# Patient Record
Sex: FEMALE | Race: WHITE | HISPANIC OR LATINO | Employment: OTHER | URBAN - METROPOLITAN AREA
[De-identification: names, ages, dates, MRNs, and addresses within clinical notes are randomized per-mention and may not be internally consistent; named-entity substitution may affect disease eponyms.]

---

## 2017-01-09 ENCOUNTER — OFFICE VISIT (OUTPATIENT)
Dept: SPORTS MEDICINE | Facility: CLINIC | Age: 82
End: 2017-01-09
Payer: MEDICARE

## 2017-01-09 VITALS — WEIGHT: 180 LBS | BODY MASS INDEX: 36.29 KG/M2 | HEIGHT: 59 IN | TEMPERATURE: 98 F

## 2017-01-09 DIAGNOSIS — M17.0 PRIMARY OSTEOARTHRITIS OF BOTH KNEES: Primary | ICD-10-CM

## 2017-01-09 PROCEDURE — 20611 DRAIN/INJ JOINT/BURSA W/US: CPT | Mod: 50,PBBFAC,PO | Performed by: FAMILY MEDICINE

## 2017-01-09 PROCEDURE — 99999 PR PBB SHADOW E&M-EST. PATIENT-LVL III: CPT | Mod: PBBFAC,,, | Performed by: FAMILY MEDICINE

## 2017-01-09 PROCEDURE — 99499 UNLISTED E&M SERVICE: CPT | Mod: S$PBB,,, | Performed by: FAMILY MEDICINE

## 2017-01-09 PROCEDURE — 99213 OFFICE O/P EST LOW 20 MIN: CPT | Mod: PBBFAC,PO | Performed by: FAMILY MEDICINE

## 2017-01-09 RX ORDER — TRIAMCINOLONE ACETONIDE 40 MG/ML
40 INJECTION, SUSPENSION INTRA-ARTICULAR; INTRAMUSCULAR
Status: DISCONTINUED | OUTPATIENT
Start: 2017-01-09 | End: 2017-01-09 | Stop reason: HOSPADM

## 2017-01-09 RX ADMIN — TRIAMCINOLONE ACETONIDE 40 MG: 40 INJECTION, SUSPENSION INTRA-ARTICULAR; INTRAMUSCULAR at 05:01

## 2017-01-09 NOTE — PROCEDURES
Large Joint Aspiration/Injection  Date/Time: 1/9/2017 5:56 PM  Performed by: MADALYN THAO  Authorized by: MADALYN THAO     Consent Done?:  Yes (Verbal)  Indications:  Pain  Procedure site marked: Yes    Timeout: Prior to procedure the correct patient, procedure, and site was verified      Location:  Knee  Site:  R knee and L knee  Prep: Patient was prepped and draped in usual sterile fashion    Ultrasonic Guidance for needle placement: Yes  Images are saved and documented.  Needle size:  18 G  Approach:  Lateral  Medications:  40 mg triamcinolone acetonide 40 mg/mL; 40 mg triamcinolone acetonide 40 mg/mL  Patient tolerance:  Patient tolerated the procedure well with no immediate complications    Additional Comments: Description of ultrasound utilization for needle guidance:   Ultrasound guidance used for needle localization.  Images saved and stored for documentation.  The knee joint was visualized.  Dynamic visualization of the 20g x 1.5  needle was continuous throughout the procedure.

## 2017-01-09 NOTE — MR AVS SNAPSHOT
[facility]  [address]  [facility] [address]  Phone: [phone]                  Marycruz Perez   2017 5:00 PM   Appointment    Description:  Female : 1/3/1930   Provider:  Bang Rob MD   Department:  [facility]                To Do List           Future Appointments        Provider Department Dept Phone    2017 5:00 PM Bang Rob MD [facility] [phone]    2017 5:00 PM Bang Rob MD [facility] [phone]      Goals (5 Years of Data)     None      [facility]     [facility] On Call Nurse Care Line -  Assistance  Registered nurses in the [facility] On Call Center provide clinical advisement, health education, appointment booking, and other advisory services.  Call for this free service at [phone].             Medications           Message regarding Medications     Verify the changes and/or additions to your medication regime listed below are the same as discussed with your clinician today.  If any of these changes or additions are incorrect, please notify your healthcare provider.             Verify that the below list of medications is an accurate representation of the medications you are currently taking.  If none reported, the list may be blank. If incorrect, please contact your healthcare provider. Carry this list with you in case of emergency.           Current Medications     amlodipine (NORVASC) 5 MG tablet     clopidogrel (PLAVIX) 75 mg tablet TK 1 T PO QD    potassium chloride (MICRO-K) 10 MEQ CpSR TK 1 C PO D    tramadol (ULTRAM) 50 mg tablet TK 1 T PO TID PRN P    valsartan (DIOVAN) 160 MG tablet TK 1 T PO D    VOLTAREN 1 % Gel EUGENE 4 GRAMS TID PRN P           Clinical Reference Information           Allergies as of 2017     Codeine    Penicillins      Immunizations Administered on Date of Encounter - 2017     None      [facility] Sign-Up     Activating  your [facility] account is as easy as 1-2-3!     1) Visit [email or web address], select Sign Up Now, enter this activation code and your date of birth, then select Next.  [email or web address]  Expires: 1/20/2017  3:36 PM      2) Create a username and password to use when you visit [facility] in the future and select a security question in case you lose your password and select Next.    3) Enter your e-mail address and click Sign Up!    Additional Information  If you have questions, please e-mail [email or web address] or call [phone] to talk to our [facility] staff. Remember, [facility] is NOT to be used for urgent needs. For medical emergencies, dial 911.

## 2017-01-16 ENCOUNTER — OFFICE VISIT (OUTPATIENT)
Dept: SPORTS MEDICINE | Facility: CLINIC | Age: 82
End: 2017-01-16
Payer: MEDICARE

## 2017-01-16 VITALS — TEMPERATURE: 98 F | HEIGHT: 58 IN | WEIGHT: 180 LBS | BODY MASS INDEX: 37.79 KG/M2

## 2017-01-16 DIAGNOSIS — M17.0 PRIMARY OSTEOARTHRITIS OF BOTH KNEES: Primary | ICD-10-CM

## 2017-01-16 PROCEDURE — 99999 PR PBB SHADOW E&M-EST. PATIENT-LVL III: CPT | Mod: PBBFAC,,, | Performed by: FAMILY MEDICINE

## 2017-01-16 PROCEDURE — 20610 DRAIN/INJ JOINT/BURSA W/O US: CPT | Mod: 50,PBBFAC,PO | Performed by: FAMILY MEDICINE

## 2017-01-16 PROCEDURE — 99499 UNLISTED E&M SERVICE: CPT | Mod: S$PBB,,, | Performed by: FAMILY MEDICINE

## 2017-01-16 PROCEDURE — 99213 OFFICE O/P EST LOW 20 MIN: CPT | Mod: PBBFAC,PO | Performed by: FAMILY MEDICINE

## 2017-01-16 RX ORDER — HYALURONATE SODIUM 20 MG/2 ML
20 SYRINGE (ML) INTRAARTICULAR
Status: DISCONTINUED | OUTPATIENT
Start: 2017-01-16 | End: 2017-01-16 | Stop reason: HOSPADM

## 2017-01-16 RX ADMIN — Medication 20 MG: at 05:01

## 2017-01-16 NOTE — MR AVS SNAPSHOT
[facility]  [address]  [facility] [address]  Phone: [phone]                  Marycruz Perez   2017 5:00 PM   Appointment    Description:  Female : 1/3/1930   Provider:  Bang Rob MD   Department:  [facility]                To Do List           Future Appointments        Provider Department Dept Phone    2017 5:00 PM Bang Rob MD [facility] [phone]      Goals (5 Years of Data)     None      [facility]     [facility] On Call Nurse Care Line -  Assistance  Registered nurses in the [facility] On Call Center provide clinical advisement, health education, appointment booking, and other advisory services.  Call for this free service at [phone].             Medications           Message regarding Medications     Verify the changes and/or additions to your medication regime listed below are the same as discussed with your clinician today.  If any of these changes or additions are incorrect, please notify your healthcare provider.             Verify that the below list of medications is an accurate representation of the medications you are currently taking.  If none reported, the list may be blank. If incorrect, please contact your healthcare provider. Carry this list with you in case of emergency.           Current Medications     amlodipine (NORVASC) 5 MG tablet     clopidogrel (PLAVIX) 75 mg tablet TK 1 T PO QD    potassium chloride (MICRO-K) 10 MEQ CpSR TK 1 C PO D    tramadol (ULTRAM) 50 mg tablet TK 1 T PO TID PRN P    valsartan (DIOVAN) 160 MG tablet TK 1 T PO D    VOLTAREN 1 % Gel EUGENE 4 GRAMS TID PRN P           Clinical Reference Information           Allergies as of 2017     Codeine    Penicillins      Immunizations Administered on Date of Encounter - 2017     None      [facility] Sign-Up     Activating your [facility] account is as easy as 1-2-3!     1) Visit [email or web address], select Sign  Up Now, enter this activation code and your date of birth, then select Next.  [email or web address]  Expires: 1/20/2017  3:36 PM      2) Create a username and password to use when you visit [facility] in the future and select a security question in case you lose your password and select Next.    3) Enter your e-mail address and click Sign Up!    Additional Information  If you have questions, please e-mail [email or web address] or call [phone] to talk to our [facility] staff. Remember, [facility] is NOT to be used for urgent needs. For medical emergencies, dial 911.

## 2017-01-16 NOTE — PROCEDURES
Large Joint Aspiration/Injection  Date/Time: 1/16/2017 5:41 PM  Performed by: MADALYN THAO  Authorized by: MADALYN THAO     Consent Done?:  Yes (Verbal)  Indications:  Pain  Procedure site marked: Yes    Timeout: Prior to procedure the correct patient, procedure, and site was verified      Location:  Knee  Site:  R knee and L knee  Prep: Patient was prepped and draped in usual sterile fashion    Ultrasonic Guidance for needle placement: No  Needle size:  20 G  Approach:  Anterior  Medications:  20 mg EUFLEXXA 10 mg/mL; 20 mg EUFLEXXA 10 mg/mL  Aspirate amount (ml):  20  Aspirate:  Clear  Patient tolerance:  Patient tolerated the procedure well with no immediate complications    Additional Comments: Description of ultrasound utilization for needle guidance:   Ultrasound guidance used for needle localization.  Images saved and stored for documentation.  The knee joint was visualized.  Dynamic visualization of the 20g x 1.5  needle was continuous throughout the procedure.

## 2017-03-05 ENCOUNTER — HOSPITAL ENCOUNTER (EMERGENCY)
Facility: HOSPITAL | Age: 82
Discharge: HOME | End: 2017-03-05
Attending: EMERGENCY MEDICINE
Payer: MEDICARE

## 2017-03-05 VITALS
RESPIRATION RATE: 20 BRPM | DIASTOLIC BLOOD PRESSURE: 69 MMHG | WEIGHT: 180 LBS | OXYGEN SATURATION: 99 % | TEMPERATURE: 98 F | BODY MASS INDEX: 36.29 KG/M2 | SYSTOLIC BLOOD PRESSURE: 154 MMHG | HEIGHT: 59 IN | HEART RATE: 90 BPM

## 2017-03-05 DIAGNOSIS — R00.2 PALPITATIONS: ICD-10-CM

## 2017-03-05 LAB
ALBUMIN SERPL BCP-MCNC: 3.2 G/DL
ALP SERPL-CCNC: 102 U/L
ALT SERPL W/O P-5'-P-CCNC: 9 U/L
ANION GAP SERPL CALC-SCNC: 11 MMOL/L
ANISOCYTOSIS BLD QL SMEAR: SLIGHT
AST SERPL-CCNC: 15 U/L
BASOPHILS # BLD AUTO: NORMAL K/UL
BASOPHILS NFR BLD: 0 %
BILIRUB SERPL-MCNC: 0.3 MG/DL
BUN SERPL-MCNC: 19 MG/DL
CALCIUM SERPL-MCNC: 8.9 MG/DL
CHLORIDE SERPL-SCNC: 108 MMOL/L
CO2 SERPL-SCNC: 21 MMOL/L
CREAT SERPL-MCNC: 0.9 MG/DL
DIFFERENTIAL METHOD: NORMAL
EOSINOPHIL # BLD AUTO: NORMAL K/UL
EOSINOPHIL NFR BLD: 0 %
ERYTHROCYTE [DISTWIDTH] IN BLOOD BY AUTOMATED COUNT: 13.9 %
EST. GFR  (AFRICAN AMERICAN): >60 ML/MIN/1.73 M^2
EST. GFR  (NON AFRICAN AMERICAN): 58 ML/MIN/1.73 M^2
GLUCOSE SERPL-MCNC: 113 MG/DL
HCT VFR BLD AUTO: 37.1 %
HGB BLD-MCNC: 12.2 G/DL
INR PPP: 1
LYMPHOCYTES # BLD AUTO: NORMAL K/UL
LYMPHOCYTES NFR BLD: 39 %
MAGNESIUM SERPL-MCNC: 1.8 MG/DL
MCH RBC QN AUTO: 29.3 PG
MCHC RBC AUTO-ENTMCNC: 32.9 %
MCV RBC AUTO: 89 FL
MONOCYTES # BLD AUTO: NORMAL K/UL
MONOCYTES NFR BLD: 6 %
NEUTROPHILS NFR BLD: 54 %
NEUTS BAND NFR BLD MANUAL: 1 %
PLATELET # BLD AUTO: 188 K/UL
PLATELET BLD QL SMEAR: NORMAL
PMV BLD AUTO: 11 FL
POTASSIUM SERPL-SCNC: 3.8 MMOL/L
PROT SERPL-MCNC: 6.8 G/DL
PROTHROMBIN TIME: 10.5 SEC
RBC # BLD AUTO: 4.16 M/UL
SODIUM SERPL-SCNC: 140 MMOL/L
TROPONIN I SERPL DL<=0.01 NG/ML-MCNC: 0.01 NG/ML
TSH SERPL DL<=0.005 MIU/L-ACNC: 2.33 UIU/ML
WBC # BLD AUTO: 7.22 K/UL

## 2017-03-05 PROCEDURE — 84443 ASSAY THYROID STIM HORMONE: CPT

## 2017-03-05 PROCEDURE — 85610 PROTHROMBIN TIME: CPT

## 2017-03-05 PROCEDURE — 84484 ASSAY OF TROPONIN QUANT: CPT

## 2017-03-05 PROCEDURE — 99284 EMERGENCY DEPT VISIT MOD MDM: CPT

## 2017-03-05 PROCEDURE — 83735 ASSAY OF MAGNESIUM: CPT

## 2017-03-05 PROCEDURE — 85007 BL SMEAR W/DIFF WBC COUNT: CPT

## 2017-03-05 PROCEDURE — 80053 COMPREHEN METABOLIC PANEL: CPT

## 2017-03-05 PROCEDURE — 85027 COMPLETE CBC AUTOMATED: CPT

## 2017-03-05 PROCEDURE — 93005 ELECTROCARDIOGRAM TRACING: CPT

## 2017-03-05 NOTE — ED AVS SNAPSHOT
[facility] [address]  [place] [address]               Marycruz Perez   3/5/2017 12:31 AM   ED    Description:  Female : 1/3/1930   Department:  [facility]           Your Care was Coordinated By:     Provider Role From To    Osei Madrid MD Attending Provider 17 0052 --      Reason for Visit     Palpitations           Diagnoses this Visit        Comments    Palpitations           ED Disposition     None           To Do List           Follow-up Information     Follow up with Zelalem Puente MD.    Specialty:  Cardiology    Why:  For re-evaluation of your symptoms    Contact information:    [address]  [id number]  [place] [address]  [phone]        [facility]     [facility] Nurse Care [facility] -  Assistance  Registered nurses in the [facility] Center provide clinical advisement, health education, appointment booking, and other advisory services.  Call for this free service at [phone].             Medications           Message regarding Medications     Verify the changes and/or additions to your medication regime listed below are the same as discussed with your clinician today.  If any of these changes or additions are incorrect, please notify your healthcare provider.             Verify that the below list of medications is an accurate representation of the medications you are currently taking.  If none reported, the list may be blank. If incorrect, please contact your healthcare provider. Carry this list with you in case of emergency.           Current Medications     amlodipine (NORVASC) 5 MG tablet     clopidogrel (PLAVIX) 75 mg tablet TK 1 T PO QD    potassium chloride (MICRO-K) 10 MEQ CpSR TK 1 C PO D    tramadol (ULTRAM) 50 mg tablet TK 1 T PO TID PRN P    valsartan (DIOVAN) 160 MG tablet TK 1 T PO D    VOLTAREN 1 % Gel EUGENE 4 GRAMS TID PRN P           Clinical Reference Information           Your  "Vitals Were     BP Pulse Temp Resp Height Weight    154/69 90 98.4 °F (36.9 °C) (Oral) 20 4' 11" (1.499 m) 81.6 kg (180 lb)    SpO2 BMI             99% 36.36 kg/m2         Allergies as of 3/5/2017        Reactions    Codeine     Penicillins       Immunizations Administered on Date of Encounter - 3/5/2017     None      ED Micro, Lab, POCT     Start Ordered       Status Ordering Provider    03/05/17 0054 03/05/17 0053  CBC auto differential  STAT      Final result     03/05/17 0054 03/05/17 0053  Comprehensive metabolic panel  STAT      Final result     03/05/17 0054 03/05/17 0053  Magnesium  STAT      Final result     03/05/17 0054 03/05/17 0053  Protime-INR  STAT      Final result     03/05/17 0054 03/05/17 0053  Troponin I  STAT      Final result     03/05/17 0054 03/05/17 0053  TSH  STAT      Final result       ED Imaging Orders     Start Ordered       Status Ordering Provider    03/05/17 0110 03/05/17 0110  X-Ray Chest 1 View  1 time imaging      Final result         Discharge Instructions         Monitor your symptoms closely.  Follow up with your cardiologist.      Understanding Heart Palpitations    Heart palpitations are a symptom. Its the feeling you have when your heartbeat seems to be racing, pounding, skipping, or fluttering. Heart palpitations are most often felt in the chest. Sometimes, they may also be felt in the neck.  What causes heart palpitations?  In most cases, heart palpitations are caused by:  · Stress or anxiety  · Exercise  · Pregnancy  · Some medicines  · Caffeine  · Nicotine  · Alcohol  · Illegal drugs, such as cocaine  · Health problems, such as anemia or overactive thyroid  In some cases, heart palpitations may be caused by a problem with the heart. Abnormal heart rhythms (arrhythmias) are the main concern. They may need to be managed by you and your healthcare provider or treated right away.  How are heart palpitations treated?  Treatments for heart palpitations depend on the cause. " Options may include:  · Managing the things that trigger your heart palpitations. This could mean:  ¨ Learning ways to reduce stress and anxiety  ¨ Avoiding caffeine, nicotine, alcohol, or illegal drugs  ¨ Stopping the use of certain medicines, under your doctors guidance  · Medicines, procedures, or surgery to treat an arrhythmia or other health problem that is causing your symptoms  What are the complications of heart palpitations?  Complications of heart palpitations are rare unless they are caused by a problem such as an arrhythmia. In such cases, complications can include:  · Fainting  · Heart failure. This problem occurs when the heart is so weak it no longer pumps blood well.  · Blood clots and stroke  · Sudden cardiac arrest. This problem occurs when the heart suddenly stops beating.  When should I call my healthcare provider?  Call your healthcare provider right away if you have any of these:  · Fever of 100.4°F (38°C) or higher, or as directed  · Symptoms that dont get better with treatment, or symptoms that get worse  · New symptoms, such as chest pain, shortness of breath, dizziness, or fainting   Date Last Reviewed: 5/1/2016  © 9597-6698 [employer]. [address] [place] [address]. All rights reserved. This information is not intended as a substitute for professional medical care. Always follow your healthcare professional's instructions.          [facility] Sign-Up     Activating your [facility] account is as easy as 1-2-3!     1) Visit [email or web address], select Sign Up Now, enter this activation code and your date of birth, then select Next.  [email or web address]  Expires: 4/19/2017  4:19 AM      2) Create a username and password to use when you visit [facility] in the future and select a security question in case you lose your password and select Next.    3) Enter your e-mail address and click Sign Up!    Additional Information  If you have questions, please e-mail  [email or web address] or call [phone] to talk to our [facility] staff. Remember, [facility] is NOT to be used for urgent needs. For medical emergencies, dial 911.          [facility] complies with applicable Federal civil rights laws and does not discriminate on the basis of race, color, national origin, age, disability, or sex.        Language Assistance Services     ATTENTION: Language assistance services are available, free of charge. Please call [phone].      ATENCIÓN: Si habla español, tiene a rome disposición servicios gratuitos de asistencia lingüística. Llame al [phone].     CHÚ Ý: N?u b?n nói Ti?ng Vi?t, có các d?ch v? h? tr? ngôn ng? mi?n phí dành cho b?n. G?i s? [phone].

## 2017-03-05 NOTE — ED PROVIDER NOTES
Encounter Date: 3/5/2017       History     Chief Complaint   Patient presents with    Palpitations     palpitations for one hour.  No chest pain.     Review of patient's allergies indicates:   Allergen Reactions    Codeine     Penicillins      HPI Comments: 87-year-old female presents with palpitations that have been occurring intermittently throughout the day.  She states that she did not sleep well last night and has not been feeling well all day.  She reports fatigue, but denies any chest pain nausea, vomiting or fever.  The palpitations were persistent for approximately 1 hour prior to arrival but she states that they have spontaneously improved.  No exacerbating relieving factors.    The history is provided by the patient.     Past Medical History:   Diagnosis Date    Hypertension      Past Surgical History:   Procedure Laterality Date    CARDIAC PACEMAKER PLACEMENT      CAROTID STENT       History reviewed. No pertinent family history.  Social History   Substance Use Topics    Smoking status: Never Smoker    Smokeless tobacco: Never Used    Alcohol use None     Review of Systems   Constitutional: Positive for activity change, appetite change and fatigue. Negative for fever.   HENT: Negative for sore throat.    Respiratory: Negative for shortness of breath.    Cardiovascular: Positive for palpitations. Negative for chest pain.   Gastrointestinal: Negative for nausea.   Genitourinary: Negative for dysuria.   Musculoskeletal: Negative for back pain.   Skin: Negative for rash.   Neurological: Negative for weakness.   Hematological: Does not bruise/bleed easily.   All other systems reviewed and are negative.      Physical Exam   Initial Vitals   BP Pulse Resp Temp SpO2   03/05/17 0028 03/05/17 0028 03/05/17 0028 03/05/17 0028 03/05/17 0028   192/76 62 16 98.4 °F (36.9 °C) 97 %     Physical Exam    Vitals reviewed.  Constitutional: Vital signs are normal. She appears well-developed and well-nourished.    HENT:   Head: Normocephalic and atraumatic.   Mouth/Throat: Oropharynx is clear and moist.   Eyes: Conjunctivae and EOM are normal. Pupils are equal, round, and reactive to light.   Neck: Trachea normal and normal range of motion. Neck supple.   Cardiovascular: Normal rate, regular rhythm and normal pulses.   Murmur heard.  Pulmonary/Chest: Breath sounds normal. No respiratory distress.   Abdominal: Soft. Normal appearance and bowel sounds are normal. There is no tenderness.   Musculoskeletal: Normal range of motion.   Neurological: She is alert and oriented to person, place, and time.   Skin: Skin is warm and dry.         ED Course   Procedures  Labs Reviewed   CBC W/ AUTO DIFFERENTIAL   COMPREHENSIVE METABOLIC PANEL   MAGNESIUM   PROTIME-INR   TROPONIN I   TSH     EKG Readings: (Independently Interpreted)   Initial Reading: No STEMI. Rhythm: Normal Sinus Rhythm. Heart Rate: 61.   Paced rhythm        X-Rays:   Independently Interpreted Readings:   Chest X-Ray: Cardiomegaly present. There is a pacemaker present in the right upper chest.     Medical Decision Making:   History:   Old Medical Records: I decided to obtain old medical records.  Old Records Summarized: records from clinic visits and records from previous admission(s).  Initial Assessment:   Emergent evaluation of palpitations  Differential Diagnosis:   Lethal dysrhythmia, ACS, I abnormality, pacemaker malfunction  Independently Interpreted Test(s):   I have ordered and independently interpreted X-rays - see prior notes.  I have ordered and independently interpreted EKG Reading(s) - see prior notes  Clinical Tests:   Lab Tests: Reviewed and Ordered  Radiological Study: Reviewed and Ordered  Medical Tests: Reviewed and Ordered  ED Management:  Initial emergency department plan includes cardiac monitoring, lab work.  EKG does not indicate a clear dysrhythmia.  We'll monitor closely and repeat EKG.  We'll check labs.  I discussed with [employer] technician  who states that the patient has a single lead pacemaker with no atrial wire, she states there will be limited data obtained from a interrogation of the device.  She states that unless the patient was in ventricular tachycardia greater than 180 bpm, interrogation would not reveal significant information.  Other:   I have discussed this case with another health care provider.              Attending Attestation:             Attending ED Notes:   Patient had no additional episodes of palpitations during her emergency department stay.  She maintained normal sinus rhythm on cardiac monitoring.  Her lab work was unremarkable.  This time I feel she is safe for discharge home.  She understands to follow-up with her cardiologist on Monday.  She was chance a monitor symptoms and return if there are any additional symptoms.          ED Course     Clinical Impression:   The encounter diagnosis was Palpitations.    Disposition:   Disposition: Discharged  Condition: Stable       Osei Madrid MD  03/05/17 2471

## 2017-03-05 NOTE — DISCHARGE INSTRUCTIONS
Monitor your symptoms closely.  Follow up with your cardiologist.      Understanding Heart Palpitations    Heart palpitations are a symptom. Its the feeling you have when your heartbeat seems to be racing, pounding, skipping, or fluttering. Heart palpitations are most often felt in the chest. Sometimes, they may also be felt in the neck.  What causes heart palpitations?  In most cases, heart palpitations are caused by:  · Stress or anxiety  · Exercise  · Pregnancy  · Some medicines  · Caffeine  · Nicotine  · Alcohol  · Illegal drugs, such as cocaine  · Health problems, such as anemia or overactive thyroid  In some cases, heart palpitations may be caused by a problem with the heart. Abnormal heart rhythms (arrhythmias) are the main concern. They may need to be managed by you and your healthcare provider or treated right away.  How are heart palpitations treated?  Treatments for heart palpitations depend on the cause. Options may include:  · Managing the things that trigger your heart palpitations. This could mean:  ¨ Learning ways to reduce stress and anxiety  ¨ Avoiding caffeine, nicotine, alcohol, or illegal drugs  ¨ Stopping the use of certain medicines, under your doctors guidance  · Medicines, procedures, or surgery to treat an arrhythmia or other health problem that is causing your symptoms  What are the complications of heart palpitations?  Complications of heart palpitations are rare unless they are caused by a problem such as an arrhythmia. In such cases, complications can include:  · Fainting  · Heart failure. This problem occurs when the heart is so weak it no longer pumps blood well.  · Blood clots and stroke  · Sudden cardiac arrest. This problem occurs when the heart suddenly stops beating.  When should I call my healthcare provider?  Call your healthcare provider right away if you have any of these:  · Fever of 100.4°F (38°C) or higher, or as directed  · Symptoms that dont get better with  treatment, or symptoms that get worse  · New symptoms, such as chest pain, shortness of breath, dizziness, or fainting   Date Last Reviewed: 5/1/2016  © 0184-4390 [employer]. [address] [place] [address], [place] [address]. All rights reserved. This information is not intended as a substitute for professional medical care. Always follow your healthcare professional's instructions.

## 2017-03-06 DIAGNOSIS — R00.2 PALPITATIONS: Primary | ICD-10-CM

## 2018-11-29 ENCOUNTER — TELEPHONE (OUTPATIENT)
Dept: SPORTS MEDICINE | Facility: CLINIC | Age: 83
End: 2018-11-29

## 2018-11-29 NOTE — TELEPHONE ENCOUNTER
Scheduled patient 18.12.05. @ 2:30pm    Curtis Odell   Sports Medicine Assistant   [facility]     ----- Message from Curtis Odell MA sent at 11/29/2018  2:49 PM CST -----  Contact: Self      ----- Message -----  From: Bernarda King  Sent: 11/29/2018  12:54 PM  To: [facility] Staff    Patient injured RT knee and is requesting to be seen tomorrow or Monday after 3:00. Nothing showing later than 2:30 on .  [phone]

## 2018-12-05 ENCOUNTER — OFFICE VISIT (OUTPATIENT)
Dept: SPORTS MEDICINE | Facility: CLINIC | Age: 83
End: 2018-12-05
Payer: MEDICARE

## 2018-12-05 ENCOUNTER — HOSPITAL ENCOUNTER (OUTPATIENT)
Dept: RADIOLOGY | Facility: HOSPITAL | Age: 83
Discharge: HOME | End: 2018-12-05
Attending: FAMILY MEDICINE
Payer: MEDICARE

## 2018-12-05 VITALS — HEIGHT: 59 IN | BODY MASS INDEX: 35.28 KG/M2 | TEMPERATURE: 98 F | WEIGHT: 175 LBS

## 2018-12-05 DIAGNOSIS — G89.29 CHRONIC PAIN OF BOTH KNEES: ICD-10-CM

## 2018-12-05 DIAGNOSIS — R53.81 PHYSICAL DECONDITIONING: ICD-10-CM

## 2018-12-05 DIAGNOSIS — M25.561 CHRONIC PAIN OF BOTH KNEES: ICD-10-CM

## 2018-12-05 DIAGNOSIS — M17.0 PRIMARY OSTEOARTHRITIS OF KNEES, BILATERAL: Primary | ICD-10-CM

## 2018-12-05 DIAGNOSIS — M25.562 CHRONIC PAIN OF BOTH KNEES: ICD-10-CM

## 2018-12-05 PROCEDURE — 20611 DRAIN/INJ JOINT/BURSA W/US: CPT | Mod: 50,PBBFAC,PO | Performed by: FAMILY MEDICINE

## 2018-12-05 PROCEDURE — 99999 PR PBB SHADOW E&M-EST. PATIENT-LVL III: CPT | Mod: PBBFAC,,, | Performed by: FAMILY MEDICINE

## 2018-12-05 PROCEDURE — 99213 OFFICE O/P EST LOW 20 MIN: CPT | Mod: PBBFAC,25,PO | Performed by: FAMILY MEDICINE

## 2018-12-05 PROCEDURE — 73564 X-RAY EXAM KNEE 4 OR MORE: CPT | Mod: 26,50,, | Performed by: RADIOLOGY

## 2018-12-05 PROCEDURE — 99214 OFFICE O/P EST MOD 30 MIN: CPT | Mod: 25,S$PBB,, | Performed by: FAMILY MEDICINE

## 2018-12-05 PROCEDURE — 73564 X-RAY EXAM KNEE 4 OR MORE: CPT | Mod: TC,50,FY,PO

## 2018-12-05 RX ORDER — TRIAMCINOLONE ACETONIDE 40 MG/ML
40 INJECTION, SUSPENSION INTRA-ARTICULAR; INTRAMUSCULAR
Status: DISCONTINUED | OUTPATIENT
Start: 2018-12-05 | End: 2018-12-05 | Stop reason: HOSPADM

## 2018-12-05 RX ADMIN — TRIAMCINOLONE ACETONIDE 40 MG: 40 INJECTION, SUSPENSION INTRA-ARTICULAR; INTRAMUSCULAR at 11:12

## 2018-12-05 NOTE — PROGRESS NOTES
Marycruz Perez, a 88 y.o. female, presents today for evaluation of her right and left knee.      History of Present Illness (HPI)  Location: anterior knee, R>L  Onset: insidious, chronic  Palliative:    Relative rest   Oral analgesics   L/R, CSI, iaKnee 16.12.06   L/R, CSI, iaKnee 17.01.09    L/R, VSI - Euflexxa, iaKnee, 17.01.16        Provocative:    ADLS   Prolonged ambulation     Prior: none  Progression: worsening discomfort  Quality:    sharp pain  Radiation: none  Severity: per nursing documentation  Timing: intermittent w/ use  Trauma: Patient had recent trauma on 11/29/2018 where she twisted her knee in the shower    Review of Systems (ROS)  A 10+ review of systems was performed with pertinent positives and negatives noted above in the history of present illness. Other systems were negative unless otherwise specified.    Physical Examination (PE)  General:  The patient is alert and oriented x 3. Mood is pleasant. Observation of ears, eyes and nose reveal no gross abnormalities. HEENT: NCAT, sclera anicteric.   Lungs: Respirations are equal and unlabored.  Gait is coordinated. Patient can toe walk and heel walk without difficulty.    Right and left KNEE EXAMINATION    Observation/Inspection  Gait:   Wheelchair, able to stand/not ambulate  Alignment:  Neutral   Scars:   None   Muscle atrophy: Mild  Effusion:  None   Warmth:  None   Discoloration:   none     Tenderness / Crepitus (T / C):         T / C      T / C  Patella   - / -   Lateral joint line   - / -     Peripatellar medial  -  Medial joint line    + / -  Peripatellar lateral -  Medial plica   - / -  Patellar tendon -   Popliteal fossa   - / -  Quad tendon   -   Gastrocnemius   -  Prepatellar Bursa - / -   Quadricep   -  Tibial tubercle  -  Thigh/hamstring  -  Pes anserine/HS -  Fibula    -  ITB   - / -  Tibia     -  Tib/fib joint  - / -  LCL    -    MFC   + / -   MCL: Proximal  -    LFC   - / -   Distal    -          ROM: (* =  pain)  PASSIVE   ACTIVE    Left :   5 / 0 / 145*   5 / 0 / 145*     Right :    5 / 0 / 145*   5 / 0 / 145*    Patellofemoral examination:  See above noted areas of tenderness.   Patella position    Subluxation / dislocation: Centered        Sup. / Inf;   Normal   Crepitus (PF):    Absent   Patellar Mobility:       Medial-lateral:   Normal    Superior-inferior:  Normal    Inferior tilt   Normal    Patellar tendon:  Normal   Lateral tilt:    Normal   J-sign:     None   Patellofemoral grind:   No pain     Meniscal Signs:     Pain on terminal extension:  +  Pain on terminal flexion:  +  Louises maneuver:  +*  Squat     NT    Ligament Examination:  ACL / Lachman:  WNL  PCL-Post.  drawer: normal 0 to 2mm  MCL- Valgus:  normal 0 to 2mm  LCL- Varus:    normal 0 to 2mm  Pivot shift:  guarding   Dial Test:   difference c/w other side   At 30° flexion: normal (< 5°)    At 90° flexion: normal (< 5°)   Reverse Pivot Shift:   normal (Equal)     Strength: (* = with pain) Painful Side  Quadriceps   4/5  Hamstrin/5    Extremity Neuro-vascular Examination:   Sensation:  Grossly intact to light touch all dermatomal regions.   Motor Function:  Fully intact motor function at hip, knee, foot and ankle    DTRs;  quadriceps and  achilles 2+.  No clonus and downgoing Babinski.    Vascular status:  DP and PT pulses 2+, brisk capillary refill, symmetric.     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 surg candidate given pacemaker and cardiac hx     The patient chooses #2 and #3 csi iaknee bilat    Pain management: handout given  Bracing:   Physical therapy: fPT, @ [facility], begin as above   Activity (e.g. sports, work) restrictions: as tolerated   school/vocation:     Follow up in 12 w  A/e fPT, a/e csi iaknee bilat  Should symptoms worsen or fail to resolve, consider:  Revisiting the above options

## 2018-12-05 NOTE — PROCEDURES
"Large Joint Aspiration/Injection: R knee, L knee  Date/Time: 12/5/2018 11:32 AM  Performed by: Bang Rob MD  Authorized by: Bang Rob MD     Consent Done?:  Yes (Verbal)  Indications:  Pain  Procedure site marked: Yes    Timeout: Prior to procedure the correct patient, procedure, and site was verified      Location:  Knee  Site:  R knee and L knee  Prep: Patient was prepped and draped in usual sterile fashion    Ultrasonic Guidance for needle placement: Yes  Images are saved and documented.  Needle size:  20 G  Approach:  Lateral  Medications:  40 mg triamcinolone acetonide 40 mg/mL; 40 mg triamcinolone acetonide 40 mg/mL  Patient tolerance:  Patient tolerated the procedure well with no immediate complications    Additional Comments: Description of ultrasound utilization for needle guidance:   Ultrasound guidance used for needle localization. Images saved and stored for documentation. The knee joint was visualized. Dynamic visualization of the 20g x 1.5" needle was continuous throughout the procedure.      "

## 2019-03-22 ENCOUNTER — HOSPITAL ENCOUNTER (EMERGENCY)
Facility: HOSPITAL | Age: 84
Discharge: HOME | End: 2019-03-22
Attending: EMERGENCY MEDICINE
Payer: MEDICARE

## 2019-03-22 VITALS
WEIGHT: 178 LBS | BODY MASS INDEX: 35.88 KG/M2 | HEIGHT: 59 IN | HEART RATE: 91 BPM | RESPIRATION RATE: 19 BRPM | TEMPERATURE: 98 F | DIASTOLIC BLOOD PRESSURE: 67 MMHG | SYSTOLIC BLOOD PRESSURE: 165 MMHG | OXYGEN SATURATION: 96 %
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
## 2019-03-22 DIAGNOSIS — S01.81XA LACERATION OF FOREHEAD, INITIAL ENCOUNTER: ICD-10-CM

## 2019-03-22 DIAGNOSIS — M25.569 KNEE PAIN: ICD-10-CM

## 2019-03-22 DIAGNOSIS — M25.521 RIGHT ELBOW PAIN: ICD-10-CM

## 2019-03-22 DIAGNOSIS — S00.83XA CONTUSION OF FOREHEAD, INITIAL ENCOUNTER: Primary | ICD-10-CM

## 2019-03-22 PROCEDURE — 25000003 PHARM REV CODE 250: Performed by: PHYSICIAN ASSISTANT

## 2019-03-22 PROCEDURE — 90471 IMMUNIZATION ADMIN: CPT | Performed by: PHYSICIAN ASSISTANT

## 2019-03-22 PROCEDURE — 99284 EMERGENCY DEPT VISIT MOD MDM: CPT | Mod: 25

## 2019-03-22 PROCEDURE — 90715 TDAP VACCINE 7 YRS/> IM: CPT | Performed by: PHYSICIAN ASSISTANT

## 2019-03-22 PROCEDURE — 63600175 PHARM REV CODE 636 W HCPCS: Performed by: PHYSICIAN ASSISTANT

## 2019-03-22 RX ORDER — HYDROCODONE BITARTRATE AND ACETAMINOPHEN 5; 325 MG/1; MG/1
1 TABLET ORAL
Status: COMPLETED | OUTPATIENT
Start: 2019-03-22 | End: 2019-03-22

## 2019-03-22 RX ORDER — NAPROXEN 500 MG/1
500 TABLET ORAL 2 TIMES DAILY WITH MEALS
Qty: 14 TABLET | Refills: 0 | Status: SHIPPED | OUTPATIENT
Start: 2019-03-22 | End: 2019-10-31

## 2019-03-22 RX ORDER — LIDOCAINE HYDROCHLORIDE 10 MG/ML
10 INJECTION INFILTRATION; PERINEURAL
Status: DISCONTINUED | OUTPATIENT
Start: 2019-03-22 | End: 2019-03-22 | Stop reason: HOSPADM

## 2019-03-22 RX ADMIN — CLOSTRIDIUM TETANI TOXOID ANTIGEN (FORMALDEHYDE INACTIVATED), CORYNEBACTERIUM DIPHTHERIAE TOXOID ANTIGEN (FORMALDEHYDE INACTIVATED), BORDETELLA PERTUSSIS TOXOID ANTIGEN (GLUTARALDEHYDE INACTIVATED), BORDETELLA PERTUSSIS FILAMENTOUS HEMAGGLUTININ ANTIGEN (FORMALDEHYDE INACTIVATED), BORDETELLA PERTUSSIS PERTACTIN ANTIGEN, AND BORDETELLA PERTUSSIS FIMBRIAE 2/3 ANTIGEN 0.5 ML: 5; 2; 2.5; 5; 3; 5 INJECTION, SUSPENSION INTRAMUSCULAR at 12:03

## 2019-03-22 RX ADMIN — HYDROCODONE BITARTRATE AND ACETAMINOPHEN 1 TABLET: 5; 325 TABLET ORAL at 10:03

## 2019-03-22 NOTE — ED NOTES
Patient vaccination site checked, patient had no s/s of reaction, discharge instructions administered, family at bedside. Patient verbalized understanding

## 2019-03-22 NOTE — ED NOTES
Assist pt. With bedpan to void. Repositioned in bed for comfort. Family at bedside. Surgery resident at bedside.

## 2019-03-22 NOTE — ED NOTES
Report received care assumed, patient AAOx4 daughter at bedside, laceration tray setup at bedside. Patient updated on care.

## 2019-03-22 NOTE — ED PROVIDER NOTES
Encounter Date: 3/22/2019       History     Chief Complaint   Patient presents with    Fall     Reports fell getting out of shower hitting head. Reports became dizzy prior to fall. Pt has large hematoma to L forehead and reports 2 small lacerations to hematoma. Pt also on Plavix.      Marycruz Perez, a 89 y.o. female that presents to the ED for evaluation after a fall while getting out of the shower PTA.  Patient states that she did feel dizzy upon exiting the shower and her shower shoe got caught on the drain, causing her to fall forward, hitting her head on the wall and landing on her right side and both knees.  Patient states that at her baseline she has knee pain and ambulates with a walker or uses a wheelchair.  She states that she had to crawl to the phone to call EMS, but was not on the ground for an extended amount of time.  No loc.  Patient is currently taking plavix.  Has pain to bilateral knees as well as right elbow.  No previous h/o of fracture or surgery to these sites.      ,       The history is provided by the patient.     Review of patient's allergies indicates:   Allergen Reactions    Codeine     Penicillins     Penicillin Rash     Past Medical History:   Diagnosis Date    Hypertension      Past Surgical History:   Procedure Laterality Date    CARDIAC PACEMAKER PLACEMENT      CAROTID STENT       History reviewed. No pertinent family history.  Social History     Tobacco Use    Smoking status: Never Smoker    Smokeless tobacco: Never Used   Substance Use Topics    Alcohol use: No     Frequency: Never    Drug use: No     Review of Systems   Constitutional: Negative for fever.   HENT:        Bruising to left forehead    Gastrointestinal: Negative for nausea and vomiting.   Musculoskeletal: Negative for neck pain and neck stiffness.   Skin: Positive for color change and wound.   Allergic/Immunologic: Negative for immunocompromised state.   Hematological: Negative for adenopathy.    Psychiatric/Behavioral: Negative for agitation.   All other systems reviewed and are negative.      Physical Exam     Initial Vitals [03/22/19 0938]   BP Pulse Resp Temp SpO2   139/61 77 16 97.5 °F (36.4 °C) (!) 94 %      MAP       --         Physical Exam    Nursing note and vitals reviewed.  Constitutional: She appears well-developed and well-nourished. She is not diaphoretic. No distress.   HENT:   Head: Normocephalic. Head is with contusion and with laceration. Head is without raccoon's eyes, without Strickland's sign, without right periorbital erythema and without left periorbital erythema.       Right Ear: Hearing, tympanic membrane, external ear and ear canal normal.   Left Ear: Hearing, tympanic membrane, external ear and ear canal normal.   Nose: Nose normal.   Mouth/Throat: Oropharynx is clear and moist.   Contusion to left forehead with overlying superficial laceration measuring approximately 1cm.     Eyes: Conjunctivae and EOM are normal.   Neck: Normal range of motion. No spinous process tenderness and no muscular tenderness present. Normal range of motion present. No neck rigidity.   Cardiovascular: Normal rate and regular rhythm.   Pulmonary/Chest: Breath sounds normal. No respiratory distress. She has no wheezes. She has no rhonchi. She has no rales.   Abdominal: Soft. Bowel sounds are normal. She exhibits no distension. There is no tenderness. There is no rebound and no guarding.   Musculoskeletal:        Right elbow: She exhibits decreased range of motion and swelling. She exhibits no effusion, no deformity and no laceration. Tenderness found. Olecranon process tenderness noted. No radial head, no medial epicondyle and no lateral epicondyle tenderness noted.        Right hip: Normal.        Left hip: Normal.        Right knee: She exhibits normal range of motion, no swelling, no effusion, no ecchymosis, no deformity, no laceration, no erythema, normal alignment, no LCL laxity, normal patellar  mobility, no bony tenderness, normal meniscus and no MCL laxity. Tenderness found. Medial joint line tenderness noted. No lateral joint line, no MCL, no LCL and no patellar tendon tenderness noted.        Left knee: She exhibits normal range of motion, no swelling, no effusion, no ecchymosis, no deformity, no laceration, no erythema, normal alignment, no LCL laxity, normal patellar mobility, no bony tenderness, normal meniscus and no MCL laxity. Tenderness found. Medial joint line tenderness noted. No lateral joint line, no MCL, no LCL and no patellar tendon tenderness noted.        Right ankle: Normal.        Left ankle: Normal.        Legs:  All extremities are nvi.  Small abrasion noted to right elbow.  Normal AROM of right elbow and bilateral knees.  No ligamentous laxity noted to bilateral knees.    Neurological: She is alert and oriented to person, place, and time. No cranial nerve deficit. GCS eye subscore is 4. GCS verbal subscore is 5. GCS motor subscore is 6.   Skin: Skin is warm and dry. Capillary refill takes less than 2 seconds. No rash noted. No erythema.   Psychiatric: She has a normal mood and affect. Thought content normal.         ED Course   Lac Repair  Date/Time: 3/22/2019 2:43 PM  Performed by: Valerie Valdez PA-C  Authorized by: Mark Anthony Philip MD   Body area: head/neck  Location details: scalp  Laceration length: 2 cm  Foreign bodies: no foreign bodies  Tendon involvement: none  Nerve involvement: none  Vascular damage: no  Patient sedated: no  Irrigation solution: saline  Irrigation method: syringe  Amount of cleaning: standard  Debridement: none  Degree of undermining: none  Skin closure: Steri-Strips  Number of sutures: 2  Technique: simple  Approximation: loose  Approximation difficulty: simple  Patient tolerance: Patient tolerated the procedure well with no immediate complications        Labs Reviewed - No data to display       Imaging Results          CT Cervical Spine Without Contrast  (Final result)  Result time 03/22/19 12:02:48    Final result by Bang Petit MD (03/22/19 12:02:48)                 Impression:      Left frontal extracranial soft tissue swelling/hematoma.  No evidence of underlying calvarial fracture or acute intracranial hemorrhage.    Chronic ischemic change and generalized cerebral volume loss.    Moderate cervical spondylosis without evidence of acute fracture or traumatic malalignment.      Electronically signed by: Bang Petit MD  Date:    03/22/2019  Time:    12:02             Narrative:    EXAMINATION:  CT HEAD WITHOUT CONTRAST; CT CERVICAL SPINE WITHOUT CONTRAST    CLINICAL HISTORY:  head trauma;; fall;    TECHNIQUE:  Low dose axial CT images obtained throughout the head and cervical spine without intravenous contrast.  Axial, sagittal and coronal reconstructions were performed.    COMPARISON:  None.    FINDINGS:  Head:    Prominence of the ventricles and sulci compatible with mild generalized cerebral volume loss.  No hydrocephalus.    No extra-axial blood or fluid collections.    Mild to moderate chronic microvascular ischemic change.  Remote right caudate head lacunar type infarct.  No parenchymal mass, hemorrhage, edema or recent or remote major vascular distribution infarct.    Left frontal extracranial soft tissue swelling/hematoma.  No calvarial fracture.  Mastoid air cells and paranasal sinuses are essentially clear.    Spine:    The vertebral bodies are normal in height and morphology without evidence of fracture.    Reversal normal cervical lordosis with apex at C5.  No significant spondylolisthesis.    Moderate degenerative change with loss of disc height at C4-5, C5-6 and C6-7.  No bony spinal canal stenosis.  Mild scattered neural foraminal encroachment from uncovertebral and facet hypertrophy.    Limited evaluation of the intraspinal contents demonstrates no evidence of hematoma.    The paraspinal soft tissue structures exhibit no acute  abnormalities.  Scattered vascular calcification.                               CT Head Without Contrast (Final result)  Result time 03/22/19 12:02:48    Final result by Bang Petit MD (03/22/19 12:02:48)                 Impression:      Left frontal extracranial soft tissue swelling/hematoma.  No evidence of underlying calvarial fracture or acute intracranial hemorrhage.    Chronic ischemic change and generalized cerebral volume loss.    Moderate cervical spondylosis without evidence of acute fracture or traumatic malalignment.      Electronically signed by: Bang Petit MD  Date:    03/22/2019  Time:    12:02             Narrative:    EXAMINATION:  CT HEAD WITHOUT CONTRAST; CT CERVICAL SPINE WITHOUT CONTRAST    CLINICAL HISTORY:  head trauma;; fall;    TECHNIQUE:  Low dose axial CT images obtained throughout the head and cervical spine without intravenous contrast.  Axial, sagittal and coronal reconstructions were performed.    COMPARISON:  None.    FINDINGS:  Head:    Prominence of the ventricles and sulci compatible with mild generalized cerebral volume loss.  No hydrocephalus.    No extra-axial blood or fluid collections.    Mild to moderate chronic microvascular ischemic change.  Remote right caudate head lacunar type infarct.  No parenchymal mass, hemorrhage, edema or recent or remote major vascular distribution infarct.    Left frontal extracranial soft tissue swelling/hematoma.  No calvarial fracture.  Mastoid air cells and paranasal sinuses are essentially clear.    Spine:    The vertebral bodies are normal in height and morphology without evidence of fracture.    Reversal normal cervical lordosis with apex at C5.  No significant spondylolisthesis.    Moderate degenerative change with loss of disc height at C4-5, C5-6 and C6-7.  No bony spinal canal stenosis.  Mild scattered neural foraminal encroachment from uncovertebral and facet hypertrophy.    Limited evaluation of the intraspinal contents  demonstrates no evidence of hematoma.    The paraspinal soft tissue structures exhibit no acute abnormalities.  Scattered vascular calcification.                               X-Ray Knee 3 View Bilateral (Final result)  Result time 03/22/19 11:06:22    Final result by Ezekiel Soto MD (03/22/19 11:06:22)                 Impression:      No fracture or dislocation.  No suprapatellar joint effusion.  Moderate to severe bilateral DJD is present.      Electronically signed by: Ezekiel Soto MD  Date:    03/22/2019  Time:    11:06             Narrative:    EXAMINATION:  XR KNEE 3 VIEW BILATERAL    CLINICAL HISTORY:  Pain in unspecified knee    TECHNIQUE:  AP, lateral, and Merchant views of both knees were performed.    COMPARISON:  Bilateral knee radiographs 12/05/2018.    FINDINGS:  No fracture or dislocation.  No suprapatellar joint effusion.  Moderate to severe bilateral DJD is present.                               X-Ray Elbow Complete Right (Final result)  Result time 03/22/19 11:07:19    Final result by Ezekiel Soto MD (03/22/19 11:07:19)                 Impression:      No fracture, dislocation, or joint effusion.      Electronically signed by: Ezekiel Soto MD  Date:    03/22/2019  Time:    11:07             Narrative:    EXAMINATION:  XR ELBOW COMPLETE 3 VIEW RIGHT    CLINICAL HISTORY:  . Pain in right elbow    TECHNIQUE:  AP, lateral, and oblique views of the right elbow were performed.    COMPARISON:  None    FINDINGS:  No fracture, dislocation, or joint effusion.                                 Medical Decision Making:   Initial Assessment:   Scalp hematoma, right elbow pain and bilateral knee pain after fall  Differential Diagnosis:   Fracture, dislocation, ICH, laceration simple vs complex   Clinical Tests:   Radiological Study: Ordered and Reviewed  ED Management:  Patient presents to ED for evaluation after fall in shower.  Laceration overlying contusion to left scalp.  Norco given and tetanus updated.   Patient is neurologically intact.  CT of head and neck show no acute abnormality.  X-rays shows no evidence of fracture.  Patient at this time is stable for discharge with return precautions.  Family at bedside is in agreement with plan.                        Clinical Impression:       ICD-10-CM ICD-9-CM   1. Contusion of forehead, initial encounter S00.83XA 920   2. Right elbow pain M25.521 719.42   3. Knee pain M25.569 719.46   4. Laceration of forehead, initial encounter S01.81XA 873.42   5. Fall, initial encounter W19.XXXA E888.9                                Valerie Valdez PA-C  03/22/19 1442

## 2019-03-22 NOTE — ED NOTES
pt presents to the ED w/ c/ of fall pta. Pt reports that she was getting out of shower when she slipped. Pt denies dizziness at this time. Pt reports tenderness to bilateral knees on palpation. Denies pain to needs on ROM. Pt reports pain to right shoulder and right elbow on movement. Pt does have full ROM on right upper extremity. Pt reports that she is on plavix for pacemaker. Pt denies abd pain or nausea or dizziness at this time. Pt has hematoma to the left side of the forehead. Pt arrives with bleeding controlled and hematoma wrapped. Pt is orientedx4, awake, alert. Pt is connected to cardiac monitor, BP cuff, and pulse ox.

## 2019-10-02 ENCOUNTER — HOSPITAL ENCOUNTER (OUTPATIENT)
Dept: RADIOLOGY | Facility: HOSPITAL | Age: 84
Discharge: HOME | End: 2019-10-02
Attending: FAMILY MEDICINE
Payer: MEDICARE

## 2019-10-02 DIAGNOSIS — M25.569 PAIN IN JOINT, LOWER LEG: ICD-10-CM

## 2019-10-02 DIAGNOSIS — M25.569 PAIN IN JOINT, LOWER LEG: Primary | ICD-10-CM

## 2019-10-02 PROCEDURE — 73560 X-RAY EXAM OF KNEE 1 OR 2: CPT | Mod: TC,FY,LT

## 2019-10-02 PROCEDURE — 73560 XR KNEE 1 OR 2 VIEW LEFT: ICD-10-PCS | Mod: 26,LT,, | Performed by: RADIOLOGY

## 2019-10-02 PROCEDURE — 73560 X-RAY EXAM OF KNEE 1 OR 2: CPT | Mod: 26,LT,, | Performed by: RADIOLOGY

## 2019-10-06 ENCOUNTER — HOSPITAL ENCOUNTER (EMERGENCY)
Facility: HOSPITAL | Age: 84
Discharge: HOME | End: 2019-10-06
Attending: EMERGENCY MEDICINE
Payer: MEDICARE

## 2019-10-06 VITALS
HEART RATE: 92 BPM | SYSTOLIC BLOOD PRESSURE: 149 MMHG | RESPIRATION RATE: 18 BRPM | BODY MASS INDEX: 35.28 KG/M2 | HEIGHT: 59 IN | TEMPERATURE: 98 F | OXYGEN SATURATION: 95 % | DIASTOLIC BLOOD PRESSURE: 82 MMHG | WEIGHT: 175 LBS

## 2019-10-06 DIAGNOSIS — G93.40 ACUTE ENCEPHALOPATHY: ICD-10-CM

## 2019-10-06 DIAGNOSIS — R41.0 INTERMITTENT CONFUSION: Primary | ICD-10-CM

## 2019-10-06 LAB
ALBUMIN SERPL BCP-MCNC: 3.3 G/DL (ref 3.5–5.2)
ALP SERPL-CCNC: 103 U/L (ref 55–135)
ALT SERPL W/O P-5'-P-CCNC: 9 U/L (ref 10–44)
AMMONIA PLAS-SCNC: 71 UMOL/L (ref 10–50)
AMPHET+METHAMPHET UR QL: NEGATIVE
ANION GAP SERPL CALC-SCNC: 8 MMOL/L (ref 8–16)
AST SERPL-CCNC: 16 U/L (ref 10–40)
BARBITURATES UR QL SCN>200 NG/ML: NEGATIVE
BENZODIAZ UR QL SCN>200 NG/ML: NEGATIVE
BILIRUB SERPL-MCNC: 0.4 MG/DL (ref 0.1–1)
BILIRUB UR QL STRIP: NEGATIVE
BUN SERPL-MCNC: 18 MG/DL (ref 8–23)
BZE UR QL SCN: NEGATIVE
CALCIUM SERPL-MCNC: 9.3 MG/DL (ref 8.7–10.5)
CANNABINOIDS UR QL SCN: NEGATIVE
CHLORIDE SERPL-SCNC: 107 MMOL/L (ref 95–110)
CLARITY UR: CLEAR
CO2 SERPL-SCNC: 26 MMOL/L (ref 23–29)
COLOR UR: YELLOW
CREAT SERPL-MCNC: 1 MG/DL (ref 0.5–1.4)
CREAT UR-MCNC: 91.2 MG/DL (ref 15–325)
ERYTHROCYTE [DISTWIDTH] IN BLOOD BY AUTOMATED COUNT: 14.3 % (ref 11.5–14.5)
EST. GFR  (AFRICAN AMERICAN): 58 ML/MIN/1.73 M^2
EST. GFR  (NON AFRICAN AMERICAN): 50 ML/MIN/1.73 M^2
GLUCOSE SERPL-MCNC: 104 MG/DL (ref 70–110)
GLUCOSE UR QL STRIP: NEGATIVE
HCT VFR BLD AUTO: 42.3 % (ref 37–48.5)
HGB BLD-MCNC: 13.4 G/DL (ref 12–16)
HGB UR QL STRIP: NEGATIVE
KETONES UR QL STRIP: NEGATIVE
LACTATE SERPL-SCNC: 1.4 MMOL/L (ref 0.5–2.2)
LEUKOCYTE ESTERASE UR QL STRIP: NEGATIVE
LIPASE SERPL-CCNC: 16 U/L (ref 4–60)
MCH RBC QN AUTO: 29.3 PG (ref 27–31)
MCHC RBC AUTO-ENTMCNC: 31.7 G/DL (ref 32–36)
MCV RBC AUTO: 92 FL (ref 82–98)
METHADONE UR QL SCN>300 NG/ML: NEGATIVE
NITRITE UR QL STRIP: NEGATIVE
OPIATES UR QL SCN: NEGATIVE
PCP UR QL SCN>25 NG/ML: NEGATIVE
PH UR STRIP: 6 [PH] (ref 5–8)
PLATELET # BLD AUTO: 176 K/UL (ref 150–350)
PMV BLD AUTO: 11.1 FL (ref 9.2–12.9)
POTASSIUM SERPL-SCNC: 4 MMOL/L (ref 3.5–5.1)
PROT SERPL-MCNC: 6.9 G/DL (ref 6–8.4)
PROT UR QL STRIP: NEGATIVE
RBC # BLD AUTO: 4.58 M/UL (ref 4–5.4)
SODIUM SERPL-SCNC: 141 MMOL/L (ref 136–145)
SP GR UR STRIP: 1.02 (ref 1–1.03)
TOXICOLOGY INFORMATION: NORMAL
URN SPEC COLLECT METH UR: NORMAL
UROBILINOGEN UR STRIP-ACNC: NEGATIVE EU/DL
WBC # BLD AUTO: 6.2 K/UL (ref 3.9–12.7)

## 2019-10-06 PROCEDURE — 80053 COMPREHEN METABOLIC PANEL: CPT

## 2019-10-06 PROCEDURE — 83690 ASSAY OF LIPASE: CPT

## 2019-10-06 PROCEDURE — 82140 ASSAY OF AMMONIA: CPT

## 2019-10-06 PROCEDURE — 96360 HYDRATION IV INFUSION INIT: CPT | Mod: 25

## 2019-10-06 PROCEDURE — 85027 COMPLETE CBC AUTOMATED: CPT

## 2019-10-06 PROCEDURE — 93005 ELECTROCARDIOGRAM TRACING: CPT

## 2019-10-06 PROCEDURE — 81003 URINALYSIS AUTO W/O SCOPE: CPT | Mod: 59

## 2019-10-06 PROCEDURE — 80307 DRUG TEST PRSMV CHEM ANLYZR: CPT

## 2019-10-06 PROCEDURE — 99285 EMERGENCY DEPT VISIT HI MDM: CPT | Mod: 25

## 2019-10-06 PROCEDURE — 63600175 PHARM REV CODE 636 W HCPCS: Performed by: EMERGENCY MEDICINE

## 2019-10-06 PROCEDURE — P9612 CATHETERIZE FOR URINE SPEC: HCPCS

## 2019-10-06 PROCEDURE — 83605 ASSAY OF LACTIC ACID: CPT

## 2019-10-06 RX ORDER — ONDANSETRON 4 MG/1
4 TABLET, FILM COATED ORAL EVERY 8 HOURS PRN
COMMUNITY
End: 2020-01-09

## 2019-10-06 RX ADMIN — SODIUM CHLORIDE, SODIUM LACTATE, POTASSIUM CHLORIDE, AND CALCIUM CHLORIDE 500 ML: .6; .31; .03; .02 INJECTION, SOLUTION INTRAVENOUS at 05:10

## 2019-10-06 NOTE — ED NOTES
Pt provided with warm blanket for comfort- updated on NPO status until all tests have resulted.   Pt informed of need for urine specimen and importance of urine testing for diagnosing- pt verbalized understanding.   Side rails up x 2- call light within reach, family at bedside.  No needs or complaints at this time

## 2019-10-06 NOTE — ED PROVIDER NOTES
"Encounter Date: 10/6/2019    SCRIBE #1 NOTE: I, Segundo Guzman, am scribing for, and in the presence of,  . I have scribed the entire note.       History     Chief Complaint   Patient presents with    Altered Mental Status     pt. reports confusion for 2 days after starting zofran. zofran was prescribed after she was experiencing dizziness that caused her to fall when she stood up from a bending position. approx. 4 weeks ago.      Marycruz Perez is a 89 y.o. female who  has a past medical history of Hypertension.    The patient presents to the ED due to AMS.   Patient's family reports the patient has been experiencing dizziness since September 30th, which has been making her off-balance. She sustained a fall 4 days ago, and was seen by her PCP and was prescribed Zofran. The patient's family reports since starting the Zofran, they have noticed intermittent confusion. The patient's daughter states the patient told her she felt "confused" and had a hard time talking and getting her thoughts together. She denies any other recent illness, fever, chills, sore throat, SOB, CP, nausea, vomiting, abdominal pain, dysuria, headache, focal weakness/numbness, slurred speech, facial droop, or any other complaints.  Patient has been able to ambulate with a walker, which is her baseline functional status.       The history is provided by a relative and the patient.     Review of patient's allergies indicates:   Allergen Reactions    Codeine     Penicillins     Penicillin Rash     Past Medical History:   Diagnosis Date    Hypertension      Past Surgical History:   Procedure Laterality Date    CARDIAC PACEMAKER PLACEMENT      CAROTID STENT       No family history on file.  Social History     Tobacco Use    Smoking status: Never Smoker    Smokeless tobacco: Never Used   Substance Use Topics    Alcohol use: No     Frequency: Never    Drug use: No     Review of Systems   Constitutional: Negative for chills and fever. "   HENT: Negative for congestion, rhinorrhea and sore throat.    Eyes: Negative for redness and visual disturbance.   Respiratory: Negative for cough, shortness of breath and wheezing.    Cardiovascular: Negative for chest pain and palpitations.   Gastrointestinal: Negative for abdominal pain, diarrhea, nausea and vomiting.   Genitourinary: Negative for dysuria and hematuria.   Musculoskeletal: Negative for back pain, myalgias and neck pain.   Skin: Negative for rash.   Neurological: Negative for dizziness, weakness and light-headedness.   Psychiatric/Behavioral: Positive for confusion. Negative for hallucinations, sleep disturbance and suicidal ideas. The patient is not nervous/anxious.        Physical Exam     Initial Vitals [10/06/19 1559]   BP Pulse Resp Temp SpO2   (!) 140/65 80 20 97.9 °F (36.6 °C) (!) 92 %      MAP       --         Physical Exam    Nursing note and vitals reviewed.  Constitutional: She appears well-developed and well-nourished. She is not diaphoretic. No distress.   Awake, alert, NAD.  Oriented x3.   HENT:   Head: Normocephalic and atraumatic.   Right Ear: Tympanic membrane normal.   Left Ear: Tympanic membrane normal.   Mouth/Throat: Oropharynx is clear and moist.   Eyes: EOM are normal. Pupils are equal, round, and reactive to light.   Neck: No tracheal deviation present.   Cardiovascular: Normal rate, regular rhythm, normal heart sounds and intact distal pulses.   Pulmonary/Chest: Breath sounds normal. No stridor. No respiratory distress. She has no wheezes.   Abdominal: Soft. Bowel sounds are normal. She exhibits no distension and no mass. There is no tenderness. There is no rigidity, no rebound, no guarding, no CVA tenderness, no tenderness at McBurney's point and negative Mueller's sign.   Obese abdomen, non-tender.  Post op scar to abdomen, well healed.   Musculoskeletal: Normal range of motion. She exhibits no edema.   Neurological: She is alert and oriented to person, place, and time.  She has normal strength. No cranial nerve deficit or sensory deficit. She exhibits normal muscle tone. GCS eye subscore is 4. GCS verbal subscore is 5. GCS motor subscore is 6.   Skin: Skin is warm and dry. Capillary refill takes less than 2 seconds. No pallor.   post op scar to abdomen well healed.   Psychiatric: She has a normal mood and affect. Her behavior is normal. Thought content normal.         ED Course   Procedures  Labs Reviewed   CBC WITHOUT DIFFERENTIAL - Abnormal; Notable for the following components:       Result Value    Mean Corpuscular Hemoglobin Conc 31.7 (*)     All other components within normal limits   COMPREHENSIVE METABOLIC PANEL - Abnormal; Notable for the following components:    Albumin 3.3 (*)     ALT 9 (*)     eGFR if  58 (*)     eGFR if non  50 (*)     All other components within normal limits   AMMONIA - Abnormal; Notable for the following components:    Ammonia 71 (*)     All other components within normal limits   DRUG SCREEN PANEL, URINE EMERGENCY    Narrative:     Preferred Collection Type->Urine, Clean Catch   LIPASE   LACTIC ACID, PLASMA   URINALYSIS, REFLEX TO URINE CULTURE    Narrative:     Preferred Collection Type->Urine, Clean Catch     EKG Readings: (Independently Interpreted)   Initial Reading: No STEMI. Previous EKG: Compared with most recent EKG   Paced rhythm, rate 92, no ischemia.  Stable from prior 03/2017     ECG Results          EKG 12-lead (In process)  Result time 10/07/19 07:36:21    In process by Interface, Lab In [facility] (10/07/19 07:36:21)                 Narrative:    Test Reason : G93.40,    Vent. Rate : 092 BPM     Atrial Rate : 092 BPM     P-R Int : 000 ms          QRS Dur : 154 ms      QT Int : 434 ms       P-R-T Axes : 069 220 087 degrees     QTc Int : 536 ms    Sinus rhythm with A-V dissociation and Wide QRS rhythm  Right bundle branch block  LVH with repolarization abnormality  Cannot rule out Anteroseptal infarct ,age  undetermined  Abnormal ECG  When compared with ECG of 05-MAR-2017 00:47,  Previous ECG has undetermined rhythm, needs review    Referred By: [email or web address]   SELF           Confirmed By:                             Imaging Results          CT Head Without Contrast (Final result)  Result time 10/06/19 17:42:00    Final result by Rozina Tejada MD (10/06/19 17:42:00)                 Impression:      No acute intracranial abnormality detected. Remote right caudate head lacunar infarct.    Stable cerebral atrophy and chronic small vessel ischemic changes.      Electronically signed by: Rozina Tejada  Date:    10/06/2019  Time:    17:42             Narrative:    EXAMINATION:  CT OF THE HEAD WITHOUT    CLINICAL HISTORY:  Confusion/delirium, altered LOC, unexplained;    TECHNIQUE:  5 mm unenhanced axial images were obtained from the skull base to the vertex.    COMPARISON:  03/22/2019    FINDINGS:  There is stable cerebral atrophy and chronic small vessel ischemic change.  There is a nonacute infarct of the right caudate head.  There is no acute intracranial hemorrhage, territorial infarct or mass effect, or midline shift. The visualized paranasal sinuses and mastoid air cells are clear.  Hyperostosis frontalis is present.                                 Medical Decision Making:   Differential Diagnosis:   Differential Diagnosis includes, but is not limited to:  CVA/TIA, seizure, status epilepticus, post-ictal state, meningitis/encephalitis, sepsis, MI/ACS, arrhythmia, syncope, intracranial mass/hemorrhage, head trauma, anaphylaxis, substance abuse, alcohol intoxication/withdrawal, medication reaction, intentional medication overdose, neuroleptic malignant syndrome, serotonin syndrome, CO poisoning, hypoxia/hypercapnea, hepatic encephalopathy, metabolic disturbance, thyroid disease, hypoglycemia.    Clinical Tests:   Lab Tests: Ordered and Reviewed  Radiological Study: Ordered and Reviewed  Medical Tests: Reviewed and  Ordered                   ED Course as of Oct 07 1922   Sun Oct 06, 2019   3599 89-year-old femaleWith history of chronic arthritis presents to ED accompanied by children due to concern for confusion.  Children state they noted she intermittently has appeared more confused and has had trouble talking since being evaluated by her PCP and starting Zofran for dizziness.  Family states she was talking nonsensically earlier today.  She has been ambulatory with her walker, which is her functional baseline, and is currently not confused.  She is oriented to person place and time on arrival.  Vitals unremarkable, exam benign, no focal neuro deficits, symmetric strength all 4 extremities, follows commands well.  Will obtain CT head, basic labs, UA, and continue to monitor.    [SS]   1842 CT head without acute process.  Labs grossly unremarkable.  UA without infection.  On reassessment, patient remains well-appearing and vitals have been stable throughout ED course.  Unknown etiology of patient's intermittent confusion at this time, however, no emergent process evident on today/s visit. Etiology possibly age-related or from recent fall and post-concussive syndrome. Patient does not appear confused currently, and is interacting normally with family and oriented x3.   Informed patient and family of findings and reassured. Recommend close f/u with PCP for further evaluation and management, or return to ED for worsening symptoms, focal weakness/stroke symptoms, or any other concerns.   [SS]      ED Course User Index  [SS] Samy Melchor MD     Clinical Impression:       ICD-10-CM ICD-9-CM   1. Intermittent confusion R41.0 298.9   2. Acute encephalopathy G93.40 348.30           Disposition:   Disposition: Discharged  Condition: Stable        I, Dr. Samy Melchor, personally performed the services described in this documentation. All medical record entries made by the scribe were at my direction and in my presence.  I have reviewed  the chart and agree that the record reflects my personal performance and is accurate and complete.     Samy Melchor MD.               Samy Melchor MD  10/07/19 1925

## 2019-10-07 NOTE — ED NOTES
Pt unable to provide urine sample at this time- explained in and out catheter. Pt verbalized understanding

## 2019-10-16 ENCOUNTER — OFFICE VISIT (OUTPATIENT)
Dept: SPORTS MEDICINE | Facility: CLINIC | Age: 84
End: 2019-10-16
Payer: MEDICARE

## 2019-10-16 ENCOUNTER — HOSPITAL ENCOUNTER (OUTPATIENT)
Dept: RADIOLOGY | Facility: HOSPITAL | Age: 84
Discharge: HOME | End: 2019-10-16
Attending: PHYSICIAN ASSISTANT
Payer: MEDICARE

## 2019-10-16 VITALS
HEART RATE: 79 BPM | WEIGHT: 175 LBS | DIASTOLIC BLOOD PRESSURE: 77 MMHG | SYSTOLIC BLOOD PRESSURE: 149 MMHG | HEIGHT: 59 IN | BODY MASS INDEX: 35.28 KG/M2

## 2019-10-16 DIAGNOSIS — M25.562 CHRONIC PAIN OF BOTH KNEES: ICD-10-CM

## 2019-10-16 DIAGNOSIS — R53.81 PHYSICAL DECONDITIONING: ICD-10-CM

## 2019-10-16 DIAGNOSIS — G89.29 CHRONIC PAIN OF BOTH KNEES: ICD-10-CM

## 2019-10-16 DIAGNOSIS — M17.0 PRIMARY OSTEOARTHRITIS OF KNEES, BILATERAL: Primary | ICD-10-CM

## 2019-10-16 DIAGNOSIS — M25.561 CHRONIC PAIN OF BOTH KNEES: ICD-10-CM

## 2019-10-16 PROCEDURE — 20610 DRAIN/INJ JOINT/BURSA W/O US: CPT | Mod: 50,PBBFAC | Performed by: PHYSICIAN ASSISTANT

## 2019-10-16 PROCEDURE — 99214 OFFICE O/P EST MOD 30 MIN: CPT | Mod: PBBFAC,25 | Performed by: PHYSICIAN ASSISTANT

## 2019-10-16 PROCEDURE — 99999 PR PBB SHADOW E&M-EST. PATIENT-LVL IV: CPT | Mod: PBBFAC,,, | Performed by: PHYSICIAN ASSISTANT

## 2019-10-16 PROCEDURE — 99213 PR OFFICE/OUTPT VISIT, EST, LEVL III, 20-29 MIN: ICD-10-PCS | Mod: 25,S$PBB,, | Performed by: PHYSICIAN ASSISTANT

## 2019-10-16 PROCEDURE — 99213 OFFICE O/P EST LOW 20 MIN: CPT | Mod: 25,S$PBB,, | Performed by: PHYSICIAN ASSISTANT

## 2019-10-16 PROCEDURE — 73562 X-RAY EXAM OF KNEE 3: CPT | Mod: 26,50,, | Performed by: RADIOLOGY

## 2019-10-16 PROCEDURE — 20610 DRAIN/INJ JOINT/BURSA W/O US: CPT | Mod: 50,S$PBB,, | Performed by: PHYSICIAN ASSISTANT

## 2019-10-16 PROCEDURE — 20610 PR DRAIN/INJECT LARGE JOINT/BURSA: ICD-10-PCS | Mod: 50,S$PBB,, | Performed by: PHYSICIAN ASSISTANT

## 2019-10-16 PROCEDURE — 73562 X-RAY EXAM OF KNEE 3: CPT | Mod: 50,TC

## 2019-10-16 PROCEDURE — 99999 PR PBB SHADOW E&M-EST. PATIENT-LVL IV: ICD-10-PCS | Mod: PBBFAC,,, | Performed by: PHYSICIAN ASSISTANT

## 2019-10-16 PROCEDURE — 73562 XR KNEE 3 VIEW BILATERAL: ICD-10-PCS | Mod: 26,50,, | Performed by: RADIOLOGY

## 2019-10-16 RX ORDER — LIDOCAINE HYDROCHLORIDE 10 MG/ML
2 INJECTION INFILTRATION; PERINEURAL
Status: COMPLETED | OUTPATIENT
Start: 2019-10-16 | End: 2019-10-16

## 2019-10-16 RX ORDER — TRIAMCINOLONE ACETONIDE 40 MG/ML
40 INJECTION, SUSPENSION INTRA-ARTICULAR; INTRAMUSCULAR
Status: COMPLETED | OUTPATIENT
Start: 2019-10-16 | End: 2019-10-16

## 2019-10-16 RX ORDER — BUPIVACAINE HYDROCHLORIDE 2.5 MG/ML
2 INJECTION, SOLUTION INFILTRATION; PERINEURAL
Status: COMPLETED | OUTPATIENT
Start: 2019-10-16 | End: 2019-10-16

## 2019-10-16 RX ADMIN — LIDOCAINE HYDROCHLORIDE 2 ML: 10 INJECTION, SOLUTION INFILTRATION; PERINEURAL at 11:10

## 2019-10-16 RX ADMIN — TRIAMCINOLONE ACETONIDE 40 MG: 40 INJECTION, SUSPENSION INTRA-ARTICULAR; INTRAMUSCULAR at 11:10

## 2019-10-16 RX ADMIN — BUPIVACAINE HYDROCHLORIDE 5 MG: 2.5 INJECTION, SOLUTION INFILTRATION; PERINEURAL at 11:10

## 2019-10-16 NOTE — PROGRESS NOTES
Marycruz Perez, a 89 y.o. female, presents today for evaluation of her right and left knees. Hx chronic bilateral knee pain, worse after recent fall on 9/21/19. States that she slipped and fell in shower, landed in seated position. Noticed pain and swelling in knees worse the day after fall. Pain has continued since, swelling has improved. She is interested in steroid injections today. She has not been icing or taking any medication for knee pain.    History of Present Illness (HPI)  Location: anterior knee, R>L  Onset: insidious, chronic  Palliative:    Relative rest   Oral analgesics   L/R, CSI, iaKnee 16.12.06   L/R, CSI, iaKnee 17.01.09    L/R, VSI - Euflexxa, iaKnee, 17.01.16   L/R, CSI, iaKnee 18.12.05        Provocative:    ADLS   Prolonged ambulation     Prior: none  Progression: worsening discomfort  Quality:    sharp pain  Radiation: none  Severity: per nursing documentation  Timing: intermittent w/ use  Trauma: Patient had recent trauma on 9/21/19 day when she fell in shower, landed in seated position but pain in knees after standing up from fall    Review of Systems (ROS)  A 10+ review of systems was performed with pertinent positives and negatives noted above in the history of present illness. Other systems were negative unless otherwise specified.    Physical Examination (PE)  General:  The patient is alert and oriented x 3. Mood is pleasant. Observation of ears, eyes and nose reveal no gross abnormalities. HEENT: NCAT, sclera anicteric.   Lungs: Respirations are equal and unlabored.  Gait is coordinated. Patient can toe walk and heel walk without difficulty.    Right and left KNEE EXAMINATION    Observation/Inspection  Gait:   Wheelchair, able to stand/not ambulate  Alignment:  Neutral   Scars:   None   Muscle atrophy: Mild  Effusion:  None   Warmth:  None   Discoloration:   none     Tenderness / Crepitus (T / C):         T / C      T / C  Patella   - / -   Lateral joint line   +/ -     Peripatellar  medial  -  Medial joint line    + / -  Peripatellar lateral -  Medial plica   - / -  Patellar tendon -   Popliteal fossa   - / -  Quad tendon   -   Gastrocnemius   -  Prepatellar Bursa - / -   Quadricep   -  Tibial tubercle  -  Thigh/hamstring  -  Pes anserine/HS -  Fibula    -  ITB   - / -  Tibia     -  Tib/fib joint  - / -  LCL    -    MFC   + / -   MCL: Proximal  -    LFC   - / -   Distal    -          ROM: (* = pain)  PASSIVE   ACTIVE    Left :    0 *    0 *     Right :     0 *    0 *    Patellofemoral examination:  See above noted areas of tenderness.   Patella position    Subluxation / dislocation: Centered        Sup. / Inf;   Normal   Crepitus (PF):    Absent   Patellar Mobility:       Medial-lateral:   Normal    Superior-inferior:  Normal    Inferior tilt   Normal    Patellar tendon:  Normal   Lateral tilt:    Normal   J-sign:     None   Patellofemoral grind:   No pain     Meniscal Signs:     Pain on terminal extension:  +  Pain on terminal flexion:  +  Louises maneuver:  +*  Squat     NT    Ligament Examination:  ACL / Lachman:  WNL  PCL-Post.  drawer: normal 0 to 2mm  MCL- Valgus:  normal 0 to 2mm  LCL- Varus:    normal 0 to 2mm  Pivot shift:  guarding   Dial Test:   difference c/w other side   At 30° flexion: normal (< 5°)    At 90° flexion: normal (< 5°)   Reverse Pivot Shift:   normal (Equal)     Strength: (* = with pain) Painful Side  Quadriceps   4/5  Hamstrin/5    Extremity Neuro-vascular Examination:   Sensation:  Grossly intact to light touch all dermatomal regions.   Motor Function:  Fully intact motor function at hip, knee, foot and ankle    DTRs;  quadriceps and  achilles 2+.  No clonus and downgoing Babinski.    Vascular status:  DP and PT pulses 2+, brisk capillary refill, symmetric.     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 surg candidate given pacemaker and cardiac hx     The patient chooses #2 and #3 csi iaknee bilat    Injection Procedure  A time out was performed, including verification of patient ID, procedure, site and side, availability of information and equipment, review of safety issues, and agreement with consent, the procedure site was marked.    After time out was performed, the patient was prepped aseptically with povidone-iodine swabsticks. A diagnostic and therapeutic injection of 1:4cc Kenalog/Lidocaine/Marcaine was given under sterile technique using a 22g x 1.5 needle from the anteriolateral aspect of the bilateral Knee Joint in the sitting position.      Marycruz Perez had no adverse reactions to the medication. Pain decreased. She was instructed to apply ice to the joint for 20 minutes and avoid strenuous activities for 24-36 hours following the injection. She was warned of possible blood sugar and/or blood pressure changes during that time. Following that time, she can resume regular activities.    She was reminded to call the clinic immediately for any adverse side effects as explained in clinic today.      Pain management: handout given  Bracing:   Physical therapy: fPT, @ [facility], begin as above   Activity (e.g. sports, work) restrictions: as tolerated   school/vocation:     Follow up in 12 w  A/e fPT, a/e csi iaknee bilat  Should symptoms worsen or fail to resolve,  consider:  Revisiting the above options

## 2019-10-31 ENCOUNTER — OFFICE VISIT (OUTPATIENT)
Dept: CARDIOLOGY | Facility: CLINIC | Age: 84
End: 2019-10-31
Payer: MEDICARE

## 2019-10-31 VITALS
DIASTOLIC BLOOD PRESSURE: 87 MMHG | WEIGHT: 181.13 LBS | SYSTOLIC BLOOD PRESSURE: 129 MMHG | OXYGEN SATURATION: 97 % | HEART RATE: 81 BPM | HEIGHT: 59 IN | BODY MASS INDEX: 36.52 KG/M2

## 2019-10-31 DIAGNOSIS — Z98.61 S/P PTCA (PERCUTANEOUS TRANSLUMINAL CORONARY ANGIOPLASTY): ICD-10-CM

## 2019-10-31 DIAGNOSIS — Z98.61 POST PTCA: ICD-10-CM

## 2019-10-31 DIAGNOSIS — Z95.0 CARDIAC PACEMAKER: Primary | ICD-10-CM

## 2019-10-31 DIAGNOSIS — I10 ESSENTIAL HYPERTENSION: ICD-10-CM

## 2019-10-31 PROCEDURE — 93010 EKG 12-LEAD: ICD-10-PCS | Mod: S$PBB,,, | Performed by: INTERNAL MEDICINE

## 2019-10-31 PROCEDURE — 99213 OFFICE O/P EST LOW 20 MIN: CPT | Mod: PBBFAC,PO | Performed by: INTERNAL MEDICINE

## 2019-10-31 PROCEDURE — 99999 PR PBB SHADOW E&M-EST. PATIENT-LVL III: ICD-10-PCS | Mod: PBBFAC,,, | Performed by: INTERNAL MEDICINE

## 2019-10-31 PROCEDURE — 99205 PR OFFICE/OUTPT VISIT, NEW, LEVL V, 60-74 MIN: ICD-10-PCS | Mod: S$PBB,25,, | Performed by: INTERNAL MEDICINE

## 2019-10-31 PROCEDURE — 93010 ELECTROCARDIOGRAM REPORT: CPT | Mod: S$PBB,,, | Performed by: INTERNAL MEDICINE

## 2019-10-31 PROCEDURE — 93005 ELECTROCARDIOGRAM TRACING: CPT | Mod: PBBFAC,PO | Performed by: INTERNAL MEDICINE

## 2019-10-31 PROCEDURE — 99205 OFFICE O/P NEW HI 60 MIN: CPT | Mod: S$PBB,25,, | Performed by: INTERNAL MEDICINE

## 2019-10-31 PROCEDURE — 99999 PR PBB SHADOW E&M-EST. PATIENT-LVL III: CPT | Mod: PBBFAC,,, | Performed by: INTERNAL MEDICINE

## 2019-10-31 RX ORDER — OXYBUTYNIN CHLORIDE 15 MG/1
TABLET, EXTENDED RELEASE ORAL
Refills: 11 | COMMUNITY
Start: 2019-10-03 | End: 2019-10-31

## 2019-10-31 RX ORDER — FUROSEMIDE 20 MG/1
TABLET ORAL
Refills: 0 | COMMUNITY
Start: 2019-10-02 | End: 2020-01-09 | Stop reason: SDUPTHER

## 2019-10-31 RX ORDER — DIAZEPAM 5 MG/1
TABLET ORAL
Refills: 0 | Status: ON HOLD | COMMUNITY
Start: 2019-10-21 | End: 2021-02-19 | Stop reason: HOSPADM

## 2019-10-31 RX ORDER — METOPROLOL SUCCINATE 25 MG/1
TABLET, EXTENDED RELEASE ORAL
Refills: 3 | COMMUNITY
Start: 2019-10-03 | End: 2021-12-15 | Stop reason: SDUPTHER

## 2019-10-31 NOTE — PROGRESS NOTES
Subjective:      Patient ID: Marycruz Perez is a 89 y.o. female.    Chief Complaint: Pacemaker Check (Establishing care with a new cardiologist)    HPI:  Pt used to see Dr Puente who placed a pacemaker in pt in 2002.    [facility] [place] put in a new pacemaker.  The device is a Biotronik.    Pt states she has a hx of a stent (either a coronary or a carotid stent) but no hx of heart attack.    Pt can walk with the walker in the house.  Pt is limited by severe arthritis in both knees.  Pt takes naproxen    Pt feels short of breath when walking which is a chronic symptom.    Pt c/o wheezing.    Legs swell chronically.    Last stress test was many years ago.    Last echocardiogram was a couple of years ago at [facility].    Pt c/o palpitations with heart beating fast.    Review of Systems   Cardiovascular: Positive for dyspnea on exertion, leg swelling and palpitations. Negative for chest pain, claudication, irregular heartbeat, near-syncope, orthopnea and syncope.      Pt fell in the shower earlier this year in March and September.     Pt states that she got dizzy and fell after bending over to  a washcloth.  No definite loss of consciousness.    Past Medical History:   Diagnosis Date    Hypertension     Syncope and collapse         Past Surgical History:   Procedure Laterality Date    CARDIAC CATHETERIZATION      CARDIAC PACEMAKER PLACEMENT      CHOLECYSTECTOMY      CORONARY ANGIOPLASTY      HYSTERECTOMY         Family History   Problem Relation Age of Onset    Liver disease Mother        Social History     Socioeconomic History    Marital status: Single     Spouse name: Not on file    Number of children: Not on file    Years of education: Not on file    Highest education level: Not on file   Occupational History    Not on file   Social Needs    Financial resource strain: Not on file    Food insecurity:     Worry: Not on file     Inability: Not on file    Transportation needs:     Medical: Not on file      Non-medical: Not on file   Tobacco Use    Smoking status: Never Smoker    Smokeless tobacco: Never Used   Substance and Sexual Activity    Alcohol use: No     Frequency: Never    Drug use: No    Sexual activity: Not on file   Lifestyle    Physical activity:     Days per week: Not on file     Minutes per session: Not on file    Stress: Not on file   Relationships    Social connections:     Talks on phone: Not on file     Gets together: Not on file     Attends Gnosticist service: Not on file     Active member of club or organization: Not on file     Attends meetings of clubs or organizations: Not on file     Relationship status: Not on file   Other Topics Concern    Not on file   Social History Narrative    Not on file       Current Outpatient Medications on File Prior to Visit   Medication Sig Dispense Refill    amlodipine (NORVASC) 5 MG tablet 5 mg once daily.       clopidogrel (PLAVIX) 75 mg tablet TK 1 T PO QD  2    diazePAM (VALIUM) 5 MG tablet TK 1 T PO D PRN  0    furosemide (LASIX) 20 MG tablet TK 1 T PO D  0    metoprolol succinate (TOPROL-XL) 25 MG 24 hr tablet TK 1 T PO D  3    naproxen (NAPROSYN) 500 MG tablet Take 1 tablet (500 mg total) by mouth 2 (two) times daily with meals. 14 tablet 0    oxybutynin (DITROPAN XL) 15 MG TR24 TK 1 T PO D  11    potassium chloride (MICRO-K) 10 MEQ CpSR TK 1 C PO D  1    VOLTAREN 1 % Gel EUGENE 4 GRAMS TID PRN P  1    ondansetron (ZOFRAN) 4 MG tablet Take 4 mg by mouth every 8 (eight) hours as needed for Nausea.      tramadol (ULTRAM) 50 mg tablet TK 1 T PO TID PRN P  0    valsartan (DIOVAN) 160 MG tablet TK 1 T PO D  1     No current facility-administered medications on file prior to visit.        Review of patient's allergies indicates:   Allergen Reactions    Codeine     Penicillins     Penicillin Rash     Objective:     Vitals:    10/31/19 1547   BP: 129/87   BP Location: Left arm   Patient Position: Sitting   BP Method: Large (Automatic)   Pulse:  "81   SpO2: 97%   Weight: 82.2 kg (181 lb 1.7 oz)   Height: 4' 11" (1.499 m)        Physical Exam   Constitutional: She is oriented to person, place, and time. She appears well-developed and well-nourished.   Eyes: No scleral icterus.   Neck: No JVD present. Carotid bruit is not present.   Cardiovascular: Normal rate and regular rhythm. Exam reveals no gallop.   Murmur (II/VI systolic) heard.  Pulmonary/Chest: Breath sounds normal.   Musculoskeletal: She exhibits edema (one plus edema bilaterally).   Neurological: She is alert and oriented to person, place, and time.   Skin: Skin is warm and dry.   Psychiatric: She has a normal mood and affect. Her behavior is normal. Judgment and thought content normal.   Vitals reviewed.     CBC and CMP in ER earlier this month were OK.  CT head showed an old stroke.    ECG shows sinus rhythm and complete heart block with ventricular pacing at 78 bpm, appropriate magnet response    [facility] records from care everywhere indicate pt has a carotid stent    Old CXR shows a single lead pacemaker  Assessment:     1. Cardiac pacemaker    2. S/P PTCA (percutaneous transluminal coronary angioplasty)    3. Essential hypertension      Plan:   Marycruz was seen today for pacemaker check.    Diagnoses and all orders for this visit:    Cardiac pacemaker    S/P PTCA (percutaneous transluminal coronary angioplasty)    Essential hypertension    Other orders  -     IN OFFICE EKG 12-LEAD (to Muse)     Edema could be exacerbated by amlodipine and naproxen    Falls could be due to an unsteady gait or could be due to orthostatic hypotension    Therefore will discontinue the amlodipine and the naproxen and monitor the blood pressure off them.  Pt instructed to take Tylenol prn knee pain.      Will also discontinue the ditropan since it can cause dizziness    Pt and daughter informed that the diazepam could cause loss of balance.  Pt's son is dying and daughter is concerned that his death could cause severe " emotional upset.    Need records of last stress test and last pacemaker interrogation and last echocardiogram from Dr Ham    Need cor angio and PTCA results from [facility] or [facility] [place]    RTC 2 weeks with Biotronik.   If blood pressure goes up will consider doubling the dose of valsartan    F/u with Dr Loo, PCP    No follow-ups on file.

## 2019-11-12 ENCOUNTER — OFFICE VISIT (OUTPATIENT)
Dept: CARDIOLOGY | Facility: CLINIC | Age: 84
End: 2019-11-12
Payer: MEDICARE

## 2019-11-12 VITALS
HEART RATE: 72 BPM | WEIGHT: 184.5 LBS | HEIGHT: 59 IN | BODY MASS INDEX: 37.2 KG/M2 | DIASTOLIC BLOOD PRESSURE: 70 MMHG | SYSTOLIC BLOOD PRESSURE: 137 MMHG

## 2019-11-12 DIAGNOSIS — I10 ESSENTIAL HYPERTENSION: Primary | ICD-10-CM

## 2019-11-12 DIAGNOSIS — Z95.0 CARDIAC PACEMAKER: ICD-10-CM

## 2019-11-12 DIAGNOSIS — I34.0 MILD MITRAL REGURGITATION: ICD-10-CM

## 2019-11-12 DIAGNOSIS — R60.0 LOCALIZED EDEMA: ICD-10-CM

## 2019-11-12 DIAGNOSIS — Z98.61 S/P PTCA (PERCUTANEOUS TRANSLUMINAL CORONARY ANGIOPLASTY): ICD-10-CM

## 2019-11-12 DIAGNOSIS — I35.0 AORTIC VALVE STENOSIS, ETIOLOGY OF CARDIAC VALVE DISEASE UNSPECIFIED: ICD-10-CM

## 2019-11-12 DIAGNOSIS — R06.09 DYSPNEA ON EXERTION: ICD-10-CM

## 2019-11-12 PROCEDURE — 93288 INTERROG EVL PM/LDLS PM IP: CPT | Mod: 26,S$PBB,, | Performed by: INTERNAL MEDICINE

## 2019-11-12 PROCEDURE — 99999 PR PBB SHADOW E&M-EST. PATIENT-LVL III: ICD-10-PCS | Mod: PBBFAC,,, | Performed by: INTERNAL MEDICINE

## 2019-11-12 PROCEDURE — 99213 OFFICE O/P EST LOW 20 MIN: CPT | Mod: PBBFAC,PO,25 | Performed by: INTERNAL MEDICINE

## 2019-11-12 PROCEDURE — 99214 OFFICE O/P EST MOD 30 MIN: CPT | Mod: S$PBB,,, | Performed by: INTERNAL MEDICINE

## 2019-11-12 PROCEDURE — 93288 PR INTERROG EVAL, IN PERSON,PACEMAKER: ICD-10-PCS | Mod: 26,S$PBB,, | Performed by: INTERNAL MEDICINE

## 2019-11-12 PROCEDURE — 99214 PR OFFICE/OUTPT VISIT, EST, LEVL IV, 30-39 MIN: ICD-10-PCS | Mod: S$PBB,,, | Performed by: INTERNAL MEDICINE

## 2019-11-12 PROCEDURE — 93288 INTERROG EVL PM/LDLS PM IP: CPT | Mod: PBBFAC,PO | Performed by: INTERNAL MEDICINE

## 2019-11-12 PROCEDURE — 99999 PR PBB SHADOW E&M-EST. PATIENT-LVL III: CPT | Mod: PBBFAC,,, | Performed by: INTERNAL MEDICINE

## 2019-11-12 RX ORDER — MECLIZINE HYDROCHLORIDE 25 MG/1
TABLET ORAL
Refills: 1 | COMMUNITY
Start: 2019-11-07 | End: 2020-01-09

## 2019-11-12 NOTE — PROGRESS NOTES
Subjective:      Patient ID: Marycruz Perez is a 89 y.o. female.    Chief Complaint: Follow-up    HPI:  Feels well  No recurrent weak or dizzy spells.  Feet, however, are still swollen even after stopping the amlodipine and naproxen.  Pt states that she also stopped the valsartan 2 weeks ago due to recall.    Pt is no longer having palpitations.    PCP is Dr Loo      Review of Systems   Cardiovascular: Positive for dyspnea on exertion (chronic, stable) and leg swelling (chronic, stable). Negative for chest pain, claudication, irregular heartbeat, near-syncope, orthopnea, palpitations and syncope.        Past Medical History:   Diagnosis Date    Hypertension     Syncope and collapse         Past Surgical History:   Procedure Laterality Date    CARDIAC CATHETERIZATION      CARDIAC PACEMAKER PLACEMENT      CHOLECYSTECTOMY      CORONARY ANGIOPLASTY      HYSTERECTOMY         Family History   Problem Relation Age of Onset    Liver disease Mother        Social History     Socioeconomic History    Marital status: Single     Spouse name: Not on file    Number of children: Not on file    Years of education: Not on file    Highest education level: Not on file   Occupational History    Not on file   Social Needs    Financial resource strain: Not on file    Food insecurity:     Worry: Not on file     Inability: Not on file    Transportation needs:     Medical: Not on file     Non-medical: Not on file   Tobacco Use    Smoking status: Never Smoker    Smokeless tobacco: Never Used   Substance and Sexual Activity    Alcohol use: No     Frequency: Never    Drug use: No    Sexual activity: Not on file   Lifestyle    Physical activity:     Days per week: Not on file     Minutes per session: Not on file    Stress: Not on file   Relationships    Social connections:     Talks on phone: Not on file     Gets together: Not on file     Attends Mu-ism service: Not on file     Active member of club or  "organization: Not on file     Attends meetings of clubs or organizations: Not on file     Relationship status: Not on file   Other Topics Concern    Not on file   Social History Narrative    Not on file       Current Outpatient Medications on File Prior to Visit   Medication Sig Dispense Refill    clopidogrel (PLAVIX) 75 mg tablet TK 1 T PO QD  2    furosemide (LASIX) 20 MG tablet TK 1 T PO D  0    meclizine (ANTIVERT) 25 mg tablet   1    metoprolol succinate (TOPROL-XL) 25 MG 24 hr tablet TK 1 T PO D  3    ondansetron (ZOFRAN) 4 MG tablet Take 4 mg by mouth every 8 (eight) hours as needed for Nausea.      potassium chloride (MICRO-K) 10 MEQ CpSR TK 1 C PO D  1    tramadol (ULTRAM) 50 mg tablet TK 1 T PO TID PRN P  0    VOLTAREN 1 % Gel EUGENE 4 GRAMS TID PRN P  1    diazePAM (VALIUM) 5 MG tablet TK 1 T PO D PRN  0    valsartan (DIOVAN) 160 MG tablet TK 1 T PO D  1     No current facility-administered medications on file prior to visit.        Review of patient's allergies indicates:   Allergen Reactions    Codeine     Penicillins     Penicillin Rash     Objective:     Vitals:    11/12/19 1147 11/12/19 1201   BP: (!) 145/78 137/70   BP Location: Right arm Right arm   Patient Position: Sitting Sitting   BP Method: Large (Automatic)    Pulse: 72    Weight: 83.7 kg (184 lb 8.4 oz)    Height: 4' 11" (1.499 m)         Physical Exam   Constitutional: She is oriented to person, place, and time. She appears well-developed and well-nourished.   Eyes: No scleral icterus.   Neck: No JVD present. Carotid bruit is not present.   Cardiovascular: Normal rate and regular rhythm. Exam reveals no gallop.   Murmur (II/VI systolic) heard.  Pulmonary/Chest: Breath sounds normal.   Musculoskeletal: She exhibits edema (trace to one plus edema LLE>RLE).   Neurological: She is alert and oriented to person, place, and time.   Skin: Skin is warm and dry.   Psychiatric: She has a normal mood and affect. Her behavior is normal. " Judgment and thought content normal.   Vitals reviewed.     Wt up 3 lbs    Pacemaker interrogated in office:  Battery at 70% of life.  100% RV paced.  No dysrhythmia.  Normal device function    Dr Ham's records reviewed:  Echocardiogram 4/18 showed LVEF 55%, abn LV relaxation, mild AS and mild MR.  PAP 35 mm Hg.    Note Cardiolite stress test in 2013 was normal    Note CBC and CMP last month OK  Assessment:     1. Essential hypertension    2. Cardiac pacemaker    3. S/P PTCA (percutaneous transluminal coronary angioplasty)    4. Aortic valve stenosis, etiology of cardiac valve disease unspecified    5. Mild mitral regurgitation    6. Localized edema    7. Dyspnea on exertion      Plan:   Marycruz was seen today for follow-up.    Diagnoses and all orders for this visit:    Essential hypertension  -     TSH; Future  -     Lipid panel; Future    Cardiac pacemaker    S/P PTCA (percutaneous transluminal coronary angioplasty)  -     TSH; Future  -     Lipid panel; Future    Aortic valve stenosis, etiology of cardiac valve disease unspecified  -     Echo Color Flow Doppler? Yes; Future    Mild mitral regurgitation  -     Echo Color Flow Doppler? Yes; Future    Localized edema  -     US Lower Extremity Veins Bilateral; Future  -     Echo Color Flow Doppler? Yes; Future  -     TSH; Future  -     Brain natriuretic peptide; Future    Dyspnea on exertion  -     X-Ray Chest PA And Lateral; Future  -     Brain natriuretic peptide; Future     The two falls earlier this year were likely due to orthostatic hypotension.    Pt's blood pressure is now normal off amlodipine and off valsartan.    Edema is due to venous insufficiency of lower extremities.    Pacemaker is functioning normally    Same meds    RTC 6 months with [facility]    Due to asymmetric pedal edema will get venous ultrasound of lower extremities to R/O DVT    Due to hx of aortic stenosis and mild pulmonary hypertension will repeat the echocardiogram with doppler    Will  also get CXR due to shortness of breath    Salt and fluid restriction discussed    Tylenol for arthritis pain in place of naproxen    Check lipids and TFT's    Follow up in about 6 months (around 5/12/2020), or with [facility].

## 2019-11-25 ENCOUNTER — HOSPITAL ENCOUNTER (OUTPATIENT)
Dept: RADIOLOGY | Facility: HOSPITAL | Age: 84
Discharge: HOME | End: 2019-11-25
Attending: INTERNAL MEDICINE
Payer: MEDICARE

## 2019-11-25 ENCOUNTER — HOSPITAL ENCOUNTER (OUTPATIENT)
Dept: CARDIOLOGY | Facility: HOSPITAL | Age: 84
Discharge: HOME | End: 2019-11-25
Attending: INTERNAL MEDICINE
Payer: MEDICARE

## 2019-11-25 DIAGNOSIS — I35.0 AORTIC VALVE STENOSIS, ETIOLOGY OF CARDIAC VALVE DISEASE UNSPECIFIED: ICD-10-CM

## 2019-11-25 DIAGNOSIS — R60.0 LOCALIZED EDEMA: ICD-10-CM

## 2019-11-25 DIAGNOSIS — I34.0 MILD MITRAL REGURGITATION: ICD-10-CM

## 2019-11-25 DIAGNOSIS — R06.09 DYSPNEA ON EXERTION: ICD-10-CM

## 2019-11-25 LAB
AORTIC ROOT ANNULUS: 2.83 CM
ASCENDING AORTA: 2.61 CM
AV INDEX (PROSTH): 0.3
AV MEAN GRADIENT: 13 MMHG
AV PEAK GRADIENT: 20 MMHG
AV VALVE AREA: 1.03 CM2
AV VELOCITY RATIO: 0.35
CV ECHO LV RWT: 0.66 CM
DOP CALC AO PEAK VEL: 2.21 M/S
DOP CALC AO VTI: 44.72 CM
DOP CALC LVOT AREA: 3.5 CM2
DOP CALC LVOT DIAMETER: 2.11 CM
DOP CALC LVOT PEAK VEL: 0.77 M/S
DOP CALC LVOT STROKE VOLUME: 46.2 CM3
DOP CALCLVOT PEAK VEL VTI: 13.22 CM
E WAVE DECELERATION TIME: 140.58 MSEC
E/A RATIO: 3.63
ECHO LV POSTERIOR WALL: 1.2 CM (ref 0.6–1.1)
FRACTIONAL SHORTENING: 40 % (ref 28–44)
INTERVENTRICULAR SEPTUM: 1.4 CM (ref 0.6–1.1)
IVRT: 0.09 MSEC
LA MAJOR: 5.54 CM
LA MINOR: 5.41 CM
LA WIDTH: 3.76 CM
LEFT ATRIUM SIZE: 4.9 CM
LEFT ATRIUM VOLUME: 85.73 CM3
LEFT INTERNAL DIMENSION IN SYSTOLE: 2.16 CM (ref 2.1–4)
LEFT VENTRICLE DIASTOLIC VOLUME: 55.22 ML
LEFT VENTRICLE SYSTOLIC VOLUME: 15.45 ML
LEFT VENTRICULAR INTERNAL DIMENSION IN DIASTOLE: 3.62 CM (ref 3.5–6)
LEFT VENTRICULAR MASS: 161.35 G
MV PEAK A VEL: 0.48 M/S
MV PEAK E VEL: 1.74 M/S
PISA TR MAX VEL: 3.29 M/S
RA MAJOR: 4.63 CM
RA PRESSURE: 8 MMHG
RA WIDTH: 2.73 CM
RIGHT VENTRICULAR END-DIASTOLIC DIMENSION: 3.61 CM
STJ: 2.64 CM
TR MAX PG: 43 MMHG
TV REST PULMONARY ARTERY PRESSURE: 51 MMHG

## 2019-11-25 PROCEDURE — 93970 US LOWER EXTREMITY VEINS BILATERAL: ICD-10-PCS | Mod: 26,,, | Performed by: RADIOLOGY

## 2019-11-25 PROCEDURE — 93306 TTE W/DOPPLER COMPLETE: CPT | Mod: 26,,, | Performed by: INTERNAL MEDICINE

## 2019-11-25 PROCEDURE — 93970 EXTREMITY STUDY: CPT | Mod: 26,,, | Performed by: RADIOLOGY

## 2019-11-25 PROCEDURE — 93306 TTE W/DOPPLER COMPLETE: CPT

## 2019-11-25 PROCEDURE — 93970 EXTREMITY STUDY: CPT | Mod: TC

## 2019-11-25 PROCEDURE — 71046 X-RAY EXAM CHEST 2 VIEWS: CPT | Mod: TC,FY

## 2019-11-25 PROCEDURE — 71046 XR CHEST PA AND LATERAL: ICD-10-PCS | Mod: 26,,, | Performed by: RADIOLOGY

## 2019-11-25 PROCEDURE — 93306 ECHO (CUPID ONLY): ICD-10-PCS | Mod: 26,,, | Performed by: INTERNAL MEDICINE

## 2019-11-25 PROCEDURE — 71046 X-RAY EXAM CHEST 2 VIEWS: CPT | Mod: 26,,, | Performed by: RADIOLOGY

## 2019-11-26 ENCOUNTER — TELEPHONE (OUTPATIENT)
Dept: CARDIOLOGY | Facility: CLINIC | Age: 84
End: 2019-11-26

## 2019-11-26 NOTE — TELEPHONE ENCOUNTER
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   Anticipate annual repeat echocardiogram

## 2019-12-30 ENCOUNTER — HOSPITAL ENCOUNTER (EMERGENCY)
Facility: HOSPITAL | Age: 84
Discharge: HOME | End: 2019-12-30
Attending: EMERGENCY MEDICINE
Payer: MEDICARE

## 2019-12-30 VITALS
WEIGHT: 178 LBS | DIASTOLIC BLOOD PRESSURE: 93 MMHG | SYSTOLIC BLOOD PRESSURE: 155 MMHG | TEMPERATURE: 98 F | RESPIRATION RATE: 17 BRPM | OXYGEN SATURATION: 96 % | HEART RATE: 89 BPM | BODY MASS INDEX: 35.95 KG/M2

## 2019-12-30 DIAGNOSIS — R60.0 BILATERAL LOWER EXTREMITY EDEMA: Primary | ICD-10-CM

## 2019-12-30 DIAGNOSIS — R06.02 SHORTNESS OF BREATH: ICD-10-CM

## 2019-12-30 LAB
ALBUMIN SERPL BCP-MCNC: 3.4 G/DL (ref 3.5–5.2)
ALP SERPL-CCNC: 104 U/L (ref 55–135)
ALT SERPL W/O P-5'-P-CCNC: 12 U/L (ref 10–44)
ANION GAP SERPL CALC-SCNC: 11 MMOL/L (ref 8–16)
AST SERPL-CCNC: 17 U/L (ref 10–40)
BASOPHILS # BLD AUTO: 0.02 K/UL (ref 0–0.2)
BASOPHILS NFR BLD: 0.3 % (ref 0–1.9)
BILIRUB SERPL-MCNC: 0.6 MG/DL (ref 0.1–1)
BILIRUB UR QL STRIP: NEGATIVE
BNP SERPL-MCNC: 408 PG/ML (ref 0–99)
BUN SERPL-MCNC: 18 MG/DL (ref 8–23)
CALCIUM SERPL-MCNC: 9.3 MG/DL (ref 8.7–10.5)
CHLORIDE SERPL-SCNC: 108 MMOL/L (ref 95–110)
CLARITY UR: CLEAR
CO2 SERPL-SCNC: 21 MMOL/L (ref 23–29)
COLOR UR: YELLOW
CREAT SERPL-MCNC: 0.8 MG/DL (ref 0.5–1.4)
DIFFERENTIAL METHOD: ABNORMAL
EOSINOPHIL # BLD AUTO: 0.1 K/UL (ref 0–0.5)
EOSINOPHIL NFR BLD: 1 % (ref 0–8)
ERYTHROCYTE [DISTWIDTH] IN BLOOD BY AUTOMATED COUNT: 15 % (ref 11.5–14.5)
EST. GFR  (AFRICAN AMERICAN): >60 ML/MIN/1.73 M^2
EST. GFR  (NON AFRICAN AMERICAN): >60 ML/MIN/1.73 M^2
GLUCOSE SERPL-MCNC: 94 MG/DL (ref 70–110)
GLUCOSE UR QL STRIP: NEGATIVE
HCT VFR BLD AUTO: 49.1 % (ref 37–48.5)
HGB BLD-MCNC: 16.7 G/DL (ref 12–16)
HGB UR QL STRIP: NEGATIVE
KETONES UR QL STRIP: NEGATIVE
LEUKOCYTE ESTERASE UR QL STRIP: ABNORMAL
LYMPHOCYTES # BLD AUTO: 2.3 K/UL (ref 1–4.8)
LYMPHOCYTES NFR BLD: 33.5 % (ref 18–48)
MCH RBC QN AUTO: 30.3 PG (ref 27–31)
MCHC RBC AUTO-ENTMCNC: 34 G/DL (ref 32–36)
MCV RBC AUTO: 89 FL (ref 82–98)
MICROSCOPIC COMMENT: NORMAL
MONOCYTES # BLD AUTO: 0.4 K/UL (ref 0.3–1)
MONOCYTES NFR BLD: 5.7 % (ref 4–15)
NEUTROPHILS # BLD AUTO: 4.2 K/UL (ref 1.8–7.7)
NEUTROPHILS NFR BLD: 59.5 % (ref 38–73)
NITRITE UR QL STRIP: NEGATIVE
PH UR STRIP: 6 [PH] (ref 5–8)
PLATELET # BLD AUTO: 137 K/UL (ref 150–350)
PMV BLD AUTO: 12 FL (ref 9.2–12.9)
POTASSIUM SERPL-SCNC: 4.2 MMOL/L (ref 3.5–5.1)
PROT SERPL-MCNC: 6.7 G/DL (ref 6–8.4)
PROT UR QL STRIP: NEGATIVE
RBC # BLD AUTO: 5.52 M/UL (ref 4–5.4)
RBC #/AREA URNS HPF: 1 /HPF (ref 0–4)
SODIUM SERPL-SCNC: 140 MMOL/L (ref 136–145)
SP GR UR STRIP: 1.02 (ref 1–1.03)
TROPONIN I SERPL DL<=0.01 NG/ML-MCNC: <0.006 NG/ML (ref 0–0.03)
URN SPEC COLLECT METH UR: ABNORMAL
UROBILINOGEN UR STRIP-ACNC: NEGATIVE EU/DL
WBC # BLD AUTO: 6.99 K/UL (ref 3.9–12.7)
WBC #/AREA URNS HPF: 3 /HPF (ref 0–5)

## 2019-12-30 PROCEDURE — 93010 EKG 12-LEAD: ICD-10-PCS | Mod: ,,, | Performed by: INTERNAL MEDICINE

## 2019-12-30 PROCEDURE — 63600175 PHARM REV CODE 636 W HCPCS: Performed by: EMERGENCY MEDICINE

## 2019-12-30 PROCEDURE — 80053 COMPREHEN METABOLIC PANEL: CPT

## 2019-12-30 PROCEDURE — 85025 COMPLETE CBC W/AUTO DIFF WBC: CPT

## 2019-12-30 PROCEDURE — 83880 ASSAY OF NATRIURETIC PEPTIDE: CPT

## 2019-12-30 PROCEDURE — 81000 URINALYSIS NONAUTO W/SCOPE: CPT

## 2019-12-30 PROCEDURE — 93010 ELECTROCARDIOGRAM REPORT: CPT | Mod: ,,, | Performed by: INTERNAL MEDICINE

## 2019-12-30 PROCEDURE — 93005 ELECTROCARDIOGRAM TRACING: CPT

## 2019-12-30 PROCEDURE — 84484 ASSAY OF TROPONIN QUANT: CPT

## 2019-12-30 PROCEDURE — 99285 EMERGENCY DEPT VISIT HI MDM: CPT | Mod: 25

## 2019-12-30 PROCEDURE — 96374 THER/PROPH/DIAG INJ IV PUSH: CPT

## 2019-12-30 RX ORDER — FUROSEMIDE 10 MG/ML
20 INJECTION INTRAMUSCULAR; INTRAVENOUS
Status: COMPLETED | OUTPATIENT
Start: 2019-12-30 | End: 2019-12-30

## 2019-12-30 RX ORDER — FUROSEMIDE 20 MG/1
20 TABLET ORAL DAILY
Qty: 7 TABLET | Refills: 0 | Status: SHIPPED | OUTPATIENT
Start: 2019-12-30 | End: 2020-01-06

## 2019-12-30 RX ADMIN — FUROSEMIDE 20 MG: 10 INJECTION, SOLUTION INTRAMUSCULAR; INTRAVENOUS at 08:12

## 2019-12-31 NOTE — DISCHARGE INSTRUCTIONS
Your blood pressure was elevated today, possibly from stopping one of your blood pressure medications recently.  Follow-up with your primary doctor as soon as possible for blood pressure re-check and to add blood pressure medication if needed.  Keep your legs elevated as much as possible when sitting/resting, and use compression stockings to help with swelling.

## 2019-12-31 NOTE — ED NOTES
APPEARANCE: Alert, oriented and in no acute distress.  CARDIAC: Normal rate and rhythm, no murmur heard.   RESPIRATORY:Normal rate and effort, breath sounds clear bilaterally throughout chest. Respirations are equal and unlabored no obvious signs of distress.  GASTRO: soft, bowel sounds normal, no tenderness, no abdominal distention  SKIN: Skin is warm and dry, normal skin turgor, mucous membranes moist.  NEURO: 5/5 strength major flexors/extensors bilaterally. Sensory intact to light touch bilaterally. Sturgeon coma scale: eyes open spontaneously-4, oriented & converses-5, obeys commands-6. No neurological abnormalities.   MENTAL STATUS: awake, alert and aware of environment.  EYE: PERRL, both eyes: pupils brisk and reactive to light. Normal size.  ENT: EARS: no obvious drainage. NOSE: no active bleeding.

## 2019-12-31 NOTE — ED TRIAGE NOTES
Pt presented to ED with c/o leg swelling x3 days. Pt states since Friday she has had leg swelling that increased swelling and pain as days went on, pt states legs have gotten so bad she is unable to really walk. Pt denies any sob, chest pain , dizziness or n/v. Pt has history of htn , syncope, and pacemaker.

## 2019-12-31 NOTE — ED PROVIDER NOTES
Encounter Date: 12/30/2019       History     Chief Complaint   Patient presents with    Leg Swelling     x 2 -3 days, pt denies any SOB, pt complains of increased leg pain with movement      Marycruz Perez is a 89 y.o. female who  has a past medical history of Hypertension and Syncope and collapse.    The patient presents to the ED due to bilateral leg swelling.   Patient reports symptoms started a few weeks ago.  She was evaluated by her cardiologist, and was started on Lasix.  She initially took it twice a day, and has since been taking it once per day, but she has not noticed any improvement.  She denies any significant pain, but states her legs feel weak and heavy.  She has history of CHF but denies any chest pain, shortness of breath, fever, skin changes, recent injury, trauma, or fall.         Review of patient's allergies indicates:   Allergen Reactions    Codeine     Penicillins     Penicillin Rash     Past Medical History:   Diagnosis Date    Hypertension     Syncope and collapse      Past Surgical History:   Procedure Laterality Date    CARDIAC CATHETERIZATION      CARDIAC PACEMAKER PLACEMENT      CHOLECYSTECTOMY      CORONARY ANGIOPLASTY      HYSTERECTOMY       Family History   Problem Relation Age of Onset    Liver disease Mother      Social History     Tobacco Use    Smoking status: Never Smoker    Smokeless tobacco: Never Used   Substance Use Topics    Alcohol use: No     Frequency: Never    Drug use: No     Review of Systems   Constitutional: Negative for chills and fever.   HENT: Negative for sore throat.    Respiratory: Negative for cough, chest tightness and shortness of breath.    Cardiovascular: Positive for leg swelling. Negative for chest pain and palpitations.   Gastrointestinal: Negative for abdominal pain, constipation, diarrhea, nausea and vomiting.   Genitourinary: Negative for dysuria, frequency and urgency.   Musculoskeletal: Negative for back pain.   Skin: Negative for  rash and wound.   Neurological: Negative for syncope and weakness.   Hematological: Does not bruise/bleed easily.   Psychiatric/Behavioral: Negative for agitation, behavioral problems and confusion.       Physical Exam     Initial Vitals [12/30/19 1600]   BP Pulse Resp Temp SpO2   (!) 209/92 91 18 97.5 °F (36.4 °C) 97 %      MAP       --         Physical Exam    Nursing note and vitals reviewed.  Constitutional: She appears well-developed and well-nourished. She is not diaphoretic. No distress.   Pleasant, well-appearing, no acute distress.   HENT:   Head: Normocephalic and atraumatic.   Mouth/Throat: Oropharynx is clear and moist.   Eyes: EOM are normal. Pupils are equal, round, and reactive to light.   Neck: No tracheal deviation present.   Cardiovascular: Normal rate, regular rhythm, normal heart sounds and intact distal pulses.   Pulmonary/Chest: Effort normal and breath sounds normal. No stridor. No respiratory distress. She has no decreased breath sounds. She has no wheezes. She has no rhonchi. She has no rales.   Abdominal: Soft. Bowel sounds are normal. She exhibits no distension and no mass. There is no tenderness.   Musculoskeletal: Normal range of motion. She exhibits edema (Bilateral lower extremity, pitting up to thighs).   Neurological: She is alert and oriented to person, place, and time. She has normal strength. No cranial nerve deficit or sensory deficit.   Skin: Skin is warm and dry. Capillary refill takes less than 2 seconds. No pallor.   Psychiatric: She has a normal mood and affect. Her behavior is normal. Thought content normal.         ED Course   Procedures  Labs Reviewed   CBC W/ AUTO DIFFERENTIAL - Abnormal; Notable for the following components:       Result Value    RBC 5.52 (*)     Hemoglobin 16.7 (*)     Hematocrit 49.1 (*)     RDW 15.0 (*)     Platelets 137 (*)     All other components within normal limits   COMPREHENSIVE METABOLIC PANEL - Abnormal; Notable for the following components:     CO2 21 (*)     Albumin 3.4 (*)     All other components within normal limits   B-TYPE NATRIURETIC PEPTIDE - Abnormal; Notable for the following components:     (*)     All other components within normal limits   URINALYSIS, REFLEX TO URINE CULTURE - Abnormal; Notable for the following components:    Leukocytes, UA Trace (*)     All other components within normal limits    Narrative:     Preferred Collection Type->Urine, Clean Catch   TROPONIN I   URINALYSIS MICROSCOPIC    Narrative:     Preferred Collection Type->Urine, Clean Catch     EKG Readings: (Independently Interpreted)   Paced rhythm, rate 94, no ST changes or ischemia.   Stable from prior 10/2019     ECG Results          EKG 12-lead (Final result)  Result time 12/31/19 12:23:11    Final result by Interface, Lab In [facility] (12/31/19 12:23:11)                 Narrative:    Test Reason : R06.02,    Vent. Rate : 094 BPM     Atrial Rate : 094 BPM     P-R Int : 000 ms          QRS Dur : 158 ms      QT Int : 418 ms       P-R-T Axes : 070 217 097 degrees     QTc Int : 522 ms    Ventricular-paced rhythm  Confirmed by Antonella GASTON, Addison PARK ([id number]) on 12/31/2019 12:23:00 PM    Referred By: [email or web address]   SELF           Confirmed By:Addison Berman MD                            Imaging Results          X-Ray Chest 1 View (Final result)  Result time 12/30/19 19:07:03    Final result by Rozina Tejada MD (12/30/19 19:07:03)                 Impression:      No acute intrathoracic abnormality detected.  No significant change.      Electronically signed by: Rozina Tejada  Date:    12/30/2019  Time:    19:07             Narrative:    EXAMINATION:  CHEST ONE VIEW    CLINICAL HISTORY:  Shortness of breath    TECHNIQUE:  One view of the chest.    COMPARISON:  11/25/2019    FINDINGS:  Examination is limited by body habitus.  There is a single lead right subclavian pacer.  The cardiac silhouette is within normal limits. Vascular calcifications seen in the aortic  knob.  A calcified granuloma is seen in the right mid upper lung field, which is unchanged.  There is no focal consolidation, pneumothorax, or pleural effusion. Calcification of the mitral valve annulus is present.                                 Medical Decision Making:   History:   Old Medical Records: I decided to obtain old medical records.  Old Records Summarized: other records.  Initial Assessment:   89-year-old female with history of hypertension, CHF presenting to ED due to bilateral lower extremity swelling over last several weeks, despite taking Lasix at home.  Hypertensive on arrival, otherwise vitals reassuring, patient well-appearing and in no acute distress.  Bilateral lower extremity pitting edema without respiratory distress or significant rales.  Will obtain labs, CXR, give IV Lasix, and reassess.  Differential Diagnosis:   Differential Diagnosis includes, but is not limited to:  PE, MI/ACS, pneumothorax, pericardial effusion/tamonade, pneumonia, lung abscess, pericarditis/myocarditis, pleural effusion, lung mass, CHF exacerbation, asthma exacerbation, COPD exacerbation, aspirated/ingested foreign body, airway obstruction, CO poisoning, anemia, metabolic derangement, allergy/atopy, influenza, viral URI, viral syndrome.    Clinical Tests:   Lab Tests: Ordered  Radiological Study: Ordered and Reviewed  Medical Tests: Ordered and Reviewed  ED Management:  EKG paced rhythm without ischemia or arrhythmia.  CXR grossly stable without acute process.  Labs pending at time of shift change.    Oncoming physician to assumed care, follow up on lab results, and disposition accordingly.  Anticipate patient will be stable for discharge if labs are unremarkable. Recommend leg elevation, compression stockings, increasing Lasix, and follow up with PCP for blood pressure control and further management.                   ED Course as of Jan 02 1616   Mon Dec 30, 2019   0941 This is a SORT/MSE of a 89 y.o. female  presenting to the ED with c/o bilateral leg pain and swelling for 2-3 weeks but worse the past 2 days. Patient reports history of CHF. She admits to SOB but denies chest pain and vomiting. Pertinent exam findings remarkable for nonpitting bilateral LE edema and elevated blood pressure. Care will be transferred to an alternate provider when patient is roomed for a full evaluation and final disposition. [facility] 5:41 PM    [HN]   2220 Received sign-out from Dr. Melchor, labs imaging reviewed, no acute process identified.  Patient complaining of worsening leg swelling, appears to be related to slightly worsening CHF, and some lymphedema from not moving and not wearing compression stockings.  Trace leukocyte esterase noted, but no signs UTI in no symptoms. Discussed with patient diagnosis of peripheral edema, discussed plan discharge home, sleep with legs elevated as well as compression stockings, will also restart her lisinopril and will have her follow up with primary care in the next few days.  Return precautions discussed.  Patient understood agreed to plan and will be discharged.    [SE]      ED Course User Index  [HN] Richard Cinda  [SE] Dwayne Padilla MD                Clinical Impression:       ICD-10-CM ICD-9-CM   1. Bilateral lower extremity edema R60.0 782.3   2. Shortness of breath R06.02 786.05                             Samy Melchor MD  12/30/19 2012       Samy Melchor MD  01/02/20 1616

## 2020-01-07 ENCOUNTER — TELEPHONE (OUTPATIENT)
Dept: CARDIOLOGY | Facility: CLINIC | Age: 85
End: 2020-01-07

## 2020-01-07 NOTE — TELEPHONE ENCOUNTER
Gave patient an appointment for Thursday January 9th @4pm. Told patient to go to ER if her leg gets worst. Patient verbalized understanding.      ----- Message from Yoli Childs sent at 1/7/2020 12:46 PM CST -----  Contact: Self  Pt is calling to be scheduled for an appt for leg inflammation.  Pt says she was seen in the ER recently & requests a returned call for scheduling.   was unable to find availability before 2/24/20.    She can be reached at [phone].    Thank you.

## 2020-01-09 ENCOUNTER — OFFICE VISIT (OUTPATIENT)
Dept: CARDIOLOGY | Facility: CLINIC | Age: 85
End: 2020-01-09
Payer: MEDICARE

## 2020-01-09 VITALS
HEIGHT: 59 IN | WEIGHT: 186.06 LBS | OXYGEN SATURATION: 96 % | BODY MASS INDEX: 37.51 KG/M2 | HEART RATE: 90 BPM | SYSTOLIC BLOOD PRESSURE: 134 MMHG | DIASTOLIC BLOOD PRESSURE: 80 MMHG

## 2020-01-09 DIAGNOSIS — Z95.0 CARDIAC PACEMAKER: ICD-10-CM

## 2020-01-09 DIAGNOSIS — I34.0 MILD MITRAL REGURGITATION: ICD-10-CM

## 2020-01-09 DIAGNOSIS — R60.0 LOCALIZED EDEMA: Primary | ICD-10-CM

## 2020-01-09 DIAGNOSIS — I05.0 RHEUMATIC MITRAL STENOSIS: ICD-10-CM

## 2020-01-09 DIAGNOSIS — I06.0 RHEUMATIC AORTIC STENOSIS: ICD-10-CM

## 2020-01-09 DIAGNOSIS — Z98.61 S/P PTCA (PERCUTANEOUS TRANSLUMINAL CORONARY ANGIOPLASTY): ICD-10-CM

## 2020-01-09 DIAGNOSIS — I10 ESSENTIAL HYPERTENSION: ICD-10-CM

## 2020-01-09 PROCEDURE — 99214 PR OFFICE/OUTPT VISIT, EST, LEVL IV, 30-39 MIN: ICD-10-PCS | Mod: S$PBB,,, | Performed by: INTERNAL MEDICINE

## 2020-01-09 PROCEDURE — 1159F PR MEDICATION LIST DOCUMENTED IN MEDICAL RECORD: ICD-10-PCS | Mod: ,,, | Performed by: INTERNAL MEDICINE

## 2020-01-09 PROCEDURE — 99213 OFFICE O/P EST LOW 20 MIN: CPT | Mod: PBBFAC,PO | Performed by: INTERNAL MEDICINE

## 2020-01-09 PROCEDURE — 1159F MED LIST DOCD IN RCRD: CPT | Mod: ,,, | Performed by: INTERNAL MEDICINE

## 2020-01-09 PROCEDURE — 99999 PR PBB SHADOW E&M-EST. PATIENT-LVL III: ICD-10-PCS | Mod: PBBFAC,,, | Performed by: INTERNAL MEDICINE

## 2020-01-09 PROCEDURE — 99214 OFFICE O/P EST MOD 30 MIN: CPT | Mod: S$PBB,,, | Performed by: INTERNAL MEDICINE

## 2020-01-09 PROCEDURE — 99999 PR PBB SHADOW E&M-EST. PATIENT-LVL III: CPT | Mod: PBBFAC,,, | Performed by: INTERNAL MEDICINE

## 2020-01-09 RX ORDER — FUROSEMIDE 20 MG/1
20 TABLET ORAL DAILY
Qty: 90 TABLET | Refills: 3 | Status: SHIPPED | OUTPATIENT
Start: 2020-01-09 | End: 2020-05-12 | Stop reason: SDUPTHER

## 2020-01-09 NOTE — PROGRESS NOTES
Subjective:      Patient ID: Marycruz Perez is a 89 y.o. female.    Chief Complaint: Hospital Follow Up (Leg swelling)    HPI:   Pt went to ER 12/30/19 with pain in both knees and in both legs.    Pt c/o swelling in both legs.    Pt was given furosemide 20 mg daily for 5 days and the swelling improved by 40% and the discomfort improved.    Pt had steroid shots in her knees in the past which helped the pain in her knees temporarily.    Pt has chronic shortness of breath with exertion.    Review of Systems   Cardiovascular: Positive for dyspnea on exertion and leg swelling. Negative for chest pain, claudication, irregular heartbeat, near-syncope, orthopnea, palpitations and syncope.        Past Medical History:   Diagnosis Date    Hypertension     Syncope and collapse         Past Surgical History:   Procedure Laterality Date    CARDIAC CATHETERIZATION      CARDIAC PACEMAKER PLACEMENT      CHOLECYSTECTOMY      CORONARY ANGIOPLASTY      HYSTERECTOMY         Family History   Problem Relation Age of Onset    Liver disease Mother        Social History     Socioeconomic History    Marital status: Single     Spouse name: Not on file    Number of children: Not on file    Years of education: Not on file    Highest education level: Not on file   Occupational History    Not on file   Social Needs    Financial resource strain: Not on file    Food insecurity:     Worry: Not on file     Inability: Not on file    Transportation needs:     Medical: Not on file     Non-medical: Not on file   Tobacco Use    Smoking status: Never Smoker    Smokeless tobacco: Never Used   Substance and Sexual Activity    Alcohol use: No     Frequency: Never    Drug use: No    Sexual activity: Not on file   Lifestyle    Physical activity:     Days per week: Not on file     Minutes per session: Not on file    Stress: Not on file   Relationships    Social connections:     Talks on phone: Not on file     Gets together: Not on file  "    Attends Rastafari service: Not on file     Active member of club or organization: Not on file     Attends meetings of clubs or organizations: Not on file     Relationship status: Not on file   Other Topics Concern    Not on file   Social History Narrative    Not on file       Current Outpatient Medications on File Prior to Visit   Medication Sig Dispense Refill    clopidogrel (PLAVIX) 75 mg tablet TK 1 T PO QD  2    diazePAM (VALIUM) 5 MG tablet TK 1 T PO D PRN  0    metoprolol succinate (TOPROL-XL) 25 MG 24 hr tablet TK 1 T PO D  3    potassium chloride (MICRO-K) 10 MEQ CpSR TK 1 C PO D  1    [DISCONTINUED] furosemide (LASIX) 20 MG tablet TK 1 T PO D  0    [DISCONTINUED] meclizine (ANTIVERT) 25 mg tablet   1    [DISCONTINUED] ondansetron (ZOFRAN) 4 MG tablet Take 4 mg by mouth every 8 (eight) hours as needed for Nausea.      [DISCONTINUED] tramadol (ULTRAM) 50 mg tablet TK 1 T PO TID PRN P  0    [DISCONTINUED] valsartan (DIOVAN) 160 MG tablet TK 1 T PO D  1    [DISCONTINUED] VOLTAREN 1 % Gel EUGENE 4 GRAMS TID PRN P  1     No current facility-administered medications on file prior to visit.        Review of patient's allergies indicates:   Allergen Reactions    Codeine     Penicillins     Penicillin Rash     Objective:     Vitals:    01/09/20 1410 01/09/20 1440   BP: (!) 149/86 134/80   BP Location: Left arm Left arm   Patient Position: Sitting Sitting   BP Method: Large (Automatic)    Pulse: 90    SpO2:  96%   Weight: 84.4 kg (186 lb 1.1 oz)    Height: 4' 11" (1.499 m)         Physical Exam   Constitutional: She is oriented to person, place, and time. She appears well-developed and well-nourished.   Eyes: No scleral icterus.   Neck: No JVD present. Carotid bruit is not present.   Cardiovascular: Normal rate and regular rhythm. Exam reveals no gallop.   Murmur (III/VI systolic) heard.  Pulmonary/Chest: Breath sounds normal.   Musculoskeletal: She exhibits edema (trace pitting pedal edema " bilaterally).   Neurological: She is alert and oriented to person, place, and time.   Skin: Skin is warm and dry.   Psychiatric: She has a normal mood and affect. Her behavior is normal. Judgment and thought content normal.   Vitals reviewed.     Records form ER 12/30/19:  CXR WNL    CMP WNL except alb 3.4  Hgb 16.7  K 4.2      Note recent echo 11/25/19 showed mild AS and mild MS and normal LVEF and moderate MR and pulmonary hypertension with an LVEF 51 mm Hg.      Assessment:     1. Localized edema    2. Rheumatic aortic stenosis    3. Cardiac pacemaker    4. Essential hypertension    5. Mild mitral regurgitation    6. S/P PTCA (percutaneous transluminal coronary angioplasty)    7. Rheumatic mitral stenosis      Plan:   Marycruz was seen today for hospital follow up.    Diagnoses and all orders for this visit:    Localized edema  -     CBC auto differential; Future  -     Comprehensive metabolic panel; Future  -     Magnesium; Future  -     TSH; Future    Rheumatic aortic stenosis  -     CBC auto differential; Future  -     Comprehensive metabolic panel; Future  -     Magnesium; Future  -     TSH; Future    Cardiac pacemaker    Essential hypertension  -     CBC auto differential; Future  -     Comprehensive metabolic panel; Future  -     Magnesium; Future  -     TSH; Future    Mild mitral regurgitation    S/P PTCA (percutaneous transluminal coronary angioplasty)    Rheumatic mitral stenosis  -     CBC auto differential; Future  -     Comprehensive metabolic panel; Future  -     Magnesium; Future  -     TSH; Future    Other orders  -     furosemide (LASIX) 20 MG tablet; Take 1 tablet (20 mg total) by mouth once daily.     Most of the edema is due to venous insufficiency.    Pt may have some component of diastolic CHF    Will add furosemide 20 mg daily    Continue KCl and metoprolol and clopidogrel    Salt and fluid restriction discussed    Consider f/u with orthopedic surgeon.    RTC 6-8 weeks     Repeat labs  on return    Follow up in about 6 weeks (around 2/20/2020).

## 2020-02-11 NOTE — PROGRESS NOTES
Marycruz Perez, a 89 y.o. female, presents today for evaluation of her Left and Right knee.      History of Present Illness (HPI)  Location: anterior/medial knee, R>L  Onset: Chronic since 2016  Palliative:    Relative rest   Oral analgesics   CSI, L/R. iaKnee, 12.06.2016   CSI, L/R. iaKnee, 01.09.2017    VSI (Euflexxa), L/R. iaKnee, 01.16.2017   CSI, L/R. iaKnee, 12.05.2018   CSI, L/R. 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 Newman - no treatment needed  Progression: worsening discomfort  Quality:    sharp pain  Radiation: none  Severity: per nursing documentation  Timing: intermittent w/ use  Trauma:   - 01.12.2020: fall in home bathroom    - 08.27.2019: fell prior to her flight to [employer].   - 11.29.2018: Patient had recent trauma on 11/29/2018 where she twisted her knee in the shower    Review of Systems (ROS)  A 10+ review of systems was performed with pertinent positives and negatives noted above in the history of present illness. Other systems were negative unless otherwise specified.    Physical Examination (PE)  General:  The patient is alert and oriented x 3. Mood is pleasant. Observation of ears, eyes and nose reveal no gross abnormalities. HEENT: NCAT, sclera anicteric.   Lungs: Respirations are equal and unlabored.  Gait is coordinated. Patient can toe walk and heel walk without difficulty.    Right and left KNEE EXAMINATION    Observation/Inspection  Gait:   Wheelchair, able to stand/not ambulate  Alignment:  Neutral   Scars:   None   Muscle atrophy: Mild  Effusion:  None   Warmth:  None   Discoloration:   none     Tenderness / Crepitus (T / C):         T / C      T / C  Patella   - / -   Lateral joint line   - / -     Peripatellar medial  -  Medial joint line    ++ / -  Peripatellar lateral -  Medial plica   - / -  Patellar tendon -    Popliteal fossa   - / -  Quad tendon   -   Gastrocnemius   -  Prepatellar Bursa - / -   Quadricep   -  Tibial tubercle  -  Thigh/hamstring  -  Pes anserine/HS -  Fibula    -  ITB   - / -  Tibia     -  Tib/fib joint  - / -  LCL    -    MFC   + / -   MCL: Proximal  -    LFC   - / -   Distal    -          ROM: (* = pain)  PASSIVE   ACTIVE    Left :   5 / 0 / 145*   5 / 0 / 145*     Right :    5 / 0 / 145*   5 / 0 / 145*    Patellofemoral examination:  See above noted areas of tenderness.   Patella position    Subluxation / dislocation: Centered        Sup. / Inf;   Normal   Crepitus (PF):    Absent   Patellar Mobility:       Medial-lateral:   Normal    Superior-inferior:  Normal    Inferior tilt   Normal    Patellar tendon:  Normal   Lateral tilt:    Normal   J-sign:     None   Patellofemoral grind:   No pain     Meniscal Signs:     Pain on terminal extension:  +  Pain on terminal flexion:  +  Louises maneuver:  +*  Squat     NT    Ligament Examination:  ACL / Lachman:  WNL  PCL-Post.  drawer: normal 0 to 2mm  MCL- Valgus:  normal 0 to 2mm  LCL- Varus:    normal 0 to 2mm  Pivot shift:  guarding   Dial Test:   difference c/w other side   At 30° flexion: normal (< 5°)    At 90° flexion: normal (< 5°)   Reverse Pivot Shift:   normal (Equal)     Strength: (* = with pain) Painful Side  Quadriceps   4/5  Hamstrin/5    Extremity Neuro-vascular Examination:   Sensation:  Grossly intact to light touch all dermatomal regions.   Motor Function:  Fully intact motor function at hip, knee, foot and ankle    DTRs;  quadriceps and  achilles 2+.  No clonus and downgoing Babinski.    Vascular status:  DP and PT pulses 2+, brisk capillary refill, symmetric.     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 surg candidate given pacemaker and cardiac hx   The patient chooses As above   x = prescribed  CSI = corticosteroid injection  VSI = viscosupplement injection  PRPI = platelet rich plasma injection  ia = intra articular  R = right  L = left  B = bilateral     Physical Therapy        Formal (fPT), @ [facility] facility nf   Formal (fPT), @ OSH facility        Homegoing (hgPT), per concurrent fPT recommendations    Homegoing (hgPT), per prior fPT recommendations    Homegoing (hgPT), handout provided        w/  (atPT)    [blank] = not prescribed  x = prescribed  b = prescribed, and begin as indicated  t = continue as indicated  r = prescribed, and restart as indicated  p = completed prior as indicated  hs = prescribed, and with high school   col = prescribed, and with college or university   nf = physical therapy was recommended, but patient is not interested in PT at this time    Activity (e.g. sports, work) restrictions    [blank] = as tolerated  pt = per physical therapist  at = per     Bracing    [blank] = not prescribed  r = recommended, but not fit with at todays visit  f = prescribed and fit with at todays visit  t = continue as indicated    Pain management    [blank] = No prescription necessary. A handout detailing dosing of appropriate over-the-counter musculoskeletal analgesics was made available to the patient.   m = meloxicam x 14 days  mp = 14 day course of meloxicam prescribed prior    Follow up 12   [blank] = as needed  [number] = in [number] weeks  CSI = for corticosteroid injection  VSI = for viscosupplement injection or injection series  PRP = for platelet rich plasma injection or injection  series  MRI = after MRI imaging    Should symptoms worsen or fail to resolve, consider    Revisiting the above options and / or        Vocation:

## 2020-02-12 ENCOUNTER — HOSPITAL ENCOUNTER (OUTPATIENT)
Dept: RADIOLOGY | Facility: HOSPITAL | Age: 85
Discharge: HOME | End: 2020-02-12
Attending: FAMILY MEDICINE
Payer: MEDICARE

## 2020-02-12 ENCOUNTER — OFFICE VISIT (OUTPATIENT)
Dept: SPORTS MEDICINE | Facility: CLINIC | Age: 85
End: 2020-02-12
Payer: MEDICARE

## 2020-02-12 VITALS — BODY MASS INDEX: 37.5 KG/M2 | WEIGHT: 186 LBS | TEMPERATURE: 97 F | HEIGHT: 59 IN

## 2020-02-12 DIAGNOSIS — M17.0 PRIMARY OSTEOARTHRITIS OF BOTH KNEES: Primary | ICD-10-CM

## 2020-02-12 DIAGNOSIS — M25.562 PAIN IN BOTH KNEES, UNSPECIFIED CHRONICITY: ICD-10-CM

## 2020-02-12 DIAGNOSIS — M25.561 PAIN IN BOTH KNEES, UNSPECIFIED CHRONICITY: ICD-10-CM

## 2020-02-12 PROCEDURE — 99213 OFFICE O/P EST LOW 20 MIN: CPT | Mod: PBBFAC,25 | Performed by: FAMILY MEDICINE

## 2020-02-12 PROCEDURE — 73564 X-RAY EXAM KNEE 4 OR MORE: CPT | Mod: TC,50

## 2020-02-12 PROCEDURE — 73564 XR KNEE ORTHO BILAT WITH FLEXION: ICD-10-PCS | Mod: 26,50,, | Performed by: RADIOLOGY

## 2020-02-12 PROCEDURE — 99214 OFFICE O/P EST MOD 30 MIN: CPT | Mod: 25,S$PBB,, | Performed by: FAMILY MEDICINE

## 2020-02-12 PROCEDURE — 20611 DRAIN/INJ JOINT/BURSA W/US: CPT | Mod: 50,PBBFAC | Performed by: FAMILY MEDICINE

## 2020-02-12 PROCEDURE — 99214 PR OFFICE/OUTPT VISIT, EST, LEVL IV, 30-39 MIN: ICD-10-PCS | Mod: 25,S$PBB,, | Performed by: FAMILY MEDICINE

## 2020-02-12 PROCEDURE — 73564 X-RAY EXAM KNEE 4 OR MORE: CPT | Mod: 26,50,, | Performed by: RADIOLOGY

## 2020-02-12 PROCEDURE — 99999 PR PBB SHADOW E&M-EST. PATIENT-LVL III: CPT | Mod: PBBFAC,,, | Performed by: FAMILY MEDICINE

## 2020-02-12 PROCEDURE — 99999 PR PBB SHADOW E&M-EST. PATIENT-LVL III: ICD-10-PCS | Mod: PBBFAC,,, | Performed by: FAMILY MEDICINE

## 2020-02-12 PROCEDURE — 20611 LARGE JOINT ASPIRATION/INJECTION: BILATERAL KNEE: ICD-10-PCS | Mod: 50,S$PBB,, | Performed by: FAMILY MEDICINE

## 2020-02-12 RX ORDER — TRIAMCINOLONE ACETONIDE 40 MG/ML
40 INJECTION, SUSPENSION INTRA-ARTICULAR; INTRAMUSCULAR
Status: DISCONTINUED | OUTPATIENT
Start: 2020-02-12 | End: 2020-02-12 | Stop reason: HOSPADM

## 2020-02-12 RX ORDER — AMLODIPINE BESYLATE 5 MG/1
5 TABLET ORAL DAILY
COMMUNITY
End: 2020-10-06 | Stop reason: SINTOL

## 2020-02-12 RX ORDER — TOLTERODINE 4 MG/1
1 CAPSULE, EXTENDED RELEASE ORAL DAILY
COMMUNITY
End: 2020-02-20

## 2020-02-12 RX ORDER — LOSARTAN POTASSIUM 100 MG/1
100 TABLET ORAL DAILY
COMMUNITY
End: 2022-02-01 | Stop reason: SDUPTHER

## 2020-02-12 RX ADMIN — TRIAMCINOLONE ACETONIDE 40 MG: 40 INJECTION, SUSPENSION INTRA-ARTICULAR; INTRAMUSCULAR at 11:02

## 2020-02-12 NOTE — PROCEDURES
"Large Joint Aspiration/Injection: bilateral knee  Performed by: Bang Rob MD  Authorized by: Bang Rob MD  Date/Time: 2/12/2020 11:30 AM    Consent Done?:  Yes (Verbal)  Indications:  pain  Timeout: Immediately prior to procedure a time out was called to verify the correct patient, procedure, equipment, support staff and site/side marked as required.  Prep:Patient was prepped and draped in the usual sterile fashion.  Procedure site marked: Yes     Anesthesia  Local anesthesia not used        Details:   Needle size: 20 G  Ultrasonic Guidance for needle placement: Yes  Images are saved and documented.   Approach: lateral  Location:  Knee  Site:  Bilateral knee    Medications (Right): 40 mg triamcinolone acetonide 40 mg/mL  Medications (Left): 40 mg triamcinolone acetonide 40 mg/mL  Patient tolerance:  patient tolerated the procedure well with no immediate complications    Description of ultrasound utilization for needle guidance:   Ultrasound guidance used for needle localization. Images saved and stored for documentation. The knee joint was visualized. Dynamic visualization of the 20g x 3.5" needle was continuous throughout the procedure.         "

## 2020-02-13 ENCOUNTER — CLINICAL SUPPORT (OUTPATIENT)
Dept: CARDIOLOGY | Facility: CLINIC | Age: 85
End: 2020-02-13
Payer: MEDICARE

## 2020-02-13 DIAGNOSIS — Z95.0 CARDIAC PACEMAKER: Primary | ICD-10-CM

## 2020-02-13 PROCEDURE — 99499 UNLISTED E&M SERVICE: CPT | Mod: S$PBB,,, | Performed by: INTERNAL MEDICINE

## 2020-02-13 PROCEDURE — 93294 PR INTERROG EVAL, REMOTE, UP TO 90 DAYS,PACEMAKER: ICD-10-PCS | Mod: ,,, | Performed by: INTERNAL MEDICINE

## 2020-02-13 PROCEDURE — 99499 NO LOS: ICD-10-PCS | Mod: S$PBB,,, | Performed by: INTERNAL MEDICINE

## 2020-02-13 PROCEDURE — 93294 REM INTERROG EVL PM/LDLS PM: CPT | Mod: ,,, | Performed by: INTERNAL MEDICINE

## 2020-02-13 NOTE — PROGRESS NOTES
Subjective:      Patient ID: Marycruz Perez is a 89 y.o. female.    Chief Complaint: No chief complaint on file.    HPI:    ROS Bi[employer] pacemaker remote interrogation report downloaded and prepared by medical assistant and interpreted by me and full report scanned into Epic media.  Battery OK at 70%.  Lead OK.  No events.  Normal device function    Past Medical History:   Diagnosis Date    Hypertension     Syncope and collapse         Past Surgical History:   Procedure Laterality Date    CARDIAC CATHETERIZATION      CARDIAC PACEMAKER PLACEMENT      CHOLECYSTECTOMY      CORONARY ANGIOPLASTY      HYSTERECTOMY         Family History   Problem Relation Age of Onset    Liver disease Mother        Social History     Socioeconomic History    Marital status: Single     Spouse name: Not on file    Number of children: Not on file    Years of education: Not on file    Highest education level: Not on file   Occupational History    Not on file   Social Needs    Financial resource strain: Not on file    Food insecurity:     Worry: Not on file     Inability: Not on file    Transportation needs:     Medical: Not on file     Non-medical: Not on file   Tobacco Use    Smoking status: Never Smoker    Smokeless tobacco: Never Used   Substance and Sexual Activity    Alcohol use: No     Frequency: Never    Drug use: No    Sexual activity: Not on file   Lifestyle    Physical activity:     Days per week: Not on file     Minutes per session: Not on file    Stress: Not on file   Relationships    Social connections:     Talks on phone: Not on file     Gets together: Not on file     Attends Cheondoism service: Not on file     Active member of club or organization: Not on file     Attends meetings of clubs or organizations: Not on file     Relationship status: Not on file   Other Topics Concern    Not on file   Social History Narrative    Not on file       Current Outpatient Medications on File Prior to Visit    Medication Sig Dispense Refill    amLODIPine (NORVASC) 5 MG tablet Take 5 mg by mouth once daily.      clopidogrel (PLAVIX) 75 mg tablet TK 1 T PO QD  2    diazePAM (VALIUM) 5 MG tablet TK 1 T PO D PRN  0    furosemide (LASIX) 20 MG tablet Take 1 tablet (20 mg total) by mouth once daily. (Patient not taking: Reported on 2/12/2020) 90 tablet 3    losartan (COZAAR) 100 MG tablet Take 100 mg by mouth once daily.      metoprolol succinate (TOPROL-XL) 25 MG 24 hr tablet TK 1 T PO D  3    potassium chloride (MICRO-K) 10 MEQ CpSR TK 1 C PO D  1    tolterodine (DETROL LA) 4 MG 24 hr capsule Take 1 mg by mouth once daily.       No current facility-administered medications on file prior to visit.        Review of patient's allergies indicates:   Allergen Reactions    Codeine     Penicillins     Penicillin Rash     Objective:   There were no vitals filed for this visit.     Physical Exam     Assessment:     1. Cardiac pacemaker      Plan:   Diagnoses and all orders for this visit:    Cardiac pacemaker         No follow-ups on file.

## 2020-02-20 ENCOUNTER — TELEPHONE (OUTPATIENT)
Dept: CARDIOLOGY | Facility: CLINIC | Age: 85
End: 2020-02-20

## 2020-02-20 ENCOUNTER — LAB VISIT (OUTPATIENT)
Dept: LAB | Facility: HOSPITAL | Age: 85
End: 2020-02-20
Attending: INTERNAL MEDICINE
Payer: MEDICARE

## 2020-02-20 ENCOUNTER — OFFICE VISIT (OUTPATIENT)
Dept: CARDIOLOGY | Facility: CLINIC | Age: 85
End: 2020-02-20
Payer: MEDICARE

## 2020-02-20 VITALS
BODY MASS INDEX: 37.33 KG/M2 | SYSTOLIC BLOOD PRESSURE: 110 MMHG | HEIGHT: 59 IN | DIASTOLIC BLOOD PRESSURE: 78 MMHG | HEART RATE: 83 BPM | WEIGHT: 185.19 LBS

## 2020-02-20 DIAGNOSIS — R60.0 LOCALIZED EDEMA: ICD-10-CM

## 2020-02-20 DIAGNOSIS — I10 ESSENTIAL HYPERTENSION: ICD-10-CM

## 2020-02-20 DIAGNOSIS — Z98.61 S/P PTCA (PERCUTANEOUS TRANSLUMINAL CORONARY ANGIOPLASTY): ICD-10-CM

## 2020-02-20 DIAGNOSIS — R60.0 LOCALIZED EDEMA: Primary | ICD-10-CM

## 2020-02-20 DIAGNOSIS — Z95.0 CARDIAC PACEMAKER: ICD-10-CM

## 2020-02-20 DIAGNOSIS — I34.0 MILD MITRAL REGURGITATION: ICD-10-CM

## 2020-02-20 DIAGNOSIS — I06.0 RHEUMATIC AORTIC STENOSIS: ICD-10-CM

## 2020-02-20 DIAGNOSIS — I05.0 RHEUMATIC MITRAL STENOSIS: ICD-10-CM

## 2020-02-20 LAB
ALBUMIN SERPL BCP-MCNC: 3.3 G/DL (ref 3.5–5.2)
ALP SERPL-CCNC: 116 U/L (ref 55–135)
ALT SERPL W/O P-5'-P-CCNC: 11 U/L (ref 10–44)
ANION GAP SERPL CALC-SCNC: 9 MMOL/L (ref 8–16)
AST SERPL-CCNC: 13 U/L (ref 10–40)
BASOPHILS # BLD AUTO: 0.04 K/UL (ref 0–0.2)
BASOPHILS NFR BLD: 0.4 % (ref 0–1.9)
BILIRUB SERPL-MCNC: 0.5 MG/DL (ref 0.1–1)
BUN SERPL-MCNC: 28 MG/DL (ref 8–23)
CALCIUM SERPL-MCNC: 9.2 MG/DL (ref 8.7–10.5)
CHLORIDE SERPL-SCNC: 110 MMOL/L (ref 95–110)
CO2 SERPL-SCNC: 22 MMOL/L (ref 23–29)
CREAT SERPL-MCNC: 0.9 MG/DL (ref 0.5–1.4)
DIFFERENTIAL METHOD: ABNORMAL
EOSINOPHIL # BLD AUTO: 0.1 K/UL (ref 0–0.5)
EOSINOPHIL NFR BLD: 0.8 % (ref 0–8)
ERYTHROCYTE [DISTWIDTH] IN BLOOD BY AUTOMATED COUNT: 13 % (ref 11.5–14.5)
EST. GFR  (AFRICAN AMERICAN): >60 ML/MIN/1.73 M^2
EST. GFR  (NON AFRICAN AMERICAN): 56 ML/MIN/1.73 M^2
GLUCOSE SERPL-MCNC: 100 MG/DL (ref 70–110)
HCT VFR BLD AUTO: 44 % (ref 37–48.5)
HGB BLD-MCNC: 14.1 G/DL (ref 12–16)
IMM GRANULOCYTES # BLD AUTO: 0.07 K/UL (ref 0–0.04)
IMM GRANULOCYTES NFR BLD AUTO: 0.6 % (ref 0–0.5)
LYMPHOCYTES # BLD AUTO: 2.7 K/UL (ref 1–4.8)
LYMPHOCYTES NFR BLD: 23.8 % (ref 18–48)
MAGNESIUM SERPL-MCNC: 2 MG/DL (ref 1.6–2.6)
MCH RBC QN AUTO: 29.3 PG (ref 27–31)
MCHC RBC AUTO-ENTMCNC: 32 G/DL (ref 32–36)
MCV RBC AUTO: 91 FL (ref 82–98)
MONOCYTES # BLD AUTO: 0.7 K/UL (ref 0.3–1)
MONOCYTES NFR BLD: 5.7 % (ref 4–15)
NEUTROPHILS # BLD AUTO: 7.8 K/UL (ref 1.8–7.7)
NEUTROPHILS NFR BLD: 68.7 % (ref 38–73)
NRBC BLD-RTO: 0 /100 WBC
PLATELET # BLD AUTO: 204 K/UL (ref 150–350)
PMV BLD AUTO: 11.2 FL (ref 9.2–12.9)
POTASSIUM SERPL-SCNC: 3.8 MMOL/L (ref 3.5–5.1)
PROT SERPL-MCNC: 7.1 G/DL (ref 6–8.4)
RBC # BLD AUTO: 4.82 M/UL (ref 4–5.4)
SODIUM SERPL-SCNC: 141 MMOL/L (ref 136–145)
T4 FREE SERPL-MCNC: 1.22 NG/DL (ref 0.71–1.51)
TSH SERPL DL<=0.005 MIU/L-ACNC: 0.37 UIU/ML (ref 0.4–4)
WBC # BLD AUTO: 11.31 K/UL (ref 3.9–12.7)

## 2020-02-20 PROCEDURE — 85025 COMPLETE CBC W/AUTO DIFF WBC: CPT

## 2020-02-20 PROCEDURE — 99214 PR OFFICE/OUTPT VISIT, EST, LEVL IV, 30-39 MIN: ICD-10-PCS | Mod: S$PBB,,, | Performed by: INTERNAL MEDICINE

## 2020-02-20 PROCEDURE — 99999 PR PBB SHADOW E&M-EST. PATIENT-LVL III: ICD-10-PCS | Mod: PBBFAC,,, | Performed by: INTERNAL MEDICINE

## 2020-02-20 PROCEDURE — 99999 PR PBB SHADOW E&M-EST. PATIENT-LVL III: CPT | Mod: PBBFAC,,, | Performed by: INTERNAL MEDICINE

## 2020-02-20 PROCEDURE — 80053 COMPREHEN METABOLIC PANEL: CPT

## 2020-02-20 PROCEDURE — 84439 ASSAY OF FREE THYROXINE: CPT

## 2020-02-20 PROCEDURE — 84443 ASSAY THYROID STIM HORMONE: CPT

## 2020-02-20 PROCEDURE — 83735 ASSAY OF MAGNESIUM: CPT

## 2020-02-20 PROCEDURE — 99213 OFFICE O/P EST LOW 20 MIN: CPT | Mod: PBBFAC,PO | Performed by: INTERNAL MEDICINE

## 2020-02-20 PROCEDURE — 99214 OFFICE O/P EST MOD 30 MIN: CPT | Mod: S$PBB,,, | Performed by: INTERNAL MEDICINE

## 2020-02-20 PROCEDURE — 36415 COLL VENOUS BLD VENIPUNCTURE: CPT

## 2020-02-20 RX ORDER — TOLTERODINE TARTRATE 1 MG/1
1 TABLET, EXTENDED RELEASE ORAL 2 TIMES DAILY
COMMUNITY
Start: 2020-01-29 | End: 2021-02-02

## 2020-02-20 NOTE — PROGRESS NOTES
Subjective:      Patient ID: Marycruz Perez is a 89 y.o. female.    Chief Complaint: Follow-up (6 wk f/u)    HPI:  Pt reports less edema of legs since taking furosemide.    Pt does not take the furosemide since it makes her urinate frequently    Pt walks in the house but is short of breath chronically after walking short distances.    Review of Systems   Cardiovascular: Positive for dyspnea on exertion and leg swelling. Negative for chest pain, claudication, irregular heartbeat, near-syncope, orthopnea, palpitations and syncope.        Past Medical History:   Diagnosis Date    Hypertension     Syncope and collapse         Past Surgical History:   Procedure Laterality Date    CARDIAC CATHETERIZATION      CARDIAC PACEMAKER PLACEMENT      CHOLECYSTECTOMY      CORONARY ANGIOPLASTY      HYSTERECTOMY         Family History   Problem Relation Age of Onset    Liver disease Mother        Social History     Socioeconomic History    Marital status: Single     Spouse name: Not on file    Number of children: Not on file    Years of education: Not on file    Highest education level: Not on file   Occupational History    Not on file   Social Needs    Financial resource strain: Not on file    Food insecurity:     Worry: Not on file     Inability: Not on file    Transportation needs:     Medical: Not on file     Non-medical: Not on file   Tobacco Use    Smoking status: Never Smoker    Smokeless tobacco: Never Used   Substance and Sexual Activity    Alcohol use: No     Frequency: Never    Drug use: No    Sexual activity: Not on file   Lifestyle    Physical activity:     Days per week: Not on file     Minutes per session: Not on file    Stress: Not on file   Relationships    Social connections:     Talks on phone: Not on file     Gets together: Not on file     Attends Taoism service: Not on file     Active member of club or organization: Not on file     Attends meetings of clubs or organizations: Not on  "file     Relationship status: Not on file   Other Topics Concern    Not on file   Social History Narrative    Not on file       Current Outpatient Medications on File Prior to Visit   Medication Sig Dispense Refill    amLODIPine (NORVASC) 5 MG tablet Take 5 mg by mouth once daily.      clopidogrel (PLAVIX) 75 mg tablet TK 1 T PO QD  2    losartan (COZAAR) 100 MG tablet Take 100 mg by mouth once daily.      metoprolol succinate (TOPROL-XL) 25 MG 24 hr tablet TK 1 T PO D  3    potassium chloride (MICRO-K) 10 MEQ CpSR TK 1 C PO D  1    [DISCONTINUED] tolterodine (DETROL LA) 4 MG 24 hr capsule Take 1 mg by mouth once daily.      diazePAM (VALIUM) 5 MG tablet TK 1 T PO D PRN  0    furosemide (LASIX) 20 MG tablet Take 1 tablet (20 mg total) by mouth once daily. (Patient not taking: Reported on 2/12/2020) 90 tablet 3    tolterodine (DETROL) 1 MG Tab        No current facility-administered medications on file prior to visit.        Review of patient's allergies indicates:   Allergen Reactions    Codeine     Penicillins     Penicillin Rash     Objective:     Vitals:    02/20/20 1313 02/20/20 1340   BP: (!) 144/86 110/78   BP Location: Right arm Left arm   Patient Position: Sitting Sitting   BP Method: Large (Automatic)    Pulse: 83    Weight: 84 kg (185 lb 3 oz)    Height: 4' 11" (1.499 m)         Physical Exam   Constitutional: She is oriented to person, place, and time. She appears well-developed and well-nourished.   Eyes: No scleral icterus.   Neck: No JVD present. Carotid bruit is not present.   Cardiovascular: Normal rate and regular rhythm. Exam reveals no gallop.   No murmur heard.  Pulmonary/Chest: Breath sounds normal.   Musculoskeletal: She exhibits edema (one plus edema of legs bilaterally).   Neurological: She is alert and oriented to person, place, and time.   Skin: Skin is warm and dry.   Psychiatric: She has a normal mood and affect. Her behavior is normal. Judgment and thought content normal. "   Vitals reviewed.   wt down one pound    Today's lab:Na 141, K 3.8, creat 0.9, LFT's OK except alb 3.3, WBC 11.3, Hgb 14.1  TSH pending    Assessment:     1. Localized edema    2. Rheumatic aortic stenosis    3. Rheumatic mitral stenosis    4. Mild mitral regurgitation    5. Essential hypertension    6. Cardiac pacemaker    7. S/P PTCA (percutaneous transluminal coronary angioplasty)      Plan:   Marycruz was seen today for follow-up.    Diagnoses and all orders for this visit:    Localized edema  -     CBC auto differential; Future  -     Comprehensive metabolic panel; Future    Rheumatic aortic stenosis    Rheumatic mitral stenosis  -     CBC auto differential; Future  -     Comprehensive metabolic panel; Future    Mild mitral regurgitation    Essential hypertension    Cardiac pacemaker    S/P PTCA (percutaneous transluminal coronary angioplasty)     Compliance with daily dose of furosemide discussed    Same meds plus resume furosemide    Salt restriction discussed    Fluid restriction discussed    Low carb, weight reducing diet discussed.    Obesity and deconditioning are also contributing to pt's shortness of breath.    Elevation of feet above heart discussed    RTC 3 months with lab    Follow up in about 3 months (around 5/20/2020).

## 2020-04-27 ENCOUNTER — TELEPHONE (OUTPATIENT)
Dept: CARDIOLOGY | Facility: CLINIC | Age: 85
End: 2020-04-27

## 2020-04-27 NOTE — TELEPHONE ENCOUNTER
Not receiving messages from patient's remote pacemaker monitor. Reached out to patient to have her reset the monitor.

## 2020-04-28 ENCOUNTER — TELEPHONE (OUTPATIENT)
Dept: CARDIOLOGY | Facility: CLINIC | Age: 85
End: 2020-04-28

## 2020-04-28 NOTE — TELEPHONE ENCOUNTER
No remote transmissions from patient's monitor. She was unable to reset monitor. Gave her the number for [employer] patient support [phone] to call for help with resetting monitor.

## 2020-04-30 ENCOUNTER — TELEPHONE (OUTPATIENT)
Dept: CARDIOLOGY | Facility: CLINIC | Age: 85
End: 2020-04-30

## 2020-05-12 ENCOUNTER — LAB VISIT (OUTPATIENT)
Dept: LAB | Facility: HOSPITAL | Age: 85
End: 2020-05-12
Attending: INTERNAL MEDICINE
Payer: MEDICARE

## 2020-05-12 ENCOUNTER — OFFICE VISIT (OUTPATIENT)
Dept: CARDIOLOGY | Facility: CLINIC | Age: 85
End: 2020-05-12
Payer: MEDICARE

## 2020-05-12 ENCOUNTER — TELEPHONE (OUTPATIENT)
Dept: CARDIOLOGY | Facility: CLINIC | Age: 85
End: 2020-05-12

## 2020-05-12 VITALS
OXYGEN SATURATION: 95 % | DIASTOLIC BLOOD PRESSURE: 79 MMHG | HEART RATE: 80 BPM | WEIGHT: 186.75 LBS | SYSTOLIC BLOOD PRESSURE: 133 MMHG | HEIGHT: 59 IN | BODY MASS INDEX: 37.65 KG/M2

## 2020-05-12 DIAGNOSIS — Z98.61 S/P PTCA (PERCUTANEOUS TRANSLUMINAL CORONARY ANGIOPLASTY): Primary | ICD-10-CM

## 2020-05-12 DIAGNOSIS — I44.2 COMPLETE HEART BLOCK: ICD-10-CM

## 2020-05-12 DIAGNOSIS — Z95.0 CARDIAC PACEMAKER: ICD-10-CM

## 2020-05-12 DIAGNOSIS — R60.0 LOCALIZED EDEMA: ICD-10-CM

## 2020-05-12 DIAGNOSIS — I05.0 RHEUMATIC MITRAL STENOSIS: ICD-10-CM

## 2020-05-12 DIAGNOSIS — I34.0 MILD MITRAL REGURGITATION: ICD-10-CM

## 2020-05-12 DIAGNOSIS — I10 ESSENTIAL HYPERTENSION: ICD-10-CM

## 2020-05-12 DIAGNOSIS — I06.0 RHEUMATIC AORTIC STENOSIS: ICD-10-CM

## 2020-05-12 LAB
ALBUMIN SERPL BCP-MCNC: 3.2 G/DL (ref 3.5–5.2)
ALP SERPL-CCNC: 100 U/L (ref 55–135)
ALT SERPL W/O P-5'-P-CCNC: 8 U/L (ref 10–44)
ANION GAP SERPL CALC-SCNC: 9 MMOL/L (ref 8–16)
AST SERPL-CCNC: 16 U/L (ref 10–40)
BASOPHILS # BLD AUTO: 0.12 K/UL (ref 0–0.2)
BASOPHILS NFR BLD: 1.3 % (ref 0–1.9)
BILIRUB SERPL-MCNC: 0.4 MG/DL (ref 0.1–1)
BUN SERPL-MCNC: 19 MG/DL (ref 8–23)
CALCIUM SERPL-MCNC: 9.6 MG/DL (ref 8.7–10.5)
CHLORIDE SERPL-SCNC: 109 MMOL/L (ref 95–110)
CO2 SERPL-SCNC: 22 MMOL/L (ref 23–29)
CREAT SERPL-MCNC: 1 MG/DL (ref 0.5–1.4)
DIFFERENTIAL METHOD: ABNORMAL
EOSINOPHIL # BLD AUTO: 0.1 K/UL (ref 0–0.5)
EOSINOPHIL NFR BLD: 1.1 % (ref 0–8)
ERYTHROCYTE [DISTWIDTH] IN BLOOD BY AUTOMATED COUNT: 14.1 % (ref 11.5–14.5)
EST. GFR  (AFRICAN AMERICAN): 57 ML/MIN/1.73 M^2
EST. GFR  (NON AFRICAN AMERICAN): 50 ML/MIN/1.73 M^2
GLUCOSE SERPL-MCNC: 114 MG/DL (ref 70–110)
HCT VFR BLD AUTO: 42.1 % (ref 37–48.5)
HGB BLD-MCNC: 13.4 G/DL (ref 12–16)
IMM GRANULOCYTES # BLD AUTO: 0.06 K/UL (ref 0–0.04)
IMM GRANULOCYTES NFR BLD AUTO: 0.6 % (ref 0–0.5)
LYMPHOCYTES # BLD AUTO: 2.5 K/UL (ref 1–4.8)
LYMPHOCYTES NFR BLD: 26.8 % (ref 18–48)
MCH RBC QN AUTO: 29.4 PG (ref 27–31)
MCHC RBC AUTO-ENTMCNC: 31.8 G/DL (ref 32–36)
MCV RBC AUTO: 92 FL (ref 82–98)
MONOCYTES # BLD AUTO: 0.6 K/UL (ref 0.3–1)
MONOCYTES NFR BLD: 6.8 % (ref 4–15)
NEUTROPHILS # BLD AUTO: 5.9 K/UL (ref 1.8–7.7)
NEUTROPHILS NFR BLD: 63.4 % (ref 38–73)
NRBC BLD-RTO: 0 /100 WBC
PLATELET # BLD AUTO: 179 K/UL (ref 150–350)
PMV BLD AUTO: 11.5 FL (ref 9.2–12.9)
POTASSIUM SERPL-SCNC: 4.2 MMOL/L (ref 3.5–5.1)
PROT SERPL-MCNC: 6.9 G/DL (ref 6–8.4)
RBC # BLD AUTO: 4.56 M/UL (ref 4–5.4)
SODIUM SERPL-SCNC: 140 MMOL/L (ref 136–145)
WBC # BLD AUTO: 9.35 K/UL (ref 3.9–12.7)

## 2020-05-12 PROCEDURE — 80053 COMPREHEN METABOLIC PANEL: CPT

## 2020-05-12 PROCEDURE — 93288 PR INTERROG EVAL, IN PERSON,PACEMAKER: ICD-10-PCS | Mod: 26,S$PBB,, | Performed by: INTERNAL MEDICINE

## 2020-05-12 PROCEDURE — 85025 COMPLETE CBC W/AUTO DIFF WBC: CPT

## 2020-05-12 PROCEDURE — 99999 PR PBB SHADOW E&M-EST. PATIENT-LVL III: ICD-10-PCS | Mod: PBBFAC,,, | Performed by: INTERNAL MEDICINE

## 2020-05-12 PROCEDURE — 99213 OFFICE O/P EST LOW 20 MIN: CPT | Mod: PBBFAC,PO | Performed by: INTERNAL MEDICINE

## 2020-05-12 PROCEDURE — 99999 PR PBB SHADOW E&M-EST. PATIENT-LVL III: CPT | Mod: PBBFAC,,, | Performed by: INTERNAL MEDICINE

## 2020-05-12 PROCEDURE — 36415 COLL VENOUS BLD VENIPUNCTURE: CPT

## 2020-05-12 PROCEDURE — 93288 INTERROG EVL PM/LDLS PM IP: CPT | Mod: 26,S$PBB,, | Performed by: INTERNAL MEDICINE

## 2020-05-12 PROCEDURE — 99214 PR OFFICE/OUTPT VISIT, EST, LEVL IV, 30-39 MIN: ICD-10-PCS | Mod: 25,S$PBB,, | Performed by: INTERNAL MEDICINE

## 2020-05-12 PROCEDURE — 93005 ELECTROCARDIOGRAM TRACING: CPT | Mod: PBBFAC,PO,59 | Performed by: INTERNAL MEDICINE

## 2020-05-12 PROCEDURE — 93010 ELECTROCARDIOGRAM REPORT: CPT | Mod: S$PBB,59,ICN, | Performed by: INTERNAL MEDICINE

## 2020-05-12 PROCEDURE — 93010 EKG 12-LEAD: ICD-10-PCS | Mod: S$PBB,59,ICN, | Performed by: INTERNAL MEDICINE

## 2020-05-12 PROCEDURE — 93288 INTERROG EVL PM/LDLS PM IP: CPT | Mod: PBBFAC,PO | Performed by: INTERNAL MEDICINE

## 2020-05-12 PROCEDURE — 99214 OFFICE O/P EST MOD 30 MIN: CPT | Mod: 25,S$PBB,, | Performed by: INTERNAL MEDICINE

## 2020-05-12 RX ORDER — MECLIZINE HYDROCHLORIDE 25 MG/1
25 TABLET ORAL 3 TIMES DAILY PRN
Status: ON HOLD | COMMUNITY
Start: 2020-03-19 | End: 2021-02-19 | Stop reason: HOSPADM

## 2020-05-12 RX ORDER — MONTELUKAST SODIUM 10 MG/1
TABLET ORAL
COMMUNITY
Start: 2020-05-11 | End: 2020-10-06

## 2020-05-12 RX ORDER — FUROSEMIDE 20 MG/1
20 TABLET ORAL DAILY
Qty: 90 TABLET | Refills: 3 | Status: SHIPPED | OUTPATIENT
Start: 2020-05-12 | End: 2020-11-05 | Stop reason: SDUPTHER

## 2020-05-12 NOTE — TELEPHONE ENCOUNTER
Left message on voice mail.  No cholesterol medication needed due to lipid profile being normal at last blood draw in November 2019.     ----- Message from Yogesh Newman MD sent at 5/12/2020  3:14 PM CDT -----    No, the lipid profile November 2019 was normal      ----- Message -----  From: Jenelle Yanez MA  Sent: 5/12/2020   3:08 PM CDT  To: Yogesh Newman MD    Patient's daughter would like to know if her mother needs to be on a cholesterol medication.  Please advise.

## 2020-05-12 NOTE — PROGRESS NOTES
Subjective:      Patient ID: Marycruz Perez is a 89 y.o. female.    Chief Complaint: Follow-up and Shortness of Breath    HPI:    Review of Systems   Cardiovascular: Positive for dyspnea on exertion, leg swelling and orthopnea (chronic 2 pillow orthopnea). Negative for chest pain, claudication, irregular heartbeat, near-syncope, palpitations and syncope.        Pt has mild, chronic shortness of breath of breath and chronic, mild edema.    Pt takes meclizine prn dizziness.    Pt reports that she is no longer taking the amlodipine and reports improved compliance with the furosemide although pt appears uncertain about her meds    Past Medical History:   Diagnosis Date    Hypertension     Syncope and collapse         Past Surgical History:   Procedure Laterality Date    CARDIAC CATHETERIZATION      CARDIAC PACEMAKER PLACEMENT      CHOLECYSTECTOMY      CORONARY ANGIOPLASTY      HYSTERECTOMY         Family History   Problem Relation Age of Onset    Liver disease Mother        Social History     Socioeconomic History    Marital status: Single     Spouse name: Not on file    Number of children: Not on file    Years of education: Not on file    Highest education level: Not on file   Occupational History    Not on file   Social Needs    Financial resource strain: Not on file    Food insecurity:     Worry: Not on file     Inability: Not on file    Transportation needs:     Medical: Not on file     Non-medical: Not on file   Tobacco Use    Smoking status: Never Smoker    Smokeless tobacco: Never Used   Substance and Sexual Activity    Alcohol use: No     Frequency: Never    Drug use: No    Sexual activity: Not on file   Lifestyle    Physical activity:     Days per week: Not on file     Minutes per session: Not on file    Stress: Not on file   Relationships    Social connections:     Talks on phone: Not on file     Gets together: Not on file     Attends Yazdanism service: Not on file     Active member  "of club or organization: Not on file     Attends meetings of clubs or organizations: Not on file     Relationship status: Not on file   Other Topics Concern    Not on file   Social History Narrative    Not on file       Current Outpatient Medications on File Prior to Visit   Medication Sig Dispense Refill    clopidogrel (PLAVIX) 75 mg tablet TK 1 T PO QD  2    furosemide (LASIX) 20 MG tablet Take 1 tablet (20 mg total) by mouth once daily. 90 tablet 3    losartan (COZAAR) 100 MG tablet Take 100 mg by mouth once daily.      meclizine (ANTIVERT) 25 mg tablet       metoprolol succinate (TOPROL-XL) 25 MG 24 hr tablet TK 1 T PO D  3    montelukast (SINGULAIR) 10 mg tablet       potassium chloride (MICRO-K) 10 MEQ CpSR TK 1 C PO D  1    tolterodine (DETROL) 1 MG Tab       amLODIPine (NORVASC) 5 MG tablet Take 5 mg by mouth once daily.      diazePAM (VALIUM) 5 MG tablet TK 1 T PO D PRN  0     No current facility-administered medications on file prior to visit.        Review of patient's allergies indicates:   Allergen Reactions    Codeine     Penicillins     Penicillin Rash     Objective:     Vitals:    05/12/20 1056   BP: 133/79   BP Location: Left arm   Patient Position: Sitting   BP Method: Large (Automatic)   Pulse: 80   SpO2: 95%   Weight: 84.7 kg (186 lb 11.7 oz)   Height: 4' 11" (1.499 m)        Physical Exam   Constitutional: She is oriented to person, place, and time. She appears well-developed and well-nourished.   Eyes: No scleral icterus.   Neck: No JVD present. Carotid bruit is not present.   Cardiovascular: Normal rate and regular rhythm. Exam reveals no gallop.   Murmur (III/VI systolic) heard.  Pulmonary/Chest: Breath sounds normal.   Musculoskeletal: She exhibits edema (one plus bilateral ankle edema).   Neurological: She is alert and oriented to person, place, and time.   Skin: Skin is warm and dry.   Psychiatric: She has a normal mood and affect. Her behavior is normal. Judgment and " thought content normal.   Vitals reviewed.       Single lead pacemaker interrogated in the office with [employer] rep:  PARTHA 7 years and 5 months  One nonsustatined VT in February 2020 lasting 4 seconds  Paces unipolar  Pt is pacemaker dependent    ECG today: NSR with complete heart block.  Ventricular pacing    Note last echocardiogram showed LVEF 55% and only mild aortic stenosis and mild mitral stenosis    Assessment:     1. S/P PTCA (percutaneous transluminal coronary angioplasty)    2. Rheumatic mitral stenosis    3. Rheumatic aortic stenosis    4. Mild mitral regurgitation    5. Essential hypertension    6. Cardiac pacemaker    7. Localized edema    8. Complete heart block      Plan:   Marycruz was seen today for follow-up and shortness of breath.    Diagnoses and all orders for this visit:    S/P PTCA (percutaneous transluminal coronary angioplasty)    Rheumatic mitral stenosis    Rheumatic aortic stenosis    Mild mitral regurgitation    Essential hypertension    Cardiac pacemaker  -     IN OFFICE EKG 12-LEAD (to [place])    Localized edema    Complete heart block  -     IN OFFICE EKG 12-LEAD (to [place])     Salt and fluid restriction discussed    Double the furosemide prn worse edema or shortness of breath.    Same meds    Get labs today that I ordered in February    Continue remote pacemaker monitoring    RTC 6 months     Discussed compliance with furosemide in detail (furosemide not found in her bag of meds)    Discussed that the potassium should only be taken when taking the furosemide    Discussed that the amlodipine was discontinued due to edema (it is still in her bag of meds)    Follow up in about 3 months (around 8/12/2020).

## 2020-05-18 ENCOUNTER — CLINICAL SUPPORT (OUTPATIENT)
Dept: CARDIOLOGY | Facility: CLINIC | Age: 85
End: 2020-05-18
Payer: MEDICARE

## 2020-05-18 DIAGNOSIS — Z95.0 CARDIAC PACEMAKER: Primary | ICD-10-CM

## 2020-05-18 PROCEDURE — 93294 PR INTERROG EVAL, REMOTE, UP TO 90 DAYS,PACEMAKER: ICD-10-PCS | Mod: ,,, | Performed by: INTERNAL MEDICINE

## 2020-05-18 PROCEDURE — 99499 UNLISTED E&M SERVICE: CPT | Mod: S$PBB,,, | Performed by: INTERNAL MEDICINE

## 2020-05-18 PROCEDURE — 93294 REM INTERROG EVL PM/LDLS PM: CPT | Mod: ,,, | Performed by: INTERNAL MEDICINE

## 2020-05-18 PROCEDURE — 99499 NO LOS: ICD-10-PCS | Mod: S$PBB,,, | Performed by: INTERNAL MEDICINE

## 2020-05-19 NOTE — PROGRESS NOTES
Subjective:      Patient ID: Marycruz Perez is a 89 y.o. female.    Chief Complaint: No chief complaint on file.    HPI:    [employer] remote interrogation of pacemaker report downloaded and prepared by medical assistant and interpreted by me and full report scanned into Epic media.  Battery OK.  Lead OK.  100% ventricular pacing.  Normal device function.    Past Medical History:   Diagnosis Date    Hypertension     Syncope and collapse         Past Surgical History:   Procedure Laterality Date    CARDIAC CATHETERIZATION      CARDIAC PACEMAKER PLACEMENT      CHOLECYSTECTOMY      CORONARY ANGIOPLASTY      HYSTERECTOMY         Family History   Problem Relation Age of Onset    Liver disease Mother        Social History     Socioeconomic History    Marital status: Single     Spouse name: Not on file    Number of children: Not on file    Years of education: Not on file    Highest education level: Not on file   Occupational History    Not on file   Social Needs    Financial resource strain: Not on file    Food insecurity:     Worry: Not on file     Inability: Not on file    Transportation needs:     Medical: Not on file     Non-medical: Not on file   Tobacco Use    Smoking status: Never Smoker    Smokeless tobacco: Never Used   Substance and Sexual Activity    Alcohol use: No     Frequency: Never    Drug use: No    Sexual activity: Not on file   Lifestyle    Physical activity:     Days per week: Not on file     Minutes per session: Not on file    Stress: Not on file   Relationships    Social connections:     Talks on phone: Not on file     Gets together: Not on file     Attends Scientology service: Not on file     Active member of club or organization: Not on file     Attends meetings of clubs or organizations: Not on file     Relationship status: Not on file   Other Topics Concern    Not on file   Social History Narrative    Not on file       Current Outpatient Medications on File Prior to  Visit   Medication Sig Dispense Refill    amLODIPine (NORVASC) 5 MG tablet Take 5 mg by mouth once daily.      clopidogrel (PLAVIX) 75 mg tablet TK 1 T PO QD  2    diazePAM (VALIUM) 5 MG tablet TK 1 T PO D PRN  0    furosemide (LASIX) 20 MG tablet Take 1 tablet (20 mg total) by mouth once daily. 90 tablet 3    losartan (COZAAR) 100 MG tablet Take 100 mg by mouth once daily.      meclizine (ANTIVERT) 25 mg tablet       metoprolol succinate (TOPROL-XL) 25 MG 24 hr tablet TK 1 T PO D  3    montelukast (SINGULAIR) 10 mg tablet       potassium chloride (MICRO-K) 10 MEQ CpSR TK 1 C PO D  1    tolterodine (DETROL) 1 MG Tab        No current facility-administered medications on file prior to visit.        Review of patient's allergies indicates:   Allergen Reactions    Codeine     Penicillins     Penicillin Rash     Objective:   There were no vitals filed for this visit.     Physical Exam     Assessment:     1. Cardiac pacemaker      Plan:   Diagnoses and all orders for this visit:    Cardiac pacemaker         No follow-ups on file.

## 2020-08-17 ENCOUNTER — CLINICAL SUPPORT (OUTPATIENT)
Dept: CARDIOLOGY | Facility: CLINIC | Age: 85
End: 2020-08-17
Payer: MEDICARE

## 2020-08-17 DIAGNOSIS — Z95.0 CARDIAC PACEMAKER: Primary | ICD-10-CM

## 2020-08-17 PROCEDURE — 93294 REM INTERROG EVL PM/LDLS PM: CPT | Mod: ,,, | Performed by: INTERNAL MEDICINE

## 2020-08-17 PROCEDURE — 93296 REM INTERROG EVL PM/IDS: CPT

## 2020-08-17 PROCEDURE — 93294 PR INTERROG EVAL, REMOTE, UP TO 90 DAYS,PACEMAKER: ICD-10-PCS | Mod: ,,, | Performed by: INTERNAL MEDICINE

## 2020-08-17 NOTE — PROGRESS NOTES
Subjective:      Patient ID: Marycruz Perez is a 89 y.o. female.    Chief Complaint: No chief complaint on file.    HPI:    [employer] remote pacemaker interrogation report downloaded and prepared by medical assistant and interpreted by me and full report scanned into Epic media.  Battery OK.  Lead OK.  Normal device function.    Past Medical History:   Diagnosis Date    Hypertension     Syncope and collapse         Past Surgical History:   Procedure Laterality Date    CARDIAC CATHETERIZATION      CARDIAC PACEMAKER PLACEMENT      CHOLECYSTECTOMY      CORONARY ANGIOPLASTY      HYSTERECTOMY         Family History   Problem Relation Age of Onset    Liver disease Mother        Social History     Socioeconomic History    Marital status: Single     Spouse name: Not on file    Number of children: Not on file    Years of education: Not on file    Highest education level: Not on file   Occupational History    Not on file   Social Needs    Financial resource strain: Not on file    Food insecurity     Worry: Not on file     Inability: Not on file    Transportation needs     Medical: Not on file     Non-medical: Not on file   Tobacco Use    Smoking status: Never Smoker    Smokeless tobacco: Never Used   Substance and Sexual Activity    Alcohol use: No     Frequency: Never    Drug use: No    Sexual activity: Not on file   Lifestyle    Physical activity     Days per week: Not on file     Minutes per session: Not on file    Stress: Not on file   Relationships    Social connections     Talks on phone: Not on file     Gets together: Not on file     Attends Scientologist service: Not on file     Active member of club or organization: Not on file     Attends meetings of clubs or organizations: Not on file     Relationship status: Not on file   Other Topics Concern    Not on file   Social History Narrative    Not on file       Current Outpatient Medications on File Prior to Visit   Medication Sig Dispense  Refill    amLODIPine (NORVASC) 5 MG tablet Take 5 mg by mouth once daily.      clopidogrel (PLAVIX) 75 mg tablet TK 1 T PO QD  2    diazePAM (VALIUM) 5 MG tablet TK 1 T PO D PRN  0    furosemide (LASIX) 20 MG tablet Take 1 tablet (20 mg total) by mouth once daily. 90 tablet 3    losartan (COZAAR) 100 MG tablet Take 100 mg by mouth once daily.      meclizine (ANTIVERT) 25 mg tablet       metoprolol succinate (TOPROL-XL) 25 MG 24 hr tablet TK 1 T PO D  3    montelukast (SINGULAIR) 10 mg tablet       potassium chloride (MICRO-K) 10 MEQ CpSR TK 1 C PO D  1    tolterodine (DETROL) 1 MG Tab        No current facility-administered medications on file prior to visit.        Review of patient's allergies indicates:   Allergen Reactions    Codeine     Penicillins     Penicillin Rash     Objective:   There were no vitals filed for this visit.     Physical Exam     Assessment:     1. Cardiac pacemaker      Plan:   Diagnoses and all orders for this visit:    Cardiac pacemaker         No follow-ups on file.

## 2020-08-27 ENCOUNTER — TELEPHONE (OUTPATIENT)
Dept: CARDIOLOGY | Facility: CLINIC | Age: 85
End: 2020-08-27

## 2020-08-27 NOTE — TELEPHONE ENCOUNTER
Patient's daughter called to reschedule mothers appointment due to the bad weather.  Rescheduled appointment and confirmed date, time and location with patient's daughter and reminded her to bring ALL of the patients medications to the next appointment. Reminder letter mailed to patient.

## 2020-09-29 ENCOUNTER — HOSPITAL ENCOUNTER (OUTPATIENT)
Dept: RADIOLOGY | Facility: HOSPITAL | Age: 85
Discharge: HOME | End: 2020-09-29
Attending: PSYCHIATRY & NEUROLOGY
Payer: MEDICARE

## 2020-09-29 DIAGNOSIS — R41.3 MEMORY LOSS: ICD-10-CM

## 2020-09-29 PROCEDURE — 70450 CT HEAD/BRAIN W/O DYE: CPT | Mod: 26,,, | Performed by: RADIOLOGY

## 2020-09-29 PROCEDURE — 70450 CT HEAD/BRAIN W/O DYE: CPT | Mod: TC

## 2020-09-29 PROCEDURE — 70450 CT HEAD WITHOUT CONTRAST: ICD-10-PCS | Mod: 26,,, | Performed by: RADIOLOGY

## 2020-10-06 ENCOUNTER — OFFICE VISIT (OUTPATIENT)
Dept: CARDIOLOGY | Facility: CLINIC | Age: 85
End: 2020-10-06
Payer: MEDICARE

## 2020-10-06 VITALS
DIASTOLIC BLOOD PRESSURE: 88 MMHG | WEIGHT: 184.94 LBS | BODY MASS INDEX: 37.28 KG/M2 | HEIGHT: 59 IN | SYSTOLIC BLOOD PRESSURE: 138 MMHG | HEART RATE: 85 BPM

## 2020-10-06 DIAGNOSIS — I05.0 RHEUMATIC MITRAL STENOSIS: ICD-10-CM

## 2020-10-06 DIAGNOSIS — Z95.0 CARDIAC PACEMAKER: ICD-10-CM

## 2020-10-06 DIAGNOSIS — R60.0 LOCALIZED EDEMA: Primary | ICD-10-CM

## 2020-10-06 DIAGNOSIS — I10 ESSENTIAL HYPERTENSION: ICD-10-CM

## 2020-10-06 DIAGNOSIS — Z98.61 S/P PTCA (PERCUTANEOUS TRANSLUMINAL CORONARY ANGIOPLASTY): ICD-10-CM

## 2020-10-06 DIAGNOSIS — Z95.0 PACEMAKER: ICD-10-CM

## 2020-10-06 DIAGNOSIS — I34.0 MILD MITRAL REGURGITATION: ICD-10-CM

## 2020-10-06 DIAGNOSIS — I06.0 RHEUMATIC AORTIC STENOSIS: ICD-10-CM

## 2020-10-06 PROCEDURE — 99214 OFFICE O/P EST MOD 30 MIN: CPT | Mod: 25,S$PBB,, | Performed by: INTERNAL MEDICINE

## 2020-10-06 PROCEDURE — 99999 PR PBB SHADOW E&M-EST. PATIENT-LVL III: CPT | Mod: PBBFAC,,, | Performed by: INTERNAL MEDICINE

## 2020-10-06 PROCEDURE — 93010 ELECTROCARDIOGRAM REPORT: CPT | Mod: S$PBB,,, | Performed by: INTERNAL MEDICINE

## 2020-10-06 PROCEDURE — 99214 PR OFFICE/OUTPT VISIT, EST, LEVL IV, 30-39 MIN: ICD-10-PCS | Mod: 25,S$PBB,, | Performed by: INTERNAL MEDICINE

## 2020-10-06 PROCEDURE — 93005 ELECTROCARDIOGRAM TRACING: CPT | Mod: PBBFAC,PO | Performed by: INTERNAL MEDICINE

## 2020-10-06 PROCEDURE — 93010 EKG 12-LEAD: ICD-10-PCS | Mod: S$PBB,,, | Performed by: INTERNAL MEDICINE

## 2020-10-06 PROCEDURE — 99999 PR PBB SHADOW E&M-EST. PATIENT-LVL III: ICD-10-PCS | Mod: PBBFAC,,, | Performed by: INTERNAL MEDICINE

## 2020-10-06 PROCEDURE — 99213 OFFICE O/P EST LOW 20 MIN: CPT | Mod: PBBFAC,PO,25 | Performed by: INTERNAL MEDICINE

## 2020-10-06 RX ORDER — CETIRIZINE HYDROCHLORIDE 10 MG/1
10 TABLET ORAL DAILY
COMMUNITY

## 2020-10-06 RX ORDER — CALCIUM CARBONATE 600 MG
600 TABLET ORAL DAILY
COMMUNITY

## 2020-10-06 NOTE — PROGRESS NOTES
Subjective:      Patient ID: Marycruz Perez is a 89 y.o. female.    Chief Complaint: Follow-up    HPI:  Feet swell and turn purple after sitting a while    Pt often feels dizzy upon arising in the morning    Pt has a mild non-productive cough.    Review of Systems   Cardiovascular: Positive for dyspnea on exertion and leg swelling. Negative for chest pain, claudication, irregular heartbeat, near-syncope, orthopnea, palpitations and syncope.        Past Medical History:   Diagnosis Date    Hypertension     Syncope and collapse         Past Surgical History:   Procedure Laterality Date    CARDIAC CATHETERIZATION      CARDIAC PACEMAKER PLACEMENT      CHOLECYSTECTOMY      CORONARY ANGIOPLASTY      HYSTERECTOMY         Family History   Problem Relation Age of Onset    Liver disease Mother        Social History     Socioeconomic History    Marital status: Single     Spouse name: Not on file    Number of children: Not on file    Years of education: Not on file    Highest education level: Not on file   Occupational History    Not on file   Social Needs    Financial resource strain: Not on file    Food insecurity     Worry: Not on file     Inability: Not on file    Transportation needs     Medical: Not on file     Non-medical: Not on file   Tobacco Use    Smoking status: Never Smoker    Smokeless tobacco: Never Used   Substance and Sexual Activity    Alcohol use: No     Frequency: Never    Drug use: No    Sexual activity: Not on file   Lifestyle    Physical activity     Days per week: Not on file     Minutes per session: Not on file    Stress: Not on file   Relationships    Social connections     Talks on phone: Not on file     Gets together: Not on file     Attends Jew service: Not on file     Active member of club or organization: Not on file     Attends meetings of clubs or organizations: Not on file     Relationship status: Not on file   Other Topics Concern    Not on file   Social  "History Narrative    Not on file       Current Outpatient Medications on File Prior to Visit   Medication Sig Dispense Refill    calcium carbonate (OS-BERE) 600 mg calcium (1,500 mg) Tab Take 600 mg by mouth once.      cetirizine (ZYRTEC) 10 MG tablet Take 10 mg by mouth once daily.      clopidogrel (PLAVIX) 75 mg tablet TK 1 T PO QD  2    diazePAM (VALIUM) 5 MG tablet TK 1 T PO D PRN  0    furosemide (LASIX) 20 MG tablet Take 1 tablet (20 mg total) by mouth once daily. 90 tablet 3    losartan (COZAAR) 100 MG tablet Take 100 mg by mouth once daily.      meclizine (ANTIVERT) 25 mg tablet 25 mg 3 (three) times daily as needed.       metoprolol succinate (TOPROL-XL) 25 MG 24 hr tablet TK 1 T PO D  3    potassium chloride (MICRO-K) 10 MEQ CpSR TK 1 C PO D  1    tolterodine (DETROL) 1 MG Tab 1 mg 2 (two) times daily.       ZINC ACETATE ORAL Take by mouth.      [DISCONTINUED] amLODIPine (NORVASC) 5 MG tablet Take 5 mg by mouth once daily.      [DISCONTINUED] montelukast (SINGULAIR) 10 mg tablet        No current facility-administered medications on file prior to visit.        Review of patient's allergies indicates:   Allergen Reactions    Codeine     Penicillins     Penicillin Rash     Objective:     Vitals:    10/06/20 1335 10/06/20 1418   BP: (!) 146/88 138/88   BP Location: Left arm Left arm   Patient Position: Sitting Sitting   BP Method: Large (Automatic)    Pulse: 85    Weight: 83.9 kg (184 lb 15.5 oz)    Height: 4' 11" (1.499 m)         Physical Exam   Constitutional: She is oriented to person, place, and time. She appears well-developed and well-nourished.   Eyes: No scleral icterus.   Neck: No JVD present. Carotid bruit is not present.   Cardiovascular: Normal rate and regular rhythm. Exam reveals no gallop.   No murmur heard.  Pulmonary/Chest: Breath sounds normal.   Musculoskeletal:         General: Edema present.   Neurological: She is alert and oriented to person, place, and time.   Skin: " Skin is warm and dry.   Psychiatric: She has a normal mood and affect. Her behavior is normal. Judgment and thought content normal.   Vitals reviewed.         ECG: NSR with heart block.  Ventricular pacing.    Lab Visit on 05/12/2020   Component Date Value Ref Range Status    WBC 05/12/2020 9.35  3.90 - 12.70 K/uL Final    RBC 05/12/2020 4.56  4.00 - 5.40 M/uL Final    Hemoglobin 05/12/2020 13.4  12.0 - 16.0 g/dL Final    Hematocrit 05/12/2020 42.1  37.0 - 48.5 % Final    Mean Corpuscular Volume 05/12/2020 92  82 - 98 fL Final    Mean Corpuscular Hemoglobin 05/12/2020 29.4  27.0 - 31.0 pg Final    Mean Corpuscular Hemoglobin Conc 05/12/2020 31.8* 32.0 - 36.0 g/dL Final    RDW 05/12/2020 14.1  11.5 - 14.5 % Final    Platelets 05/12/2020 179  150 - 350 K/uL Final    MPV 05/12/2020 11.5  9.2 - 12.9 fL Final    Immature Granulocytes 05/12/2020 0.6* 0.0 - 0.5 % Final    Gran # (ANC) 05/12/2020 5.9  1.8 - 7.7 K/uL Final    Immature Grans (Abs) 05/12/2020 0.06* 0.00 - 0.04 K/uL Final    Lymph # 05/12/2020 2.5  1.0 - 4.8 K/uL Final    Mono # 05/12/2020 0.6  0.3 - 1.0 K/uL Final    Eos # 05/12/2020 0.1  0.0 - 0.5 K/uL Final    Baso # 05/12/2020 0.12  0.00 - 0.20 K/uL Final    nRBC 05/12/2020 0  0 /100 WBC Final    Gran% 05/12/2020 63.4  38.0 - 73.0 % Final    Lymph% 05/12/2020 26.8  18.0 - 48.0 % Final    Mono% 05/12/2020 6.8  4.0 - 15.0 % Final    Eosinophil% 05/12/2020 1.1  0.0 - 8.0 % Final    Basophil% 05/12/2020 1.3  0.0 - 1.9 % Final    Differential Method 05/12/2020 Automated   Final    Sodium 05/12/2020 140  136 - 145 mmol/L Final    Potassium 05/12/2020 4.2  3.5 - 5.1 mmol/L Final    Chloride 05/12/2020 109  95 - 110 mmol/L Final    CO2 05/12/2020 22* 23 - 29 mmol/L Final    Glucose 05/12/2020 114* 70 - 110 mg/dL Final    BUN, Bld 05/12/2020 19  8 - 23 mg/dL Final    Creatinine 05/12/2020 1.0  0.5 - 1.4 mg/dL Final    Calcium 05/12/2020 9.6  8.7 - 10.5 mg/dL Final    Total Protein  05/12/2020 6.9  6.0 - 8.4 g/dL Final    Albumin 05/12/2020 3.2* 3.5 - 5.2 g/dL Final    Total Bilirubin 05/12/2020 0.4  0.1 - 1.0 mg/dL Final    Alkaline Phosphatase 05/12/2020 100  55 - 135 U/L Final    AST 05/12/2020 16  10 - 40 U/L Final    ALT 05/12/2020 8* 10 - 44 U/L Final    Anion Gap 05/12/2020 9  8 - 16 mmol/L Final    eGFR if African American 05/12/2020 57* >60 mL/min/1.73 m^2 Final    eGFR if non African American 05/12/2020 50* >60 mL/min/1.73 m^2 Final   (    Note last [employer] single chamber pacemaker with normal function last interrogation      CT brain shows old stroke and involution and microvascular ischemic changes    CXR 12/19 showed aortic atherosclerosis and normal heart size and clear lungs      Assessment:     1. Localized edema    2. Rheumatic mitral stenosis    3. Mild mitral regurgitation    4. Rheumatic aortic stenosis    5. Essential hypertension    6. S/P PTCA (percutaneous transluminal coronary angioplasty)    7. Cardiac pacemaker    8. Pacemaker      Plan:   Marycruz was seen today for follow-up.    Diagnoses and all orders for this visit:    Localized edema  -     IN OFFICE EKG 12-LEAD (to Muse)  -     CBC auto differential; Future  -     Comprehensive Metabolic Panel; Future  -     TSH; Future  -     Lipid Panel; Future    Rheumatic mitral stenosis  -     IN OFFICE EKG 12-LEAD (to Muse)    Mild mitral regurgitation  -     IN OFFICE EKG 12-LEAD (to Muse)    Rheumatic aortic stenosis  -     IN OFFICE EKG 12-LEAD (to Muse)    Essential hypertension  -     IN OFFICE EKG 12-LEAD (to Muse)  -     CBC auto differential; Future  -     Comprehensive Metabolic Panel; Future  -     TSH; Future  -     Lipid Panel; Future    S/P PTCA (percutaneous transluminal coronary angioplasty)  -     IN OFFICE EKG 12-LEAD (to Muse)  -     CBC auto differential; Future  -     Comprehensive Metabolic Panel; Future  -     TSH; Future  -     Lipid Panel; Future    Cardiac pacemaker  -     IN OFFICE EKG  12-LEAD (to Muse)    Pacemaker  -     IN OFFICE EKG 12-LEAD (to Muse)     Discussed that Detrol can cause dry mouth and dizziness and is optional since it doesn't really help with urinary incontinence    Rest of meds the same     The edema is mostly venous insufficiency edema rather than diastolic CHF    OK to double the furosemide prn worse edema    RTC 6 months with lab    Continue transtelephonic pacemaker checks    Follow up in about 6 months (around 4/6/2021).

## 2020-10-26 ENCOUNTER — HOSPITAL ENCOUNTER (OUTPATIENT)
Facility: HOSPITAL | Age: 85
Discharge: HOME | End: 2020-10-28
Attending: EMERGENCY MEDICINE | Admitting: INTERNAL MEDICINE
Payer: MEDICARE

## 2020-10-26 DIAGNOSIS — I50.33 ACUTE ON CHRONIC DIASTOLIC CONGESTIVE HEART FAILURE: ICD-10-CM

## 2020-10-26 DIAGNOSIS — M79.605 PAIN IN BOTH LOWER EXTREMITIES: ICD-10-CM

## 2020-10-26 DIAGNOSIS — M79.604 PAIN IN BOTH LOWER EXTREMITIES: ICD-10-CM

## 2020-10-26 DIAGNOSIS — R07.9 CHEST PAIN, UNSPECIFIED TYPE: ICD-10-CM

## 2020-10-26 DIAGNOSIS — Z95.0 CARDIAC PACEMAKER: ICD-10-CM

## 2020-10-26 DIAGNOSIS — I50.23 ACUTE ON CHRONIC SYSTOLIC CONGESTIVE HEART FAILURE: Primary | ICD-10-CM

## 2020-10-26 DIAGNOSIS — R06.02 SHORTNESS OF BREATH: ICD-10-CM

## 2020-10-26 DIAGNOSIS — I10 ESSENTIAL HYPERTENSION: ICD-10-CM

## 2020-10-26 PROCEDURE — 99291 CRITICAL CARE FIRST HOUR: CPT | Mod: 25

## 2020-10-26 PROCEDURE — 96374 THER/PROPH/DIAG INJ IV PUSH: CPT

## 2020-10-26 PROCEDURE — 93005 ELECTROCARDIOGRAM TRACING: CPT

## 2020-10-27 PROBLEM — R07.9 CHEST PAIN: Status: ACTIVE | Noted: 2020-10-27

## 2020-10-27 PROBLEM — I50.9 ACUTE EXACERBATION OF CHF (CONGESTIVE HEART FAILURE): Status: ACTIVE | Noted: 2020-10-27

## 2020-10-27 PROBLEM — M79.606 LEG PAIN: Status: ACTIVE | Noted: 2020-10-27

## 2020-10-27 PROBLEM — I25.10 CORONARY ARTERY DISEASE INVOLVING NATIVE CORONARY ARTERY: Status: ACTIVE | Noted: 2020-10-27

## 2020-10-27 LAB
ALBUMIN SERPL BCP-MCNC: 3.4 G/DL (ref 3.5–5.2)
ALBUMIN SERPL BCP-MCNC: 3.4 G/DL (ref 3.5–5.2)
ALP SERPL-CCNC: 103 U/L (ref 55–135)
ALP SERPL-CCNC: 108 U/L (ref 55–135)
ALT SERPL W/O P-5'-P-CCNC: 12 U/L (ref 10–44)
ALT SERPL W/O P-5'-P-CCNC: 12 U/L (ref 10–44)
ANION GAP SERPL CALC-SCNC: 8 MMOL/L (ref 8–16)
ANION GAP SERPL CALC-SCNC: 9 MMOL/L (ref 8–16)
AORTIC ROOT ANNULUS: 3.11 CM
AST SERPL-CCNC: 19 U/L (ref 10–40)
AST SERPL-CCNC: 20 U/L (ref 10–40)
AV INDEX (PROSTH): 0.47
AV MEAN GRADIENT: 10 MMHG
AV PEAK GRADIENT: 16 MMHG
AV VALVE AREA: 1.43 CM2
AV VELOCITY RATIO: 0.43
BASOPHILS # BLD AUTO: 0.05 K/UL (ref 0–0.2)
BASOPHILS # BLD AUTO: 0.05 K/UL (ref 0–0.2)
BASOPHILS NFR BLD: 0.6 % (ref 0–1.9)
BASOPHILS NFR BLD: 0.6 % (ref 0–1.9)
BILIRUB SERPL-MCNC: 0.4 MG/DL (ref 0.1–1)
BILIRUB SERPL-MCNC: 0.4 MG/DL (ref 0.1–1)
BNP SERPL-MCNC: 307 PG/ML (ref 0–99)
BSA FOR ECHO PROCEDURE: 1.86 M2
BUN SERPL-MCNC: 27 MG/DL (ref 8–23)
BUN SERPL-MCNC: 28 MG/DL (ref 8–23)
CALCIUM SERPL-MCNC: 8.9 MG/DL (ref 8.7–10.5)
CALCIUM SERPL-MCNC: 9 MG/DL (ref 8.7–10.5)
CHLORIDE SERPL-SCNC: 107 MMOL/L (ref 95–110)
CHLORIDE SERPL-SCNC: 107 MMOL/L (ref 95–110)
CO2 SERPL-SCNC: 28 MMOL/L (ref 23–29)
CO2 SERPL-SCNC: 28 MMOL/L (ref 23–29)
CREAT SERPL-MCNC: 1 MG/DL (ref 0.5–1.4)
CREAT SERPL-MCNC: 1.1 MG/DL (ref 0.5–1.4)
CV ECHO LV RWT: 1 CM
D DIMER PPP IA.FEU-MCNC: 2.5 MG/L FEU
DIFFERENTIAL METHOD: ABNORMAL
DIFFERENTIAL METHOD: ABNORMAL
DOP CALC AO PEAK VEL: 1.99 M/S
DOP CALC AO VTI: 37.68 CM
DOP CALC LVOT AREA: 3 CM2
DOP CALC LVOT DIAMETER: 1.97 CM
DOP CALC LVOT PEAK VEL: 0.86 M/S
DOP CALC LVOT STROKE VOLUME: 54.01 CM3
DOP CALCLVOT PEAK VEL VTI: 17.73 CM
E WAVE DECELERATION TIME: 310.34 MSEC
E/A RATIO: 0.75
ECHO LV POSTERIOR WALL: 1.43 CM (ref 0.6–1.1)
EOSINOPHIL # BLD AUTO: 0.1 K/UL (ref 0–0.5)
EOSINOPHIL # BLD AUTO: 0.1 K/UL (ref 0–0.5)
EOSINOPHIL NFR BLD: 1.2 % (ref 0–8)
EOSINOPHIL NFR BLD: 1.6 % (ref 0–8)
ERYTHROCYTE [DISTWIDTH] IN BLOOD BY AUTOMATED COUNT: 14.7 % (ref 11.5–14.5)
ERYTHROCYTE [DISTWIDTH] IN BLOOD BY AUTOMATED COUNT: 14.7 % (ref 11.5–14.5)
EST. GFR  (AFRICAN AMERICAN): 51 ML/MIN/1.73 M^2
EST. GFR  (AFRICAN AMERICAN): 57 ML/MIN/1.73 M^2
EST. GFR  (NON AFRICAN AMERICAN): 44 ML/MIN/1.73 M^2
EST. GFR  (NON AFRICAN AMERICAN): 50 ML/MIN/1.73 M^2
ESTIMATED AVG GLUCOSE: 117 MG/DL (ref 68–131)
FRACTIONAL SHORTENING: 31 % (ref 28–44)
GLUCOSE SERPL-MCNC: 117 MG/DL (ref 70–110)
GLUCOSE SERPL-MCNC: 145 MG/DL (ref 70–110)
HBA1C MFR BLD HPLC: 5.7 % (ref 4–5.6)
HCT VFR BLD AUTO: 37.6 % (ref 37–48.5)
HCT VFR BLD AUTO: 40.2 % (ref 37–48.5)
HGB BLD-MCNC: 12.5 G/DL (ref 12–16)
HGB BLD-MCNC: 13.2 G/DL (ref 12–16)
IMM GRANULOCYTES # BLD AUTO: 0.04 K/UL (ref 0–0.04)
IMM GRANULOCYTES # BLD AUTO: 0.04 K/UL (ref 0–0.04)
IMM GRANULOCYTES NFR BLD AUTO: 0.5 % (ref 0–0.5)
IMM GRANULOCYTES NFR BLD AUTO: 0.5 % (ref 0–0.5)
INR PPP: 1 (ref 0.8–1.2)
INTERVENTRICULAR SEPTUM: 1.5 CM (ref 0.6–1.1)
LA MAJOR: 6.18 CM
LA MINOR: 5.36 CM
LA WIDTH: 4.4 CM
LEFT ATRIUM SIZE: 4.6 CM
LEFT ATRIUM VOLUME INDEX: 55.5 ML/M2
LEFT ATRIUM VOLUME: 98.77 CM3
LEFT INTERNAL DIMENSION IN SYSTOLE: 1.99 CM (ref 2.1–4)
LEFT VENTRICLE DIASTOLIC VOLUME INDEX: 17.66 ML/M2
LEFT VENTRICLE DIASTOLIC VOLUME: 31.43 ML
LEFT VENTRICLE MASS INDEX: 80 G/M2
LEFT VENTRICLE SYSTOLIC VOLUME INDEX: 7.1 ML/M2
LEFT VENTRICLE SYSTOLIC VOLUME: 12.61 ML
LEFT VENTRICULAR INTERNAL DIMENSION IN DIASTOLE: 2.87 CM (ref 3.5–6)
LEFT VENTRICULAR MASS: 143.26 G
LYMPHOCYTES # BLD AUTO: 2.6 K/UL (ref 1–4.8)
LYMPHOCYTES # BLD AUTO: 2.8 K/UL (ref 1–4.8)
LYMPHOCYTES NFR BLD: 32.1 % (ref 18–48)
LYMPHOCYTES NFR BLD: 32.9 % (ref 18–48)
MAGNESIUM SERPL-MCNC: 1.8 MG/DL (ref 1.6–2.6)
MCH RBC QN AUTO: 29.7 PG (ref 27–31)
MCH RBC QN AUTO: 29.8 PG (ref 27–31)
MCHC RBC AUTO-ENTMCNC: 32.8 G/DL (ref 32–36)
MCHC RBC AUTO-ENTMCNC: 33.2 G/DL (ref 32–36)
MCV RBC AUTO: 90 FL (ref 82–98)
MCV RBC AUTO: 91 FL (ref 82–98)
MONOCYTES # BLD AUTO: 0.7 K/UL (ref 0.3–1)
MONOCYTES # BLD AUTO: 0.7 K/UL (ref 0.3–1)
MONOCYTES NFR BLD: 7.8 % (ref 4–15)
MONOCYTES NFR BLD: 8.6 % (ref 4–15)
MV PEAK A VEL: 1.42 M/S
MV PEAK E VEL: 1.07 M/S
MV STENOSIS PRESSURE HALF TIME: 90 MS
MV VALVE AREA P 1/2 METHOD: 2.44 CM2
NEUTROPHILS # BLD AUTO: 4.7 K/UL (ref 1.8–7.7)
NEUTROPHILS # BLD AUTO: 4.8 K/UL (ref 1.8–7.7)
NEUTROPHILS NFR BLD: 56.6 % (ref 38–73)
NEUTROPHILS NFR BLD: 57 % (ref 38–73)
NRBC BLD-RTO: 0 /100 WBC
NRBC BLD-RTO: 0 /100 WBC
PHOSPHATE SERPL-MCNC: 3.9 MG/DL (ref 2.7–4.5)
PISA TR MAX VEL: 2.81 M/S
PLATELET # BLD AUTO: 150 K/UL (ref 150–350)
PLATELET # BLD AUTO: 170 K/UL (ref 150–350)
PMV BLD AUTO: 11.9 FL (ref 9.2–12.9)
PMV BLD AUTO: 12 FL (ref 9.2–12.9)
POCT GLUCOSE: 103 MG/DL (ref 70–110)
POCT GLUCOSE: 112 MG/DL (ref 70–110)
POCT GLUCOSE: 121 MG/DL (ref 70–110)
POCT GLUCOSE: 125 MG/DL (ref 70–110)
POTASSIUM SERPL-SCNC: 3.5 MMOL/L (ref 3.5–5.1)
POTASSIUM SERPL-SCNC: 3.6 MMOL/L (ref 3.5–5.1)
PROT SERPL-MCNC: 6.6 G/DL (ref 6–8.4)
PROT SERPL-MCNC: 6.9 G/DL (ref 6–8.4)
PROTHROMBIN TIME: 10.8 SEC (ref 9–12.5)
PV PEAK VELOCITY: 0.8 CM/S
RA MAJOR: 4.83 CM
RA PRESSURE: 3 MMHG
RA WIDTH: 2.14 CM
RBC # BLD AUTO: 4.19 M/UL (ref 4–5.4)
RBC # BLD AUTO: 4.44 M/UL (ref 4–5.4)
RIGHT VENTRICULAR END-DIASTOLIC DIMENSION: 3.2 CM
SARS-COV-2 RDRP RESP QL NAA+PROBE: NEGATIVE
SODIUM SERPL-SCNC: 143 MMOL/L (ref 136–145)
SODIUM SERPL-SCNC: 144 MMOL/L (ref 136–145)
STJ: 2.01 CM
TR MAX PG: 32 MMHG
TRICUSPID ANNULAR PLANE SYSTOLIC EXCURSION: 1.67 CM
TROPONIN I SERPL DL<=0.01 NG/ML-MCNC: 0.02 NG/ML (ref 0–0.03)
TROPONIN I SERPL DL<=0.01 NG/ML-MCNC: 0.02 NG/ML (ref 0–0.03)
TROPONIN I SERPL DL<=0.01 NG/ML-MCNC: 0.03 NG/ML (ref 0–0.03)
TV REST PULMONARY ARTERY PRESSURE: 35 MMHG
WBC # BLD AUTO: 8.23 K/UL (ref 3.9–12.7)
WBC # BLD AUTO: 8.47 K/UL (ref 3.9–12.7)

## 2020-10-27 PROCEDURE — 84484 ASSAY OF TROPONIN QUANT: CPT | Mod: 91

## 2020-10-27 PROCEDURE — 96376 TX/PRO/DX INJ SAME DRUG ADON: CPT | Mod: 59

## 2020-10-27 PROCEDURE — 85379 FIBRIN DEGRADATION QUANT: CPT

## 2020-10-27 PROCEDURE — G0378 HOSPITAL OBSERVATION PER HR: HCPCS | Mod: CS

## 2020-10-27 PROCEDURE — 80053 COMPREHEN METABOLIC PANEL: CPT

## 2020-10-27 PROCEDURE — U0002 COVID-19 LAB TEST NON-CDC: HCPCS

## 2020-10-27 PROCEDURE — 25000003 PHARM REV CODE 250: Performed by: STUDENT IN AN ORGANIZED HEALTH CARE EDUCATION/TRAINING PROGRAM

## 2020-10-27 PROCEDURE — 83036 HEMOGLOBIN GLYCOSYLATED A1C: CPT

## 2020-10-27 PROCEDURE — 84484 ASSAY OF TROPONIN QUANT: CPT

## 2020-10-27 PROCEDURE — 63600175 PHARM REV CODE 636 W HCPCS: Performed by: STUDENT IN AN ORGANIZED HEALTH CARE EDUCATION/TRAINING PROGRAM

## 2020-10-27 PROCEDURE — 25000003 PHARM REV CODE 250: Performed by: EMERGENCY MEDICINE

## 2020-10-27 PROCEDURE — 85610 PROTHROMBIN TIME: CPT

## 2020-10-27 PROCEDURE — 85025 COMPLETE CBC W/AUTO DIFF WBC: CPT

## 2020-10-27 PROCEDURE — 83735 ASSAY OF MAGNESIUM: CPT

## 2020-10-27 PROCEDURE — 25500020 PHARM REV CODE 255: Performed by: EMERGENCY MEDICINE

## 2020-10-27 PROCEDURE — 94761 N-INVAS EAR/PLS OXIMETRY MLT: CPT

## 2020-10-27 PROCEDURE — 36415 COLL VENOUS BLD VENIPUNCTURE: CPT

## 2020-10-27 PROCEDURE — 96375 TX/PRO/DX INJ NEW DRUG ADDON: CPT

## 2020-10-27 PROCEDURE — 96372 THER/PROPH/DIAG INJ SC/IM: CPT | Mod: 59

## 2020-10-27 PROCEDURE — 83880 ASSAY OF NATRIURETIC PEPTIDE: CPT

## 2020-10-27 PROCEDURE — 84100 ASSAY OF PHOSPHORUS: CPT

## 2020-10-27 PROCEDURE — G0378 HOSPITAL OBSERVATION PER HR: HCPCS

## 2020-10-27 PROCEDURE — 99214 OFFICE O/P EST MOD 30 MIN: CPT | Mod: ,,, | Performed by: NURSE PRACTITIONER

## 2020-10-27 PROCEDURE — 99214 PR OFFICE/OUTPT VISIT, EST, LEVL IV, 30-39 MIN: ICD-10-PCS | Mod: ,,, | Performed by: NURSE PRACTITIONER

## 2020-10-27 PROCEDURE — 80053 COMPREHEN METABOLIC PANEL: CPT | Mod: 91

## 2020-10-27 RX ORDER — INSULIN ASPART 100 [IU]/ML
0-5 INJECTION, SOLUTION INTRAVENOUS; SUBCUTANEOUS
Status: DISCONTINUED | OUTPATIENT
Start: 2020-10-27 | End: 2020-10-28 | Stop reason: HOSPADM

## 2020-10-27 RX ORDER — METOPROLOL SUCCINATE 25 MG/1
25 TABLET, EXTENDED RELEASE ORAL DAILY
Status: DISCONTINUED | OUTPATIENT
Start: 2020-10-27 | End: 2020-10-28 | Stop reason: HOSPADM

## 2020-10-27 RX ORDER — FUROSEMIDE 10 MG/ML
80 INJECTION INTRAMUSCULAR; INTRAVENOUS
Status: COMPLETED | OUTPATIENT
Start: 2020-10-27 | End: 2020-10-27

## 2020-10-27 RX ORDER — FUROSEMIDE 10 MG/ML
40 INJECTION INTRAMUSCULAR; INTRAVENOUS
Status: DISCONTINUED | OUTPATIENT
Start: 2020-10-27 | End: 2020-10-28

## 2020-10-27 RX ORDER — METOLAZONE 2.5 MG/1
5 TABLET ORAL DAILY
Status: DISCONTINUED | OUTPATIENT
Start: 2020-10-27 | End: 2020-10-28

## 2020-10-27 RX ORDER — LABETALOL HYDROCHLORIDE 5 MG/ML
10 INJECTION, SOLUTION INTRAVENOUS ONCE
Status: COMPLETED | OUTPATIENT
Start: 2020-10-27 | End: 2020-10-27

## 2020-10-27 RX ORDER — GLUCAGON 1 MG
1 KIT INJECTION
Status: DISCONTINUED | OUTPATIENT
Start: 2020-10-27 | End: 2020-10-28 | Stop reason: HOSPADM

## 2020-10-27 RX ORDER — MECLIZINE HCL 12.5 MG 12.5 MG/1
25 TABLET ORAL 3 TIMES DAILY PRN
Status: DISCONTINUED | OUTPATIENT
Start: 2020-10-27 | End: 2020-10-28 | Stop reason: HOSPADM

## 2020-10-27 RX ORDER — HEPARIN SODIUM 5000 [USP'U]/ML
5000 INJECTION, SOLUTION INTRAVENOUS; SUBCUTANEOUS EVERY 12 HOURS
Status: DISCONTINUED | OUTPATIENT
Start: 2020-10-27 | End: 2020-10-28 | Stop reason: HOSPADM

## 2020-10-27 RX ORDER — LABETALOL HYDROCHLORIDE 5 MG/ML
10 INJECTION, SOLUTION INTRAVENOUS ONCE
Status: DISCONTINUED | OUTPATIENT
Start: 2020-10-27 | End: 2020-10-27

## 2020-10-27 RX ORDER — CLOPIDOGREL BISULFATE 75 MG/1
75 TABLET ORAL DAILY
Status: DISCONTINUED | OUTPATIENT
Start: 2020-10-27 | End: 2020-10-28 | Stop reason: HOSPADM

## 2020-10-27 RX ORDER — SODIUM CHLORIDE 0.9 % (FLUSH) 0.9 %
10 SYRINGE (ML) INJECTION
Status: DISCONTINUED | OUTPATIENT
Start: 2020-10-27 | End: 2020-10-28 | Stop reason: HOSPADM

## 2020-10-27 RX ORDER — LOSARTAN POTASSIUM 50 MG/1
100 TABLET ORAL DAILY
Status: DISCONTINUED | OUTPATIENT
Start: 2020-10-27 | End: 2020-10-28 | Stop reason: HOSPADM

## 2020-10-27 RX ORDER — IBUPROFEN 200 MG
16 TABLET ORAL
Status: DISCONTINUED | OUTPATIENT
Start: 2020-10-27 | End: 2020-10-28 | Stop reason: HOSPADM

## 2020-10-27 RX ORDER — OXYBUTYNIN CHLORIDE 5 MG/1
5 TABLET, EXTENDED RELEASE ORAL DAILY
Status: DISCONTINUED | OUTPATIENT
Start: 2020-10-27 | End: 2020-10-28 | Stop reason: HOSPADM

## 2020-10-27 RX ORDER — IBUPROFEN 200 MG
24 TABLET ORAL
Status: DISCONTINUED | OUTPATIENT
Start: 2020-10-27 | End: 2020-10-28 | Stop reason: HOSPADM

## 2020-10-27 RX ADMIN — OXYBUTYNIN CHLORIDE 5 MG: 5 TABLET, EXTENDED RELEASE ORAL at 09:10

## 2020-10-27 RX ADMIN — FUROSEMIDE 80 MG: 10 INJECTION, SOLUTION INTRAVENOUS at 04:10

## 2020-10-27 RX ADMIN — LOSARTAN POTASSIUM 100 MG: 50 TABLET, FILM COATED ORAL at 09:10

## 2020-10-27 RX ADMIN — HEPARIN SODIUM 5000 UNITS: 5000 INJECTION INTRAVENOUS; SUBCUTANEOUS at 09:10

## 2020-10-27 RX ADMIN — LABETALOL HYDROCHLORIDE 10 MG: 5 INJECTION, SOLUTION INTRAVENOUS at 02:10

## 2020-10-27 RX ADMIN — METOLAZONE 5 MG: 2.5 TABLET ORAL at 03:10

## 2020-10-27 RX ADMIN — IOHEXOL 100 ML: 350 INJECTION, SOLUTION INTRAVENOUS at 03:10

## 2020-10-27 RX ADMIN — FUROSEMIDE 40 MG: 10 INJECTION, SOLUTION INTRAVENOUS at 04:10

## 2020-10-27 RX ADMIN — METOPROLOL SUCCINATE 25 MG: 25 TABLET, FILM COATED, EXTENDED RELEASE ORAL at 09:10

## 2020-10-27 RX ADMIN — CLOPIDOGREL 75 MG: 75 TABLET, FILM COATED ORAL at 09:10

## 2020-10-27 RX ADMIN — FUROSEMIDE 40 MG: 10 INJECTION, SOLUTION INTRAVENOUS at 06:10

## 2020-10-27 RX ADMIN — NITROGLYCERIN 1 INCH: 20 OINTMENT TOPICAL at 12:10

## 2020-10-27 NOTE — ED PROVIDER NOTES
Encounter Date: 10/26/2020    SCRIBE #1 NOTE: I, Richard Loo, am scribing for, and in the presence of, Guy Lefort, MD.       History     Chief Complaint   Patient presents with    Chest Pain     Mid sternal chest pressure that started 2-3 hours ago. Patient complaining of SOB. Able to speak in full complete sentences.  Patient currently hypertensive. Daughter states she did not take her BP medication this morning. Last dose was 8 pm aristides Perez is a 89 y.o. female who  has a past medical history of Hypertension and Syncope and collapse.    The patient presents to the ED due to shortness of breath. Onset 2-3 hours ago. Patient reports associated midsternal chest pressure and heart palpitations. Family reports patient has a history of CHF. States her legs are more swollen than normal today despite taking Lasix. She last took blood pressure medication in the evening today at 8 pm but notes that she missed her morning dose today. She denies fever or new cough. Patient notes she recently had a steroid injection due to allergies. Patient uses Plavix.    The history is provided by the patient.     Review of patient's allergies indicates:   Allergen Reactions    Codeine     Penicillins     Penicillin Rash     Past Medical History:   Diagnosis Date    Acute exacerbation of CHF (congestive heart failure) 10/27/2020    Hypertension     Syncope and collapse      Past Surgical History:   Procedure Laterality Date    CARDIAC CATHETERIZATION      CARDIAC PACEMAKER PLACEMENT      CHOLECYSTECTOMY      CORONARY ANGIOPLASTY      HYSTERECTOMY       Family History   Problem Relation Age of Onset    Liver disease Mother      Social History     Tobacco Use    Smoking status: Never Smoker    Smokeless tobacco: Never Used   Substance Use Topics    Alcohol use: No     Frequency: Never    Drug use: No     Review of Systems   Constitutional: Negative for fever.   HENT: Negative for sore throat.    Eyes: Negative  for visual disturbance.   Respiratory: Positive for shortness of breath. Negative for cough.    Cardiovascular: Positive for chest pain, palpitations and leg swelling.   Gastrointestinal: Negative for nausea.   Genitourinary: Negative for dysuria.   Musculoskeletal: Negative for back pain.   Skin: Negative for rash.   Neurological: Negative for weakness.   All other systems reviewed and are negative.      Physical Exam     Initial Vitals [10/26/20 2343]   BP Pulse Resp Temp SpO2   (!) 224/105 85 18 98.4 °F (36.9 °C) 95 %      MAP       --         Physical Exam    Nursing note and vitals reviewed.  Constitutional: She appears well-developed and well-nourished.   HENT:   Head: Normocephalic and atraumatic.   Eyes: EOM are normal. Pupils are equal, round, and reactive to light.   Neck: Normal range of motion. Neck supple.   Cardiovascular: Normal rate, regular rhythm and intact distal pulses.   Pulmonary/Chest: Breath sounds normal. She is in respiratory distress (tachypnea).   Abdominal: Soft. She exhibits no distension. There is no abdominal tenderness.   Musculoskeletal: Normal range of motion. Tenderness and edema present.      Comments: Bilateral LE edema.    Left calf tenderness   Neurological: She is oriented to person, place, and time.   Skin: Skin is warm and dry.         ED Course   Critical Care    Date/Time: 10/27/2020 10:45 PM  Performed by: Guy J. Lefort, MD  Authorized by: Guy J. Lefort, MD   Total critical care time (exclusive of procedural time) : 30 minutes  Critical care time was exclusive of separately billable procedures and treating other patients.  Critical care was necessary to treat or prevent imminent or life-threatening deterioration of the following conditions: respiratory failure.  Critical care was time spent personally by me on the following activities: blood draw for specimens, development of treatment plan with patient or surrogate, evaluation of patient's response to treatment,  ordering and performing treatments and interventions, ordering and review of laboratory studies and ordering and review of radiographic studies.        Labs Reviewed   CBC W/ AUTO DIFFERENTIAL - Abnormal; Notable for the following components:       Result Value    RDW 14.7 (*)     All other components within normal limits   COMPREHENSIVE METABOLIC PANEL - Abnormal; Notable for the following components:    Glucose 145 (*)     BUN, Bld 28 (*)     Albumin 3.4 (*)     eGFR if  51 (*)     eGFR if non  44 (*)     All other components within normal limits   B-TYPE NATRIURETIC PEPTIDE - Abnormal; Notable for the following components:     (*)     All other components within normal limits   D DIMER, QUANTITATIVE - Abnormal; Notable for the following components:    D-Dimer 2.50 (*)     All other components within normal limits   TROPONIN I   PROTIME-INR   SARS-COV-2 RNA AMPLIFICATION, QUAL   CBC W/ AUTO DIFFERENTIAL   COMPREHENSIVE METABOLIC PANEL   MAGNESIUM   PHOSPHORUS   HEMOGLOBIN A1C   POCT GLUCOSE MONITORING CONTINUOUS     EKG Readings: (Independently Interpreted)   Rhythm: Paced Rhythm. Heart Rate: 91.       Imaging Results          CTA Chest Non-Coronary (PE Study) (Final result)  Result time 10/27/20 03:56:48    Final result by Kathi Stanton MD (10/27/20 03:56:48)                 Impression:      1. No evidence of pulmonary thromboembolism through the proximal segmental levels noting limited evaluation of distal segmental and subsegmental branch vessels at the lung bases due to respiratory motion artifact.  Correlation with D-dimer and lower extremity venous ultrasound as warranted.  2. Patchy ground-glass opacity and interlobular septal thickening involving the lung bases.  This could possibly relate to underlying respiratory motion artifact but can also be seen with pulmonary edema versus infectious or non infectious inflammatory process.  Clinical correlation advised.  3.  Coronary artery and aortic atherosclerosis.  Right-sided cardiac pacing device in place.      Electronically signed by: Kathi Stanton MD  Date:    10/27/2020  Time:    03:56             Narrative:    EXAMINATION:  CTA CHEST NON CORONARY    CLINICAL HISTORY:  PE suspected, intermediate prob, positive D-dimer;    TECHNIQUE:  Low dose axial images, sagittal and coronal reformations were obtained from the thoracic inlet to the lung bases following the IV administration of 100 mL of Omnipaque 350.  Contrast timing was optimized to evaluate the pulmonary arteries.  MIP images were performed.    COMPARISON:  None    FINDINGS:  There is a right-sided cardiac pacing device in place with transvenous leads extending into the heart.  The thoracic aorta normal in caliber, contour and course without evidence to suggest aneurysmal dilatation or dissection.  There is mild atherosclerotic plaquing of the thoracic aorta.  The heart is mildly prominent in size.  There is calcific atherosclerosis of the coronary vessels.  There is calcification of the mitral annulus.  No significant pericardial fluid is present.  There is reflux of contrast into the IVC and hepatic veins which can be seen with elevated right heart pressures.  There is no axillary adenopathy.  Normal sized mediastinal lymph nodes are present.  Hilar contours are within normal limits noting calcified right hilar lymph nodes.  The esophagus maintains normal caliber and course.    The trachea is midline and the proximal airways are patent.  There is a calcified right upper lobe pulmonary granuloma.  There is patchy ground-glass opacity and possible interlobular septal thickening most notably at the bases noting definitive evaluation is limited due to respiratory motion artifact.  There is no focal consolidation, pleural fluid or pneumothorax.    Allowing for artifact from dense contrast bolus in the SVC, the pulmonary arteries demonstrate no filling defects to suggest  pulmonary thromboembolism through the proximal segmental levels noting evaluation of distal segmental and subsegmental branches at the lung bases is limited by respiratory motion artifact.    Incidentally visualized structures of the upper abdomen demonstrate no acute abnormalities.  Visualized osseous structures demonstrate degenerative change without acute abnormality.                               X-Ray Chest AP Portable (Final result)  Result time 10/27/20 00:25:18    Final result by Preston Gonzales MD (10/27/20 00:25:18)                 Impression:      No definite evidence of acute cardiopulmonary disease.      Electronically signed by: Preston Gonzales  Date:    10/27/2020  Time:    00:25             Narrative:    EXAMINATION:  XR CHEST AP PORTABLE    CLINICAL HISTORY:  CHF;    TECHNIQUE:  Single frontal view of the chest was performed.    COMPARISON:  Chest radiograph 12/30/2019    FINDINGS:  EKG leads overlie the chest.  Similar position of single lead pacer, generator overlying the right upper hemithorax.  Cardiomediastinal silhouette unchanged.  Mitral annular calcifications.  Chronic appearing interstitial increased attenuation is noted.  Calcified granuloma and right upper mid lung field.  No new focal airspace interstitial opacities seen.  There is chronic elevation right hemidiaphragm.  No pneumothorax or pleural effusion.  No acute findings are suggested in the visualized upper abdomen.  Osseous and soft tissue structures without acute change.                                 Medical Decision Making:   Differential Diagnosis:   Differential Diagnosis includes, but is not limited to:  PE, MI/ACS, pneumothorax, pericardial effusion/tamonade, pneumonia, lung abscess, pericarditis/myocarditis, pleural effusion, lung mass, CHF exacerbation, asthma exacerbation, COPD exacerbation, aspirated/ingested foreign body, airway obstruction, CO poisoning, anemia, metabolic derangement, allergy/atopy, influenza,  viral URI, viral syndrome.    Independently Interpreted Test(s):   I have ordered and independently interpreted X-rays - see prior notes.  I have ordered and independently interpreted EKG Reading(s) - see prior notes  Clinical Tests:   Lab Tests: Reviewed  Radiological Study: Reviewed  Medical Tests: Reviewed                             Clinical Impression:     ICD-10-CM ICD-9-CM   1. Acute on chronic systolic congestive heart failure  I50.23 428.23     428.0   2. Shortness of breath  R06.02 786.05   3. Chest pain, unspecified type  R07.9 786.50   4. Pain in both lower extremities  M79.604 729.5    M79.605                       Disposition:   Disposition: Admitted  Condition: Fair     ED Disposition Condition    Observation               I, Dr. Guy Lefort, personally performed the services described in this documentation. All medical record entries made by the scribe were at my direction and in my presence. I have reviewed the chart and agree that the record reflects my personal performance and is accurate and complete. Guy Lefort, MD.  10:45 PM 10/27/2020                 Guy J. Lefort, MD  10/27/20 9009

## 2020-10-27 NOTE — NURSING
VN note: VN cued into patient's room for introduction. Daughter at bedside. Daughter reports patient speaks some English as well, but she is [place]. Refused  at this time. VN informed patient and daughter that VN would be working closely along side bedside nurse, PCT, and the rest of the care team and making rounds throughout the shift. Daughter verbalized understanding. Allowed time for questions. VN will continue to be available to patient and intervene prn.      Admission questions completed. Home medications review.

## 2020-10-27 NOTE — ASSESSMENT & PLAN NOTE
- /105 upon presentation  - on Losartan and Toprol XL as an outpatient with good control per daughter  - omission of medication yesterday likely leading to uncontrolled HTN  - on ARB currently with SBP down to 140s-150s; recommend resumption of BB therapy as well  - monitor BP and adjust meds prn

## 2020-10-27 NOTE — ASSESSMENT & PLAN NOTE
- EKG with V paced rhythm  - normal function pacemaker  - monitor on telemetry while admitted  - follow up as an outpatient for ongoing interrogations

## 2020-10-27 NOTE — SUBJECTIVE & OBJECTIVE
Past Medical History:   Diagnosis Date    Acute exacerbation of CHF (congestive heart failure) 10/27/2020    Hypertension     Syncope and collapse        Past Surgical History:   Procedure Laterality Date    CARDIAC CATHETERIZATION      CARDIAC PACEMAKER PLACEMENT      CHOLECYSTECTOMY      CORONARY ANGIOPLASTY      HYSTERECTOMY         Review of patient's allergies indicates:   Allergen Reactions    Codeine     Penicillins     Penicillin Rash       No current facility-administered medications on file prior to encounter.      Current Outpatient Medications on File Prior to Encounter   Medication Sig    calcium carbonate (OS-BERE) 600 mg calcium (1,500 mg) Tab Take 600 mg by mouth once daily.     cetirizine (ZYRTEC) 10 MG tablet Take 10 mg by mouth once daily.    clopidogrel (PLAVIX) 75 mg tablet TK 1 T PO QD    diazePAM (VALIUM) 5 MG tablet TK 1 T PO D PRN    furosemide (LASIX) 20 MG tablet Take 1 tablet (20 mg total) by mouth once daily.    losartan (COZAAR) 100 MG tablet Take 100 mg by mouth once daily.    meclizine (ANTIVERT) 25 mg tablet 25 mg 3 (three) times daily as needed.     metoprolol succinate (TOPROL-XL) 25 MG 24 hr tablet TK 1 T PO D    potassium chloride (MICRO-K) 10 MEQ CpSR TK 1 C PO D    tolterodine (DETROL) 1 MG Tab 1 mg 2 (two) times daily.     ZINC ACETATE ORAL Take by mouth once daily.      Family History     Problem Relation (Age of Onset)    Liver disease Mother        Tobacco Use    Smoking status: Never Smoker    Smokeless tobacco: Never Used   Substance and Sexual Activity    Alcohol use: No     Frequency: Never    Drug use: No    Sexual activity: Not on file     Review of Systems   Constitution: Negative for chills, decreased appetite, diaphoresis and fever.   Cardiovascular: Positive for chest pain, dyspnea on exertion and paroxysmal nocturnal dyspnea. Negative for claudication, cyanosis, irregular heartbeat, leg swelling, near-syncope, orthopnea, palpitations and  syncope.   Respiratory: Negative for cough, hemoptysis, shortness of breath and wheezing.    Gastrointestinal: Negative for bloating, abdominal pain, constipation, diarrhea, melena, nausea and vomiting.   Neurological: Negative for dizziness and weakness.     Objective:     Vital Signs (Most Recent):  Temp: 97.1 °F (36.2 °C) (10/27/20 1204)  Pulse: 87 (10/27/20 1204)  Resp: 18 (10/27/20 1204)  BP: 129/85 (10/27/20 1204)  SpO2: (!) 94 % (10/27/20 1206) Vital Signs (24h Range):  Temp:  [97.1 °F (36.2 °C)-98.4 °F (36.9 °C)] 97.1 °F (36.2 °C)  Pulse:  [73-92] 87  Resp:  [16-57] 18  SpO2:  [91 %-99 %] 94 %  BP: (129-224)/() 129/85        There is no height or weight on file to calculate BMI.    SpO2: (!) 94 %  O2 Device (Oxygen Therapy): room air      Intake/Output Summary (Last 24 hours) at 10/27/2020 1356  Last data filed at 10/27/2020 0908  Gross per 24 hour   Intake 180 ml   Output 1550 ml   Net -1370 ml       Lines/Drains/Airways     None                 Physical Exam   Constitutional: She is oriented to person, place, and time. She appears well-developed and well-nourished. No distress.   Cardiovascular: Normal rate and regular rhythm. Exam reveals no gallop.   No murmur heard.  Pulmonary/Chest: Effort normal and breath sounds normal. No respiratory distress. She has no wheezes.   Abdominal: Soft. Bowel sounds are normal. She exhibits no distension. There is no abdominal tenderness.   Neurological: She is alert and oriented to person, place, and time.   Skin: Skin is warm and dry.       Significant Labs:     Recent Labs   Lab 10/27/20  0501   WBC 8.47   RBC 4.19   HGB 12.5   HCT 37.6      MCV 90   MCH 29.8   MCHC 33.2     Recent Labs   Lab 10/27/20  0501      K 3.5      CO2 28   BUN 27*   CREATININE 1.0   MG 1.8     Recent Labs   Lab 10/27/20  1308   TROPONINI 0.017  0.017       Significant Imaging: Echocardiogram:   Transthoracic echo (TTE) complete ([facility] Only): repeat echo pending    Results for orders placed or performed during the hospital encounter of 11/25/19   Echo Color Flow Doppler? Yes   Result Value Ref Range    LA WIDTH 3.76 cm    LVIDd 3.62 3.5 - 6.0 cm    IVS 1.40 (A) 0.6 - 1.1 cm    Posterior Wall 1.20 0.6 - 1.1 cm    Ao root annulus 2.83 cm    LVIDs 2.16 2.1 - 4.0 cm    FS 40 28 - 44 %    LA volume 85.73 cm3    STJ 2.64 cm    Ascending aorta 2.61 cm    LV mass 161.35 g    LA size 4.90 cm    RVDD 3.61 cm    Left Ventricle Relative Wall Thickness 0.66 cm    AV mean gradient 13 mmHg    AV valve area 1.03 cm2    AV Velocity Ratio 0.35     AV index (prosthetic) 0.30     E/A ratio 3.63     E wave decelartion time 140.58 msec    IVRT 0.09 msec    LVOT diameter 2.11 cm    LVOT area 3.5 cm2    LVOT peak juan m 0.77 m/s    LVOT peak VTI 13.22 cm    Ao peak juan m 2.21 m/s    Ao VTI 44.72 cm    LVOT stroke volume 46.20 cm3    AV peak gradient 20 mmHg    MV Peak E Juan M 1.74 m/s    TR Max Juan M 3.29 m/s    MV Peak A Juan M 0.48 m/s    LV Systolic Volume 15.45 mL    LV Diastolic Volume 55.22 mL    RA Major Axis 4.63 cm    Left Atrium Minor Axis 5.41 cm    Left Atrium Major Axis 5.54 cm    Triscuspid Valve Regurgitation Peak Gradient 43 mmHg    RA Width 2.73 cm    Right Atrial Pressure (from IVC) 8 mmHg    TV rest pulmonary artery pressure 51 mmHg    Narrative    · Mild concentric left ventricular hypertrophy.  · Normal left ventricular systolic function. The estimated ejection   fraction is 55%  · Indeterminate left ventricular diastolic function.  · Septal wall has abnormal motion consistent with right ventricular   pacemaker.  · Mild right ventricular enlargement.  · Mild aortic valve stenosis.  · Aortic valve area is 1.03 cm2; peak velocity is 2.21 m/s; mean gradient   is 13 mmHg.  · There is mild leaflet calcification of the Mitral Valve.  · Moderate mitral regurgitation.  · Mild mitral stenosis.  · Moderate tricuspid regurgitation.  · Mild to moderate pulmonic regurgitation.  · Intermediate central  venous pressure (8 mm Hg).  · The estimated PA systolic pressure is 51 mm Hg  · Pulmonary hypertension present.  · The rhythm is sinus with complete heart block and ventricular pacing

## 2020-10-27 NOTE — PLAN OF CARE
Pt primary language [place] however speaks and understands English  Tn did offer  free of charge but pt declined. Pt informed Tn she lives with her daughter , Lily ([phone]) who is at bedside. Pt has a RW and w/c ; no hh. Pt voices no d/c needs at this time. Pt's Cardiologist is Dr. Newman.  Tn gave pt d/c brochure, card, and folder and encouraged to call for any needs/concerns.     10/27/20 1032   Discharge Assessment   Assessment Type Discharge Planning Assessment   Confirmed/corrected address and phone number on facesheet? Yes   Assessment information obtained from? Patient   Expected Length of Stay (days) 2   Communicated expected length of stay with patient/caregiver yes   Prior to hospitilization cognitive status: Alert/Oriented   Prior to hospitalization functional status: Independent;Assistive Equipment   Current cognitive status: Alert/Oriented   Current Functional Status: Needs Assistance   Lives With child(chris), adult   Able to Return to Prior Arrangements yes   Is patient able to care for self after discharge? Yes   Who are your caregiver(s) and their phone number(s)? Lily (daughter) [phone]   Patient's perception of discharge disposition home or selfcare   Readmission Within the Last 30 Days no previous admission in last 30 days   Patient currently being followed by outpatient case management? No   Patient currently receives any other outside agency services? No   Equipment Currently Used at Home walker, rolling;wheelchair   Do you have any problems affording any of your prescribed medications? No   Is the patient taking medications as prescribed? yes   Does the patient have transportation home? Yes   Transportation Anticipated family or friend will provide   Does the patient receive services at the [facility]? No   Discharge Plan A Home with family   Discharge Plan B Home Health   DME Needed Upon Discharge  none   Patient/Family in Agreement with Plan yes

## 2020-10-27 NOTE — H&P
[facility] Medicine H&P Note     Admitting Team: [facility] Hospitalist Team B  Attending Physician: Guy J. Lefort, MD  Resident: Jori  Intern: Kurtis     Date of Admit: 10/26/2020    Chief Complaint     Chest Pain (Mid sternal chest pressure that started 2-3 hours ago. Patient complaining of SOB. Able to speak in full complete sentences.  Patient currently hypertensive. Daughter states she did not take her BP medication this morning. Last dose was 8 pm tonight )   for 1 day    Subjective:      History of Present Illness:  Marycruz Perez is a 89 y.o.  female who  has a past medical history of Hypertension and Syncope and collapse.. The patient presented to [facility] on 10/26/2020 with a primary complaint of Chest Pain (Mid sternal chest pressure that started 2-3 hours ago. Patient complaining of SOB. Able to speak in full complete sentences.  Patient currently hypertensive. Daughter states she did not take her BP medication this morning. Last dose was 8 pm tonight )  Pt states 2-3 hours SOB before deciding to go to ED. Started when she was sitting down. Exacerbated by exertion. Reports chest pressure with heart palpitations. Increased urination at night. Denies CP. Denies SOB when sleeping. Reports ocassional weakness. Hx CHF. Legs more swollen than normal. Compliant with lasix. Pt missed morning dose of lasix today. Denies fever, cough,  headache, n/v, bladder changes, weight change, sputum.   Lasix 80mg, labetalol 10mg, nitroglycerin given in ED.    Past Medical History:  Past Medical History:   Diagnosis Date    Hypertension     Syncope and collapse        Past Surgical History:  Past Surgical History:   Procedure Laterality Date    CARDIAC CATHETERIZATION      CARDIAC PACEMAKER PLACEMENT      CHOLECYSTECTOMY      CORONARY ANGIOPLASTY      HYSTERECTOMY         Allergies:  Review of patient's allergies indicates:   Allergen Reactions    Codeine     Penicillins     Penicillin Rash        Home Medications:  Prior to Admission medications    Medication Sig Start Date End Date Taking? Authorizing Provider   calcium carbonate (OS-BERE) 600 mg calcium (1,500 mg) Tab Take 600 mg by mouth once.    Historical Provider   cetirizine (ZYRTEC) 10 MG tablet Take 10 mg by mouth once daily.    Historical Provider   clopidogrel (PLAVIX) 75 mg tablet TK 1 T PO QD 16   Historical Provider   diazePAM (VALIUM) 5 MG tablet TK 1 T PO D PRN 10/21/19   Historical Provider   furosemide (LASIX) 20 MG tablet Take 1 tablet (20 mg total) by mouth once daily. 20   Yogesh Newman MD   losartan (COZAAR) 100 MG tablet Take 100 mg by mouth once daily.    Historical Provider   meclizine (ANTIVERT) 25 mg tablet 25 mg 3 (three) times daily as needed.  3/19/20   Historical Provider   metoprolol succinate (TOPROL-XL) 25 MG 24 hr tablet TK 1 T PO D 10/3/19   Historical Provider   potassium chloride (MICRO-K) 10 MEQ CpSR TK 1 C PO D 10/31/16   Historical Provider   tolterodine (DETROL) 1 MG Tab 1 mg 2 (two) times daily.  20   Historical Provider   ZINC ACETATE ORAL Take by mouth.    Historical Provider       Family History:  Family History   Problem Relation Age of Onset    Liver disease Mother        Social History:  Social History     Tobacco Use    Smoking status: Never Smoker    Smokeless tobacco: Never Used   Substance Use Topics    Alcohol use: No     Frequency: Never    Drug use: No       Review of Systems:  Pertinent items are noted in HPI. All other systems are reviewed and are negative.    Health Maintaince :   Primary Care Physician: Richard Loo    Immunizations:   TDap UTD  Flu not UTD  Pna UTD    Cancer Screening:  PAP: N/A  MM  Colonoscopy: not UTD     Objective:   Last 24 Hour Vital Signs:  BP  Min: 147/72  Max: 224/105  Temp  Av.2 °F (36.8 °C)  Min: 98 °F (36.7 °C)  Max: 98.4 °F (36.9 °C)  Pulse  Av.3  Min: 85  Max: 92  Resp  Av  Min: 18  Max: 24  SpO2  Av.7 %  Min: 93  %  Max: 96 %  There is no height or weight on file to calculate BMI.  No intake/output data recorded.    Physical Examination:  Constitutional: She appears well-developed and well-nourished.   HEENT: Normocephalic and atraumatic. PERLLA. EOMI  Neck: Normal range of motion. Neck supple.   Cardiovascular: Normal rate, regular rhythm, normal heart sounds and intact distal pulses. No murmur, gallops, rubs  Pulmonary: crackles heard in bilateral  Lower lobes, no wheezes, rales, ronchi   Musculoskeletal: Normal range of motion.   Neurological: She is oriented to person, place, and time.   Skin: Skin is warm and dry.   Extremities: bilateral lower extremity pitting edema. baseline    Laboratory:  Most Recent Data:  CBC:   Lab Results   Component Value Date    WBC 8.23 10/27/2020    HGB 13.2 10/27/2020    HCT 40.2 10/27/2020     10/27/2020    MCV 91 10/27/2020    RDW 14.7 (H) 10/27/2020       BMP:   Lab Results   Component Value Date     10/27/2020    K 3.6 10/27/2020     10/27/2020    CO2 28 10/27/2020    BUN 28 (H) 10/27/2020    CREATININE 1.1 10/27/2020     (H) 10/27/2020    CALCIUM 9.0 10/27/2020    MG 2.0 02/20/2020     LFTs:   Lab Results   Component Value Date    PROT 6.9 10/27/2020    ALBUMIN 3.4 (L) 10/27/2020    BILITOT 0.4 10/27/2020    AST 19 10/27/2020    ALKPHOS 108 10/27/2020    ALT 12 10/27/2020     Coags:   Lab Results   Component Value Date    INR 1.0 10/27/2020     FLP:   Lab Results   Component Value Date    CHOL 188 11/25/2019    HDL 49 11/25/2019    LDLCALC 121.0 11/25/2019    TRIG 90 11/25/2019    CHOLHDL 26.1 11/25/2019     DM:   Lab Results   Component Value Date    LDLCALC 121.0 11/25/2019    CREATININE 1.1 10/27/2020     Thyroid:   Lab Results   Component Value Date    TSH 0.368 (L) 02/20/2020    FREET4 1.22 02/20/2020     Anemia: No results found for: IRON, TIBC, FERRITIN, [id number], FOLATE  Cardiac:   Lab Results   Component Value Date    TROPONINI 0.025 10/27/2020      (H) 10/27/2020     Urinalysis:   Lab Results   Component Value Date    COLORU Yellow 12/30/2019    SPECGRAV 1.020 12/30/2019    NITRITE Negative 12/30/2019    KETONESU Negative 12/30/2019    UROBILINOGEN Negative 12/30/2019    WBCUA 3 12/30/2019       Trended Lab Data:  Recent Labs   Lab 10/27/20  0037   WBC 8.23   HGB 13.2   HCT 40.2      MCV 91   RDW 14.7*      K 3.6      CO2 28   BUN 28*   CREATININE 1.1   *   PROT 6.9   ALBUMIN 3.4*   BILITOT 0.4   AST 19   ALKPHOS 108   ALT 12       Trended Cardiac Data:  Recent Labs   Lab 10/27/20  0037   TROPONINI 0.025   *       Microbiology Data:  None    Other Results:  EKG (my interpretation): ?    Radiology:  Imaging Results          CTA Chest Non-Coronary (PE Study) (In process)                X-Ray Chest AP Portable (Final result)  Result time 10/27/20 00:25:18    Final result by Preston Gonzales MD (10/27/20 00:25:18)                 Impression:      No definite evidence of acute cardiopulmonary disease.      Electronically signed by: Preston Gonzales  Date:    10/27/2020  Time:    00:25             Narrative:    EXAMINATION:  XR CHEST AP PORTABLE    CLINICAL HISTORY:  CHF;    TECHNIQUE:  Single frontal view of the chest was performed.    COMPARISON:  Chest radiograph 12/30/2019    FINDINGS:  EKG leads overlie the chest.  Similar position of single lead pacer, generator overlying the right upper hemithorax.  Cardiomediastinal silhouette unchanged.  Mitral annular calcifications.  Chronic appearing interstitial increased attenuation is noted.  Calcified granuloma and right upper mid lung field.  No new focal airspace interstitial opacities seen.  There is chronic elevation right hemidiaphragm.  No pneumothorax or pleural effusion.  No acute findings are suggested in the visualized upper abdomen.  Osseous and soft tissue structures without acute change.                                     Assessment:     Marycruz Perez is a 89  y.o. female with:  Patient Active Problem List    Diagnosis Date Noted    Rheumatic mitral stenosis 01/09/2020    Rheumatic aortic stenosis 11/12/2019    Mild mitral regurgitation 11/12/2019    Localized edema 11/12/2019    Cardiac pacemaker 10/31/2019    S/P PTCA (percutaneous transluminal coronary angioplasty) 10/31/2019    Essential hypertension 10/31/2019   Pt stable. Pending diuresis and resolution of SOB     Plan:     #CHF exacerbation  -CTA showing   1. No evidence of pulmonary thromboembolism through the proximal  segmental  levels noting limited evaluation of distal segmental and  subsegmental branch  vessels at the lung bases due to  respiratory motion artifact.  Correlation with  D-dimer and lower  extremity venous ultrasound as warranted.    2. Patchy ground-glass opacity and interlobular septal thickening  involving the lung  bases.  This could possibly relate to underlying  respiratory motion artifact but can  also be seen with pulmonary  edema versus infectious or non infectious  inflammatory process.   Clinical correlation advised.    3. Coronary artery and aortic atherosclerosis.  Right-sided cardiac  pacing device in  place.     -Note recent echo 11/25/19 showed mild AS and mild MS and  normal LVEF  and moderate MR and pulmonary hypertension with an  LVEF 51 mm Hg  -CXR showing   No definite evidence of acute cardiopulmonary disease  -had cardiac pacemaker.    -furosemide 80mg, labetalol 10mg given in ED  -continue diuresing      #HTN  -224/156 on admit  -continue home lasix, cozaar, toprol-xl    #hx angioplasty  -continue home plavix      Code Status:     Full    Doni Hull MD  hospitals Internal Medicine HO-I    hospitals Medicine Hospitalist Pager numbers:   hospitals Hospitalist Medicine Team A (Ale/Charles): [phone]  hospitals Hospitalist Medicine Team B (Lianne/Nadia):  [phone]

## 2020-10-27 NOTE — ASSESSMENT & PLAN NOTE
- chest pressure with SOB; likely related to uncontrolled BP  - EKG with Vpaced rhythm; troponin negative x 2 .025-.017  - no suspicion of ACS currently; continue with medical management with ASA, Plavix, BB, ARB and statin therapy   - echo pending

## 2020-10-27 NOTE — HOSPITAL COURSE
10/26/2020 Presented to the ER with complaints of chest pressure and SOB. Initial troponin .025. /105. . CTA with no evidence of PE with ground glass opacity suggestive of respiratory motion artifact vs pulmonary edema vs infectious vs non infectious process. Given IV Lasix 80mg in the ER. Admitted to [facility] Medicine for further evaluation   10/27/2020 Chest pressure and SOB improving; on IV Lasix 40mg BID with 1.3 liters out so far. Troponin down to .017HR and BP stable. No arrhythmias noted on telemetry. EKG with V paced rhythm. BLE venous ultrasound and echocardiogram pending. Cardiology consulted for evaluation of acute chest pain and SOB with abnormal CE  10/28/2020 Echocardiogram yesterday with normal LVEF. HR and BP stable. CBC WNL. BMP with creatinine 1.3 K+ 4.1. On IV Lasix and Metolazone with 4.0 liters out overnight- will de escalate to oral Lasix

## 2020-10-27 NOTE — ASSESSMENT & PLAN NOTE
- s/p LAD PCI 2001 by Dr. Puente with no repeat angiogram/intervention since  - continue DAPT, statin, BB, ARB  - echo pending   - no concern for ACS currently; symptoms appear to be related to mild volume overload and uncontrolled HTN

## 2020-10-27 NOTE — ED NOTES
Patient placed on continuous cardiac monitor, automatic blood pressure cuff and continuous pulse oximeter.

## 2020-10-27 NOTE — HPI
89yo female with history of CAD s/p LAD PCI 2001, SSS s/p PPM, HTN, MR, AS, obesity and arthritis who presented to the ER with complaints of chest pressure and SOB. Ms. Perez was accompanied by her daughter who assisted with history. She complains of chest tightness and SOB that started yesterday evening while at rest. She reports the chest pressure was not associated with any other symptoms. Her SOB was described as a rapid breathing that was not resolving therefore she presented to the ER. She denies any chest pressure or SOB in the preceeding days. She is not very active due to leg weakness/arthritis and the most active thing she does is walk from room to room in her house. She endorses a few episodes of PND as well as edema but denies any orthopnea. She was followed by Dr. Puente for years until his prison and is now followed by Dr. Newman. She reports feeling better since admission with her symptoms improving

## 2020-10-27 NOTE — CONSULTS
[facility]  Cardiology  Consult Note    Patient Name: Marycruz Perez  MRN: [id number]  Admission Date: 10/26/2020  Hospital Length of Stay: 0 days  Code Status: Full Code   Attending Provider: Marcellus Zuniga MD   Consulting Provider: HUMBLE Garcia, ANP  Primary Care Physician: Richard Loo MD  Principal Problem:Acute exacerbation of CHF (congestive heart failure)    Patient information was obtained from patient, relative(s), past medical records and ER records.     Inpatient consult to [facility]  Consult performed by: HUMBLE Stevenson, ANP  Consult ordered by: Ashu Hsieh MD  Reason for consult: chest pressure and JOHNSON     Inpatient consult to [facility]  Consult performed by: HUMBLE Stevenson, GEOFF  Consult ordered by: Kirt Morillo DO  Reason for consult: chest pressure and SOB         Subjective:     Chief Complaint:  Chest pressure and SOB      HPI:   89yo female with history of CAD s/p LAD PCI 2001, SSS s/p PPM, HTN, MR, AS, obesity and arthritis who presented to the ER with complaints of chest pressure and SOB. Ms. Perez was accompanied by her daughter who assisted with history. She complains of chest tightness and SOB that started yesterday evening while at rest. She reports the chest pressure was not associated with any other symptoms. Her SOB was described as a rapid breathing that was not resolving therefore she presented to the ER. She denies any chest pressure or SOB in the preceeding days. She is not very active due to leg weakness/arthritis and the most active thing she does is walk from room to room in her house. She endorses a few episodes of PND as well as edema but denies any orthopnea. She was followed by Dr. Puente for years until his jail and is now followed by Dr. Newman. She reports feeling better since admission with her symptoms improving     Hospital Course:    10/26/2020 Presented to the ER with complaints of chest  pressure and SOB. Initial troponin .025. /105. . CTA with no evidence of PE with ground glass opacity suggestive of respiratory motion artifact vs pulmonary edema vs infectious vs non infectious process. Given IV Lasix 80mg in the ER. Admitted to [facility] Medicine for further evaluation   10/27/2020 Chest pressure and SOB improving; on IV Lasix 40mg BID with 1.3 liters out so far. Troponin down to .017HR and BP stable. No arrhythmias noted on telemetry. EKG with V paced rhythm. BLE venous ultrasound and echocardiogram pending. Cardiology consulted for evaluation of acute chest pain and SOB with abnormal CE    Past Medical History:   Diagnosis Date    Acute exacerbation of CHF (congestive heart failure) 10/27/2020    Hypertension     Syncope and collapse        Past Surgical History:   Procedure Laterality Date    CARDIAC CATHETERIZATION      CARDIAC PACEMAKER PLACEMENT      CHOLECYSTECTOMY      CORONARY ANGIOPLASTY      HYSTERECTOMY         Review of patient's allergies indicates:   Allergen Reactions    Codeine     Penicillins     Penicillin Rash       No current facility-administered medications on file prior to encounter.      Current Outpatient Medications on File Prior to Encounter   Medication Sig    calcium carbonate (OS-BERE) 600 mg calcium (1,500 mg) Tab Take 600 mg by mouth once daily.     cetirizine (ZYRTEC) 10 MG tablet Take 10 mg by mouth once daily.    clopidogrel (PLAVIX) 75 mg tablet TK 1 T PO QD    diazePAM (VALIUM) 5 MG tablet TK 1 T PO D PRN    furosemide (LASIX) 20 MG tablet Take 1 tablet (20 mg total) by mouth once daily.    losartan (COZAAR) 100 MG tablet Take 100 mg by mouth once daily.    meclizine (ANTIVERT) 25 mg tablet 25 mg 3 (three) times daily as needed.     metoprolol succinate (TOPROL-XL) 25 MG 24 hr tablet TK 1 T PO D    potassium chloride (MICRO-K) 10 MEQ CpSR TK 1 C PO D    tolterodine (DETROL) 1 MG Tab 1 mg 2 (two) times daily.     ZINC  ACETATE ORAL Take by mouth once daily.      Family History     Problem Relation (Age of Onset)    Liver disease Mother        Tobacco Use    Smoking status: Never Smoker    Smokeless tobacco: Never Used   Substance and Sexual Activity    Alcohol use: No     Frequency: Never    Drug use: No    Sexual activity: Not on file     Review of Systems   Constitution: Negative for chills, decreased appetite, diaphoresis and fever.   Cardiovascular: Positive for chest pain, dyspnea on exertion and paroxysmal nocturnal dyspnea. Negative for claudication, cyanosis, irregular heartbeat, leg swelling, near-syncope, orthopnea, palpitations and syncope.   Respiratory: Negative for cough, hemoptysis, shortness of breath and wheezing.    Gastrointestinal: Negative for bloating, abdominal pain, constipation, diarrhea, melena, nausea and vomiting.   Neurological: Negative for dizziness and weakness.     Objective:     Vital Signs (Most Recent):  Temp: 97.1 °F (36.2 °C) (10/27/20 1204)  Pulse: 87 (10/27/20 1204)  Resp: 18 (10/27/20 1204)  BP: 129/85 (10/27/20 1204)  SpO2: (!) 94 % (10/27/20 1206) Vital Signs (24h Range):  Temp:  [97.1 °F (36.2 °C)-98.4 °F (36.9 °C)] 97.1 °F (36.2 °C)  Pulse:  [73-92] 87  Resp:  [16-57] 18  SpO2:  [91 %-99 %] 94 %  BP: (129-224)/() 129/85        There is no height or weight on file to calculate BMI.    SpO2: (!) 94 %  O2 Device (Oxygen Therapy): room air      Intake/Output Summary (Last 24 hours) at 10/27/2020 1356  Last data filed at 10/27/2020 0908  Gross per 24 hour   Intake 180 ml   Output 1550 ml   Net -1370 ml       Lines/Drains/Airways     None                 Physical Exam   Constitutional: She is oriented to person, place, and time. She appears well-developed and well-nourished. No distress.   Cardiovascular: Normal rate and regular rhythm. Exam reveals no gallop.   No murmur heard.  Pulmonary/Chest: Effort normal and breath sounds normal. No respiratory distress. She has no wheezes.    Abdominal: Soft. Bowel sounds are normal. She exhibits no distension. There is no abdominal tenderness.   Neurological: She is alert and oriented to person, place, and time.   Skin: Skin is warm and dry.       Significant Labs:     Recent Labs   Lab 10/27/20  0501   WBC 8.47   RBC 4.19   HGB 12.5   HCT 37.6      MCV 90   MCH 29.8   MCHC 33.2     Recent Labs   Lab 10/27/20  0501      K 3.5      CO2 28   BUN 27*   CREATININE 1.0   MG 1.8     Recent Labs   Lab 10/27/20  1308   TROPONINI 0.017  0.017       Significant Imaging: Echocardiogram:   Transthoracic echo (TTE) complete (Cupid Only): repeat echo pending   Results for orders placed or performed during the hospital encounter of 11/25/19   Echo Color Flow Doppler? Yes   Result Value Ref Range    LA WIDTH 3.76 cm    LVIDd 3.62 3.5 - 6.0 cm    IVS 1.40 (A) 0.6 - 1.1 cm    Posterior Wall 1.20 0.6 - 1.1 cm    Ao root annulus 2.83 cm    LVIDs 2.16 2.1 - 4.0 cm    FS 40 28 - 44 %    LA volume 85.73 cm3    STJ 2.64 cm    Ascending aorta 2.61 cm    LV mass 161.35 g    LA size 4.90 cm    RVDD 3.61 cm    Left Ventricle Relative Wall Thickness 0.66 cm    AV mean gradient 13 mmHg    AV valve area 1.03 cm2    AV Velocity Ratio 0.35     AV index (prosthetic) 0.30     E/A ratio 3.63     E wave decelartion time 140.58 msec    IVRT 0.09 msec    LVOT diameter 2.11 cm    LVOT area 3.5 cm2    LVOT peak juan m 0.77 m/s    LVOT peak VTI 13.22 cm    Ao peak juan m 2.21 m/s    Ao VTI 44.72 cm    LVOT stroke volume 46.20 cm3    AV peak gradient 20 mmHg    MV Peak E Juan M 1.74 m/s    TR Max Juan M 3.29 m/s    MV Peak A Juan M 0.48 m/s    LV Systolic Volume 15.45 mL    LV Diastolic Volume 55.22 mL    RA Major Axis 4.63 cm    Left Atrium Minor Axis 5.41 cm    Left Atrium Major Axis 5.54 cm    Triscuspid Valve Regurgitation Peak Gradient 43 mmHg    RA Width 2.73 cm    Right Atrial Pressure (from IVC) 8 mmHg    TV rest pulmonary artery pressure 51 mmHg    Narrative    · Mild concentric  left ventricular hypertrophy.  · Normal left ventricular systolic function. The estimated ejection   fraction is 55%  · Indeterminate left ventricular diastolic function.  · Septal wall has abnormal motion consistent with right ventricular   pacemaker.  · Mild right ventricular enlargement.  · Mild aortic valve stenosis.  · Aortic valve area is 1.03 cm2; peak velocity is 2.21 m/s; mean gradient   is 13 mmHg.  · There is mild leaflet calcification of the Mitral Valve.  · Moderate mitral regurgitation.  · Mild mitral stenosis.  · Moderate tricuspid regurgitation.  · Mild to moderate pulmonic regurgitation.  · Intermediate central venous pressure (8 mm Hg).  · The estimated PA systolic pressure is 51 mm Hg  · Pulmonary hypertension present.  · The rhythm is sinus with complete heart block and ventricular pacing        Assessment and Plan:     * Acute exacerbation of CHF (congestive heart failure)  - last echo in 2019 with normal LVEF, indeterminate diastolic dysfunction and moderate MR/TR  - ; CXR with no gross volume overload noted  - on IV Lasix BID with 1.3 liters out; does not appear significantly overload and would anticipate de escalation of IV Lasix later today vs tomorrow  - continue ARB; resume BB therapy; on Lasix 20mg po daily at home and will resume once IV Lasix de escalated     Coronary artery disease involving native coronary artery  - s/p LAD PCI 2001 by Dr. Puente with no repeat angiogram/intervention since  - continue DAPT, statin, BB, ARB  - echo pending   - no concern for ACS currently; symptoms appear to be related to mild volume overload and uncontrolled HTN     Chest pain  - chest pressure with SOB; likely related to uncontrolled BP  - EKG with Vpaced rhythm; troponin negative x 2 .025-.017  - no suspicion of ACS currently; continue with medical management with ASA, Plavix, BB, ARB and statin therapy   - echo pending      Essential hypertension  - /105 upon presentation  - on  Losartan and Toprol XL as an outpatient with good control per daughter  - omission of medication yesterday likely leading to uncontrolled HTN  - on ARB currently with SBP down to 140s-150s; recommend resumption of BB therapy as well  - monitor BP and adjust meds prn    Cardiac pacemaker  - EKG with V paced rhythm  - normal function pacemaker  - monitor on telemetry while admitted  - follow up as an outpatient for ongoing interrogations         VTE Risk Mitigation (From admission, onward)         Ordered     heparin (porcine) injection 5,000 Units  Every 12 hours      10/27/20 0426     IP VTE HIGH RISK PATIENT  Once      10/27/20 0426     Place sequential compression device  Until discontinued      10/27/20 0426                Thank you for your consult. I will follow-up with patient. Please contact us if you have any additional questions.    HUMBLE Garcia, ANP  Cardiology   [facility]

## 2020-10-27 NOTE — PLAN OF CARE
Patient arrived from ED this AM with complaints of SOB rt to CHF and CP.  Patient A&Ox4. Mainly Citizen of [place] speaking but does understand english.  Room air.  Paced on telemetry.  Cardiac diet. ACHS accuchecks.  Up with assistance to the bathroom. Purwick in place.  Skin intact.  IV lasix given.  U/S of BLE neg. ECHO pending.  Cardiology consulted.  All questions answered.w  Will continue to monitor.    Alaina Tsai RN      Problem: Fall Injury Risk  Goal: Absence of Fall and Fall-Related Injury  Outcome: Ongoing, Progressing  Intervention: Identify and Manage Contributors to Fall Injury Risk  Flowsheets (Taken 10/27/2020 1431)  Self-Care Promotion: independence encouraged  Medication Review/Management: medications reviewed     Problem: Adult Inpatient Plan of Care  Goal: Plan of Care Review  Outcome: Ongoing, Progressing  Flowsheets (Taken 10/27/2020 1431)  Plan of Care Reviewed With:   patient   daughter

## 2020-10-27 NOTE — ED NOTES
Pt presents to ED from home secondary to SOB that began 2-3hrs pta. States she became sob while sitting down but notes it is worse with exertion. Pt also reports some chest pressure. States she has a hx of chf and has noticed more swelling than usual in her legs despite taking her prescribed lasix.

## 2020-10-27 NOTE — ED NOTES
Pt reports symptoms have improved. Denies pain. Denies any needs at this time. Pt updated on poc and v/u.

## 2020-10-27 NOTE — ASSESSMENT & PLAN NOTE
- last echo in 2019 with normal LVEF, indeterminate diastolic dysfunction and moderate MR/TR  - ; CXR with no gross volume overload noted  - on IV Lasix BID with 1.3 liters out; does not appear significantly overload and would anticipate de escalation of IV Lasix later today vs tomorrow  - continue ARB; resume BB therapy; on Lasix 20mg po daily at home and will resume once IV Lasix de escalated

## 2020-10-28 VITALS
WEIGHT: 175.25 LBS | OXYGEN SATURATION: 93 % | BODY MASS INDEX: 35.33 KG/M2 | RESPIRATION RATE: 17 BRPM | HEART RATE: 76 BPM | TEMPERATURE: 97 F | HEIGHT: 59 IN | DIASTOLIC BLOOD PRESSURE: 65 MMHG | SYSTOLIC BLOOD PRESSURE: 146 MMHG

## 2020-10-28 LAB
ALBUMIN SERPL BCP-MCNC: 3.5 G/DL (ref 3.5–5.2)
ALP SERPL-CCNC: 89 U/L (ref 55–135)
ALT SERPL W/O P-5'-P-CCNC: 14 U/L (ref 10–44)
ANION GAP SERPL CALC-SCNC: 13 MMOL/L (ref 8–16)
AST SERPL-CCNC: 19 U/L (ref 10–40)
BASOPHILS # BLD AUTO: 0.05 K/UL (ref 0–0.2)
BASOPHILS NFR BLD: 0.5 % (ref 0–1.9)
BILIRUB SERPL-MCNC: 0.8 MG/DL (ref 0.1–1)
BUN SERPL-MCNC: 27 MG/DL (ref 8–23)
CALCIUM SERPL-MCNC: 9.3 MG/DL (ref 8.7–10.5)
CHLORIDE SERPL-SCNC: 94 MMOL/L (ref 95–110)
CO2 SERPL-SCNC: 31 MMOL/L (ref 23–29)
CREAT SERPL-MCNC: 1.3 MG/DL (ref 0.5–1.4)
DIFFERENTIAL METHOD: NORMAL
EOSINOPHIL # BLD AUTO: 0.2 K/UL (ref 0–0.5)
EOSINOPHIL NFR BLD: 2 % (ref 0–8)
ERYTHROCYTE [DISTWIDTH] IN BLOOD BY AUTOMATED COUNT: 14.2 % (ref 11.5–14.5)
EST. GFR  (AFRICAN AMERICAN): 42 ML/MIN/1.73 M^2
EST. GFR  (NON AFRICAN AMERICAN): 36 ML/MIN/1.73 M^2
GLUCOSE SERPL-MCNC: 125 MG/DL (ref 70–110)
HCT VFR BLD AUTO: 41.1 % (ref 37–48.5)
HGB BLD-MCNC: 13.7 G/DL (ref 12–16)
IMM GRANULOCYTES # BLD AUTO: 0.04 K/UL (ref 0–0.04)
IMM GRANULOCYTES NFR BLD AUTO: 0.4 % (ref 0–0.5)
LYMPHOCYTES # BLD AUTO: 3.2 K/UL (ref 1–4.8)
LYMPHOCYTES NFR BLD: 31.6 % (ref 18–48)
MCH RBC QN AUTO: 29.1 PG (ref 27–31)
MCHC RBC AUTO-ENTMCNC: 33.3 G/DL (ref 32–36)
MCV RBC AUTO: 87 FL (ref 82–98)
MONOCYTES # BLD AUTO: 0.7 K/UL (ref 0.3–1)
MONOCYTES NFR BLD: 7 % (ref 4–15)
NEUTROPHILS # BLD AUTO: 5.9 K/UL (ref 1.8–7.7)
NEUTROPHILS NFR BLD: 58.5 % (ref 38–73)
NRBC BLD-RTO: 0 /100 WBC
PLATELET # BLD AUTO: 180 K/UL (ref 150–350)
PMV BLD AUTO: 12.1 FL (ref 9.2–12.9)
POTASSIUM SERPL-SCNC: 3.1 MMOL/L (ref 3.5–5.1)
PROT SERPL-MCNC: 6.9 G/DL (ref 6–8.4)
RBC # BLD AUTO: 4.7 M/UL (ref 4–5.4)
SODIUM SERPL-SCNC: 138 MMOL/L (ref 136–145)
WBC # BLD AUTO: 10.04 K/UL (ref 3.9–12.7)

## 2020-10-28 PROCEDURE — 36415 COLL VENOUS BLD VENIPUNCTURE: CPT

## 2020-10-28 PROCEDURE — 85025 COMPLETE CBC W/AUTO DIFF WBC: CPT

## 2020-10-28 PROCEDURE — 25000003 PHARM REV CODE 250: Performed by: NURSE PRACTITIONER

## 2020-10-28 PROCEDURE — 96376 TX/PRO/DX INJ SAME DRUG ADON: CPT

## 2020-10-28 PROCEDURE — 99214 PR OFFICE/OUTPT VISIT, EST, LEVL IV, 30-39 MIN: ICD-10-PCS | Mod: ,,, | Performed by: NURSE PRACTITIONER

## 2020-10-28 PROCEDURE — 99214 OFFICE O/P EST MOD 30 MIN: CPT | Mod: ,,, | Performed by: NURSE PRACTITIONER

## 2020-10-28 PROCEDURE — 63600175 PHARM REV CODE 636 W HCPCS: Performed by: STUDENT IN AN ORGANIZED HEALTH CARE EDUCATION/TRAINING PROGRAM

## 2020-10-28 PROCEDURE — 80053 COMPREHEN METABOLIC PANEL: CPT

## 2020-10-28 PROCEDURE — G0378 HOSPITAL OBSERVATION PER HR: HCPCS

## 2020-10-28 PROCEDURE — 94761 N-INVAS EAR/PLS OXIMETRY MLT: CPT

## 2020-10-28 PROCEDURE — 25000003 PHARM REV CODE 250: Performed by: STUDENT IN AN ORGANIZED HEALTH CARE EDUCATION/TRAINING PROGRAM

## 2020-10-28 PROCEDURE — 96372 THER/PROPH/DIAG INJ SC/IM: CPT

## 2020-10-28 RX ORDER — POTASSIUM CHLORIDE 20 MEQ/1
40 TABLET, EXTENDED RELEASE ORAL
Status: COMPLETED | OUTPATIENT
Start: 2020-10-28 | End: 2020-10-28

## 2020-10-28 RX ORDER — FUROSEMIDE 20 MG/1
20 TABLET ORAL DAILY
Status: DISCONTINUED | OUTPATIENT
Start: 2020-10-28 | End: 2020-10-28 | Stop reason: HOSPADM

## 2020-10-28 RX ORDER — FUROSEMIDE 40 MG/1
40 TABLET ORAL DAILY
Status: DISCONTINUED | OUTPATIENT
Start: 2020-10-28 | End: 2020-10-28

## 2020-10-28 RX ADMIN — HEPARIN SODIUM 5000 UNITS: 5000 INJECTION INTRAVENOUS; SUBCUTANEOUS at 09:10

## 2020-10-28 RX ADMIN — FUROSEMIDE 20 MG: 40 TABLET ORAL at 09:10

## 2020-10-28 RX ADMIN — CLOPIDOGREL 75 MG: 75 TABLET, FILM COATED ORAL at 09:10

## 2020-10-28 RX ADMIN — POTASSIUM CHLORIDE 40 MEQ: 1500 TABLET, EXTENDED RELEASE ORAL at 07:10

## 2020-10-28 RX ADMIN — METOPROLOL SUCCINATE 25 MG: 25 TABLET, FILM COATED, EXTENDED RELEASE ORAL at 09:10

## 2020-10-28 RX ADMIN — LOSARTAN POTASSIUM 100 MG: 50 TABLET, FILM COATED ORAL at 09:10

## 2020-10-28 RX ADMIN — OXYBUTYNIN CHLORIDE 5 MG: 5 TABLET, EXTENDED RELEASE ORAL at 09:10

## 2020-10-28 RX ADMIN — FUROSEMIDE 40 MG: 10 INJECTION, SOLUTION INTRAVENOUS at 04:10

## 2020-10-28 RX ADMIN — POTASSIUM CHLORIDE 40 MEQ: 1500 TABLET, EXTENDED RELEASE ORAL at 09:10

## 2020-10-28 NOTE — SUBJECTIVE & OBJECTIVE
Review of Systems   Constitution: Negative for chills, decreased appetite, diaphoresis, fever, malaise/fatigue, weight gain and weight loss.   Cardiovascular: Negative for chest pain, claudication, cyanosis, dyspnea on exertion, irregular heartbeat, leg swelling, near-syncope, orthopnea, palpitations, paroxysmal nocturnal dyspnea and syncope.   Respiratory: Negative for cough, shortness of breath, snoring, sputum production and wheezing.    Endocrine: Negative for cold intolerance, heat intolerance, polydipsia, polyphagia and polyuria.   Skin: Negative for color change, dry skin, itching, nail changes and poor wound healing.   Musculoskeletal: Negative for back pain, gout, joint pain and joint swelling.   Gastrointestinal: Negative for bloating, abdominal pain, constipation, diarrhea, hematemesis, hematochezia, melena, nausea and vomiting.   Genitourinary: Negative for dysuria, hematuria and nocturia.   Neurological: Negative for dizziness, headaches, light-headedness, numbness, paresthesias and weakness.   Psychiatric/Behavioral: Negative for altered mental status, depression and memory loss.     Objective:     Vital Signs (Most Recent):  Temp: 96.6 °F (35.9 °C) (10/28/20 0802)  Pulse: 76 (10/28/20 0803)  Resp: 17 (10/28/20 0802)  BP: (!) 146/65 (10/28/20 0802)  SpO2: (!) 93 % (10/28/20 0832) Vital Signs (24h Range):  Temp:  [96.6 °F (35.9 °C)-97.8 °F (36.6 °C)] 96.6 °F (35.9 °C)  Pulse:  [75-87] 76  Resp:  [16-20] 17  SpO2:  [90 %-96 %] 93 %  BP: (119-175)/(65-92) 146/65     Weight: 79.5 kg (175 lb 4.3 oz)  Body mass index is 35.4 kg/m².     SpO2: (!) 93 %  O2 Device (Oxygen Therapy): room air      Intake/Output Summary (Last 24 hours) at 10/28/2020 0940  Last data filed at 10/28/2020 0000  Gross per 24 hour   Intake 280 ml   Output 2800 ml   Net -2520 ml       Lines/Drains/Airways     Drain            Female External Urinary Catheter 10/27/20 1 day          Peripheral Intravenous Line                  Peripheral IV - Single Lumen 10/28/20 24 G Medial;Left Breast less than 1 day                Physical Exam   Constitutional: She is oriented to person, place, and time. She appears well-developed and well-nourished. No distress.   Cardiovascular: Normal rate and regular rhythm. Exam reveals no gallop.   No murmur heard.  Pulmonary/Chest: Effort normal and breath sounds normal. No respiratory distress. She has no wheezes.   Abdominal: Soft. Bowel sounds are normal. She exhibits no distension. There is no abdominal tenderness.   Neurological: She is alert and oriented to person, place, and time.   Skin: Skin is warm and dry.       Significant Labs:     Recent Labs   Lab 10/28/20  0437   WBC 10.04   RBC 4.70   HGB 13.7   HCT 41.1      MCV 87   MCH 29.1   MCHC 33.3     Recent Labs   Lab 10/28/20  0437      K 3.1*   CL 94*   CO2 31*   BUN 27*   CREATININE 1.3       Significant Imaging: Echocardiogram:   Transthoracic echo (TTE) complete (Cupid Only):   Results for orders placed or performed during the hospital encounter of 10/26/20   Echo Color Flow Doppler? Yes   Result Value Ref Range    STJ 2.01 cm    AV mean gradient 10 mmHg    Ao peak juan m 1.99 m/s    Ao VTI 37.68 cm    IVS 1.50 (A) 0.6 - 1.1 cm    LA size 4.60 cm    Left Atrium Major Axis 6.18 cm    Left Atrium Minor Axis 5.36 cm    LVIDd 2.87 (A) 3.5 - 6.0 cm    LVIDs 1.99 (A) 2.1 - 4.0 cm    LVOT diameter 1.97 cm    LVOT peak VTI 17.73 cm    Posterior Wall 1.43 (A) 0.6 - 1.1 cm    MV Peak A Juan M 1.42 m/s    E wave decelartion time 310.34 msec    MV Peak E Juan M 1.07 m/s    RA Major Axis 4.83 cm    RA Width 2.14 cm    RVDD 3.20 cm    TAPSE 1.67 cm    TR Max Juan M 2.81 m/s    LA WIDTH 4.40 cm    Ao root annulus 3.11 cm    PV PEAK VELOCITY 0.80 cm/s    MV stenosis pressure 1/2 time 90.00 ms    LV Diastolic Volume 31.43 mL    LV Systolic Volume 12.61 mL    LVOT peak juan m 0.86 m/s    FS 31 %    LA volume 98.77 cm3    LV mass 143.26 g    Left Ventricle Relative  Wall Thickness 1.00 cm    AV valve area 1.43 cm2    AV Velocity Ratio 0.43     AV index (prosthetic) 0.47     MV valve area p 1/2 method 2.44 cm2    E/A ratio 0.75     LVOT area 3.0 cm2    LVOT stroke volume 54.01 cm3    AV peak gradient 16 mmHg    Triscuspid Valve Regurgitation Peak Gradient 32 mmHg    BSA 1.86 m2    LV Systolic Volume Index 7.1 mL/m2    LV Diastolic Volume Index 17.66 mL/m2    LA Volume Index 55.5 mL/m2    LV Mass Index 80 g/m2    Right Atrial Pressure (from IVC) 3 mmHg    TV rest pulmonary artery pressure 35 mmHg    Narrative    · The left ventricle is normal in size with normal systolic function. The   estimated ejection fraction is 55%.  · There is severe left ventricular concentric hypertrophy.  · Grade I diastolic dysfunction.  · Mild mitral regurgitation.  · Mild aortic valve stenosis.  · Aortic valve area is 1.43 cm2; peak velocity is 1.99 m/s; mean gradient   is 10 mmHg.  · Mild mitral stenosis.  · The estimated PA systolic pressure is 35 mmHg.  · Mild tricuspid regurgitation.

## 2020-10-28 NOTE — DISCHARGE SUMMARY
[facility] Medicine Discharge Summary    Primary Team: [facility] Hospitalist Team B  Attending Physician: Marcellus Zuniga MD  Resident: Jori/Ilia      Date of Admit: 10/26/2020  Date of Discharge: 10/28/2020    Discharge to: home  Condition: stable    Discharge Diagnoses     Patient Active Problem List   Diagnosis    Cardiac pacemaker    S/P PTCA (percutaneous transluminal coronary angioplasty)    Essential hypertension    Rheumatic aortic stenosis    Mild mitral regurgitation    Localized edema    Rheumatic mitral stenosis    Acute exacerbation of CHF (congestive heart failure)    Chest pain    Leg pain    Coronary artery disease involving native coronary artery       Consultants and Procedures     Consultants:  Cardiology     Procedures:       Imaging:  DVT U/S: No evidence of deep venous thrombosis in either lower extremity.  TTE: EF 55, severe LVH, mild MR, mild AS, mild TR    Brief History of Present Illness    89 y.o.  female presented with Chest Pain and SOB (Able to speak in full complete sentences) that started when she was sitting down and exacerbated by exertion, as well as increased leg swelling. Initial troponin .025. /105. . CTA with no evidence of PE     For the full HPI please refer to the History & Physical from this admission.    Hospital Course By Problem with Pertinent Findings     1. Acute on Chronic HF exacerbation  -TTE 11/25/19 showed mild AS and mild MS and  normal LVEF  and moderate MR and pulmonary hypertension with an LVEF 51 mm Hg  - s/p lasix  - Discharge with PO lasix 20     2. HTN  -continue home lasix, cozaar, toprol-xl     3. hx angioplasty  -continue home plavix    Discharge Medications      Marycruz Perez   Home Medication Instructions RUSLAN:[id number]    Printed on:10/28/20 1112   Medication Information                      calcium carbonate (OS-BERE) 600 mg calcium (1,500 mg) Tab  Take 600 mg by mouth once daily.              cetirizine (ZYRTEC) 10 MG  tablet  Take 10 mg by mouth once daily.             clopidogrel (PLAVIX) 75 mg tablet  TK 1 T PO QD             diazePAM (VALIUM) 5 MG tablet  TK 1 T PO D PRN             furosemide (LASIX) 20 MG tablet  Take 1 tablet (20 mg total) by mouth once daily.             losartan (COZAAR) 100 MG tablet  Take 100 mg by mouth once daily.             meclizine (ANTIVERT) 25 mg tablet  25 mg 3 (three) times daily as needed.              metoprolol succinate (TOPROL-XL) 25 MG 24 hr tablet  TK 1 T PO D             potassium chloride (MICRO-K) 10 MEQ CpSR  TK 1 C PO D             tolterodine (DETROL) 1 MG Tab  1 mg 2 (two) times daily.              ZINC ACETATE ORAL  Take by mouth once daily.                  Discharge Information:   Diet:  Cardiac    Physical Activity:  As tolerated              Instructions:  1. Take all medications as prescribed  2. Keep all follow-up appointments  3. Return to the hospital or call your primary care physicians if any worsening symptoms such as fever, chest pain, shortness of breath, return of symptoms, or any other concerns.    Follow-Up Appointments:  PCP, Cardiology     Ashu Hsieh MD  [facility] Internal Medicine, HO-2

## 2020-10-28 NOTE — PROGRESS NOTES
[facility]                    Pharmacy       Discharge Medication Education    Patient ACCEPTED medication education. Pharmacy has provided education on the name, indication, and possible side effects of the medication(s) prescribed, using teach-back method.     The following medications have also been discussed, during this admission.        Medication List        CONTINUE taking these medications      calcium carbonate 600 mg calcium (1,500 mg) Tab  Commonly known as: OS-BERE     cetirizine 10 MG tablet  Commonly known as: ZYRTEC     clopidogreL 75 mg tablet  Commonly known as: PLAVIX     diazePAM 5 MG tablet  Commonly known as: VALIUM     furosemide 20 MG tablet  Commonly known as: LASIX  Take 1 tablet (20 mg total) by mouth once daily.     losartan 100 MG tablet  Commonly known as: COZAAR     meclizine 25 mg tablet  Commonly known as: ANTIVERT     metoprolol succinate 25 MG 24 hr tablet  Commonly known as: TOPROL-XL     potassium chloride 10 MEQ Cpsr  Commonly known as: MICRO-K     tolterodine 1 MG Tab  Commonly known as: DETROL     ZINC ACETATE ORAL               Thank you  Remi Loo, PharmD  [phone]

## 2020-10-28 NOTE — ASSESSMENT & PLAN NOTE
- s/p LAD PCI 2001 by Dr. Puente with no repeat angiogram/intervention since  - continue DAPT, statin, BB, ARB  - echo with normal LVEF   - no concern for ACS currently; symptoms appear to be related to mild volume overload and uncontrolled HTN

## 2020-10-28 NOTE — PLAN OF CARE
Pt to be d/c home and voices no needs. Pt and daughter do not want hh services as pt has been independent and daughter Lily ([phone]) stated she helps pt as needed and will provide transportation home.   Pt has  d/c brochure, card, and folder and encouraged to call for any further needs.  TN notified floor nurse and VN of pt ready for d/c       10/28/20 1005   Final Note   Assessment Type Final Discharge Note   Anticipated Discharge Disposition Home   What phone number can be called within the next 1-3 days to see how you are doing after discharge? [phone]   Hospital Follow Up  Appt(s) scheduled? Yes   Discharge plans and expectations educations in teach back method with documentation complete? Yes   Right Care Referral Info   Post Acute Recommendation No Care

## 2020-10-28 NOTE — DISCHARGE INSTRUCTIONS
Heart Failure, Discharge Instructions for (Israeli) View Edit Remove   Heart Failure: Making Changes to Your Diet (Israeli) View Edit Remove   Heart Failure: Warning Signs of a Flare-Up (Israeli) View Edit Remove   Diet, Discharge Instructions for Eating a Low-Salt (Israeli) View Edit Remove

## 2020-10-28 NOTE — NURSING
Discharge instructions and education provided. Patient voices understanding. IV site and telemetry discontinued without adverse reaction. Patient left via wheelchair with in distress noted.

## 2020-10-28 NOTE — PLAN OF CARE
VN note: I cued into patient's room. Patient's son and daughter at bedside. I asked their preference for discharge information. They stated would prefer discharge information given in Yakut. Will send secure message to Mauritian speaking bedside nurse Lana. Will be available as needed.

## 2020-10-28 NOTE — PROGRESS NOTES
"[facility] Medicine Progress Note    Primary Team: \[facility]\"" Hospitalist Team b  Attending Physician: Marcellus Zuniga MD      Subjective:    Pt with no acute overnight events. States her breathing is better today. LE edema improving. Denies chest pain, SOB, abd pain, nausea, vomiting. Net negative over 3L.      Objective:     Last 24 Hour Vital Signs:  BP  Min: 119/70  Max: 175/92  Temp  Av.2 °F (36.2 °C)  Min: 96.6 °F (35.9 °C)  Max: 97.8 °F (36.6 °C)  Pulse  Av.5  Min: 75  Max: 87  Resp  Av.5  Min: 16  Max: 20  SpO2  Av.4 %  Min: 90 %  Max: 96 %  Height  Av' 11" (149.9 cm)  Min: 4' 11" (149.9 cm)  Max: 4' 11" (149.9 cm)  Weight  Av.5 kg (179 lb 10.1 oz)  Min: 79.5 kg (175 lb 4.3 oz)  Max: 83.5 kg (184 lb)  I/O last 3 completed shifts:  In: 460 [P.O.:460]  Out: 4350 [Urine:4350]    Physical Examination:  Constitutional: She appears well-developed and well-nourished.   HEENT: Normocephalic and atraumatic. PERLLA. EOMI  Neck: Normal range of motion. Neck supple.   Cardiovascular: Normal rate, regular rhythm, normal heart sounds and intact distal pulses. No murmur, gallops, rubs  Pulmonary: mild crackles heard in bilateral  Lower lobes, no wheezes, rales, ronchi   Musculoskeletal: Normal range of motion.   Neurological: She is oriented to person, place, and time.   Skin: Skin is warm and dry.   Extremities: bilateral lower extremity pitting edema. baseline    Laboratory:  Laboratory Data Reviewed: yes  Pertinent Findings:      Microbiology Data Reviewed: yes  Pertinent Findings:        Radiology Data Reviewed: yes  Pertinent Findings:      Current Medications:     Infusions:       Scheduled:   clopidogreL  75 mg Oral Daily    furosemide  20 mg Oral Daily    heparin (porcine)  5,000 Units Subcutaneous Q12H    losartan  100 mg Oral Daily    metoprolol succinate  25 mg Oral Daily    oxybutynin  5 mg Oral Daily    potassium chloride  40 mEq Oral Q2H        PRN:  dextrose 50%, dextrose " 50%, glucagon (human recombinant), glucose, glucose, insulin aspart U-100, meclizine, sodium chloride 0.9%      Assessment:     Marycruz Perez is a 89 y.o.female with  Patient Active Problem List    Diagnosis Date Noted    Acute exacerbation of CHF (congestive heart failure) 10/27/2020    Chest pain 10/27/2020    Leg pain 10/27/2020    Coronary artery disease involving native coronary artery 10/27/2020    Rheumatic mitral stenosis 01/09/2020    Rheumatic aortic stenosis 11/12/2019    Mild mitral regurgitation 11/12/2019    Localized edema 11/12/2019    Cardiac pacemaker 10/31/2019    S/P PTCA (percutaneous transluminal coronary angioplasty) 10/31/2019    Essential hypertension 10/31/2019        Plan:     1. Acute on Chronic HF exacerbation  -TTE 11/25/19 showed mild AS and mild MS and  normal LVEF  and moderate MR and pulmonary hypertension with an LVEF 51 mm Hg  - 33.5L out, switch from IV to PO lasix 20    2. HTN  -224/156 on admit  -continue home lasix, cozaar, toprol-xl     3. hx angioplasty  -continue home plavix    Dispo: Discharge today with lasix 20mg    Ashu Hsieh MD  [facility] Internal Medicine HO-2    [facility] Medicine Hospitalist Pager numbers:   [facility] Hospitalist Medicine Team A (Ale/Charles): [phone]  [facility] Hospitalist Medicine Team B (Lianne/Nadia):  [phone]

## 2020-10-28 NOTE — ASSESSMENT & PLAN NOTE
- /105 upon presentation  - SBP 110s-140s  - continue  Losartan and Toprol XL   - uncontrolled upon admission due to omission of medication and dietary noncompliance

## 2020-10-28 NOTE — PROGRESS NOTES
[facility]  Cardiology  Progress Note    Patient Name: Marycruz Perez  MRN: [id number]  Admission Date: 10/26/2020  Hospital Length of Stay: 0 days  Code Status: Full Code   Attending Physician: Marcellus Zuniga MD   Primary Care Physician: Richard Loo MD  Expected Discharge Date: 10/28/2020  Principal Problem:Acute exacerbation of CHF (congestive heart failure)    Subjective:     Hospital Course:     10/26/2020 Presented to the ER with complaints of chest pressure and SOB. Initial troponin .025. /105. . CTA with no evidence of PE with ground glass opacity suggestive of respiratory motion artifact vs pulmonary edema vs infectious vs non infectious process. Given IV Lasix 80mg in the ER. Admitted to [facility] for further evaluation   10/27/2020 Chest pressure and SOB improving; on IV Lasix 40mg BID with 1.3 liters out so far. Troponin down to .017HR and BP stable. No arrhythmias noted on telemetry. EKG with V paced rhythm. BLE venous ultrasound and echocardiogram pending. Cardiology consulted for evaluation of acute chest pain and SOB with abnormal CE  10/28/2020 Echocardiogram yesterday with normal LVEF. HR and BP stable. CBC WNL. BMP with creatinine 1.3 K+ 4.1. On IV Lasix and Metolazone with 4.0 liters out overnight- will de escalate to oral Lasix         Review of Systems   Constitution: Negative for chills, decreased appetite, diaphoresis, fever, malaise/fatigue, weight gain and weight loss.   Cardiovascular: Negative for chest pain, claudication, cyanosis, dyspnea on exertion, irregular heartbeat, leg swelling, near-syncope, orthopnea, palpitations, paroxysmal nocturnal dyspnea and syncope.   Respiratory: Negative for cough, shortness of breath, snoring, sputum production and wheezing.    Endocrine: Negative for cold intolerance, heat intolerance, polydipsia, polyphagia and polyuria.   Skin: Negative for color change, dry skin, itching, nail changes and poor wound  healing.   Musculoskeletal: Negative for back pain, gout, joint pain and joint swelling.   Gastrointestinal: Negative for bloating, abdominal pain, constipation, diarrhea, hematemesis, hematochezia, melena, nausea and vomiting.   Genitourinary: Negative for dysuria, hematuria and nocturia.   Neurological: Negative for dizziness, headaches, light-headedness, numbness, paresthesias and weakness.   Psychiatric/Behavioral: Negative for altered mental status, depression and memory loss.     Objective:     Vital Signs (Most Recent):  Temp: 96.6 °F (35.9 °C) (10/28/20 0802)  Pulse: 76 (10/28/20 0803)  Resp: 17 (10/28/20 0802)  BP: (!) 146/65 (10/28/20 0802)  SpO2: (!) 93 % (10/28/20 0832) Vital Signs (24h Range):  Temp:  [96.6 °F (35.9 °C)-97.8 °F (36.6 °C)] 96.6 °F (35.9 °C)  Pulse:  [75-87] 76  Resp:  [16-20] 17  SpO2:  [90 %-96 %] 93 %  BP: (119-175)/(65-92) 146/65     Weight: 79.5 kg (175 lb 4.3 oz)  Body mass index is 35.4 kg/m².     SpO2: (!) 93 %  O2 Device (Oxygen Therapy): room air      Intake/Output Summary (Last 24 hours) at 10/28/2020 0940  Last data filed at 10/28/2020 0000  Gross per 24 hour   Intake 280 ml   Output 2800 ml   Net -2520 ml       Lines/Drains/Airways     Drain            Female External Urinary Catheter 10/27/20 1 day          Peripheral Intravenous Line                 Peripheral IV - Single Lumen 10/28/20 24 G Medial;Left Breast less than 1 day                Physical Exam   Constitutional: She is oriented to person, place, and time. She appears well-developed and well-nourished. No distress.   Cardiovascular: Normal rate and regular rhythm. Exam reveals no gallop.   No murmur heard.  Pulmonary/Chest: Effort normal and breath sounds normal. No respiratory distress. She has no wheezes.   Abdominal: Soft. Bowel sounds are normal. She exhibits no distension. There is no abdominal tenderness.   Neurological: She is alert and oriented to person, place, and time.   Skin: Skin is warm and dry.        Significant Labs:     Recent Labs   Lab 10/28/20  0437   WBC 10.04   RBC 4.70   HGB 13.7   HCT 41.1      MCV 87   MCH 29.1   MCHC 33.3     Recent Labs   Lab 10/28/20  0437      K 3.1*   CL 94*   CO2 31*   BUN 27*   CREATININE 1.3       Significant Imaging: Echocardiogram:   Transthoracic echo (TTE) complete (Cupid Only):   Results for orders placed or performed during the hospital encounter of 10/26/20   Echo Color Flow Doppler? Yes   Result Value Ref Range    STJ 2.01 cm    AV mean gradient 10 mmHg    Ao peak juan m 1.99 m/s    Ao VTI 37.68 cm    IVS 1.50 (A) 0.6 - 1.1 cm    LA size 4.60 cm    Left Atrium Major Axis 6.18 cm    Left Atrium Minor Axis 5.36 cm    LVIDd 2.87 (A) 3.5 - 6.0 cm    LVIDs 1.99 (A) 2.1 - 4.0 cm    LVOT diameter 1.97 cm    LVOT peak VTI 17.73 cm    Posterior Wall 1.43 (A) 0.6 - 1.1 cm    MV Peak A Juan M 1.42 m/s    E wave decelartion time 310.34 msec    MV Peak E Juan M 1.07 m/s    RA Major Axis 4.83 cm    RA Width 2.14 cm    RVDD 3.20 cm    TAPSE 1.67 cm    TR Max Juan M 2.81 m/s    LA WIDTH 4.40 cm    Ao root annulus 3.11 cm    PV PEAK VELOCITY 0.80 cm/s    MV stenosis pressure 1/2 time 90.00 ms    LV Diastolic Volume 31.43 mL    LV Systolic Volume 12.61 mL    LVOT peak juan m 0.86 m/s    FS 31 %    LA volume 98.77 cm3    LV mass 143.26 g    Left Ventricle Relative Wall Thickness 1.00 cm    AV valve area 1.43 cm2    AV Velocity Ratio 0.43     AV index (prosthetic) 0.47     MV valve area p 1/2 method 2.44 cm2    E/A ratio 0.75     LVOT area 3.0 cm2    LVOT stroke volume 54.01 cm3    AV peak gradient 16 mmHg    Triscuspid Valve Regurgitation Peak Gradient 32 mmHg    BSA 1.86 m2    LV Systolic Volume Index 7.1 mL/m2    LV Diastolic Volume Index 17.66 mL/m2    LA Volume Index 55.5 mL/m2    LV Mass Index 80 g/m2    Right Atrial Pressure (from IVC) 3 mmHg    TV rest pulmonary artery pressure 35 mmHg    Narrative    · The left ventricle is normal in size with normal systolic function. The    estimated ejection fraction is 55%.  · There is severe left ventricular concentric hypertrophy.  · Grade I diastolic dysfunction.  · Mild mitral regurgitation.  · Mild aortic valve stenosis.  · Aortic valve area is 1.43 cm2; peak velocity is 1.99 m/s; mean gradient   is 10 mmHg.  · Mild mitral stenosis.  · The estimated PA systolic pressure is 35 mmHg.  · Mild tricuspid regurgitation.        Assessment and Plan:     Brief HPI: Seen this morning on AM rounds with Dr. Roberto. Denies any complaints currently and reports feeling better.Discussed cardiac POC as detailed below-verbalized understanding and agrees with POC     * Acute exacerbation of CHF (congestive heart failure)  - last echo in 2019 with normal LVEF, indeterminate diastolic dysfunction and moderate MR/TR; repeat echo yesterday with normal LVEF   - ; CXR with no gross volume overload noted  - on IV Lasix BID with 4.0 liters out overnight; started on Metolazone per primary team; IV Lasix de escalated to oral Lasix and will continue Lasix 20mg po daily upon discharge   - continue ARB, BB    Coronary artery disease involving native coronary artery  - s/p LAD PCI 2001 by Dr. Puente with no repeat angiogram/intervention since  - continue DAPT, statin, BB, ARB  - echo with normal LVEF   - no concern for ACS currently; symptoms appear to be related to mild volume overload and uncontrolled HTN     Chest pain  - chest pressure with SOB; likely related to uncontrolled BP  - EKG with Vpaced rhythm; troponin negative x 2 .025-.017  - no suspicion of ACS currently; continue with medical management with ASA, Plavix, BB, ARB and statin therapy   - echo pending      Essential hypertension  - /105 upon presentation  - SBP 110s-140s  - continue  Losartan and Toprol XL   - uncontrolled upon admission due to omission of medication and dietary noncompliance       Cardiac pacemaker  - EKG with V paced rhythm  - normal function pacemaker  - monitor on telemetry  while admitted  - follow up as an outpatient for ongoing interrogations         VTE Risk Mitigation (From admission, onward)         Ordered     heparin (porcine) injection 5,000 Units  Every 12 hours      10/27/20 0426     IP VTE HIGH RISK PATIENT  Once      10/27/20 0426     Place sequential compression device  Until discontinued      10/27/20 0426                HUMBLE Garcia, ANP  Cardiology  [facility]

## 2020-10-28 NOTE — PROGRESS NOTES
[facility]                    Pharmacy       Discharge Medication Education    Patient ACCEPTED medication education. Pharmacy has provided education on the name, indication, and possible side effects of the medication(s) prescribed, using teach-back method.     The following medications have also been discussed, during this admission.        Medication List        CONTINUE taking these medications      calcium carbonate 600 mg calcium (1,500 mg) Tab  Commonly known as: OS-BERE     cetirizine 10 MG tablet  Commonly known as: ZYRTEC     clopidogreL 75 mg tablet  Commonly known as: PLAVIX     diazePAM 5 MG tablet  Commonly known as: VALIUM     furosemide 20 MG tablet  Commonly known as: LASIX  Take 1 tablet (20 mg total) by mouth once daily.     losartan 100 MG tablet  Commonly known as: COZAAR     meclizine 25 mg tablet  Commonly known as: ANTIVERT     metoprolol succinate 25 MG 24 hr tablet  Commonly known as: TOPROL-XL     potassium chloride 10 MEQ Cpsr  Commonly known as: MICRO-K     tolterodine 1 MG Tab  Commonly known as: DETROL     ZINC ACETATE ORAL               Thank you  Remi Loo, PharmD  [phone]

## 2020-10-28 NOTE — ASSESSMENT & PLAN NOTE
- last echo in 2019 with normal LVEF, indeterminate diastolic dysfunction and moderate MR/TR; repeat echo yesterday with normal LVEF   - ; CXR with no gross volume overload noted  - on IV Lasix BID with 4.0 liters out overnight; started on Metolazone per primary team; IV Lasix de escalated to oral Lasix and will continue Lasix 20mg po daily upon discharge   - continue ARB, BB

## 2020-11-05 ENCOUNTER — OFFICE VISIT (OUTPATIENT)
Dept: FAMILY MEDICINE | Facility: HOSPITAL | Age: 85
End: 2020-11-05
Attending: FAMILY MEDICINE
Payer: MEDICARE

## 2020-11-05 VITALS
HEIGHT: 59 IN | SYSTOLIC BLOOD PRESSURE: 137 MMHG | BODY MASS INDEX: 35.47 KG/M2 | DIASTOLIC BLOOD PRESSURE: 83 MMHG | WEIGHT: 175.94 LBS | HEART RATE: 79 BPM

## 2020-11-05 DIAGNOSIS — M17.11 PRIMARY OSTEOARTHRITIS OF RIGHT KNEE: ICD-10-CM

## 2020-11-05 DIAGNOSIS — I50.33 ACUTE ON CHRONIC DIASTOLIC CONGESTIVE HEART FAILURE: Primary | ICD-10-CM

## 2020-11-05 PROCEDURE — 99214 OFFICE O/P EST MOD 30 MIN: CPT | Performed by: PHYSICIAN ASSISTANT

## 2020-11-05 RX ORDER — PREDNISONE 10 MG/1
TABLET ORAL
COMMUNITY
Start: 2020-10-23 | End: 2020-12-22

## 2020-11-05 RX ORDER — DICLOFENAC SODIUM 10 MG/G
GEL TOPICAL
COMMUNITY
Start: 2020-10-23

## 2020-11-05 RX ORDER — FUROSEMIDE 20 MG/1
20 TABLET ORAL DAILY
Qty: 90 TABLET | Refills: 3 | Status: SHIPPED | OUTPATIENT
Start: 2020-11-05 | End: 2020-11-12 | Stop reason: DRUGHIGH

## 2020-11-05 NOTE — PROGRESS NOTES
FAMILY MEDICINE  New Visit Progress Note   Recent Hospital Discharge     PRESENTING HISTORY     Chief Complaint/Reason for Admission:  Follow up Hospital Discharge   Chief Complaint   Patient presents with    Follow-up     Hosptial    Dizziness     PCP: Richard Loo MD    History of Present Illness:  Ms. Marycruz Perez is a 89 y.o. female who was recently admitted to the hospital.    Admit Date: 10/26/20  Discharge Date: 10/28/20  ___________________________________________________________________    HPI:  89yo female with history of CAD s/p LAD PCI 2001, SSS s/p PPM, HTN, obesity, and arthritis here today for hospital follow up. She presented with increasing SOB and chest tightness. Initial troponin .025. /105. . CTA with no evidence of PE with ground glass opacity suggestive of respiratory motion artifact vs pulmonary edema vs infectious vs non infectious process. Given IV Lasix 80mg in the ER. Troponin trended down. BP stabilized. EKG with paced rhythm, ECHO with normal LVEF. CP and SOB resolved. CP deemed due to HTN and volume overload, neg ACS.    Today she is accompanied by her daughter and has been doing well since her discharge. She reports taking her medications as prescribed, but her Lasix was not refilled and she is out. She does not weigh herself at home, but does try to monitor her salt intake. Of note, she does have a PCP, Dr. Richard Loo.    CHF/CAD (s/p LAD PCI):  ECHO with normal EF. Continue ARB, BB, statin, DAPT. Will refill lasix today. Pacemaker in place, functioning normally during admission. Follow up with Dr. Newman next week.    Essential hypertension\  Taking Losartan and Toprol XL. BP well controlled today.    Review of Systems  General ROS: negative for chills, fever or weight loss  Psychological ROS: negative for hallucination, depression or suicidal ideation  Ophthalmic ROS: negative for blurry vision, photophobia or eye pain  ENT ROS: negative for epistaxis, sore throat  or rhinorrhea  Respiratory ROS: no cough, shortness of breath, or wheezing  Cardiovascular ROS: +Leg swelling  Gastrointestinal ROS: no abdominal pain, change in bowel habits, or black/ bloody stools  Genito-Urinary ROS: no dysuria, trouble voiding, or hematuria  Musculoskeletal ROS: negative for gait disturbance or muscular weakness  Neurological ROS: no syncope or seizures; no ataxia  Dermatological ROS: negative for pruritis, rash and jaundice    PAST HISTORY:     Past Medical History:   Diagnosis Date    Acute exacerbation of CHF (congestive heart failure) 10/27/2020    Hypertension     Syncope and collapse        Past Surgical History:   Procedure Laterality Date    CARDIAC CATHETERIZATION      CARDIAC PACEMAKER PLACEMENT      CHOLECYSTECTOMY      CORONARY ANGIOPLASTY      HYSTERECTOMY         Family History   Problem Relation Age of Onset    Liver disease Mother        Social History     Socioeconomic History    Marital status: Single     Spouse name: Not on file    Number of children: Not on file    Years of education: Not on file    Highest education level: Not on file   Occupational History    Not on file   Social Needs    Financial resource strain: Not on file    Food insecurity     Worry: Not on file     Inability: Not on file    Transportation needs     Medical: Not on file     Non-medical: Not on file   Tobacco Use    Smoking status: Never Smoker    Smokeless tobacco: Never Used   Substance and Sexual Activity    Alcohol use: No     Frequency: Never    Drug use: No    Sexual activity: Not on file   Lifestyle    Physical activity     Days per week: Not on file     Minutes per session: Not on file    Stress: Not on file   Relationships    Social connections     Talks on phone: Not on file     Gets together: Not on file     Attends Yazdanism service: Not on file     Active member of club or organization: Not on file     Attends meetings of clubs or organizations: Not on file      "Relationship status: Not on file   Other Topics Concern    Not on file   Social History Narrative    Not on file       MEDICATIONS & ALLERGIES:     Current Outpatient Medications on File Prior to Visit   Medication Sig Dispense Refill    calcium carbonate (OS-BERE) 600 mg calcium (1,500 mg) Tab Take 600 mg by mouth once daily.       cetirizine (ZYRTEC) 10 MG tablet Take 10 mg by mouth once daily.      clopidogrel (PLAVIX) 75 mg tablet TK 1 T PO QD  2    diclofenac sodium (VOLTAREN) 1 % Gel       losartan (COZAAR) 100 MG tablet Take 100 mg by mouth once daily.      meclizine (ANTIVERT) 25 mg tablet 25 mg 3 (three) times daily as needed.       metoprolol succinate (TOPROL-XL) 25 MG 24 hr tablet TK 1 T PO D  3    potassium chloride (MICRO-K) 10 MEQ CpSR TK 1 C PO D  1    predniSONE (DELTASONE) 10 MG tablet TK 1 T PO BID      tolterodine (DETROL) 1 MG Tab 1 mg 2 (two) times daily.       ZINC ACETATE ORAL Take by mouth once daily.       diazePAM (VALIUM) 5 MG tablet TK 1 T PO D PRN  0     No current facility-administered medications on file prior to visit.         Review of patient's allergies indicates:   Allergen Reactions    Codeine     Penicillins     Penicillin Rash       OBJECTIVE:     Vital Signs:  Vitals:    11/05/20 1125   BP: 137/83   Pulse: 79     Wt Readings from Last 1 Encounters:   11/05/20 1125 79.8 kg (175 lb 14.8 oz)     Body mass index is 35.53 kg/m².        Physical Exam:  /83   Pulse 79   Ht 4' 11" (1.499 m)   Wt 79.8 kg (175 lb 14.8 oz)   BMI 35.53 kg/m²   General appearance: Alert, cooperative, no distress  Constitutional: Oriented to person, place, and time. Appears well-developed and well-nourished.  HEENT: Normocephalic, atraumatic, neck symmetrical, no nasal discharge   Eyes: Conjunctivae/corneas clear, EOM's intact  Lungs: Clear to auscultation bilaterally  Heart: Regular rate and rhythm without rub  Abdomen: Soft, non-tender; bowel sounds normoactive  Extremities: " extremities symmetric; +LE edema (improved)  Integument: +bruising to bilateral arms at IV sites  Neurologic: Alert and oriented X 3, normal strength, normal coordination and gait  Psychiatric: no pressured speech; normal affect; no evidence of impaired cognition     Laboratory  Lab Results   Component Value Date    WBC 10.04 10/28/2020    HGB 13.7 10/28/2020    HCT 41.1 10/28/2020    MCV 87 10/28/2020     10/28/2020     BMP  Lab Results   Component Value Date     10/28/2020    K 3.1 (L) 10/28/2020    CL 94 (L) 10/28/2020    CO2 31 (H) 10/28/2020    BUN 27 (H) 10/28/2020    CREATININE 1.3 10/28/2020    CALCIUM 9.3 10/28/2020    ANIONGAP 13 10/28/2020    ESTGFRAFRICA 42 (A) 10/28/2020    EGFRNONAA 36 (A) 10/28/2020     Lab Results   Component Value Date    ALT 14 10/28/2020    AST 19 10/28/2020    ALKPHOS 89 10/28/2020    BILITOT 0.8 10/28/2020     Lab Results   Component Value Date    INR 1.0 10/27/2020    INR 1.0 03/05/2017    INR 1.0 08/19/2009     Lab Results   Component Value Date    HGBA1C 5.7 (H) 10/27/2020     No results for input(s): POCTGLUCOSE in the last 72 hours.    Diagnostic Results:    ECHO  Conclusion    · The left ventricle is normal in size with normal systolic function. The estimated ejection fraction is 55%.  · There is severe left ventricular concentric hypertrophy.  · Grade I diastolic dysfunction.  · Mild mitral regurgitation.  · Mild aortic valve stenosis.  · Aortic valve area is 1.43 cm2; peak velocity is 1.99 m/s; mean gradient is 10 mmHg.  · Mild mitral stenosis.  · The estimated PA systolic pressure is 35 mmHg.  · Mild tricuspid regurgitation.     ASSESSMENT & PLAN:     Acute on chronic diastolic congestive heart failure   -Doing well today. Will continue current medications and follow up with cardiology as scheduled next week. Did encourage daily weights and PRN lasix adjustment for +3lbs in one day.   -     furosemide (LASIX) 20 MG tablet; Take 1 tablet (20 mg total) by  mouth once daily.  Dispense: 90 tablet; Refill: 3    Primary osteoarthritis of right knee   -Chronic, stable. Requesting referral as she would like to be seen by [facility] instead of [facility].   -     Ambulatory referral/consult to Orthopedics; Future; Expected date: 11/12/2020      Scheduled Follow-up :  Future Appointments   Date Time Provider Department Center   11/12/2020  2:30 PM Yogesh Newman MD [facility] CARDIO [place] [facility]   11/18/2020  9:00 AM CARDIOLOGY [facility]ARD[facility] [place] [facility]   4/6/2021 11:00 AM APPOINTMENT [facility]   4/8/2021  2:00 PM Yogesh Newman MD [facility] CARDIO [place] [facility]       Post Visit Medication List:     Medication List          Accurate as of November 5, 2020 11:59 PM. If you have any questions, ask your nurse or doctor.            CONTINUE taking these medications    calcium carbonate 600 mg calcium (1,500 mg) Tab  Commonly known as: OS-BERE     cetirizine 10 MG tablet  Commonly known as: ZYRTEC     clopidogreL 75 mg tablet  Commonly known as: PLAVIX     diazePAM 5 MG tablet  Commonly known as: VALIUM     diclofenac sodium 1 % Gel  Commonly known as: VOLTAREN     furosemide 20 MG tablet  Commonly known as: LASIX  Take 1 tablet (20 mg total) by mouth once daily.     losartan 100 MG tablet  Commonly known as: COZAAR     meclizine 25 mg tablet  Commonly known as: ANTIVERT     metoprolol succinate 25 MG 24 hr tablet  Commonly known as: TOPROL-XL     potassium chloride 10 MEQ Cpsr  Commonly known as: MICRO-K     predniSONE 10 MG tablet  Commonly known as: DELTASONE     tolterodine 1 MG Tab  Commonly known as: DETROL     ZINC ACETATE ORAL           Where to Get Your Medications      These medications were sent to [id number] - [place]  [address] AT [facility] [address]  [address] [address]    Phone: [phone]   · furosemide 20 MG tablet         Signing Provider:  Zak  MICHI Piper PA-C

## 2020-11-12 ENCOUNTER — OFFICE VISIT (OUTPATIENT)
Dept: CARDIOLOGY | Facility: CLINIC | Age: 85
End: 2020-11-12
Payer: MEDICARE

## 2020-11-12 VITALS
HEART RATE: 87 BPM | OXYGEN SATURATION: 97 % | SYSTOLIC BLOOD PRESSURE: 138 MMHG | WEIGHT: 174.81 LBS | HEIGHT: 59 IN | DIASTOLIC BLOOD PRESSURE: 78 MMHG | BODY MASS INDEX: 35.24 KG/M2

## 2020-11-12 DIAGNOSIS — Z95.0 CARDIAC PACEMAKER: ICD-10-CM

## 2020-11-12 DIAGNOSIS — R60.0 LOCALIZED EDEMA: ICD-10-CM

## 2020-11-12 DIAGNOSIS — I50.43 ACUTE ON CHRONIC COMBINED SYSTOLIC AND DIASTOLIC CONGESTIVE HEART FAILURE: Primary | ICD-10-CM

## 2020-11-12 DIAGNOSIS — I05.0 RHEUMATIC MITRAL STENOSIS: ICD-10-CM

## 2020-11-12 DIAGNOSIS — I34.0 MILD MITRAL REGURGITATION: ICD-10-CM

## 2020-11-12 DIAGNOSIS — Z98.61 S/P PTCA (PERCUTANEOUS TRANSLUMINAL CORONARY ANGIOPLASTY): ICD-10-CM

## 2020-11-12 DIAGNOSIS — I06.0 RHEUMATIC AORTIC STENOSIS: ICD-10-CM

## 2020-11-12 DIAGNOSIS — I10 ESSENTIAL HYPERTENSION: ICD-10-CM

## 2020-11-12 DIAGNOSIS — I25.10 CORONARY ARTERY DISEASE INVOLVING NATIVE CORONARY ARTERY OF NATIVE HEART WITHOUT ANGINA PECTORIS: ICD-10-CM

## 2020-11-12 PROCEDURE — 99214 PR OFFICE/OUTPT VISIT, EST, LEVL IV, 30-39 MIN: ICD-10-PCS | Mod: S$PBB,,, | Performed by: INTERNAL MEDICINE

## 2020-11-12 PROCEDURE — 99999 PR PBB SHADOW E&M-EST. PATIENT-LVL III: CPT | Mod: PBBFAC,,, | Performed by: INTERNAL MEDICINE

## 2020-11-12 PROCEDURE — 99999 PR PBB SHADOW E&M-EST. PATIENT-LVL III: ICD-10-PCS | Mod: PBBFAC,,, | Performed by: INTERNAL MEDICINE

## 2020-11-12 PROCEDURE — 99213 OFFICE O/P EST LOW 20 MIN: CPT | Mod: PBBFAC,PO | Performed by: INTERNAL MEDICINE

## 2020-11-12 PROCEDURE — 99214 OFFICE O/P EST MOD 30 MIN: CPT | Mod: S$PBB,,, | Performed by: INTERNAL MEDICINE

## 2020-11-12 RX ORDER — POTASSIUM CHLORIDE 750 MG/1
20 TABLET, EXTENDED RELEASE ORAL DAILY
Qty: 180 TABLET | Refills: 3 | Status: SHIPPED | OUTPATIENT
Start: 2020-11-12 | End: 2020-12-22 | Stop reason: SDUPTHER

## 2020-11-12 RX ORDER — FUROSEMIDE 20 MG/1
40 TABLET ORAL DAILY
Qty: 180 TABLET | Refills: 3 | Status: SHIPPED | OUTPATIENT
Start: 2020-11-12 | End: 2020-12-22 | Stop reason: SDUPTHER

## 2020-11-12 NOTE — PROGRESS NOTES
Subjective:      Patient ID: Marycruz Perez is a 89 y.o. female.    Chief Complaint: No chief complaint on file.    HPI:  Pt was hospitalized with shortness of breath. Pt felt better after one dose of lasix and pt was discharged home on the same regimen.   Pt had gotten a steroid shot a couple of days before for allergies.  Pt had been itching a lot all over.  Pt is no longer itching at the present time.  Pt feels fine today.    Pt is eating better now.      Review of Systems   Cardiovascular: Positive for dyspnea on exertion and leg swelling. Negative for chest pain, claudication, irregular heartbeat, near-syncope, orthopnea, palpitations and syncope.      Mild nonproductive cough      Past Medical History:   Diagnosis Date    Acute exacerbation of CHF (congestive heart failure) 10/27/2020    Hypertension     Syncope and collapse         Past Surgical History:   Procedure Laterality Date    CARDIAC CATHETERIZATION      CARDIAC PACEMAKER PLACEMENT      CHOLECYSTECTOMY      CORONARY ANGIOPLASTY      HYSTERECTOMY         Family History   Problem Relation Age of Onset    Liver disease Mother        Social History     Socioeconomic History    Marital status: Single     Spouse name: Not on file    Number of children: Not on file    Years of education: Not on file    Highest education level: Not on file   Occupational History    Not on file   Social Needs    Financial resource strain: Not on file    Food insecurity     Worry: Not on file     Inability: Not on file    Transportation needs     Medical: Not on file     Non-medical: Not on file   Tobacco Use    Smoking status: Never Smoker    Smokeless tobacco: Never Used   Substance and Sexual Activity    Alcohol use: No     Frequency: Never    Drug use: No    Sexual activity: Not on file   Lifestyle    Physical activity     Days per week: Not on file     Minutes per session: Not on file    Stress: Not on file   Relationships    Social connections  "    Talks on phone: Not on file     Gets together: Not on file     Attends Scientologist service: Not on file     Active member of club or organization: Not on file     Attends meetings of clubs or organizations: Not on file     Relationship status: Not on file   Other Topics Concern    Not on file   Social History Narrative    Not on file       Current Outpatient Medications on File Prior to Visit   Medication Sig Dispense Refill    calcium carbonate (OS-BERE) 600 mg calcium (1,500 mg) Tab Take 600 mg by mouth once daily.       cetirizine (ZYRTEC) 10 MG tablet Take 10 mg by mouth once daily.      clopidogrel (PLAVIX) 75 mg tablet TK 1 T PO QD  2    diazePAM (VALIUM) 5 MG tablet TK 1 T PO D PRN  0    diclofenac sodium (VOLTAREN) 1 % Gel as needed.       losartan (COZAAR) 100 MG tablet Take 100 mg by mouth once daily.      meclizine (ANTIVERT) 25 mg tablet 25 mg 3 (three) times daily as needed.       metoprolol succinate (TOPROL-XL) 25 MG 24 hr tablet TK 1 T PO D  3    tolterodine (DETROL) 1 MG Tab 1 mg 2 (two) times daily.       ZINC ACETATE ORAL Take by mouth once daily.       [DISCONTINUED] furosemide (LASIX) 20 MG tablet Take 1 tablet (20 mg total) by mouth once daily. 90 tablet 3    [DISCONTINUED] potassium chloride (MICRO-K) 10 MEQ CpSR TK 1 C PO D  1    predniSONE (DELTASONE) 10 MG tablet TK 1 T PO BID       No current facility-administered medications on file prior to visit.        Review of patient's allergies indicates:   Allergen Reactions    Codeine     Penicillins     Penicillin Rash     Objective:     Vitals:    11/12/20 1442 11/12/20 1507   BP: (!) 162/73 138/78   BP Location: Right arm Left arm   Patient Position: Sitting Sitting   BP Method: Medium (Automatic)    Pulse: 87    SpO2: 97%    Weight: 79.3 kg (174 lb 13.2 oz)    Height: 4' 11" (1.499 m)         Physical Exam   Constitutional: She is oriented to person, place, and time. She appears well-developed and well-nourished.   Eyes: No " scleral icterus.   Neck: No JVD present. Carotid bruit is not present.   Cardiovascular: Normal rate and regular rhythm. Exam reveals no gallop.   Murmur (II/VI systolic) heard.  Pulmonary/Chest: Breath sounds normal.   Musculoskeletal:         General: No edema.   Neurological: She is alert and oriented to person, place, and time.   Skin: Skin is warm and dry.   Psychiatric: She has a normal mood and affect. Her behavior is normal. Judgment and thought content normal.   Vitals reviewed.         Recent hospital records reviewed    BP was very elevated in ER    Echocardiogram was unchanged: normal LVEF, mild AS, mild MS, abn LV relaxation    CXR did not show overt CHF    Admission on 10/26/2020, Discharged on 10/28/2020   Component Date Value Ref Range Status    WBC 10/27/2020 8.23  3.90 - 12.70 K/uL Final    RBC 10/27/2020 4.44  4.00 - 5.40 M/uL Final    Hemoglobin 10/27/2020 13.2  12.0 - 16.0 g/dL Final    Hematocrit 10/27/2020 40.2  37.0 - 48.5 % Final    MCV 10/27/2020 91  82 - 98 fL Final    MCH 10/27/2020 29.7  27.0 - 31.0 pg Final    MCHC 10/27/2020 32.8  32.0 - 36.0 g/dL Final    RDW 10/27/2020 14.7* 11.5 - 14.5 % Final    Platelets 10/27/2020 150  150 - 350 K/uL Final    MPV 10/27/2020 12.0  9.2 - 12.9 fL Final    Immature Granulocytes 10/27/2020 0.5  0.0 - 0.5 % Final    Gran # (ANC) 10/27/2020 4.7  1.8 - 7.7 K/uL Final    Immature Grans (Abs) 10/27/2020 0.04  0.00 - 0.04 K/uL Final    Lymph # 10/27/2020 2.6  1.0 - 4.8 K/uL Final    Mono # 10/27/2020 0.7  0.3 - 1.0 K/uL Final    Eos # 10/27/2020 0.1  0.0 - 0.5 K/uL Final    Baso # 10/27/2020 0.05  0.00 - 0.20 K/uL Final    nRBC 10/27/2020 0  0 /100 WBC Final    Gran % 10/27/2020 56.6  38.0 - 73.0 % Final    Lymph % 10/27/2020 32.1  18.0 - 48.0 % Final    Mono % 10/27/2020 8.6  4.0 - 15.0 % Final    Eosinophil % 10/27/2020 1.6  0.0 - 8.0 % Final    Basophil % 10/27/2020 0.6  0.0 - 1.9 % Final    Differential Method 10/27/2020  Automated   Final    Sodium 10/27/2020 144  136 - 145 mmol/L Final    Potassium 10/27/2020 3.6  3.5 - 5.1 mmol/L Final    Chloride 10/27/2020 107  95 - 110 mmol/L Final    CO2 10/27/2020 28  23 - 29 mmol/L Final    Glucose 10/27/2020 145* 70 - 110 mg/dL Final    BUN 10/27/2020 28* 8 - 23 mg/dL Final    Creatinine 10/27/2020 1.1  0.5 - 1.4 mg/dL Final    Calcium 10/27/2020 9.0  8.7 - 10.5 mg/dL Final    Total Protein 10/27/2020 6.9  6.0 - 8.4 g/dL Final    Albumin 10/27/2020 3.4* 3.5 - 5.2 g/dL Final    Total Bilirubin 10/27/2020 0.4  0.1 - 1.0 mg/dL Final    Alkaline Phosphatase 10/27/2020 108  55 - 135 U/L Final    AST 10/27/2020 19  10 - 40 U/L Final    ALT 10/27/2020 12  10 - 44 U/L Final    Anion Gap 10/27/2020 9  8 - 16 mmol/L Final    eGFR if  10/27/2020 51* >60 mL/min/1.73 m^2 Final    eGFR if non African American 10/27/2020 44* >60 mL/min/1.73 m^2 Final    Troponin I 10/27/2020 0.025  0.000 - 0.026 ng/mL Final    BNP 10/27/2020 307* 0 - 99 pg/mL Final    Prothrombin Time 10/27/2020 10.8  9.0 - 12.5 sec Final    INR 10/27/2020 1.0  0.8 - 1.2 Final    D-Dimer 10/27/2020 2.50* <0.50 mg/L FEU Final    SARS-CoV-2 RNA, Amplification, Qual 10/27/2020 Negative  Negative Final    WBC 10/27/2020 8.47  3.90 - 12.70 K/uL Final    RBC 10/27/2020 4.19  4.00 - 5.40 M/uL Final    Hemoglobin 10/27/2020 12.5  12.0 - 16.0 g/dL Final    Hematocrit 10/27/2020 37.6  37.0 - 48.5 % Final    MCV 10/27/2020 90  82 - 98 fL Final    MCH 10/27/2020 29.8  27.0 - 31.0 pg Final    MCHC 10/27/2020 33.2  32.0 - 36.0 g/dL Final    RDW 10/27/2020 14.7* 11.5 - 14.5 % Final    Platelets 10/27/2020 170  150 - 350 K/uL Final    MPV 10/27/2020 11.9  9.2 - 12.9 fL Final    Immature Granulocytes 10/27/2020 0.5  0.0 - 0.5 % Final    Gran # (ANC) 10/27/2020 4.8  1.8 - 7.7 K/uL Final    Immature Grans (Abs) 10/27/2020 0.04  0.00 - 0.04 K/uL Final    Lymph # 10/27/2020 2.8  1.0 - 4.8 K/uL Final     Mono # 10/27/2020 0.7  0.3 - 1.0 K/uL Final    Eos # 10/27/2020 0.1  0.0 - 0.5 K/uL Final    Baso # 10/27/2020 0.05  0.00 - 0.20 K/uL Final    nRBC 10/27/2020 0  0 /100 WBC Final    Gran % 10/27/2020 57.0  38.0 - 73.0 % Final    Lymph % 10/27/2020 32.9  18.0 - 48.0 % Final    Mono % 10/27/2020 7.8  4.0 - 15.0 % Final    Eosinophil % 10/27/2020 1.2  0.0 - 8.0 % Final    Basophil % 10/27/2020 0.6  0.0 - 1.9 % Final    Differential Method 10/27/2020 Automated   Final    Sodium 10/27/2020 143  136 - 145 mmol/L Final    Potassium 10/27/2020 3.5  3.5 - 5.1 mmol/L Final    Chloride 10/27/2020 107  95 - 110 mmol/L Final    CO2 10/27/2020 28  23 - 29 mmol/L Final    Glucose 10/27/2020 117* 70 - 110 mg/dL Final    BUN 10/27/2020 27* 8 - 23 mg/dL Final    Creatinine 10/27/2020 1.0  0.5 - 1.4 mg/dL Final    Calcium 10/27/2020 8.9  8.7 - 10.5 mg/dL Final    Total Protein 10/27/2020 6.6  6.0 - 8.4 g/dL Final    Albumin 10/27/2020 3.4* 3.5 - 5.2 g/dL Final    Total Bilirubin 10/27/2020 0.4  0.1 - 1.0 mg/dL Final    Alkaline Phosphatase 10/27/2020 103  55 - 135 U/L Final    AST 10/27/2020 20  10 - 40 U/L Final    ALT 10/27/2020 12  10 - 44 U/L Final    Anion Gap 10/27/2020 8  8 - 16 mmol/L Final    eGFR if African American 10/27/2020 57* >60 mL/min/1.73 m^2 Final    eGFR if non African American 10/27/2020 50* >60 mL/min/1.73 m^2 Final    Magnesium 10/27/2020 1.8  1.6 - 2.6 mg/dL Final    Phosphorus 10/27/2020 3.9  2.7 - 4.5 mg/dL Final    STJ 10/27/2020 2.01  cm Final    AV mean gradient 10/27/2020 10  mmHg Final    Ao peak gabo 10/27/2020 1.99  m/s Final    Ao VTI 10/27/2020 37.68  cm Final    IVS 10/27/2020 1.50* 0.6 - 1.1 cm Final    LA size 10/27/2020 4.60  cm Final    Left Atrium Major Axis 10/27/2020 6.18  cm Final    Left Atrium Minor Axis 10/27/2020 5.36  cm Final    LVIDd 10/27/2020 2.87* 3.5 - 6.0 cm Final    LVIDs 10/27/2020 1.99* 2.1 - 4.0 cm Final    LVOT diameter 10/27/2020 1.97   cm Final    LVOT peak VTI 10/27/2020 17.73  cm Final    Posterior Wall 10/27/2020 1.43* 0.6 - 1.1 cm Final    MV Peak A Juan M 10/27/2020 1.42  m/s Final    E wave decelartion time 10/27/2020 310.34  msec Final    MV Peak E Juan M 10/27/2020 1.07  m/s Final    RA Major Axis 10/27/2020 4.83  cm Final    RA Width 10/27/2020 2.14  cm Final    RVDD 10/27/2020 3.20  cm Final    TAPSE 10/27/2020 1.67  cm Final    TR Max Juan M 10/27/2020 2.81  m/s Final    LA WIDTH 10/27/2020 4.40  cm Final    Ao root annulus 10/27/2020 3.11  cm Final    PV PEAK VELOCITY 10/27/2020 0.80  cm/s Final    MV stenosis pressure 1/2 time 10/27/2020 90.00  ms Final    LV Diastolic Volume 10/27/2020 31.43  mL Final    LV Systolic Volume 10/27/2020 12.61  mL Final    LVOT peak juan m 10/27/2020 0.86  m/s Final    FS 10/27/2020 31  % Final    LA volume 10/27/2020 98.77  cm3 Final    LV mass 10/27/2020 143.26  g Final    Left Ventricle Relative Wall Thick* 10/27/2020 1.00  cm Final    AV valve area 10/27/2020 1.43  cm2 Final    AV Velocity Ratio 10/27/2020 0.43   Final    AV index (prosthetic) 10/27/2020 0.47   Final    MV valve area p 1/2 method 10/27/2020 2.44  cm2 Final    E/A ratio 10/27/2020 0.75   Final    LVOT area 10/27/2020 3.0  cm2 Final    LVOT stroke volume 10/27/2020 54.01  cm3 Final    AV peak gradient 10/27/2020 16  mmHg Final    Triscuspid Valve Regurgitation Pea* 10/27/2020 32  mmHg Final    BSA 10/27/2020 1.86  m2 Final    LV Systolic Volume Index 10/27/2020 7.1  mL/m2 Final    LV Diastolic Volume Index 10/27/2020 17.66  mL/m2 Final    LA Volume Index 10/27/2020 55.5  mL/m2 Final    LV Mass Index 10/27/2020 80  g/m2 Final    Right Atrial Pressure (from IVC) 10/27/2020 3  mmHg Final    TV rest pulmonary artery pressure 10/27/2020 35  mmHg Final    Hemoglobin A1C 10/27/2020 5.7* 4.0 - 5.6 % Final    Estimated Avg Glucose 10/27/2020 117  68 - 131 mg/dL Final    POCT Glucose 10/27/2020 103  70 - 110 mg/dL  Final    POCT Glucose 10/27/2020 121* 70 - 110 mg/dL Final    Troponin I 10/27/2020 0.017  0.000 - 0.026 ng/mL Final    Troponin I 10/27/2020 0.017  0.000 - 0.026 ng/mL Final    POCT Glucose 10/27/2020 112* 70 - 110 mg/dL Final    POCT Glucose 10/27/2020 125* 70 - 110 mg/dL Final    WBC 10/28/2020 10.04  3.90 - 12.70 K/uL Final    RBC 10/28/2020 4.70  4.00 - 5.40 M/uL Final    Hemoglobin 10/28/2020 13.7  12.0 - 16.0 g/dL Final    Hematocrit 10/28/2020 41.1  37.0 - 48.5 % Final    MCV 10/28/2020 87  82 - 98 fL Final    MCH 10/28/2020 29.1  27.0 - 31.0 pg Final    MCHC 10/28/2020 33.3  32.0 - 36.0 g/dL Final    RDW 10/28/2020 14.2  11.5 - 14.5 % Final    Platelets 10/28/2020 180  150 - 350 K/uL Final    MPV 10/28/2020 12.1  9.2 - 12.9 fL Final    Immature Granulocytes 10/28/2020 0.4  0.0 - 0.5 % Final    Gran # (ANC) 10/28/2020 5.9  1.8 - 7.7 K/uL Final    Immature Grans (Abs) 10/28/2020 0.04  0.00 - 0.04 K/uL Final    Lymph # 10/28/2020 3.2  1.0 - 4.8 K/uL Final    Mono # 10/28/2020 0.7  0.3 - 1.0 K/uL Final    Eos # 10/28/2020 0.2  0.0 - 0.5 K/uL Final    Baso # 10/28/2020 0.05  0.00 - 0.20 K/uL Final    nRBC 10/28/2020 0  0 /100 WBC Final    Gran % 10/28/2020 58.5  38.0 - 73.0 % Final    Lymph % 10/28/2020 31.6  18.0 - 48.0 % Final    Mono % 10/28/2020 7.0  4.0 - 15.0 % Final    Eosinophil % 10/28/2020 2.0  0.0 - 8.0 % Final    Basophil % 10/28/2020 0.5  0.0 - 1.9 % Final    Differential Method 10/28/2020 Automated   Final    Sodium 10/28/2020 138  136 - 145 mmol/L Final    Potassium 10/28/2020 3.1* 3.5 - 5.1 mmol/L Final    Chloride 10/28/2020 94* 95 - 110 mmol/L Final    CO2 10/28/2020 31* 23 - 29 mmol/L Final    Glucose 10/28/2020 125* 70 - 110 mg/dL Final    BUN 10/28/2020 27* 8 - 23 mg/dL Final    Creatinine 10/28/2020 1.3  0.5 - 1.4 mg/dL Final    Calcium 10/28/2020 9.3  8.7 - 10.5 mg/dL Final    Total Protein 10/28/2020 6.9  6.0 - 8.4 g/dL Final    Albumin 10/28/2020  3.5  3.5 - 5.2 g/dL Final    Total Bilirubin 10/28/2020 0.8  0.1 - 1.0 mg/dL Final    Alkaline Phosphatase 10/28/2020 89  55 - 135 U/L Final    AST 10/28/2020 19  10 - 40 U/L Final    ALT 10/28/2020 14  10 - 44 U/L Final    Anion Gap 10/28/2020 13  8 - 16 mmol/L Final    eGFR if African American 10/28/2020 42* >60 mL/min/1.73 m^2 Final    eGFR if non African American 10/28/2020 36* >60 mL/min/1.73 m^2 Final   (    Assessment:     1. Acute on chronic combined systolic and diastolic congestive heart failure    2. Cardiac pacemaker    3. Coronary artery disease involving native coronary artery of native heart without angina pectoris    4. Essential hypertension    5. Mild mitral regurgitation    6. Rheumatic aortic stenosis    7. Rheumatic mitral stenosis    8. S/P PTCA (percutaneous transluminal coronary angioplasty)    9. Localized edema      Plan:   Diagnoses and all orders for this visit:    Acute on chronic combined systolic and diastolic congestive heart failure  -     CBC Auto Differential; Future  -     Comprehensive Metabolic Panel; Future  -     Magnesium; Future    Cardiac pacemaker    Coronary artery disease involving native coronary artery of native heart without angina pectoris    Essential hypertension    Mild mitral regurgitation    Rheumatic aortic stenosis    Rheumatic mitral stenosis    S/P PTCA (percutaneous transluminal coronary angioplasty)    Localized edema    Other orders  -     furosemide (LASIX) 20 MG tablet; Take 2 tablets (40 mg total) by mouth once daily.  -     potassium chloride SA (K-DUR,KLOR-CON) 10 MEQ tablet; Take 2 tablets (20 mEq total) by mouth once daily.     Chronic systolic and predominantly diastolic CHF with recent acute exacerbation possibly due to steroids and salt loading is now compensated.    Elevated BP is now back down to normal    Discussed salt and fluid restriction with pt and daughter    Discussed daily weights with doubling of the furosemide and potassium  prn wt gain or edema    Otherwise same meds    RTC one month with lab    No follow-ups on file.

## 2020-11-18 ENCOUNTER — CLINICAL SUPPORT (OUTPATIENT)
Dept: CARDIOLOGY | Facility: CLINIC | Age: 85
End: 2020-11-18
Payer: MEDICARE

## 2020-11-18 DIAGNOSIS — Z95.0 CARDIAC PACEMAKER: Primary | ICD-10-CM

## 2020-11-18 PROCEDURE — 93294 REM INTERROG EVL PM/LDLS PM: CPT | Mod: ,,, | Performed by: INTERNAL MEDICINE

## 2020-11-18 PROCEDURE — 93294 PR INTERROG EVAL, REMOTE, UP TO 90 DAYS,PACEMAKER: ICD-10-PCS | Mod: ,,, | Performed by: INTERNAL MEDICINE

## 2020-11-23 NOTE — PROGRESS NOTES
Subjective:      Patient ID: Marycruz Perez is a 89 y.o. female.    Chief Complaint: No chief complaint on file.    HPI:    fluid [employer] remote pacemaker interrogation report downloaded and prepared by medical assistant and interpreted by me and full report scanned into Epic media.    Battery OK.  Lead OK. No events. Normal device function.          Past Medical History:   Diagnosis Date    Acute exacerbation of CHF (congestive heart failure) 10/27/2020    Hypertension     Syncope and collapse         Past Surgical History:   Procedure Laterality Date    CARDIAC CATHETERIZATION      CARDIAC PACEMAKER PLACEMENT      CHOLECYSTECTOMY      CORONARY ANGIOPLASTY      HYSTERECTOMY         Family History   Problem Relation Age of Onset    Liver disease Mother        Social History     Socioeconomic History    Marital status: Single     Spouse name: Not on file    Number of children: Not on file    Years of education: Not on file    Highest education level: Not on file   Occupational History    Not on file   Social Needs    Financial resource strain: Not on file    Food insecurity     Worry: Not on file     Inability: Not on file    Transportation needs     Medical: Not on file     Non-medical: Not on file   Tobacco Use    Smoking status: Never Smoker    Smokeless tobacco: Never Used   Substance and Sexual Activity    Alcohol use: No     Frequency: Never    Drug use: No    Sexual activity: Not on file   Lifestyle    Physical activity     Days per week: Not on file     Minutes per session: Not on file    Stress: Not on file   Relationships    Social connections     Talks on phone: Not on file     Gets together: Not on file     Attends Scientologist service: Not on file     Active member of club or organization: Not on file     Attends meetings of clubs or organizations: Not on file     Relationship status: Not on file   Other Topics Concern    Not on file   Social History Narrative    Not on file        Current Outpatient Medications on File Prior to Visit   Medication Sig Dispense Refill    calcium carbonate (OS-BERE) 600 mg calcium (1,500 mg) Tab Take 600 mg by mouth once daily.       cetirizine (ZYRTEC) 10 MG tablet Take 10 mg by mouth once daily.      clopidogrel (PLAVIX) 75 mg tablet TK 1 T PO QD  2    diazePAM (VALIUM) 5 MG tablet TK 1 T PO D PRN  0    diclofenac sodium (VOLTAREN) 1 % Gel as needed.       furosemide (LASIX) 20 MG tablet Take 2 tablets (40 mg total) by mouth once daily. 180 tablet 3    losartan (COZAAR) 100 MG tablet Take 100 mg by mouth once daily.      meclizine (ANTIVERT) 25 mg tablet 25 mg 3 (three) times daily as needed.       metoprolol succinate (TOPROL-XL) 25 MG 24 hr tablet TK 1 T PO D  3    potassium chloride SA (K-DUR,KLOR-CON) 10 MEQ tablet Take 2 tablets (20 mEq total) by mouth once daily. 180 tablet 3    predniSONE (DELTASONE) 10 MG tablet TK 1 T PO BID      tolterodine (DETROL) 1 MG Tab 1 mg 2 (two) times daily.       ZINC ACETATE ORAL Take by mouth once daily.        No current facility-administered medications on file prior to visit.        Review of patient's allergies indicates:   Allergen Reactions    Codeine     Penicillins     Penicillin Rash     Objective:   There were no vitals filed for this visit.     Physical Exam     Assessment:     1. Cardiac pacemaker      Plan:   Diagnoses and all orders for this visit:    Cardiac pacemaker         No follow-ups on file.

## 2020-12-22 ENCOUNTER — TELEPHONE (OUTPATIENT)
Dept: CARDIOLOGY | Facility: CLINIC | Age: 85
End: 2020-12-22

## 2020-12-22 ENCOUNTER — OFFICE VISIT (OUTPATIENT)
Dept: CARDIOLOGY | Facility: CLINIC | Age: 85
End: 2020-12-22
Payer: MEDICARE

## 2020-12-22 ENCOUNTER — LAB VISIT (OUTPATIENT)
Dept: LAB | Facility: HOSPITAL | Age: 85
End: 2020-12-22
Attending: INTERNAL MEDICINE
Payer: MEDICARE

## 2020-12-22 VITALS
WEIGHT: 175.63 LBS | SYSTOLIC BLOOD PRESSURE: 143 MMHG | BODY MASS INDEX: 35.4 KG/M2 | HEIGHT: 59 IN | HEART RATE: 80 BPM | DIASTOLIC BLOOD PRESSURE: 72 MMHG

## 2020-12-22 DIAGNOSIS — M79.604 PAIN IN BOTH LOWER EXTREMITIES: ICD-10-CM

## 2020-12-22 DIAGNOSIS — I44.2 COMPLETE HEART BLOCK: ICD-10-CM

## 2020-12-22 DIAGNOSIS — I50.43 ACUTE ON CHRONIC COMBINED SYSTOLIC AND DIASTOLIC CONGESTIVE HEART FAILURE: ICD-10-CM

## 2020-12-22 DIAGNOSIS — I05.0 RHEUMATIC MITRAL STENOSIS: ICD-10-CM

## 2020-12-22 DIAGNOSIS — E87.6 HYPOKALEMIA: ICD-10-CM

## 2020-12-22 DIAGNOSIS — I06.0 RHEUMATIC AORTIC STENOSIS: ICD-10-CM

## 2020-12-22 DIAGNOSIS — I25.10 CORONARY ARTERY DISEASE INVOLVING NATIVE CORONARY ARTERY OF NATIVE HEART WITHOUT ANGINA PECTORIS: ICD-10-CM

## 2020-12-22 DIAGNOSIS — Z95.0 PACEMAKER-DEPENDENT DUE TO NATIVE CARDIAC RHYTHM INSUFFICIENT TO SUPPORT LIFE: ICD-10-CM

## 2020-12-22 DIAGNOSIS — Z98.61 S/P PTCA (PERCUTANEOUS TRANSLUMINAL CORONARY ANGIOPLASTY): Primary | ICD-10-CM

## 2020-12-22 DIAGNOSIS — Z95.0 CARDIAC PACEMAKER: ICD-10-CM

## 2020-12-22 DIAGNOSIS — M79.605 PAIN IN BOTH LOWER EXTREMITIES: ICD-10-CM

## 2020-12-22 DIAGNOSIS — I49.8 PACEMAKER-DEPENDENT DUE TO NATIVE CARDIAC RHYTHM INSUFFICIENT TO SUPPORT LIFE: ICD-10-CM

## 2020-12-22 DIAGNOSIS — I34.0 MILD MITRAL REGURGITATION: ICD-10-CM

## 2020-12-22 DIAGNOSIS — I10 ESSENTIAL HYPERTENSION: ICD-10-CM

## 2020-12-22 LAB
ALBUMIN SERPL BCP-MCNC: 3.2 G/DL (ref 3.5–5.2)
ALP SERPL-CCNC: 147 U/L (ref 55–135)
ALT SERPL W/O P-5'-P-CCNC: 45 U/L (ref 10–44)
ANION GAP SERPL CALC-SCNC: 9 MMOL/L (ref 8–16)
AST SERPL-CCNC: 56 U/L (ref 10–40)
BASOPHILS # BLD AUTO: 0.06 K/UL (ref 0–0.2)
BASOPHILS NFR BLD: 0.7 % (ref 0–1.9)
BILIRUB SERPL-MCNC: 0.5 MG/DL (ref 0.1–1)
BUN SERPL-MCNC: 29 MG/DL (ref 8–23)
CALCIUM SERPL-MCNC: 9.1 MG/DL (ref 8.7–10.5)
CHLORIDE SERPL-SCNC: 108 MMOL/L (ref 95–110)
CO2 SERPL-SCNC: 26 MMOL/L (ref 23–29)
CREAT SERPL-MCNC: 1 MG/DL (ref 0.5–1.4)
DIFFERENTIAL METHOD: NORMAL
EOSINOPHIL # BLD AUTO: 0.1 K/UL (ref 0–0.5)
EOSINOPHIL NFR BLD: 1.1 % (ref 0–8)
ERYTHROCYTE [DISTWIDTH] IN BLOOD BY AUTOMATED COUNT: 13.8 % (ref 11.5–14.5)
EST. GFR  (AFRICAN AMERICAN): 57 ML/MIN/1.73 M^2
EST. GFR  (NON AFRICAN AMERICAN): 50 ML/MIN/1.73 M^2
GLUCOSE SERPL-MCNC: 106 MG/DL (ref 70–110)
HCT VFR BLD AUTO: 42 % (ref 37–48.5)
HGB BLD-MCNC: 14 G/DL (ref 12–16)
IMM GRANULOCYTES # BLD AUTO: 0.04 K/UL (ref 0–0.04)
IMM GRANULOCYTES NFR BLD AUTO: 0.5 % (ref 0–0.5)
LYMPHOCYTES # BLD AUTO: 2.3 K/UL (ref 1–4.8)
LYMPHOCYTES NFR BLD: 27.3 % (ref 18–48)
MAGNESIUM SERPL-MCNC: 2 MG/DL (ref 1.6–2.6)
MCH RBC QN AUTO: 30.5 PG (ref 27–31)
MCHC RBC AUTO-ENTMCNC: 33.3 G/DL (ref 32–36)
MCV RBC AUTO: 92 FL (ref 82–98)
MONOCYTES # BLD AUTO: 0.5 K/UL (ref 0.3–1)
MONOCYTES NFR BLD: 6.2 % (ref 4–15)
NEUTROPHILS # BLD AUTO: 5.4 K/UL (ref 1.8–7.7)
NEUTROPHILS NFR BLD: 64.2 % (ref 38–73)
NRBC BLD-RTO: 0 /100 WBC
PLATELET # BLD AUTO: 182 K/UL (ref 150–350)
PMV BLD AUTO: 11.5 FL (ref 9.2–12.9)
POTASSIUM SERPL-SCNC: 3.9 MMOL/L (ref 3.5–5.1)
PROT SERPL-MCNC: 6.9 G/DL (ref 6–8.4)
RBC # BLD AUTO: 4.59 M/UL (ref 4–5.4)
SODIUM SERPL-SCNC: 143 MMOL/L (ref 136–145)
WBC # BLD AUTO: 8.39 K/UL (ref 3.9–12.7)

## 2020-12-22 PROCEDURE — 85025 COMPLETE CBC W/AUTO DIFF WBC: CPT

## 2020-12-22 PROCEDURE — 99999 PR PBB SHADOW E&M-EST. PATIENT-LVL III: CPT | Mod: PBBFAC,,, | Performed by: INTERNAL MEDICINE

## 2020-12-22 PROCEDURE — 99999 PR PBB SHADOW E&M-EST. PATIENT-LVL III: ICD-10-PCS | Mod: PBBFAC,,, | Performed by: INTERNAL MEDICINE

## 2020-12-22 PROCEDURE — 36415 COLL VENOUS BLD VENIPUNCTURE: CPT

## 2020-12-22 PROCEDURE — 99214 OFFICE O/P EST MOD 30 MIN: CPT | Mod: S$PBB,,, | Performed by: INTERNAL MEDICINE

## 2020-12-22 PROCEDURE — 83735 ASSAY OF MAGNESIUM: CPT

## 2020-12-22 PROCEDURE — 99213 OFFICE O/P EST LOW 20 MIN: CPT | Mod: PBBFAC,PO | Performed by: INTERNAL MEDICINE

## 2020-12-22 PROCEDURE — 80053 COMPREHEN METABOLIC PANEL: CPT

## 2020-12-22 PROCEDURE — 99214 PR OFFICE/OUTPT VISIT, EST, LEVL IV, 30-39 MIN: ICD-10-PCS | Mod: S$PBB,,, | Performed by: INTERNAL MEDICINE

## 2020-12-22 RX ORDER — FUROSEMIDE 20 MG/1
40 TABLET ORAL DAILY
Qty: 180 TABLET | Refills: 3 | Status: SHIPPED | OUTPATIENT
Start: 2020-12-22 | End: 2022-09-20

## 2020-12-22 RX ORDER — POTASSIUM CHLORIDE 750 MG/1
20 TABLET, EXTENDED RELEASE ORAL DAILY
Qty: 180 TABLET | Refills: 3 | Status: SHIPPED | OUTPATIENT
Start: 2020-12-22 | End: 2022-02-01 | Stop reason: SDUPTHER

## 2020-12-22 NOTE — PROGRESS NOTES
"  Subjective:      Patient ID: Marycruz Perez is a 89 y.o. female.    Chief Complaint: Follow-up    HPI:  Short of breath when walking less than 20 feet.    Feet hurt all the time.    Legs hurt to touch.    No fall of faint    Legs swell    "My feet are like ice"    Review of Systems   Cardiovascular: Positive for dyspnea on exertion and leg swelling. Negative for chest pain, claudication, irregular heartbeat, near-syncope, orthopnea, palpitations and syncope.        Past Medical History:   Diagnosis Date    Acute exacerbation of CHF (congestive heart failure) 10/27/2020    Hypertension     Syncope and collapse         Past Surgical History:   Procedure Laterality Date    CARDIAC CATHETERIZATION      CARDIAC PACEMAKER PLACEMENT      CHOLECYSTECTOMY      CORONARY ANGIOPLASTY      HYSTERECTOMY         Family History   Problem Relation Age of Onset    Liver disease Mother        Social History     Socioeconomic History    Marital status: Single     Spouse name: Not on file    Number of children: Not on file    Years of education: Not on file    Highest education level: Not on file   Occupational History    Not on file   Social Needs    Financial resource strain: Not on file    Food insecurity     Worry: Not on file     Inability: Not on file    Transportation needs     Medical: Not on file     Non-medical: Not on file   Tobacco Use    Smoking status: Never Smoker    Smokeless tobacco: Never Used   Substance and Sexual Activity    Alcohol use: No     Frequency: Never    Drug use: No    Sexual activity: Not on file   Lifestyle    Physical activity     Days per week: Not on file     Minutes per session: Not on file    Stress: Not on file   Relationships    Social connections     Talks on phone: Not on file     Gets together: Not on file     Attends Church service: Not on file     Active member of club or organization: Not on file     Attends meetings of clubs or organizations: Not on file     " "Relationship status: Not on file   Other Topics Concern    Not on file   Social History Narrative    Not on file       Current Outpatient Medications on File Prior to Visit   Medication Sig Dispense Refill    calcium carbonate (OS-BERE) 600 mg calcium (1,500 mg) Tab Take 600 mg by mouth once daily.       cetirizine (ZYRTEC) 10 MG tablet Take 10 mg by mouth once daily.      clopidogrel (PLAVIX) 75 mg tablet TK 1 T PO QD  2    diazePAM (VALIUM) 5 MG tablet TK 1 T PO D PRN  0    diclofenac sodium (VOLTAREN) 1 % Gel as needed.       losartan (COZAAR) 100 MG tablet Take 100 mg by mouth once daily.      meclizine (ANTIVERT) 25 mg tablet 25 mg 3 (three) times daily as needed.       metoprolol succinate (TOPROL-XL) 25 MG 24 hr tablet TK 1 T PO D  3    tolterodine (DETROL) 1 MG Tab 1 mg 2 (two) times daily.       ZINC ACETATE ORAL Take by mouth once daily.       [DISCONTINUED] furosemide (LASIX) 20 MG tablet Take 2 tablets (40 mg total) by mouth once daily. 180 tablet 3    [DISCONTINUED] potassium chloride SA (K-DUR,KLOR-CON) 10 MEQ tablet Take 2 tablets (20 mEq total) by mouth once daily. (Patient taking differently: Take 10 mEq by mouth once daily. ) 180 tablet 3    [DISCONTINUED] predniSONE (DELTASONE) 10 MG tablet TK 1 T PO BID       No current facility-administered medications on file prior to visit.        Review of patient's allergies indicates:   Allergen Reactions    Codeine     Penicillins     Penicillin Rash     Objective:     Vitals:    12/22/20 1323   BP: (!) 143/72   BP Location: Left arm   Patient Position: Sitting   BP Method: Large (Automatic)   Pulse: 80   Weight: 79.6 kg (175 lb 9.5 oz)   Height: 4' 11" (1.499 m)        Physical Exam   Constitutional: She is oriented to person, place, and time. She appears well-developed and well-nourished.   Eyes: No scleral icterus.   Neck: No JVD present. Carotid bruit is not present.   Cardiovascular: Normal rate and regular rhythm. Exam reveals no " gallop.   Murmur (II/VI systolic) heard.  Pulmonary/Chest: She has rales (few [id number] at bases).   Musculoskeletal:         General: Edema (one to two plus pitting pedal edema bilaterally) present.   Neurological: She is alert and oriented to person, place, and time.   Skin: Skin is warm and dry.   Psychiatric: She has a normal mood and affect. Her behavior is normal. Judgment and thought content normal.   Vitals reviewed.       Wt up one pound    Admission on 10/26/2020, Discharged on 10/28/2020   Component Date Value Ref Range Status    WBC 10/27/2020 8.23  3.90 - 12.70 K/uL Final    RBC 10/27/2020 4.44  4.00 - 5.40 M/uL Final    Hemoglobin 10/27/2020 13.2  12.0 - 16.0 g/dL Final    Hematocrit 10/27/2020 40.2  37.0 - 48.5 % Final    MCV 10/27/2020 91  82 - 98 fL Final    MCH 10/27/2020 29.7  27.0 - 31.0 pg Final    MCHC 10/27/2020 32.8  32.0 - 36.0 g/dL Final    RDW 10/27/2020 14.7* 11.5 - 14.5 % Final    Platelets 10/27/2020 150  150 - 350 K/uL Final    MPV 10/27/2020 12.0  9.2 - 12.9 fL Final    Immature Granulocytes 10/27/2020 0.5  0.0 - 0.5 % Final    Gran # (ANC) 10/27/2020 4.7  1.8 - 7.7 K/uL Final    Immature Grans (Abs) 10/27/2020 0.04  0.00 - 0.04 K/uL Final    Lymph # 10/27/2020 2.6  1.0 - 4.8 K/uL Final    Mono # 10/27/2020 0.7  0.3 - 1.0 K/uL Final    Eos # 10/27/2020 0.1  0.0 - 0.5 K/uL Final    Baso # 10/27/2020 0.05  0.00 - 0.20 K/uL Final    nRBC 10/27/2020 0  0 /100 WBC Final    Gran % 10/27/2020 56.6  38.0 - 73.0 % Final    Lymph % 10/27/2020 32.1  18.0 - 48.0 % Final    Mono % 10/27/2020 8.6  4.0 - 15.0 % Final    Eosinophil % 10/27/2020 1.6  0.0 - 8.0 % Final    Basophil % 10/27/2020 0.6  0.0 - 1.9 % Final    Differential Method 10/27/2020 Automated   Final    Sodium 10/27/2020 144  136 - 145 mmol/L Final    Potassium 10/27/2020 3.6  3.5 - 5.1 mmol/L Final    Chloride 10/27/2020 107  95 - 110 mmol/L Final    CO2 10/27/2020 28  23 - 29 mmol/L Final    Glucose  10/27/2020 145* 70 - 110 mg/dL Final    BUN 10/27/2020 28* 8 - 23 mg/dL Final    Creatinine 10/27/2020 1.1  0.5 - 1.4 mg/dL Final    Calcium 10/27/2020 9.0  8.7 - 10.5 mg/dL Final    Total Protein 10/27/2020 6.9  6.0 - 8.4 g/dL Final    Albumin 10/27/2020 3.4* 3.5 - 5.2 g/dL Final    Total Bilirubin 10/27/2020 0.4  0.1 - 1.0 mg/dL Final    Alkaline Phosphatase 10/27/2020 108  55 - 135 U/L Final    AST 10/27/2020 19  10 - 40 U/L Final    ALT 10/27/2020 12  10 - 44 U/L Final    Anion Gap 10/27/2020 9  8 - 16 mmol/L Final    eGFR if  10/27/2020 51* >60 mL/min/1.73 m^2 Final    eGFR if non African American 10/27/2020 44* >60 mL/min/1.73 m^2 Final    Troponin I 10/27/2020 0.025  0.000 - 0.026 ng/mL Final    BNP 10/27/2020 307* 0 - 99 pg/mL Final    Prothrombin Time 10/27/2020 10.8  9.0 - 12.5 sec Final    INR 10/27/2020 1.0  0.8 - 1.2 Final    D-Dimer 10/27/2020 2.50* <0.50 mg/L FEU Final    SARS-CoV-2 RNA, Amplification, Qual 10/27/2020 Negative  Negative Final    WBC 10/27/2020 8.47  3.90 - 12.70 K/uL Final    RBC 10/27/2020 4.19  4.00 - 5.40 M/uL Final    Hemoglobin 10/27/2020 12.5  12.0 - 16.0 g/dL Final    Hematocrit 10/27/2020 37.6  37.0 - 48.5 % Final    MCV 10/27/2020 90  82 - 98 fL Final    MCH 10/27/2020 29.8  27.0 - 31.0 pg Final    MCHC 10/27/2020 33.2  32.0 - 36.0 g/dL Final    RDW 10/27/2020 14.7* 11.5 - 14.5 % Final    Platelets 10/27/2020 170  150 - 350 K/uL Final    MPV 10/27/2020 11.9  9.2 - 12.9 fL Final    Immature Granulocytes 10/27/2020 0.5  0.0 - 0.5 % Final    Gran # (ANC) 10/27/2020 4.8  1.8 - 7.7 K/uL Final    Immature Grans (Abs) 10/27/2020 0.04  0.00 - 0.04 K/uL Final    Lymph # 10/27/2020 2.8  1.0 - 4.8 K/uL Final    Mono # 10/27/2020 0.7  0.3 - 1.0 K/uL Final    Eos # 10/27/2020 0.1  0.0 - 0.5 K/uL Final    Baso # 10/27/2020 0.05  0.00 - 0.20 K/uL Final    nRBC 10/27/2020 0  0 /100 WBC Final    Gran % 10/27/2020 57.0  38.0 - 73.0 % Final     Lymph % 10/27/2020 32.9  18.0 - 48.0 % Final    Mono % 10/27/2020 7.8  4.0 - 15.0 % Final    Eosinophil % 10/27/2020 1.2  0.0 - 8.0 % Final    Basophil % 10/27/2020 0.6  0.0 - 1.9 % Final    Differential Method 10/27/2020 Automated   Final    Sodium 10/27/2020 143  136 - 145 mmol/L Final    Potassium 10/27/2020 3.5  3.5 - 5.1 mmol/L Final    Chloride 10/27/2020 107  95 - 110 mmol/L Final    CO2 10/27/2020 28  23 - 29 mmol/L Final    Glucose 10/27/2020 117* 70 - 110 mg/dL Final    BUN 10/27/2020 27* 8 - 23 mg/dL Final    Creatinine 10/27/2020 1.0  0.5 - 1.4 mg/dL Final    Calcium 10/27/2020 8.9  8.7 - 10.5 mg/dL Final    Total Protein 10/27/2020 6.6  6.0 - 8.4 g/dL Final    Albumin 10/27/2020 3.4* 3.5 - 5.2 g/dL Final    Total Bilirubin 10/27/2020 0.4  0.1 - 1.0 mg/dL Final    Alkaline Phosphatase 10/27/2020 103  55 - 135 U/L Final    AST 10/27/2020 20  10 - 40 U/L Final    ALT 10/27/2020 12  10 - 44 U/L Final    Anion Gap 10/27/2020 8  8 - 16 mmol/L Final    eGFR if African American 10/27/2020 57* >60 mL/min/1.73 m^2 Final    eGFR if non African American 10/27/2020 50* >60 mL/min/1.73 m^2 Final    Magnesium 10/27/2020 1.8  1.6 - 2.6 mg/dL Final    Phosphorus 10/27/2020 3.9  2.7 - 4.5 mg/dL Final    STJ 10/27/2020 2.01  cm Final    AV mean gradient 10/27/2020 10  mmHg Final    Ao peak juan m 10/27/2020 1.99  m/s Final    Ao VTI 10/27/2020 37.68  cm Final    IVS 10/27/2020 1.50* 0.6 - 1.1 cm Final    LA size 10/27/2020 4.60  cm Final    Left Atrium Major Axis 10/27/2020 6.18  cm Final    Left Atrium Minor Axis 10/27/2020 5.36  cm Final    LVIDd 10/27/2020 2.87* 3.5 - 6.0 cm Final    LVIDs 10/27/2020 1.99* 2.1 - 4.0 cm Final    LVOT diameter 10/27/2020 1.97  cm Final    LVOT peak VTI 10/27/2020 17.73  cm Final    Posterior Wall 10/27/2020 1.43* 0.6 - 1.1 cm Final    MV Peak A Juan M 10/27/2020 1.42  m/s Final    E wave decelartion time 10/27/2020 310.34  msec Final    MV Peak E Juan M  10/27/2020 1.07  m/s Final    RA Major Axis 10/27/2020 4.83  cm Final    RA Width 10/27/2020 2.14  cm Final    RVDD 10/27/2020 3.20  cm Final    TAPSE 10/27/2020 1.67  cm Final    TR Max Juan M 10/27/2020 2.81  m/s Final    LA WIDTH 10/27/2020 4.40  cm Final    Ao root annulus 10/27/2020 3.11  cm Final    PV PEAK VELOCITY 10/27/2020 0.80  cm/s Final    MV stenosis pressure 1/2 time 10/27/2020 90.00  ms Final    LV Diastolic Volume 10/27/2020 31.43  mL Final    LV Systolic Volume 10/27/2020 12.61  mL Final    LVOT peak juan m 10/27/2020 0.86  m/s Final    FS 10/27/2020 31  % Final    LA volume 10/27/2020 98.77  cm3 Final    LV mass 10/27/2020 143.26  g Final    Left Ventricle Relative Wall Thick* 10/27/2020 1.00  cm Final    AV valve area 10/27/2020 1.43  cm2 Final    AV Velocity Ratio 10/27/2020 0.43   Final    AV index (prosthetic) 10/27/2020 0.47   Final    MV valve area p 1/2 method 10/27/2020 2.44  cm2 Final    E/A ratio 10/27/2020 0.75   Final    LVOT area 10/27/2020 3.0  cm2 Final    LVOT stroke volume 10/27/2020 54.01  cm3 Final    AV peak gradient 10/27/2020 16  mmHg Final    Triscuspid Valve Regurgitation Pea* 10/27/2020 32  mmHg Final    BSA 10/27/2020 1.86  m2 Final    LV Systolic Volume Index 10/27/2020 7.1  mL/m2 Final    LV Diastolic Volume Index 10/27/2020 17.66  mL/m2 Final    LA Volume Index 10/27/2020 55.5  mL/m2 Final    LV Mass Index 10/27/2020 80  g/m2 Final    Right Atrial Pressure (from IVC) 10/27/2020 3  mmHg Final    TV rest pulmonary artery pressure 10/27/2020 35  mmHg Final    Hemoglobin A1C 10/27/2020 5.7* 4.0 - 5.6 % Final    Estimated Avg Glucose 10/27/2020 117  68 - 131 mg/dL Final    POCT Glucose 10/27/2020 103  70 - 110 mg/dL Final    POCT Glucose 10/27/2020 121* 70 - 110 mg/dL Final    Troponin I 10/27/2020 0.017  0.000 - 0.026 ng/mL Final    Troponin I 10/27/2020 0.017  0.000 - 0.026 ng/mL Final    POCT Glucose 10/27/2020 112* 70 - 110 mg/dL Final     POCT Glucose 10/27/2020 125* 70 - 110 mg/dL Final    WBC 10/28/2020 10.04  3.90 - 12.70 K/uL Final    RBC 10/28/2020 4.70  4.00 - 5.40 M/uL Final    Hemoglobin 10/28/2020 13.7  12.0 - 16.0 g/dL Final    Hematocrit 10/28/2020 41.1  37.0 - 48.5 % Final    MCV 10/28/2020 87  82 - 98 fL Final    MCH 10/28/2020 29.1  27.0 - 31.0 pg Final    MCHC 10/28/2020 33.3  32.0 - 36.0 g/dL Final    RDW 10/28/2020 14.2  11.5 - 14.5 % Final    Platelets 10/28/2020 180  150 - 350 K/uL Final    MPV 10/28/2020 12.1  9.2 - 12.9 fL Final    Immature Granulocytes 10/28/2020 0.4  0.0 - 0.5 % Final    Gran # (ANC) 10/28/2020 5.9  1.8 - 7.7 K/uL Final    Immature Grans (Abs) 10/28/2020 0.04  0.00 - 0.04 K/uL Final    Lymph # 10/28/2020 3.2  1.0 - 4.8 K/uL Final    Mono # 10/28/2020 0.7  0.3 - 1.0 K/uL Final    Eos # 10/28/2020 0.2  0.0 - 0.5 K/uL Final    Baso # 10/28/2020 0.05  0.00 - 0.20 K/uL Final    nRBC 10/28/2020 0  0 /100 WBC Final    Gran % 10/28/2020 58.5  38.0 - 73.0 % Final    Lymph % 10/28/2020 31.6  18.0 - 48.0 % Final    Mono % 10/28/2020 7.0  4.0 - 15.0 % Final    Eosinophil % 10/28/2020 2.0  0.0 - 8.0 % Final    Basophil % 10/28/2020 0.5  0.0 - 1.9 % Final    Differential Method 10/28/2020 Automated   Final    Sodium 10/28/2020 138  136 - 145 mmol/L Final    Potassium 10/28/2020 3.1* 3.5 - 5.1 mmol/L Final    Chloride 10/28/2020 94* 95 - 110 mmol/L Final    CO2 10/28/2020 31* 23 - 29 mmol/L Final    Glucose 10/28/2020 125* 70 - 110 mg/dL Final    BUN 10/28/2020 27* 8 - 23 mg/dL Final    Creatinine 10/28/2020 1.3  0.5 - 1.4 mg/dL Final    Calcium 10/28/2020 9.3  8.7 - 10.5 mg/dL Final    Total Protein 10/28/2020 6.9  6.0 - 8.4 g/dL Final    Albumin 10/28/2020 3.5  3.5 - 5.2 g/dL Final    Total Bilirubin 10/28/2020 0.8  0.1 - 1.0 mg/dL Final    Alkaline Phosphatase 10/28/2020 89  55 - 135 U/L Final    AST 10/28/2020 19  10 - 40 U/L Final    ALT 10/28/2020 14  10 - 44 U/L Final    Anion  Gap 10/28/2020 13  8 - 16 mmol/L Final    eGFR if African American 10/28/2020 42* >60 mL/min/1.73 m^2 Final    eGFR if non African American 10/28/2020 36* >60 mL/min/1.73 m^2 Final   (    Assessment:     1. S/P PTCA (percutaneous transluminal coronary angioplasty)    2. Acute on chronic combined systolic and diastolic congestive heart failure    3. Rheumatic mitral stenosis    4. Rheumatic aortic stenosis    5. Mild mitral regurgitation    6. Essential hypertension    7. Coronary artery disease involving native coronary artery of native heart without angina pectoris    8. Cardiac pacemaker    9. Pain in both lower extremities    10. Complete heart block    11. Pacemaker-dependent due to native cardiac rhythm insufficient to support life    12. Hypokalemia      Plan:   Marycruz was seen today for follow-up.    Diagnoses and all orders for this visit:    S/P PTCA (percutaneous transluminal coronary angioplasty)    Acute on chronic combined systolic and diastolic congestive heart failure  -     Ambulatory referral/consult to Electrophysiology; Future    Rheumatic mitral stenosis    Rheumatic aortic stenosis    Mild mitral regurgitation    Essential hypertension    Coronary artery disease involving native coronary artery of native heart without angina pectoris    Cardiac pacemaker  -     Ambulatory referral/consult to Electrophysiology; Future    Pain in both lower extremities    Complete heart block    Pacemaker-dependent due to native cardiac rhythm insufficient to support life  -     Ambulatory referral/consult to Electrophysiology; Future    Hypokalemia    Other orders  -     potassium chloride SA (K-DUR,KLOR-CON) 10 MEQ tablet; Take 2 tablets (20 mEq total) by mouth once daily.  -     furosemide (LASIX) 20 MG tablet; Take 2 tablets (40 mg total) by mouth once daily.     Will check lab today.    Due to edema and JOHNSON will double the dose of furosemide 20 mg to 2 tablets daily (and double the dose of KCl to 10 meq 2  tablets daily)    Will consult arrhythmia service to see if pt is a candidate for upgrading the pacemaker to a dual chamber device.  (recent ECG's look like ventricular pacing with underlying sinus rhythm)    RTC in one month    Follow up in about 4 weeks (around 1/19/2021).

## 2020-12-22 NOTE — TELEPHONE ENCOUNTER
Lab OK--message left on voicemail that labs look good.  (Abn LFT's may represent chronic passive congestion of the liver.)

## 2021-01-06 ENCOUNTER — TELEPHONE (OUTPATIENT)
Dept: ELECTROPHYSIOLOGY | Facility: CLINIC | Age: 86
End: 2021-01-06

## 2021-01-08 ENCOUNTER — OFFICE VISIT (OUTPATIENT)
Dept: CARDIOLOGY | Facility: CLINIC | Age: 86
End: 2021-01-08
Payer: MEDICARE

## 2021-01-08 VITALS
DIASTOLIC BLOOD PRESSURE: 67 MMHG | WEIGHT: 176.81 LBS | SYSTOLIC BLOOD PRESSURE: 95 MMHG | HEIGHT: 59 IN | TEMPERATURE: 60 F | BODY MASS INDEX: 35.64 KG/M2

## 2021-01-08 DIAGNOSIS — I44.2 COMPLETE HEART BLOCK: Primary | ICD-10-CM

## 2021-01-08 DIAGNOSIS — I50.43 ACUTE ON CHRONIC COMBINED SYSTOLIC AND DIASTOLIC CONGESTIVE HEART FAILURE: ICD-10-CM

## 2021-01-08 DIAGNOSIS — Z95.0 PACEMAKER-DEPENDENT DUE TO NATIVE CARDIAC RHYTHM INSUFFICIENT TO SUPPORT LIFE: ICD-10-CM

## 2021-01-08 DIAGNOSIS — I49.8 OTHER SPECIFIED CARDIAC ARRHYTHMIAS: ICD-10-CM

## 2021-01-08 DIAGNOSIS — I49.8 PACEMAKER-DEPENDENT DUE TO NATIVE CARDIAC RHYTHM INSUFFICIENT TO SUPPORT LIFE: ICD-10-CM

## 2021-01-08 DIAGNOSIS — Z95.0 CARDIAC PACEMAKER: ICD-10-CM

## 2021-01-08 PROCEDURE — 93010 RHYTHM STRIP: ICD-10-PCS | Mod: S$PBB,,, | Performed by: INTERNAL MEDICINE

## 2021-01-08 PROCEDURE — 93005 ELECTROCARDIOGRAM TRACING: CPT | Mod: PBBFAC,PO | Performed by: INTERNAL MEDICINE

## 2021-01-08 PROCEDURE — 93010 ELECTROCARDIOGRAM REPORT: CPT | Mod: S$PBB,,, | Performed by: INTERNAL MEDICINE

## 2021-01-08 PROCEDURE — 99999 PR PBB SHADOW E&M-EST. PATIENT-LVL III: ICD-10-PCS | Mod: PBBFAC,,, | Performed by: INTERNAL MEDICINE

## 2021-01-08 PROCEDURE — 99205 PR OFFICE/OUTPT VISIT, NEW, LEVL V, 60-74 MIN: ICD-10-PCS | Mod: S$PBB,,, | Performed by: INTERNAL MEDICINE

## 2021-01-08 PROCEDURE — 99205 OFFICE O/P NEW HI 60 MIN: CPT | Mod: S$PBB,,, | Performed by: INTERNAL MEDICINE

## 2021-01-08 PROCEDURE — 99999 PR PBB SHADOW E&M-EST. PATIENT-LVL III: CPT | Mod: PBBFAC,,, | Performed by: INTERNAL MEDICINE

## 2021-01-08 PROCEDURE — 99213 OFFICE O/P EST LOW 20 MIN: CPT | Mod: PBBFAC,PO | Performed by: INTERNAL MEDICINE

## 2021-01-11 DIAGNOSIS — I49.8 OTHER SPECIFIED CARDIAC ARRHYTHMIAS: Primary | ICD-10-CM

## 2021-01-21 ENCOUNTER — OFFICE VISIT (OUTPATIENT)
Dept: UROLOGY | Facility: CLINIC | Age: 86
End: 2021-01-21
Payer: MEDICARE

## 2021-01-21 VITALS — DIASTOLIC BLOOD PRESSURE: 91 MMHG | HEART RATE: 85 BPM | SYSTOLIC BLOOD PRESSURE: 149 MMHG

## 2021-01-21 DIAGNOSIS — R35.0 URINE FREQUENCY: ICD-10-CM

## 2021-01-21 DIAGNOSIS — R32 URINARY INCONTINENCE IN FEMALE: Primary | ICD-10-CM

## 2021-01-21 DIAGNOSIS — R39.15 URINARY URGENCY: ICD-10-CM

## 2021-01-21 LAB
BILIRUB SERPL-MCNC: ABNORMAL MG/DL
BLOOD URINE, POC: ABNORMAL
CLARITY, POC UA: ABNORMAL
COLOR, POC UA: ABNORMAL
GLUCOSE UR QL STRIP: ABNORMAL
KETONES UR QL STRIP: ABNORMAL
LEUKOCYTE ESTERASE URINE, POC: ABNORMAL
NITRITE, POC UA: ABNORMAL
PH, POC UA: 5
POC RESIDUAL URINE VOLUME: 0 ML (ref 0–100)
PROTEIN, POC: ABNORMAL
SPECIFIC GRAVITY, POC UA: 1.02
UROBILINOGEN, POC UA: ABNORMAL

## 2021-01-21 PROCEDURE — 51798 US URINE CAPACITY MEASURE: CPT | Mod: PBBFAC,PO | Performed by: STUDENT IN AN ORGANIZED HEALTH CARE EDUCATION/TRAINING PROGRAM

## 2021-01-21 PROCEDURE — 99204 PR OFFICE/OUTPT VISIT, NEW, LEVL IV, 45-59 MIN: ICD-10-PCS | Mod: S$PBB,,, | Performed by: STUDENT IN AN ORGANIZED HEALTH CARE EDUCATION/TRAINING PROGRAM

## 2021-01-21 PROCEDURE — 81002 URINALYSIS NONAUTO W/O SCOPE: CPT | Mod: PBBFAC,PO | Performed by: STUDENT IN AN ORGANIZED HEALTH CARE EDUCATION/TRAINING PROGRAM

## 2021-01-21 PROCEDURE — 99999 PR PBB SHADOW E&M-EST. PATIENT-LVL III: ICD-10-PCS | Mod: PBBFAC,,, | Performed by: STUDENT IN AN ORGANIZED HEALTH CARE EDUCATION/TRAINING PROGRAM

## 2021-01-21 PROCEDURE — 99204 OFFICE O/P NEW MOD 45 MIN: CPT | Mod: S$PBB,,, | Performed by: STUDENT IN AN ORGANIZED HEALTH CARE EDUCATION/TRAINING PROGRAM

## 2021-01-21 PROCEDURE — 99213 OFFICE O/P EST LOW 20 MIN: CPT | Mod: PBBFAC,PO | Performed by: STUDENT IN AN ORGANIZED HEALTH CARE EDUCATION/TRAINING PROGRAM

## 2021-01-21 PROCEDURE — 99999 PR PBB SHADOW E&M-EST. PATIENT-LVL III: CPT | Mod: PBBFAC,,, | Performed by: STUDENT IN AN ORGANIZED HEALTH CARE EDUCATION/TRAINING PROGRAM

## 2021-01-21 RX ORDER — TOBRAMYCIN 3 MG/ML
SOLUTION/ DROPS OPHTHALMIC
COMMUNITY
Start: 2021-01-12

## 2021-02-01 ENCOUNTER — TELEPHONE (OUTPATIENT)
Dept: ELECTROPHYSIOLOGY | Facility: CLINIC | Age: 86
End: 2021-02-01

## 2021-02-01 DIAGNOSIS — I49.9 CARDIAC ARRHYTHMIA, UNSPECIFIED CARDIAC ARRHYTHMIA TYPE: ICD-10-CM

## 2021-02-01 DIAGNOSIS — I49.8 OTHER SPECIFIED CARDIAC ARRHYTHMIAS: ICD-10-CM

## 2021-02-01 DIAGNOSIS — I44.2 COMPLETE HEART BLOCK: Primary | ICD-10-CM

## 2021-02-02 ENCOUNTER — OFFICE VISIT (OUTPATIENT)
Dept: CARDIOLOGY | Facility: CLINIC | Age: 86
End: 2021-02-02
Payer: MEDICARE

## 2021-02-02 VITALS
WEIGHT: 176.06 LBS | SYSTOLIC BLOOD PRESSURE: 139 MMHG | DIASTOLIC BLOOD PRESSURE: 81 MMHG | BODY MASS INDEX: 35.49 KG/M2 | HEIGHT: 59 IN | TEMPERATURE: 78 F

## 2021-02-02 DIAGNOSIS — Z95.0 PACEMAKER-DEPENDENT DUE TO NATIVE CARDIAC RHYTHM INSUFFICIENT TO SUPPORT LIFE: ICD-10-CM

## 2021-02-02 DIAGNOSIS — I25.10 CORONARY ARTERY DISEASE INVOLVING NATIVE CORONARY ARTERY OF NATIVE HEART WITHOUT ANGINA PECTORIS: ICD-10-CM

## 2021-02-02 DIAGNOSIS — I44.2 COMPLETE HEART BLOCK: ICD-10-CM

## 2021-02-02 DIAGNOSIS — I06.0 RHEUMATIC AORTIC STENOSIS: ICD-10-CM

## 2021-02-02 DIAGNOSIS — I05.0 RHEUMATIC MITRAL STENOSIS: ICD-10-CM

## 2021-02-02 DIAGNOSIS — I10 ESSENTIAL HYPERTENSION: ICD-10-CM

## 2021-02-02 DIAGNOSIS — I50.42 CHRONIC COMBINED SYSTOLIC AND DIASTOLIC CONGESTIVE HEART FAILURE: Primary | ICD-10-CM

## 2021-02-02 DIAGNOSIS — I34.0 MILD MITRAL REGURGITATION: ICD-10-CM

## 2021-02-02 DIAGNOSIS — I49.8 PACEMAKER-DEPENDENT DUE TO NATIVE CARDIAC RHYTHM INSUFFICIENT TO SUPPORT LIFE: ICD-10-CM

## 2021-02-02 DIAGNOSIS — Z98.61 S/P PTCA (PERCUTANEOUS TRANSLUMINAL CORONARY ANGIOPLASTY): ICD-10-CM

## 2021-02-02 DIAGNOSIS — R60.0 LOCALIZED EDEMA: ICD-10-CM

## 2021-02-02 DIAGNOSIS — Z95.0 CARDIAC PACEMAKER: ICD-10-CM

## 2021-02-02 PROCEDURE — 1126F AMNT PAIN NOTED NONE PRSNT: CPT | Mod: S$GLB,,, | Performed by: INTERNAL MEDICINE

## 2021-02-02 PROCEDURE — 99999 PR PBB SHADOW E&M-EST. PATIENT-LVL III: ICD-10-PCS | Mod: PBBFAC,,, | Performed by: INTERNAL MEDICINE

## 2021-02-02 PROCEDURE — 3288F FALL RISK ASSESSMENT DOCD: CPT | Mod: CPTII,S$GLB,, | Performed by: INTERNAL MEDICINE

## 2021-02-02 PROCEDURE — 1159F MED LIST DOCD IN RCRD: CPT | Mod: S$GLB,,, | Performed by: INTERNAL MEDICINE

## 2021-02-02 PROCEDURE — 99999 PR PBB SHADOW E&M-EST. PATIENT-LVL III: CPT | Mod: PBBFAC,,, | Performed by: INTERNAL MEDICINE

## 2021-02-02 PROCEDURE — 1101F PT FALLS ASSESS-DOCD LE1/YR: CPT | Mod: CPTII,S$GLB,, | Performed by: INTERNAL MEDICINE

## 2021-02-02 PROCEDURE — 1159F PR MEDICATION LIST DOCUMENTED IN MEDICAL RECORD: ICD-10-PCS | Mod: S$GLB,,, | Performed by: INTERNAL MEDICINE

## 2021-02-02 PROCEDURE — 1101F PR PT FALLS ASSESS DOC 0-1 FALLS W/OUT INJ PAST YR: ICD-10-PCS | Mod: CPTII,S$GLB,, | Performed by: INTERNAL MEDICINE

## 2021-02-02 PROCEDURE — 3288F PR FALLS RISK ASSESSMENT DOCUMENTED: ICD-10-PCS | Mod: CPTII,S$GLB,, | Performed by: INTERNAL MEDICINE

## 2021-02-02 PROCEDURE — 99214 OFFICE O/P EST MOD 30 MIN: CPT | Mod: S$GLB,,, | Performed by: INTERNAL MEDICINE

## 2021-02-02 PROCEDURE — 99214 PR OFFICE/OUTPT VISIT, EST, LEVL IV, 30-39 MIN: ICD-10-PCS | Mod: S$GLB,,, | Performed by: INTERNAL MEDICINE

## 2021-02-02 PROCEDURE — 1126F PR PAIN SEVERITY QUANTIFIED, NO PAIN PRESENT: ICD-10-PCS | Mod: S$GLB,,, | Performed by: INTERNAL MEDICINE

## 2021-02-02 RX ORDER — NITROGLYCERIN 0.4 MG/1
0.4 TABLET SUBLINGUAL EVERY 5 MIN PRN
Qty: 30 TABLET | Refills: 11 | Status: ON HOLD | OUTPATIENT
Start: 2021-02-02 | End: 2023-12-02 | Stop reason: HOSPADM

## 2021-02-08 ENCOUNTER — TELEPHONE (OUTPATIENT)
Dept: CARDIOLOGY | Facility: CLINIC | Age: 86
End: 2021-02-08

## 2021-02-18 ENCOUNTER — HOSPITAL ENCOUNTER (OUTPATIENT)
Facility: HOSPITAL | Age: 86
Discharge: HOME | End: 2021-02-19
Attending: INTERNAL MEDICINE | Admitting: INTERNAL MEDICINE
Payer: MEDICARE

## 2021-02-18 DIAGNOSIS — I10 ESSENTIAL HYPERTENSION: ICD-10-CM

## 2021-02-18 DIAGNOSIS — Z95.0 CARDIAC PACEMAKER: ICD-10-CM

## 2021-02-18 DIAGNOSIS — I25.10 CORONARY ARTERY DISEASE INVOLVING NATIVE CORONARY ARTERY OF NATIVE HEART WITHOUT ANGINA PECTORIS: ICD-10-CM

## 2021-02-18 DIAGNOSIS — I50.32 CHRONIC HEART FAILURE WITH PRESERVED EJECTION FRACTION (HFPEF): ICD-10-CM

## 2021-02-18 DIAGNOSIS — R55 NEAR SYNCOPE: Primary | ICD-10-CM

## 2021-02-18 DIAGNOSIS — R55 SYNCOPE: ICD-10-CM

## 2021-02-18 LAB
ALBUMIN SERPL BCP-MCNC: 3.2 G/DL (ref 3.5–5.2)
ALP SERPL-CCNC: 99 U/L (ref 55–135)
ALT SERPL W/O P-5'-P-CCNC: 10 U/L (ref 10–44)
ANION GAP SERPL CALC-SCNC: 8 MMOL/L (ref 8–16)
AST SERPL-CCNC: 20 U/L (ref 10–40)
BASOPHILS # BLD AUTO: 0.1 K/UL (ref 0–0.2)
BASOPHILS NFR BLD: 1.3 % (ref 0–1.9)
BILIRUB SERPL-MCNC: 0.4 MG/DL (ref 0.1–1)
BILIRUB UR QL STRIP: NEGATIVE
BUN SERPL-MCNC: 21 MG/DL (ref 10–30)
CALCIUM SERPL-MCNC: 9 MG/DL (ref 8.7–10.5)
CHLORIDE SERPL-SCNC: 107 MMOL/L (ref 95–110)
CLARITY UR: ABNORMAL
CO2 SERPL-SCNC: 26 MMOL/L (ref 23–29)
COLOR UR: YELLOW
CREAT SERPL-MCNC: 1.1 MG/DL (ref 0.5–1.4)
CTP QC/QA: YES
DIFFERENTIAL METHOD: ABNORMAL
EOSINOPHIL # BLD AUTO: 0.1 K/UL (ref 0–0.5)
EOSINOPHIL NFR BLD: 1.3 % (ref 0–8)
ERYTHROCYTE [DISTWIDTH] IN BLOOD BY AUTOMATED COUNT: 13.5 % (ref 11.5–14.5)
EST. GFR  (AFRICAN AMERICAN): 51 ML/MIN/1.73 M^2
EST. GFR  (NON AFRICAN AMERICAN): 44 ML/MIN/1.73 M^2
GLUCOSE SERPL-MCNC: 131 MG/DL (ref 70–110)
GLUCOSE UR QL STRIP: NEGATIVE
HCT VFR BLD AUTO: 41.9 % (ref 37–48.5)
HGB BLD-MCNC: 13.6 G/DL (ref 12–16)
HGB UR QL STRIP: NEGATIVE
IMM GRANULOCYTES # BLD AUTO: 0.05 K/UL (ref 0–0.04)
IMM GRANULOCYTES NFR BLD AUTO: 0.6 % (ref 0–0.5)
KETONES UR QL STRIP: NEGATIVE
LACTATE SERPL-SCNC: 1.3 MMOL/L (ref 0.5–2.2)
LEUKOCYTE ESTERASE UR QL STRIP: ABNORMAL
LYMPHOCYTES # BLD AUTO: 1.7 K/UL (ref 1–4.8)
LYMPHOCYTES NFR BLD: 21.4 % (ref 18–48)
MCH RBC QN AUTO: 29.8 PG (ref 27–31)
MCHC RBC AUTO-ENTMCNC: 32.5 G/DL (ref 32–36)
MCV RBC AUTO: 92 FL (ref 82–98)
MICROSCOPIC COMMENT: ABNORMAL
MONOCYTES # BLD AUTO: 0.5 K/UL (ref 0.3–1)
MONOCYTES NFR BLD: 6.3 % (ref 4–15)
NEUTROPHILS # BLD AUTO: 5.5 K/UL (ref 1.8–7.7)
NEUTROPHILS NFR BLD: 69.1 % (ref 38–73)
NITRITE UR QL STRIP: NEGATIVE
NRBC BLD-RTO: 0 /100 WBC
PH UR STRIP: 5 [PH] (ref 5–8)
PLATELET # BLD AUTO: 192 K/UL (ref 150–350)
PMV BLD AUTO: 11.5 FL (ref 9.2–12.9)
POTASSIUM SERPL-SCNC: 3.7 MMOL/L (ref 3.5–5.1)
PROT SERPL-MCNC: 6.7 G/DL (ref 6–8.4)
PROT UR QL STRIP: NEGATIVE
RBC # BLD AUTO: 4.56 M/UL (ref 4–5.4)
RBC #/AREA URNS HPF: 3 /HPF (ref 0–4)
SARS-COV-2 RDRP RESP QL NAA+PROBE: NEGATIVE
SODIUM SERPL-SCNC: 141 MMOL/L (ref 136–145)
SP GR UR STRIP: 1.02 (ref 1–1.03)
TROPONIN I SERPL DL<=0.01 NG/ML-MCNC: <0.006 NG/ML (ref 0–0.03)
TROPONIN I SERPL DL<=0.01 NG/ML-MCNC: <0.006 NG/ML (ref 0–0.03)
URN SPEC COLLECT METH UR: ABNORMAL
UROBILINOGEN UR STRIP-ACNC: NEGATIVE EU/DL
WBC # BLD AUTO: 7.95 K/UL (ref 3.9–12.7)
WBC #/AREA URNS HPF: 10 /HPF (ref 0–5)

## 2021-02-18 PROCEDURE — 93010 EKG 12-LEAD: ICD-10-PCS | Mod: ,,, | Performed by: INTERNAL MEDICINE

## 2021-02-18 PROCEDURE — G0378 HOSPITAL OBSERVATION PER HR: HCPCS

## 2021-02-18 PROCEDURE — 93005 ELECTROCARDIOGRAM TRACING: CPT

## 2021-02-18 PROCEDURE — 99285 EMERGENCY DEPT VISIT HI MDM: CPT | Mod: 25

## 2021-02-18 PROCEDURE — 25000003 PHARM REV CODE 250: Performed by: STUDENT IN AN ORGANIZED HEALTH CARE EDUCATION/TRAINING PROGRAM

## 2021-02-18 PROCEDURE — 84484 ASSAY OF TROPONIN QUANT: CPT

## 2021-02-18 PROCEDURE — 83605 ASSAY OF LACTIC ACID: CPT

## 2021-02-18 PROCEDURE — U0002 COVID-19 LAB TEST NON-CDC: HCPCS | Performed by: PHYSICIAN ASSISTANT

## 2021-02-18 PROCEDURE — 25000003 PHARM REV CODE 250: Performed by: PHYSICIAN ASSISTANT

## 2021-02-18 PROCEDURE — 81000 URINALYSIS NONAUTO W/SCOPE: CPT

## 2021-02-18 PROCEDURE — 93010 ELECTROCARDIOGRAM REPORT: CPT | Mod: ,,, | Performed by: INTERNAL MEDICINE

## 2021-02-18 PROCEDURE — 80053 COMPREHEN METABOLIC PANEL: CPT

## 2021-02-18 PROCEDURE — 85025 COMPLETE CBC W/AUTO DIFF WBC: CPT

## 2021-02-18 PROCEDURE — 84484 ASSAY OF TROPONIN QUANT: CPT | Mod: 91

## 2021-02-18 RX ORDER — MECLIZINE HYDROCHLORIDE 25 MG/1
25 TABLET ORAL 3 TIMES DAILY PRN
Status: DISCONTINUED | OUTPATIENT
Start: 2021-02-18 | End: 2021-02-19 | Stop reason: HOSPADM

## 2021-02-18 RX ORDER — POTASSIUM CHLORIDE 750 MG/1
10 TABLET, EXTENDED RELEASE ORAL DAILY
Status: DISCONTINUED | OUTPATIENT
Start: 2021-02-19 | End: 2021-02-19 | Stop reason: HOSPADM

## 2021-02-18 RX ORDER — SODIUM CHLORIDE 0.9 % (FLUSH) 0.9 %
10 SYRINGE (ML) INJECTION
Status: DISCONTINUED | OUTPATIENT
Start: 2021-02-18 | End: 2021-02-19 | Stop reason: HOSPADM

## 2021-02-18 RX ORDER — LOSARTAN POTASSIUM 50 MG/1
100 TABLET ORAL DAILY
Status: DISCONTINUED | OUTPATIENT
Start: 2021-02-18 | End: 2021-02-19 | Stop reason: HOSPADM

## 2021-02-18 RX ORDER — TALC
6 POWDER (GRAM) TOPICAL NIGHTLY PRN
Status: DISCONTINUED | OUTPATIENT
Start: 2021-02-18 | End: 2021-02-19 | Stop reason: HOSPADM

## 2021-02-18 RX ORDER — METOPROLOL SUCCINATE 25 MG/1
25 TABLET, EXTENDED RELEASE ORAL DAILY
Status: DISCONTINUED | OUTPATIENT
Start: 2021-02-18 | End: 2021-02-19 | Stop reason: HOSPADM

## 2021-02-18 RX ORDER — CALCIUM CARBONATE 200(500)MG
500 TABLET,CHEWABLE ORAL DAILY
Status: DISCONTINUED | OUTPATIENT
Start: 2021-02-19 | End: 2021-02-19 | Stop reason: HOSPADM

## 2021-02-18 RX ORDER — CLOPIDOGREL BISULFATE 75 MG/1
75 TABLET ORAL DAILY
Status: DISCONTINUED | OUTPATIENT
Start: 2021-02-18 | End: 2021-02-19 | Stop reason: HOSPADM

## 2021-02-18 RX ORDER — ENOXAPARIN SODIUM 100 MG/ML
40 INJECTION SUBCUTANEOUS EVERY 24 HOURS
Status: DISCONTINUED | OUTPATIENT
Start: 2021-02-19 | End: 2021-02-19 | Stop reason: HOSPADM

## 2021-02-18 RX ORDER — FUROSEMIDE 20 MG/1
20 TABLET ORAL DAILY
Status: DISCONTINUED | OUTPATIENT
Start: 2021-02-18 | End: 2021-02-19 | Stop reason: HOSPADM

## 2021-02-18 RX ADMIN — FUROSEMIDE 20 MG: 20 TABLET ORAL at 07:02

## 2021-02-18 RX ADMIN — Medication 6 MG: at 11:02

## 2021-02-18 RX ADMIN — CLOPIDOGREL 75 MG: 75 TABLET, FILM COATED ORAL at 07:02

## 2021-02-18 RX ADMIN — METOPROLOL SUCCINATE 25 MG: 25 TABLET, FILM COATED, EXTENDED RELEASE ORAL at 07:02

## 2021-02-18 RX ADMIN — LOSARTAN POTASSIUM 100 MG: 50 TABLET, FILM COATED ORAL at 07:02

## 2021-02-19 ENCOUNTER — ANESTHESIA EVENT (OUTPATIENT)
Dept: MEDSURG UNIT | Facility: HOSPITAL | Age: 86
End: 2021-02-19
Payer: MEDICARE

## 2021-02-19 ENCOUNTER — TELEPHONE (OUTPATIENT)
Dept: ELECTROPHYSIOLOGY | Facility: CLINIC | Age: 86
End: 2021-02-19

## 2021-02-19 VITALS
RESPIRATION RATE: 18 BRPM | WEIGHT: 170.44 LBS | TEMPERATURE: 98 F | OXYGEN SATURATION: 96 % | HEART RATE: 76 BPM | HEIGHT: 59 IN | BODY MASS INDEX: 34.36 KG/M2 | SYSTOLIC BLOOD PRESSURE: 153 MMHG | DIASTOLIC BLOOD PRESSURE: 68 MMHG

## 2021-02-19 PROBLEM — R55 NEAR SYNCOPE: Status: RESOLVED | Noted: 2021-02-18 | Resolved: 2021-02-19

## 2021-02-19 LAB
ALBUMIN SERPL BCP-MCNC: 2.9 G/DL (ref 3.5–5.2)
ALP SERPL-CCNC: 88 U/L (ref 55–135)
ALT SERPL W/O P-5'-P-CCNC: 8 U/L (ref 10–44)
ANION GAP SERPL CALC-SCNC: 7 MMOL/L (ref 8–16)
AST SERPL-CCNC: 16 U/L (ref 10–40)
BILIRUB SERPL-MCNC: 0.5 MG/DL (ref 0.1–1)
BNP SERPL-MCNC: 232 PG/ML (ref 0–99)
BUN SERPL-MCNC: 16 MG/DL (ref 10–30)
CALCIUM SERPL-MCNC: 8.6 MG/DL (ref 8.7–10.5)
CHLORIDE SERPL-SCNC: 107 MMOL/L (ref 95–110)
CO2 SERPL-SCNC: 27 MMOL/L (ref 23–29)
CREAT SERPL-MCNC: 0.9 MG/DL (ref 0.5–1.4)
ERYTHROCYTE [DISTWIDTH] IN BLOOD BY AUTOMATED COUNT: 13.3 % (ref 11.5–14.5)
EST. GFR  (AFRICAN AMERICAN): >60 ML/MIN/1.73 M^2
EST. GFR  (NON AFRICAN AMERICAN): 56 ML/MIN/1.73 M^2
GLUCOSE SERPL-MCNC: 100 MG/DL (ref 70–110)
HCT VFR BLD AUTO: 39.1 % (ref 37–48.5)
HGB BLD-MCNC: 12.9 G/DL (ref 12–16)
MCH RBC QN AUTO: 30.1 PG (ref 27–31)
MCHC RBC AUTO-ENTMCNC: 33 G/DL (ref 32–36)
MCV RBC AUTO: 91 FL (ref 82–98)
PLATELET # BLD AUTO: 167 K/UL (ref 150–350)
PMV BLD AUTO: 11.7 FL (ref 9.2–12.9)
POTASSIUM SERPL-SCNC: 3.8 MMOL/L (ref 3.5–5.1)
PROT SERPL-MCNC: 6 G/DL (ref 6–8.4)
RBC # BLD AUTO: 4.29 M/UL (ref 4–5.4)
SODIUM SERPL-SCNC: 141 MMOL/L (ref 136–145)
WBC # BLD AUTO: 7.32 K/UL (ref 3.9–12.7)

## 2021-02-19 PROCEDURE — 25000003 PHARM REV CODE 250: Performed by: STUDENT IN AN ORGANIZED HEALTH CARE EDUCATION/TRAINING PROGRAM

## 2021-02-19 PROCEDURE — 94761 N-INVAS EAR/PLS OXIMETRY MLT: CPT

## 2021-02-19 PROCEDURE — 80053 COMPREHEN METABOLIC PANEL: CPT

## 2021-02-19 PROCEDURE — 36415 COLL VENOUS BLD VENIPUNCTURE: CPT

## 2021-02-19 PROCEDURE — 83880 ASSAY OF NATRIURETIC PEPTIDE: CPT

## 2021-02-19 PROCEDURE — 85027 COMPLETE CBC AUTOMATED: CPT

## 2021-02-19 PROCEDURE — G0378 HOSPITAL OBSERVATION PER HR: HCPCS

## 2021-02-19 RX ADMIN — POTASSIUM CHLORIDE 10 MEQ: 750 TABLET, EXTENDED RELEASE ORAL at 09:02

## 2021-02-19 RX ADMIN — METOPROLOL SUCCINATE 25 MG: 25 TABLET, FILM COATED, EXTENDED RELEASE ORAL at 09:02

## 2021-02-19 RX ADMIN — CLOPIDOGREL 75 MG: 75 TABLET, FILM COATED ORAL at 09:02

## 2021-02-19 RX ADMIN — LOSARTAN POTASSIUM 100 MG: 50 TABLET, FILM COATED ORAL at 09:02

## 2021-02-19 RX ADMIN — CALCIUM CARBONATE (ANTACID) CHEW TAB 500 MG 500 MG: 500 CHEW TAB at 09:02

## 2021-02-19 RX ADMIN — FUROSEMIDE 20 MG: 20 TABLET ORAL at 09:02

## 2021-02-22 ENCOUNTER — HOSPITAL ENCOUNTER (OUTPATIENT)
Facility: HOSPITAL | Age: 86
Discharge: HOME | End: 2021-02-22
Attending: INTERNAL MEDICINE | Admitting: INTERNAL MEDICINE
Payer: MEDICARE

## 2021-02-22 ENCOUNTER — ANESTHESIA (OUTPATIENT)
Dept: MEDSURG UNIT | Facility: HOSPITAL | Age: 86
End: 2021-02-22
Payer: MEDICARE

## 2021-02-22 VITALS
SYSTOLIC BLOOD PRESSURE: 138 MMHG | OXYGEN SATURATION: 96 % | HEART RATE: 73 BPM | DIASTOLIC BLOOD PRESSURE: 63 MMHG | BODY MASS INDEX: 37.36 KG/M2 | RESPIRATION RATE: 20 BRPM | TEMPERATURE: 98 F | HEIGHT: 58 IN | WEIGHT: 178 LBS

## 2021-02-22 DIAGNOSIS — I44.2 COMPLETE HEART BLOCK: ICD-10-CM

## 2021-02-22 DIAGNOSIS — I49.9 ARRHYTHMIA: ICD-10-CM

## 2021-02-22 DIAGNOSIS — I49.8 OTHER SPECIFIED CARDIAC ARRHYTHMIAS: ICD-10-CM

## 2021-02-22 DIAGNOSIS — Z95.9 CARDIAC DEVICE IN SITU: ICD-10-CM

## 2021-02-22 LAB — SARS-COV-2 RDRP RESP QL NAA+PROBE: NEGATIVE

## 2021-02-22 PROCEDURE — 37000009 HC ANESTHESIA EA ADD 15 MINS: Performed by: INTERNAL MEDICINE

## 2021-02-22 PROCEDURE — C1785 PMKR, DUAL, RATE-RESP: HCPCS | Performed by: INTERNAL MEDICINE

## 2021-02-22 PROCEDURE — 33214 UPGRADE OF PACEMAKER SYSTEM: CPT | Performed by: INTERNAL MEDICINE

## 2021-02-22 PROCEDURE — 27201423 OPTIME MED/SURG SUP & DEVICES STERILE SUPPLY: Performed by: INTERNAL MEDICINE

## 2021-02-22 PROCEDURE — 93010 ELECTROCARDIOGRAM REPORT: CPT | Mod: ,,, | Performed by: INTERNAL MEDICINE

## 2021-02-22 PROCEDURE — 63600175 PHARM REV CODE 636 W HCPCS: Performed by: NURSE ANESTHETIST, CERTIFIED REGISTERED

## 2021-02-22 PROCEDURE — C1894 INTRO/SHEATH, NON-LASER: HCPCS | Performed by: INTERNAL MEDICINE

## 2021-02-22 PROCEDURE — D9220A PRA ANESTHESIA: Mod: CRNA,,, | Performed by: NURSE ANESTHETIST, CERTIFIED REGISTERED

## 2021-02-22 PROCEDURE — 25500020 PHARM REV CODE 255: Performed by: NURSE ANESTHETIST, CERTIFIED REGISTERED

## 2021-02-22 PROCEDURE — A4216 STERILE WATER/SALINE, 10 ML: HCPCS | Performed by: INTERNAL MEDICINE

## 2021-02-22 PROCEDURE — 93010 ELECTROCARDIOGRAM REPORT: CPT | Mod: 76,,, | Performed by: INTERNAL MEDICINE

## 2021-02-22 PROCEDURE — 25000003 PHARM REV CODE 250: Performed by: NURSE ANESTHETIST, CERTIFIED REGISTERED

## 2021-02-22 PROCEDURE — 37000008 HC ANESTHESIA 1ST 15 MINUTES: Performed by: INTERNAL MEDICINE

## 2021-02-22 PROCEDURE — D9220A PRA ANESTHESIA: Mod: ANES,,, | Performed by: ANESTHESIOLOGY

## 2021-02-22 PROCEDURE — 93005 ELECTROCARDIOGRAM TRACING: CPT | Mod: 59

## 2021-02-22 PROCEDURE — 33214 UPGRADE OF PACEMAKER SYSTEM: CPT | Mod: ,,, | Performed by: INTERNAL MEDICINE

## 2021-02-22 PROCEDURE — D9220A PRA ANESTHESIA: ICD-10-PCS | Mod: CRNA,,, | Performed by: NURSE ANESTHETIST, CERTIFIED REGISTERED

## 2021-02-22 PROCEDURE — D9220A PRA ANESTHESIA: ICD-10-PCS | Mod: ANES,,, | Performed by: ANESTHESIOLOGY

## 2021-02-22 PROCEDURE — 63600175 PHARM REV CODE 636 W HCPCS: Performed by: INTERNAL MEDICINE

## 2021-02-22 PROCEDURE — 33214 PR UPGRADE OF PACEMAKER SYSTEM: ICD-10-PCS | Mod: ,,, | Performed by: INTERNAL MEDICINE

## 2021-02-22 PROCEDURE — C1898 LEAD, PMKR, OTHER THAN TRANS: HCPCS | Performed by: INTERNAL MEDICINE

## 2021-02-22 PROCEDURE — 25000003 PHARM REV CODE 250: Performed by: INTERNAL MEDICINE

## 2021-02-22 PROCEDURE — U0002 COVID-19 LAB TEST NON-CDC: HCPCS

## 2021-02-22 PROCEDURE — 63600175 PHARM REV CODE 636 W HCPCS: Performed by: NURSE PRACTITIONER

## 2021-02-22 PROCEDURE — 25000003 PHARM REV CODE 250: Performed by: NURSE PRACTITIONER

## 2021-02-22 PROCEDURE — 93010 EKG 12-LEAD: ICD-10-PCS | Mod: ,,, | Performed by: INTERNAL MEDICINE

## 2021-02-22 DEVICE — IMPLANTABLE DEVICE
Type: IMPLANTABLE DEVICE | Site: HEART | Status: FUNCTIONAL
Brand: SOLIA

## 2021-02-22 DEVICE — IMPLANTABLE DEVICE
Type: IMPLANTABLE DEVICE | Site: CHEST | Status: FUNCTIONAL
Brand: EDORA 8 DR-T

## 2021-02-22 RX ORDER — MIDAZOLAM HYDROCHLORIDE 1 MG/ML
INJECTION, SOLUTION INTRAMUSCULAR; INTRAVENOUS
Status: DISCONTINUED | OUTPATIENT
Start: 2021-02-22 | End: 2021-02-22

## 2021-02-22 RX ORDER — DIPHENHYDRAMINE HYDROCHLORIDE 50 MG/ML
25 INJECTION INTRAMUSCULAR; INTRAVENOUS EVERY 6 HOURS PRN
Status: DISCONTINUED | OUTPATIENT
Start: 2021-02-22 | End: 2021-02-22 | Stop reason: HOSPADM

## 2021-02-22 RX ORDER — FENTANYL CITRATE 50 UG/ML
INJECTION, SOLUTION INTRAMUSCULAR; INTRAVENOUS
Status: DISCONTINUED | OUTPATIENT
Start: 2021-02-22 | End: 2021-02-22

## 2021-02-22 RX ORDER — DOXYCYCLINE HYCLATE 100 MG
100 TABLET ORAL 2 TIMES DAILY
Qty: 10 TABLET | Refills: 0 | Status: SHIPPED | OUTPATIENT
Start: 2021-02-22 | End: 2021-02-27

## 2021-02-22 RX ORDER — ACETAMINOPHEN 325 MG/1
650 TABLET ORAL EVERY 4 HOURS PRN
Status: DISCONTINUED | OUTPATIENT
Start: 2021-02-22 | End: 2021-02-22 | Stop reason: HOSPADM

## 2021-02-22 RX ORDER — SODIUM CHLORIDE 9 MG/ML
INJECTION, SOLUTION INTRAMUSCULAR; INTRAVENOUS; SUBCUTANEOUS
Status: DISCONTINUED | OUTPATIENT
Start: 2021-02-22 | End: 2021-02-22 | Stop reason: HOSPADM

## 2021-02-22 RX ORDER — IODIXANOL 320 MG/ML
INJECTION, SOLUTION INTRAVASCULAR
Status: DISCONTINUED | OUTPATIENT
Start: 2021-02-22 | End: 2021-02-22

## 2021-02-22 RX ORDER — ONDANSETRON 2 MG/ML
4 INJECTION INTRAMUSCULAR; INTRAVENOUS ONCE AS NEEDED
Status: DISCONTINUED | OUTPATIENT
Start: 2021-02-22 | End: 2021-02-22 | Stop reason: HOSPADM

## 2021-02-22 RX ORDER — BUPIVACAINE HYDROCHLORIDE 2.5 MG/ML
INJECTION, SOLUTION EPIDURAL; INFILTRATION; INTRACAUDAL
Status: DISCONTINUED | OUTPATIENT
Start: 2021-02-22 | End: 2021-02-22 | Stop reason: HOSPADM

## 2021-02-22 RX ORDER — VANCOMYCIN HCL IN 5 % DEXTROSE 1G/250ML
1000 PLASTIC BAG, INJECTION (ML) INTRAVENOUS
Status: DISPENSED | OUTPATIENT
Start: 2021-02-22

## 2021-02-22 RX ORDER — PROPOFOL 10 MG/ML
VIAL (ML) INTRAVENOUS CONTINUOUS PRN
Status: DISCONTINUED | OUTPATIENT
Start: 2021-02-22 | End: 2021-02-22

## 2021-02-22 RX ORDER — HYDROMORPHONE HYDROCHLORIDE 1 MG/ML
0.2 INJECTION, SOLUTION INTRAMUSCULAR; INTRAVENOUS; SUBCUTANEOUS EVERY 5 MIN PRN
Status: DISCONTINUED | OUTPATIENT
Start: 2021-02-22 | End: 2021-02-22 | Stop reason: HOSPADM

## 2021-02-22 RX ORDER — VANCOMYCIN HYDROCHLORIDE 1 G/20ML
INJECTION, POWDER, LYOPHILIZED, FOR SOLUTION INTRAVENOUS
Status: DISCONTINUED | OUTPATIENT
Start: 2021-02-22 | End: 2021-02-22 | Stop reason: HOSPADM

## 2021-02-22 RX ORDER — LIDOCAINE HCL/PF 100 MG/5ML
SYRINGE (ML) INTRAVENOUS
Status: DISCONTINUED | OUTPATIENT
Start: 2021-02-22 | End: 2021-02-22

## 2021-02-22 RX ORDER — LIDOCAINE HYDROCHLORIDE 20 MG/ML
INJECTION, SOLUTION INFILTRATION; PERINEURAL
Status: DISCONTINUED | OUTPATIENT
Start: 2021-02-22 | End: 2021-02-22 | Stop reason: HOSPADM

## 2021-02-22 RX ORDER — FENTANYL CITRATE 50 UG/ML
25 INJECTION, SOLUTION INTRAMUSCULAR; INTRAVENOUS EVERY 5 MIN PRN
Status: DISCONTINUED | OUTPATIENT
Start: 2021-02-22 | End: 2021-02-22 | Stop reason: HOSPADM

## 2021-02-22 RX ADMIN — SODIUM CHLORIDE: 0.9 INJECTION, SOLUTION INTRAVENOUS at 09:02

## 2021-02-22 RX ADMIN — VANCOMYCIN HYDROCHLORIDE 1000 MG: 1 INJECTION, POWDER, LYOPHILIZED, FOR SOLUTION INTRAVENOUS at 09:02

## 2021-02-22 RX ADMIN — IODIXANOL 10 ML: 320 INJECTION, SOLUTION INTRAVASCULAR at 09:02

## 2021-02-22 RX ADMIN — FENTANYL CITRATE 25 MCG: 50 INJECTION INTRAMUSCULAR; INTRAVENOUS at 10:02

## 2021-02-22 RX ADMIN — IODIXANOL 10 ML: 320 INJECTION, SOLUTION INTRAVASCULAR at 10:02

## 2021-02-22 RX ADMIN — MIDAZOLAM 1 MG: 1 INJECTION INTRAMUSCULAR; INTRAVENOUS at 09:02

## 2021-02-22 RX ADMIN — LIDOCAINE HYDROCHLORIDE 60 MG: 20 INJECTION, SOLUTION INTRAVENOUS at 09:02

## 2021-02-22 RX ADMIN — FENTANYL CITRATE 50 MCG: 50 INJECTION INTRAMUSCULAR; INTRAVENOUS at 11:02

## 2021-02-22 RX ADMIN — PROPOFOL 50 MCG/KG/MIN: 10 INJECTION, EMULSION INTRAVENOUS at 09:02

## 2021-02-25 ENCOUNTER — TELEPHONE (OUTPATIENT)
Dept: ELECTROPHYSIOLOGY | Facility: CLINIC | Age: 86
End: 2021-02-25

## 2021-03-01 ENCOUNTER — CLINICAL SUPPORT (OUTPATIENT)
Dept: CARDIOLOGY | Facility: HOSPITAL | Age: 86
End: 2021-03-01
Attending: INTERNAL MEDICINE
Payer: MEDICARE

## 2021-03-01 DIAGNOSIS — I44.2 COMPLETE HEART BLOCK: ICD-10-CM

## 2021-03-01 DIAGNOSIS — I49.8 OTHER SPECIFIED CARDIAC ARRHYTHMIAS: ICD-10-CM

## 2021-03-01 PROCEDURE — 93280 PM DEVICE PROGR EVAL DUAL: CPT

## 2021-03-01 PROCEDURE — 93280 PM DEVICE PROGR EVAL DUAL: CPT | Mod: 26,,, | Performed by: INTERNAL MEDICINE

## 2021-03-01 PROCEDURE — 93280 CARDIAC DEVICE CHECK - IN CLINIC & HOSPITAL: ICD-10-PCS | Mod: 26,,, | Performed by: INTERNAL MEDICINE

## 2021-03-06 ENCOUNTER — OFFICE VISIT (OUTPATIENT)
Dept: URGENT CARE | Facility: CLINIC | Age: 86
End: 2021-03-06
Payer: MEDICARE

## 2021-03-06 ENCOUNTER — NURSE TRIAGE (OUTPATIENT)
Dept: ADMINISTRATIVE | Facility: CLINIC | Age: 86
End: 2021-03-06

## 2021-03-06 DIAGNOSIS — J34.89 RHINORRHEA: ICD-10-CM

## 2021-03-06 DIAGNOSIS — R05.9 COUGH: Primary | ICD-10-CM

## 2021-03-06 LAB
CTP QC/QA: YES
SARS-COV-2 RDRP RESP QL NAA+PROBE: NEGATIVE

## 2021-03-06 PROCEDURE — 99213 OFFICE O/P EST LOW 20 MIN: CPT | Mod: S$GLB,,, | Performed by: NURSE PRACTITIONER

## 2021-03-06 PROCEDURE — 99213 PR OFFICE/OUTPT VISIT, EST, LEVL III, 20-29 MIN: ICD-10-PCS | Mod: S$GLB,,, | Performed by: NURSE PRACTITIONER

## 2021-03-06 PROCEDURE — U0002 COVID-19 LAB TEST NON-CDC: HCPCS | Mod: QW,S$GLB,, | Performed by: NURSE PRACTITIONER

## 2021-03-06 PROCEDURE — U0002: ICD-10-PCS | Mod: QW,S$GLB,, | Performed by: NURSE PRACTITIONER

## 2021-04-02 ENCOUNTER — IMMUNIZATION (OUTPATIENT)
Dept: PRIMARY CARE CLINIC | Facility: CLINIC | Age: 86
End: 2021-04-02
Payer: MEDICARE

## 2021-04-02 DIAGNOSIS — Z23 NEED FOR VACCINATION: Primary | ICD-10-CM

## 2021-04-02 PROCEDURE — 0001A PR IMMUNIZ ADMIN, SARS-COV-2 COVID-19 VACC, 30MCG/0.3ML, 1ST DOSE: ICD-10-PCS | Mod: CV19,S$GLB,, | Performed by: INTERNAL MEDICINE

## 2021-04-02 PROCEDURE — 91300 PR SARS-COV- 2 COVID-19 VACCINE, NO PRSV, 30MCG/0.3ML, IM: CPT | Mod: S$GLB,,, | Performed by: INTERNAL MEDICINE

## 2021-04-02 PROCEDURE — 91300 PR SARS-COV- 2 COVID-19 VACCINE, NO PRSV, 30MCG/0.3ML, IM: ICD-10-PCS | Mod: S$GLB,,, | Performed by: INTERNAL MEDICINE

## 2021-04-02 PROCEDURE — 0001A PR IMMUNIZ ADMIN, SARS-COV-2 COVID-19 VACC, 30MCG/0.3ML, 1ST DOSE: CPT | Mod: CV19,S$GLB,, | Performed by: INTERNAL MEDICINE

## 2021-04-02 RX ADMIN — Medication 0.3 ML: at 11:04

## 2021-04-06 ENCOUNTER — LAB VISIT (OUTPATIENT)
Dept: LAB | Facility: HOSPITAL | Age: 86
End: 2021-04-06
Attending: INTERNAL MEDICINE
Payer: MEDICARE

## 2021-04-06 DIAGNOSIS — I10 ESSENTIAL HYPERTENSION: ICD-10-CM

## 2021-04-06 DIAGNOSIS — Z98.61 S/P PTCA (PERCUTANEOUS TRANSLUMINAL CORONARY ANGIOPLASTY): ICD-10-CM

## 2021-04-06 DIAGNOSIS — R60.0 LOCALIZED EDEMA: ICD-10-CM

## 2021-04-06 LAB
ALBUMIN SERPL BCP-MCNC: 2.9 G/DL (ref 3.5–5.2)
ALP SERPL-CCNC: 98 U/L (ref 55–135)
ALT SERPL W/O P-5'-P-CCNC: 12 U/L (ref 10–44)
ANION GAP SERPL CALC-SCNC: 12 MMOL/L (ref 8–16)
AST SERPL-CCNC: 21 U/L (ref 10–40)
BASOPHILS # BLD AUTO: 0.08 K/UL (ref 0–0.2)
BASOPHILS NFR BLD: 1.3 % (ref 0–1.9)
BILIRUB SERPL-MCNC: 0.3 MG/DL (ref 0.1–1)
BUN SERPL-MCNC: 18 MG/DL (ref 10–30)
CALCIUM SERPL-MCNC: 9.5 MG/DL (ref 8.7–10.5)
CHLORIDE SERPL-SCNC: 105 MMOL/L (ref 95–110)
CHOLEST SERPL-MCNC: 191 MG/DL (ref 120–199)
CHOLEST/HDLC SERPL: 4.4 {RATIO} (ref 2–5)
CO2 SERPL-SCNC: 25 MMOL/L (ref 23–29)
CREAT SERPL-MCNC: 1.1 MG/DL (ref 0.5–1.4)
DIFFERENTIAL METHOD: NORMAL
EOSINOPHIL # BLD AUTO: 0.2 K/UL (ref 0–0.5)
EOSINOPHIL NFR BLD: 2.5 % (ref 0–8)
ERYTHROCYTE [DISTWIDTH] IN BLOOD BY AUTOMATED COUNT: 13.4 % (ref 11.5–14.5)
EST. GFR  (AFRICAN AMERICAN): 51 ML/MIN/1.73 M^2
EST. GFR  (NON AFRICAN AMERICAN): 44 ML/MIN/1.73 M^2
GLUCOSE SERPL-MCNC: 130 MG/DL (ref 70–110)
HCT VFR BLD AUTO: 38.2 % (ref 37–48.5)
HDLC SERPL-MCNC: 43 MG/DL (ref 40–75)
HDLC SERPL: 22.5 % (ref 20–50)
HGB BLD-MCNC: 12.5 G/DL (ref 12–16)
IMM GRANULOCYTES # BLD AUTO: 0.03 K/UL (ref 0–0.04)
IMM GRANULOCYTES NFR BLD AUTO: 0.5 % (ref 0–0.5)
LDLC SERPL CALC-MCNC: 116.4 MG/DL (ref 63–159)
LYMPHOCYTES # BLD AUTO: 1.8 K/UL (ref 1–4.8)
LYMPHOCYTES NFR BLD: 29.4 % (ref 18–48)
MCH RBC QN AUTO: 29.8 PG (ref 27–31)
MCHC RBC AUTO-ENTMCNC: 32.7 G/DL (ref 32–36)
MCV RBC AUTO: 91 FL (ref 82–98)
MONOCYTES # BLD AUTO: 0.4 K/UL (ref 0.3–1)
MONOCYTES NFR BLD: 6.6 % (ref 4–15)
NEUTROPHILS # BLD AUTO: 3.6 K/UL (ref 1.8–7.7)
NEUTROPHILS NFR BLD: 59.7 % (ref 38–73)
NONHDLC SERPL-MCNC: 148 MG/DL
NRBC BLD-RTO: 0 /100 WBC
PLATELET # BLD AUTO: 240 K/UL (ref 150–450)
PMV BLD AUTO: 10.7 FL (ref 9.2–12.9)
POTASSIUM SERPL-SCNC: 3.9 MMOL/L (ref 3.5–5.1)
PROT SERPL-MCNC: 6.6 G/DL (ref 6–8.4)
RBC # BLD AUTO: 4.2 M/UL (ref 4–5.4)
SODIUM SERPL-SCNC: 142 MMOL/L (ref 136–145)
TRIGL SERPL-MCNC: 158 MG/DL (ref 30–150)
TSH SERPL DL<=0.005 MIU/L-ACNC: 2.67 UIU/ML (ref 0.4–4)
WBC # BLD AUTO: 6.03 K/UL (ref 3.9–12.7)

## 2021-04-06 PROCEDURE — 36415 COLL VENOUS BLD VENIPUNCTURE: CPT | Performed by: INTERNAL MEDICINE

## 2021-04-06 PROCEDURE — 80053 COMPREHEN METABOLIC PANEL: CPT | Performed by: INTERNAL MEDICINE

## 2021-04-06 PROCEDURE — 80061 LIPID PANEL: CPT | Performed by: INTERNAL MEDICINE

## 2021-04-06 PROCEDURE — 85025 COMPLETE CBC W/AUTO DIFF WBC: CPT | Performed by: INTERNAL MEDICINE

## 2021-04-06 PROCEDURE — 84443 ASSAY THYROID STIM HORMONE: CPT | Performed by: INTERNAL MEDICINE

## 2021-04-13 ENCOUNTER — OFFICE VISIT (OUTPATIENT)
Dept: CARDIOLOGY | Facility: CLINIC | Age: 86
End: 2021-04-13
Payer: MEDICARE

## 2021-04-13 VITALS
OXYGEN SATURATION: 95 % | DIASTOLIC BLOOD PRESSURE: 85 MMHG | HEART RATE: 85 BPM | HEIGHT: 58 IN | BODY MASS INDEX: 37.14 KG/M2 | SYSTOLIC BLOOD PRESSURE: 134 MMHG | WEIGHT: 176.94 LBS

## 2021-04-13 DIAGNOSIS — R73.01 FASTING HYPERGLYCEMIA: ICD-10-CM

## 2021-04-13 DIAGNOSIS — I49.8 PACEMAKER-DEPENDENT DUE TO NATIVE CARDIAC RHYTHM INSUFFICIENT TO SUPPORT LIFE: ICD-10-CM

## 2021-04-13 DIAGNOSIS — R55 VASOVAGAL SYNCOPE: Primary | ICD-10-CM

## 2021-04-13 DIAGNOSIS — I25.10 CORONARY ARTERY DISEASE INVOLVING NATIVE CORONARY ARTERY OF NATIVE HEART WITHOUT ANGINA PECTORIS: ICD-10-CM

## 2021-04-13 DIAGNOSIS — I05.0 RHEUMATIC MITRAL STENOSIS: ICD-10-CM

## 2021-04-13 DIAGNOSIS — Z95.0 PACEMAKER-DEPENDENT DUE TO NATIVE CARDIAC RHYTHM INSUFFICIENT TO SUPPORT LIFE: ICD-10-CM

## 2021-04-13 DIAGNOSIS — I34.0 MILD MITRAL REGURGITATION: ICD-10-CM

## 2021-04-13 DIAGNOSIS — I44.2 COMPLETE HEART BLOCK: ICD-10-CM

## 2021-04-13 DIAGNOSIS — I50.32 CHRONIC HEART FAILURE WITH PRESERVED EJECTION FRACTION (HFPEF): ICD-10-CM

## 2021-04-13 DIAGNOSIS — R60.0 LOCALIZED EDEMA: ICD-10-CM

## 2021-04-13 DIAGNOSIS — I73.9 PAD (PERIPHERAL ARTERY DISEASE): ICD-10-CM

## 2021-04-13 DIAGNOSIS — Z98.61 S/P PTCA (PERCUTANEOUS TRANSLUMINAL CORONARY ANGIOPLASTY): ICD-10-CM

## 2021-04-13 DIAGNOSIS — I10 ESSENTIAL HYPERTENSION: ICD-10-CM

## 2021-04-13 DIAGNOSIS — R30.0 DYSURIA: ICD-10-CM

## 2021-04-13 DIAGNOSIS — I06.0 RHEUMATIC AORTIC STENOSIS: ICD-10-CM

## 2021-04-13 DIAGNOSIS — Z95.0 CARDIAC PACEMAKER: ICD-10-CM

## 2021-04-13 PROCEDURE — 3288F FALL RISK ASSESSMENT DOCD: CPT | Mod: CPTII,S$GLB,, | Performed by: INTERNAL MEDICINE

## 2021-04-13 PROCEDURE — 99214 PR OFFICE/OUTPT VISIT, EST, LEVL IV, 30-39 MIN: ICD-10-PCS | Mod: S$GLB,,, | Performed by: INTERNAL MEDICINE

## 2021-04-13 PROCEDURE — 99999 PR PBB SHADOW E&M-EST. PATIENT-LVL III: CPT | Mod: PBBFAC,,, | Performed by: INTERNAL MEDICINE

## 2021-04-13 PROCEDURE — 1126F PR PAIN SEVERITY QUANTIFIED, NO PAIN PRESENT: ICD-10-PCS | Mod: S$GLB,,, | Performed by: INTERNAL MEDICINE

## 2021-04-13 PROCEDURE — 1159F MED LIST DOCD IN RCRD: CPT | Mod: S$GLB,,, | Performed by: INTERNAL MEDICINE

## 2021-04-13 PROCEDURE — 99999 PR PBB SHADOW E&M-EST. PATIENT-LVL III: ICD-10-PCS | Mod: PBBFAC,,, | Performed by: INTERNAL MEDICINE

## 2021-04-13 PROCEDURE — 1159F PR MEDICATION LIST DOCUMENTED IN MEDICAL RECORD: ICD-10-PCS | Mod: S$GLB,,, | Performed by: INTERNAL MEDICINE

## 2021-04-13 PROCEDURE — 1101F PR PT FALLS ASSESS DOC 0-1 FALLS W/OUT INJ PAST YR: ICD-10-PCS | Mod: CPTII,S$GLB,, | Performed by: INTERNAL MEDICINE

## 2021-04-13 PROCEDURE — 1101F PT FALLS ASSESS-DOCD LE1/YR: CPT | Mod: CPTII,S$GLB,, | Performed by: INTERNAL MEDICINE

## 2021-04-13 PROCEDURE — 3288F PR FALLS RISK ASSESSMENT DOCUMENTED: ICD-10-PCS | Mod: CPTII,S$GLB,, | Performed by: INTERNAL MEDICINE

## 2021-04-13 PROCEDURE — 1126F AMNT PAIN NOTED NONE PRSNT: CPT | Mod: S$GLB,,, | Performed by: INTERNAL MEDICINE

## 2021-04-13 PROCEDURE — 99214 OFFICE O/P EST MOD 30 MIN: CPT | Mod: S$GLB,,, | Performed by: INTERNAL MEDICINE

## 2021-04-13 RX ORDER — ALBUTEROL SULFATE 90 UG/1
AEROSOL, METERED RESPIRATORY (INHALATION)
COMMUNITY
Start: 2021-03-08 | End: 2022-02-01 | Stop reason: SDUPTHER

## 2021-04-23 ENCOUNTER — IMMUNIZATION (OUTPATIENT)
Dept: PRIMARY CARE CLINIC | Facility: CLINIC | Age: 86
End: 2021-04-23

## 2021-04-23 DIAGNOSIS — Z23 NEED FOR VACCINATION: Primary | ICD-10-CM

## 2021-04-23 PROCEDURE — 0002A PR IMMUNIZ ADMIN, SARS-COV-2 COVID-19 VACC, 30MCG/0.3ML, 2ND DOSE: ICD-10-PCS | Mod: CV19,S$GLB,, | Performed by: INTERNAL MEDICINE

## 2021-04-23 PROCEDURE — 91300 PR SARS-COV- 2 COVID-19 VACCINE, NO PRSV, 30MCG/0.3ML, IM: CPT | Mod: S$GLB,,, | Performed by: INTERNAL MEDICINE

## 2021-04-23 PROCEDURE — 91300 PR SARS-COV- 2 COVID-19 VACCINE, NO PRSV, 30MCG/0.3ML, IM: ICD-10-PCS | Mod: S$GLB,,, | Performed by: INTERNAL MEDICINE

## 2021-04-23 PROCEDURE — 0002A PR IMMUNIZ ADMIN, SARS-COV-2 COVID-19 VACC, 30MCG/0.3ML, 2ND DOSE: CPT | Mod: CV19,S$GLB,, | Performed by: INTERNAL MEDICINE

## 2021-04-23 RX ADMIN — Medication 0.3 ML: at 09:04

## 2021-05-22 ENCOUNTER — CLINICAL SUPPORT (OUTPATIENT)
Dept: CARDIOLOGY | Facility: HOSPITAL | Age: 86
End: 2021-05-22
Payer: MEDICARE

## 2021-05-22 DIAGNOSIS — Z95.0 PRESENCE OF CARDIAC PACEMAKER: ICD-10-CM

## 2021-05-22 DIAGNOSIS — I44.2 ATRIOVENTRICULAR BLOCK, COMPLETE: ICD-10-CM

## 2021-05-22 PROCEDURE — 93294 REM INTERROG EVL PM/LDLS PM: CPT | Mod: ,,, | Performed by: INTERNAL MEDICINE

## 2021-05-22 PROCEDURE — 93296 REM INTERROG EVL PM/IDS: CPT | Performed by: INTERNAL MEDICINE

## 2021-05-22 PROCEDURE — 93294 CARDIAC DEVICE CHECK - REMOTE: ICD-10-PCS | Mod: ,,, | Performed by: INTERNAL MEDICINE

## 2021-07-19 ENCOUNTER — TELEPHONE (OUTPATIENT)
Dept: CARDIOLOGY | Facility: CLINIC | Age: 86
End: 2021-07-19

## 2021-07-20 ENCOUNTER — OFFICE VISIT (OUTPATIENT)
Dept: CARDIOLOGY | Facility: CLINIC | Age: 86
End: 2021-07-20
Payer: MEDICARE

## 2021-07-20 VITALS
WEIGHT: 173.31 LBS | DIASTOLIC BLOOD PRESSURE: 82 MMHG | SYSTOLIC BLOOD PRESSURE: 128 MMHG | OXYGEN SATURATION: 94 % | HEIGHT: 58 IN | BODY MASS INDEX: 36.38 KG/M2 | HEART RATE: 84 BPM

## 2021-07-20 DIAGNOSIS — I49.8 PACEMAKER-DEPENDENT DUE TO NATIVE CARDIAC RHYTHM INSUFFICIENT TO SUPPORT LIFE: ICD-10-CM

## 2021-07-20 DIAGNOSIS — I73.9 PAD (PERIPHERAL ARTERY DISEASE): ICD-10-CM

## 2021-07-20 DIAGNOSIS — I44.2 COMPLETE HEART BLOCK: ICD-10-CM

## 2021-07-20 DIAGNOSIS — I50.32 CHRONIC HEART FAILURE WITH PRESERVED EJECTION FRACTION (HFPEF): ICD-10-CM

## 2021-07-20 DIAGNOSIS — R60.0 LOCALIZED EDEMA: ICD-10-CM

## 2021-07-20 DIAGNOSIS — Z95.0 PACEMAKER-DEPENDENT DUE TO NATIVE CARDIAC RHYTHM INSUFFICIENT TO SUPPORT LIFE: ICD-10-CM

## 2021-07-20 DIAGNOSIS — I05.0 RHEUMATIC MITRAL STENOSIS: ICD-10-CM

## 2021-07-20 DIAGNOSIS — Z98.61 S/P PTCA (PERCUTANEOUS TRANSLUMINAL CORONARY ANGIOPLASTY): ICD-10-CM

## 2021-07-20 DIAGNOSIS — R55 VASOVAGAL SYNCOPE: ICD-10-CM

## 2021-07-20 DIAGNOSIS — I34.0 MILD MITRAL REGURGITATION: ICD-10-CM

## 2021-07-20 DIAGNOSIS — I06.0 RHEUMATIC AORTIC STENOSIS: ICD-10-CM

## 2021-07-20 DIAGNOSIS — Z95.0 CARDIAC PACEMAKER: Primary | ICD-10-CM

## 2021-07-20 DIAGNOSIS — I25.10 CORONARY ARTERY DISEASE INVOLVING NATIVE CORONARY ARTERY OF NATIVE HEART WITHOUT ANGINA PECTORIS: ICD-10-CM

## 2021-07-20 DIAGNOSIS — I10 ESSENTIAL HYPERTENSION: ICD-10-CM

## 2021-07-20 PROCEDURE — 1101F PT FALLS ASSESS-DOCD LE1/YR: CPT | Mod: CPTII,S$GLB,, | Performed by: INTERNAL MEDICINE

## 2021-07-20 PROCEDURE — 99999 PR PBB SHADOW E&M-EST. PATIENT-LVL III: CPT | Mod: PBBFAC,,, | Performed by: INTERNAL MEDICINE

## 2021-07-20 PROCEDURE — 3288F FALL RISK ASSESSMENT DOCD: CPT | Mod: CPTII,S$GLB,, | Performed by: INTERNAL MEDICINE

## 2021-07-20 PROCEDURE — 3288F PR FALLS RISK ASSESSMENT DOCUMENTED: ICD-10-PCS | Mod: CPTII,S$GLB,, | Performed by: INTERNAL MEDICINE

## 2021-07-20 PROCEDURE — 1159F MED LIST DOCD IN RCRD: CPT | Mod: CPTII,S$GLB,, | Performed by: INTERNAL MEDICINE

## 2021-07-20 PROCEDURE — 99214 PR OFFICE/OUTPT VISIT, EST, LEVL IV, 30-39 MIN: ICD-10-PCS | Mod: 25,S$GLB,, | Performed by: INTERNAL MEDICINE

## 2021-07-20 PROCEDURE — 93000 EKG 12-LEAD: ICD-10-PCS | Mod: S$GLB,,, | Performed by: INTERNAL MEDICINE

## 2021-07-20 PROCEDURE — 93000 ELECTROCARDIOGRAM COMPLETE: CPT | Mod: S$GLB,,, | Performed by: INTERNAL MEDICINE

## 2021-07-20 PROCEDURE — 1126F PR PAIN SEVERITY QUANTIFIED, NO PAIN PRESENT: ICD-10-PCS | Mod: CPTII,S$GLB,, | Performed by: INTERNAL MEDICINE

## 2021-07-20 PROCEDURE — 1101F PR PT FALLS ASSESS DOC 0-1 FALLS W/OUT INJ PAST YR: ICD-10-PCS | Mod: CPTII,S$GLB,, | Performed by: INTERNAL MEDICINE

## 2021-07-20 PROCEDURE — 1126F AMNT PAIN NOTED NONE PRSNT: CPT | Mod: CPTII,S$GLB,, | Performed by: INTERNAL MEDICINE

## 2021-07-20 PROCEDURE — 1159F PR MEDICATION LIST DOCUMENTED IN MEDICAL RECORD: ICD-10-PCS | Mod: CPTII,S$GLB,, | Performed by: INTERNAL MEDICINE

## 2021-07-20 PROCEDURE — 99999 PR PBB SHADOW E&M-EST. PATIENT-LVL III: ICD-10-PCS | Mod: PBBFAC,,, | Performed by: INTERNAL MEDICINE

## 2021-07-20 PROCEDURE — 99214 OFFICE O/P EST MOD 30 MIN: CPT | Mod: 25,S$GLB,, | Performed by: INTERNAL MEDICINE

## 2021-08-04 ENCOUNTER — CLINICAL SUPPORT (OUTPATIENT)
Dept: CARDIOLOGY | Facility: HOSPITAL | Age: 86
End: 2021-08-04
Attending: INTERNAL MEDICINE
Payer: MEDICARE

## 2021-08-04 ENCOUNTER — OFFICE VISIT (OUTPATIENT)
Dept: ELECTROPHYSIOLOGY | Facility: CLINIC | Age: 86
End: 2021-08-04
Payer: MEDICARE

## 2021-08-04 VITALS
SYSTOLIC BLOOD PRESSURE: 151 MMHG | DIASTOLIC BLOOD PRESSURE: 71 MMHG | WEIGHT: 174.19 LBS | BODY MASS INDEX: 36.56 KG/M2 | HEIGHT: 58 IN | HEART RATE: 89 BPM

## 2021-08-04 DIAGNOSIS — I49.8 PACEMAKER-DEPENDENT DUE TO NATIVE CARDIAC RHYTHM INSUFFICIENT TO SUPPORT LIFE: ICD-10-CM

## 2021-08-04 DIAGNOSIS — I44.2 COMPLETE HEART BLOCK: ICD-10-CM

## 2021-08-04 DIAGNOSIS — I49.8 OTHER SPECIFIED CARDIAC ARRHYTHMIAS: ICD-10-CM

## 2021-08-04 DIAGNOSIS — Z95.0 CARDIAC PACEMAKER: Primary | ICD-10-CM

## 2021-08-04 DIAGNOSIS — Z95.0 PACEMAKER-DEPENDENT DUE TO NATIVE CARDIAC RHYTHM INSUFFICIENT TO SUPPORT LIFE: ICD-10-CM

## 2021-08-04 DIAGNOSIS — I10 ESSENTIAL HYPERTENSION: ICD-10-CM

## 2021-08-04 DIAGNOSIS — R55 VASOVAGAL SYNCOPE: ICD-10-CM

## 2021-08-04 PROCEDURE — 99499 UNLISTED E&M SERVICE: CPT | Mod: S$GLB,,, | Performed by: NURSE PRACTITIONER

## 2021-08-04 PROCEDURE — 99999 PR PBB SHADOW E&M-EST. PATIENT-LVL III: ICD-10-PCS | Mod: PBBFAC,,, | Performed by: NURSE PRACTITIONER

## 2021-08-04 PROCEDURE — 1126F PR PAIN SEVERITY QUANTIFIED, NO PAIN PRESENT: ICD-10-PCS | Mod: CPTII,S$GLB,, | Performed by: NURSE PRACTITIONER

## 2021-08-04 PROCEDURE — 1160F RVW MEDS BY RX/DR IN RCRD: CPT | Mod: CPTII,S$GLB,, | Performed by: NURSE PRACTITIONER

## 2021-08-04 PROCEDURE — 3288F FALL RISK ASSESSMENT DOCD: CPT | Mod: CPTII,S$GLB,, | Performed by: NURSE PRACTITIONER

## 2021-08-04 PROCEDURE — 93280 CARDIAC DEVICE CHECK - IN CLINIC & HOSPITAL: ICD-10-PCS | Mod: 26,,, | Performed by: INTERNAL MEDICINE

## 2021-08-04 PROCEDURE — 93280 PM DEVICE PROGR EVAL DUAL: CPT

## 2021-08-04 PROCEDURE — 99999 PR PBB SHADOW E&M-EST. PATIENT-LVL III: CPT | Mod: PBBFAC,,, | Performed by: NURSE PRACTITIONER

## 2021-08-04 PROCEDURE — 3288F PR FALLS RISK ASSESSMENT DOCUMENTED: ICD-10-PCS | Mod: CPTII,S$GLB,, | Performed by: NURSE PRACTITIONER

## 2021-08-04 PROCEDURE — 99499 RISK ADDL DX/OHS AUDIT: ICD-10-PCS | Mod: S$GLB,,, | Performed by: NURSE PRACTITIONER

## 2021-08-04 PROCEDURE — 1159F PR MEDICATION LIST DOCUMENTED IN MEDICAL RECORD: ICD-10-PCS | Mod: CPTII,S$GLB,, | Performed by: NURSE PRACTITIONER

## 2021-08-04 PROCEDURE — 99214 OFFICE O/P EST MOD 30 MIN: CPT | Mod: S$GLB,,, | Performed by: NURSE PRACTITIONER

## 2021-08-04 PROCEDURE — 93280 PM DEVICE PROGR EVAL DUAL: CPT | Mod: 26,,, | Performed by: INTERNAL MEDICINE

## 2021-08-04 PROCEDURE — 1159F MED LIST DOCD IN RCRD: CPT | Mod: CPTII,S$GLB,, | Performed by: NURSE PRACTITIONER

## 2021-08-04 PROCEDURE — 1126F AMNT PAIN NOTED NONE PRSNT: CPT | Mod: CPTII,S$GLB,, | Performed by: NURSE PRACTITIONER

## 2021-08-04 PROCEDURE — 1101F PR PT FALLS ASSESS DOC 0-1 FALLS W/OUT INJ PAST YR: ICD-10-PCS | Mod: CPTII,S$GLB,, | Performed by: NURSE PRACTITIONER

## 2021-08-04 PROCEDURE — 1160F PR REVIEW ALL MEDS BY PRESCRIBER/CLIN PHARMACIST DOCUMENTED: ICD-10-PCS | Mod: CPTII,S$GLB,, | Performed by: NURSE PRACTITIONER

## 2021-08-04 PROCEDURE — 99214 PR OFFICE/OUTPT VISIT, EST, LEVL IV, 30-39 MIN: ICD-10-PCS | Mod: S$GLB,,, | Performed by: NURSE PRACTITIONER

## 2021-08-04 PROCEDURE — 1101F PT FALLS ASSESS-DOCD LE1/YR: CPT | Mod: CPTII,S$GLB,, | Performed by: NURSE PRACTITIONER

## 2021-08-04 RX ORDER — CLOTRIMAZOLE AND BETAMETHASONE DIPROPIONATE 10; .64 MG/G; MG/G
CREAM TOPICAL 2 TIMES DAILY
Status: ON HOLD | COMMUNITY
Start: 2021-07-15 | End: 2023-12-02 | Stop reason: HOSPADM

## 2021-08-04 RX ORDER — MUPIROCIN 20 MG/G
OINTMENT TOPICAL 3 TIMES DAILY
COMMUNITY
Start: 2021-07-15 | End: 2022-03-17

## 2021-08-20 ENCOUNTER — CLINICAL SUPPORT (OUTPATIENT)
Dept: CARDIOLOGY | Facility: HOSPITAL | Age: 86
End: 2021-08-20
Payer: MEDICARE

## 2021-08-20 DIAGNOSIS — I44.2 ATRIOVENTRICULAR BLOCK, COMPLETE: ICD-10-CM

## 2021-08-20 DIAGNOSIS — Z95.0 PRESENCE OF CARDIAC PACEMAKER: ICD-10-CM

## 2021-08-20 PROCEDURE — 93294 REM INTERROG EVL PM/LDLS PM: CPT | Mod: ,,, | Performed by: INTERNAL MEDICINE

## 2021-08-20 PROCEDURE — 93296 REM INTERROG EVL PM/IDS: CPT | Performed by: INTERNAL MEDICINE

## 2021-08-20 PROCEDURE — 93294 CARDIAC DEVICE CHECK - REMOTE: ICD-10-PCS | Mod: ,,, | Performed by: INTERNAL MEDICINE

## 2021-08-24 ENCOUNTER — HOSPITAL ENCOUNTER (EMERGENCY)
Facility: HOSPITAL | Age: 86
Discharge: HOME | End: 2021-08-24
Attending: EMERGENCY MEDICINE
Payer: MEDICARE

## 2021-08-24 VITALS
WEIGHT: 160 LBS | HEART RATE: 117 BPM | OXYGEN SATURATION: 97 % | BODY MASS INDEX: 33.58 KG/M2 | HEIGHT: 58 IN | SYSTOLIC BLOOD PRESSURE: 196 MMHG | DIASTOLIC BLOOD PRESSURE: 116 MMHG | TEMPERATURE: 98 F | RESPIRATION RATE: 17 BRPM

## 2021-08-24 DIAGNOSIS — M79.89 LEG SWELLING: ICD-10-CM

## 2021-08-24 DIAGNOSIS — U07.1 COVID-19 VIRUS INFECTION: Primary | ICD-10-CM

## 2021-08-24 LAB
ALBUMIN SERPL BCP-MCNC: 3.3 G/DL (ref 3.5–5.2)
ALP SERPL-CCNC: 112 U/L (ref 55–135)
ALT SERPL W/O P-5'-P-CCNC: 15 U/L (ref 10–44)
ANION GAP SERPL CALC-SCNC: 12 MMOL/L (ref 8–16)
AST SERPL-CCNC: 25 U/L (ref 10–40)
BASOPHILS # BLD AUTO: 0.04 K/UL (ref 0–0.2)
BASOPHILS NFR BLD: 0.5 % (ref 0–1.9)
BILIRUB SERPL-MCNC: 0.3 MG/DL (ref 0.1–1)
BNP SERPL-MCNC: 128 PG/ML (ref 0–99)
BUN SERPL-MCNC: 22 MG/DL (ref 10–30)
CALCIUM SERPL-MCNC: 9 MG/DL (ref 8.7–10.5)
CHLORIDE SERPL-SCNC: 108 MMOL/L (ref 95–110)
CK SERPL-CCNC: 98 U/L (ref 20–180)
CO2 SERPL-SCNC: 18 MMOL/L (ref 23–29)
CREAT SERPL-MCNC: 0.9 MG/DL (ref 0.5–1.4)
CRP SERPL-MCNC: 3 MG/L (ref 0–8.2)
CTP QC/QA: YES
DIFFERENTIAL METHOD: ABNORMAL
EOSINOPHIL # BLD AUTO: 0 K/UL (ref 0–0.5)
EOSINOPHIL NFR BLD: 0.3 % (ref 0–8)
ERYTHROCYTE [DISTWIDTH] IN BLOOD BY AUTOMATED COUNT: 13.9 % (ref 11.5–14.5)
EST. GFR  (AFRICAN AMERICAN): >60 ML/MIN/1.73 M^2
EST. GFR  (NON AFRICAN AMERICAN): 56 ML/MIN/1.73 M^2
FERRITIN SERPL-MCNC: 229 NG/ML (ref 20–300)
GLUCOSE SERPL-MCNC: 114 MG/DL (ref 70–110)
HCT VFR BLD AUTO: 40.1 % (ref 37–48.5)
HGB BLD-MCNC: 13.6 G/DL (ref 12–16)
IMM GRANULOCYTES # BLD AUTO: 0.05 K/UL (ref 0–0.04)
IMM GRANULOCYTES NFR BLD AUTO: 0.6 % (ref 0–0.5)
LACTATE SERPL-SCNC: 1.7 MMOL/L (ref 0.5–2.2)
LDH SERPL L TO P-CCNC: 299 U/L (ref 110–260)
LYMPHOCYTES # BLD AUTO: 1.3 K/UL (ref 1–4.8)
LYMPHOCYTES NFR BLD: 17 % (ref 18–48)
MAGNESIUM SERPL-MCNC: 1.9 MG/DL (ref 1.6–2.6)
MCH RBC QN AUTO: 30 PG (ref 27–31)
MCHC RBC AUTO-ENTMCNC: 33.9 G/DL (ref 32–36)
MCV RBC AUTO: 88 FL (ref 82–98)
MONOCYTES # BLD AUTO: 0.4 K/UL (ref 0.3–1)
MONOCYTES NFR BLD: 4.5 % (ref 4–15)
NEUTROPHILS # BLD AUTO: 6 K/UL (ref 1.8–7.7)
NEUTROPHILS NFR BLD: 77.1 % (ref 38–73)
NRBC BLD-RTO: 0 /100 WBC
PHOSPHATE SERPL-MCNC: 2.6 MG/DL (ref 2.7–4.5)
PLATELET # BLD AUTO: 195 K/UL (ref 150–450)
PMV BLD AUTO: 11.3 FL (ref 9.2–12.9)
POTASSIUM SERPL-SCNC: 4.2 MMOL/L (ref 3.5–5.1)
PROT SERPL-MCNC: 7.1 G/DL (ref 6–8.4)
RBC # BLD AUTO: 4.54 M/UL (ref 4–5.4)
SARS-COV-2 RDRP RESP QL NAA+PROBE: POSITIVE
SODIUM SERPL-SCNC: 138 MMOL/L (ref 136–145)
TROPONIN I SERPL DL<=0.01 NG/ML-MCNC: 0.03 NG/ML (ref 0–0.03)
TROPONIN I SERPL DL<=0.01 NG/ML-MCNC: 0.03 NG/ML (ref 0–0.03)
WBC # BLD AUTO: 7.83 K/UL (ref 3.9–12.7)

## 2021-08-24 PROCEDURE — 63600175 PHARM REV CODE 636 W HCPCS: Performed by: EMERGENCY MEDICINE

## 2021-08-24 PROCEDURE — 93010 EKG 12-LEAD: ICD-10-PCS | Mod: ,,, | Performed by: INTERNAL MEDICINE

## 2021-08-24 PROCEDURE — 83615 LACTATE (LD) (LDH) ENZYME: CPT | Performed by: NURSE PRACTITIONER

## 2021-08-24 PROCEDURE — 93010 ELECTROCARDIOGRAM REPORT: CPT | Mod: ,,, | Performed by: INTERNAL MEDICINE

## 2021-08-24 PROCEDURE — 84484 ASSAY OF TROPONIN QUANT: CPT | Performed by: EMERGENCY MEDICINE

## 2021-08-24 PROCEDURE — 99285 EMERGENCY DEPT VISIT HI MDM: CPT | Mod: 25

## 2021-08-24 PROCEDURE — 96374 THER/PROPH/DIAG INJ IV PUSH: CPT

## 2021-08-24 PROCEDURE — 80053 COMPREHEN METABOLIC PANEL: CPT | Performed by: EMERGENCY MEDICINE

## 2021-08-24 PROCEDURE — 85025 COMPLETE CBC W/AUTO DIFF WBC: CPT | Performed by: EMERGENCY MEDICINE

## 2021-08-24 PROCEDURE — 82550 ASSAY OF CK (CPK): CPT | Performed by: NURSE PRACTITIONER

## 2021-08-24 PROCEDURE — 83605 ASSAY OF LACTIC ACID: CPT | Performed by: NURSE PRACTITIONER

## 2021-08-24 PROCEDURE — 84100 ASSAY OF PHOSPHORUS: CPT | Performed by: NURSE PRACTITIONER

## 2021-08-24 PROCEDURE — 82728 ASSAY OF FERRITIN: CPT | Performed by: NURSE PRACTITIONER

## 2021-08-24 PROCEDURE — 93005 ELECTROCARDIOGRAM TRACING: CPT

## 2021-08-24 PROCEDURE — 84484 ASSAY OF TROPONIN QUANT: CPT | Mod: 91 | Performed by: NURSE PRACTITIONER

## 2021-08-24 PROCEDURE — 86140 C-REACTIVE PROTEIN: CPT | Performed by: NURSE PRACTITIONER

## 2021-08-24 PROCEDURE — U0002 COVID-19 LAB TEST NON-CDC: HCPCS | Performed by: EMERGENCY MEDICINE

## 2021-08-24 PROCEDURE — 83880 ASSAY OF NATRIURETIC PEPTIDE: CPT | Performed by: EMERGENCY MEDICINE

## 2021-08-24 PROCEDURE — 83735 ASSAY OF MAGNESIUM: CPT | Performed by: NURSE PRACTITIONER

## 2021-08-24 RX ORDER — ALBUTEROL SULFATE 90 UG/1
2 AEROSOL, METERED RESPIRATORY (INHALATION)
Status: DISCONTINUED | OUTPATIENT
Start: 2021-08-24 | End: 2021-08-24 | Stop reason: HOSPADM

## 2021-08-24 RX ORDER — HYDRALAZINE HYDROCHLORIDE 20 MG/ML
10 INJECTION INTRAMUSCULAR; INTRAVENOUS
Status: COMPLETED | OUTPATIENT
Start: 2021-08-24 | End: 2021-08-24

## 2021-08-24 RX ADMIN — HYDRALAZINE HYDROCHLORIDE 10 MG: 20 INJECTION, SOLUTION INTRAMUSCULAR; INTRAVENOUS at 06:08

## 2021-08-30 ENCOUNTER — NURSE TRIAGE (OUTPATIENT)
Dept: ADMINISTRATIVE | Facility: CLINIC | Age: 86
End: 2021-08-30

## 2021-10-07 ENCOUNTER — TELEPHONE (OUTPATIENT)
Dept: CARDIOLOGY | Facility: HOSPITAL | Age: 86
End: 2021-10-07

## 2021-10-08 ENCOUNTER — DOCUMENTATION ONLY (OUTPATIENT)
Dept: CARDIOLOGY | Facility: HOSPITAL | Age: 86
End: 2021-10-08

## 2021-11-18 ENCOUNTER — CLINICAL SUPPORT (OUTPATIENT)
Dept: CARDIOLOGY | Facility: HOSPITAL | Age: 86
End: 2021-11-18
Payer: MEDICARE

## 2021-11-18 DIAGNOSIS — Z95.0 PRESENCE OF CARDIAC PACEMAKER: ICD-10-CM

## 2021-11-18 PROCEDURE — 93296 REM INTERROG EVL PM/IDS: CPT | Performed by: INTERNAL MEDICINE

## 2021-11-18 PROCEDURE — 93294 CARDIAC DEVICE CHECK - REMOTE: ICD-10-PCS | Mod: ,,, | Performed by: INTERNAL MEDICINE

## 2021-11-18 PROCEDURE — 93294 REM INTERROG EVL PM/LDLS PM: CPT | Mod: ,,, | Performed by: INTERNAL MEDICINE

## 2021-12-15 ENCOUNTER — TELEPHONE (OUTPATIENT)
Dept: CARDIOLOGY | Facility: CLINIC | Age: 86
End: 2021-12-15
Payer: MEDICARE

## 2021-12-15 RX ORDER — METOPROLOL SUCCINATE 25 MG/1
TABLET, EXTENDED RELEASE ORAL
Qty: 90 TABLET | Refills: 3 | Status: ON HOLD | OUTPATIENT
Start: 2021-12-15 | End: 2023-12-02 | Stop reason: HOSPADM

## 2022-01-28 ENCOUNTER — LAB VISIT (OUTPATIENT)
Dept: LAB | Facility: HOSPITAL | Age: 87
End: 2022-01-28
Attending: INTERNAL MEDICINE
Payer: MEDICARE

## 2022-01-28 DIAGNOSIS — R73.01 FASTING HYPERGLYCEMIA: ICD-10-CM

## 2022-01-28 DIAGNOSIS — R55 VASOVAGAL SYNCOPE: ICD-10-CM

## 2022-01-28 DIAGNOSIS — I10 ESSENTIAL HYPERTENSION: ICD-10-CM

## 2022-01-28 DIAGNOSIS — I50.32 CHRONIC HEART FAILURE WITH PRESERVED EJECTION FRACTION (HFPEF): ICD-10-CM

## 2022-01-28 DIAGNOSIS — I25.10 CORONARY ARTERY DISEASE INVOLVING NATIVE CORONARY ARTERY OF NATIVE HEART WITHOUT ANGINA PECTORIS: ICD-10-CM

## 2022-01-28 LAB
ALBUMIN SERPL BCP-MCNC: 3.2 G/DL (ref 3.5–5.2)
ALP SERPL-CCNC: 109 U/L (ref 55–135)
ALT SERPL W/O P-5'-P-CCNC: 12 U/L (ref 10–44)
ANION GAP SERPL CALC-SCNC: 10 MMOL/L (ref 8–16)
AST SERPL-CCNC: 20 U/L (ref 10–40)
BASOPHILS # BLD AUTO: 0.07 K/UL (ref 0–0.2)
BASOPHILS NFR BLD: 1.1 % (ref 0–1.9)
BILIRUB SERPL-MCNC: 0.4 MG/DL (ref 0.1–1)
BNP SERPL-MCNC: 89 PG/ML (ref 0–99)
BUN SERPL-MCNC: 20 MG/DL (ref 10–30)
CALCIUM SERPL-MCNC: 9.4 MG/DL (ref 8.7–10.5)
CHLORIDE SERPL-SCNC: 110 MMOL/L (ref 95–110)
CHOLEST SERPL-MCNC: 189 MG/DL (ref 120–199)
CHOLEST/HDLC SERPL: 4.6 {RATIO} (ref 2–5)
CO2 SERPL-SCNC: 21 MMOL/L (ref 23–29)
CREAT SERPL-MCNC: 0.9 MG/DL (ref 0.5–1.4)
DIFFERENTIAL METHOD: NORMAL
EOSINOPHIL # BLD AUTO: 0.2 K/UL (ref 0–0.5)
EOSINOPHIL NFR BLD: 3.3 % (ref 0–8)
ERYTHROCYTE [DISTWIDTH] IN BLOOD BY AUTOMATED COUNT: 13.1 % (ref 11.5–14.5)
EST. GFR  (AFRICAN AMERICAN): >60 ML/MIN/1.73 M^2
EST. GFR  (NON AFRICAN AMERICAN): 56 ML/MIN/1.73 M^2
ESTIMATED AVG GLUCOSE: 120 MG/DL (ref 68–131)
GLUCOSE SERPL-MCNC: 98 MG/DL (ref 70–110)
HBA1C MFR BLD: 5.8 % (ref 4–5.6)
HCT VFR BLD AUTO: 38.5 % (ref 37–48.5)
HDLC SERPL-MCNC: 41 MG/DL (ref 40–75)
HDLC SERPL: 21.7 % (ref 20–50)
HGB BLD-MCNC: 12.8 G/DL (ref 12–16)
IMM GRANULOCYTES # BLD AUTO: 0.01 K/UL (ref 0–0.04)
IMM GRANULOCYTES NFR BLD AUTO: 0.2 % (ref 0–0.5)
LDLC SERPL CALC-MCNC: 118 MG/DL (ref 63–159)
LYMPHOCYTES # BLD AUTO: 2.5 K/UL (ref 1–4.8)
LYMPHOCYTES NFR BLD: 39.8 % (ref 18–48)
MCH RBC QN AUTO: 29.8 PG (ref 27–31)
MCHC RBC AUTO-ENTMCNC: 33.2 G/DL (ref 32–36)
MCV RBC AUTO: 90 FL (ref 82–98)
MONOCYTES # BLD AUTO: 0.5 K/UL (ref 0.3–1)
MONOCYTES NFR BLD: 7.6 % (ref 4–15)
NEUTROPHILS # BLD AUTO: 3.1 K/UL (ref 1.8–7.7)
NEUTROPHILS NFR BLD: 48 % (ref 38–73)
NONHDLC SERPL-MCNC: 148 MG/DL
NRBC BLD-RTO: 0 /100 WBC
PLATELET # BLD AUTO: 186 K/UL (ref 150–450)
PMV BLD AUTO: 11 FL (ref 9.2–12.9)
POTASSIUM SERPL-SCNC: 3.7 MMOL/L (ref 3.5–5.1)
PROT SERPL-MCNC: 6.9 G/DL (ref 6–8.4)
RBC # BLD AUTO: 4.29 M/UL (ref 4–5.4)
SODIUM SERPL-SCNC: 141 MMOL/L (ref 136–145)
TRIGL SERPL-MCNC: 150 MG/DL (ref 30–150)
TSH SERPL DL<=0.005 MIU/L-ACNC: 2.65 UIU/ML (ref 0.4–4)
WBC # BLD AUTO: 6.35 K/UL (ref 3.9–12.7)

## 2022-01-28 PROCEDURE — 83036 HEMOGLOBIN GLYCOSYLATED A1C: CPT | Performed by: INTERNAL MEDICINE

## 2022-01-28 PROCEDURE — 80061 LIPID PANEL: CPT | Performed by: INTERNAL MEDICINE

## 2022-01-28 PROCEDURE — 83880 ASSAY OF NATRIURETIC PEPTIDE: CPT | Performed by: INTERNAL MEDICINE

## 2022-01-28 PROCEDURE — 36415 COLL VENOUS BLD VENIPUNCTURE: CPT | Performed by: INTERNAL MEDICINE

## 2022-01-28 PROCEDURE — 84443 ASSAY THYROID STIM HORMONE: CPT | Performed by: INTERNAL MEDICINE

## 2022-01-28 PROCEDURE — 85025 COMPLETE CBC W/AUTO DIFF WBC: CPT | Performed by: INTERNAL MEDICINE

## 2022-01-28 PROCEDURE — 80053 COMPREHEN METABOLIC PANEL: CPT | Performed by: INTERNAL MEDICINE

## 2022-02-01 ENCOUNTER — OFFICE VISIT (OUTPATIENT)
Dept: CARDIOLOGY | Facility: CLINIC | Age: 87
End: 2022-02-01
Payer: MEDICARE

## 2022-02-01 VITALS
BODY MASS INDEX: 37.26 KG/M2 | HEIGHT: 58 IN | OXYGEN SATURATION: 92 % | HEART RATE: 78 BPM | SYSTOLIC BLOOD PRESSURE: 150 MMHG | WEIGHT: 177.5 LBS | DIASTOLIC BLOOD PRESSURE: 80 MMHG

## 2022-02-01 DIAGNOSIS — I34.0 MILD MITRAL REGURGITATION: ICD-10-CM

## 2022-02-01 DIAGNOSIS — I49.8 PACEMAKER-DEPENDENT DUE TO NATIVE CARDIAC RHYTHM INSUFFICIENT TO SUPPORT LIFE: ICD-10-CM

## 2022-02-01 DIAGNOSIS — R73.01 FASTING HYPERGLYCEMIA: ICD-10-CM

## 2022-02-01 DIAGNOSIS — I10 ESSENTIAL HYPERTENSION: ICD-10-CM

## 2022-02-01 DIAGNOSIS — R60.0 LOCALIZED EDEMA: ICD-10-CM

## 2022-02-01 DIAGNOSIS — I73.9 PAD (PERIPHERAL ARTERY DISEASE): ICD-10-CM

## 2022-02-01 DIAGNOSIS — I06.0 RHEUMATIC AORTIC STENOSIS: ICD-10-CM

## 2022-02-01 DIAGNOSIS — I05.0 RHEUMATIC MITRAL STENOSIS: ICD-10-CM

## 2022-02-01 DIAGNOSIS — I44.2 COMPLETE HEART BLOCK: ICD-10-CM

## 2022-02-01 DIAGNOSIS — R55 VASOVAGAL SYNCOPE: ICD-10-CM

## 2022-02-01 DIAGNOSIS — Z98.61 S/P PTCA (PERCUTANEOUS TRANSLUMINAL CORONARY ANGIOPLASTY): ICD-10-CM

## 2022-02-01 DIAGNOSIS — I50.32 CHRONIC HEART FAILURE WITH PRESERVED EJECTION FRACTION (HFPEF): ICD-10-CM

## 2022-02-01 DIAGNOSIS — Z95.0 CARDIAC PACEMAKER: Primary | ICD-10-CM

## 2022-02-01 DIAGNOSIS — L60.0 ONYCHOMYCOSIS WITH INGROWN TOENAIL: ICD-10-CM

## 2022-02-01 DIAGNOSIS — Z95.0 PACEMAKER-DEPENDENT DUE TO NATIVE CARDIAC RHYTHM INSUFFICIENT TO SUPPORT LIFE: ICD-10-CM

## 2022-02-01 DIAGNOSIS — B35.1 ONYCHOMYCOSIS WITH INGROWN TOENAIL: ICD-10-CM

## 2022-02-01 DIAGNOSIS — I25.10 CORONARY ARTERY DISEASE INVOLVING NATIVE CORONARY ARTERY OF NATIVE HEART WITHOUT ANGINA PECTORIS: ICD-10-CM

## 2022-02-01 PROCEDURE — 3288F PR FALLS RISK ASSESSMENT DOCUMENTED: ICD-10-PCS | Mod: CPTII,S$GLB,, | Performed by: INTERNAL MEDICINE

## 2022-02-01 PROCEDURE — 1126F PR PAIN SEVERITY QUANTIFIED, NO PAIN PRESENT: ICD-10-PCS | Mod: CPTII,S$GLB,, | Performed by: INTERNAL MEDICINE

## 2022-02-01 PROCEDURE — 99214 PR OFFICE/OUTPT VISIT, EST, LEVL IV, 30-39 MIN: ICD-10-PCS | Mod: S$GLB,,, | Performed by: INTERNAL MEDICINE

## 2022-02-01 PROCEDURE — 1101F PR PT FALLS ASSESS DOC 0-1 FALLS W/OUT INJ PAST YR: ICD-10-PCS | Mod: CPTII,S$GLB,, | Performed by: INTERNAL MEDICINE

## 2022-02-01 PROCEDURE — 99999 PR PBB SHADOW E&M-EST. PATIENT-LVL IV: CPT | Mod: PBBFAC,,, | Performed by: INTERNAL MEDICINE

## 2022-02-01 PROCEDURE — 1126F AMNT PAIN NOTED NONE PRSNT: CPT | Mod: CPTII,S$GLB,, | Performed by: INTERNAL MEDICINE

## 2022-02-01 PROCEDURE — 99214 OFFICE O/P EST MOD 30 MIN: CPT | Mod: PBBFAC,PO | Performed by: INTERNAL MEDICINE

## 2022-02-01 PROCEDURE — 3288F FALL RISK ASSESSMENT DOCD: CPT | Mod: CPTII,S$GLB,, | Performed by: INTERNAL MEDICINE

## 2022-02-01 PROCEDURE — 1159F MED LIST DOCD IN RCRD: CPT | Mod: CPTII,S$GLB,, | Performed by: INTERNAL MEDICINE

## 2022-02-01 PROCEDURE — 1101F PT FALLS ASSESS-DOCD LE1/YR: CPT | Mod: CPTII,S$GLB,, | Performed by: INTERNAL MEDICINE

## 2022-02-01 PROCEDURE — 93000 ELECTROCARDIOGRAM COMPLETE: CPT | Mod: S$GLB,,, | Performed by: INTERNAL MEDICINE

## 2022-02-01 PROCEDURE — 93000 EKG 12-LEAD: ICD-10-PCS | Mod: S$GLB,,, | Performed by: INTERNAL MEDICINE

## 2022-02-01 PROCEDURE — 99214 OFFICE O/P EST MOD 30 MIN: CPT | Mod: S$GLB,,, | Performed by: INTERNAL MEDICINE

## 2022-02-01 PROCEDURE — 99999 PR PBB SHADOW E&M-EST. PATIENT-LVL IV: ICD-10-PCS | Mod: PBBFAC,,, | Performed by: INTERNAL MEDICINE

## 2022-02-01 PROCEDURE — 1159F PR MEDICATION LIST DOCUMENTED IN MEDICAL RECORD: ICD-10-PCS | Mod: CPTII,S$GLB,, | Performed by: INTERNAL MEDICINE

## 2022-02-01 RX ORDER — ALBUTEROL SULFATE 90 UG/1
2 AEROSOL, METERED RESPIRATORY (INHALATION) EVERY 4 HOURS PRN
Qty: 18 G | Refills: 11 | Status: SHIPPED | OUTPATIENT
Start: 2022-02-01

## 2022-02-01 RX ORDER — POTASSIUM CHLORIDE 750 MG/1
20 TABLET, EXTENDED RELEASE ORAL DAILY
Qty: 180 TABLET | Refills: 3 | Status: SHIPPED | OUTPATIENT
Start: 2022-02-01

## 2022-02-01 RX ORDER — LOSARTAN POTASSIUM 100 MG/1
100 TABLET ORAL DAILY
Qty: 90 TABLET | Refills: 3 | Status: ON HOLD | OUTPATIENT
Start: 2022-02-01 | End: 2023-11-30

## 2022-02-01 RX ORDER — TOLTERODINE TARTRATE 1 MG/1
1 TABLET, EXTENDED RELEASE ORAL DAILY
Status: ON HOLD | COMMUNITY
End: 2023-12-02 | Stop reason: HOSPADM
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
## 2022-02-16 ENCOUNTER — CLINICAL SUPPORT (OUTPATIENT)
Dept: CARDIOLOGY | Facility: HOSPITAL | Age: 87
End: 2022-02-16
Payer: MEDICARE

## 2022-02-16 DIAGNOSIS — Z95.0 PRESENCE OF CARDIAC PACEMAKER: ICD-10-CM

## 2022-02-16 PROCEDURE — 93296 REM INTERROG EVL PM/IDS: CPT | Performed by: INTERNAL MEDICINE

## 2022-03-17 ENCOUNTER — OFFICE VISIT (OUTPATIENT)
Dept: PODIATRY | Facility: CLINIC | Age: 87
End: 2022-03-17
Payer: MEDICARE

## 2022-03-17 VITALS
WEIGHT: 174 LBS | DIASTOLIC BLOOD PRESSURE: 92 MMHG | SYSTOLIC BLOOD PRESSURE: 152 MMHG | BODY MASS INDEX: 35.08 KG/M2 | HEIGHT: 59 IN

## 2022-03-17 DIAGNOSIS — L03.031 PARONYCHIA OF GREAT TOE, RIGHT: ICD-10-CM

## 2022-03-17 PROCEDURE — 99999 PR PBB SHADOW E&M-EST. PATIENT-LVL IV: CPT | Mod: PBBFAC,,, | Performed by: PODIATRIST

## 2022-03-17 PROCEDURE — 11730 AVULSION NAIL PLATE SIMPLE 1: CPT | Mod: TA,S$GLB,, | Performed by: PODIATRIST

## 2022-03-17 PROCEDURE — 1159F PR MEDICATION LIST DOCUMENTED IN MEDICAL RECORD: ICD-10-PCS | Mod: CPTII,S$GLB,, | Performed by: PODIATRIST

## 2022-03-17 PROCEDURE — 11730 PR REMOVAL OF NAIL PLATE: ICD-10-PCS | Mod: TA,S$GLB,, | Performed by: PODIATRIST

## 2022-03-17 PROCEDURE — 1126F AMNT PAIN NOTED NONE PRSNT: CPT | Mod: CPTII,S$GLB,, | Performed by: PODIATRIST

## 2022-03-17 PROCEDURE — 99203 OFFICE O/P NEW LOW 30 MIN: CPT | Mod: 25,S$GLB,, | Performed by: PODIATRIST

## 2022-03-17 PROCEDURE — 3288F PR FALLS RISK ASSESSMENT DOCUMENTED: ICD-10-PCS | Mod: CPTII,S$GLB,, | Performed by: PODIATRIST

## 2022-03-17 PROCEDURE — 3288F FALL RISK ASSESSMENT DOCD: CPT | Mod: CPTII,S$GLB,, | Performed by: PODIATRIST

## 2022-03-17 PROCEDURE — 1160F RVW MEDS BY RX/DR IN RCRD: CPT | Mod: CPTII,S$GLB,, | Performed by: PODIATRIST

## 2022-03-17 PROCEDURE — 1159F MED LIST DOCD IN RCRD: CPT | Mod: CPTII,S$GLB,, | Performed by: PODIATRIST

## 2022-03-17 PROCEDURE — 1126F PR PAIN SEVERITY QUANTIFIED, NO PAIN PRESENT: ICD-10-PCS | Mod: CPTII,S$GLB,, | Performed by: PODIATRIST

## 2022-03-17 PROCEDURE — 99203 PR OFFICE/OUTPT VISIT, NEW, LEVL III, 30-44 MIN: ICD-10-PCS | Mod: 25,S$GLB,, | Performed by: PODIATRIST

## 2022-03-17 PROCEDURE — 1160F PR REVIEW ALL MEDS BY PRESCRIBER/CLIN PHARMACIST DOCUMENTED: ICD-10-PCS | Mod: CPTII,S$GLB,, | Performed by: PODIATRIST

## 2022-03-17 PROCEDURE — 1101F PT FALLS ASSESS-DOCD LE1/YR: CPT | Mod: CPTII,S$GLB,, | Performed by: PODIATRIST

## 2022-03-17 PROCEDURE — 1101F PR PT FALLS ASSESS DOC 0-1 FALLS W/OUT INJ PAST YR: ICD-10-PCS | Mod: CPTII,S$GLB,, | Performed by: PODIATRIST

## 2022-03-17 PROCEDURE — 99999 PR PBB SHADOW E&M-EST. PATIENT-LVL IV: ICD-10-PCS | Mod: PBBFAC,,, | Performed by: PODIATRIST

## 2022-03-17 RX ORDER — MUPIROCIN 20 MG/G
OINTMENT TOPICAL 2 TIMES DAILY
Qty: 22 G | Refills: 1 | Status: ON HOLD | OUTPATIENT
Start: 2022-03-17 | End: 2023-12-02 | Stop reason: HOSPADM

## 2022-03-17 NOTE — PATIENT INSTRUCTIONS
Prescribed topical compound nail solution from [facility] to be applied 1-2 times per day for a year or longer as discussed.

## 2022-03-17 NOTE — PROGRESS NOTES
"Subjective:      Patient ID: Marycruz Perez is a 89 y.o. female.    Chief Complaint: Ingrown Toenail    Leave a painful ingrown toenail to left great toe 4 week.  Her daughter normally trims her toenails however states that a week after she trim the toenail there was red discoloration and pain.  No trauma reported.    Vitals:    03/17/22 1508   BP: (!) 152/92   Weight: 78.9 kg (174 lb)   Height: 4' 11" (1.499 m)   PainSc: 0-No pain      Past Medical History:   Diagnosis Date    Acute exacerbation of CHF (congestive heart failure) 10/27/2020    Hypertension     Syncope and collapse        Past Surgical History:   Procedure Laterality Date    A-V CARDIAC PACEMAKER INSERTION N/A 2/22/2021    Procedure: INSERTION, CARDIAC PACEMAKER, DUAL CHAMBER;  Surgeon: Norman Foote MD;  Location: [facility] EP LAB;  Service: Cardiology;  Laterality: N/A;  AVB, PPM upgrade to Dual PPM (right sided), Bio, Venogram prior to draping, MAC, [place], 3 Prep    CARDIAC CATHETERIZATION      CARDIAC PACEMAKER PLACEMENT      CHOLECYSTECTOMY      CORONARY ANGIOPLASTY      HYSTERECTOMY      REVISION OF SKIN POCKET FOR PACEMAKER  2/22/2021    Procedure: Revision, Skin Pocket, For Cardiac Pacemaker;  Surgeon: Norman Foote MD;  Location: [facility] EP LAB;  Service: Cardiology;;       Family History   Problem Relation Age of Onset    Liver disease Mother        Social History     Socioeconomic History    Marital status: Single   Tobacco Use    Smoking status: Never Smoker    Smokeless tobacco: Never Used   Substance and Sexual Activity    Alcohol use: No    Drug use: No       Current Outpatient Medications   Medication Sig Dispense Refill    albuterol (PROVENTIL/VENTOLIN HFA) 90 mcg/actuation inhaler Inhale 2 puffs into the lungs every 4 (four) hours as needed for Wheezing (cough or wheezing). Rescue 18 g 11    calcium carbonate (OS-BERE) 600 mg calcium (1,500 mg) Tab Take 600 mg by mouth once daily.       cetirizine (ZYRTEC) 10 MG tablet Take " 10 mg by mouth once daily.      clopidogrel (PLAVIX) 75 mg tablet TK 1 T PO QD  2    clotrimazole-betamethasone 1-0.05% (LOTRISONE) cream 2 (two) times daily. Apply to affected area      diclofenac sodium (VOLTAREN) 1 % Gel as needed.       losartan (COZAAR) 100 MG tablet Take 1 tablet (100 mg total) by mouth once daily. 90 tablet 3    metoprolol succinate (TOPROL-XL) 25 MG 24 hr tablet TK 1 T PO D 90 tablet 3    potassium chloride SA (K-DUR,KLOR-CON) 10 MEQ tablet Take 2 tablets (20 mEq total) by mouth once daily. 180 tablet 3    tobramycin sulfate 0.3% (TOBREX) 0.3 % ophthalmic solution INSTILL 1 DROP IN RIGHT EYE FOUR TIMES DAILY      tolterodine (DETROL) 1 MG Tab Take 1 mg by mouth once daily.      ZINC ACETATE ORAL Take by mouth once daily.       furosemide (LASIX) 20 MG tablet Take 2 tablets (40 mg total) by mouth once daily. 180 tablet 3    mupirocin (BACTROBAN) 2 % ointment Apply topically 2 (two) times daily. 22 g 1    nitroGLYCERIN (NITROSTAT) 0.4 MG SL tablet Place 1 tablet (0.4 mg total) under the tongue every 5 (five) minutes as needed for Chest pain. 30 tablet 11     No current facility-administered medications for this visit.     Facility-Administered Medications Ordered in Other Visits   Medication Dose Route Frequency Provider Last Rate Last Admin    vancomycin in dextrose 5 % 1 gram/250 mL IVPB 1,000 mg  1,000 mg Intravenous On Call Procedure Rocío Blevins NP   1,000 mg at 02/22/21 0963       Review of patient's allergies indicates:   Allergen Reactions    Codeine     Latex, natural rubber     Penicillins     Penicillin Rash         Review of Systems   Constitutional: Negative for chills and fever.   HENT: Negative for congestion and hearing loss.    Cardiovascular: Positive for leg swelling.   Respiratory: Negative for cough.    Skin: Positive for color change and nail changes.   Gastrointestinal: Negative for nausea and vomiting.   Neurological: Negative for numbness.    Psychiatric/Behavioral: Negative for altered mental status.           Objective:      Physical Exam  Constitutional:       General: She is not in acute distress.     Appearance: She is obese. She is not ill-appearing.   Cardiovascular:      Pulses:           Dorsalis pedis pulses are 1+ on the right side and 1+ on the left side.        Posterior tibial pulses are 1+ on the right side and 2+ on the left side.      Comments: Moderate edema to lower extremity bilateral.  Skin temp is warm to foot bilateral.  No rubor noted.  Musculoskeletal:      Comments: No pain with ROM or MMT bilateral lower extremity.     Skin:     General: Skin is warm.      Capillary Refill: Capillary refill takes less than 2 seconds.      Findings: Erythema present. No ecchymosis.      Nails: There is no clubbing.      Comments: Left hallux nail with incurvated medial nail border and localized erythema and edema at the distal medial nail border.  There is white and yellow discoloration along the medial nail border of the left hallux nail.    Right hallux nail is thickened and discolored yellow with some dystrophy noted.    Remaining toenails have incurvated appearance however normal in appearance.   Neurological:      Mental Status: She is alert and oriented to person, place, and time.      Motor: Motor function is intact.               Assessment:       Encounter Diagnosis   Name Primary?    Paronychia of great toe, right          Plan:       Marycruz was seen today for ingrown toenail.    Diagnoses and all orders for this visit:    Paronychia of great toe, right  -     Ambulatory referral/consult to Podiatry    Other orders  -     mupirocin (BACTROBAN) 2 % ointment; Apply topically 2 (two) times daily.      I counseled the patient on her conditions, their implications and medical management.    The patient's verbal consent attempted to trim the left hallux medial nail border however is too painful for the patient.  We discussed left hallux  medial nail border avulsion.  Risks, benefits anticipate postop course discussed today.  Patient elected proceed surgical intervention.    Procedure:  Left hallux medial nail border avulsion  Pathology: none  EBL: < 1 mL  Materials: none  Injectibles: 3.5 mL 1% plain lidocaine and 3.5 mL 0.25% plain Marcaine  Complications: none    Procedure in detail: Time out called verifying patient, procedure and toe. Skin prepped with alcohol followed by ethyl chloride application and infiltration of local anesthetic described above into proximal toe. Skin was prepped with betadine. A sterile tourniquet was applied to the toe. Sterile instrumentation was used to excise the affected nail border described above. The tourniquet was released noting instant return of the toe color and capillary fill time was instant. Bacitracin ointment was applied to the wound followed by gauze secured with coban.     The patient tolerated the procedure well without complication. Home care instructions reviewed in detail.    Discussed treatment options for fungal toenails to include topical vs oral vs laser vs combined therapy in detail.  [facility] compound antifungal nail solution to be applied to the right hallux nail 1-2 times per day for year longer discussed.    RTC p.r.n. as discussed.  We discussed it will take approximately 6 months or longer for the nail to grow.  Discussed appropriate sizing shoes to prevent direct irritation pressure to the left hallux nail.    A portion of this note was generated by voice recognition software and may contain spelling and grammar errors.    .

## 2022-05-17 ENCOUNTER — CLINICAL SUPPORT (OUTPATIENT)
Dept: CARDIOLOGY | Facility: HOSPITAL | Age: 87
End: 2022-05-17
Payer: MEDICARE

## 2022-05-17 DIAGNOSIS — Z95.0 PRESENCE OF CARDIAC PACEMAKER: ICD-10-CM

## 2022-05-17 DIAGNOSIS — I44.2 ATRIOVENTRICULAR BLOCK, COMPLETE: ICD-10-CM

## 2022-05-17 PROCEDURE — 93294 REM INTERROG EVL PM/LDLS PM: CPT | Mod: ,,, | Performed by: INTERNAL MEDICINE

## 2022-05-17 PROCEDURE — 93296 REM INTERROG EVL PM/IDS: CPT | Performed by: INTERNAL MEDICINE

## 2022-05-17 PROCEDURE — 93294 CARDIAC DEVICE CHECK - REMOTE: ICD-10-PCS | Mod: ,,, | Performed by: INTERNAL MEDICINE

## 2022-08-15 ENCOUNTER — CLINICAL SUPPORT (OUTPATIENT)
Dept: CARDIOLOGY | Facility: HOSPITAL | Age: 87
End: 2022-08-15
Payer: MEDICARE

## 2022-08-15 DIAGNOSIS — I44.2 ATRIOVENTRICULAR BLOCK, COMPLETE: ICD-10-CM

## 2022-08-15 DIAGNOSIS — Z95.0 PRESENCE OF CARDIAC PACEMAKER: ICD-10-CM

## 2022-08-15 PROCEDURE — 93296 REM INTERROG EVL PM/IDS: CPT | Performed by: INTERNAL MEDICINE

## 2022-09-22 ENCOUNTER — TELEPHONE (OUTPATIENT)
Dept: ELECTROPHYSIOLOGY | Facility: CLINIC | Age: 87
End: 2022-09-22
Payer: MEDICARE

## 2022-11-13 ENCOUNTER — CLINICAL SUPPORT (OUTPATIENT)
Dept: CARDIOLOGY | Facility: HOSPITAL | Age: 87
End: 2022-11-13
Payer: MEDICARE

## 2022-11-13 DIAGNOSIS — I44.2 ATRIOVENTRICULAR BLOCK, COMPLETE: ICD-10-CM

## 2022-11-13 DIAGNOSIS — Z95.0 PRESENCE OF CARDIAC PACEMAKER: ICD-10-CM

## 2022-11-13 PROCEDURE — 93294 CARDIAC DEVICE CHECK - REMOTE: ICD-10-PCS | Mod: ,,, | Performed by: INTERNAL MEDICINE

## 2022-11-13 PROCEDURE — 93294 REM INTERROG EVL PM/LDLS PM: CPT | Mod: ,,, | Performed by: INTERNAL MEDICINE

## 2022-11-13 PROCEDURE — 93296 REM INTERROG EVL PM/IDS: CPT | Performed by: INTERNAL MEDICINE

## 2022-12-16 DIAGNOSIS — I44.2 COMPLETE HEART BLOCK: Primary | ICD-10-CM

## 2022-12-29 ENCOUNTER — HOSPITAL ENCOUNTER (EMERGENCY)
Facility: HOSPITAL | Age: 87
Discharge: HOME | End: 2022-12-29
Attending: EMERGENCY MEDICINE
Payer: MEDICARE

## 2022-12-29 VITALS
WEIGHT: 180 LBS | BODY MASS INDEX: 36.36 KG/M2 | TEMPERATURE: 98 F | OXYGEN SATURATION: 96 % | RESPIRATION RATE: 18 BRPM | DIASTOLIC BLOOD PRESSURE: 75 MMHG | HEART RATE: 97 BPM | SYSTOLIC BLOOD PRESSURE: 170 MMHG

## 2022-12-29 DIAGNOSIS — R06.02 SOB (SHORTNESS OF BREATH): ICD-10-CM

## 2022-12-29 DIAGNOSIS — N20.1 LEFT URETERAL CALCULUS: ICD-10-CM

## 2022-12-29 DIAGNOSIS — N23 URETERAL COLIC: Primary | ICD-10-CM

## 2022-12-29 LAB
ALBUMIN SERPL BCP-MCNC: 3.3 G/DL (ref 3.5–5.2)
ALP SERPL-CCNC: 102 U/L (ref 55–135)
ALT SERPL W/O P-5'-P-CCNC: 11 U/L (ref 10–44)
ANION GAP SERPL CALC-SCNC: 10 MMOL/L (ref 8–16)
AST SERPL-CCNC: 22 U/L (ref 10–40)
BACTERIA #/AREA URNS HPF: ABNORMAL /HPF
BASOPHILS # BLD AUTO: 0.08 K/UL (ref 0–0.2)
BASOPHILS NFR BLD: 1.1 % (ref 0–1.9)
BILIRUB SERPL-MCNC: 0.3 MG/DL (ref 0.1–1)
BILIRUB UR QL STRIP: NEGATIVE
BUN SERPL-MCNC: 22 MG/DL (ref 10–30)
CALCIUM SERPL-MCNC: 9.1 MG/DL (ref 8.7–10.5)
CHLORIDE SERPL-SCNC: 106 MMOL/L (ref 95–110)
CLARITY UR: CLEAR
CO2 SERPL-SCNC: 23 MMOL/L (ref 23–29)
COLOR UR: YELLOW
CREAT SERPL-MCNC: 0.8 MG/DL (ref 0.5–1.4)
DIFFERENTIAL METHOD: NORMAL
EOSINOPHIL # BLD AUTO: 0.2 K/UL (ref 0–0.5)
EOSINOPHIL NFR BLD: 2.1 % (ref 0–8)
ERYTHROCYTE [DISTWIDTH] IN BLOOD BY AUTOMATED COUNT: 13.6 % (ref 11.5–14.5)
EST. GFR  (NO RACE VARIABLE): >60 ML/MIN/1.73 M^2
GLUCOSE SERPL-MCNC: 105 MG/DL (ref 70–110)
GLUCOSE UR QL STRIP: NEGATIVE
HCT VFR BLD AUTO: 38.8 % (ref 37–48.5)
HGB BLD-MCNC: 13 G/DL (ref 12–16)
HGB UR QL STRIP: ABNORMAL
IMM GRANULOCYTES # BLD AUTO: 0.02 K/UL (ref 0–0.04)
IMM GRANULOCYTES NFR BLD AUTO: 0.3 % (ref 0–0.5)
KETONES UR QL STRIP: NEGATIVE
LACTATE SERPL-SCNC: 1.2 MMOL/L (ref 0.5–2.2)
LEUKOCYTE ESTERASE UR QL STRIP: NEGATIVE
LIPASE SERPL-CCNC: 38 U/L (ref 4–60)
LYMPHOCYTES # BLD AUTO: 2.4 K/UL (ref 1–4.8)
LYMPHOCYTES NFR BLD: 33.1 % (ref 18–48)
MCH RBC QN AUTO: 29.7 PG (ref 27–31)
MCHC RBC AUTO-ENTMCNC: 33.5 G/DL (ref 32–36)
MCV RBC AUTO: 89 FL (ref 82–98)
MICROSCOPIC COMMENT: ABNORMAL
MONOCYTES # BLD AUTO: 0.5 K/UL (ref 0.3–1)
MONOCYTES NFR BLD: 6.2 % (ref 4–15)
NEUTROPHILS # BLD AUTO: 4.1 K/UL (ref 1.8–7.7)
NEUTROPHILS NFR BLD: 57.2 % (ref 38–73)
NITRITE UR QL STRIP: NEGATIVE
NRBC BLD-RTO: 0 /100 WBC
PH UR STRIP: 7 [PH] (ref 5–8)
PLATELET # BLD AUTO: 172 K/UL (ref 150–450)
PMV BLD AUTO: 11.4 FL (ref 9.2–12.9)
POTASSIUM SERPL-SCNC: 4.2 MMOL/L (ref 3.5–5.1)
PROT SERPL-MCNC: 7.2 G/DL (ref 6–8.4)
PROT UR QL STRIP: NEGATIVE
RBC # BLD AUTO: 4.37 M/UL (ref 4–5.4)
RBC #/AREA URNS HPF: >100 /HPF (ref 0–4)
SODIUM SERPL-SCNC: 139 MMOL/L (ref 136–145)
SP GR UR STRIP: 1.01 (ref 1–1.03)
UNIDENT CRYS URNS QL MICRO: 5
URN SPEC COLLECT METH UR: ABNORMAL
UROBILINOGEN UR STRIP-ACNC: NEGATIVE EU/DL
WBC # BLD AUTO: 7.23 K/UL (ref 3.9–12.7)
WBC #/AREA URNS HPF: 3 /HPF (ref 0–5)

## 2022-12-29 PROCEDURE — 96375 TX/PRO/DX INJ NEW DRUG ADDON: CPT

## 2022-12-29 PROCEDURE — 83690 ASSAY OF LIPASE: CPT | Performed by: EMERGENCY MEDICINE

## 2022-12-29 PROCEDURE — 93005 ELECTROCARDIOGRAM TRACING: CPT

## 2022-12-29 PROCEDURE — 25500020 PHARM REV CODE 255: Performed by: EMERGENCY MEDICINE

## 2022-12-29 PROCEDURE — 81000 URINALYSIS NONAUTO W/SCOPE: CPT | Performed by: EMERGENCY MEDICINE

## 2022-12-29 PROCEDURE — 83605 ASSAY OF LACTIC ACID: CPT | Performed by: EMERGENCY MEDICINE

## 2022-12-29 PROCEDURE — 93010 ELECTROCARDIOGRAM REPORT: CPT | Mod: ,,, | Performed by: INTERNAL MEDICINE

## 2022-12-29 PROCEDURE — 85025 COMPLETE CBC W/AUTO DIFF WBC: CPT | Performed by: EMERGENCY MEDICINE

## 2022-12-29 PROCEDURE — 93010 EKG 12-LEAD: ICD-10-PCS | Mod: ,,, | Performed by: INTERNAL MEDICINE

## 2022-12-29 PROCEDURE — 25000003 PHARM REV CODE 250: Performed by: EMERGENCY MEDICINE

## 2022-12-29 PROCEDURE — 96365 THER/PROPH/DIAG IV INF INIT: CPT

## 2022-12-29 PROCEDURE — 99285 EMERGENCY DEPT VISIT HI MDM: CPT | Mod: 25

## 2022-12-29 PROCEDURE — 80053 COMPREHEN METABOLIC PANEL: CPT | Performed by: EMERGENCY MEDICINE

## 2022-12-29 PROCEDURE — 63600175 PHARM REV CODE 636 W HCPCS: Performed by: EMERGENCY MEDICINE

## 2022-12-29 RX ORDER — ONDANSETRON 2 MG/ML
4 INJECTION INTRAMUSCULAR; INTRAVENOUS
Status: COMPLETED | OUTPATIENT
Start: 2022-12-29 | End: 2022-12-29

## 2022-12-29 RX ORDER — KETOROLAC TROMETHAMINE 30 MG/ML
15 INJECTION, SOLUTION INTRAMUSCULAR; INTRAVENOUS
Status: COMPLETED | OUTPATIENT
Start: 2022-12-29 | End: 2022-12-29

## 2022-12-29 RX ORDER — ONDANSETRON 4 MG/1
4 TABLET, ORALLY DISINTEGRATING ORAL EVERY 6 HOURS PRN
Qty: 10 TABLET | Refills: 0 | Status: SHIPPED | OUTPATIENT
Start: 2022-12-29

## 2022-12-29 RX ORDER — HYDROCODONE BITARTRATE AND ACETAMINOPHEN 5; 325 MG/1; MG/1
1 TABLET ORAL EVERY 4 HOURS PRN
Qty: 18 TABLET | Refills: 0 | Status: ON HOLD | OUTPATIENT
Start: 2022-12-29 | End: 2023-12-02 | Stop reason: HOSPADM

## 2022-12-29 RX ORDER — MORPHINE SULFATE 2 MG/ML
2 INJECTION, SOLUTION INTRAMUSCULAR; INTRAVENOUS
Status: COMPLETED | OUTPATIENT
Start: 2022-12-29 | End: 2022-12-29

## 2022-12-29 RX ORDER — KETOROLAC TROMETHAMINE 10 MG/1
10 TABLET, FILM COATED ORAL EVERY 8 HOURS PRN
Qty: 20 TABLET | Refills: 0 | Status: ON HOLD | OUTPATIENT
Start: 2022-12-29 | End: 2023-12-02 | Stop reason: HOSPADM

## 2022-12-29 RX ORDER — HYDROCODONE BITARTRATE AND ACETAMINOPHEN 5; 325 MG/1; MG/1
1 TABLET ORAL
Status: COMPLETED | OUTPATIENT
Start: 2022-12-29 | End: 2022-12-29

## 2022-12-29 RX ADMIN — MORPHINE SULFATE 2 MG: 2 INJECTION, SOLUTION INTRAMUSCULAR; INTRAVENOUS at 04:12

## 2022-12-29 RX ADMIN — HYDROCODONE BITARTRATE AND ACETAMINOPHEN 1 TABLET: 5; 325 TABLET ORAL at 07:12

## 2022-12-29 RX ADMIN — IOHEXOL 75 ML: 350 INJECTION, SOLUTION INTRAVENOUS at 05:12

## 2022-12-29 RX ADMIN — PROMETHAZINE HYDROCHLORIDE 12.5 MG: 25 INJECTION INTRAMUSCULAR; INTRAVENOUS at 07:12

## 2022-12-29 RX ADMIN — KETOROLAC TROMETHAMINE 15 MG: 30 INJECTION, SOLUTION INTRAMUSCULAR; INTRAVENOUS at 07:12

## 2022-12-29 RX ADMIN — ONDANSETRON HYDROCHLORIDE 4 MG: 2 SOLUTION INTRAMUSCULAR; INTRAVENOUS at 04:12

## 2022-12-29 NOTE — ED PROVIDER NOTES
Encounter Date: 12/29/2022       History     Chief Complaint   Patient presents with    Abdominal Pain     Pt c/o abd pain, nausea and vomiting since this morning. BP on arrival is 229/106. C/o mild dizziness. Denies headache. Denies chest pain but reports feeling winded.      The patient is a 89-year-old with the below past medical history.  She presents with left-sided abdominal pain that began this morning.  The pain is constant.  It is severe.  She is unable to further characterize.  She has associated nausea and vomiting.  She denies diarrhea and constipation.  She denies fever, chills, chest pain, palpitations, cough, shortness of breath, and back pain.  She has not taking medications to attempt treatment for the symptoms.  There are no exacerbating or alleviating factors.  She denies sick contacts.    The history is provided by the patient and a relative. No  was used.   Review of patient's allergies indicates:   Allergen Reactions    Codeine     Latex, natural rubber     Penicillins     Penicillin Rash     Past Medical History:   Diagnosis Date    Acute exacerbation of CHF (congestive heart failure) 10/27/2020    Hypertension     Syncope and collapse      Past Surgical History:   Procedure Laterality Date    A-V CARDIAC PACEMAKER INSERTION N/A 2/22/2021    Procedure: INSERTION, CARDIAC PACEMAKER, DUAL CHAMBER;  Surgeon: Norman Foote MD;  Location: [facility] EP LAB;  Service: Cardiology;  Laterality: N/A;  AVB, PPM upgrade to Dual PPM (right sided), Bio, Venogram prior to draping, MAC, ND, 3 Prep    CARDIAC CATHETERIZATION      CARDIAC PACEMAKER PLACEMENT      CHOLECYSTECTOMY      CORONARY ANGIOPLASTY      HYSTERECTOMY      REVISION OF SKIN POCKET FOR PACEMAKER  2/22/2021    Procedure: Revision, Skin Pocket, For Cardiac Pacemaker;  Surgeon: Norman Foote MD;  Location: [facility] EP LAB;  Service: Cardiology;;     Family History   Problem Relation Age of Onset    Liver disease Mother      Social  History     Tobacco Use    Smoking status: Never    Smokeless tobacco: Never   Substance Use Topics    Alcohol use: No    Drug use: No     Review of Systems   Constitutional:  Negative for chills and fever.   HENT:  Negative for sore throat and trouble swallowing.    Eyes:  Negative for visual disturbance.   Respiratory:  Negative for shortness of breath.    Cardiovascular:  Negative for chest pain.   Gastrointestinal:  Positive for abdominal pain, nausea and vomiting. Negative for blood in stool, constipation and diarrhea.   Genitourinary:  Negative for difficulty urinating and dysuria.   Musculoskeletal:  Negative for back pain.   Allergic/Immunologic: Negative for immunocompromised state.   Neurological:  Negative for dizziness and headaches.     Physical Exam     Initial Vitals [12/29/22 1437]   BP Pulse Resp Temp SpO2   (!) 229/106 88 18 97.9 °F (36.6 °C) (!) 94 %      MAP       --         Physical Exam    Nursing note and vitals reviewed.  Constitutional: She is not diaphoretic. No distress.   HENT:   Mouth/Throat: Oropharynx is clear and moist.   Eyes: Conjunctivae are normal. No scleral icterus.   Cardiovascular:  Normal rate, regular rhythm and normal heart sounds.     Exam reveals no gallop and no friction rub.       No murmur heard.  Pulmonary/Chest: No respiratory distress. She has no wheezes. She has no rhonchi. She has no rales.   Abdominal: Abdomen is soft. She exhibits no distension. There is abdominal tenderness in the left upper quadrant and left lower quadrant.   No right CVA tenderness.  No left CVA tenderness. There is no rebound and no guarding.     Neurological: She is alert and oriented to person, place, and time. GCS eye subscore is 4. GCS verbal subscore is 5. GCS motor subscore is 6.   Skin: Skin is warm and dry. No pallor.   Psychiatric: She is not actively hallucinating. She is attentive.       ED Course   Procedures  Labs Reviewed   COMPREHENSIVE METABOLIC PANEL - Abnormal; Notable for  the following components:       Result Value    Albumin 3.3 (*)     All other components within normal limits   URINALYSIS, REFLEX TO URINE CULTURE - Abnormal; Notable for the following components:    Occult Blood UA 3+ (*)     All other components within normal limits    Narrative:     Specimen Source->Urine   URINALYSIS MICROSCOPIC - Abnormal; Notable for the following components:    RBC, UA >100 (*)     All other components within normal limits    Narrative:     Specimen Source->Urine   CBC W/ AUTO DIFFERENTIAL   LIPASE   LACTIC ACID, PLASMA          Imaging Results              CT Abdomen Pelvis With Contrast (Final result)  Result time 12/29/22 17:45:50      Final result by Bradley Alford DO (12/29/22 17:45:50)                   Impression:      1. 3 mm calculus at the left UVJ resulting in mild left-sided hydronephrosis.  Questionable additional calculus in the left proximal ureter as above.  2. Right lower lobe pulmonary nodule measuring 6 mm.  For a solid nodule 6-8 mm, Fleischner Society 2017 guidelines recommend follow up with non-contrast chest CT at 6-12 months and 18-24 months after discovery.  3. Intra and extrahepatic biliary ductal dilation which may be related to cholecystectomy changes.  4. Colonic diverticulosis without acute diverticulitis.  5. Cardiomegaly.      Electronically signed by: Bradley Alford  Date:    12/29/2022  Time:    17:45               Narrative:    EXAMINATION:  CT ABDOMEN PELVIS WITH CONTRAST    CLINICAL HISTORY:  LLQ abdominal pain;    TECHNIQUE:  Axial CT images with sagittal and coronal reformats were obtained of the abdomen and pelvis from the hemidiaphragms through the symphysis pubis after the administration of 75mL Omnipaque 350.    COMPARISON:  None available.    FINDINGS:  Lung Bases: There is a 6 mm right lower lobe pulmonary nodule (series 2, image 16).  There is mild atelectasis in the lung bases.    Heart: The heart is enlarged.  There are mitral annular  calcifications.  There are calcifications of the aortic valve.  Cardiac pacer/AICD leads are noted.  No pericardial effusion.    Liver: The liver is enlarged and demonstrates homogeneous enhancement without focal lesion.  The portal vasculature is patent.    Biliary tract: There is intra and extrahepatic biliary ductal dilation which may be related to cholecystectomy changes.  The CBD measures up to approximately 1.7 cm in maximal diameter.    Gallbladder: No radiodense gallstone. No wall thickening or pericholecystic fluid.    Pancreas: There is mild pancreatic atrophy.  No parenchymal lesions are seen.  No pancreatic ductal dilatation.    Spleen: Normal size without focal lesion.    Adrenals: Unremarkable.    Kidneys and urinary collecting systems: There is a 3 mm calculus at the left UVJ resulting in mild left-sided hydronephrosis.  Additionally, there is an 8 mm calcification within or adjacent to the left proximal ureter (series 2, image 80), difficult to exclude an additional ureteral stone, although this may represent an adjacent vascular calcification.  There is no right-sided hydronephrosis.  No additional renal calculi are seen.    Lymph nodes: None enlarged.    Stomach and bowel: The stomach is normal.  Loops of small and large bowel are normal in caliber without evidence for inflammation or obstruction.  There is colonic diverticulosis without evidence of acute diverticulitis.    Peritoneum and mesentery: There is mild mesenteric panniculitis.  No abdominal fluid collection.  There are 2 nodular lesions in the left upper quadrant mesentery (series 2, image 40), likely representing small splenules.    Vasculature: There is moderate calcified atherosclerosis.    Urinary bladder: No wall thickening.    Reproductive organs: The uterus is absent.    Body wall: Multiple soft tissue calcifications in the bilateral gluteal subcutaneous tissues.    Musculoskeletal: No aggressive osseous lesion or acute fracture  or dislocation.  There are degenerative changes.  Partially imaged intramuscular lipoma in the left anterior thigh.                                       Medications   morphine injection 2 mg (2 mg Intravenous Given 12/29/22 1651)   ondansetron injection 4 mg (4 mg Intravenous Given 12/29/22 1651)   iohexoL (OMNIPAQUE 350) injection 75 mL (75 mLs Intravenous Given 12/29/22 1719)   ketorolac injection 15 mg (15 mg Intravenous Given 12/29/22 1919)   HYDROcodone-acetaminophen 5-325 mg per tablet 1 tablet (1 tablet Oral Given 12/29/22 1918)   promethazine (PHENERGAN) 12.5 mg in dextrose 5 % 50 mL IVPB (0 mg Intravenous Stopped 12/29/22 2011)     Medical Decision Making:   History:   Old Medical Records: I decided to obtain old medical records.  Old Records Summarized: other records.       <> Summary of Records: [facility] reviewed as above.  Differential Diagnosis:   Pyelonephritis, diverticulitis, ureteral colic, mesenteric ischemia, ischemic colitis, bowel obstruction, cystitis, appendicitis, gastritis, enteritis  Clinical Tests:   Lab Tests: Ordered  Radiological Study: Ordered    MDM:  This was an urgent evaluation.  Differential diagnoses included the above and other conditions.  3 mm distal left ureteral stone and possibly an 8 mm proximal stone on contrasted CT.  Symptoms well controlled with ED management.  Patient discharged with instructions to strain urine.  She was prescribed Toradol, Norco, and Zofran.  She was instructed to arrange close follow-up with her PCP.  Strict ED return instructions were given.           ED Course as of 12/29/22 2337   Thu Dec 29, 2022   1513 EKG received/reviewed. No STEMI. [LP]   1848 The patient had initial improvement but her pain and nausea are beginning to worsen. [LP]   2043 She is feeling much better. [LP]      ED Course User Index  [LP] Raphael Marcelo III, MD                 Clinical Impression:   Final diagnoses:  [R06.02] SOB (shortness of breath)  [N20.1] Left ureteral  calculus  [N23] Ureteral colic (Primary)        ED Disposition Condition    Discharge Stable          ED Prescriptions       Medication Sig Dispense Start Date End Date Auth. Provider    ketorolac (TORADOL) 10 mg tablet Take 1 tablet (10 mg total) by mouth every 8 (eight) hours as needed for Pain. 20 tablet 12/29/2022 -- Raphael Marcelo III, MD    HYDROcodone-acetaminophen (NORCO) 5-325 mg per tablet Take 1 tablet by mouth every 4 (four) hours as needed (Moderate to severe pain). 18 tablet 12/29/2022 -- Raphael Marcelo III, MD    ondansetron (ZOFRAN-ODT) 4 MG TbDL Take 1 tablet (4 mg total) by mouth every 6 (six) hours as needed (nausea). 10 tablet 12/29/2022 -- Raphael Marcelo III, MD          Follow-up Information       Follow up With Specialties Details Why Contact Info    Richard Loo MD Internal Medicine  Schedule close follow-up for a recheck to make sure that additional studies or referral to a specialist are not needed. [address] [place] [address]  [phone]      ER   Return to the ER for worsened symptoms or for any other concerns.              Raphael Marcelo III, MD  12/29/22 1618

## 2022-12-29 NOTE — ED TRIAGE NOTES
Pt complains of LUQ/LLQ pain that began after drinking coffee and eating breakfast this am, x 3 emesis reported. Pt denies urinary complaints,pain 7/10, pt also states easton was unable to take home meds this am due to nausea and pain

## 2022-12-30 NOTE — ED NOTES
Patient stated she was feeling better. Pure wick was not successful. Patient cleaned with diaper applied.

## 2023-01-05 ENCOUNTER — TELEPHONE (OUTPATIENT)
Dept: ELECTROPHYSIOLOGY | Facility: CLINIC | Age: 88
End: 2023-01-05
Payer: MEDICARE

## 2023-01-06 ENCOUNTER — OFFICE VISIT (OUTPATIENT)
Dept: CARDIOLOGY | Facility: CLINIC | Age: 88
End: 2023-01-06
Payer: MEDICARE

## 2023-01-06 VITALS — HEART RATE: 87 BPM | DIASTOLIC BLOOD PRESSURE: 87 MMHG | SYSTOLIC BLOOD PRESSURE: 148 MMHG

## 2023-01-06 DIAGNOSIS — I73.9 PAD (PERIPHERAL ARTERY DISEASE): ICD-10-CM

## 2023-01-06 DIAGNOSIS — Z95.0 CARDIAC PACEMAKER: ICD-10-CM

## 2023-01-06 DIAGNOSIS — E66.01 MORBID (SEVERE) OBESITY DUE TO EXCESS CALORIES: ICD-10-CM

## 2023-01-06 DIAGNOSIS — I44.2 COMPLETE HEART BLOCK: Primary | ICD-10-CM

## 2023-01-06 DIAGNOSIS — I50.32 CHRONIC HEART FAILURE WITH PRESERVED EJECTION FRACTION (HFPEF): ICD-10-CM

## 2023-01-06 PROCEDURE — 93010 ELECTROCARDIOGRAM REPORT: CPT | Mod: S$GLB,,, | Performed by: INTERNAL MEDICINE

## 2023-01-06 PROCEDURE — 1101F PT FALLS ASSESS-DOCD LE1/YR: CPT | Mod: CPTII,S$GLB,, | Performed by: INTERNAL MEDICINE

## 2023-01-06 PROCEDURE — 1101F PR PT FALLS ASSESS DOC 0-1 FALLS W/OUT INJ PAST YR: ICD-10-PCS | Mod: CPTII,S$GLB,, | Performed by: INTERNAL MEDICINE

## 2023-01-06 PROCEDURE — 93010 RHYTHM STRIP: ICD-10-PCS | Mod: S$GLB,,, | Performed by: INTERNAL MEDICINE

## 2023-01-06 PROCEDURE — 1126F AMNT PAIN NOTED NONE PRSNT: CPT | Mod: CPTII,S$GLB,, | Performed by: INTERNAL MEDICINE

## 2023-01-06 PROCEDURE — 1126F PR PAIN SEVERITY QUANTIFIED, NO PAIN PRESENT: ICD-10-PCS | Mod: CPTII,S$GLB,, | Performed by: INTERNAL MEDICINE

## 2023-01-06 PROCEDURE — 3288F PR FALLS RISK ASSESSMENT DOCUMENTED: ICD-10-PCS | Mod: CPTII,S$GLB,, | Performed by: INTERNAL MEDICINE

## 2023-01-06 PROCEDURE — 93005 ELECTROCARDIOGRAM TRACING: CPT | Mod: S$GLB,,, | Performed by: INTERNAL MEDICINE

## 2023-01-06 PROCEDURE — 99999 PR PBB SHADOW E&M-EST. PATIENT-LVL III: ICD-10-PCS | Mod: PBBFAC,,, | Performed by: INTERNAL MEDICINE

## 2023-01-06 PROCEDURE — 1159F PR MEDICATION LIST DOCUMENTED IN MEDICAL RECORD: ICD-10-PCS | Mod: CPTII,S$GLB,, | Performed by: INTERNAL MEDICINE

## 2023-01-06 PROCEDURE — 1159F MED LIST DOCD IN RCRD: CPT | Mod: CPTII,S$GLB,, | Performed by: INTERNAL MEDICINE

## 2023-01-06 PROCEDURE — 1160F RVW MEDS BY RX/DR IN RCRD: CPT | Mod: CPTII,S$GLB,, | Performed by: INTERNAL MEDICINE

## 2023-01-06 PROCEDURE — 3288F FALL RISK ASSESSMENT DOCD: CPT | Mod: CPTII,S$GLB,, | Performed by: INTERNAL MEDICINE

## 2023-01-06 PROCEDURE — 93005 RHYTHM STRIP: ICD-10-PCS | Mod: S$GLB,,, | Performed by: INTERNAL MEDICINE

## 2023-01-06 PROCEDURE — 99999 PR PBB SHADOW E&M-EST. PATIENT-LVL III: CPT | Mod: PBBFAC,,, | Performed by: INTERNAL MEDICINE

## 2023-01-06 PROCEDURE — 99214 OFFICE O/P EST MOD 30 MIN: CPT | Mod: S$GLB,,, | Performed by: INTERNAL MEDICINE

## 2023-01-06 PROCEDURE — 99214 PR OFFICE/OUTPT VISIT, EST, LEVL IV, 30-39 MIN: ICD-10-PCS | Mod: S$GLB,,, | Performed by: INTERNAL MEDICINE

## 2023-01-06 PROCEDURE — 1160F PR REVIEW ALL MEDS BY PRESCRIBER/CLIN PHARMACIST DOCUMENTED: ICD-10-PCS | Mod: CPTII,S$GLB,, | Performed by: INTERNAL MEDICINE

## 2023-01-06 RX ORDER — ROSUVASTATIN CALCIUM 5 MG/1
5 TABLET, COATED ORAL NIGHTLY
COMMUNITY
Start: 2022-12-02

## 2023-01-06 RX ORDER — TAMSULOSIN HYDROCHLORIDE 0.4 MG/1
1 CAPSULE ORAL NIGHTLY
COMMUNITY
Start: 2022-12-30

## 2023-01-06 NOTE — PROGRESS NOTES
Subjective:    Patient ID:  Marycruz Perez is a 89 y.o. female who presents for evaluation of Pacemaker Check    Referring Cardiologist: Yogesh Newman MD  Primary Care Physician: Richard Loo MD    HPI   I had the pleasure of seeing Mrs. Perez in our electrophysiology clinic today in consultation for her pacemaker and need for AV synchrony. As you are aware she is a pleasant 89 year-old woman with complete AV block and a single ventricular lead PPM (right sided), coronary artery disease with remote PCI and normal LV function, and hypertension. Her original PPM was implanted in 2002 (uncertain indication at that time). She established care recently with Dr. Newman. She was noted to have underlying sinus rhythm with complete AV block. She has recently had issues with diastolic heart failure (admission 10/2020 with hypervolemia and severe HTN).     Device interrogation notes VVI-CLS 70 programming with 100% RV pacing with no underlying sinus conduction to the V when set at VVI 30. Her sensing and capture thresholds were normal however she is set to sense bipolar and capture unipolar.    2/2021: upgraded to dcPPM    8/2021: Saw Heather Jara. Was feeling better.    Interim Hx:  Mrs Perez returns for yearly follow-up. She is doing well.    In-clinic device interrogation notes stable lead parameters with no RA and 100% RV pacing. 1 4 second episode of NSVT. Estimated battery longevity is over 7 years.    My interpretation of today's in clinic is sinus rhythm with RV pacing    Review of Systems   Constitutional: Negative for fever and malaise/fatigue.   HENT:  Negative for congestion and sore throat.    Eyes:  Negative for blurred vision and visual disturbance.   Cardiovascular:  Positive for leg swelling. Negative for chest pain, dyspnea on exertion, irregular heartbeat, near-syncope, palpitations and syncope.   Respiratory:  Negative for cough and shortness of breath.    Hematologic/Lymphatic: Negative for  bleeding problem. Does not bruise/bleed easily.   Skin: Negative.    Musculoskeletal: Negative.    Gastrointestinal:  Negative for bloating, abdominal pain, hematochezia and melena.   Neurological:  Negative for focal weakness and weakness.   Psychiatric/Behavioral: Negative.        Objective:    Physical Exam  Vitals reviewed.   Constitutional:       General: She is not in acute distress.     Appearance: She is well-developed. She is not diaphoretic.   HENT:      Head: Normocephalic and atraumatic.   Eyes:      General:         Right eye: No discharge.         Left eye: No discharge.      Conjunctiva/sclera: Conjunctivae normal.   Cardiovascular:      Rate and Rhythm: Normal rate and regular rhythm.      Heart sounds: No murmur heard.    No friction rub. No gallop.   Pulmonary:      Effort: Pulmonary effort is normal. No respiratory distress.      Breath sounds: Normal breath sounds. No wheezing or rales.   Abdominal:      General: Bowel sounds are normal. There is no distension.      Palpations: Abdomen is soft.      Tenderness: There is no abdominal tenderness.   Musculoskeletal:      Cervical back: Neck supple.   Skin:     General: Skin is warm and dry.   Neurological:      Mental Status: She is alert and oriented to person, place, and time.   Psychiatric:         Behavior: Behavior normal.         Thought Content: Thought content normal.         Judgment: Judgment normal.         Assessment:       1. Complete heart block    2. Morbid (severe) obesity due to excess calories    3. Chronic heart failure with preserved ejection fraction (HFpEF)    4. PAD (peripheral artery disease)    5. Cardiac pacemaker         Plan:       In summary, Mrs. Perez is a pleasant 89 year-old woman with complete AV block and a single ventricular lead PPM (right sided), coronary artery disease with remote PCI and normal LV function, and hypertension now s/p upgrade to dcPPM. She is doing well. Device working appropriately. Continue  remote checks. RTC in 1 year, sooner if needed.    Thank you for allowing me to participate in the care of this patient. Please do not hesitate to call me with any questions or concerns.    Norman Foote MD, PhD  Cardiac Electrophysiology

## 2023-02-11 ENCOUNTER — CLINICAL SUPPORT (OUTPATIENT)
Dept: CARDIOLOGY | Facility: HOSPITAL | Age: 88
End: 2023-02-11
Payer: MEDICARE

## 2023-02-11 DIAGNOSIS — I44.2 ATRIOVENTRICULAR BLOCK, COMPLETE: ICD-10-CM

## 2023-02-11 DIAGNOSIS — Z95.0 PRESENCE OF CARDIAC PACEMAKER: ICD-10-CM

## 2023-02-11 PROCEDURE — 93296 REM INTERROG EVL PM/IDS: CPT | Performed by: INTERNAL MEDICINE

## 2023-05-12 ENCOUNTER — CLINICAL SUPPORT (OUTPATIENT)
Dept: CARDIOLOGY | Facility: HOSPITAL | Age: 88
End: 2023-05-12
Payer: MEDICARE

## 2023-05-12 DIAGNOSIS — Z95.0 PRESENCE OF CARDIAC PACEMAKER: ICD-10-CM

## 2023-05-12 PROCEDURE — 93296 REM INTERROG EVL PM/IDS: CPT | Performed by: INTERNAL MEDICINE

## 2023-05-12 PROCEDURE — 93294 REM INTERROG EVL PM/LDLS PM: CPT | Mod: ,,, | Performed by: INTERNAL MEDICINE

## 2023-05-12 PROCEDURE — 93294 CARDIAC DEVICE CHECK - REMOTE: ICD-10-PCS | Mod: ,,, | Performed by: INTERNAL MEDICINE

## 2023-08-10 ENCOUNTER — CLINICAL SUPPORT (OUTPATIENT)
Dept: CARDIOLOGY | Facility: HOSPITAL | Age: 88
End: 2023-08-10
Payer: MEDICARE

## 2023-08-10 DIAGNOSIS — Z95.0 PRESENCE OF CARDIAC PACEMAKER: ICD-10-CM

## 2023-08-10 PROCEDURE — 93296 REM INTERROG EVL PM/IDS: CPT | Performed by: INTERNAL MEDICINE

## 2023-11-08 ENCOUNTER — CLINICAL SUPPORT (OUTPATIENT)
Dept: CARDIOLOGY | Facility: HOSPITAL | Age: 88
End: 2023-11-08
Payer: MEDICARE

## 2023-11-08 DIAGNOSIS — Z95.0 PRESENCE OF CARDIAC PACEMAKER: ICD-10-CM

## 2023-11-08 PROCEDURE — 93296 REM INTERROG EVL PM/IDS: CPT | Performed by: INTERNAL MEDICINE

## 2023-11-08 PROCEDURE — 93294 REM INTERROG EVL PM/LDLS PM: CPT | Mod: ,,, | Performed by: INTERNAL MEDICINE

## 2023-11-08 PROCEDURE — 93294 CARDIAC DEVICE CHECK - REMOTE: ICD-10-PCS | Mod: ,,, | Performed by: INTERNAL MEDICINE

## 2023-11-30 ENCOUNTER — HOSPITAL ENCOUNTER (OUTPATIENT)
Facility: HOSPITAL | Age: 88
Discharge: HOME | End: 2023-12-02
Attending: EMERGENCY MEDICINE | Admitting: STUDENT IN AN ORGANIZED HEALTH CARE EDUCATION/TRAINING PROGRAM
Payer: MEDICARE

## 2023-11-30 DIAGNOSIS — I21.4 NON-ST ELEVATION MYOCARDIAL INFARCTION (NSTEMI): ICD-10-CM

## 2023-11-30 DIAGNOSIS — R07.9 CHEST PAIN: ICD-10-CM

## 2023-11-30 DIAGNOSIS — I20.0 UNSTABLE ANGINA: Primary | ICD-10-CM

## 2023-11-30 DIAGNOSIS — R79.89 ELEVATED TROPONIN: ICD-10-CM

## 2023-11-30 DIAGNOSIS — I21.4 NSTEMI (NON-ST ELEVATION MYOCARDIAL INFARCTION): ICD-10-CM

## 2023-11-30 PROBLEM — N17.9 AKI (ACUTE KIDNEY INJURY): Status: ACTIVE | Noted: 2023-11-30

## 2023-11-30 PROBLEM — R73.9 HYPERGLYCEMIA: Status: ACTIVE | Noted: 2021-04-13

## 2023-11-30 LAB
ABO + RH BLD: NORMAL
ALBUMIN SERPL BCP-MCNC: 3.2 G/DL (ref 3.5–5.2)
ALP SERPL-CCNC: 126 U/L (ref 55–135)
ALT SERPL W/O P-5'-P-CCNC: 13 U/L (ref 10–44)
ANION GAP SERPL CALC-SCNC: 17 MMOL/L (ref 8–16)
APTT PPP: 25.8 SEC (ref 21–32)
APTT PPP: 25.8 SEC (ref 21–32)
APTT PPP: 58.9 SEC (ref 21–32)
APTT PPP: 61.4 SEC (ref 21–32)
AST SERPL-CCNC: 16 U/L (ref 10–40)
AV INDEX (PROSTH): 0.29
AV MEAN GRADIENT: 20 MMHG
AV PEAK GRADIENT: 28 MMHG
AV VALVE AREA BY VELOCITY RATIO: 0.96 CM²
AV VALVE AREA: 0.78 CM²
AV VELOCITY RATIO: 0.36
BASOPHILS # BLD AUTO: 0.01 K/UL (ref 0–0.2)
BASOPHILS # BLD AUTO: 0.01 K/UL (ref 0–0.2)
BASOPHILS NFR BLD: 0.1 % (ref 0–1.9)
BASOPHILS NFR BLD: 0.2 % (ref 0–1.9)
BILIRUB SERPL-MCNC: 0.2 MG/DL (ref 0.1–1)
BLD GP AB SCN CELLS X3 SERPL QL: NORMAL
BNP SERPL-MCNC: 251 PG/ML (ref 0–99)
BSA FOR ECHO PROCEDURE: 1.84 M2
BUN SERPL-MCNC: 29 MG/DL (ref 10–30)
CALCIUM SERPL-MCNC: 9.5 MG/DL (ref 8.7–10.5)
CHLORIDE SERPL-SCNC: 108 MMOL/L (ref 95–110)
CHOLEST SERPL-MCNC: 197 MG/DL (ref 120–199)
CHOLEST/HDLC SERPL: 5.3 {RATIO} (ref 2–5)
CO2 SERPL-SCNC: 16 MMOL/L (ref 23–29)
CREAT SERPL-MCNC: 1.3 MG/DL (ref 0.5–1.4)
CV ECHO LV RWT: 0.62 CM
DIFFERENTIAL METHOD: ABNORMAL
DIFFERENTIAL METHOD: ABNORMAL
DOP CALC AO PEAK VEL: 2.64 M/S
DOP CALC AO VTI: 59.7 CM
DOP CALC LVOT AREA: 2.7 CM2
DOP CALC LVOT DIAMETER: 1.85 CM
DOP CALC LVOT PEAK VEL: 0.94 M/S
DOP CALC LVOT STROKE VOLUME: 46.48 CM3
DOP CALC MV VTI: 38.6 CM
DOP CALCLVOT PEAK VEL VTI: 17.3 CM
E WAVE DECELERATION TIME: 120.69 MSEC
E/A RATIO: 0.49
ECHO LV POSTERIOR WALL: 0.98 CM (ref 0.6–1.1)
EJECTION FRACTION: 65 %
EOSINOPHIL # BLD AUTO: 0 K/UL (ref 0–0.5)
EOSINOPHIL # BLD AUTO: 0 K/UL (ref 0–0.5)
EOSINOPHIL NFR BLD: 0 % (ref 0–8)
EOSINOPHIL NFR BLD: 0 % (ref 0–8)
ERYTHROCYTE [DISTWIDTH] IN BLOOD BY AUTOMATED COUNT: 13.2 % (ref 11.5–14.5)
ERYTHROCYTE [DISTWIDTH] IN BLOOD BY AUTOMATED COUNT: 13.2 % (ref 11.5–14.5)
EST. GFR  (NO RACE VARIABLE): 38 ML/MIN/1.73 M^2
ESTIMATED AVG GLUCOSE: 123 MG/DL (ref 68–131)
FRACTIONAL SHORTENING: 38 % (ref 28–44)
GLUCOSE SERPL-MCNC: 277 MG/DL (ref 70–110)
HBA1C MFR BLD: 5.9 % (ref 4–5.6)
HCT VFR BLD AUTO: 39.4 % (ref 37–48.5)
HCT VFR BLD AUTO: 40.5 % (ref 37–48.5)
HDLC SERPL-MCNC: 37 MG/DL (ref 40–75)
HDLC SERPL: 18.8 % (ref 20–50)
HGB BLD-MCNC: 13.1 G/DL (ref 12–16)
HGB BLD-MCNC: 13.5 G/DL (ref 12–16)
IMM GRANULOCYTES # BLD AUTO: 0.01 K/UL (ref 0–0.04)
IMM GRANULOCYTES # BLD AUTO: 0.08 K/UL (ref 0–0.04)
IMM GRANULOCYTES NFR BLD AUTO: 0.2 % (ref 0–0.5)
IMM GRANULOCYTES NFR BLD AUTO: 1.1 % (ref 0–0.5)
INR PPP: 1 (ref 0.8–1.2)
INR PPP: 1 (ref 0.8–1.2)
INTERVENTRICULAR SEPTUM: 1.57 CM (ref 0.6–1.1)
LA MAJOR: 6.73 CM
LA MINOR: 6.69 CM
LA WIDTH: 3.6 CM
LDLC SERPL CALC-MCNC: 138.2 MG/DL (ref 63–159)
LEFT ATRIUM SIZE: 4.78 CM
LEFT ATRIUM VOLUME INDEX MOD: 55.2 ML/M2
LEFT ATRIUM VOLUME INDEX: 55.8 ML/M2
LEFT ATRIUM VOLUME MOD: 97.07 CM3
LEFT ATRIUM VOLUME: 98.14 CM3
LEFT INTERNAL DIMENSION IN SYSTOLE: 1.97 CM (ref 2.1–4)
LEFT VENTRICLE DIASTOLIC VOLUME INDEX: 22.66 ML/M2
LEFT VENTRICLE DIASTOLIC VOLUME: 39.88 ML
LEFT VENTRICLE MASS INDEX: 74 G/M2
LEFT VENTRICLE SYSTOLIC VOLUME INDEX: 6.9 ML/M2
LEFT VENTRICLE SYSTOLIC VOLUME: 12.22 ML
LEFT VENTRICULAR INTERNAL DIMENSION IN DIASTOLE: 3.17 CM (ref 3.5–6)
LEFT VENTRICULAR MASS: 129.8 G
LV LATERAL E/E' RATIO: 12.57 M/S
LVOT MG: 2.41 MMHG
LVOT MV: 0.75 CM/S
LYMPHOCYTES # BLD AUTO: 0.8 K/UL (ref 1–4.8)
LYMPHOCYTES # BLD AUTO: 1.3 K/UL (ref 1–4.8)
LYMPHOCYTES NFR BLD: 14.7 % (ref 18–48)
LYMPHOCYTES NFR BLD: 17.3 % (ref 18–48)
MCH RBC QN AUTO: 29.8 PG (ref 27–31)
MCH RBC QN AUTO: 30.2 PG (ref 27–31)
MCHC RBC AUTO-ENTMCNC: 33.2 G/DL (ref 32–36)
MCHC RBC AUTO-ENTMCNC: 33.3 G/DL (ref 32–36)
MCV RBC AUTO: 90 FL (ref 82–98)
MCV RBC AUTO: 91 FL (ref 82–98)
MONOCYTES # BLD AUTO: 0.1 K/UL (ref 0.3–1)
MONOCYTES # BLD AUTO: 0.1 K/UL (ref 0.3–1)
MONOCYTES NFR BLD: 1.3 % (ref 4–15)
MONOCYTES NFR BLD: 1.8 % (ref 4–15)
MV PEAK A VEL: 1.8 M/S
MV PEAK E VEL: 0.88 M/S
MV PEAK GRADIENT: 19 MMHG
MV STENOSIS PRESSURE HALF TIME: 35 MS
MV VALVE AREA BY CONTINUITY EQUATION: 1.2 CM2
MV VALVE AREA P 1/2 METHOD: 6.29 CM2
NEUTROPHILS # BLD AUTO: 4.7 K/UL (ref 1.8–7.7)
NEUTROPHILS # BLD AUTO: 5.8 K/UL (ref 1.8–7.7)
NEUTROPHILS NFR BLD: 79.7 % (ref 38–73)
NEUTROPHILS NFR BLD: 83.6 % (ref 38–73)
NONHDLC SERPL-MCNC: 160 MG/DL
NRBC BLD-RTO: 0 /100 WBC
NRBC BLD-RTO: 0 /100 WBC
OHS LV EJECTION FRACTION SIMPSONS BIPLANE MOD: 60 %
PISA MRMAX VEL: 2.58 M/S
PISA TR MAX VEL: 2.07 M/S
PLATELET # BLD AUTO: 151 K/UL (ref 150–450)
PLATELET # BLD AUTO: 156 K/UL (ref 150–450)
PMV BLD AUTO: 11.1 FL (ref 9.2–12.9)
PMV BLD AUTO: 11.5 FL (ref 9.2–12.9)
POTASSIUM SERPL-SCNC: 3.8 MMOL/L (ref 3.5–5.1)
PROT SERPL-MCNC: 7.1 G/DL (ref 6–8.4)
PROTHROMBIN TIME: 11 SEC (ref 9–12.5)
PROTHROMBIN TIME: 11 SEC (ref 9–12.5)
PULM VEIN S/D RATIO: 0.87
PV PEAK D VEL: 0.45 M/S
PV PEAK GRADIENT: 3 MMHG
PV PEAK S VEL: 0.39 M/S
PV PEAK VELOCITY: 0.86 M/S
RA MAJOR: 5.93 CM
RA PRESSURE ESTIMATED: 3 MMHG
RA WIDTH: 2.61 CM
RBC # BLD AUTO: 4.39 M/UL (ref 4–5.4)
RBC # BLD AUTO: 4.47 M/UL (ref 4–5.4)
RV TB RVSP: 5 MMHG
RV TISSUE DOPPLER FREE WALL SYSTOLIC VELOCITY 1 (APICAL 4 CHAMBER VIEW): 11.82 CM/S
SINUS: 3.32 CM
SODIUM SERPL-SCNC: 141 MMOL/L (ref 136–145)
SPECIMEN OUTDATE: NORMAL
STJ: 3.12 CM
TDI LATERAL: 0.07 M/S
TR MAX PG: 17 MMHG
TRICUSPID ANNULAR PLANE SYSTOLIC EXCURSION: 2.05 CM
TRIGL SERPL-MCNC: 109 MG/DL (ref 30–150)
TROPONIN I SERPL DL<=0.01 NG/ML-MCNC: 0.07 NG/ML (ref 0–0.03)
TROPONIN I SERPL DL<=0.01 NG/ML-MCNC: 0.11 NG/ML (ref 0–0.03)
TROPONIN I SERPL DL<=0.01 NG/ML-MCNC: 0.11 NG/ML (ref 0–0.03)
TROPONIN I SERPL DL<=0.01 NG/ML-MCNC: 0.13 NG/ML (ref 0–0.03)
TROPONIN I SERPL DL<=0.01 NG/ML-MCNC: 0.15 NG/ML (ref 0–0.03)
TV REST PULMONARY ARTERY PRESSURE: 20 MMHG
WBC # BLD AUTO: 5.56 K/UL (ref 3.9–12.7)
WBC # BLD AUTO: 7.3 K/UL (ref 3.9–12.7)
Z-SCORE OF LEFT VENTRICULAR DIMENSION IN END DIASTOLE: -4.29
Z-SCORE OF LEFT VENTRICULAR DIMENSION IN END SYSTOLE: -3.35

## 2023-11-30 PROCEDURE — 80053 COMPREHEN METABOLIC PANEL: CPT | Performed by: EMERGENCY MEDICINE

## 2023-11-30 PROCEDURE — 85730 THROMBOPLASTIN TIME PARTIAL: CPT | Performed by: STUDENT IN AN ORGANIZED HEALTH CARE EDUCATION/TRAINING PROGRAM

## 2023-11-30 PROCEDURE — 99285 EMERGENCY DEPT VISIT HI MDM: CPT | Mod: 25

## 2023-11-30 PROCEDURE — 85025 COMPLETE CBC W/AUTO DIFF WBC: CPT | Mod: 91 | Performed by: STUDENT IN AN ORGANIZED HEALTH CARE EDUCATION/TRAINING PROGRAM

## 2023-11-30 PROCEDURE — 93005 ELECTROCARDIOGRAM TRACING: CPT

## 2023-11-30 PROCEDURE — 84484 ASSAY OF TROPONIN QUANT: CPT | Mod: 91 | Performed by: STUDENT IN AN ORGANIZED HEALTH CARE EDUCATION/TRAINING PROGRAM

## 2023-11-30 PROCEDURE — 86850 RBC ANTIBODY SCREEN: CPT | Performed by: STUDENT IN AN ORGANIZED HEALTH CARE EDUCATION/TRAINING PROGRAM

## 2023-11-30 PROCEDURE — 96366 THER/PROPH/DIAG IV INF ADDON: CPT

## 2023-11-30 PROCEDURE — 83036 HEMOGLOBIN GLYCOSYLATED A1C: CPT | Performed by: STUDENT IN AN ORGANIZED HEALTH CARE EDUCATION/TRAINING PROGRAM

## 2023-11-30 PROCEDURE — 63600175 PHARM REV CODE 636 W HCPCS: Performed by: STUDENT IN AN ORGANIZED HEALTH CARE EDUCATION/TRAINING PROGRAM

## 2023-11-30 PROCEDURE — 27000221 HC OXYGEN, UP TO 24 HOURS

## 2023-11-30 PROCEDURE — 96365 THER/PROPH/DIAG IV INF INIT: CPT

## 2023-11-30 PROCEDURE — 84484 ASSAY OF TROPONIN QUANT: CPT | Performed by: EMERGENCY MEDICINE

## 2023-11-30 PROCEDURE — 93010 ELECTROCARDIOGRAM REPORT: CPT | Mod: ,,, | Performed by: INTERNAL MEDICINE

## 2023-11-30 PROCEDURE — 94761 N-INVAS EAR/PLS OXIMETRY MLT: CPT

## 2023-11-30 PROCEDURE — 85610 PROTHROMBIN TIME: CPT | Performed by: STUDENT IN AN ORGANIZED HEALTH CARE EDUCATION/TRAINING PROGRAM

## 2023-11-30 PROCEDURE — 93010 EKG 12-LEAD: ICD-10-PCS | Mod: ,,, | Performed by: INTERNAL MEDICINE

## 2023-11-30 PROCEDURE — 96375 TX/PRO/DX INJ NEW DRUG ADDON: CPT

## 2023-11-30 PROCEDURE — 99900035 HC TECH TIME PER 15 MIN (STAT)

## 2023-11-30 PROCEDURE — 83880 ASSAY OF NATRIURETIC PEPTIDE: CPT | Performed by: EMERGENCY MEDICINE

## 2023-11-30 PROCEDURE — 99214 PR OFFICE/OUTPT VISIT, EST, LEVL IV, 30-39 MIN: ICD-10-PCS | Mod: 25,,, | Performed by: NURSE PRACTITIONER

## 2023-11-30 PROCEDURE — G0378 HOSPITAL OBSERVATION PER HR: HCPCS

## 2023-11-30 PROCEDURE — 85730 THROMBOPLASTIN TIME PARTIAL: CPT | Mod: 91 | Performed by: FAMILY MEDICINE

## 2023-11-30 PROCEDURE — 99214 OFFICE O/P EST MOD 30 MIN: CPT | Mod: 25,,, | Performed by: NURSE PRACTITIONER

## 2023-11-30 PROCEDURE — 80061 LIPID PANEL: CPT | Performed by: STUDENT IN AN ORGANIZED HEALTH CARE EDUCATION/TRAINING PROGRAM

## 2023-11-30 PROCEDURE — 85025 COMPLETE CBC W/AUTO DIFF WBC: CPT | Performed by: EMERGENCY MEDICINE

## 2023-11-30 PROCEDURE — 36415 COLL VENOUS BLD VENIPUNCTURE: CPT | Performed by: STUDENT IN AN ORGANIZED HEALTH CARE EDUCATION/TRAINING PROGRAM

## 2023-11-30 PROCEDURE — 25000003 PHARM REV CODE 250: Performed by: STUDENT IN AN ORGANIZED HEALTH CARE EDUCATION/TRAINING PROGRAM

## 2023-11-30 PROCEDURE — 36415 COLL VENOUS BLD VENIPUNCTURE: CPT | Performed by: FAMILY MEDICINE

## 2023-11-30 RX ORDER — NALOXONE HCL 0.4 MG/ML
0.02 VIAL (ML) INJECTION
Status: DISCONTINUED | OUTPATIENT
Start: 2023-11-30 | End: 2023-12-02 | Stop reason: HOSPADM

## 2023-11-30 RX ORDER — NITROGLYCERIN 0.4 MG/1
0.4 TABLET SUBLINGUAL EVERY 5 MIN PRN
Status: DISCONTINUED | OUTPATIENT
Start: 2023-11-30 | End: 2023-12-02 | Stop reason: HOSPADM

## 2023-11-30 RX ORDER — CLOPIDOGREL BISULFATE 75 MG/1
75 TABLET ORAL DAILY
Status: DISCONTINUED | OUTPATIENT
Start: 2023-11-30 | End: 2023-12-02 | Stop reason: HOSPADM

## 2023-11-30 RX ORDER — METOPROLOL SUCCINATE 50 MG/1
100 TABLET, EXTENDED RELEASE ORAL DAILY
Status: DISCONTINUED | OUTPATIENT
Start: 2023-11-30 | End: 2023-12-01

## 2023-11-30 RX ORDER — MECLIZINE HYDROCHLORIDE 25 MG/1
25 TABLET ORAL 3 TIMES DAILY PRN
COMMUNITY
Start: 2023-11-20

## 2023-11-30 RX ORDER — IBUPROFEN 200 MG
24 TABLET ORAL
Status: DISCONTINUED | OUTPATIENT
Start: 2023-11-30 | End: 2023-12-02 | Stop reason: HOSPADM

## 2023-11-30 RX ORDER — NAPROXEN SODIUM 220 MG/1
81 TABLET, FILM COATED ORAL DAILY
Status: DISCONTINUED | OUTPATIENT
Start: 2023-12-01 | End: 2023-12-02 | Stop reason: HOSPADM

## 2023-11-30 RX ORDER — IPRATROPIUM BROMIDE AND ALBUTEROL SULFATE 2.5; .5 MG/3ML; MG/3ML
SOLUTION RESPIRATORY (INHALATION) EVERY 6 HOURS PRN
COMMUNITY
Start: 2023-10-11

## 2023-11-30 RX ORDER — IBUPROFEN 200 MG
16 TABLET ORAL
Status: DISCONTINUED | OUTPATIENT
Start: 2023-11-30 | End: 2023-12-02 | Stop reason: HOSPADM

## 2023-11-30 RX ORDER — FUROSEMIDE 10 MG/ML
80 INJECTION INTRAMUSCULAR; INTRAVENOUS ONCE
Status: COMPLETED | OUTPATIENT
Start: 2023-11-30 | End: 2023-11-30

## 2023-11-30 RX ORDER — DONEPEZIL HYDROCHLORIDE 5 MG/1
5 TABLET, FILM COATED ORAL
COMMUNITY
Start: 2023-09-19

## 2023-11-30 RX ORDER — DIPHENHYDRAMINE HCL 25 MG
50 CAPSULE ORAL ONCE
Status: DISCONTINUED | OUTPATIENT
Start: 2023-11-30 | End: 2023-12-02 | Stop reason: HOSPADM

## 2023-11-30 RX ORDER — SODIUM CHLORIDE 0.9 % (FLUSH) 0.9 %
10 SYRINGE (ML) INJECTION EVERY 12 HOURS PRN
Status: DISCONTINUED | OUTPATIENT
Start: 2023-11-30 | End: 2023-12-02 | Stop reason: HOSPADM

## 2023-11-30 RX ORDER — VALSARTAN 160 MG/1
160 TABLET ORAL DAILY
COMMUNITY
Start: 2023-11-25

## 2023-11-30 RX ORDER — GLUCAGON 1 MG
1 KIT INJECTION
Status: DISCONTINUED | OUTPATIENT
Start: 2023-11-30 | End: 2023-12-02 | Stop reason: HOSPADM

## 2023-11-30 RX ORDER — PREDNISONE 20 MG/1
20 TABLET ORAL DAILY
Status: ON HOLD | COMMUNITY
Start: 2023-11-10 | End: 2023-12-02 | Stop reason: HOSPADM

## 2023-11-30 RX ORDER — ATORVASTATIN CALCIUM 40 MG/1
80 TABLET, FILM COATED ORAL DAILY
Status: DISCONTINUED | OUTPATIENT
Start: 2023-11-30 | End: 2023-12-02 | Stop reason: HOSPADM

## 2023-11-30 RX ORDER — BUMETANIDE 0.5 MG/1
0.5 TABLET ORAL
Status: ON HOLD | COMMUNITY
Start: 2023-11-29 | End: 2023-12-02 | Stop reason: SDUPTHER

## 2023-11-30 RX ORDER — HEPARIN SODIUM,PORCINE/D5W 25000/250
0-40 INTRAVENOUS SOLUTION INTRAVENOUS CONTINUOUS
Status: DISCONTINUED | OUTPATIENT
Start: 2023-11-30 | End: 2023-12-01

## 2023-11-30 RX ORDER — DOXYCYCLINE HYCLATE 100 MG
100 TABLET ORAL 2 TIMES DAILY
Status: ON HOLD | COMMUNITY
Start: 2023-10-10 | End: 2023-12-02 | Stop reason: HOSPADM

## 2023-11-30 RX ORDER — AZITHROMYCIN 500 MG/1
500 TABLET, FILM COATED ORAL
Status: ON HOLD | COMMUNITY
Start: 2023-11-29 | End: 2023-12-02 | Stop reason: HOSPADM

## 2023-11-30 RX ADMIN — FUROSEMIDE 80 MG: 10 INJECTION, SOLUTION INTRAVENOUS at 06:11

## 2023-11-30 RX ADMIN — METOPROLOL SUCCINATE 100 MG: 50 TABLET, EXTENDED RELEASE ORAL at 09:11

## 2023-11-30 RX ADMIN — ATORVASTATIN CALCIUM 80 MG: 40 TABLET, FILM COATED ORAL at 06:11

## 2023-11-30 RX ADMIN — HEPARIN SODIUM 12 UNITS/KG/HR: 10000 INJECTION, SOLUTION INTRAVENOUS at 06:11

## 2023-11-30 RX ADMIN — CLOPIDOGREL BISULFATE 75 MG: 75 TABLET ORAL at 09:11

## 2023-11-30 NOTE — SUBJECTIVE & OBJECTIVE
Past Medical History:   Diagnosis Date    Acute exacerbation of CHF (congestive heart failure) 10/27/2020    Hypertension     Syncope and collapse        Past Surgical History:   Procedure Laterality Date    A-V CARDIAC PACEMAKER INSERTION N/A 2/22/2021    Procedure: INSERTION, CARDIAC PACEMAKER, DUAL CHAMBER;  Surgeon: Norman Foote MD;  Location: [facility] EP LAB;  Service: Cardiology;  Laterality: N/A;  AVB, PPM upgrade to Dual PPM (right sided), Bio, Venogram prior to draping, MAC, CO, 3 Prep    CARDIAC CATHETERIZATION      CARDIAC PACEMAKER PLACEMENT      CHOLECYSTECTOMY      CORONARY ANGIOPLASTY      HYSTERECTOMY      REVISION OF SKIN POCKET FOR PACEMAKER  2/22/2021    Procedure: Revision, Skin Pocket, For Cardiac Pacemaker;  Surgeon: Norman Foote MD;  Location: [facility] EP LAB;  Service: Cardiology;;       Review of patient's allergies indicates:   Allergen Reactions    Codeine     Latex, natural rubber     Penicillins     Penicillin Rash       Current Facility-Administered Medications on File Prior to Encounter   Medication    vancomycin in dextrose 5 % 1 gram/250 mL IVPB 1,000 mg     Current Outpatient Medications on File Prior to Encounter   Medication Sig    albuterol (PROVENTIL/VENTOLIN HFA) 90 mcg/actuation inhaler Inhale 2 puffs into the lungs every 4 (four) hours as needed for Wheezing (cough or wheezing). Rescue    diclofenac sodium (VOLTAREN) 1 % Gel as needed.     furosemide (LASIX) 20 MG tablet TAKE 2 TABLETS(40 MG) BY MOUTH EVERY DAY    losartan (COZAAR) 100 MG tablet Take 1 tablet (100 mg total) by mouth once daily.    metoprolol succinate (TOPROL-XL) 25 MG 24 hr tablet TK 1 T PO D    potassium chloride SA (K-DUR,KLOR-CON) 10 MEQ tablet Take 2 tablets (20 mEq total) by mouth once daily.    rosuvastatin (CRESTOR) 5 MG tablet Take 5 mg by mouth every evening.    tamsulosin (FLOMAX) 0.4 mg Cap Take 1 capsule by mouth every evening.    tobramycin sulfate 0.3% (TOBREX) 0.3 % ophthalmic solution INSTILL 1  DROP IN RIGHT EYE FOUR TIMES DAILY    calcium carbonate (OS-BERE) 600 mg calcium (1,500 mg) Tab Take 600 mg by mouth once daily.     cetirizine (ZYRTEC) 10 MG tablet Take 10 mg by mouth once daily.    clopidogrel (PLAVIX) 75 mg tablet TK 1 T PO QD    clotrimazole-betamethasone 1-0.05% (LOTRISONE) cream 2 (two) times daily. Apply to affected area    HYDROcodone-acetaminophen (NORCO) 5-325 mg per tablet Take 1 tablet by mouth every 4 (four) hours as needed (Moderate to severe pain).    ketorolac (TORADOL) 10 mg tablet Take 1 tablet (10 mg total) by mouth every 8 (eight) hours as needed for Pain.    mupirocin (BACTROBAN) 2 % ointment Apply topically 2 (two) times daily.    nitroGLYCERIN (NITROSTAT) 0.4 MG SL tablet Place 1 tablet (0.4 mg total) under the tongue every 5 (five) minutes as needed for Chest pain.    ondansetron (ZOFRAN-ODT) 4 MG TbDL Take 1 tablet (4 mg total) by mouth every 6 (six) hours as needed (nausea).    tolterodine (DETROL) 1 MG Tab Take 1 mg by mouth once daily.    ZINC ACETATE ORAL Take by mouth once daily.      Family History       Problem Relation (Age of Onset)    Liver disease Mother          Tobacco Use    Smoking status: Never    Smokeless tobacco: Never   Substance and Sexual Activity    Alcohol use: No    Drug use: No    Sexual activity: Not on file     Review of Systems 12 point ROS negative except for HPI  Objective:     Vital Signs (Most Recent):  Temp: 98.1 °F (36.7 °C) (11/30/23 0531)  Pulse: 99 (11/30/23 0531)  Resp: 20 (11/30/23 0531)  BP: 133/67 (11/30/23 0531)  SpO2: 95 % (11/30/23 0531) Vital Signs (24h Range):  Temp:  [97.7 °F (36.5 °C)-98.1 °F (36.7 °C)] 98.1 °F (36.7 °C)  Pulse:  [] 99  Resp:  [19-27] 20  SpO2:  [93 %-96 %] 95 %  BP: (106-133)/(61-70) 133/67     Weight: 81.8 kg (180 lb 5.4 oz)  Body mass index is 36.42 kg/m².     Physical Exam  Constitutional:       Comments: Alert, Oriented, No acute distress   HENT:      Head: Normocephalic and atraumatic.   Eyes:       Conjunctiva/sclera: Conjunctivae normal.   Neck:      Comments: JVP does not appear elevated but difficult to determine   Cardiovascular:      Rate and Rhythm: Regular rhythm. Tachycardia present.   Pulmonary:      Comments: Normal effort, Clear to auscultation   Abdominal:      Comments: Soft, Non-tender, Non-distended   Musculoskeletal:      Cervical back: Normal range of motion.      Comments: Non-pitting swelling of b/l lower extremities.    Skin:     Comments: Dry, Intact, Warm, Capillary refill <2 seconds.    Neurological:      Mental Status: She is alert and oriented to person, place, and time.      Comments: Normal speech   Psychiatric:         Mood and Affect: Mood and affect normal.                Significant Labs: All pertinent labs within the past 24 hours have been reviewed.    Significant Imaging: I have reviewed all pertinent imaging results/findings within the past 24 hours.

## 2023-11-30 NOTE — ASSESSMENT & PLAN NOTE
- troponin .071 with slight trend up to .106; EKG V paced  - history of CAD s/p PCI unknown vessel  - presented with SOB for past few weeks with chest pain last night at rest improved with sitting up; suspect troponin elevation related to demand etiology in setting of mild volume overload  - continue ASA, statin, Plavix and BB; no need for IV Heparin given suspicion of demand etiology   - echo today; no plans for invasive procedure and will continue medical management

## 2023-11-30 NOTE — SUBJECTIVE & OBJECTIVE
Past Medical History:   Diagnosis Date    Acute exacerbation of CHF (congestive heart failure) 10/27/2020    NATHEN (acute kidney injury) 11/30/2023    Hypertension     Syncope and collapse        Past Surgical History:   Procedure Laterality Date    A-V CARDIAC PACEMAKER INSERTION N/A 2/22/2021    Procedure: INSERTION, CARDIAC PACEMAKER, DUAL CHAMBER;  Surgeon: Norman Foote MD;  Location: [facility] EP LAB;  Service: Cardiology;  Laterality: N/A;  AVB, PPM upgrade to Dual PPM (right sided), Bio, Venogram prior to draping, MAC, TX, 3 Prep    CARDIAC CATHETERIZATION      CARDIAC PACEMAKER PLACEMENT      CHOLECYSTECTOMY      CORONARY ANGIOPLASTY      HYSTERECTOMY      REVISION OF SKIN POCKET FOR PACEMAKER  2/22/2021    Procedure: Revision, Skin Pocket, For Cardiac Pacemaker;  Surgeon: Norman Foote MD;  Location: [facility] EP LAB;  Service: Cardiology;;       Review of patient's allergies indicates:   Allergen Reactions    Codeine     Latex, natural rubber     Penicillins     Penicillin Rash       Current Facility-Administered Medications on File Prior to Encounter   Medication    vancomycin in dextrose 5 % 1 gram/250 mL IVPB 1,000 mg     Current Outpatient Medications on File Prior to Encounter   Medication Sig    albuterol (PROVENTIL/VENTOLIN HFA) 90 mcg/actuation inhaler Inhale 2 puffs into the lungs every 4 (four) hours as needed for Wheezing (cough or wheezing). Rescue    albuterol-ipratropium (DUO-NEB) 2.5 mg-0.5 mg/3 mL nebulizer solution every 6 (six) hours as needed for Wheezing.    azithromycin (ZITHROMAX) 500 MG tablet Take 500 mg by mouth.    bumetanide (BUMEX) 0.5 MG Tab Take 0.5 mg by mouth.    diclofenac sodium (VOLTAREN) 1 % Gel as needed.     donepeziL (ARICEPT) 5 MG tablet Take 5 mg by mouth.    doxycycline (VIBRA-TABS) 100 MG tablet Take 100 mg by mouth 2 (two) times daily.    meclizine (ANTIVERT) 25 mg tablet Take 25 mg by mouth 3 (three) times daily as needed.    metoprolol succinate (TOPROL-XL) 25 MG 24 hr  tablet TK 1 T PO D    potassium chloride SA (K-DUR,KLOR-CON) 10 MEQ tablet Take 2 tablets (20 mEq total) by mouth once daily.    predniSONE (DELTASONE) 20 MG tablet Take 20 mg by mouth once daily.    rosuvastatin (CRESTOR) 5 MG tablet Take 5 mg by mouth every evening.    tamsulosin (FLOMAX) 0.4 mg Cap Take 1 capsule by mouth every evening.    tobramycin sulfate 0.3% (TOBREX) 0.3 % ophthalmic solution INSTILL 1 DROP IN RIGHT EYE FOUR TIMES DAILY    valsartan (DIOVAN) 160 MG tablet Take 160 mg by mouth once daily.    ZINC ACETATE ORAL Take by mouth once daily.     [DISCONTINUED] furosemide (LASIX) 20 MG tablet TAKE 2 TABLETS(40 MG) BY MOUTH EVERY DAY    [DISCONTINUED] losartan (COZAAR) 100 MG tablet Take 1 tablet (100 mg total) by mouth once daily.    calcium carbonate (OS-BERE) 600 mg calcium (1,500 mg) Tab Take 600 mg by mouth once daily.     cetirizine (ZYRTEC) 10 MG tablet Take 10 mg by mouth once daily.    clopidogrel (PLAVIX) 75 mg tablet TK 1 T PO QD    clotrimazole-betamethasone 1-0.05% (LOTRISONE) cream 2 (two) times daily. Apply to affected area    HYDROcodone-acetaminophen (NORCO) 5-325 mg per tablet Take 1 tablet by mouth every 4 (four) hours as needed (Moderate to severe pain).    ketorolac (TORADOL) 10 mg tablet Take 1 tablet (10 mg total) by mouth every 8 (eight) hours as needed for Pain.    mupirocin (BACTROBAN) 2 % ointment Apply topically 2 (two) times daily.    nitroGLYCERIN (NITROSTAT) 0.4 MG SL tablet Place 1 tablet (0.4 mg total) under the tongue every 5 (five) minutes as needed for Chest pain.    ondansetron (ZOFRAN-ODT) 4 MG TbDL Take 1 tablet (4 mg total) by mouth every 6 (six) hours as needed (nausea).    tolterodine (DETROL) 1 MG Tab Take 1 mg by mouth once daily.     Family History       Problem Relation (Age of Onset)    Liver disease Mother          Tobacco Use    Smoking status: Never    Smokeless tobacco: Never   Substance and Sexual Activity    Alcohol use: No    Drug use: No    Sexual  activity: Not on file     Review of Systems   Constitutional: Negative for chills, decreased appetite, diaphoresis, fever, malaise/fatigue, weight gain and weight loss.   Cardiovascular:  Positive for chest pain, dyspnea on exertion, leg swelling and orthopnea. Negative for claudication, irregular heartbeat, near-syncope, palpitations and paroxysmal nocturnal dyspnea.   Respiratory:  Negative for cough, shortness of breath, snoring, sputum production and wheezing.    Endocrine: Negative for cold intolerance, heat intolerance, polydipsia, polyphagia and polyuria.   Skin:  Negative for color change, dry skin, itching, nail changes and poor wound healing.   Musculoskeletal:  Negative for back pain, gout, joint pain and joint swelling.   Gastrointestinal:  Negative for bloating, abdominal pain, constipation, diarrhea, hematemesis, hematochezia, melena, nausea and vomiting.   Genitourinary:  Negative for dysuria, hematuria and nocturia.   Neurological:  Negative for dizziness, headaches, light-headedness, numbness, paresthesias and weakness.   Psychiatric/Behavioral:  Negative for altered mental status, depression and memory loss.      Objective:     Vital Signs (Most Recent):  Temp: 98.6 °F (37 °C) (11/30/23 0813)  Pulse: 99 (11/30/23 0813)  Resp: 20 (11/30/23 0813)  BP: 115/76 (11/30/23 0813)  SpO2: (!) 94 % (11/30/23 0831) Vital Signs (24h Range):  Temp:  [97.7 °F (36.5 °C)-98.6 °F (37 °C)] 98.6 °F (37 °C)  Pulse:  [] 99  Resp:  [19-27] 20  SpO2:  [93 %-96 %] 94 %  BP: (106-133)/(61-76) 115/76     Weight: 81.8 kg (180 lb 5.4 oz)  Body mass index is 36.42 kg/m².    SpO2: (!) 94 %       No intake or output data in the 24 hours ending 11/30/23 0950    Lines/Drains/Airways       Peripheral Intravenous Line  Duration                  Peripheral IV - Single Lumen 11/30/23 0300 20 G Left Wrist <1 day                     Physical Exam  Constitutional:       General: She is not in acute distress.     Appearance: She is  well-developed.   Cardiovascular:      Rate and Rhythm: Normal rate and regular rhythm.      Heart sounds: No murmur heard.     No gallop.   Pulmonary:      Effort: Pulmonary effort is normal. No respiratory distress.      Breath sounds: Normal breath sounds. No wheezing.   Abdominal:      General: Bowel sounds are normal. There is no distension.      Palpations: Abdomen is soft.      Tenderness: There is no abdominal tenderness.   Musculoskeletal:      Right lower leg: Edema present.      Left lower leg: Edema present.   Skin:     General: Skin is warm and dry.   Neurological:      Mental Status: She is alert and oriented to person, place, and time.          Significant Labs: BMP:   Recent Labs   Lab 11/30/23  0320   *      K 3.8      CO2 16*   BUN 29   CREATININE 1.3   CALCIUM 9.5   , CBC   Recent Labs   Lab 11/30/23 0320 11/30/23  0606   WBC 5.56 7.30   HGB 13.5 13.1   HCT 40.5 39.4    151   , and Troponin   Recent Labs   Lab 11/30/23 0320 11/30/23  0606 11/30/23  0853   TROPONINI 0.070* 0.106*  0.106* 0.132*       Significant Imaging: Echocardiogram: Transthoracic echo (TTE) complete ([facility] Only):   Results for orders placed or performed during the hospital encounter of 10/26/20   Echo Color Flow Doppler? Yes   Result Value Ref Range    STJ 2.01 cm    AV mean gradient 10 mmHg    Ao peak juan m 1.99 m/s    Ao VTI 37.68 cm    IVS 1.50 (A) 0.6 - 1.1 cm    LA size 4.60 cm    Left Atrium Major Axis 6.18 cm    Left Atrium Minor Axis 5.36 cm    LVIDd 2.87 (A) 3.5 - 6.0 cm    LVIDs 1.99 (A) 2.1 - 4.0 cm    LVOT diameter 1.97 cm    LVOT peak VTI 17.73 cm    Posterior Wall 1.43 (A) 0.6 - 1.1 cm    MV Peak A Juan M 1.42 m/s    E wave deceleration time 310.34 msec    MV Peak E Juan M 1.07 m/s    RA Major Axis 4.83 cm    RA Width 2.14 cm    RVDD 3.20 cm    TAPSE 1.67 cm    TR Max Juan M 2.81 m/s    LA WIDTH 4.40 cm    Ao root annulus 3.11 cm    PV PEAK VELOCITY 0.80 cm/s    MV stenosis pressure 1/2 time  90.00 ms    LV Diastolic Volume 31.43 mL    LV Systolic Volume 12.61 mL    LVOT peak gabo 0.86 m/s    FS 31 %    LA volume 98.77 cm3    LV mass 143.26 g    Left Ventricle Relative Wall Thickness 1.00 cm    AV valve area 1.43 cm2    AV Velocity Ratio 0.43     AV index (prosthetic) 0.47     MV valve area p 1/2 method 2.44 cm2    E/A ratio 0.75     LVOT area 3.0 cm2    LVOT stroke volume 54.01 cm3    AV peak gradient 16 mmHg    Triscuspid Valve Regurgitation Peak Gradient 32 mmHg    BSA 1.86 m2    LV Systolic Volume Index 7.1 mL/m2    LV Diastolic Volume Index 17.66 mL/m2    LA Volume Index 55.5 mL/m2    LV Mass Index 80 g/m2    Right Atrial Pressure (from IVC) 3 mmHg    TV resting pulmonary artery pressure 35 mmHg    Narrative    · The left ventricle is normal in size with normal systolic function. The   estimated ejection fraction is 55%.  · There is severe left ventricular concentric hypertrophy.  · Grade I diastolic dysfunction.  · Mild mitral regurgitation.  · Mild aortic valve stenosis.  · Aortic valve area is 1.43 cm2; peak velocity is 1.99 m/s; mean gradient   is 10 mmHg.  · Mild mitral stenosis.  · The estimated PA systolic pressure is 35 mmHg.  · Mild tricuspid regurgitation.

## 2023-11-30 NOTE — CONSULTS
[facility] - Telemetry  Adult Nutrition  Consult Note    SUMMARY     Recommendations    1. Advanced to Cardiac diet when appropriate and consider adding diabetic restrictions   2. Recommend oral supplements if po intake <50%.    3. Monitor labs   4. Collaboration with medical providers    Goals: Patient to advance with nutrition therapy prior to RD follow up.  Nutrition Goal Status: new  Communication of RD Recs: other (comment) (Consult, poc)    Assessment and Plan    Nutrition Problem  Fluid overload    Related to (etiology):   CHF    Signs and Symptoms (as evidenced by):   NPO status     Interventions(treatment strategy):  Collaboration with medical providers    Nutrition Diagnosis Status:   New      Malnutrition Assessment             Fluid Accumulation (Malnutrition): moderate (CHF Bilateral lower extremity edema)                         Reason for Assessment    Reason For Assessment: consult (Cardiac, Evaluation for oral nutrition supplements)    Diagnosis: cardiac disease  Patient Active Problem List   Diagnosis    Cardiac pacemaker    S/P PTCA (percutaneous transluminal coronary angioplasty)    Essential hypertension    Rheumatic aortic stenosis    Mild mitral regurgitation    Localized edema    Rheumatic mitral stenosis    Chest pain    Leg pain    Coronary artery disease involving native coronary artery    Complete heart block    Pacemaker-dependent    Hypokalemia    Near syncope    Chronic heart failure with preserved ejection fraction (HFpEF)    Vasovagal syncope    PAD (peripheral artery disease)    Dysuria    Hyperglycemia    Morbid (severe) obesity due to excess calories    Elevated troponin    NATHEN (acute kidney injury)       Relevant Medical History:   Past Medical History:   Diagnosis Date    Acute exacerbation of CHF (congestive heart failure) 10/27/2020    NATHEN (acute kidney injury) 11/30/2023    Hypertension     Syncope and collapse        Interdisciplinary Rounds: did not attend (RD  "remote)    General Information Comments:   11/30/2023: Patient is currently npo.  Admitted for CHF with fluid overload.  3+ edema noted to bilateral lower extremities.  Cardiac educations in [place] attached to discharge paperwork. Patient reported no tolerance issues on initial nursing assessment during admit.  RD to continue to monitor npo status, encourage compliance to recommended cardiac diet and follow up with diet education questions.  Labs reviewed.  NKFA.  No wounds present.    Nutrition Discharge Planning: Pending medical course    Nutrition Risk Screen    Nutrition Risk Screen: no indicators present    Nutrition/Diet History    Spiritual, Cultural Beliefs, Advent Practices, Values that Affect Care: no  Food Allergies: NKFA  Factors Affecting Nutritional Intake: NPO    Anthropometrics    Temp: 98.6 °F (37 °C)  Height: 4' 11" (149.9 cm)  Height (inches): 59 in  Weight Method: Bed Scale  Weight: 81.8 kg (180 lb 5.4 oz)  Weight (lb): 180.34 lb  Ideal Body Weight (IBW), Female: 95 lb  % Ideal Body Weight, Female (lb): 189.83 %  BMI (Calculated): 36.4  BMI Grade: 35 - 39.9 - obesity - grade II  Weight Loss:  (No significant weight loss found at this time.)       Lab/Procedures/Meds    Pertinent Labs Reviewed: reviewed  BMP  Lab Results   Component Value Date     11/30/2023    K 3.8 11/30/2023     11/30/2023    CO2 16 (L) 11/30/2023    BUN 29 11/30/2023    CREATININE 1.3 11/30/2023    CALCIUM 9.5 11/30/2023    ANIONGAP 17 (H) 11/30/2023    EGFRNORACEVR 38 (A) 11/30/2023     Lab Results   Component Value Date    HGBA1C 5.9 (H) 11/30/2023     Glucose   Date Value Ref Range Status   11/30/2023 277 (H) 70 - 110 mg/dL Final     Lab Results   Component Value Date    CALCIUM 9.5 11/30/2023    PHOS 2.6 (L) 08/24/2021       Pertinent Medications Reviewed: reviewed  Scheduled Meds:   [START ON 12/1/2023] aspirin  81 mg Oral Daily    atorvastatin  80 mg Oral Daily    clopidogreL  75 mg Oral Daily    " diphenhydrAMINE  50 mg Oral Once    heparin (PORCINE)  48.9 Units/kg (Adjusted) Intravenous Once    metoprolol succinate  100 mg Oral Daily     Continuous Infusions:   heparin (porcine) in D5W 12 Units/kg/hr (11/30/23 0645)     PRN Meds:.dextrose 10%, dextrose 10%, glucagon (human recombinant), glucose, glucose, heparin (PORCINE), heparin (PORCINE), naloxone, nitroGLYCERIN, nitroGLYCERIN, pneumoc 20-paul conj-dip cr(PF), sodium chloride 0.9%    Physical Findings/Assessment         Estimated/Assessed Needs    Weight Used For Calorie Calculations: 81.8 kg (180 lb 5.4 oz)  Energy Calorie Requirements (kcal): 20-25kcals/kg (1636-2045kcals/kg)  Energy Need Method: Kcal/kg  Protein Requirements: 2.0g/kg*IBW=86g/day or current BW*0.8g/kg=63g/day (63-86g/day)  Weight Used For Protein Calculations: 81.8 kg (180 lb 5.4 oz)  Fluid Requirements (mL): 1ml/kcal or per md order (CHF with fluid overload)  Estimated Fluid Requirement Method: other (see comments) (CHF)  RDA Method (mL): 20  CHO Requirement: 225-250      Nutrition Prescription Ordered    Current Diet Order: NPO    Evaluation of Received Nutrient/Fluid Intake    % Kcal Needs: NPO  % Protein Needs: NPO  I/O: N/A  Energy Calories Required: not meeting needs  Protein Required: not meeting needs  Fluid Required: not meeting needs  Comments: LBM: 11/29/2023  Tolerance: other (see comments) (NPO)  % Intake of Estimated Energy Needs: Other: npo  % Meal Intake: NPO    Nutrition Risk    Level of Risk/Frequency of Follow-up: moderate (Follow up: 1-2x per week)       Monitor and Evaluation    Food and Nutrient Intake: energy intake, food and beverage intake  Food and Nutrient Adminstration: diet order  Knowledge/Beliefs/Attitudes: beliefs and attitudes, food and nutrition knowledge/skill  Physical Activity and Function: nutrition-related ADLs and IADLs, factors affecting access to physical activity  Anthropometric Measurements: height/length, weight change, weight, body mass  index  Biochemical Data, Medical Tests and Procedures: electrolyte and renal panel, gastrointestinal profile, glucose/endocrine profile, inflammatory profile, lipid profile       Nutrition Follow-Up    RD Follow-up?: Yes  Allyson Garcia MS, RDN, LDN

## 2023-11-30 NOTE — HPI
Ms. 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 Possibly improved w/ sitting up. Nothing makes it worse. Denied any SOB, nausea, vomiting, diaphoresis, fever, chills, abdominal pain, orthopnea, PND, abdominal distention. Endorses chronic peripheral edema and worsening of chronic cough w/ white sputum. States usually when she has this type of cough its due fluid overload. Denies every experiencing this chest pain before.     In the ED, labs were notable for glucose 277,  (baseline 89), Troponin I 0.070, creatinine 1.3 (baseline 0.9). Patient was given aspirin by EMS.     Patient admitted for elevated troponin.

## 2023-11-30 NOTE — CONSULTS
"[place] - Telemetry  Cardiology  Consult Note    Patient Name: Marycruz Perez  MRN: [id number]  Admission Date: 11/30/2023  Hospital Length of Stay: 0 days  Code Status: Full Code   Attending Provider: Genesis Coley*   Consulting Provider: HUMBLE Garcia ANP  Primary Care Physician: Richard Loo MD  Principal Problem:<principal problem not specified>    Patient information was obtained from patient, relative(s), past medical records, and ER records.     Inpatient consult to [facility]  Consult performed by: Shabnam Medellin APRN, GEOFF  Consult ordered by: Eliud Sinha MD  Reason for consult: elevated troponin        Subjective:     Chief Complaint:  chest pain and SOB      HPI:   89yo female with elevated troponin, HTN, ANTHEN, CAD s/p PCI, mitral stenosis, CHB s/p PPM with upgrade to dcPPM by Dr. Foote, HFpEF, vasovagal syncope who presented to the ER with complaints of chest pain. She is accompanied by her daughter who helps provide her history. She complains of SOB for the past few weeks. She complains of SOB with mild exertion with relief with rest. She also endorses SOB with lying flat. Yesterday evening she complained of sudden onset of chest pain while at rest that was improved with sitting up. She also complains of BLE edema which has been chronic. She is followed by Dr. Foote for EP/PPM management and [facility] for routine cardiology care. Her daughter reports recent change of her "fluid pills" likely transition from Lasix to Bumex. Her daughter was concerned she was having a reaction to the new medication given her symptoms last night hence reporting to the ER- reassurance provided. She denies any decrease in appetite and her daughter reports Thanksgiving dinner last week where she at turkey, ham as well as numerous sides likely higher in salt that she is typically accustomed to eating. Labs CBC WNL. BMp with K+ 3.8 creatinine 1.3  Troponin .070-.106. CXR reviewed. " Given IV lasix 80mg and admitted to [facility]. No urine output documented overnight but patient and daughter report excessive urination. She denies any recurrent chest pain this AM     Past Medical History:   Diagnosis Date    Acute exacerbation of CHF (congestive heart failure) 10/27/2020    NATHEN (acute kidney injury) 11/30/2023    Hypertension     Syncope and collapse        Past Surgical History:   Procedure Laterality Date    A-V CARDIAC PACEMAKER INSERTION N/A 2/22/2021    Procedure: INSERTION, CARDIAC PACEMAKER, DUAL CHAMBER;  Surgeon: Noramn Foote MD;  Location: [facility] EP LAB;  Service: Cardiology;  Laterality: N/A;  AVB, PPM upgrade to Dual PPM (right sided), Bio, Venogram prior to draping, MAC, MS, 3 Prep    CARDIAC CATHETERIZATION      CARDIAC PACEMAKER PLACEMENT      CHOLECYSTECTOMY      CORONARY ANGIOPLASTY      HYSTERECTOMY      REVISION OF SKIN POCKET FOR PACEMAKER  2/22/2021    Procedure: Revision, Skin Pocket, For Cardiac Pacemaker;  Surgeon: Norman Foote MD;  Location: [facility] EP LAB;  Service: Cardiology;;       Review of patient's allergies indicates:   Allergen Reactions    Codeine     Latex, natural rubber     Penicillins     Penicillin Rash       Current Facility-Administered Medications on File Prior to Encounter   Medication    vancomycin in dextrose 5 % 1 gram/250 mL IVPB 1,000 mg     Current Outpatient Medications on File Prior to Encounter   Medication Sig    albuterol (PROVENTIL/VENTOLIN HFA) 90 mcg/actuation inhaler Inhale 2 puffs into the lungs every 4 (four) hours as needed for Wheezing (cough or wheezing). Rescue    albuterol-ipratropium (DUO-NEB) 2.5 mg-0.5 mg/3 mL nebulizer solution every 6 (six) hours as needed for Wheezing.    azithromycin (ZITHROMAX) 500 MG tablet Take 500 mg by mouth.    bumetanide (BUMEX) 0.5 MG Tab Take 0.5 mg by mouth.    diclofenac sodium (VOLTAREN) 1 % Gel as needed.     donepeziL (ARICEPT) 5 MG tablet Take 5 mg by mouth.    doxycycline  (VIBRA-TABS) 100 MG tablet Take 100 mg by mouth 2 (two) times daily.    meclizine (ANTIVERT) 25 mg tablet Take 25 mg by mouth 3 (three) times daily as needed.    metoprolol succinate (TOPROL-XL) 25 MG 24 hr tablet TK 1 T PO D    potassium chloride SA (K-DUR,KLOR-CON) 10 MEQ tablet Take 2 tablets (20 mEq total) by mouth once daily.    predniSONE (DELTASONE) 20 MG tablet Take 20 mg by mouth once daily.    rosuvastatin (CRESTOR) 5 MG tablet Take 5 mg by mouth every evening.    tamsulosin (FLOMAX) 0.4 mg Cap Take 1 capsule by mouth every evening.    tobramycin sulfate 0.3% (TOBREX) 0.3 % ophthalmic solution INSTILL 1 DROP IN RIGHT EYE FOUR TIMES DAILY    valsartan (DIOVAN) 160 MG tablet Take 160 mg by mouth once daily.    ZINC ACETATE ORAL Take by mouth once daily.     [DISCONTINUED] furosemide (LASIX) 20 MG tablet TAKE 2 TABLETS(40 MG) BY MOUTH EVERY DAY    [DISCONTINUED] losartan (COZAAR) 100 MG tablet Take 1 tablet (100 mg total) by mouth once daily.    calcium carbonate (OS-BERE) 600 mg calcium (1,500 mg) Tab Take 600 mg by mouth once daily.     cetirizine (ZYRTEC) 10 MG tablet Take 10 mg by mouth once daily.    clopidogrel (PLAVIX) 75 mg tablet TK 1 T PO QD    clotrimazole-betamethasone 1-0.05% (LOTRISONE) cream 2 (two) times daily. Apply to affected area    HYDROcodone-acetaminophen (NORCO) 5-325 mg per tablet Take 1 tablet by mouth every 4 (four) hours as needed (Moderate to severe pain).    ketorolac (TORADOL) 10 mg tablet Take 1 tablet (10 mg total) by mouth every 8 (eight) hours as needed for Pain.    mupirocin (BACTROBAN) 2 % ointment Apply topically 2 (two) times daily.    nitroGLYCERIN (NITROSTAT) 0.4 MG SL tablet Place 1 tablet (0.4 mg total) under the tongue every 5 (five) minutes as needed for Chest pain.    ondansetron (ZOFRAN-ODT) 4 MG TbDL Take 1 tablet (4 mg total) by mouth every 6 (six) hours as needed (nausea).    tolterodine (DETROL) 1 MG Tab Take 1 mg by mouth once daily.     Family History        Problem Relation (Age of Onset)    Liver disease Mother          Tobacco Use    Smoking status: Never    Smokeless tobacco: Never   Substance and Sexual Activity    Alcohol use: No    Drug use: No    Sexual activity: Not on file     Review of Systems   Constitutional: Negative for chills, decreased appetite, diaphoresis, fever, malaise/fatigue, weight gain and weight loss.   Cardiovascular:  Positive for chest pain, dyspnea on exertion, leg swelling and orthopnea. Negative for claudication, irregular heartbeat, near-syncope, palpitations and paroxysmal nocturnal dyspnea.   Respiratory:  Negative for cough, shortness of breath, snoring, sputum production and wheezing.    Endocrine: Negative for cold intolerance, heat intolerance, polydipsia, polyphagia and polyuria.   Skin:  Negative for color change, dry skin, itching, nail changes and poor wound healing.   Musculoskeletal:  Negative for back pain, gout, joint pain and joint swelling.   Gastrointestinal:  Negative for bloating, abdominal pain, constipation, diarrhea, hematemesis, hematochezia, melena, nausea and vomiting.   Genitourinary:  Negative for dysuria, hematuria and nocturia.   Neurological:  Negative for dizziness, headaches, light-headedness, numbness, paresthesias and weakness.   Psychiatric/Behavioral:  Negative for altered mental status, depression and memory loss.      Objective:     Vital Signs (Most Recent):  Temp: 98.6 °F (37 °C) (11/30/23 0813)  Pulse: 99 (11/30/23 0813)  Resp: 20 (11/30/23 0813)  BP: 115/76 (11/30/23 0813)  SpO2: (!) 94 % (11/30/23 0831) Vital Signs (24h Range):  Temp:  [97.7 °F (36.5 °C)-98.6 °F (37 °C)] 98.6 °F (37 °C)  Pulse:  [] 99  Resp:  [19-27] 20  SpO2:  [93 %-96 %] 94 %  BP: (106-133)/(61-76) 115/76     Weight: 81.8 kg (180 lb 5.4 oz)  Body mass index is 36.42 kg/m².    SpO2: (!) 94 %       No intake or output data in the 24 hours ending 11/30/23 0950    Lines/Drains/Airways       Peripheral Intravenous Line   Duration                  Peripheral IV - Single Lumen 11/30/23 0300 20 G Left Wrist <1 day                     Physical Exam  Constitutional:       General: She is not in acute distress.     Appearance: She is well-developed.   Cardiovascular:      Rate and Rhythm: Normal rate and regular rhythm.      Heart sounds: No murmur heard.     No gallop.   Pulmonary:      Effort: Pulmonary effort is normal. No respiratory distress.      Breath sounds: Normal breath sounds. No wheezing.   Abdominal:      General: Bowel sounds are normal. There is no distension.      Palpations: Abdomen is soft.      Tenderness: There is no abdominal tenderness.   Musculoskeletal:      Right lower leg: Edema present.      Left lower leg: Edema present.   Skin:     General: Skin is warm and dry.   Neurological:      Mental Status: She is alert and oriented to person, place, and time.          Significant Labs: BMP:   Recent Labs   Lab 11/30/23 0320   *      K 3.8      CO2 16*   BUN 29   CREATININE 1.3   CALCIUM 9.5   , CBC   Recent Labs   Lab 11/30/23 0320 11/30/23  0606   WBC 5.56 7.30   HGB 13.5 13.1   HCT 40.5 39.4    151   , and Troponin   Recent Labs   Lab 11/30/23 0320 11/30/23  0606 11/30/23  0853   TROPONINI 0.070* 0.106*  0.106* 0.132*       Significant Imaging: Echocardiogram: Transthoracic echo (TTE) complete ([facility] Only):   Results for orders placed or performed during the hospital encounter of 10/26/20   Echo Color Flow Doppler? Yes   Result Value Ref Range    STJ 2.01 cm    AV mean gradient 10 mmHg    Ao peak juan m 1.99 m/s    Ao VTI 37.68 cm    IVS 1.50 (A) 0.6 - 1.1 cm    LA size 4.60 cm    Left Atrium Major Axis 6.18 cm    Left Atrium Minor Axis 5.36 cm    LVIDd 2.87 (A) 3.5 - 6.0 cm    LVIDs 1.99 (A) 2.1 - 4.0 cm    LVOT diameter 1.97 cm    LVOT peak VTI 17.73 cm    Posterior Wall 1.43 (A) 0.6 - 1.1 cm    MV Peak A Juan M 1.42 m/s    E wave deceleration time 310.34 msec    MV Peak E Juan M 1.07 m/s     RA Major Axis 4.83 cm    RA Width 2.14 cm    RVDD 3.20 cm    TAPSE 1.67 cm    TR Max Juan M 2.81 m/s    LA WIDTH 4.40 cm    Ao root annulus 3.11 cm    PV PEAK VELOCITY 0.80 cm/s    MV stenosis pressure 1/2 time 90.00 ms    LV Diastolic Volume 31.43 mL    LV Systolic Volume 12.61 mL    LVOT peak juan m 0.86 m/s    FS 31 %    LA volume 98.77 cm3    LV mass 143.26 g    Left Ventricle Relative Wall Thickness 1.00 cm    AV valve area 1.43 cm2    AV Velocity Ratio 0.43     AV index (prosthetic) 0.47     MV valve area p 1/2 method 2.44 cm2    E/A ratio 0.75     LVOT area 3.0 cm2    LVOT stroke volume 54.01 cm3    AV peak gradient 16 mmHg    Triscuspid Valve Regurgitation Peak Gradient 32 mmHg    BSA 1.86 m2    LV Systolic Volume Index 7.1 mL/m2    LV Diastolic Volume Index 17.66 mL/m2    LA Volume Index 55.5 mL/m2    LV Mass Index 80 g/m2    Right Atrial Pressure (from IVC) 3 mmHg    TV resting pulmonary artery pressure 35 mmHg    Narrative    · The left ventricle is normal in size with normal systolic function. The   estimated ejection fraction is 55%.  · There is severe left ventricular concentric hypertrophy.  · Grade I diastolic dysfunction.  · Mild mitral regurgitation.  · Mild aortic valve stenosis.  · Aortic valve area is 1.43 cm2; peak velocity is 1.99 m/s; mean gradient   is 10 mmHg.  · Mild mitral stenosis.  · The estimated PA systolic pressure is 35 mmHg.  · Mild tricuspid regurgitation.        Assessment and Plan:     NATHEN (acute kidney injury)  - creatinine 1.3  - baseline creatinine .8-1.3  - monitor while admitted     Elevated troponin  - troponin .071 with slight trend up to .106; EKG V paced  - history of CAD s/p PCI unknown vessel  - presented with SOB for past few weeks with chest pain last night at rest improved with sitting up; suspect troponin elevation related to demand etiology in setting of mild volume overload  - continue ASA, statin, Plavix and BB; no need for IV Heparin given suspicion of demand  etiology   - echo today; no plans for invasive procedure and will continue medical management     Chronic heart failure with preserved ejection fraction (HFpEF)  - complains of SOB for past few weeks with chest pain at rest improved with sitting up  - endorses LE edema as well as JOHNSON and orthopnea  - given IV lasix 80mg in the ER- no output documented but significant response per patient and daughter  - reports feeling better this AM; suspect mild volume overload in setting of dietary indiscretion of salt intake with Thanksgiving last week  - [facility] from Lasix to Bumex; recommend IV diuretics until symptoms resolved; OOB and ambulate and if SOB improved and orthopnea resolved then transition to oral diuretics as prescribed by [facility]; stressed importance of adherence to low salt diet; echo today  - once symptoms resolved and back on oral diuretics then suitable for discharge from cardiac standpoint  - continue BB and ARB; follow up with [facility] as previously scheduled       Rheumatic mitral stenosis  - last echo in 2020 with EF 55%; mild MS  - repeat echo pending     Essential hypertension  - SBP 100s-130s  - on Toprol and Diovan as an outpatient  - goal BP less than 130/80  - BP well controlled; continue BB and ARB   - monitor BP         VTE Risk Mitigation (From admission, onward)           Ordered     heparin 25,000 units in dextrose 5% (100 units/ml) IV bolus from bag - ADDITIONAL PRN BOLUS - 60 units/kg (max bolus 4000 units)  As needed (PRN)        Question:  Heparin Infusion Adjustment (DO NOT MODIFY ANSWER)  Answer:  [email or web address]\epic\Images\Pharmacy\HeparinInfusions\heparin LOW INTENSITY nomogram for [facility] [id number]    11/30/23 0546     heparin 25,000 units in dextrose 5% (100 units/ml) IV bolus from bag - ADDITIONAL PRN BOLUS - 30 units/kg (max bolus 4000 units)  As needed (PRN)        Question:  Heparin Infusion Adjustment (DO NOT MODIFY  ANSWER)  Answer:  [email or web address]\epic\Images\Pharmacy\HeparinInfusions\heparin LOW INTENSITY nomogram for [facility] [id number]    11/30/23 0546     heparin 25,000 units in dextrose 5% (100 units/ml) IV bolus from bag INITIAL BOLUS (max bolus 4000 units)  Once        Question:  Heparin Infusion Adjustment (DO NOT MODIFY ANSWER)  Answer:  [email or web address]\epic\Images\Pharmacy\HeparinInfusions\heparin LOW INTENSITY nomogram for [facility] [id number]    11/30/23 0546     heparin 25,000 units in dextrose 5% 250 mL (100 units/mL) infusion LOW INTENSITY nomogram - [facility]  Continuous        Question:  Begin at (units/kg/hr)  Answer:  12    11/30/23 0546     IP VTE HIGH RISK PATIENT  Once         11/30/23 0545     Place sequential compression device  Until discontinued         11/30/23 0430                    Thank you for your consult. I will follow-up with patient. Please contact us if you have any additional questions.    HUMBLE Garcia, ANP  Cardiology   [place] - Telemetry

## 2023-11-30 NOTE — CARE UPDATE
Admitted with elevated troponin-likely due to demand etiology for mild volume overload.  Continue with aspirin, statin, Plavix, and beta-blocker   Appreciate cardiology recs continue with IV diuretic, BB, Arb.    TTE pending  Add PT/OT

## 2023-11-30 NOTE — ASSESSMENT & PLAN NOTE
- complains of SOB for past few weeks with chest pain at rest improved with sitting up  - endorses LE edema as well as JOHNSON and orthopnea  - given IV lasix 80mg in the ER- no output documented but significant response per patient and daughter  - reports feeling better this AM; suspect mild volume overload in setting of dietary indiscretion of salt intake with Thanksgiving last week  - reports changing of diuretics yesterday per [facility] likely transition from Lasix to Bumex; recommend IV diuretics until symptoms resolved; OOB and ambulate and if SOB improved and orthopnea resolved then transition to oral diuretics as prescribed by [facility]; stressed importance of adherence to low salt diet; echo today  - once symptoms resolved and back on oral diuretics then suitable for discharge from cardiac standpoint  - continue BB and ARB; follow up with [facility] as previously scheduled

## 2023-11-30 NOTE — HPI
"89yo female with elevated troponin, HTN, NATHEN, CAD s/p PCI, mitral stenosis, CHB s/p PPM with upgrade to dcPPM by Dr. Foote, HFpEF, vasovagal syncope who presented to the ER with complaints of chest pain. She is accompanied by her daughter who helps provide her history. She complains of SOB for the past few weeks. She complains of SOB with mild exertion with relief with rest. She also endorses SOB with lying flat. Yesterday evening she complained of sudden onset of chest pain while at rest that was improved with sitting up. She also complains of BLE edema which has been chronic. She is followed by Dr. Foote for EP/PPM management and [facility] for routine cardiology care. Her daughter reports recent change of her "fluid pills" likely transition from Lasix to Bumex. Her daughter was concerned she was having a reaction to the new medication given her symptoms last night hence reporting to the ER- reassurance provided. She denies any decrease in appetite and her daughter reports Thanksgiving dinner last week where she at turkey, ham as well as numerous sides likely higher in salt that she is typically accustomed to eating. Labs CBC WNL. BMp with K+ 3.8 creatinine 1.3  Troponin .070-.106. CXR reviewed. Given IV lasix 80mg and admitted to [facility]. No urine output documented overnight but patient and daughter report excessive urination. She denies any recurrent chest pain this AM   "

## 2023-11-30 NOTE — ASSESSMENT & PLAN NOTE
- SBP 100s-130s  - on Toprol and Diovan as an outpatient  - goal BP less than 130/80  - BP well controlled; continue BB and ARB   - monitor BP

## 2023-11-30 NOTE — HOSPITAL COURSE
11/30/2023 per HPI   12/01/2023 Inaccurate intake and output documented. BP elevated- antihypertensives adjusted. Cr trending down. Continues to have crackles on exam- 80 mg IV Lasix ordered.

## 2023-11-30 NOTE — ASSESSMENT & PLAN NOTE
Possibly secondary to type 1 vs type 2 in the setting of possible CHF exascerbation. Difficult to determine volume status based on physcial exam. BNP mildly elevated and patient's cough similar to previous HF exascerbations.     - Continue to trend troponin.   - Will give dose of IV lasix 80mg and monitor response. Strict I and Os. Daily weights. Maintain K>4 and Mg >2.   - Start aspirin, statin, heparin gtt. Continue home plavix and metoprolol.   - Cardiology consulted. Appreciate recommendations.

## 2023-11-30 NOTE — Clinical Note
Diagnosis: Unstable angina [123309]   Future Attending Provider: HALLE SILVA [[id number]]   Admitting Provider:: BENJAMÍN ARAIZA [[id number]]

## 2023-11-30 NOTE — ED PROVIDER NOTES
Encounter Date: 11/30/2023       History     Chief Complaint   Patient presents with    Chest Pain     Brought in by EJ [facility] from home. Patient started with mid sternal chest pain earlier tonight. States she was just seen by MD yesterday and given a diuretic and antibiotic. Was told fluid on lungs. Patient reports cough and chest congestion x 2 months. Chest pain resolved when EMS arrived. EMS administered 324 aspirin en route. Patient does use oxygen at home as needed. Currently on 1 L NC. Patient has pacemaker.      89-year-old female with history of coronary disease, CHF, pacemaker presents for chest pain that occurred just prior to arrival described as a pressure-like sensation in the left chest.  Improved on arrival.  Associated with shortness of breath cough productive with clear sputum.  Patient received aspirin while in route with EMS.  Patient feels more comfortable from a dyspnea standpoint now on 1 L nasal cannula oxygen    The history is provided by the patient and a relative.     Review of patient's allergies indicates:   Allergen Reactions    Codeine     Latex, natural rubber     Penicillins     Penicillin Rash     Past Medical History:   Diagnosis Date    Acute exacerbation of CHF (congestive heart failure) 10/27/2020    NATHEN (acute kidney injury) 11/30/2023    Hypertension     Syncope and collapse      Past Surgical History:   Procedure Laterality Date    A-V CARDIAC PACEMAKER INSERTION N/A 2/22/2021    Procedure: INSERTION, CARDIAC PACEMAKER, DUAL CHAMBER;  Surgeon: Norman Foote MD;  Location: [facility];  Service: Cardiology;  Laterality: N/A;  AVB, PPM upgrade to Dual PPM (right sided), Bio, Venogram prior to draping, MAC, MN, 3 Prep    CARDIAC CATHETERIZATION      CARDIAC PACEMAKER PLACEMENT      CHOLECYSTECTOMY      CORONARY ANGIOPLASTY      HYSTERECTOMY      REVISION OF SKIN POCKET FOR PACEMAKER  2/22/2021    Procedure: Revision, Skin Pocket, For Cardiac Pacemaker;  Surgeon: Norman Foote MD;   Location: [facility] LAB;  Service: Cardiology;;     Family History   Problem Relation Age of Onset    Liver disease Mother      Social History     Tobacco Use    Smoking status: Never    Smokeless tobacco: Never   Substance Use Topics    Alcohol use: No    Drug use: No     Review of Systems    Physical Exam     Initial Vitals [11/30/23 0254]   BP Pulse Resp Temp SpO2   106/70 (!) 120 19 97.7 °F (36.5 °C) 96 %      MAP       --         Physical Exam    Nursing note and vitals reviewed.  Constitutional: She appears well-developed and well-nourished. No distress.   HENT:   Head: Normocephalic and atraumatic.   Mouth/Throat: Oropharynx is clear and moist.   Eyes: Conjunctivae and EOM are normal. Pupils are equal, round, and reactive to light.   Neck: Neck supple.   Normal range of motion.  Cardiovascular:  Regular rhythm and intact distal pulses.           Tachycardic   Pulmonary/Chest: Breath sounds normal. No stridor. No respiratory distress.   Abdominal: Abdomen is soft. She exhibits no distension. There is no abdominal tenderness.   Musculoskeletal:         General: Normal range of motion.      Cervical back: Normal range of motion and neck supple.      Comments: Moving all extremities with grossly equal strength     Neurological: She is alert and oriented to person, place, and time.   CN 2-12 appear grossly intact   Skin: Skin is warm and dry.   Psychiatric: She has a normal mood and affect.         ED Course   Procedures  Labs Reviewed   CBC W/ AUTO DIFFERENTIAL - Abnormal; Notable for the following components:       Result Value    Lymph # 0.8 (*)     Mono # 0.1 (*)     Gran % 83.6 (*)     Lymph % 14.7 (*)     Mono % 1.3 (*)     All other components within normal limits   COMPREHENSIVE METABOLIC PANEL - Abnormal; Notable for the following components:    CO2 16 (*)     Glucose 277 (*)     Albumin 3.2 (*)     eGFR 38 (*)     Anion Gap 17 (*)     All other components within normal limits   TROPONIN I - Abnormal;  Notable for the following components:    Troponin I 0.070 (*)     All other components within normal limits   B-TYPE NATRIURETIC PEPTIDE - Abnormal; Notable for the following components:     (*)     All other components within normal limits     EKG Readings: (Independently Interpreted)   Initial Reading: No STEMI. Rhythm: Paced Rhythm. Heart Rate: 117. Ectopy: No Ectopy. Axis: Left Axis Deviation. Clinical Impression: Paced Rhythm     ECG Results              EKG 12-lead (In process)  Result time 11/30/23 14:51:21      In process by Interface, Lab In [facility] (11/30/23 14:51:21)                   Narrative:    Test Reason : R07.9,    Vent. Rate : 117 BPM     Atrial Rate : 117 BPM     P-R Int : 120 ms          QRS Dur : 152 ms      QT Int : 386 ms       P-R-T Axes : 102 -73 094 degrees     QTc Int : 538 ms    Sinus tachycardia  Left axis deviation  LVH with QRS widening and repolarization abnormality  Cannot rule out Anterior infarct ,age undetermined  Abnormal ECG  When compared with ECG of 06-JAN-2023 13:33,  Sinus rhythm has replaced Electronic ventricular pacemaker    Referred By: [email or web address]   SELF           Confirmed By:                                   Imaging Results              X-Ray Chest AP Portable (Final result)  Result time 11/30/23 03:45:35      Final result by Kathi Stanton MD (11/30/23 03:45:35)                   Impression:      Please see above.      Electronically signed by: Kathi Stanton MD  Date:    11/30/2023  Time:    03:45               Narrative:    EXAMINATION:  XR CHEST AP PORTABLE    CLINICAL HISTORY:  Chest Pain;    TECHNIQUE:  Single frontal view of the chest was performed.    COMPARISON:  08/24/2021    FINDINGS:  Cardiac monitoring leads overlie the chest.  There is a stably positioned right-sided cardiac pacing device in place.  Cardiomediastinal silhouette is unchanged in size and configuration.  There is atherosclerosis of the thoracic aorta.  There is calcification of the  mitral annulus.  The lungs are symmetrically expanded with diffuse coarse interstitial attenuation similar to prior exam.  Thin linear opacity overlying the left mid/lower lung zone suggestive of atelectasis and/or scarring.  No confluent airspace consolidation, substantial volume of pleural fluid or pneumothorax identified.  Osseous structures intact.                                    X-Rays:   Independently Interpreted Readings:   Chest X-Ray: No acute abnormalities.     Medications   sodium chloride 0.9% flush 10 mL (has no administration in time range)   naloxone 0.4 mg/mL injection 0.02 mg (has no administration in time range)   glucose chewable tablet 16 g (has no administration in time range)   glucose chewable tablet 24 g (has no administration in time range)   glucagon (human recombinant) injection 1 mg (has no administration in time range)   dextrose 10% bolus 125 mL 125 mL (has no administration in time range)   dextrose 10% bolus 250 mL 250 mL (has no administration in time range)   pneumoc 20-paul conj-dip cr(PF) (PREVNAR-20 (PF)) injection Syrg 0.5 mL (has no administration in time range)   diphenhydrAMINE capsule 50 mg (50 mg Oral Not Given 11/30/23 0642)   aspirin chewable tablet 81 mg (has no administration in time range)   nitroGLYCERIN SL tablet 0.4 mg (has no administration in time range)   atorvastatin tablet 80 mg (80 mg Oral Given 11/30/23 0642)   heparin 25,000 units in dextrose 5% (100 units/ml) IV bolus from bag INITIAL BOLUS (max bolus 4000 units) (3,030 Units Intravenous Not Given 11/30/23 0600)   heparin 25,000 units in dextrose 5% 250 mL (100 units/mL) infusion LOW INTENSITY nomogram - [facility] (12 Units/kg/hr × 62 kg (Adjusted) Intravenous Handoff 11/30/23 1930)   heparin 25,000 units in dextrose 5% (100 units/ml) IV bolus from bag - ADDITIONAL PRN BOLUS - 60 units/kg (max bolus 4000 units) (3,030 Units Intravenous Bolus from Bag 11/30/23 0646)   heparin 25,000 units in dextrose 5% (100  units/ml) IV bolus from bag - ADDITIONAL PRN BOLUS - 30 units/kg (max bolus 4000 units) (has no administration in time range)   clopidogreL tablet 75 mg (75 mg Oral Given 11/30/23 0907)   metoprolol succinate (TOPROL-XL) 24 hr tablet 100 mg (100 mg Oral Given 11/30/23 0907)   nitroGLYCERIN SL tablet 0.4 mg (has no administration in time range)   furosemide injection 80 mg (80 mg Intravenous Given by Other 11/30/23 0642)     Medical Decision Making  Patient is well-appearing nontoxic no respiratory distress.  She is tachycardic with a paced rhythm.  Workup ordered to evaluate for acute coronary syndrome, CHF exacerbation, pleural effusions, pulmonary edema.  Less likely pneumonia.    Amount and/or Complexity of Data Reviewed  Labs: ordered.  Radiology: ordered.               ED Course as of 11/30/23 2114   Thu Nov 30, 2023 0426 CBC reassuring no significant anemia or leukocytosis.  Patient's chemistry profile reassuring, mild hyperglycemia.  Mild elevation of the troponin and BNP [AP]   0426 Case discussed with hospital medicine Dr. Sinha for admission for trending of troponin and cardiology consultation.  Patient's pacemaker seems to be pacing her at a tachycardic rate and should be interrogated. [AP]   0441 Informed by [facility] patient is [facility] family [facility] Clinic patient.  Case discussed with [facility] family medicine residents for admission to Dr. Anglin service [AP]   0445 Informed by [facility] family medicine patient is not a clinic patient.  Patient will be admitted as un referred under [facility] medicine [AP]      ED Course User Index  [AP] Woody Johnson, DO                           Clinical Impression:  Final diagnoses:  [R07.9] Chest pain  [I20.0] Unstable angina (Primary)  [R79.89] Elevated troponin          ED Disposition Condition    Observation Stable                Woody Johnson, DO  11/30/23 0427       Woody Johnson, DO  11/30/23 0427       Woody Johnson,  DO  11/30/23 2114

## 2023-11-30 NOTE — PLAN OF CARE
Nutrition Plan of Care:    Recommendations     1. Advanced to Cardiac diet when appropriate and consider adding diabetic restrictions   2. Recommend oral supplements if po intake <50%.    3. Monitor labs   4. Collaboration with medical providers     Goals: Patient to advance with nutrition therapy prior to RD follow up.  Nutrition Goal Status: new  Communication of RD Recs: other (comment) (Consult, poc)     Assessment and Plan     Nutrition Problem  Fluid overload     Related to (etiology):   CHF     Signs and Symptoms (as evidenced by):   NPO status      Interventions(treatment strategy):  Collaboration with medical providers     Nutrition Diagnosis Status:   New    Allyson Garcia, MS, RDN, LDN

## 2023-11-30 NOTE — ED TRIAGE NOTES
Marycruz ePrez, a 89 y.o. female presents to the ED w/ complaint of chest pain.  Pt seen by cardiology yesterday and found to have fluid in her lungs.  Pt given an antibiotic and a diuretic.  Pt started having chest pain which resolved upon EMS arrival.       Triage note:  Chief Complaint   Patient presents with    Chest Pain     Brought in by EJ [facility] from home. Patient started with mid sternal chest pain earlier tonight. States she was just seen by MD yesterday and given a diuretic and antibiotic. Was told fluid on lungs. Patient reports cough and chest congestion x 2 months. Chest pain resolved when EMS arrived. EMS administered 324 aspirin en route. Patient does use oxygen at home as needed. Currently on 1 L NC. Patient has pacemaker.      Review of patient's allergies indicates:   Allergen Reactions    Codeine     Latex, natural rubber     Penicillins     Penicillin Rash     Past Medical History:   Diagnosis Date    Acute exacerbation of CHF (congestive heart failure) 10/27/2020    Hypertension     Syncope and collapse

## 2023-11-30 NOTE — ASSESSMENT & PLAN NOTE
- possibly secondary to cardiorenal syndrome.   - will monitor creatinine after lasix dose.   - avoid nephrotoxic agents.

## 2023-11-30 NOTE — PLAN OF CARE
Problem: Adult Inpatient Plan of Care  Goal: Plan of Care Review  Outcome: Ongoing, Not Progressing    VN cued into room to complete admit assessment. VIP model introduced; VN working alongside bedside treatment team.  Plan of care reviewed with patient and daughter.  Patient informed of fall risk, fall precautions, call light within reach, side rails x2 elevated. Patient notified to ask staff for assistance. Verbalized complete understanding. Time allowed for questions. Will continue to monitor and intervene as needed.  Chart reviewed.

## 2023-11-30 NOTE — H&P
[facility]  History & Physical    Patient Name: Marycruz Perez  MRN: [id number]  Patient Class: OP- Observation  Admission Date: 11/30/2023  Attending Physician: Eliud Sinha MD  Primary Care Provider: Richard Loo MD         Patient information was obtained from patient, past medical records, and ER records.     Subjective:     Principal Problem:<principal problem not specified>    Chief Complaint:   Chief Complaint   Patient presents with    Chest Pain     Brought in by EJ [facility] from home. Patient started with mid sternal chest pain earlier tonight. States she was just seen by MD yesterday and given a diuretic and antibiotic. Was told fluid on lungs. Patient reports cough and chest congestion x 2 months. Chest pain resolved when EMS arrived. EMS administered 324 aspirin en route. Patient does use oxygen at home as needed. Currently on 1 L NC. Patient has pacemaker.         HPI: Ms. 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 Possibly improved w/ sitting up. Nothing makes it worse. Denied any SOB, nausea, vomiting, diaphoresis, fever, chills, abdominal pain, orthopnea, PND, abdominal distention. Endorses chronic peripheral edema and worsening of chronic cough w/ white sputum. States usually when she has this type of cough its due fluid overload. Denies every experiencing this chest pain before.     In the ED, labs were notable for glucose 277,  (baseline 89), Troponin I 0.070, creatinine 1.3 (baseline 0.9). Patient was given aspirin by EMS.     Patient admitted for elevated troponin.     Past Medical History:   Diagnosis Date    Acute exacerbation of CHF (congestive heart failure) 10/27/2020    Hypertension     Syncope and  collapse        Past Surgical History:   Procedure Laterality Date    A-V CARDIAC PACEMAKER INSERTION N/A 2/22/2021    Procedure: INSERTION, CARDIAC PACEMAKER, DUAL CHAMBER;  Surgeon: Norman Foote MD;  Location: [facility];  Service: Cardiology;  Laterality: N/A;  AVB, PPM upgrade to Dual PPM (right sided), Bio, Venogram prior to draping, MAC, [place], 3 Prep    CARDIAC CATHETERIZATION      CARDIAC PACEMAKER PLACEMENT      CHOLECYSTECTOMY      CORONARY ANGIOPLASTY      HYSTERECTOMY      REVISION OF SKIN POCKET FOR PACEMAKER  2/22/2021    Procedure: Revision, Skin Pocket, For Cardiac Pacemaker;  Surgeon: Norman Foote MD;  Location: [facility];  Service: Cardiology;;       Review of patient's allergies indicates:   Allergen Reactions    Codeine     Latex, natural rubber     Penicillins     Penicillin Rash       Current Facility-Administered Medications on File Prior to Encounter   Medication    vancomycin in dextrose 5 % 1 gram/250 mL IVPB 1,000 mg     Current Outpatient Medications on File Prior to Encounter   Medication Sig    albuterol (PROVENTIL/VENTOLIN HFA) 90 mcg/actuation inhaler Inhale 2 puffs into the lungs every 4 (four) hours as needed for Wheezing (cough or wheezing). Rescue    diclofenac sodium (VOLTAREN) 1 % Gel as needed.     furosemide (LASIX) 20 MG tablet TAKE 2 TABLETS(40 MG) BY MOUTH EVERY DAY    losartan (COZAAR) 100 MG tablet Take 1 tablet (100 mg total) by mouth once daily.    metoprolol succinate (TOPROL-XL) 25 MG 24 hr tablet TK 1 T PO D    potassium chloride SA (K-DUR,KLOR-CON) 10 MEQ tablet Take 2 tablets (20 mEq total) by mouth once daily.    rosuvastatin (CRESTOR) 5 MG tablet Take 5 mg by mouth every evening.    tamsulosin (FLOMAX) 0.4 mg Cap Take 1 capsule by mouth every evening.    tobramycin sulfate 0.3% (TOBREX) 0.3 % ophthalmic solution INSTILL 1 DROP IN RIGHT EYE FOUR TIMES DAILY    calcium carbonate (OS-BERE) 600 mg calcium (1,500 mg) Tab Take 600 mg by mouth once daily.      cetirizine (ZYRTEC) 10 MG tablet Take 10 mg by mouth once daily.    clopidogrel (PLAVIX) 75 mg tablet TK 1 T PO QD    clotrimazole-betamethasone 1-0.05% (LOTRISONE) cream 2 (two) times daily. Apply to affected area    HYDROcodone-acetaminophen (NORCO) 5-325 mg per tablet Take 1 tablet by mouth every 4 (four) hours as needed (Moderate to severe pain).    ketorolac (TORADOL) 10 mg tablet Take 1 tablet (10 mg total) by mouth every 8 (eight) hours as needed for Pain.    mupirocin (BACTROBAN) 2 % ointment Apply topically 2 (two) times daily.    nitroGLYCERIN (NITROSTAT) 0.4 MG SL tablet Place 1 tablet (0.4 mg total) under the tongue every 5 (five) minutes as needed for Chest pain.    ondansetron (ZOFRAN-ODT) 4 MG TbDL Take 1 tablet (4 mg total) by mouth every 6 (six) hours as needed (nausea).    tolterodine (DETROL) 1 MG Tab Take 1 mg by mouth once daily.    ZINC ACETATE ORAL Take by mouth once daily.      Family History       Problem Relation (Age of Onset)    Liver disease Mother          Tobacco Use    Smoking status: Never    Smokeless tobacco: Never   Substance and Sexual Activity    Alcohol use: No    Drug use: No    Sexual activity: Not on file     Review of Systems 12 point ROS negative except for HPI  Objective:     Vital Signs (Most Recent):  Temp: 98.1 °F (36.7 °C) (11/30/23 0531)  Pulse: 99 (11/30/23 0531)  Resp: 20 (11/30/23 0531)  BP: 133/67 (11/30/23 0531)  SpO2: 95 % (11/30/23 0531) Vital Signs (24h Range):  Temp:  [97.7 °F (36.5 °C)-98.1 °F (36.7 °C)] 98.1 °F (36.7 °C)  Pulse:  [] 99  Resp:  [19-27] 20  SpO2:  [93 %-96 %] 95 %  BP: (106-133)/(61-70) 133/67     Weight: 81.8 kg (180 lb 5.4 oz)  Body mass index is 36.42 kg/m².     Physical Exam  Constitutional:       Comments: Alert, Oriented, No acute distress   HENT:      Head: Normocephalic and atraumatic.   Eyes:      Conjunctiva/sclera: Conjunctivae normal.   Neck:      Comments: JVP does not appear elevated but difficult to determine    Cardiovascular:      Rate and Rhythm: Regular rhythm. Tachycardia present.   Pulmonary:      Comments: Normal effort, Clear to auscultation   Abdominal:      Comments: Soft, Non-tender, Non-distended   Musculoskeletal:      Cervical back: Normal range of motion.      Comments: Non-pitting swelling of b/l lower extremities.    Skin:     Comments: Dry, Intact, Warm, Capillary refill <2 seconds.    Neurological:      Mental Status: She is alert and oriented to person, place, and time.      Comments: Normal speech   Psychiatric:         Mood and Affect: Mood and affect normal.                Significant Labs: All pertinent labs within the past 24 hours have been reviewed.    Significant Imaging: I have reviewed all pertinent imaging results/findings within the past 24 hours.  Assessment/Plan:     Elevated troponin  Possibly secondary to type 1 vs type 2 in the setting of possible CHF exascerbation. Difficult to determine volume status based on physcial exam. BNP mildly elevated and patient's cough similar to previous HF exascerbations.     - Continue to trend troponin.   - Will give dose of IV lasix 80mg and monitor response. Strict I and Os. Daily weights. Maintain K>4 and Mg >2.   - Start aspirin, statin, heparin gtt. Continue home plavix and metoprolol.   - Cardiology consulted. Appreciate recommendations.     NATHEN (acute kidney injury)  - possibly secondary to cardiorenal syndrome.   - will monitor creatinine after lasix dose.   - avoid nephrotoxic agents.     Hyperglycemia  Will check A1c.       Essential hypertension  - continue home BP meds.       VTE Risk Mitigation (From admission, onward)           Ordered     heparin 25,000 units in dextrose 5% (100 units/ml) IV bolus from bag - ADDITIONAL PRN BOLUS - 60 units/kg (max bolus 4000 units)  As needed (PRN)        Question:  Heparin Infusion Adjustment (DO NOT MODIFY ANSWER)  Answer:  [email or web address]\epic\Images\Pharmacy\HeparinInfusions\heparin LOW INTENSITY  nomogram for [facility] [id number]    11/30/23 0546     heparin 25,000 units in dextrose 5% (100 units/ml) IV bolus from bag - ADDITIONAL PRN BOLUS - 30 units/kg (max bolus 4000 units)  As needed (PRN)        Question:  Heparin Infusion Adjustment (DO NOT MODIFY ANSWER)  Answer:  [email or web address]\epic\Images\Pharmacy\HeparinInfusions\heparin LOW INTENSITY nomogram for [facility] [id number]    11/30/23 0546     heparin 25,000 units in dextrose 5% (100 units/ml) IV bolus from bag INITIAL BOLUS (max bolus 4000 units)  Once        Question:  Heparin Infusion Adjustment (DO NOT MODIFY ANSWER)  Answer:  [email or web address]\epic\Images\Pharmacy\HeparinInfusions\heparin LOW INTENSITY nomogram for [facility] [id number]    11/30/23 0546     heparin 25,000 units in dextrose 5% 250 mL (100 units/mL) infusion LOW INTENSITY nomogram - [facility]  Continuous        Question:  Begin at (units/kg/hr)  Answer:  12    11/30/23 0546     IP VTE HIGH RISK PATIENT  Once         11/30/23 0545     Place sequential compression device  Until discontinued         11/30/23 0430                       On 11/30/2023, patient should be placed in hospital observation services under my care.             Eliud Sinha MD  Department of Hospital Medicine  [facility]

## 2023-11-30 NOTE — PLAN OF CARE
[facility]olai - Telemetry  Initial Discharge Assessment       Primary Care Provider: Melchor Sethi -    Admission Diagnosis: Unstable angina [I20.0]  Elevated troponin [R79.89]  Chest pain [R07.9]    Admission Date: 11/30/2023  Expected Discharge Date: 12/1/2023    Consult: cards & dty    Payor: HUMANA MANAGED MEDICARE / Plan: HUMANA MEDICARE HMO / Product Type: Capitation /     Extended Emergency Contact Information  Primary Emergency Contact: Lily Ventura  Address: [address] [place] [address] [place] [address] [facility]  Home Phone: [phone]  Mobile Phone: [phone]  Relation: Daughter    Discharge Plan A: (P) Home with family  Discharge Plan B: (P) Home [facility] - [facility] [place] [address] [facility]  [address] [facility] [address]  Phone: [phone] Fax: [phone]    [id number] - [place] - [address] AT [facility]  [address] [place] [address]  Phone: [phone] Fax: [phone]      Initial Assessment (most recent)       Adult Discharge Assessment - 11/30/23 1020          Discharge Assessment    Assessment Type Discharge Planning Assessment (P)      Confirmed/corrected address, phone number and insurance Yes (P)      Confirmed Demographics Correct on Facesheet (P)      Source of Information patient;family (P)    Lily souza ([phone])    Communicated ANA with patient/caregiver Date not available/Unable to determine (P)      People in Home child(chris), adult;grandchild(chris) (P)    Lily rosas, & adult grandson    Do you expect to return to your current living situation? Yes (P)      Do you have help at home or someone to help you manage your care at home? Yes (P)      Prior to hospitilization cognitive status: Alert/Oriented (P)      Current cognitive status: Alert/Oriented (P)      Walking or Climbing Stairs ambulation difficulty, requires equipment (P)       Dressing/Bathing bathing difficulty, requires equipment (P)      Equipment Currently Used at Home walker, standard;wheelchair;bedside commode;grab bar;other (see comments) (P)    BP machine    Readmission within 30 days? No (P)      Patient currently being followed by outpatient case management? No (P)      Do you currently have service(s) that help you manage your care at home? No (P)      Do you take prescription medications? Yes (P)      Do you have prescription coverage? Yes (P)      Do you have any problems affording any of your prescribed medications? No (P)      Is the patient taking medications as prescribed? yes (P)      How do you get to doctors appointments? family or friend will provide (P)      Are you on dialysis? No (P)      Do you take coumadin? No (P)      DME Needed Upon Discharge  other (see comments) (P)    tbd    Discharge Plan discussed with: Patient;Adult children (P)    daughterLily ([phone])    Discharge Plan A Home with family (P)      Discharge Plan B Home Health (P)         Physical Activity    On average, how many days per week do you engage in moderate to strenuous exercise (like a brisk walk)? 0 days (P)      On average, how many minutes do you engage in exercise at this level? 0 min (P)         Financial Resource Strain    How hard is it for you to pay for the very basics like food, housing, medical care, and heating? Not hard at all (P)         Housing Stability    In the last 12 months, was there a time when you were not able to pay the mortgage or rent on time? No (P)      In the last 12 months, was there a time when you did not have a steady place to sleep or slept in a shelter (including now)? No (P)         Transportation Needs    In the past 12 months, has lack of transportation kept you from medical appointments or from getting medications? No (P)      In the past 12 months, has lack of transportation kept you from meetings, work, or from getting things  needed for daily living? No (P)         Food Insecurity    Within the past 12 months, you worried that your food would run out before you got the money to buy more. Never true (P)      Within the past 12 months, the food you bought just didn't last and you didn't have money to get more. Never true (P)         Stress    Do you feel stress - tense, restless, nervous, or anxious, or unable to sleep at night because your mind is troubled all the time - these days? Not at all (P)         Social Connections    In a typical week, how many times do you talk on the phone with family, friends, or neighbors? More than three times a week (P)      How often do you get together with friends or relatives? More than three times a week (P)      How often do you attend Jewish or Quaker services? 1 to 4 times per year (P)      Do you belong to any clubs or organizations such as Jewish groups, unions, fraternal or athletic groups, or school groups? No (P)      How often do you attend meetings of the clubs or organizations you belong to? Never (P)      Are you , , , , never , or living with a partner?  (P)         Alcohol Use    Q1: How often do you have a drink containing alcohol? Never (P)      Q2: How many drinks containing alcohol do you have on a typical day when you are drinking? Patient does not drink (P)      Q3: How often do you have six or more drinks on one occasion? Never (P)                       1020  Patient resting quietly in bed with daughter, Lily Ventura ([phone]), at the bedside when CM rounded. Patient was admitted with elevated troponin and is being followed by cards & dty. Troponin 0.132 & pox 94% on 1L O2 via NC this AM. Pt does not use home oxygen. Pt denied chest pain at this time.     Patient lives with her daughter Lily & her grandson, has equipment to assist with ADLs, & denied the need for assistance with transportation at time of discharge.     MARILYN  updated patient's whiteboard with CM name & contact information.     1310  Message sent to the schedulers informing of hospfu appts needed at [facility] with Dr Lesly Buck & cards. Awaiting response.       Will continue to follow.

## 2023-12-01 LAB
ALBUMIN SERPL BCP-MCNC: 3.1 G/DL (ref 3.5–5.2)
ALP SERPL-CCNC: 97 U/L (ref 55–135)
ALT SERPL W/O P-5'-P-CCNC: 12 U/L (ref 10–44)
ANION GAP SERPL CALC-SCNC: 11 MMOL/L (ref 8–16)
APTT PPP: 56.2 SEC (ref 21–32)
AST SERPL-CCNC: 17 U/L (ref 10–40)
BASOPHILS # BLD AUTO: 0.01 K/UL (ref 0–0.2)
BASOPHILS NFR BLD: 0.1 % (ref 0–1.9)
BILIRUB SERPL-MCNC: 0.4 MG/DL (ref 0.1–1)
BUN SERPL-MCNC: 37 MG/DL (ref 10–30)
CALCIUM SERPL-MCNC: 9.2 MG/DL (ref 8.7–10.5)
CHLORIDE SERPL-SCNC: 107 MMOL/L (ref 95–110)
CO2 SERPL-SCNC: 23 MMOL/L (ref 23–29)
CREAT SERPL-MCNC: 1.2 MG/DL (ref 0.5–1.4)
DIFFERENTIAL METHOD: NORMAL
EOSINOPHIL # BLD AUTO: 0 K/UL (ref 0–0.5)
EOSINOPHIL NFR BLD: 0.2 % (ref 0–8)
ERYTHROCYTE [DISTWIDTH] IN BLOOD BY AUTOMATED COUNT: 13.3 % (ref 11.5–14.5)
EST. GFR  (NO RACE VARIABLE): 42 ML/MIN/1.73 M^2
GLUCOSE SERPL-MCNC: 120 MG/DL (ref 70–110)
HCT VFR BLD AUTO: 37.8 % (ref 37–48.5)
HGB BLD-MCNC: 12.5 G/DL (ref 12–16)
IMM GRANULOCYTES # BLD AUTO: 0.02 K/UL (ref 0–0.04)
IMM GRANULOCYTES NFR BLD AUTO: 0.2 % (ref 0–0.5)
LYMPHOCYTES # BLD AUTO: 2.2 K/UL (ref 1–4.8)
LYMPHOCYTES NFR BLD: 23.1 % (ref 18–48)
MAGNESIUM SERPL-MCNC: 1.9 MG/DL (ref 1.6–2.6)
MCH RBC QN AUTO: 29.6 PG (ref 27–31)
MCHC RBC AUTO-ENTMCNC: 33.1 G/DL (ref 32–36)
MCV RBC AUTO: 89 FL (ref 82–98)
MONOCYTES # BLD AUTO: 0.5 K/UL (ref 0.3–1)
MONOCYTES NFR BLD: 4.8 % (ref 4–15)
NEUTROPHILS # BLD AUTO: 6.9 K/UL (ref 1.8–7.7)
NEUTROPHILS NFR BLD: 71.6 % (ref 38–73)
NRBC BLD-RTO: 0 /100 WBC
PLATELET # BLD AUTO: 166 K/UL (ref 150–450)
PMV BLD AUTO: 11.3 FL (ref 9.2–12.9)
POTASSIUM SERPL-SCNC: 4 MMOL/L (ref 3.5–5.1)
PROT SERPL-MCNC: 6.3 G/DL (ref 6–8.4)
RBC # BLD AUTO: 4.23 M/UL (ref 4–5.4)
SODIUM SERPL-SCNC: 141 MMOL/L (ref 136–145)
WBC # BLD AUTO: 9.6 K/UL (ref 3.9–12.7)

## 2023-12-01 PROCEDURE — 27000221 HC OXYGEN, UP TO 24 HOURS

## 2023-12-01 PROCEDURE — 63600175 PHARM REV CODE 636 W HCPCS: Performed by: FAMILY MEDICINE

## 2023-12-01 PROCEDURE — 97161 PT EVAL LOW COMPLEX 20 MIN: CPT

## 2023-12-01 PROCEDURE — G0378 HOSPITAL OBSERVATION PER HR: HCPCS

## 2023-12-01 PROCEDURE — 80053 COMPREHEN METABOLIC PANEL: CPT | Performed by: STUDENT IN AN ORGANIZED HEALTH CARE EDUCATION/TRAINING PROGRAM

## 2023-12-01 PROCEDURE — 63600175 PHARM REV CODE 636 W HCPCS: Performed by: STUDENT IN AN ORGANIZED HEALTH CARE EDUCATION/TRAINING PROGRAM

## 2023-12-01 PROCEDURE — 85025 COMPLETE CBC W/AUTO DIFF WBC: CPT | Performed by: STUDENT IN AN ORGANIZED HEALTH CARE EDUCATION/TRAINING PROGRAM

## 2023-12-01 PROCEDURE — 96376 TX/PRO/DX INJ SAME DRUG ADON: CPT

## 2023-12-01 PROCEDURE — 97165 OT EVAL LOW COMPLEX 30 MIN: CPT

## 2023-12-01 PROCEDURE — 83735 ASSAY OF MAGNESIUM: CPT | Performed by: STUDENT IN AN ORGANIZED HEALTH CARE EDUCATION/TRAINING PROGRAM

## 2023-12-01 PROCEDURE — 36415 COLL VENOUS BLD VENIPUNCTURE: CPT | Performed by: STUDENT IN AN ORGANIZED HEALTH CARE EDUCATION/TRAINING PROGRAM

## 2023-12-01 PROCEDURE — 25000003 PHARM REV CODE 250: Performed by: STUDENT IN AN ORGANIZED HEALTH CARE EDUCATION/TRAINING PROGRAM

## 2023-12-01 PROCEDURE — 85730 THROMBOPLASTIN TIME PARTIAL: CPT | Performed by: STUDENT IN AN ORGANIZED HEALTH CARE EDUCATION/TRAINING PROGRAM

## 2023-12-01 PROCEDURE — 96366 THER/PROPH/DIAG IV INF ADDON: CPT

## 2023-12-01 PROCEDURE — 97530 THERAPEUTIC ACTIVITIES: CPT

## 2023-12-01 PROCEDURE — 94761 N-INVAS EAR/PLS OXIMETRY MLT: CPT

## 2023-12-01 PROCEDURE — 25000003 PHARM REV CODE 250: Performed by: NURSE PRACTITIONER

## 2023-12-01 RX ORDER — HYDRALAZINE HYDROCHLORIDE 20 MG/ML
10 INJECTION INTRAMUSCULAR; INTRAVENOUS EVERY 8 HOURS PRN
Status: DISCONTINUED | OUTPATIENT
Start: 2023-12-01 | End: 2023-12-01

## 2023-12-01 RX ORDER — CARVEDILOL 25 MG/1
25 TABLET ORAL 2 TIMES DAILY WITH MEALS
Status: DISCONTINUED | OUTPATIENT
Start: 2023-12-01 | End: 2023-12-02 | Stop reason: HOSPADM

## 2023-12-01 RX ORDER — FUROSEMIDE 10 MG/ML
40 INJECTION INTRAMUSCULAR; INTRAVENOUS ONCE
Status: COMPLETED | OUTPATIENT
Start: 2023-12-01 | End: 2023-12-01

## 2023-12-01 RX ORDER — FUROSEMIDE 10 MG/ML
80 INJECTION INTRAMUSCULAR; INTRAVENOUS ONCE
Status: DISCONTINUED | OUTPATIENT
Start: 2023-12-01 | End: 2023-12-01

## 2023-12-01 RX ADMIN — CLOPIDOGREL BISULFATE 75 MG: 75 TABLET ORAL at 09:12

## 2023-12-01 RX ADMIN — HYDRALAZINE HYDROCHLORIDE 10 MG: 20 INJECTION, SOLUTION INTRAMUSCULAR; INTRAVENOUS at 09:12

## 2023-12-01 RX ADMIN — CARVEDILOL 25 MG: 25 TABLET, FILM COATED ORAL at 09:12

## 2023-12-01 RX ADMIN — ASPIRIN 81 MG CHEWABLE TABLET 81 MG: 81 TABLET CHEWABLE at 09:12

## 2023-12-01 RX ADMIN — FUROSEMIDE 40 MG: 10 INJECTION, SOLUTION INTRAVENOUS at 05:12

## 2023-12-01 RX ADMIN — ATORVASTATIN CALCIUM 80 MG: 40 TABLET, FILM COATED ORAL at 09:12

## 2023-12-01 NOTE — PROGRESS NOTES
[facility] Medicine  Progress Note    Patient Name: Marycruz Perez  MRN: [id number]  Patient Class: OP- Observation   Admission Date: 11/30/2023  Length of Stay: 0 days  Attending Physician: Genesis Coley*  Primary Care Provider: Melchor Menchaca -        Subjective:     Principal Problem:<principal problem not specified>        HPI:  Ms. 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 Possibly improved w/ sitting up. Nothing makes it worse. Denied any SOB, nausea, vomiting, diaphoresis, fever, chills, abdominal pain, orthopnea, PND, abdominal distention. Endorses chronic peripheral edema and worsening of chronic cough w/ white sputum. States usually when she has this type of cough its due fluid overload. Denies every experiencing this chest pain before.     In the ED, labs were notable for glucose 277,  (baseline 89), Troponin I 0.070, creatinine 1.3 (baseline 0.9). Patient was given aspirin by EMS.     Patient admitted for elevated troponin.     Overview/Hospital Course:  No notes on file    Interval History:  Awake and alert, lying in bed  Troponin up trended-likely due to demand ischemia-from uncontrolled high blood pressure-resume home meds  Blood pressure still elevated this morning-appreciate cardiology recs, blood pressure now improving  Deescalate Lasix dose from 80 mg to 40 mg IV.  Will add arbs as blood pressure tolerates    Review of Systems   Constitutional:  Positive for activity change.   Respiratory:  Negative for shortness of breath.    Genitourinary:  Negative for dysuria.   Neurological:  Negative for dizziness.   Psychiatric/Behavioral:  Negative for agitation.      Objective:     Vital Signs (Most Recent):  Temp:  "97.6 °F (36.4 °C) (12/01/23 1136)  Pulse: 70 (12/01/23 1216)  Resp: 20 (12/01/23 1136)  BP: (!) 105/50 (12/01/23 1346)  SpO2: (!) 94 % (12/01/23 1216) Vital Signs (24h Range):  Temp:  [97.6 °F (36.4 °C)-98.6 °F (37 °C)] 97.6 °F (36.4 °C)  Pulse:  [65-86] 70  Resp:  [18-20] 20  SpO2:  [91 %-97 %] 94 %  BP: ()/(44-84) 105/50     Weight: 80.6 kg (177 lb 11.1 oz)  Body mass index is 35.89 kg/m².    Intake/Output Summary (Last 24 hours) at 12/1/2023 1449  Last data filed at 12/1/2023 1445  Gross per 24 hour   Intake --   Output 550 ml   Net -550 ml         Physical Exam  HENT:      Head: Normocephalic and atraumatic.   Cardiovascular:      Rate and Rhythm: Normal rate.   Abdominal:      General: Bowel sounds are normal.   Musculoskeletal:      Cervical back: Normal range of motion.   Skin:     Capillary Refill: Capillary refill takes less than 2 seconds.   Neurological:      Mental Status: She is alert and oriented to person, place, and time.             Significant Labs: A1C:   Recent Labs   Lab 11/30/23 0606   HGBA1C 5.9*     ABGs: No results for input(s): "PH", "PCO2", "HCO3", "POCSATURATED", "BE", "TOTALHB", "COHB", "METHB", "O2HB", "POCFIO2", "PO2" in the last 48 hours.  Blood Culture: No results for input(s): "LABBLOO" in the last 48 hours.  CBC:   Recent Labs   Lab 11/30/23 0320 11/30/23  0606 12/01/23 0322   WBC 5.56 7.30 9.60   HGB 13.5 13.1 12.5   HCT 40.5 39.4 37.8    151 166     CMP:   Recent Labs   Lab 11/30/23  0320 12/01/23  0322    141   K 3.8 4.0    107   CO2 16* 23   * 120*   BUN 29 37*   CREATININE 1.3 1.2   CALCIUM 9.5 9.2   PROT 7.1 6.3   ALBUMIN 3.2* 3.1*   BILITOT 0.2 0.4   ALKPHOS 126 97   AST 16 17   ALT 13 12   ANIONGAP 17* 11     Lactic Acid: No results for input(s): "LACTATE" in the last 48 hours.  Lipase: No results for input(s): "LIPASE" in the last 48 hours.  Lipid Panel:   Recent Labs   Lab 11/30/23  0606   CHOL 197   HDL 37*   LDLCALC 138.2   TRIG 109 " "  CHOLHDL 18.8*     Magnesium:   Recent Labs   Lab 12/01/23  0322   MG 1.9     Troponin:   Recent Labs   Lab 11/30/23  0606 11/30/23  0853 11/30/23  1243   TROPONINI 0.106*  0.106* 0.132* 0.152*     TSH: No results for input(s): "TSH" in the last 4320 hours.  Urine Culture: No results for input(s): "LABURIN" in the last 48 hours.  Urine Studies: No results for input(s): "COLORU", "APPEARANCEUA", "PHUR", "SPECGRAV", "PROTEINUA", "GLUCUA", "KETONESU", "BILIRUBINUA", "OCCULTUA", "NITRITE", "UROBILINOGEN", "LEUKOCYTESUR", "RBCUA", "WBCUA", "BACTERIA", "SQUAMEPITHEL", "HYALINECASTS" in the last 48 hours.    Invalid input(s): "WRIGHTSUR"    Significant Imaging: I have reviewed all pertinent imaging results/findings within the past 24 hours.    Assessment/Plan:      NATHEN (acute kidney injury)  - possibly secondary to cardiorenal syndrome.   - will monitor creatinine after lasix dose.   - avoid nephrotoxic agents.     Elevated troponin  Possibly secondary to type 1 vs type 2 in the setting of possible CHF exascerbation. Difficult to determine volume status based on physcial exam. BNP mildly elevated and patient's cough similar to previous HF exascerbations.     - Continue to trend troponin.   - Will give dose of IV lasix 80mg and monitor response. Strict I and Os. Daily weights. Maintain K>4 and Mg >2.   - Start aspirin, statin, heparin gtt. Continue home plavix and metoprolol.   - Cardiology consulted. Appreciate recommendations.     Hyperglycemia   A1c.       Essential hypertension  - continue home BP meds.       VTE Risk Mitigation (From admission, onward)           Ordered     IP VTE HIGH RISK PATIENT  Once         11/30/23 0545     Place sequential compression device  Until discontinued         11/30/23 0430                    Discharge Planning   ANA: 12/1/2023     Code Status: Full Code   Is the patient medically ready for discharge?:     Reason for patient still in hospital (select all that apply): Patient trending " condition  Discharge Plan A: Home with family                  Genesis MARTY Coley MD  Department of Hospital Medicine   [place] - [facility]

## 2023-12-01 NOTE — SUBJECTIVE & OBJECTIVE
Past Medical History:   Diagnosis Date    Acute exacerbation of CHF (congestive heart failure) 10/27/2020    NATHEN (acute kidney injury) 11/30/2023    Hypertension     Syncope and collapse        Past Surgical History:   Procedure Laterality Date    A-V CARDIAC PACEMAKER INSERTION N/A 2/22/2021    Procedure: INSERTION, CARDIAC PACEMAKER, DUAL CHAMBER;  Surgeon: Norman Foote MD;  Location: [facility] EP LAB;  Service: Cardiology;  Laterality: N/A;  AVB, PPM upgrade to Dual PPM (right sided), Bio, Venogram prior to draping, MAC, CA, 3 Prep    CARDIAC CATHETERIZATION      CARDIAC PACEMAKER PLACEMENT      CHOLECYSTECTOMY      CORONARY ANGIOPLASTY      HYSTERECTOMY      REVISION OF SKIN POCKET FOR PACEMAKER  2/22/2021    Procedure: Revision, Skin Pocket, For Cardiac Pacemaker;  Surgeon: Norman Foote MD;  Location: [facility] EP LAB;  Service: Cardiology;;       Review of patient's allergies indicates:   Allergen Reactions    Codeine     Latex, natural rubber     Penicillins     Penicillin Rash       Current Facility-Administered Medications on File Prior to Encounter   Medication    vancomycin in dextrose 5 % 1 gram/250 mL IVPB 1,000 mg     Current Outpatient Medications on File Prior to Encounter   Medication Sig    albuterol (PROVENTIL/VENTOLIN HFA) 90 mcg/actuation inhaler Inhale 2 puffs into the lungs every 4 (four) hours as needed for Wheezing (cough or wheezing). Rescue    albuterol-ipratropium (DUO-NEB) 2.5 mg-0.5 mg/3 mL nebulizer solution every 6 (six) hours as needed for Wheezing.    azithromycin (ZITHROMAX) 500 MG tablet Take 500 mg by mouth.    bumetanide (BUMEX) 0.5 MG Tab Take 0.5 mg by mouth.    diclofenac sodium (VOLTAREN) 1 % Gel as needed.     donepeziL (ARICEPT) 5 MG tablet Take 5 mg by mouth.    doxycycline (VIBRA-TABS) 100 MG tablet Take 100 mg by mouth 2 (two) times daily.    meclizine (ANTIVERT) 25 mg tablet Take 25 mg by mouth 3 (three) times daily as needed.    metoprolol succinate (TOPROL-XL) 25 MG 24 hr  tablet TK 1 T PO D    potassium chloride SA (K-DUR,KLOR-CON) 10 MEQ tablet Take 2 tablets (20 mEq total) by mouth once daily.    predniSONE (DELTASONE) 20 MG tablet Take 20 mg by mouth once daily.    rosuvastatin (CRESTOR) 5 MG tablet Take 5 mg by mouth every evening.    tamsulosin (FLOMAX) 0.4 mg Cap Take 1 capsule by mouth every evening.    tobramycin sulfate 0.3% (TOBREX) 0.3 % ophthalmic solution INSTILL 1 DROP IN RIGHT EYE FOUR TIMES DAILY    valsartan (DIOVAN) 160 MG tablet Take 160 mg by mouth once daily.    ZINC ACETATE ORAL Take by mouth once daily.     calcium carbonate (OS-BERE) 600 mg calcium (1,500 mg) Tab Take 600 mg by mouth once daily.     cetirizine (ZYRTEC) 10 MG tablet Take 10 mg by mouth once daily.    clopidogrel (PLAVIX) 75 mg tablet TK 1 T PO QD    clotrimazole-betamethasone 1-0.05% (LOTRISONE) cream 2 (two) times daily. Apply to affected area    HYDROcodone-acetaminophen (NORCO) 5-325 mg per tablet Take 1 tablet by mouth every 4 (four) hours as needed (Moderate to severe pain).    ketorolac (TORADOL) 10 mg tablet Take 1 tablet (10 mg total) by mouth every 8 (eight) hours as needed for Pain.    mupirocin (BACTROBAN) 2 % ointment Apply topically 2 (two) times daily.    nitroGLYCERIN (NITROSTAT) 0.4 MG SL tablet Place 1 tablet (0.4 mg total) under the tongue every 5 (five) minutes as needed for Chest pain.    ondansetron (ZOFRAN-ODT) 4 MG TbDL Take 1 tablet (4 mg total) by mouth every 6 (six) hours as needed (nausea).    tolterodine (DETROL) 1 MG Tab Take 1 mg by mouth once daily.     Family History       Problem Relation (Age of Onset)    Liver disease Mother          Tobacco Use    Smoking status: Never    Smokeless tobacco: Never   Substance and Sexual Activity    Alcohol use: No    Drug use: No    Sexual activity: Not on file     Review of Systems   Constitutional: Negative for chills, decreased appetite, diaphoresis, fever, malaise/fatigue, weight gain and weight loss.   Cardiovascular:   Positive for dyspnea on exertion and leg swelling (improved - back to baseline). Negative for chest pain, claudication, irregular heartbeat, near-syncope, orthopnea, palpitations and paroxysmal nocturnal dyspnea.   Respiratory:  Negative for cough, shortness of breath, snoring, sputum production and wheezing.    Endocrine: Negative for cold intolerance, heat intolerance, polydipsia, polyphagia and polyuria.   Skin:  Negative for color change, dry skin, itching, nail changes and poor wound healing.   Musculoskeletal:  Negative for back pain, gout, joint pain and joint swelling.   Gastrointestinal:  Negative for bloating, abdominal pain, constipation, diarrhea, hematemesis, hematochezia, melena, nausea and vomiting.   Genitourinary:  Negative for dysuria, hematuria and nocturia.   Neurological:  Negative for dizziness, headaches, light-headedness, numbness, paresthesias and weakness.   Psychiatric/Behavioral:  Negative for altered mental status, depression and memory loss.      Objective:     Vital Signs (Most Recent):  Temp: 98.6 °F (37 °C) (12/01/23 0829)  Pulse: 86 (12/01/23 1031)  Resp: 20 (12/01/23 0829)  BP: (!) 82/44 (12/01/23 1031)  SpO2: 96 % (12/01/23 1030) Vital Signs (24h Range):  Temp:  [97.6 °F (36.4 °C)-98.6 °F (37 °C)] 98.6 °F (37 °C)  Pulse:  [65-86] 86  Resp:  [18-20] 20  SpO2:  [93 %-97 %] 96 %  BP: ()/(44-84) 82/44     Weight: 80.6 kg (177 lb 11.1 oz)  Body mass index is 35.89 kg/m².    SpO2: 96 %         Intake/Output Summary (Last 24 hours) at 12/1/2023 1034  Last data filed at 12/1/2023 0502  Gross per 24 hour   Intake --   Output 350 ml   Net -350 ml       Lines/Drains/Airways       Peripheral Intravenous Line  Duration                  Peripheral IV - Single Lumen 11/30/23 0300 20 G Left Wrist 1 day                     Physical Exam  Constitutional:       General: She is not in acute distress.     Appearance: She is well-developed.   Cardiovascular:      Rate and Rhythm: Normal rate and  regular rhythm.      Heart sounds: No murmur heard.     No gallop.   Pulmonary:      Effort: Pulmonary effort is normal. No respiratory distress.      Breath sounds: Rhonchi present. No wheezing.   Abdominal:      General: Bowel sounds are normal. There is no distension.      Palpations: Abdomen is soft.      Tenderness: There is no abdominal tenderness.   Musculoskeletal:      Right lower leg: Edema present.      Left lower leg: Edema present.   Skin:     General: Skin is warm and dry.   Neurological:      Mental Status: She is alert and oriented to person, place, and time.          Significant Labs: BMP:   Recent Labs   Lab 11/30/23  0320 12/01/23  0322   * 120*    141   K 3.8 4.0    107   CO2 16* 23   BUN 29 37*   CREATININE 1.3 1.2   CALCIUM 9.5 9.2   MG  --  1.9     , CBC   Recent Labs   Lab 11/30/23  0320 11/30/23  0606 12/01/23  0322   WBC 5.56 7.30 9.60   HGB 13.5 13.1 12.5   HCT 40.5 39.4 37.8    151 166     , and Troponin   Recent Labs   Lab 11/30/23  0606 11/30/23  0853 11/30/23  1243   TROPONINI 0.106*  0.106* 0.132* 0.152*         Significant Imaging: Echocardiogram: Transthoracic echo (TTE) complete (Cupid Only):   Results for orders placed or performed during the hospital encounter of 11/30/23   Echo   Result Value Ref Range    BSA 1.84 m2    Garcia's Biplane MOD Ejection Fraction 60 %    LVOT stroke volume 46.48 cm3    LVIDd 3.17 (A) 3.5 - 6.0 cm    LV Systolic Volume 12.22 mL    LV Systolic Volume Index 6.9 mL/m2    LVIDs 1.97 (A) 2.1 - 4.0 cm    LV Diastolic Volume 39.88 mL    LV Diastolic Volume Index 22.66 mL/m2    IVS 1.57 (A) 0.6 - 1.1 cm    LVOT diameter 1.85 cm    LVOT area 2.7 cm2    FS 38 28 - 44 %    Left Ventricle Relative Wall Thickness 0.62 cm    Posterior Wall 0.98 0.6 - 1.1 cm    LV mass 129.80 g    LV Mass Index 74 g/m2    MV Peak E Juan M 0.88 m/s    TDI LATERAL 0.07 m/s    MV Peak A Juan M 1.80 m/s    TR Max Juan M 2.07 m/s    E/A ratio 0.49     E wave  deceleration time 120.69 msec    LV LATERAL E/E' RATIO 12.57 m/s    PV Peak S Juan M 0.39 m/s    PV Peak D Juan M 0.45 m/s    Pulm vein S/D ratio 0.87     LVOT peak juan m 0.94 m/s    Left Ventricular Outflow Tract Mean Velocity 0.75 cm/s    Left Ventricular Outflow Tract Mean Gradient 2.41 mmHg    RV S' 11.82 cm/s    TAPSE 2.05 cm    LA size 4.78 cm    Left Atrium Minor Axis 6.69 cm    Left Atrium Major Axis 6.73 cm    LA volume (mod) 97.07 cm3    LA Volume Index (Mod) 55.2 mL/m2    RA Major Axis 5.93 cm    RA Width 2.61 cm    AV mean gradient 20 mmHg    AV peak gradient 28 mmHg    Ao peak juan m 2.64 m/s    Ao VTI 59.70 cm    LVOT peak VTI 17.30 cm    AV valve area 0.78 cm²    AV Velocity Ratio 0.36     AV index (prosthetic) 0.29     ELMIRA by Velocity Ratio 0.96 cm²    Mr max juan m 2.58 m/s    MV peak gradient 19 mmHg    MV stenosis pressure 1/2 time 35.00 ms    MV valve area p 1/2 method 6.29 cm2    MV valve area by continuity eq 1.20 cm2    MV VTI 38.6 cm    Triscuspid Valve Regurgitation Peak Gradient 17 mmHg    PV PEAK VELOCITY 0.86 m/s    PV peak gradient 3 mmHg    Sinus 3.32 cm    STJ 3.12 cm    ZLVIDS -3.35     ZLVIDD -4.29     LA Volume Index 55.8 mL/m2    LA volume 98.14 cm3    LA WIDTH 3.6 cm    EF 65 %    TV resting pulmonary artery pressure 20 mmHg    RV TB RVSP 5 mmHg    Est. RA pres 3 mmHg    Narrative      Left Ventricle: There is concentric remodeling. Normal wall motion.   There is normal systolic function. Ejection fraction by visual   approximation is 65%. Biplane (2D) method of discs ejection fraction is   60%. Diastolic function cannot be reliably determined in the presence of   mitral annular calcification.    Right Ventricle: Normal right ventricular cavity size. Wall thickness   is normal. Right ventricle wall motion  is normal. Systolic function is   normal.    Left Atrium: Left atrium is severely dilated.    Aortic Valve: Moderately calcified cusps. Moderately restricted motion.   There is moderate  stenosis. Aortic valve peak velocity is 2.64 m/s. Mean   gradient is 20 mmHg. The dimensionless index is 0.29.    Mitral Valve: There is severe bileaflet sclerosis. Moderately calcified   subvalvular apparatus.    Tricuspid Valve: There is mild regurgitation.    Pulmonic Valve: There is mild regurgitation.    Pulmonary Artery: The estimated pulmonary artery systolic pressure is   20 mmHg.    IVC/SVC: Normal venous pressure at 3 mmHg.

## 2023-12-01 NOTE — ASSESSMENT & PLAN NOTE
- troponin .071 with slight trend up to .106; EKG V paced  - history of CAD s/p PCI unknown vessel  -  feel troponin elevation related to demand etiology in setting of mild volume overload  - continue ASA, statin, Plavix and BB  - Heparin discontinued

## 2023-12-01 NOTE — PLAN OF CARE
12/01/23 0940   Nurse Notification   Bedside Nurse Notified? Yes   Name of Bedside Nurse Carla   Nurse Notfication Method Secure Chat   Nurse Notified Of Other;Flowsheet Documentation  (VN requested for retake and chart of present BP since  PRN Hydralazine was admin approx 35-40 minutes ago.)        Bedside and Verbal shift change report given to Ramona Beck RN (oncoming nurse) by Kate Rose RN (offgoing nurse). Report included the following information SBAR, Kardex, ED Summary, Procedure Summary, Intake/Output, MAR and Recent Results.

## 2023-12-01 NOTE — PT/OT/SLP EVAL
Physical Therapy Evaluation    Patient Name:  Marycruz Perez   MRN:  [id number]    Recommendations:     Discharge Recommendations: Low Intensity Therapy (HH PT/OT and 24/7 care and assistance)   Discharge Equipment Recommendations: none   Barriers to discharge:  Pt requires increased level of assistance    Assessment:     Marycruz Perez is a 89 y.o. female admitted with a medical diagnosis of The primary encounter diagnosis was Unstable angina. Diagnoses of Chest pain, Elevated troponin, NSTEMI (non-ST elevation myocardial infarction), and Non-ST elevation myocardial infarction (NSTEMI) were also pertinent to this visit.    She presents with the following impairments/functional limitations: weakness, impaired endurance, impaired self care skills, impaired functional mobility, gait instability, decreased coordination, edema Patient will benefit from cont PT services in order to maximize functional independence; recommend likely likely low intensity therapy post acute (HH PT/OT and 24/7 care/assistance; pt has all needed DME at this time; pt with symptomatic orthostatic HOTN during session which limited pt's progress; pt required CGA for sit<>stand with use of RW; will progress as tolerated. .    Rehab Prognosis: Good; patient would benefit from acute skilled PT services to address these deficits and reach maximum level of function.    Recent Surgery: * No surgery found *      Plan:     During this hospitalization, patient to be seen 5 x/week to address the identified rehab impairments via gait training, therapeutic activities, therapeutic exercises, neuromuscular re-education and progress toward the following goals:    Plan of Care Expires:  01/01/24    Subjective     Chief Complaint: dizziness upon standing  Patient/Family Comments/goals: agreeable to therapy  Pain/Comfort:  Pain Rating 1: 0/10    Patients cultural, spiritual, Gnosticism conflicts given the current situation: no    Living Environment:  Pt lives  w/daughter & grandson, SSH, THE, WIS w/built-in seat  Previous level of function: Pt requires Eduardo dressing, bathing, PRN for toileting, recently daughter has been SBA for functional mobility with use of RW. No falls.   Equipment Used at Home: other (see comments), walker, rolling (transport WC)  Assistance upon Discharge: Daughter who is home w/pt    Objective:     Communicated with nurse prior to session.  Patient found HOB elevated with bed alarm, oxygen  upon PT entry to room.    General Precautions: Standard, fall  Orthopedic Precautions:N/A   Braces: N/A  Respiratory Status: Nasal cannula, flow 1 L/min    Exams:  Cognitive Exam:  Patient is oriented to Person, Place, Time, Situation, and follows multistep commands  Gross Motor Coordination:  decreased speed  Postural Exam:  Patient presented with the following abnormalities:    -       Rounded shoulders  -       Forward head  Sensation:    -       Intact  light/touch BLEs  Skin Integrity/Edema:      -       Skin integrity: Visible skin intact  -       Edema: Mild L>R  RLE ROM: WFL  RLE Strength: WFL  LLE ROM: WFL  LLE Strength: WFL    Functional Mobility:  Bed Mobility:     Rolling Right: minimum assistance and use of side rail  Scooting: moderate assistance, of 2 persons, laterally along EOB 2/2 dizziness  Supine to Sit: minimum assistance and moderate assistance  Sit to Supine: maximal assistance, of 2 persons, 2/2 dizziness following standing trial  Transfers:     Sit to Stand:  contact guard assistance with rolling walker  Functional Mobility: NT 2/2 dizziness upon standing, not resolving seated EOB so returned to supine.      AM-PAC 6 CLICK MOBILITY  Total Score:15       Treatment & Education:  Daughter present throughout session to provide translation as needed- pt is [place]; pt performed skills as described above; able to sit EOB with SBA; pt sit to stand with CGA using RW and pt became orthostatic and symptomatic during standing; pt unable to step  laterally to HOB; scooted along EOB then assisted back to supine; O2 sats 95% and above during activity on RW; nurse informed of status.  23 1037 -- -- 74 -- 128/58 Abnormal  Lying 84 -- -- -- -- -- -- -- -- SB   23 1031 -- -- 86 -- 82/44 Abnormal  Standing 57 -- -- -- -- -- -- -- -- SB   23 1030 -- -- 80 -- 130/62 Sitting 89 96 % -- -- -- -- -- -- -- SB   23 1020                         Patient left HOB elevated with all lines intact, call button in reach, bed alarm on, nurse notified, daughter present, and dizziness resolved .    GOALS:   Multidisciplinary Problems       Physical Therapy Goals          Problem: Physical Therapy    Goal Priority Disciplines Outcome Goal Variances Interventions   Physical Therapy Goal     PT, PT/OT Ongoing, Progressing     Description: Goals to be met by: 2025     Patient will increase functional independence with mobility by performin. Supine to sit with Stand-by Assistance  2. Sit to supine with Stand-by Assistance  3. Sit to stand transfer with Stand-by Assistance using Rolling Walker  4. Bed to chair transfer with Stand-by Assistance using Rolling Walker  5. Gait  x 75 feet with Stand-by Assistance using Rolling Walker.   6. Ascend/Descend 5 inch curb step with Contact Guard Assistance and Minimal Assistance using Rolling Walker.  7. Lower extremity exercise program x10 reps with supervision                         History:     Past Medical History:   Diagnosis Date    Acute exacerbation of CHF (congestive heart failure) 10/27/2020    NATHEN (acute kidney injury) 2023    Hypertension     Syncope and collapse        Past Surgical History:   Procedure Laterality Date    A-V CARDIAC PACEMAKER INSERTION N/A 2021    Procedure: INSERTION, CARDIAC PACEMAKER, DUAL CHAMBER;  Surgeon: Norman Foote MD;  Location: [facility] EP LAB;  Service: Cardiology;  Laterality: N/A;  AVB, PPM upgrade to Dual PPM (right sided), Bio, Venogram prior to draping, MAC,  NV, 3 Prep    CARDIAC CATHETERIZATION      CARDIAC PACEMAKER PLACEMENT      CHOLECYSTECTOMY      CORONARY ANGIOPLASTY      HYSTERECTOMY      REVISION OF SKIN POCKET FOR PACEMAKER  2/22/2021    Procedure: Revision, Skin Pocket, For Cardiac Pacemaker;  Surgeon: Norman Foote MD;  Location: [facility] LAB;  Service: Cardiology;;       Time Tracking:     PT Received On: 12/01/23  PT Start Time: 1007     PT Stop Time: 1043  PT Total Time (min): 36 min     Billable Minutes: Evaluation 10 and Therapeutic Activity 26      12/01/2023

## 2023-12-01 NOTE — ASSESSMENT & PLAN NOTE
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  Follow up with Dr Newman as OP for continued monitoring

## 2023-12-01 NOTE — PT/OT/SLP EVAL
Occupational Therapy   Evaluation    Name: Marycruz Perez  MRN: [id number]  Admitting Diagnosis: <principal problem not specified>  Recent Surgery: * No surgery found *      Recommendations:     Discharge Recommendations: Low Intensity Therapy (likely HH OT/PT & 24/7 care/assistance)  Discharge Equipment Recommendations:  none  Barriers to discharge:  Other (Comment) (Pt requires increased level of assistance)    Assessment:     Marycruz Perez is a 89 y.o. female with a medical diagnosis of <principal problem not specified>.  She presents with The primary encounter diagnosis was Unstable angina. Diagnoses of Chest pain, Elevated troponin, NSTEMI (non-ST elevation myocardial infarction), and Non-ST elevation myocardial infarction (NSTEMI) were also pertinent to this visit. Performance deficits affecting function: weakness, impaired cardiopulmonary response to activity, impaired endurance, gait instability, impaired balance, impaired self care skills, impaired functional mobility, decreased coordination, edema.      Pt would benefit from cont OT services in order to maximize functional independence. Recommending likely low intensity therapy upon d/c (HH OT/PT & 24/7 care/assistance). Pt has all necessary DME at this time. Pt orthostatic & symptomatic upon standing this date, limiting session. See full note for BP chart. Pt requires CGA for sit<>stand with use of RW. Will progress as able.      Rehab Prognosis: Good; patient would benefit from acute skilled OT services to address these deficits and reach maximum level of function.       Plan:     Patient to be seen 3 x/week to address the above listed problems via self-care/home management, therapeutic activities, therapeutic exercises  Plan of Care Expires: 01/01/24  Plan of Care Reviewed with: patient, daughter    Subjective     Chief Complaint: Dizziness upon standing  Patient/Family Comments/goals: Agreeable to therapy    Occupational Profile:  Living Environment: Pt  lives w/daughter & grandson, SSH, THE, WIS w/built-in seat  Previous level of function: Pt requires Eduardo dressing, bathing, PRN for toileting, recently daughter has been SBA for functional mobility with use of RW. No falls.   Equipment Used at Home: other (see comments), walker, rolling (transport WC)  Assistance upon Discharge: Daughter who is home w/pt    Pain/Comfort:  Pain Rating 1: 0/10    Patients cultural, spiritual, Protestant conflicts given the current situation: no    Objective:     Communicated with: nsrommel prior to session.  Patient found HOB elevated with bed alarm, oxygen upon OT entry to room.    General Precautions: Standard, fall  Orthopedic Precautions: N/A  Braces: N/A  Respiratory Status: Nasal cannula, flow 1 L/min    Occupational Performance:    Bed Mobility:    Patient completed Scooting/Bridging with moderate assistance and 2 persons laterally along EOB 2/2 dizziness  Patient completed Supine to Sit with minimum assistance and moderate assistance  Patient completed Sit to Supine with maximum assistance and 2 persons 2/2 dizziness after standing trial    Functional Mobility/Transfers:  Patient completed Sit <> Stand Transfer with contact guard assistance  with  rolling walker   Functional Mobility: NT 2/2 dizziness upon standing, not resolving seated EOB so returned to supine.    Activities of Daily Living:  Lower Body Dressing: minimum assistance to patricia sandals EOB; pt reports she wears only sandals at home    Cognitive/Visual Perceptual:  Cognitive/Psychosocial Skills:     -       Oriented to: Person, Place, Time, and Situation   -       Follows Commands/attention:Follows multistep  commands  -       Memory: No Deficits noted  -       Mood/Affect/Coping skills/emotional control: Cooperative, Happy, and Pleasant    Physical Exam:  Upper Extremity Range of Motion:     -       Right Upper Extremity: WFL  -       Left Upper Extremity: WFL  Upper Extremity Strength:    -       Right Upper  Extremity: WFL  -       Left Upper Extremity: WFL   Strength:    -       Right Upper Extremity: WFL  -       Left Upper Extremity: WFL    AMPAC 6 Click ADL:  [facility] Total Score: 19    Treatment & Education:  Pt would benefit from cont OT services in order to maximize functional independence.   Pt has all necessary DME at this time.   Daughter in room throughout session providing translation as needed as pt is [place].  Pt orthostatic & symptomatic upon standing this date, limiting session. See below for BP chart.   Pt requires CGA for sit<>stand with use of RW.   Pt returned to supine for safety.  O2 95% and above during ax on RA; nsg notified.   Will progress as able.        12/01/23 1037 -- -- 74 -- 128/58 Abnormal  Lying 84 -- -- -- -- -- -- -- -- SB   12/01/23 1031 -- -- 86 -- 82/44 Abnormal  Standing 57 -- -- -- -- -- -- -- -- SB   12/01/23 1030 -- -- 80 -- 130/62 Sitting 89 96 % -- -- -- -- -- -- -- SB   12/01/23 1020 -- -- 79 -- 128/58 Abnormal  Lying 84 96 % -- -- -- -- -- -- -- SB       Patient left HOB elevated with all lines intact, call button in reach, bed alarm on, nsg notified, daughter present, and dizziness resolved.    GOALS:   Multidisciplinary Problems       Occupational Therapy Goals          Problem: Occupational Therapy    Goal Priority Disciplines Outcome Interventions   Occupational Therapy Goal     OT, PT/OT Ongoing, Progressing    Description: Goals to be met by: 1/1/2024     Patient will increase functional independence with ADLs by performing:    Grooming while seated with Set-up Assistance.  Toileting from toilet with Stand-by Assistance for hygiene and clothing management.   Supine to sit with Stand-by Assistance.  Step transfer with Stand-by Assistance & appropriate AD.  Toilet transfer to toilet with Stand-by Assistance & appropriate AD.                         History:     Past Medical History:   Diagnosis Date    Acute exacerbation of CHF (congestive heart failure) 10/27/2020    NATHEN  (acute kidney injury) 11/30/2023    Hypertension     Syncope and collapse          Past Surgical History:   Procedure Laterality Date    A-V CARDIAC PACEMAKER INSERTION N/A 2/22/2021    Procedure: INSERTION, CARDIAC PACEMAKER, DUAL CHAMBER;  Surgeon: Norman Foote MD;  Location: [facility] EP LAB;  Service: Cardiology;  Laterality: N/A;  AVB, PPM upgrade to Dual PPM (right sided), Bio, Venogram prior to draping, MAC, VA, 3 Prep    CARDIAC CATHETERIZATION      CARDIAC PACEMAKER PLACEMENT      CHOLECYSTECTOMY      CORONARY ANGIOPLASTY      HYSTERECTOMY      REVISION OF SKIN POCKET FOR PACEMAKER  2/22/2021    Procedure: Revision, Skin Pocket, For Cardiac Pacemaker;  Surgeon: Norman Foote MD;  Location: [facility] EP LAB;  Service: Cardiology;;       Time Tracking:     OT Date of Treatment: 12/01/23  OT Start Time: 1007  OT Stop Time: 1043  OT Total Time (min): 36 min w/PT    Billable Minutes:Evaluation 10  Therapeutic Activity 26    12/1/2023

## 2023-12-01 NOTE — PLAN OF CARE
"  Problem: Adult Inpatient Plan of Care  Goal: Plan of Care Review  Outcome: Ongoing, Progressing     Problem: Adult Inpatient Plan of Care  Goal: Optimal Comfort and Wellbeing  Outcome: Ongoing, Progressing     Problem: Cardiac-Related Pain (Acute Coronary Syndrome)  Goal: Absence of Cardiac-Related Pain  Outcome: Ongoing, Progressing     Problem: Tissue Perfusion (Acute Coronary Syndrome)  Goal: Adequate Tissue Perfusion  Outcome: Ongoing, Progressing     Problem: Heart Failure Comorbidity  Goal: Maintenance of Heart Failure Symptom Control  Outcome: Ongoing, Progressing     .Plan of care reviewed with the patient. Scheduled medicines given and the patient tolerated well. On continuous Heparin drip @ 12 U/kg/hr. Fall and safety precautions taken and the standard interventions are in place. On Telemetry monitoring with Paced rhythm, no true "red alarms' noted, no acute distress reported either in the shift. On Oxygen 1 L and satting well. Advised the patient to call for the assistance. Continued monitoring the patient.   "

## 2023-12-01 NOTE — PLAN OF CARE
12/01/23 1030   Rounds   Attendance Nurse ;Provider   Discharge Plan A Home with family       CM was informed by Dr Loomis that the pt is not medically stable to dc today due to blood pressure being unstable. BP ranging from 82/-84 this AM.       Will continue to follow.

## 2023-12-01 NOTE — PLAN OF CARE
Pt would benefit from cont OT services in order to maximize functional independence. Recommending likely low intensity therapy upon d/c ( OT/PT & 24/7 care/assistance). Pt has all necessary DME at this time. Pt orthostatic & symptomatic upon standing this date, limiting session. See full note for BP chart. Pt requires CGA for sit<>stand with use of RW. Will progress as able.      Problem: Occupational Therapy  Goal: Occupational Therapy Goal  Description: Goals to be met by: 1/1/2024     Patient will increase functional independence with ADLs by performing:    Grooming while seated with Set-up Assistance.  Toileting from toilet with Stand-by Assistance for hygiene and clothing management.   Supine to sit with Stand-by Assistance.  Step transfer with Stand-by Assistance & appropriate AD.  Toilet transfer to toilet with Stand-by Assistance & appropriate AD.    Outcome: Ongoing, Progressing

## 2023-12-01 NOTE — SUBJECTIVE & OBJECTIVE
"Interval History:  Awake and alert, lying in bed  Troponin up trended-likely due to demand ischemia-from uncontrolled high blood pressure-resume home meds  Blood pressure still elevated this morning-appreciate cardiology recs, blood pressure now improving  Deescalate Lasix dose from 80 mg to 40 mg IV.  Will add arbs as blood pressure tolerates    Review of Systems   Constitutional:  Positive for activity change.   Respiratory:  Negative for shortness of breath.    Genitourinary:  Negative for dysuria.   Neurological:  Negative for dizziness.   Psychiatric/Behavioral:  Negative for agitation.      Objective:     Vital Signs (Most Recent):  Temp: 97.6 °F (36.4 °C) (12/01/23 1136)  Pulse: 70 (12/01/23 1216)  Resp: 20 (12/01/23 1136)  BP: (!) 105/50 (12/01/23 1346)  SpO2: (!) 94 % (12/01/23 1216) Vital Signs (24h Range):  Temp:  [97.6 °F (36.4 °C)-98.6 °F (37 °C)] 97.6 °F (36.4 °C)  Pulse:  [65-86] 70  Resp:  [18-20] 20  SpO2:  [91 %-97 %] 94 %  BP: ()/(44-84) 105/50     Weight: 80.6 kg (177 lb 11.1 oz)  Body mass index is 35.89 kg/m².    Intake/Output Summary (Last 24 hours) at 12/1/2023 1449  Last data filed at 12/1/2023 1445  Gross per 24 hour   Intake --   Output 550 ml   Net -550 ml         Physical Exam  HENT:      Head: Normocephalic and atraumatic.   Cardiovascular:      Rate and Rhythm: Normal rate.   Abdominal:      General: Bowel sounds are normal.   Musculoskeletal:      Cervical back: Normal range of motion.   Skin:     Capillary Refill: Capillary refill takes less than 2 seconds.   Neurological:      Mental Status: She is alert and oriented to person, place, and time.             Significant Labs: A1C:   Recent Labs   Lab 11/30/23  0606   HGBA1C 5.9*     ABGs: No results for input(s): "PH", "PCO2", "HCO3", "POCSATURATED", "BE", "TOTALHB", "COHB", "METHB", "O2HB", "POCFIO2", "PO2" in the last 48 hours.  Blood Culture: No results for input(s): "LABBLOO" in the last 48 hours.  CBC:   Recent Labs   Lab " "11/30/23  0320 11/30/23  0606 12/01/23  0322   WBC 5.56 7.30 9.60   HGB 13.5 13.1 12.5   HCT 40.5 39.4 37.8    151 166     CMP:   Recent Labs   Lab 11/30/23  0320 12/01/23  0322    141   K 3.8 4.0    107   CO2 16* 23   * 120*   BUN 29 37*   CREATININE 1.3 1.2   CALCIUM 9.5 9.2   PROT 7.1 6.3   ALBUMIN 3.2* 3.1*   BILITOT 0.2 0.4   ALKPHOS 126 97   AST 16 17   ALT 13 12   ANIONGAP 17* 11     Lactic Acid: No results for input(s): "LACTATE" in the last 48 hours.  Lipase: No results for input(s): "LIPASE" in the last 48 hours.  Lipid Panel:   Recent Labs   Lab 11/30/23 0606   CHOL 197   HDL 37*   LDLCALC 138.2   TRIG 109   CHOLHDL 18.8*     Magnesium:   Recent Labs   Lab 12/01/23  0322   MG 1.9     Troponin:   Recent Labs   Lab 11/30/23  0606 11/30/23  0853 11/30/23  1243   TROPONINI 0.106*  0.106* 0.132* 0.152*     TSH: No results for input(s): "TSH" in the last 4320 hours.  Urine Culture: No results for input(s): "LABURIN" in the last 48 hours.  Urine Studies: No results for input(s): "COLORU", "APPEARANCEUA", "PHUR", "SPECGRAV", "PROTEINUA", "GLUCUA", "KETONESU", "BILIRUBINUA", "OCCULTUA", "NITRITE", "UROBILINOGEN", "LEUKOCYTESUR", "RBCUA", "WBCUA", "BACTERIA", "SQUAMEPITHEL", "HYALINECASTS" in the last 48 hours.    Invalid input(s): "WRIGHTSUR"    Significant Imaging: I have reviewed all pertinent imaging results/findings within the past 24 hours.  "

## 2023-12-01 NOTE — PLAN OF CARE
Problem: Physical Therapy  Goal: Physical Therapy Goal  Description: Goals to be met by: 2025     Patient will increase functional independence with mobility by performin. Supine to sit with Stand-by Assistance  2. Sit to supine with Stand-by Assistance  3. Sit to stand transfer with Stand-by Assistance using Rolling Walker  4. Bed to chair transfer with Stand-by Assistance using Rolling Walker  5. Gait  x 75 feet with Stand-by Assistance using Rolling Walker.   6. Ascend/Descend 5 inch curb step with Contact Guard Assistance and Minimal Assistance using Rolling Walker.  7. Lower extremity exercise program x10 reps with supervision    Outcome: Ongoing, Progressing   Patient will benefit from cont PT services in order to maximize functional independence; recommend likely likely low intensity therapy post acute ( PT/OT and  care/assistance; pt has all needed DME at this time; pt with symptomatic orthostatic HOTN during session which limited pt's progress; pt required CGA for sit<>stand with use of RW; will progress as tolerated.

## 2023-12-01 NOTE — ASSESSMENT & PLAN NOTE
- complains of SOB for past few weeks with chest pain at rest improved with sitting up  - endorses LE edema as well as JOHNSON and orthopnea which is improving with diuresis  - feeling better this AM; suspect mild volume overload in setting of dietary indiscretion of salt intake with Thanksgiving last week  - reports changing of diuretics yesterday per [facility] likely transition from Lasix to Bumex; recommend IV diuretics ( Lasix 80 mg IV this AM)   - OOB and ambulate this afternoon and if SOB improved then transition to oral diuretics as prescribed by Mecca; stressed importance of adherence to low salt diet   - once symptoms resolved and back on oral diuretics then suitable for discharge from cardiac standpoint  - continue BB and ARB; [facility]

## 2023-12-01 NOTE — ASSESSMENT & PLAN NOTE
- SBP 100s-130s  - on Toprol and Diovan as an outpatient  - goal BP less than 130/80  - BP well controlled; continue BB, if BP can tolerate-resume ARB

## 2023-12-01 NOTE — PROGRESS NOTES
[facility] - Telemetry  Cardiology  Progress Note    Patient Name: Marycruz Perez  MRN: [id number]  Admission Date: 11/30/2023  Hospital Length of Stay: 0 days  Code Status: Full Code   Attending Physician: Genesis Coley*   Primary Care Physician: Melchor Menchaca -  Expected Discharge Date: 12/1/2023  Principal Problem:<principal problem not specified>    Subjective:     Hospital Course:   11/30/2023 per HPI   12/01/2023 Inaccurate intake and output documented. BP elevated- antihypertensives adjusted. Cr trending down. Continues to have crackles on exam- 80 mg IV Lasix ordered.     Past Medical History:   Diagnosis Date    Acute exacerbation of CHF (congestive heart failure) 10/27/2020    NATHEN (acute kidney injury) 11/30/2023    Hypertension     Syncope and collapse        Past Surgical History:   Procedure Laterality Date    A-V CARDIAC PACEMAKER INSERTION N/A 2/22/2021    Procedure: INSERTION, CARDIAC PACEMAKER, DUAL CHAMBER;  Surgeon: Norman Foote MD;  Location: [facility] EP LAB;  Service: Cardiology;  Laterality: N/A;  AVB, PPM upgrade to Dual PPM (right sided), Bio, Venogram prior to draping, MAC, AR, 3 Prep    CARDIAC CATHETERIZATION      CARDIAC PACEMAKER PLACEMENT      CHOLECYSTECTOMY      CORONARY ANGIOPLASTY      HYSTERECTOMY      REVISION OF SKIN POCKET FOR PACEMAKER  2/22/2021    Procedure: Revision, Skin Pocket, For Cardiac Pacemaker;  Surgeon: Norman Foote MD;  Location: [facility] EP LAB;  Service: Cardiology;;       Review of patient's allergies indicates:   Allergen Reactions    Codeine     Latex, natural rubber     Penicillins     Penicillin Rash       Current Facility-Administered Medications on File Prior to Encounter   Medication    vancomycin in dextrose 5 % 1 gram/250 mL IVPB 1,000 mg     Current Outpatient Medications on File Prior to Encounter   Medication Sig    albuterol (PROVENTIL/VENTOLIN HFA) 90 mcg/actuation inhaler Inhale 2 puffs into the lungs every 4 (four) hours as needed for Wheezing  (cough or wheezing). Rescue    albuterol-ipratropium (DUO-NEB) 2.5 mg-0.5 mg/3 mL nebulizer solution every 6 (six) hours as needed for Wheezing.    azithromycin (ZITHROMAX) 500 MG tablet Take 500 mg by mouth.    bumetanide (BUMEX) 0.5 MG Tab Take 0.5 mg by mouth.    diclofenac sodium (VOLTAREN) 1 % Gel as needed.     donepeziL (ARICEPT) 5 MG tablet Take 5 mg by mouth.    doxycycline (VIBRA-TABS) 100 MG tablet Take 100 mg by mouth 2 (two) times daily.    meclizine (ANTIVERT) 25 mg tablet Take 25 mg by mouth 3 (three) times daily as needed.    metoprolol succinate (TOPROL-XL) 25 MG 24 hr tablet TK 1 T PO D    potassium chloride SA (K-DUR,KLOR-CON) 10 MEQ tablet Take 2 tablets (20 mEq total) by mouth once daily.    predniSONE (DELTASONE) 20 MG tablet Take 20 mg by mouth once daily.    rosuvastatin (CRESTOR) 5 MG tablet Take 5 mg by mouth every evening.    tamsulosin (FLOMAX) 0.4 mg Cap Take 1 capsule by mouth every evening.    tobramycin sulfate 0.3% (TOBREX) 0.3 % ophthalmic solution INSTILL 1 DROP IN RIGHT EYE FOUR TIMES DAILY    valsartan (DIOVAN) 160 MG tablet Take 160 mg by mouth once daily.    ZINC ACETATE ORAL Take by mouth once daily.     calcium carbonate (OS-BERE) 600 mg calcium (1,500 mg) Tab Take 600 mg by mouth once daily.     cetirizine (ZYRTEC) 10 MG tablet Take 10 mg by mouth once daily.    clopidogrel (PLAVIX) 75 mg tablet TK 1 T PO QD    clotrimazole-betamethasone 1-0.05% (LOTRISONE) cream 2 (two) times daily. Apply to affected area    HYDROcodone-acetaminophen (NORCO) 5-325 mg per tablet Take 1 tablet by mouth every 4 (four) hours as needed (Moderate to severe pain).    ketorolac (TORADOL) 10 mg tablet Take 1 tablet (10 mg total) by mouth every 8 (eight) hours as needed for Pain.    mupirocin (BACTROBAN) 2 % ointment Apply topically 2 (two) times daily.    nitroGLYCERIN (NITROSTAT) 0.4 MG SL tablet Place 1 tablet (0.4 mg total) under the tongue every 5 (five) minutes as needed for Chest pain.     ondansetron (ZOFRAN-ODT) 4 MG TbDL Take 1 tablet (4 mg total) by mouth every 6 (six) hours as needed (nausea).    tolterodine (DETROL) 1 MG Tab Take 1 mg by mouth once daily.     Family History       Problem Relation (Age of Onset)    Liver disease Mother          Tobacco Use    Smoking status: Never    Smokeless tobacco: Never   Substance and Sexual Activity    Alcohol use: No    Drug use: No    Sexual activity: Not on file     Review of Systems   Constitutional: Negative for chills, decreased appetite, diaphoresis, fever, malaise/fatigue, weight gain and weight loss.   Cardiovascular:  Positive for dyspnea on exertion and leg swelling (improved - back to baseline). Negative for chest pain, claudication, irregular heartbeat, near-syncope, orthopnea, palpitations and paroxysmal nocturnal dyspnea.   Respiratory:  Negative for cough, shortness of breath, snoring, sputum production and wheezing.    Endocrine: Negative for cold intolerance, heat intolerance, polydipsia, polyphagia and polyuria.   Skin:  Negative for color change, dry skin, itching, nail changes and poor wound healing.   Musculoskeletal:  Negative for back pain, gout, joint pain and joint swelling.   Gastrointestinal:  Negative for bloating, abdominal pain, constipation, diarrhea, hematemesis, hematochezia, melena, nausea and vomiting.   Genitourinary:  Negative for dysuria, hematuria and nocturia.   Neurological:  Negative for dizziness, headaches, light-headedness, numbness, paresthesias and weakness.   Psychiatric/Behavioral:  Negative for altered mental status, depression and memory loss.      Objective:     Vital Signs (Most Recent):  Temp: 98.6 °F (37 °C) (12/01/23 0829)  Pulse: 86 (12/01/23 1031)  Resp: 20 (12/01/23 0829)  BP: (!) 82/44 (12/01/23 1031)  SpO2: 96 % (12/01/23 1030) Vital Signs (24h Range):  Temp:  [97.6 °F (36.4 °C)-98.6 °F (37 °C)] 98.6 °F (37 °C)  Pulse:  [65-86] 86  Resp:  [18-20] 20  SpO2:  [93 %-97 %] 96 %  BP: ()/(44-84)  82/44     Weight: 80.6 kg (177 lb 11.1 oz)  Body mass index is 35.89 kg/m².    SpO2: 96 %         Intake/Output Summary (Last 24 hours) at 12/1/2023 1034  Last data filed at 12/1/2023 0502  Gross per 24 hour   Intake --   Output 350 ml   Net -350 ml       Lines/Drains/Airways       Peripheral Intravenous Line  Duration                  Peripheral IV - Single Lumen 11/30/23 0300 20 G Left Wrist 1 day                     Physical Exam  Constitutional:       General: She is not in acute distress.     Appearance: She is well-developed.   Cardiovascular:      Rate and Rhythm: Normal rate and regular rhythm.      Heart sounds: No murmur heard.     No gallop.   Pulmonary:      Effort: Pulmonary effort is normal. No respiratory distress.      Breath sounds: Rhonchi present. No wheezing.   Abdominal:      General: Bowel sounds are normal. There is no distension.      Palpations: Abdomen is soft.      Tenderness: There is no abdominal tenderness.   Musculoskeletal:      Right lower leg: Edema present.      Left lower leg: Edema present.   Skin:     General: Skin is warm and dry.   Neurological:      Mental Status: She is alert and oriented to person, place, and time.          Significant Labs: BMP:   Recent Labs   Lab 11/30/23  0320 12/01/23  0322   * 120*    141   K 3.8 4.0    107   CO2 16* 23   BUN 29 37*   CREATININE 1.3 1.2   CALCIUM 9.5 9.2   MG  --  1.9     , CBC   Recent Labs   Lab 11/30/23  0320 11/30/23  0606 12/01/23  0322   WBC 5.56 7.30 9.60   HGB 13.5 13.1 12.5   HCT 40.5 39.4 37.8    151 166     , and Troponin   Recent Labs   Lab 11/30/23  0606 11/30/23  0853 11/30/23  1243   TROPONINI 0.106*  0.106* 0.132* 0.152*         Significant Imaging: Echocardiogram: Transthoracic echo (TTE) complete ([facility] Only):   Results for orders placed or performed during the hospital encounter of 11/30/23   Echo   Result Value Ref Range    BSA 1.84 m2    Garcia's Biplane MOD Ejection Fraction 60 %     LVOT stroke volume 46.48 cm3    LVIDd 3.17 (A) 3.5 - 6.0 cm    LV Systolic Volume 12.22 mL    LV Systolic Volume Index 6.9 mL/m2    LVIDs 1.97 (A) 2.1 - 4.0 cm    LV Diastolic Volume 39.88 mL    LV Diastolic Volume Index 22.66 mL/m2    IVS 1.57 (A) 0.6 - 1.1 cm    LVOT diameter 1.85 cm    LVOT area 2.7 cm2    FS 38 28 - 44 %    Left Ventricle Relative Wall Thickness 0.62 cm    Posterior Wall 0.98 0.6 - 1.1 cm    LV mass 129.80 g    LV Mass Index 74 g/m2    MV Peak E Juan M 0.88 m/s    TDI LATERAL 0.07 m/s    MV Peak A Juan M 1.80 m/s    TR Max Juan M 2.07 m/s    E/A ratio 0.49     E wave deceleration time 120.69 msec    LV LATERAL E/E' RATIO 12.57 m/s    PV Peak S Juan M 0.39 m/s    PV Peak D Juan M 0.45 m/s    Pulm vein S/D ratio 0.87     LVOT peak juan m 0.94 m/s    Left Ventricular Outflow Tract Mean Velocity 0.75 cm/s    Left Ventricular Outflow Tract Mean Gradient 2.41 mmHg    RV S' 11.82 cm/s    TAPSE 2.05 cm    LA size 4.78 cm    Left Atrium Minor Axis 6.69 cm    Left Atrium Major Axis 6.73 cm    LA volume (mod) 97.07 cm3    LA Volume Index (Mod) 55.2 mL/m2    RA Major Axis 5.93 cm    RA Width 2.61 cm    AV mean gradient 20 mmHg    AV peak gradient 28 mmHg    Ao peak juan m 2.64 m/s    Ao VTI 59.70 cm    LVOT peak VTI 17.30 cm    AV valve area 0.78 cm²    AV Velocity Ratio 0.36     AV index (prosthetic) 0.29     ELMIRA by Velocity Ratio 0.96 cm²    Mr max juan m 2.58 m/s    MV peak gradient 19 mmHg    MV stenosis pressure 1/2 time 35.00 ms    MV valve area p 1/2 method 6.29 cm2    MV valve area by continuity eq 1.20 cm2    MV VTI 38.6 cm    Triscuspid Valve Regurgitation Peak Gradient 17 mmHg    PV PEAK VELOCITY 0.86 m/s    PV peak gradient 3 mmHg    Sinus 3.32 cm    STJ 3.12 cm    ZLVIDS -3.35     ZLVIDD -4.29     LA Volume Index 55.8 mL/m2    LA volume 98.14 cm3    LA WIDTH 3.6 cm    EF 65 %    TV resting pulmonary artery pressure 20 mmHg    RV TB RVSP 5 mmHg    Est. RA pres 3 mmHg    Narrative      Left Ventricle: There is  concentric remodeling. Normal wall motion.   There is normal systolic function. Ejection fraction by visual   approximation is 65%. Biplane (2D) method of discs ejection fraction is   60%. Diastolic function cannot be reliably determined in the presence of   mitral annular calcification.    Right Ventricle: Normal right ventricular cavity size. Wall thickness   is normal. Right ventricle wall motion  is normal. Systolic function is   normal.    Left Atrium: Left atrium is severely dilated.    Aortic Valve: Moderately calcified cusps. Moderately restricted motion.   There is moderate stenosis. Aortic valve peak velocity is 2.64 m/s. Mean   gradient is 20 mmHg. The dimensionless index is 0.29.    Mitral Valve: There is severe bileaflet sclerosis. Moderately calcified   subvalvular apparatus.    Tricuspid Valve: There is mild regurgitation.    Pulmonic Valve: There is mild regurgitation.    Pulmonary Artery: The estimated pulmonary artery systolic pressure is   20 mmHg.    IVC/SVC: Normal venous pressure at 3 mmHg.       Assessment and Plan:     Brief HPI: Patient seen this morning on rounds with reports of improved symptoms. Volume status improving, Lasix 80 mg IV this AM ordered.     NATHEN (acute kidney injury)  - creatinine 1.2  - baseline creatinine .8-1.3  - monitor while admitted     Elevated troponin  - troponin .071 with slight trend up to .106; EKG V paced  - history of CAD s/p PCI unknown vessel  -  feel troponin elevation related to demand etiology in setting of mild volume overload  - continue ASA, statin, Plavix and BB  - Heparin discontinued       Chronic heart failure with preserved ejection fraction (HFpEF)  - complains of SOB for past few weeks with chest pain at rest improved with sitting up  - endorses LE edema as well as JOHNSON and orthopnea which is improving with diuresis  - feeling better this AM; suspect mild volume overload in setting of dietary indiscretion of salt intake with Thanksgiving last  week  - [facility]umex; recommend IV diuretics ( Lasix 80 mg IV this AM)   - OOB and ambulate this afternoon and if SOB improved then transition to oral diuretics as prescribed by [facility]; stressed importance of adherence to low salt diet   - once symptoms resolved and back on oral diuretics then suitable for discharge from cardiac standpoint  - continue BB and ARB; follow up with [facility] as previously scheduled       Rheumatic mitral stenosis  TTE noting Severe bileaflet sclerosis to MV with moderately calcified subvalvular apparatus.  Follow up with Dr Newman as OP for continued monitoring     Essential hypertension  - SBP 100s-130s  - on Toprol and Diovan as an outpatient  - goal BP less than 130/80  - BP well controlled; continue BB, if BP can tolerate-resume ARB         VTE Risk Mitigation (From admission, onward)           Ordered     IP VTE HIGH RISK PATIENT  Once         11/30/23 0545     Place sequential compression device  Until discontinued         11/30/23 0430                    Mansoor Luis NP  Cardiology  [place] - Telemetry

## 2023-12-02 VITALS
BODY MASS INDEX: 36.32 KG/M2 | SYSTOLIC BLOOD PRESSURE: 146 MMHG | WEIGHT: 180.13 LBS | HEART RATE: 73 BPM | RESPIRATION RATE: 18 BRPM | OXYGEN SATURATION: 96 % | TEMPERATURE: 99 F | HEIGHT: 59 IN | DIASTOLIC BLOOD PRESSURE: 67 MMHG

## 2023-12-02 LAB
ALBUMIN SERPL BCP-MCNC: 3 G/DL (ref 3.5–5.2)
ALP SERPL-CCNC: 93 U/L (ref 55–135)
ALT SERPL W/O P-5'-P-CCNC: 12 U/L (ref 10–44)
ANION GAP SERPL CALC-SCNC: 12 MMOL/L (ref 8–16)
AST SERPL-CCNC: 17 U/L (ref 10–40)
BILIRUB SERPL-MCNC: 0.5 MG/DL (ref 0.1–1)
BUN SERPL-MCNC: 38 MG/DL (ref 10–30)
CALCIUM SERPL-MCNC: 8.9 MG/DL (ref 8.7–10.5)
CHLORIDE SERPL-SCNC: 104 MMOL/L (ref 95–110)
CO2 SERPL-SCNC: 26 MMOL/L (ref 23–29)
CREAT SERPL-MCNC: 1 MG/DL (ref 0.5–1.4)
EST. GFR  (NO RACE VARIABLE): 53 ML/MIN/1.73 M^2
GLUCOSE SERPL-MCNC: 98 MG/DL (ref 70–110)
MAGNESIUM SERPL-MCNC: 1.8 MG/DL (ref 1.6–2.6)
POTASSIUM SERPL-SCNC: 3.4 MMOL/L (ref 3.5–5.1)
PROT SERPL-MCNC: 6.1 G/DL (ref 6–8.4)
SODIUM SERPL-SCNC: 142 MMOL/L (ref 136–145)

## 2023-12-02 PROCEDURE — 94761 N-INVAS EAR/PLS OXIMETRY MLT: CPT

## 2023-12-02 PROCEDURE — G0378 HOSPITAL OBSERVATION PER HR: HCPCS

## 2023-12-02 PROCEDURE — 25000003 PHARM REV CODE 250: Performed by: NURSE PRACTITIONER

## 2023-12-02 PROCEDURE — 36415 COLL VENOUS BLD VENIPUNCTURE: CPT | Performed by: STUDENT IN AN ORGANIZED HEALTH CARE EDUCATION/TRAINING PROGRAM

## 2023-12-02 PROCEDURE — 83735 ASSAY OF MAGNESIUM: CPT | Performed by: STUDENT IN AN ORGANIZED HEALTH CARE EDUCATION/TRAINING PROGRAM

## 2023-12-02 PROCEDURE — 80053 COMPREHEN METABOLIC PANEL: CPT | Performed by: STUDENT IN AN ORGANIZED HEALTH CARE EDUCATION/TRAINING PROGRAM

## 2023-12-02 PROCEDURE — 25000003 PHARM REV CODE 250: Performed by: STUDENT IN AN ORGANIZED HEALTH CARE EDUCATION/TRAINING PROGRAM

## 2023-12-02 RX ORDER — CARVEDILOL 12.5 MG/1
12.5 TABLET ORAL 2 TIMES DAILY WITH MEALS
Qty: 60 TABLET | Refills: 11 | Status: SHIPPED | OUTPATIENT
Start: 2023-12-02 | End: 2024-12-01

## 2023-12-02 RX ORDER — NAPROXEN SODIUM 220 MG/1
81 TABLET, FILM COATED ORAL DAILY
Qty: 30 TABLET | Refills: 2 | Status: SHIPPED | OUTPATIENT
Start: 2023-12-03 | End: 2024-12-02

## 2023-12-02 RX ORDER — NITROGLYCERIN 0.4 MG/1
0.4 TABLET SUBLINGUAL EVERY 5 MIN PRN
Qty: 30 TABLET | Refills: 1 | Status: SHIPPED | OUTPATIENT
Start: 2023-12-02 | End: 2024-12-01

## 2023-12-02 RX ORDER — BUMETANIDE 0.5 MG/1
0.5 TABLET ORAL 2 TIMES DAILY
Qty: 60 TABLET | Refills: 3 | Status: SHIPPED | OUTPATIENT
Start: 2023-12-02

## 2023-12-02 RX ADMIN — CARVEDILOL 25 MG: 25 TABLET, FILM COATED ORAL at 08:12

## 2023-12-02 RX ADMIN — ASPIRIN 81 MG CHEWABLE TABLET 81 MG: 81 TABLET CHEWABLE at 08:12

## 2023-12-02 RX ADMIN — ATORVASTATIN CALCIUM 80 MG: 40 TABLET, FILM COATED ORAL at 08:12

## 2023-12-02 RX ADMIN — CLOPIDOGREL BISULFATE 75 MG: 75 TABLET ORAL at 08:12

## 2023-12-02 NOTE — NURSING
AVS printed and handed to patient. Patient's daughter at bedside. Discharge instructions reviewed by LINA Bills RN. IV site removed and telemetry box discontinued without adverse reactions noted. Patient discharged with her belongings via wheelchair to main entrance and no acute distress noted.     denies pain/discomfort

## 2023-12-02 NOTE — PROGRESS NOTES
[facility] Medicine  Progress Note    Patient Name: Marycruz Perez  MRN: [id number]  Patient Class: OP- Observation   Admission Date: 11/30/2023  Length of Stay: 0 days  Attending Physician: Genesis Coley*  Primary Care Provider: Melchor Menchaca -        Subjective:     Principal Problem:Elevated troponin        HPI:  Ms. 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 Possibly improved w/ sitting up. Nothing makes it worse. Denied any SOB, nausea, vomiting, diaphoresis, fever, chills, abdominal pain, orthopnea, PND, abdominal distention. Endorses chronic peripheral edema and worsening of chronic cough w/ white sputum. States usually when she has this type of cough its due fluid overload. Denies every experiencing this chest pain before.     In the ED, labs were notable for glucose 277,  (baseline 89), Troponin I 0.070, creatinine 1.3 (baseline 0.9). Patient was given aspirin by EMS.     Patient admitted for elevated troponin.     Overview/Hospital Course:  No notes on file    Interval History:  Awake and alert, lying in bed  BP improved, on RA, will likely need scheduled diuretics,     Appreciate cardiology recs    Review of Systems   Constitutional:  Positive for activity change.   Respiratory:  Negative for shortness of breath.    Genitourinary:  Negative for dysuria.   Neurological:  Negative for dizziness.   Psychiatric/Behavioral:  Negative for agitation.      Objective:     Vital Signs (Most Recent):  Temp: 98.6 °F (37 °C) (12/02/23 0821)  Pulse: 73 (12/02/23 0821)  Resp: 18 (12/02/23 0821)  BP: (!) 146/67 (12/02/23 0821)  SpO2: 96 % (12/02/23 0900) Vital Signs (24h Range):  Temp:  [97 °F (36.1 °C)-98.6 °F (37 °C)] 98.6 °F (37  "°C)  Pulse:  [60-86] 73  Resp:  [18-20] 18  SpO2:  [91 %-96 %] 96 %  BP: ()/(44-67) 146/67     Weight: 81.7 kg (180 lb 1.9 oz)  Body mass index is 36.38 kg/m².    Intake/Output Summary (Last 24 hours) at 12/2/2023 0959  Last data filed at 12/1/2023 1445  Gross per 24 hour   Intake --   Output 200 ml   Net -200 ml           Physical Exam  HENT:      Head: Normocephalic and atraumatic.   Cardiovascular:      Rate and Rhythm: Normal rate.   Abdominal:      General: Bowel sounds are normal.   Musculoskeletal:      Cervical back: Normal range of motion.   Skin:     Capillary Refill: Capillary refill takes less than 2 seconds.   Neurological:      Mental Status: She is alert and oriented to person, place, and time.             Significant Labs: A1C:   Recent Labs   Lab 11/30/23  0606   HGBA1C 5.9*       ABGs: No results for input(s): "PH", "PCO2", "HCO3", "POCSATURATED", "BE", "TOTALHB", "COHB", "METHB", "O2HB", "POCFIO2", "PO2" in the last 48 hours.  Blood Culture: No results for input(s): "LABBLOO" in the last 48 hours.  CBC:   Recent Labs   Lab 12/01/23  0322   WBC 9.60   HGB 12.5   HCT 37.8          CMP:   Recent Labs   Lab 12/01/23  0322 12/02/23  0321    142   K 4.0 3.4*    104   CO2 23 26   * 98   BUN 37* 38*   CREATININE 1.2 1.0   CALCIUM 9.2 8.9   PROT 6.3 6.1   ALBUMIN 3.1* 3.0*   BILITOT 0.4 0.5   ALKPHOS 97 93   AST 17 17   ALT 12 12   ANIONGAP 11 12       Lactic Acid: No results for input(s): "LACTATE" in the last 48 hours.  Lipase: No results for input(s): "LIPASE" in the last 48 hours.  Lipid Panel:   No results for input(s): "CHOL", "HDL", "LDLCALC", "TRIG", "CHOLHDL" in the last 48 hours.    Magnesium:   Recent Labs   Lab 12/01/23  0322 12/02/23  0321   MG 1.9 1.8       Troponin:   Recent Labs   Lab 11/30/23  1243   TROPONINI 0.152*       TSH: No results for input(s): "TSH" in the last 4320 hours.  Urine Culture: No results for input(s): "LABURIN" in the last 48 " "hours.  Urine Studies: No results for input(s): "COLORU", "APPEARANCEUA", "PHUR", "SPECGRAV", "PROTEINUA", "GLUCUA", "KETONESU", "BILIRUBINUA", "OCCULTUA", "NITRITE", "UROBILINOGEN", "LEUKOCYTESUR", "RBCUA", "WBCUA", "BACTERIA", "SQUAMEPITHEL", "HYALINECASTS" in the last 48 hours.    Invalid input(s): "WRIGHTSUR"    Significant Imaging: I have reviewed all pertinent imaging results/findings within the past 24 hours.    Assessment/Plan:      * Elevated troponin  Possibly secondary to type 1 vs type 2 in the setting of possible CHF exascerbation. Difficult to determine volume status based on physcial exam. BNP mildly elevated and patient's cough similar to previous HF exascerbations.     - Continue to trend troponin.   - Will give dose of IV lasix 80mg and monitor response. Strict I and Os. Daily weights. Maintain K>4 and Mg >2 discharge on po diuretics  - Start aspirin, statin, heparin gtt. Continue home plavix and metoprolol.   - Cardiology consulted. Appreciate recommendations.     NATHEN (acute kidney injury)  - possibly secondary to cardiorenal syndrome.   - will monitor creatinine after lasix dose.   - avoid nephrotoxic agents.   resolved    Morbid (severe) obesity due to excess calories  Body mass index is 36.38 kg/m². Morbid obesity complicates all aspects of disease management from diagnostic modalities to treatment. Weight loss encouraged and health benefits explained to patient.         Hyperglycemia   A1c. 5.9      Chronic heart failure with preserved ejection fraction (HFpEF)  Stable.       Coronary artery disease involving native coronary artery  Continue aspirin, Plavix, statin   Continue to monitor on telemetry.     Essential hypertension  - continue home BP meds.       VTE Risk Mitigation (From admission, onward)           Ordered     IP VTE HIGH RISK PATIENT  Once         11/30/23 2081     Place sequential compression device  Until discontinued         11/30/23 1076                    Discharge Planning "   ANA: 12/2/2023     Code Status: Full Code   Is the patient medically ready for discharge?:     Reason for patient still in hospital (select all that apply): Pending disposition  Discharge Plan A: Home with family   Discharge Delays: None known at this time              Genesis Coley MD  Department of [facility] Medicine   [facility]

## 2023-12-02 NOTE — DISCHARGE SUMMARY
[facility] Medicine  Discharge Summary      Patient Name: Marycruz Perez  MRN: [id number]  RUSLAN: [id number]  Patient Class: OP- Observation  Admission Date: 11/30/2023  Hospital Length of Stay: 0 days  Discharge Date and Time: 12/2/2023 12:26 PM  Attending Physician: Genesis Coley*   Discharging Provider: Genesis Coley MD  Primary Care Provider: Melchor Menchaca -    Primary Care Team: Networked reference to record PCT     HPI:   Ms. 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 Possibly improved w/ sitting up. Nothing makes it worse. Denied any SOB, nausea, vomiting, diaphoresis, fever, chills, abdominal pain, orthopnea, PND, abdominal distention. Endorses chronic peripheral edema and worsening of chronic cough w/ white sputum. States usually when she has this type of cough its due fluid overload. Denies every experiencing this chest pain before.     In the ED, labs were notable for glucose 277,  (baseline 89), Troponin I 0.070, creatinine 1.3 (baseline 0.9). Patient was given aspirin by EMS.     Patient admitted for elevated troponin.     * No surgery found *      Hospital Course:   No notes on file     Goals of Care Treatment Preferences:  Code Status: Full Code      Consults:   Consults (From admission, onward)          Status Ordering Provider     Inpatient consult to Registered Dietitian/Nutritionist  Once        Provider:  (Not yet assigned)    Completed BENJAMÍN ARAIZA     Inpatient consult to Social Work/Case Management  Once        Provider:  (Not yet assigned)    Completed BENJAMÍN ARAIZA     [facility]  [facility]        Provider:  (Not yet assigned)    Completed BENJAMÍN ARAIZA             Cardiac/Vascular  * Elevated troponin  Possibly secondary to type 1 vs type 2 in the setting of possible CHF exascerbation. Difficult to determine volume status based on physcial exam. BNP mildly elevated and patient's cough similar to previous HF exascerbations.     - Continue to trend troponin.   - Will give dose of IV lasix 80mg and monitor response. Strict I and Os. Daily weights. Maintain K>4 and Mg >2 discharge on po diuretics  - Start aspirin, statin, heparin gtt. Continue home plavix and metoprolol.   - Cardiology consulted. Appreciate recommendations.     Chronic heart failure with preserved ejection fraction (HFpEF)  Stable.       Coronary artery disease involving native coronary artery  Continue aspirin, Plavix, statin   Continue to monitor on telemetry.     Essential hypertension  - continue home BP meds.     Renal/  NATHEN (acute kidney injury)  - possibly secondary to cardiorenal syndrome.   - will monitor creatinine after lasix dose.   - avoid nephrotoxic agents.   resolved    Endocrine  Morbid (severe) obesity due to excess calories  Body mass index is 36.38 kg/m². Morbid obesity complicates all aspects of disease management from diagnostic modalities to treatment. Weight loss encouraged and health benefits explained to patient.         Hyperglycemia   A1c. 5.9        Final Active Diagnoses:    Diagnosis Date Noted POA    PRINCIPAL PROBLEM:  Elevated troponin [R79.89] 11/30/2023 Yes    NATHEN (acute kidney injury) [N17.9] 11/30/2023 Yes    Morbid (severe) obesity due to excess calories [E66.01] 01/06/2023 Yes    Hyperglycemia [R73.9] 04/13/2021 Yes    Chronic heart failure with preserved ejection fraction (HFpEF) [I50.32]  Yes    Coronary artery disease involving native coronary artery [I25.10] 10/27/2020 Yes    Rheumatic mitral stenosis [I05.0] 01/09/2020 Yes    Essential hypertension [I10] 10/31/2019 Yes      Problems Resolved During this Admission:       Discharged Condition:  stable    Disposition:     Follow Up:   Follow-up Information       Melchor Menchaca - Follow up.    Why: Patient will be notified of a [facility] hospital follow up appointment with Dr Lesly Buck.  Contact information:  [address] [place] [id number] [phone]               Melchor Menchaca - Follow up.    Why: Patient will be notified of a cardiology hospital follow up appointment  Contact information:  [address] [id number] [phone]                           Patient Instructions:   No discharge procedures on file.    Significant Diagnostic Studies:     Pending Diagnostic Studies:       None           Medications:  Reconciled Home Medications:      Medication List        START taking these medications      aspirin 81 MG Chew  Take 1 tablet (81 mg total) by mouth once daily.  Start taking on: December 3, 2023     carvediloL 12.5 MG tablet  Commonly known as: COREG  Take 1 tablet (12.5 mg total) by mouth 2 (two) times daily with meals.            CHANGE how you take these medications      bumetanide 0.5 MG Tab  Commonly known as: BUMEX  Take 1 tablet (0.5 mg total) by mouth 2 (two) times a day.  What changed: when to take this     nitroGLYCERIN 0.4 MG SL tablet  Commonly known as: NITROSTAT  Place 1 tablet (0.4 mg total) under the tongue every 5 (five) minutes as needed for Chest pain (angina).  What changed: reasons to take this            CONTINUE taking these medications      albuterol 90 mcg/actuation inhaler  Commonly known as: PROVENTIL/VENTOLIN HFA  Inhale 2 puffs into the lungs every 4 (four) hours as needed for Wheezing (cough or wheezing). Rescue     albuterol-ipratropium 2.5 mg-0.5 mg/3 mL nebulizer solution  Commonly known as: DUO-NEB  every 6 (six) hours as needed for Wheezing.     calcium carbonate 600 mg calcium (1,500 mg) Tab  Commonly known as: OS-BERE  Take 600 mg by mouth once daily.     cetirizine 10 MG tablet  Commonly known as: ZYRTEC  Take 10 mg by mouth once daily.      clopidogreL 75 mg tablet  Commonly known as: PLAVIX  TK 1 T PO QD     diclofenac sodium 1 % Gel  Commonly known as: VOLTAREN  as needed.     donepeziL 5 MG tablet  Commonly known as: ARICEPT  Take 5 mg by mouth.     meclizine 25 mg tablet  Commonly known as: ANTIVERT  Take 25 mg by mouth 3 (three) times daily as needed.     ondansetron 4 MG Tbdl  Commonly known as: ZOFRAN-ODT  Take 1 tablet (4 mg total) by mouth every 6 (six) hours as needed (nausea).     potassium chloride SA 10 MEQ tablet  Commonly known as: K-DUR,KLOR-CON M  Take 2 tablets (20 mEq total) by mouth once daily.     rosuvastatin 5 MG tablet  Commonly known as: CRESTOR  Take 5 mg by mouth every evening.     tamsulosin 0.4 mg Cap  Commonly known as: FLOMAX  Take 1 capsule by mouth every evening.     tobramycin sulfate 0.3% 0.3 % ophthalmic solution  Commonly known as: TOBREX  INSTILL 1 DROP IN RIGHT EYE FOUR TIMES DAILY     valsartan 160 MG tablet  Commonly known as: DIOVAN  Take 160 mg by mouth once daily.     ZINC ACETATE ORAL  Take by mouth once daily.            STOP taking these medications      azithromycin 500 MG tablet  Commonly known as: ZITHROMAX     clotrimazole-betamethasone 1-0.05% cream  Commonly known as: LOTRISONE     doxycycline 100 MG tablet  Commonly known as: VIBRA-TABS     HYDROcodone-acetaminophen 5-325 mg per tablet  Commonly known as: NORCO     ketorolac 10 mg tablet  Commonly known as: TORADOL     metoprolol succinate 25 MG 24 hr tablet  Commonly known as: TOPROL-XL     mupirocin 2 % ointment  Commonly known as: BACTROBAN     predniSONE 20 MG tablet  Commonly known as: DELTASONE     tolterodine 1 MG Tab  Commonly known as: DETROL              Indwelling Lines/Drains at time of discharge:   Lines/Drains/Airways       None                   Time spent on the discharge of patient: 35 minutes         Genesis Coley MD  Department of Hospital Medicine  [facility]

## 2023-12-02 NOTE — PROGRESS NOTES
Discharge orders noted. Daughter at bedside. Additional clinical references attached. Patient's discharge instructions given by bedside RN and reviewed via this VN.  Education provided on new and previous medications, diagnosis, and follow-up appointments.  New medications were sent to patient's  pharmacy. Patient verbalized understanding and teach back method was used. Patient's ride/transportation home at bedside. All questions answered. Transport to [facility] requested. Floor nurse notified.                12/02/23 1154    Notification    Notified Of Discharge Status   Admission   Communication Issues? None   Shift   Virtual Nurse - Patient Verbalized Approval Of Camera Use   Safety/Activity   Patient Rounds visualized patient;clutter free environment maintained   Safety Promotion/Fall Prevention family to remain at bedside   Activity Management Up in chair - L3

## 2023-12-02 NOTE — PLAN OF CARE
Discharge plans discussed with pt's daughter.  She verbalized understanding of all.  Pt is discharging home today.  Daughter declines the need for home health due to she is with pt 24/7.  Daughter will transport pt home.  No other needs identified.      Lily Ventura  Daughter  [phone] [address] [place] [id number]       12/02/23 0957   Final Note   Assessment Type Final Discharge Note   Anticipated Discharge Disposition Home   Hospital Resources/Appts/Education Provided Appointments scheduled and added to AVS   Post-Acute Status   Discharge Delays None known at this time       Torsten Winter, RN   Supervisor Case Management-[place]  [phone]

## 2023-12-02 NOTE — ASSESSMENT & PLAN NOTE
Possibly secondary to type 1 vs type 2 in the setting of possible CHF exascerbation. Difficult to determine volume status based on physcial exam. BNP mildly elevated and patient's cough similar to previous HF exascerbations.     - Continue to trend troponin.   - Will give dose of IV lasix 80mg and monitor response. Strict I and Os. Daily weights. Maintain K>4 and Mg >2 discharge on po diuretics  - Start aspirin, statin, heparin gtt. Continue home plavix and metoprolol.   - Cardiology consulted. Appreciate recommendations.

## 2023-12-02 NOTE — PLAN OF CARE
Problem: Adult Inpatient Plan of Care  Goal: Plan of Care Review  12/2/2023 1159 by Negar Dao RN  Outcome: Met  12/2/2023 0924 by Negar Dao RN  Outcome: Ongoing, Progressing  Goal: Patient-Specific Goal (Individualized)  Outcome: Met  Goal: Absence of Hospital-Acquired Illness or Injury  Outcome: Met  Goal: Optimal Comfort and Wellbeing  Outcome: Met  Goal: Readiness for Transition of Care  Outcome: Met     Problem: Adjustment to Illness (Acute Coronary Syndrome)  Goal: Optimal Adaptation to Illness  Outcome: Met     Problem: Dysrhythmia (Acute Coronary Syndrome)  Goal: Normalized Cardiac Rhythm  Outcome: Met     Problem: Cardiac-Related Pain (Acute Coronary Syndrome)  Goal: Absence of Cardiac-Related Pain  Outcome: Met     Problem: Hemodynamic Instability (Acute Coronary Syndrome)  Goal: Effective Cardiac Pump Function  Outcome: Met     Problem: Tissue Perfusion (Acute Coronary Syndrome)  Goal: Adequate Tissue Perfusion  Outcome: Met     Problem: Arrhythmia/Dysrhythmia (Cardiac Catheterization)  Goal: Stable Heart Rate and Rhythm  Outcome: Met     Problem: Bleeding (Cardiac Catheterization)  Goal: Absence of Bleeding  Outcome: Met     Problem: Contrast-Induced Injury Risk (Cardiac Catheterization)  Goal: Absence of Contrast-Induced Injury  Outcome: Met     Problem: Embolism (Cardiac Catheterization)  Goal: Absence of Embolism Signs and Symptoms  Outcome: Met     Problem: Ongoing Anesthesia/Sedation Effects (Cardiac Catheterization)  Goal: Anesthesia/Sedation Recovery  Outcome: Met     Problem: Pain (Cardiac Catheterization)  Goal: Acceptable Pain Control  Outcome: Met     Problem: Vascular Access Protection (Cardiac Catheterization)  Goal: Absence of Vascular Access Complication  Outcome: Met     Problem: Fluid and Electrolyte Imbalance (Acute Kidney Injury/Impairment)  Goal: Fluid and Electrolyte Balance  Outcome: Met     Problem: Oral Intake Inadequate (Acute Kidney Injury/Impairment)  Goal: Optimal  Nutrition Intake  Outcome: Met     Problem: Renal Function Impairment (Acute Kidney Injury/Impairment)  Goal: Effective Renal Function  Outcome: Met     Problem: Heart Failure Comorbidity  Goal: Maintenance of Heart Failure Symptom Control  Outcome: Met     Problem: Hypertension Comorbidity  Goal: Blood Pressure in Desired Range  Outcome: Met     Problem: Skin Injury Risk Increased  Goal: Skin Health and Integrity  Outcome: Met     Problem: Fall Injury Risk  Goal: Absence of Fall and Fall-Related Injury  Outcome: Met     Problem: Occupational Therapy  Goal: Occupational Therapy Goal  Description: Goals to be met by: 2024     Patient will increase functional independence with ADLs by performing:    Grooming while seated with Set-up Assistance.  Toileting from toilet with Stand-by Assistance for hygiene and clothing management.   Supine to sit with Stand-by Assistance.  Step transfer with Stand-by Assistance & appropriate AD.  Toilet transfer to toilet with Stand-by Assistance & appropriate AD.    Outcome: Met     Problem: Physical Therapy  Goal: Physical Therapy Goal  Description: Goals to be met by: 2025     Patient will increase functional independence with mobility by performin. Supine to sit with Stand-by Assistance  2. Sit to supine with Stand-by Assistance  3. Sit to stand transfer with Stand-by Assistance using Rolling Walker  4. Bed to chair transfer with Stand-by Assistance using Rolling Walker  5. Gait  x 75 feet with Stand-by Assistance using Rolling Walker.   6. Ascend/Descend 5 inch curb step with Contact Guard Assistance and Minimal Assistance using Rolling Walker.  7. Lower extremity exercise program x10 reps with supervision    Outcome: Met

## 2023-12-02 NOTE — ASSESSMENT & PLAN NOTE
- possibly secondary to cardiorenal syndrome.   - will monitor creatinine after lasix dose.   - avoid nephrotoxic agents.   resolved

## 2023-12-02 NOTE — SUBJECTIVE & OBJECTIVE
"Interval History:  Awake and alert, lying in bed  BP improved, on RA, will likely need scheduled diuretics,     Appreciate cardiology recs    Review of Systems   Constitutional:  Positive for activity change.   Respiratory:  Negative for shortness of breath.    Genitourinary:  Negative for dysuria.   Neurological:  Negative for dizziness.   Psychiatric/Behavioral:  Negative for agitation.      Objective:     Vital Signs (Most Recent):  Temp: 98.6 °F (37 °C) (12/02/23 0821)  Pulse: 73 (12/02/23 0821)  Resp: 18 (12/02/23 0821)  BP: (!) 146/67 (12/02/23 0821)  SpO2: 96 % (12/02/23 0900) Vital Signs (24h Range):  Temp:  [97 °F (36.1 °C)-98.6 °F (37 °C)] 98.6 °F (37 °C)  Pulse:  [60-86] 73  Resp:  [18-20] 18  SpO2:  [91 %-96 %] 96 %  BP: ()/(44-67) 146/67     Weight: 81.7 kg (180 lb 1.9 oz)  Body mass index is 36.38 kg/m².    Intake/Output Summary (Last 24 hours) at 12/2/2023 0959  Last data filed at 12/1/2023 1445  Gross per 24 hour   Intake --   Output 200 ml   Net -200 ml           Physical Exam  HENT:      Head: Normocephalic and atraumatic.   Cardiovascular:      Rate and Rhythm: Normal rate.   Abdominal:      General: Bowel sounds are normal.   Musculoskeletal:      Cervical back: Normal range of motion.   Skin:     Capillary Refill: Capillary refill takes less than 2 seconds.   Neurological:      Mental Status: She is alert and oriented to person, place, and time.             Significant Labs: A1C:   Recent Labs   Lab 11/30/23  0606   HGBA1C 5.9*       ABGs: No results for input(s): "PH", "PCO2", "HCO3", "POCSATURATED", "BE", "TOTALHB", "COHB", "METHB", "O2HB", "POCFIO2", "PO2" in the last 48 hours.  Blood Culture: No results for input(s): "LABBLOO" in the last 48 hours.  CBC:   Recent Labs   Lab 12/01/23 0322   WBC 9.60   HGB 12.5   HCT 37.8          CMP:   Recent Labs   Lab 12/01/23 0322 12/02/23 0321    142   K 4.0 3.4*    104   CO2 23 26   * 98   BUN 37* 38*   CREATININE 1.2 " "1.0   CALCIUM 9.2 8.9   PROT 6.3 6.1   ALBUMIN 3.1* 3.0*   BILITOT 0.4 0.5   ALKPHOS 97 93   AST 17 17   ALT 12 12   ANIONGAP 11 12       Lactic Acid: No results for input(s): "LACTATE" in the last 48 hours.  Lipase: No results for input(s): "LIPASE" in the last 48 hours.  Lipid Panel:   No results for input(s): "CHOL", "HDL", "LDLCALC", "TRIG", "CHOLHDL" in the last 48 hours.    Magnesium:   Recent Labs   Lab 12/01/23  0322 12/02/23  0321   MG 1.9 1.8       Troponin:   Recent Labs   Lab 11/30/23  1243   TROPONINI 0.152*       TSH: No results for input(s): "TSH" in the last 4320 hours.  Urine Culture: No results for input(s): "LABURIN" in the last 48 hours.  Urine Studies: No results for input(s): "COLORU", "APPEARANCEUA", "PHUR", "SPECGRAV", "PROTEINUA", "GLUCUA", "KETONESU", "BILIRUBINUA", "OCCULTUA", "NITRITE", "UROBILINOGEN", "LEUKOCYTESUR", "RBCUA", "WBCUA", "BACTERIA", "SQUAMEPITHEL", "HYALINECASTS" in the last 48 hours.    Invalid input(s): "WRIGHTSUR"    Significant Imaging: I have reviewed all pertinent imaging results/findings within the past 24 hours.  "

## 2023-12-04 ENCOUNTER — PATIENT OUTREACH (OUTPATIENT)
Dept: ADMINISTRATIVE | Facility: CLINIC | Age: 88
End: 2023-12-04
Payer: MEDICARE

## 2023-12-04 NOTE — PROGRESS NOTES
C3 nurse spoke with Marycruz Perez, patient's daughter, Lily,  for a TCC post hospital discharge follow up call. The patient's daughter informed me that patient has a scheduled HOSFU appointment with Melchor Menchaca -  on 12/05/2023 @ 2:30 PM.

## 2024-02-07 ENCOUNTER — CLINICAL SUPPORT (OUTPATIENT)
Dept: CARDIOLOGY | Facility: HOSPITAL | Age: 89
End: 2024-02-07
Attending: INTERNAL MEDICINE
Payer: MEDICARE

## 2024-02-07 ENCOUNTER — CLINICAL SUPPORT (OUTPATIENT)
Dept: CARDIOLOGY | Facility: HOSPITAL | Age: 89
End: 2024-02-07
Payer: MEDICARE

## 2024-02-07 DIAGNOSIS — Z95.0 PRESENCE OF CARDIAC PACEMAKER: ICD-10-CM

## 2024-02-07 PROCEDURE — 93294 REM INTERROG EVL PM/LDLS PM: CPT | Mod: ,,, | Performed by: INTERNAL MEDICINE

## 2024-02-07 PROCEDURE — 93296 REM INTERROG EVL PM/IDS: CPT | Performed by: INTERNAL MEDICINE

## 2024-02-21 LAB
OHS CV AF BURDEN PERCENT: < 1
OHS CV DC REMOTE DEVICE TYPE: NORMAL
OHS CV RV PACING PERCENT: 100 %

## 2024-03-04 PROBLEM — N17.9 AKI (ACUTE KIDNEY INJURY): Status: RESOLVED | Noted: 2023-11-30 | Resolved: 2024-03-04

## 2024-05-08 ENCOUNTER — CLINICAL SUPPORT (OUTPATIENT)
Dept: CARDIOLOGY | Facility: HOSPITAL | Age: 89
End: 2024-05-08
Payer: MEDICARE

## 2024-05-08 ENCOUNTER — CLINICAL SUPPORT (OUTPATIENT)
Dept: CARDIOLOGY | Facility: HOSPITAL | Age: 89
End: 2024-05-08
Attending: INTERNAL MEDICINE
Payer: MEDICARE

## 2024-05-08 DIAGNOSIS — Z95.0 PRESENCE OF CARDIAC PACEMAKER: ICD-10-CM

## 2024-05-08 PROCEDURE — 93296 REM INTERROG EVL PM/IDS: CPT | Performed by: INTERNAL MEDICINE

## 2024-05-08 PROCEDURE — 93294 REM INTERROG EVL PM/LDLS PM: CPT | Mod: ,,, | Performed by: INTERNAL MEDICINE

## 2024-05-13 LAB
OHS CV AF BURDEN PERCENT: < 1
OHS CV DC REMOTE DEVICE TYPE: NORMAL
OHS CV RV PACING PERCENT: 100 %

## 2024-07-02 ENCOUNTER — TELEPHONE (OUTPATIENT)
Dept: ELECTROPHYSIOLOGY | Facility: CLINIC | Age: 89
End: 2024-07-02
Payer: MEDICARE

## 2024-07-02 NOTE — TELEPHONE ENCOUNTER
Recieved email request from MACEY Napier of [employer]  Dr. Cm would like to have [employer] remote home monitoring released to him so she can be followed at [facility].    LVM to see if pt/ family wishes to have HM transferred.

## 2024-07-03 NOTE — TELEPHONE ENCOUNTER
Spoke with pt's daughter Lily who confirmed that they want to have pt's [employer] Home monitoring followed by Dr. Cm.    Will release pt.

## 2024-07-10 ENCOUNTER — HOSPITAL ENCOUNTER (OUTPATIENT)
Facility: HOSPITAL | Age: 89
Discharge: HOME | End: 2024-07-12
Attending: STUDENT IN AN ORGANIZED HEALTH CARE EDUCATION/TRAINING PROGRAM | Admitting: FAMILY MEDICINE
Payer: MEDICARE

## 2024-07-10 DIAGNOSIS — R79.89 ELEVATED TROPONIN: ICD-10-CM

## 2024-07-10 DIAGNOSIS — R06.00 DYSPNEA: ICD-10-CM

## 2024-07-10 DIAGNOSIS — R07.9 CHEST PAIN: ICD-10-CM

## 2024-07-10 DIAGNOSIS — I31.39 PERICARDIAL EFFUSION: Primary | ICD-10-CM

## 2024-07-10 LAB
ALBUMIN SERPL BCP-MCNC: 3 G/DL (ref 3.5–5.2)
ALP SERPL-CCNC: 100 U/L (ref 55–135)
ALT SERPL W/O P-5'-P-CCNC: <5 U/L (ref 10–44)
ANION GAP SERPL CALC-SCNC: 11 MMOL/L (ref 8–16)
APICAL FOUR CHAMBER EJECTION FRACTION: 60 %
APICAL TWO CHAMBER EJECTION FRACTION: 64 %
ASCENDING AORTA: 3.29 CM
AST SERPL-CCNC: 15 U/L (ref 10–40)
AV INDEX (PROSTH): 0.43
AV MEAN GRADIENT: 17 MMHG
AV PEAK GRADIENT: 27 MMHG
AV VALVE AREA BY VELOCITY RATIO: 1.06 CM²
AV VALVE AREA: 1.15 CM²
AV VELOCITY RATIO: 0.39
BASOPHILS # BLD AUTO: 0.12 K/UL (ref 0–0.2)
BASOPHILS NFR BLD: 1.3 % (ref 0–1.9)
BILIRUB SERPL-MCNC: 0.2 MG/DL (ref 0.1–1)
BNP SERPL-MCNC: 180 PG/ML (ref 0–99)
BSA FOR ECHO PROCEDURE: 1.84 M2
BUN SERPL-MCNC: 24 MG/DL (ref 10–30)
CALCIUM SERPL-MCNC: 8.8 MG/DL (ref 8.7–10.5)
CHLORIDE SERPL-SCNC: 111 MMOL/L (ref 95–110)
CO2 SERPL-SCNC: 23 MMOL/L (ref 23–29)
CREAT SERPL-MCNC: 1 MG/DL (ref 0.5–1.4)
CV ECHO LV RWT: 0.77 CM
DIFFERENTIAL METHOD BLD: ABNORMAL
DOP CALC AO PEAK VEL: 2.59 M/S
DOP CALC AO VTI: 49.1 CM
DOP CALC LVOT AREA: 2.7 CM2
DOP CALC LVOT DIAMETER: 1.85 CM
DOP CALC LVOT PEAK VEL: 1.02 M/S
DOP CALC LVOT STROKE VOLUME: 56.42 CM3
DOP CALC MV VTI: 39 CM
DOP CALCLVOT PEAK VEL VTI: 21 CM
E WAVE DECELERATION TIME: 431.06 MSEC
E/A RATIO: 0.54
E/E' RATIO: 17.17 M/S
ECHO LV POSTERIOR WALL: 1.09 CM (ref 0.6–1.1)
EOSINOPHIL # BLD AUTO: 0.3 K/UL (ref 0–0.5)
EOSINOPHIL NFR BLD: 3.7 % (ref 0–8)
ERYTHROCYTE [DISTWIDTH] IN BLOOD BY AUTOMATED COUNT: 13.6 % (ref 11.5–14.5)
EST. GFR  (NO RACE VARIABLE): 52 ML/MIN/1.73 M^2
FRACTIONAL SHORTENING: 30 % (ref 28–44)
GLUCOSE SERPL-MCNC: 119 MG/DL (ref 70–110)
HCT VFR BLD AUTO: 42.1 % (ref 37–48.5)
HGB BLD-MCNC: 13.9 G/DL (ref 12–16)
HR MV ECHO: 81 BPM
IMM GRANULOCYTES # BLD AUTO: 0.02 K/UL (ref 0–0.04)
IMM GRANULOCYTES NFR BLD AUTO: 0.2 % (ref 0–0.5)
INTERVENTRICULAR SEPTUM: 1.15 CM (ref 0.6–1.1)
IVC DIAMETER: 1.3 CM
LA MAJOR: 5.74 CM
LA MINOR: 6.11 CM
LA WIDTH: 4.7 CM
LEFT ATRIUM AREA SYSTOLIC (APICAL 2 CHAMBER): 27.58 CM2
LEFT ATRIUM AREA SYSTOLIC (APICAL 4 CHAMBER): 24.85 CM2
LEFT ATRIUM SIZE: 4.35 CM
LEFT ATRIUM VOLUME INDEX MOD: 53.6 ML/M2
LEFT ATRIUM VOLUME INDEX: 58.4 ML/M2
LEFT ATRIUM VOLUME MOD: 94.4 CM3
LEFT ATRIUM VOLUME: 102.87 CM3
LEFT INTERNAL DIMENSION IN SYSTOLE: 1.98 CM (ref 2.1–4)
LEFT VENTRICLE DIASTOLIC VOLUME INDEX: 17.26 ML/M2
LEFT VENTRICLE DIASTOLIC VOLUME: 30.37 ML
LEFT VENTRICLE END DIASTOLIC VOLUME APICAL 2 CHAMBER: 39.21 ML
LEFT VENTRICLE END DIASTOLIC VOLUME APICAL 4 CHAMBER: 42 ML
LEFT VENTRICLE END SYSTOLIC VOLUME APICAL 2 CHAMBER: 101.8 ML
LEFT VENTRICLE END SYSTOLIC VOLUME APICAL 4 CHAMBER: 83.77 ML
LEFT VENTRICLE MASS INDEX: 51 G/M2
LEFT VENTRICLE SYSTOLIC VOLUME INDEX: 7.1 ML/M2
LEFT VENTRICLE SYSTOLIC VOLUME: 12.49 ML
LEFT VENTRICULAR INTERNAL DIMENSION IN DIASTOLE: 2.83 CM (ref 3.5–6)
LEFT VENTRICULAR MASS: 90.17 G
LV LATERAL E/E' RATIO: 17.17 M/S
LV SEPTAL E/E' RATIO: 17.17 M/S
LVED V (TEICH): 30.37 ML
LVES V (TEICH): 12.49 ML
LVOT MG: 2.59 MMHG
LVOT MV: 0.77 CM/S
LYMPHOCYTES # BLD AUTO: 4.5 K/UL (ref 1–4.8)
LYMPHOCYTES NFR BLD: 48.6 % (ref 18–48)
MCH RBC QN AUTO: 29.9 PG (ref 27–31)
MCHC RBC AUTO-ENTMCNC: 33 G/DL (ref 32–36)
MCV RBC AUTO: 91 FL (ref 82–98)
MONOCYTES # BLD AUTO: 0.8 K/UL (ref 0.3–1)
MONOCYTES NFR BLD: 8.8 % (ref 4–15)
MV MEAN GRADIENT: 7 MMHG
MV PEAK A VEL: 1.9 M/S
MV PEAK E VEL: 1.03 M/S
MV PEAK GRADIENT: 18 MMHG
MV STENOSIS PRESSURE HALF TIME: 125.01 MS
MV VALVE AREA BY CONTINUITY EQUATION: 1.45 CM2
MV VALVE AREA P 1/2 METHOD: 1.76 CM2
NEUTROPHILS # BLD AUTO: 3.5 K/UL (ref 1.8–7.7)
NEUTROPHILS NFR BLD: 37.4 % (ref 38–73)
NRBC BLD-RTO: 0 /100 WBC
OHS CV RV/LV RATIO: 1.29 CM
OHS LV EJECTION FRACTION SIMPSONS BIPLANE MOD: 63 %
OHS QRS DURATION: 156 MS
OHS QTC CALCULATION: 539 MS
PISA TR MAX VEL: 2.84 M/S
PLATELET # BLD AUTO: 151 K/UL (ref 150–450)
PMV BLD AUTO: 12 FL (ref 9.2–12.9)
POTASSIUM SERPL-SCNC: 3.4 MMOL/L (ref 3.5–5.1)
PROT SERPL-MCNC: 6.2 G/DL (ref 6–8.4)
PULM VEIN S/D RATIO: 1.93
PV MV: 0.72 M/S
PV PEAK D VEL: 0.27 M/S
PV PEAK GRADIENT: 4 MMHG
PV PEAK S VEL: 0.52 M/S
PV PEAK VELOCITY: 0.94 M/S
RA MAJOR: 5.18 CM
RA PRESSURE ESTIMATED: 3 MMHG
RA WIDTH: 2.74 CM
RBC # BLD AUTO: 4.65 M/UL (ref 4–5.4)
RIGHT VENTRICLE DIASTOLIC BASEL DIMENSION: 2.7 CM
RIGHT VENTRICULAR END-DIASTOLIC DIMENSION: 3.66 CM
RV TB RVSP: 6 MMHG
RV TISSUE DOPPLER FREE WALL SYSTOLIC VELOCITY 1 (APICAL 4 CHAMBER VIEW): 15.99 CM/S
SINUS: 2.93 CM
SODIUM SERPL-SCNC: 145 MMOL/L (ref 136–145)
STJ: 2.72 CM
TDI LATERAL: 0.06 M/S
TDI SEPTAL: 0.06 M/S
TDI: 0.06 M/S
TR MAX PG: 32 MMHG
TRICUSPID ANNULAR PLANE SYSTOLIC EXCURSION: 1.93 CM
TROPONIN I SERPL DL<=0.01 NG/ML-MCNC: 0.15 NG/ML (ref 0–0.03)
TROPONIN I SERPL DL<=0.01 NG/ML-MCNC: 2.11 NG/ML (ref 0–0.03)
TV REST PULMONARY ARTERY PRESSURE: 35 MMHG
WBC # BLD AUTO: 9.22 K/UL (ref 3.9–12.7)
Z-SCORE OF LEFT VENTRICULAR DIMENSION IN END DIASTOLE: -5.42
Z-SCORE OF LEFT VENTRICULAR DIMENSION IN END SYSTOLE: -3.31

## 2024-07-10 PROCEDURE — 83880 ASSAY OF NATRIURETIC PEPTIDE: CPT | Performed by: STUDENT IN AN ORGANIZED HEALTH CARE EDUCATION/TRAINING PROGRAM

## 2024-07-10 PROCEDURE — 25000003 PHARM REV CODE 250: Performed by: NURSE PRACTITIONER

## 2024-07-10 PROCEDURE — 84484 ASSAY OF TROPONIN QUANT: CPT | Mod: 91 | Performed by: INTERNAL MEDICINE

## 2024-07-10 PROCEDURE — 96374 THER/PROPH/DIAG INJ IV PUSH: CPT

## 2024-07-10 PROCEDURE — 25000003 PHARM REV CODE 250: Performed by: REGISTERED NURSE

## 2024-07-10 PROCEDURE — 80053 COMPREHEN METABOLIC PANEL: CPT | Performed by: STUDENT IN AN ORGANIZED HEALTH CARE EDUCATION/TRAINING PROGRAM

## 2024-07-10 PROCEDURE — 63600175 PHARM REV CODE 636 W HCPCS: Performed by: STUDENT IN AN ORGANIZED HEALTH CARE EDUCATION/TRAINING PROGRAM

## 2024-07-10 PROCEDURE — G0378 HOSPITAL OBSERVATION PER HR: HCPCS

## 2024-07-10 PROCEDURE — 99285 EMERGENCY DEPT VISIT HI MDM: CPT | Mod: 25

## 2024-07-10 PROCEDURE — 25000003 PHARM REV CODE 250: Performed by: STUDENT IN AN ORGANIZED HEALTH CARE EDUCATION/TRAINING PROGRAM

## 2024-07-10 PROCEDURE — 85025 COMPLETE CBC W/AUTO DIFF WBC: CPT | Performed by: STUDENT IN AN ORGANIZED HEALTH CARE EDUCATION/TRAINING PROGRAM

## 2024-07-10 PROCEDURE — 96375 TX/PRO/DX INJ NEW DRUG ADDON: CPT

## 2024-07-10 PROCEDURE — 84484 ASSAY OF TROPONIN QUANT: CPT | Performed by: STUDENT IN AN ORGANIZED HEALTH CARE EDUCATION/TRAINING PROGRAM

## 2024-07-10 PROCEDURE — 93005 ELECTROCARDIOGRAM TRACING: CPT

## 2024-07-10 RX ORDER — BUMETANIDE 1 MG/1
1 TABLET ORAL DAILY
Status: DISCONTINUED | OUTPATIENT
Start: 2024-07-10 | End: 2024-07-11

## 2024-07-10 RX ORDER — ASPIRIN 325 MG
325 TABLET ORAL
Status: COMPLETED | OUTPATIENT
Start: 2024-07-10 | End: 2024-07-10

## 2024-07-10 RX ORDER — CLOPIDOGREL BISULFATE 75 MG/1
75 TABLET ORAL DAILY
Status: DISCONTINUED | OUTPATIENT
Start: 2024-07-10 | End: 2024-07-12 | Stop reason: HOSPADM

## 2024-07-10 RX ORDER — GLUCAGON 1 MG
1 KIT INJECTION
Status: DISCONTINUED | OUTPATIENT
Start: 2024-07-10 | End: 2024-07-12 | Stop reason: HOSPADM

## 2024-07-10 RX ORDER — METOPROLOL SUCCINATE 100 MG/1
100 TABLET, EXTENDED RELEASE ORAL DAILY
Status: ON HOLD | COMMUNITY
Start: 2024-06-24 | End: 2024-07-11 | Stop reason: HOSPADM

## 2024-07-10 RX ORDER — MORPHINE SULFATE 4 MG/ML
4 INJECTION, SOLUTION INTRAMUSCULAR; INTRAVENOUS
Status: COMPLETED | OUTPATIENT
Start: 2024-07-10 | End: 2024-07-10

## 2024-07-10 RX ORDER — METOPROLOL SUCCINATE 25 MG/1
25 TABLET, EXTENDED RELEASE ORAL DAILY
Status: ON HOLD | COMMUNITY
End: 2024-07-11 | Stop reason: HOSPADM

## 2024-07-10 RX ORDER — SERTRALINE HYDROCHLORIDE 25 MG/1
25 TABLET, FILM COATED ORAL
COMMUNITY
Start: 2024-01-15 | End: 2024-07-10

## 2024-07-10 RX ORDER — IBUPROFEN 200 MG
16 TABLET ORAL
Status: DISCONTINUED | OUTPATIENT
Start: 2024-07-10 | End: 2024-07-12 | Stop reason: HOSPADM

## 2024-07-10 RX ORDER — SODIUM CHLORIDE 0.9 % (FLUSH) 0.9 %
10 SYRINGE (ML) INJECTION EVERY 12 HOURS PRN
Status: DISCONTINUED | OUTPATIENT
Start: 2024-07-10 | End: 2024-07-12 | Stop reason: HOSPADM

## 2024-07-10 RX ORDER — NALOXONE HCL 0.4 MG/ML
0.02 VIAL (ML) INJECTION
Status: DISCONTINUED | OUTPATIENT
Start: 2024-07-10 | End: 2024-07-12 | Stop reason: HOSPADM

## 2024-07-10 RX ORDER — IPRATROPIUM BROMIDE AND ALBUTEROL SULFATE 2.5; .5 MG/3ML; MG/3ML
3 SOLUTION RESPIRATORY (INHALATION) EVERY 6 HOURS PRN
Status: DISCONTINUED | OUTPATIENT
Start: 2024-07-10 | End: 2024-07-12 | Stop reason: HOSPADM

## 2024-07-10 RX ORDER — ONDANSETRON HYDROCHLORIDE 2 MG/ML
4 INJECTION, SOLUTION INTRAVENOUS EVERY 8 HOURS PRN
Status: DISCONTINUED | OUTPATIENT
Start: 2024-07-10 | End: 2024-07-12 | Stop reason: HOSPADM

## 2024-07-10 RX ORDER — IBUPROFEN 200 MG
24 TABLET ORAL
Status: DISCONTINUED | OUTPATIENT
Start: 2024-07-10 | End: 2024-07-12 | Stop reason: HOSPADM

## 2024-07-10 RX ORDER — CARVEDILOL 6.25 MG/1
12.5 TABLET ORAL 2 TIMES DAILY WITH MEALS
Status: DISCONTINUED | OUTPATIENT
Start: 2024-07-10 | End: 2024-07-12 | Stop reason: HOSPADM

## 2024-07-10 RX ORDER — NAPROXEN SODIUM 220 MG/1
81 TABLET, FILM COATED ORAL DAILY
Status: DISCONTINUED | OUTPATIENT
Start: 2024-07-10 | End: 2024-07-12 | Stop reason: HOSPADM

## 2024-07-10 RX ORDER — ASPIRIN 325 MG
325 TABLET ORAL DAILY
Status: DISCONTINUED | OUTPATIENT
Start: 2024-07-10 | End: 2024-07-10

## 2024-07-10 RX ORDER — LANOLIN ALCOHOL/MO/W.PET/CERES
400 CREAM (GRAM) TOPICAL 2 TIMES DAILY
COMMUNITY
Start: 2024-01-31

## 2024-07-10 RX ORDER — ISOSORBIDE MONONITRATE 30 MG/1
30 TABLET, EXTENDED RELEASE ORAL DAILY
Status: DISCONTINUED | OUTPATIENT
Start: 2024-07-10 | End: 2024-07-11

## 2024-07-10 RX ORDER — FUROSEMIDE 10 MG/ML
80 INJECTION INTRAMUSCULAR; INTRAVENOUS
Status: COMPLETED | OUTPATIENT
Start: 2024-07-10 | End: 2024-07-10

## 2024-07-10 RX ADMIN — FUROSEMIDE 80 MG: 10 INJECTION, SOLUTION INTRAVENOUS at 02:07

## 2024-07-10 RX ADMIN — ISOSORBIDE MONONITRATE 30 MG: 30 TABLET, EXTENDED RELEASE ORAL at 10:07

## 2024-07-10 RX ADMIN — ASPIRIN 81 MG CHEWABLE TABLET 81 MG: 81 TABLET CHEWABLE at 09:07

## 2024-07-10 RX ADMIN — ASPIRIN 325 MG ORAL TABLET 325 MG: 325 PILL ORAL at 02:07

## 2024-07-10 RX ADMIN — CARVEDILOL 12.5 MG: 6.25 TABLET, FILM COATED ORAL at 06:07

## 2024-07-10 RX ADMIN — CARVEDILOL 12.5 MG: 6.25 TABLET, FILM COATED ORAL at 09:07

## 2024-07-10 RX ADMIN — NITROGLYCERIN 1 INCH: 20 OINTMENT TOPICAL at 02:07

## 2024-07-10 RX ADMIN — MORPHINE SULFATE 4 MG: 4 INJECTION INTRAVENOUS at 02:07

## 2024-07-10 RX ADMIN — CLOPIDOGREL BISULFATE 75 MG: 75 TABLET ORAL at 09:07

## 2024-07-10 NOTE — NURSING
Pt transferred to room via stretcher. Pt alert and oriented. Vitals taken and stable. Pt oriented to room and setup. Call light in reach, safety measures in place. Daughter at bedside. Will cont plan of care.

## 2024-07-10 NOTE — HPI
Patient is a 89-year-old female with history CAD with this and PCI, CHF/HFpEF, rheumatic mitral stenosis, right ventricular pacemaker for third-degree AV block.  Patient was brought in by herself for chest pain which has been intermittent over the last several weeks however TIA it became severe.  In ED initial blood pressure with isolated systolic hypertension with 210s/90s. In ED labs remarkable for mild hypokalemia of 3.4.  , troponin 0.154.  Elevated but at baseline.  Bedside echo done which showed a pericardial effusion in ED. Chest x-ray with chronic stable interstitial changes.  Patient given full-dose aspirin, 80 mg Lasix, morphine, topical nitro.  Patient will be admitted to Hospital Medicine will consult Cardiology for eval, trend troponin

## 2024-07-10 NOTE — CONSULTS
[facility] - Emergency Dept  Cardiology  Consult Note    Patient Name: Marycruz Perez  MRN: [id number]  Admission Date: 7/10/2024  Hospital Length of Stay: 0 days  Code Status: Full Code   Attending Provider: Addie Euceda MD   Consulting Provider: Mansoor Luis NP  Primary Care Physician: Melchor Menchaca -  Principal Problem:<principal problem not specified>    Patient information was obtained from patient, past medical records, and ER records.     [facility]  Consult performed by: Mansoor Luis NP  Consult ordered by: Tonny Palafox NP        Subjective:     Chief Complaint:  Back Pain     HPI:   89-year-old female with CAD with this and PCI, CHF/HFpEF, rheumatic mitral stenosis, rheumatic AS,  right ventricular pacemaker for third-degree AV block. Patient presented to the ED with back pain which radiates to her neck and headache which is intermittent and only present when she lays in bed. She denies CP, SOB, diaphoresis, palpitations, PND. + edema to BLE which has been present for several months despite taking Bumex BID. Patient reports compliance with all medications. She lives with family and utilizes a walker. Troponin noted mildly elevated at baseline of 0.1, repeat pending. EKG paced. SBP >210 in the ED, improved at this time. TTE pending.     Past Medical History:   Diagnosis Date    Acute exacerbation of CHF (congestive heart failure) 10/27/2020    NATHEN (acute kidney injury) 11/30/2023    Hypertension     Syncope and collapse        Past Surgical History:   Procedure Laterality Date    A-V CARDIAC PACEMAKER INSERTION N/A 2/22/2021    Procedure: INSERTION, CARDIAC PACEMAKER, DUAL CHAMBER;  Surgeon: Norman Foote MD;  Location: [facility] EP LAB;  Service: Cardiology;  Laterality: N/A;  AVB, PPM upgrade to Dual PPM (right sided), Bio, Venogram prior to draping, MAC, LA, 3 Prep    CARDIAC CATHETERIZATION      CARDIAC PACEMAKER PLACEMENT      CHOLECYSTECTOMY       CORONARY ANGIOPLASTY      HYSTERECTOMY      REVISION OF SKIN POCKET FOR PACEMAKER  2/22/2021    Procedure: Revision, Skin Pocket, For Cardiac Pacemaker;  Surgeon: Norman Foote MD;  Location: [facility] LAB;  Service: Cardiology;;       Review of patient's allergies indicates:   Allergen Reactions    Codeine     Latex, natural rubber     Penicillins     Penicillin Rash       Current Facility-Administered Medications on File Prior to Encounter   Medication    vancomycin in dextrose 5 % 1 gram/250 mL IVPB 1,000 mg     Current Outpatient Medications on File Prior to Encounter   Medication Sig    albuterol (PROVENTIL/VENTOLIN HFA) 90 mcg/actuation inhaler Inhale 2 puffs into the lungs every 4 (four) hours as needed for Wheezing (cough or wheezing). Rescue    albuterol-ipratropium (DUO-NEB) 2.5 mg-0.5 mg/3 mL nebulizer solution every 6 (six) hours as needed for Wheezing.    aspirin 81 MG Chew Take 1 tablet (81 mg total) by mouth once daily.    bumetanide (BUMEX) 0.5 MG Tab Take 1 tablet (0.5 mg total) by mouth 2 (two) times a day.    calcium carbonate (OS-BERE) 600 mg calcium (1,500 mg) Tab Take 600 mg by mouth once daily.     carvediloL (COREG) 12.5 MG tablet Take 1 tablet (12.5 mg total) by mouth 2 (two) times daily with meals.    cetirizine (ZYRTEC) 10 MG tablet Take 10 mg by mouth once daily.    clopidogrel (PLAVIX) 75 mg tablet TK 1 T PO QD    diclofenac sodium (VOLTAREN) 1 % Gel as needed.     donepeziL (ARICEPT) 5 MG tablet Take 5 mg by mouth.    meclizine (ANTIVERT) 25 mg tablet Take 25 mg by mouth 3 (three) times daily as needed.    nitroGLYCERIN (NITROSTAT) 0.4 MG SL tablet Place 1 tablet (0.4 mg total) under the tongue every 5 (five) minutes as needed for Chest pain (angina). (Patient not taking: Reported on 12/4/2023)    ondansetron (ZOFRAN-ODT) 4 MG TbDL Take 1 tablet (4 mg total) by mouth every 6 (six) hours as needed (nausea). (Patient not taking: Reported on 12/4/2023)    potassium chloride SA  (K-DUR,KLOR-CON) 10 MEQ tablet Take 2 tablets (20 mEq total) by mouth once daily.    rosuvastatin (CRESTOR) 5 MG tablet Take 5 mg by mouth every evening.    tamsulosin (FLOMAX) 0.4 mg Cap Take 1 capsule by mouth every evening.    tobramycin sulfate 0.3% (TOBREX) 0.3 % ophthalmic solution INSTILL 1 DROP IN RIGHT EYE FOUR TIMES DAILY    valsartan (DIOVAN) 160 MG tablet Take 160 mg by mouth once daily.    ZINC ACETATE ORAL Take by mouth once daily.      Family History       Problem Relation (Age of Onset)    Liver disease Mother          Tobacco Use    Smoking status: Never    Smokeless tobacco: Never   Substance and Sexual Activity    Alcohol use: No    Drug use: No    Sexual activity: Not on file     Review of Systems   Constitutional: Negative. Negative for diaphoresis.   HENT: Negative.     Cardiovascular:  Positive for leg swelling. Negative for chest pain, irregular heartbeat, near-syncope, palpitations, paroxysmal nocturnal dyspnea and syncope.   Respiratory:  Negative for cough and shortness of breath.    Endocrine: Negative.    Hematologic/Lymphatic: Negative.    Musculoskeletal:  Positive for back pain and myalgias.   Gastrointestinal: Negative.  Negative for nausea and vomiting.   Genitourinary: Negative.    Neurological:  Positive for weakness. Negative for dizziness and light-headedness.   Psychiatric/Behavioral: Negative.       Objective:     Vital Signs (Most Recent):  Temp: 97.8 °F (36.6 °C) (07/10/24 0923)  Pulse: 80 (07/10/24 0923)  Resp: 18 (07/10/24 0923)  BP: 124/71 (07/10/24 0923)  SpO2: (!) 94 % (07/10/24 0923) Vital Signs (24h Range):  Temp:  [97.4 °F (36.3 °C)-98.1 °F (36.7 °C)] 97.8 °F (36.6 °C)  Pulse:  [] 80  Resp:  [18-27] 18  SpO2:  [90 %-96 %] 94 %  BP: (122-213)/(61-96) 124/71     Weight: 81.6 kg (180 lb)  Body mass index is 36.36 kg/m².    SpO2: (!) 94 %         Intake/Output Summary (Last 24 hours) at 7/10/2024 0939  Last data filed at 7/10/2024 0800  Gross per 24 hour   Intake  "--   Output 750 ml   Net -750 ml       Lines/Drains/Airways       Drain  Duration             Female External Urinary Catheter w/ Suction 07/10/24 0240 <1 day              Peripheral Intravenous Line  Duration                  Peripheral IV - Single Lumen 07/10/24 0229 20 G Posterior;Right Wrist <1 day                     Physical Exam  Constitutional:       General: She is not in acute distress.     Appearance: She is not diaphoretic.   HENT:      Head: Atraumatic.   Eyes:      General:         Right eye: No discharge.         Left eye: No discharge.   Cardiovascular:      Rate and Rhythm: Normal rate and regular rhythm.   Pulmonary:      Effort: Pulmonary effort is normal.      Breath sounds: Normal breath sounds. No rales.   Abdominal:      General: Bowel sounds are normal.      Palpations: Abdomen is soft.   Skin:     General: Skin is warm and dry.   Neurological:      Mental Status: She is alert. Mental status is at baseline.   Psychiatric:         Mood and Affect: Mood normal.         Thought Content: Thought content normal.         Judgment: Judgment normal.          Significant Labs: BMP:   Recent Labs   Lab 07/10/24  0308   *      K 3.4*   *   CO2 23   BUN 24   CREATININE 1.0   CALCIUM 8.8   , CMP   Recent Labs   Lab 07/10/24  0308      K 3.4*   *   CO2 23   *   BUN 24   CREATININE 1.0   CALCIUM 8.8   PROT 6.2   ALBUMIN 3.0*   BILITOT 0.2   ALKPHOS 100   AST 15   ALT <5*   ANIONGAP 11   , CBC   Recent Labs   Lab 07/10/24  0239   WBC 9.22   HGB 13.9   HCT 42.1      , INR No results for input(s): "INR", "PROTIME" in the last 48 hours., Lipid Panel No results for input(s): "CHOL", "HDL", "LDLCALC", "TRIG", "CHOLHDL" in the last 48 hours., Troponin   Recent Labs   Lab 07/10/24  0308   TROPONINI 0.154*   , and All pertinent lab results from the last 24 hours have been reviewed.    Significant Imaging: Echocardiogram: Transthoracic echo (TTE) complete (Cupid Only): "   Results for orders placed or performed during the hospital encounter of 07/10/24   Echo   Result Value Ref Range    BSA 1.84 m2    Garcia's Biplane MOD Ejection Fraction 63 %    A2C EF 64 %    A4C EF 60 %    LVOT stroke volume 56.42 cm3    LVIDd 2.83 (A) 3.5 - 6.0 cm    LV Systolic Volume 12.49 mL    LV Systolic Volume Index 7.1 mL/m2    LVIDs 1.98 (A) 2.1 - 4.0 cm    LV ESV A2C 101.80 mL    LV Diastolic Volume 30.37 mL    LV ESV A4C 83.77 mL    LV Diastolic Volume Index 17.26 mL/m2    LV EDV A2C 39.723966160796275 mL    LV EDV A4C 42.00 mL    Left Ventricular End Systolic Volume by Teichholz Method 12.49 mL    Left Ventricular End Diastolic Volume by Teichholz Method 30.37 mL    IVS 1.15 (A) 0.6 - 1.1 cm    LVOT diameter 1.85 cm    LVOT area 2.7 cm2    FS 30 28 - 44 %    Left Ventricle Relative Wall Thickness 0.77 cm    Posterior Wall 1.09 0.6 - 1.1 cm    LV mass 90.17 g    LV Mass Index 51 g/m2    MV Peak E Juan M 1.03 m/s    TDI LATERAL 0.06 m/s    TDI SEPTAL 0.06 m/s    E/E' ratio 17.17 m/s    MV Peak A Juan M 1.90 m/s    TR Max Juan M 2.84 m/s    E/A ratio 0.54     E wave deceleration time 431.06 msec    LV SEPTAL E/E' RATIO 17.17 m/s    LV LATERAL E/E' RATIO 17.17 m/s    PV Peak S Juan M 0.52 m/s    PV Peak D Juan M 0.27 m/s    Pulm vein S/D ratio 1.93     LVOT peak juan m 1.02 m/s    Left Ventricular Outflow Tract Mean Velocity 0.77 cm/s    Left Ventricular Outflow Tract Mean Gradient 2.59 mmHg    RV- rangel basal diam 2.7 cm    RV S' 15.99 cm/s    TAPSE 1.93 cm    RV/LV Ratio 1.29 cm    LA size 4.35 cm    Left Atrium Minor Axis 6.11 cm    Left Atrium Major Axis 5.74 cm    LA volume (mod) 94.40 cm3    LA Volume Index (Mod) 53.6 mL/m2    RA Major Axis 5.18 cm    RA Width 2.74 cm    AV mean gradient 17 mmHg    AV peak gradient 27 mmHg    Ao peak juan m 2.59 m/s    Ao VTI 49.10 cm    LVOT peak VTI 21.00 cm    AV valve area 1.15 cm²    AV Velocity Ratio 0.39     AV index (prosthetic) 0.43     ELMIRA by Velocity Ratio 1.06 cm²    MV  mean gradient 7 mmHg    MV peak gradient 18 mmHg    MV stenosis pressure 1/2 time 125.01 ms    MV valve area p 1/2 method 1.76 cm2    MV valve area by continuity eq 1.45 cm2    MV VTI 39.0 cm    Triscuspid Valve Regurgitation Peak Gradient 32 mmHg    PV PEAK VELOCITY 0.94 m/s    PV peak gradient 4 mmHg    Pulmonary Valve Mean Velocity 0.72 m/s    Sinus 2.93 cm    STJ 2.72 cm    Ascending aorta 3.29 cm    IVC diameter 1.30 cm    Mean e' 0.06 m/s    ZLVIDS -3.31     ZLVIDD -5.42     LA area A4C 24.85 cm2    LA area A2C 27.58 cm2    RVDD 3.66 cm    LA Volume Index 58.4 mL/m2    LA volume 102.87 cm3    LA WIDTH 4.7 cm     Assessment and Plan:     Chronic heart failure with preserved ejection fraction (HFpEF)  TTE 11/2023    Left Ventricle: There is concentric remodeling. Normal wall motion. There is normal systolic function. Ejection fraction by visual approximation is 65%. Biplane (2D) method of discs ejection fraction is 60%. Diastolic function cannot be reliably determined in the presence of mitral annular calcification.    Right Ventricle: Normal right ventricular cavity size. Wall thickness is normal. Right ventricle wall motion  is normal. Systolic function is normal.    Left Atrium: Left atrium is severely dilated.    Aortic Valve: Moderately calcified cusps. Moderately restricted motion. There is moderate stenosis. Aortic valve peak velocity is 2.64 m/s. Mean gradient is 20 mmHg. The dimensionless index is 0.29.    Mitral Valve: There is severe bileaflet sclerosis. Moderately calcified subvalvular apparatus.    Tricuspid Valve: There is mild regurgitation.    Pulmonic Valve: There is mild regurgitation.    Pulmonary Artery: The estimated pulmonary artery systolic pressure is 20 mmHg.    IVC/SVC: Normal venous pressure at 3 mmHg.    Repeat TTE pending  Edema noted on exam  Given IV Lasix in the ED- no I&O documented  Resume Bumex 0.5 mg BID  Daily weights  Accurate intake and output  Continue BB and  ARB    Complete heart block  S/p PPM  Followed by EP  Paced per EKG and on tele review     Coronary artery disease involving native coronary artery  Remote history of PCI   Trend troponin - at baseline on admission at 0.1  EKG paced  Continue DAPT, statin, BB, Imdur (added)  TTE pending   Further recs to follow     Chest pain  Patient denies CP  Troponin chronically elevated. 0.1- repeat pending and will trend until peaks   TTE pending   EKG paced  SBP >210 on admission   Chronically on DAPT  Imdur added  Followed by [facility] as OP  Patient with back pain that radiates to neck and headache intermittently x 2-3 mo only when laying down  Further recs to follow troponin trend and TTE         Essential hypertension  SBP >210 on admission - improved at this time  Continue BB and ARB- up titrate PRN        VTE Risk Mitigation (From admission, onward)           Ordered     Reason for No Pharmacological VTE Prophylaxis  Once        Question:  Reasons:  Answer:  Patient is Ambulatory    07/10/24 0632     IP VTE HIGH RISK PATIENT  Once         07/10/24 0632     Place sequential compression device  Until discontinued         07/10/24 0632                    Thank you for your consult. I will follow-up with patient. Please contact us if you have any additional questions.    Mansoor Luis NP  Cardiology   [place] - Emergency Dept

## 2024-07-10 NOTE — HPI
89-year-old female with CAD with this and PCI, CHF/HFpEF, rheumatic mitral stenosis, rheumatic AS,  right ventricular pacemaker for third-degree AV block. Patient presented to the ED with back pain which radiates to her neck and headache which is intermittent and only present when she lays in bed. She denies CP, SOB, diaphoresis, palpitations, PND. + edema to BLE which has been present for several months despite taking Bumex BID. Patient reports compliance with all medications. She lives with family and utilizes a walker. Troponin noted mildly elevated at baseline of 0.1, repeat pending. EKG paced. SBP >210 in the ED, improved at this time. TTE pending.

## 2024-07-10 NOTE — HOSPITAL COURSE
07/10/2024 Per HPI   07/11/2024 LVEF 65% per TTE. No CP overnight. Troponin peaked at 2.1. Diuresing with p.o. Bumex.

## 2024-07-10 NOTE — ASSESSMENT & PLAN NOTE
Eight months ago last echo EF of 60 %   improved from previous few labs  Chest x-ray stable  Given 1 dose of 80 mg Lasix in ED.  Cardiology consulted for eval

## 2024-07-10 NOTE — H&P
[facility] - Emergency Dept  Hospital Medicine  History & Physical    Patient Name: Marycruz Perez  MRN: [id number]  Patient Class: OP- Observation  Admission Date: 7/10/2024  Attending Physician: Genesis Coley*   Primary Care Provider: Melchor Menchaca -         Patient information was obtained from patient and ER records.     Subjective:     Principal Problem:<principal problem not specified>    Chief Complaint:   Chief Complaint   Patient presents with    Back Pain    Shortness of Breath     Patient reports upper back pain and shortness of breath only when she lays down for the past few months. Denies acute symptoms.         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  Patient was brought in by herself for chest pain which has been intermittent over the last several weeks however TIA it became severe.  In ED initial blood pressure with isolated systolic hypertension with 210s/90s. In ED labs remarkable for mild hypokalemia of 3.4.  , troponin 0.154.  Elevated but at baseline.  Bedside echo done which showed a pericardial effusion in ED. Chest x-ray with chronic stable interstitial changes.  Patient given full-dose aspirin, 80 mg Lasix, morphine, topical nitro.  Patient will be admitted to Hospital Medicine will consult Cardiology for eval, trend troponin    Past Medical History:   Diagnosis Date    Acute exacerbation of CHF (congestive heart failure) 10/27/2020    NATHEN (acute kidney injury) 11/30/2023    Hypertension     Syncope and collapse        Past Surgical History:   Procedure Laterality Date    A-V CARDIAC PACEMAKER INSERTION N/A 2/22/2021    Procedure: INSERTION, CARDIAC PACEMAKER, DUAL CHAMBER;  Surgeon: Norman Foote MD;  Location: [facility];  Service: Cardiology;  Laterality: N/A;  AVB, PPM upgrade to Dual PPM (right sided), Bio, Venogram prior to draping, MAC, [place], 3  Prep    CARDIAC CATHETERIZATION      CARDIAC PACEMAKER PLACEMENT      CHOLECYSTECTOMY      CORONARY ANGIOPLASTY      HYSTERECTOMY      REVISION OF SKIN POCKET FOR PACEMAKER  2/22/2021    Procedure: Revision, Skin Pocket, For Cardiac Pacemaker;  Surgeon: Norman Foote MD;  Location: [facility];  Service: Cardiology;;       Review of patient's allergies indicates:   Allergen Reactions    Codeine     Latex, natural rubber     Penicillins     Penicillin Rash       Current Facility-Administered Medications on File Prior to Encounter   Medication    vancomycin in dextrose 5 % 1 gram/250 mL IVPB 1,000 mg     Current Outpatient Medications on File Prior to Encounter   Medication Sig    albuterol (PROVENTIL/VENTOLIN HFA) 90 mcg/actuation inhaler Inhale 2 puffs into the lungs every 4 (four) hours as needed for Wheezing (cough or wheezing). Rescue    albuterol-ipratropium (DUO-NEB) 2.5 mg-0.5 mg/3 mL nebulizer solution every 6 (six) hours as needed for Wheezing.    aspirin 81 MG Chew Take 1 tablet (81 mg total) by mouth once daily.    bumetanide (BUMEX) 0.5 MG Tab Take 1 tablet (0.5 mg total) by mouth 2 (two) times a day.    calcium carbonate (OS-BERE) 600 mg calcium (1,500 mg) Tab Take 600 mg by mouth once daily.     carvediloL (COREG) 12.5 MG tablet Take 1 tablet (12.5 mg total) by mouth 2 (two) times daily with meals.    cetirizine (ZYRTEC) 10 MG tablet Take 10 mg by mouth once daily.    clopidogrel (PLAVIX) 75 mg tablet TK 1 T PO QD    diclofenac sodium (VOLTAREN) 1 % Gel as needed.     donepeziL (ARICEPT) 5 MG tablet Take 5 mg by mouth.    meclizine (ANTIVERT) 25 mg tablet Take 25 mg by mouth 3 (three) times daily as needed.    nitroGLYCERIN (NITROSTAT) 0.4 MG SL tablet Place 1 tablet (0.4 mg total) under the tongue every 5 (five) minutes as needed for Chest pain (angina). (Patient not taking: Reported on 12/4/2023)    ondansetron (ZOFRAN-ODT) 4 MG TbDL Take 1 tablet (4 mg total) by mouth every 6 (six) hours as needed  (nausea). (Patient not taking: Reported on 12/4/2023)    potassium chloride SA (K-DUR,KLOR-CON) 10 MEQ tablet Take 2 tablets (20 mEq total) by mouth once daily.    rosuvastatin (CRESTOR) 5 MG tablet Take 5 mg by mouth every evening.    tamsulosin (FLOMAX) 0.4 mg Cap Take 1 capsule by mouth every evening.    tobramycin sulfate 0.3% (TOBREX) 0.3 % ophthalmic solution INSTILL 1 DROP IN RIGHT EYE FOUR TIMES DAILY    valsartan (DIOVAN) 160 MG tablet Take 160 mg by mouth once daily.    ZINC ACETATE ORAL Take by mouth once daily.      Family History       Problem Relation (Age of Onset)    Liver disease Mother          Tobacco Use    Smoking status: Never    Smokeless tobacco: Never   Substance and Sexual Activity    Alcohol use: No    Drug use: No    Sexual activity: Not on file     Review of Systems   Respiratory:  Positive for chest tightness.    Cardiovascular:  Positive for chest pain.   All other systems reviewed and are negative.    Objective:     Vital Signs (Most Recent):  Temp: 98.1 °F (36.7 °C) (07/10/24 0600)  Pulse: 90 (07/10/24 0600)  Resp: (!) 21 (07/10/24 0600)  BP: 126/66 (07/10/24 0600)  SpO2: (!) 91 % (07/10/24 0600) Vital Signs (24h Range):  Temp:  [98.1 °F (36.7 °C)] 98.1 °F (36.7 °C)  Pulse:  [] 90  Resp:  [18-27] 21  SpO2:  [90 %-96 %] 91 %  BP: (126-213)/(61-96) 126/66     Weight: 81.6 kg (180 lb)  Body mass index is 36.36 kg/m².     Physical Exam  Constitutional:       Appearance: Normal appearance. She is ill-appearing.   HENT:      Head: Normocephalic.      Nose: Nose normal.      Mouth/Throat:      Mouth: Mucous membranes are moist.      Pharynx: Oropharynx is clear.   Eyes:      Pupils: Pupils are equal, round, and reactive to light.   Cardiovascular:      Rate and Rhythm: Normal rate and regular rhythm.      Pulses: Normal pulses.      Heart sounds: Normal heart sounds.   Pulmonary:      Effort: Pulmonary effort is normal.      Breath sounds: Normal breath sounds.   Abdominal:       General: Bowel sounds are normal.      Palpations: Abdomen is soft.   Musculoskeletal:         General: Normal range of motion.      Cervical back: Normal range of motion.   Skin:     General: Skin is warm and dry.      Capillary Refill: Capillary refill takes less than 2 seconds.   Neurological:      General: No focal deficit present.      Mental Status: She is alert and oriented to person, place, and time. Mental status is at baseline.   Psychiatric:         Mood and Affect: Mood normal.         Behavior: Behavior normal.              CRANIAL NERVES     CN III, IV, VI   Pupils are equal, round, and reactive to light.       Significant Labs: All pertinent labs within the past 24 hours have been reviewed.  Recent Lab Results         07/10/24  0308   07/10/24  0239        Albumin 3.0                  ALT <5         Anion Gap 11         AST 15         Baso #   0.12       Basophil %   1.3       BILIRUBIN TOTAL 0.2  Comment: For infants and newborns, interpretation of results should be based  on gestational age, weight and in agreement with clinical  observations.    Premature Infant recommended reference ranges:  Up to 24 hours.............<8.0 mg/dL  Up to 48 hours............<12.0 mg/dL  3-5 days..................<15.0 mg/dL  6-29 days.................<15.0 mg/dL             Comment: Values of less than 100 pg/ml are consistent with non-CHF populations.         BUN 24         Calcium 8.8         Chloride 111         CO2 23         Creatinine 1.0         Differential Method   Automated       eGFR 52         Eos #   0.3       Eos %   3.7       Glucose 119         Gran # (ANC)   3.5       Gran %   37.4       Hematocrit   42.1       Hemoglobin   13.9       Immature Grans (Abs)   0.02  Comment: Mild elevation in immature granulocytes is non specific and   can be seen in a variety of conditions including stress response,   acute inflammation, trauma and pregnancy. Correlation with other   laboratory and  clinical findings is essential.         Immature Granulocytes   0.2       Lymph #   4.5       Lymph %   48.6       MCH   29.9       MCHC   33.0       MCV   91       Mono #   0.8       Mono %   8.8       MPV   12.0       nRBC   0       Platelet Count   151       Potassium 3.4         PROTEIN TOTAL 6.2         RBC   4.65       RDW   13.6       Sodium 145         Troponin I 0.154  Comment: The reference interval for Troponin I represents the 99th percentile   cutoff   for our facility and is consistent with 3rd generation assay   performance.           WBC   9.22               Significant Imaging: I have reviewed all pertinent imaging results/findings within the past 24 hours.  I have reviewed and interpreted all pertinent imaging results/findings within the past 24 hours.  Assessment/Plan:     Chronic heart failure with preserved ejection fraction (HFpEF)  Eight months ago last echo EF of 60 %   improved from previous few labs  Chest x-ray stable  Given 1 dose of 80 mg Lasix in ED.  Cardiology consulted for eval      Pacemaker-dependent  Patient 100% paced  Placed in the past for third-degree block    Chest pain  EKG without acute changes  Initial troponin 0.154, trend.  This was baseline back since 2023.    Noted to have small pericardial effusion per ED MD  Chest x-ray with chronic stable changes    Given aspirin, Lasix, morphine, topical nitro  Consult Cardiology for eval  Trend troponin      Essential hypertension  Chronic, uncontrolled. Latest blood pressure and vitals reviewed-     Temp:  [98.1 °F (36.7 °C)]   Pulse:  []   Resp:  [18-27]   BP: (126-213)/(61-96)   SpO2:  [90 %-96 %] .   Home meds for hypertension were reviewed and noted below.   Hypertension Medications               bumetanide (BUMEX) 0.5 MG Tab Take 1 tablet (0.5 mg total) by mouth 2 (two) times a day.    carvediloL (COREG) 12.5 MG tablet Take 1 tablet (12.5 mg total) by mouth 2 (two) times daily with meals.    nitroGLYCERIN  (NITROSTAT) 0.4 MG SL tablet Place 1 tablet (0.4 mg total) under the tongue every 5 (five) minutes as needed for Chest pain (angina).    valsartan (DIOVAN) 160 MG tablet Take 160 mg by mouth once daily.            While in the hospital, will manage blood pressure as follows; Continue home antihypertensive regimen    Will utilize p.r.n. blood pressure medication only if patient's blood pressure greater than 180/110 and she develops symptoms such as worsening chest pain or shortness of breath.      VTE Risk Mitigation (From admission, onward)           Ordered     Reason for No Pharmacological VTE Prophylaxis  Once        Question:  Reasons:  Answer:  Patient is Ambulatory    07/10/24 0632     IP VTE HIGH RISK PATIENT  Once         07/10/24 0632     Place sequential compression device  Until discontinued         07/10/24 0632                         On 07/10/2024, patient should be placed in hospital observation services under my care in collaboration with Dr. Loomis.           Tonny Palafox NP  Department of Hospital Medicine  [place] - Emergency Dept

## 2024-07-10 NOTE — SUBJECTIVE & OBJECTIVE
Past Medical History:   Diagnosis Date    Acute exacerbation of CHF (congestive heart failure) 10/27/2020    NATHEN (acute kidney injury) 11/30/2023    Hypertension     Syncope and collapse        Past Surgical History:   Procedure Laterality Date    A-V CARDIAC PACEMAKER INSERTION N/A 2/22/2021    Procedure: INSERTION, CARDIAC PACEMAKER, DUAL CHAMBER;  Surgeon: Norman Foote MD;  Location: [facility];  Service: Cardiology;  Laterality: N/A;  AVB, PPM upgrade to Dual PPM (right sided), Bio, Venogram prior to draping, MAC, [place], 3 Prep    CARDIAC CATHETERIZATION      CARDIAC PACEMAKER PLACEMENT      CHOLECYSTECTOMY      CORONARY ANGIOPLASTY      HYSTERECTOMY      REVISION OF SKIN POCKET FOR PACEMAKER  2/22/2021    Procedure: Revision, Skin Pocket, For Cardiac Pacemaker;  Surgeon: Noramn Foote MD;  Location: [facility];  Service: Cardiology;;       Review of patient's allergies indicates:   Allergen Reactions    Codeine     Latex, natural rubber     Penicillins     Penicillin Rash       Current Facility-Administered Medications on File Prior to Encounter   Medication    vancomycin in dextrose 5 % 1 gram/250 mL IVPB 1,000 mg     Current Outpatient Medications on File Prior to Encounter   Medication Sig    albuterol (PROVENTIL/VENTOLIN HFA) 90 mcg/actuation inhaler Inhale 2 puffs into the lungs every 4 (four) hours as needed for Wheezing (cough or wheezing). Rescue    albuterol-ipratropium (DUO-NEB) 2.5 mg-0.5 mg/3 mL nebulizer solution every 6 (six) hours as needed for Wheezing.    aspirin 81 MG Chew Take 1 tablet (81 mg total) by mouth once daily.    bumetanide (BUMEX) 0.5 MG Tab Take 1 tablet (0.5 mg total) by mouth 2 (two) times a day.    calcium carbonate (OS-BERE) 600 mg calcium (1,500 mg) Tab Take 600 mg by mouth once daily.     carvediloL (COREG) 12.5 MG tablet Take 1 tablet (12.5 mg total) by mouth 2 (two) times daily with meals.    cetirizine (ZYRTEC) 10 MG tablet Take 10 mg by mouth once daily.    clopidogrel  (PLAVIX) 75 mg tablet TK 1 T PO QD    diclofenac sodium (VOLTAREN) 1 % Gel as needed.     donepeziL (ARICEPT) 5 MG tablet Take 5 mg by mouth.    meclizine (ANTIVERT) 25 mg tablet Take 25 mg by mouth 3 (three) times daily as needed.    nitroGLYCERIN (NITROSTAT) 0.4 MG SL tablet Place 1 tablet (0.4 mg total) under the tongue every 5 (five) minutes as needed for Chest pain (angina). (Patient not taking: Reported on 12/4/2023)    ondansetron (ZOFRAN-ODT) 4 MG TbDL Take 1 tablet (4 mg total) by mouth every 6 (six) hours as needed (nausea). (Patient not taking: Reported on 12/4/2023)    potassium chloride SA (K-DUR,KLOR-CON) 10 MEQ tablet Take 2 tablets (20 mEq total) by mouth once daily.    rosuvastatin (CRESTOR) 5 MG tablet Take 5 mg by mouth every evening.    tamsulosin (FLOMAX) 0.4 mg Cap Take 1 capsule by mouth every evening.    tobramycin sulfate 0.3% (TOBREX) 0.3 % ophthalmic solution INSTILL 1 DROP IN RIGHT EYE FOUR TIMES DAILY    valsartan (DIOVAN) 160 MG tablet Take 160 mg by mouth once daily.    ZINC ACETATE ORAL Take by mouth once daily.      Family History       Problem Relation (Age of Onset)    Liver disease Mother          Tobacco Use    Smoking status: Never    Smokeless tobacco: Never   Substance and Sexual Activity    Alcohol use: No    Drug use: No    Sexual activity: Not on file     Review of Systems   Respiratory:  Positive for chest tightness.    Cardiovascular:  Positive for chest pain.   All other systems reviewed and are negative.    Objective:     Vital Signs (Most Recent):  Temp: 98.1 °F (36.7 °C) (07/10/24 0600)  Pulse: 90 (07/10/24 0600)  Resp: (!) 21 (07/10/24 0600)  BP: 126/66 (07/10/24 0600)  SpO2: (!) 91 % (07/10/24 0600) Vital Signs (24h Range):  Temp:  [98.1 °F (36.7 °C)] 98.1 °F (36.7 °C)  Pulse:  [] 90  Resp:  [18-27] 21  SpO2:  [90 %-96 %] 91 %  BP: (126-213)/(61-96) 126/66     Weight: 81.6 kg (180 lb)  Body mass index is 36.36 kg/m².     Physical Exam  Constitutional:        Appearance: Normal appearance. She is ill-appearing.   HENT:      Head: Normocephalic.      Nose: Nose normal.      Mouth/Throat:      Mouth: Mucous membranes are moist.      Pharynx: Oropharynx is clear.   Eyes:      Pupils: Pupils are equal, round, and reactive to light.   Cardiovascular:      Rate and Rhythm: Normal rate and regular rhythm.      Pulses: Normal pulses.      Heart sounds: Normal heart sounds.   Pulmonary:      Effort: Pulmonary effort is normal.      Breath sounds: Normal breath sounds.   Abdominal:      General: Bowel sounds are normal.      Palpations: Abdomen is soft.   Musculoskeletal:         General: Normal range of motion.      Cervical back: Normal range of motion.   Skin:     General: Skin is warm and dry.      Capillary Refill: Capillary refill takes less than 2 seconds.   Neurological:      General: No focal deficit present.      Mental Status: She is alert and oriented to person, place, and time. Mental status is at baseline.   Psychiatric:         Mood and Affect: Mood normal.         Behavior: Behavior normal.              CRANIAL NERVES     CN III, IV, VI   Pupils are equal, round, and reactive to light.       Significant Labs: All pertinent labs within the past 24 hours have been reviewed.  Recent Lab Results         07/10/24  0308   07/10/24  0239        Albumin 3.0                  ALT <5         Anion Gap 11         AST 15         Baso #   0.12       Basophil %   1.3       BILIRUBIN TOTAL 0.2  Comment: For infants and newborns, interpretation of results should be based  on gestational age, weight and in agreement with clinical  observations.    Premature Infant recommended reference ranges:  Up to 24 hours.............<8.0 mg/dL  Up to 48 hours............<12.0 mg/dL  3-5 days..................<15.0 mg/dL  6-29 days.................<15.0 mg/dL             Comment: Values of less than 100 pg/ml are consistent with non-CHF populations.         BUN 24         Calcium  8.8         Chloride 111         CO2 23         Creatinine 1.0         Differential Method   Automated       eGFR 52         Eos #   0.3       Eos %   3.7       Glucose 119         Gran # (ANC)   3.5       Gran %   37.4       Hematocrit   42.1       Hemoglobin   13.9       Immature Grans (Abs)   0.02  Comment: Mild elevation in immature granulocytes is non specific and   can be seen in a variety of conditions including stress response,   acute inflammation, trauma and pregnancy. Correlation with other   laboratory and clinical findings is essential.         Immature Granulocytes   0.2       Lymph #   4.5       Lymph %   48.6       MCH   29.9       MCHC   33.0       MCV   91       Mono #   0.8       Mono %   8.8       MPV   12.0       nRBC   0       Platelet Count   151       Potassium 3.4         PROTEIN TOTAL 6.2         RBC   4.65       RDW   13.6       Sodium 145         Troponin I 0.154  Comment: The reference interval for Troponin I represents the 99th percentile   cutoff   for our facility and is consistent with 3rd generation assay   performance.           WBC   9.22               Significant Imaging: I have reviewed all pertinent imaging results/findings within the past 24 hours.  I have reviewed and interpreted all pertinent imaging results/findings within the past 24 hours.

## 2024-07-10 NOTE — ED NOTES
Pt c/o being hungry, reports wants to be allowed to eat, admit team messaged  Pt family member to desk, expressing anger and annoyance that admit team has not been to room recently, admit team messaged

## 2024-07-10 NOTE — ASSESSMENT & PLAN NOTE
Remote history of PCI   Trend troponin - at baseline on admission at 0.1  EKG paced  Continue DAPT, statin, BB, Imdur (added)  TTE pending   Further recs to follow

## 2024-07-10 NOTE — ED PROVIDER NOTES
Encounter Date: 7/10/2024       History     Chief Complaint   Patient presents with    Back Pain    Shortness of Breath     Patient reports upper back pain and shortness of breath only when she lays down for the past few months. Denies acute symptoms.      HPI  89-year-old female with history CAD with this and PCI, CHF/HFpEF, rheumatic mitral stenosis, right ventricular pacemaker for third-degree AV block, presents brought in by self through triage for chest pain, chest pain has been intermittent for last several months but tonight became severe, throughout the entirety of the chest both front and back, not exacerbated or by anything in particular.  Denies any other systemic or infectious symptoms or other acute complaints.  She has been admitted for this several months ago, at the time it was determined to likely be heart failure related.  Review of patient's allergies indicates:   Allergen Reactions    Codeine     Latex, natural rubber     Penicillins     Penicillin Rash     Past Medical History:   Diagnosis Date    Acute exacerbation of CHF (congestive heart failure) 10/27/2020    NATHEN (acute kidney injury) 11/30/2023    Hypertension     Syncope and collapse      Past Surgical History:   Procedure Laterality Date    A-V CARDIAC PACEMAKER INSERTION N/A 2/22/2021    Procedure: INSERTION, CARDIAC PACEMAKER, DUAL CHAMBER;  Surgeon: Norman Foote MD;  Location: [facility] EP LAB;  Service: Cardiology;  Laterality: N/A;  AVB, PPM upgrade to Dual PPM (right sided), Bio, Venogram prior to draping, MAC, AR, 3 Prep    CARDIAC CATHETERIZATION      CARDIAC PACEMAKER PLACEMENT      CHOLECYSTECTOMY      CORONARY ANGIOPLASTY      HYSTERECTOMY      REVISION OF SKIN POCKET FOR PACEMAKER  2/22/2021    Procedure: Revision, Skin Pocket, For Cardiac Pacemaker;  Surgeon: Norman Foote MD;  Location: [facility] EP LAB;  Service: Cardiology;;     Family History   Problem Relation Name Age of Onset    Liver disease Mother       Social History      Tobacco Use    Smoking status: Never    Smokeless tobacco: Never   Substance Use Topics    Alcohol use: No    Drug use: No   Medical, surgical, family, and social history reviewed and considered medical decision-making  Review of Systems  Complete review of systems was conducted and was negative except as per HPI and as marked  Physical Exam     Initial Vitals [07/10/24 0228]   BP Pulse Resp Temp SpO2   (!) 213/96 104 (!) 22 98.1 °F (36.7 °C) 96 %      MAP       --         Physical Exam  Physical  General: Awake, alert, no acute distress  Head: Normocephalic, atraumatic  Neck: Trachea midline, full range of motion, no meningismus  ENT: Atraumatic, Airway Patent  Cardio: Regular Rate, Regular Rhythm, mixed systolic and diastolic murmurs, Capillary refill normal  Chest: Atraumatic, No respiratory distress no use of accessory muscles to breath, normal rate, sounds even and clear to auscultation  Abdomen: Soft, Nontender, no involuntary guarding, rigidity, or rebound.  Upper Ext: Atraumatic, Inspection normal, no swelling  Lower Ext: Atraumatic, extensive bilateral lip edema and findings of chronic venous insufficiency, with some superimposed pitting edema.    Neuro: No gross cranial nerve abnormality, no lateralization, no gross sensory or motor deficits    ED Course   Procedures  Labs Reviewed   CBC W/ AUTO DIFFERENTIAL - Abnormal; Notable for the following components:       Result Value    Gran % 37.4 (*)     Lymph % 48.6 (*)     All other components within normal limits   B-TYPE NATRIURETIC PEPTIDE - Abnormal; Notable for the following components:     (*)     All other components within normal limits   COMPREHENSIVE METABOLIC PANEL - Abnormal; Notable for the following components:    Potassium 3.4 (*)     Chloride 111 (*)     Glucose 119 (*)     Albumin 3.0 (*)     ALT <5 (*)     eGFR 52 (*)     All other components within normal limits   TROPONIN I - Abnormal; Notable for the following components:     Troponin I 0.154 (*)     All other components within normal limits     EKG Readings: (Independently Interpreted)   Rate 105, rhythm ventricular paced, with paced axis and intervals , does not meet modified Sgarbossa criteria       Imaging Results              X-Ray Chest AP Portable (Final result)  Result time 07/10/24 03:33:32      Final result by Kathi Stanton MD (07/10/24 03:33:32)                   Impression:      Please see above.      Electronically signed by: Kathi Stanton MD  Date:    07/10/2024  Time:    03:33               Narrative:    EXAMINATION:  XR CHEST AP PORTABLE    CLINICAL HISTORY:  Dyspnea, unspecified    TECHNIQUE:  Single frontal view of the chest was performed.    COMPARISON:  11/30/2023    FINDINGS:  Cardiac monitoring leads overlie the chest.  There is a stably positioned right-sided cardiac pacing device in place.  The cardiomediastinal silhouette is unchanged in size and configuration noting atherosclerosis of the thoracic aorta.  There is calcification of the mitral annulus.  The lungs are symmetrically expanded with diffuse coarse interstitial attenuation similar to prior study.  Thin linear opacity overlying the left mid lung zone suggestive of atelectasis and/or scarring.  No definite confluent airspace consolidation, substantial volume of pleural fluid or pneumothorax.  Osseous structures intact with degenerative change.                                       Medications   nitroGLYCERIN 2% TD oint ointment 1 inch (1 inch Topical (Top) Given 7/10/24 0240)   morphine injection 4 mg (4 mg Intravenous Given 7/10/24 0240)   furosemide injection 80 mg (80 mg Intravenous Given 7/10/24 0239)   aspirin tablet 325 mg (325 mg Oral Given 7/10/24 0257)     Medical Decision Making  Amount and/or Complexity of Data Reviewed  Labs: ordered.  Radiology: ordered.    Risk  OTC drugs.  Prescription drug management.    89-year-old female with history CAD with this and PCI, CHF/HFpEF, rheumatic mitral  stenosis, right ventricular pacemaker for third-degree AV block, presents brought in by self through triage for chest pain, chest pain has been intermittent for last several months but tonight became severe, throughout the entirety of the chest both front and back, not exacerbated or by anything in particular.  Denies any other systemic or infectious symptoms or other acute complaints.  She has been admitted for this several months ago, at the time it was determined to likely be heart failure related.  Will evaluate for acute pathology requiring intervention with appropriate studies, treat symptomatically, and reassess.               Studies reviewed.  She does have a small pericardial effusion on pocus no findings of tamponade.  Troponin is elevated but at baseline.  Given that she has in the highest risk category based on heart score and BUZZ for both Mace, death and disability, patient will be admitted on observation status, for further evaluation and treatment including telemetry, troponin trending, etcetera.  Aspirin given.  Patient informed and agrees with the admission.                   Clinical Impression:  Final diagnoses:  [R07.9] Chest pain  [R06.00] Dyspnea  [I31.39] Pericardial effusion (Primary)  [R79.89] Elevated troponin          ED Disposition Condition    Observation Stable                Gigi Pace MD  07/10/24 [id number]

## 2024-07-10 NOTE — ASSESSMENT & PLAN NOTE
TTE 11/2023    Left Ventricle: There is concentric remodeling. Normal wall motion. There is normal systolic function. Ejection fraction by visual approximation is 65%. Biplane (2D) method of discs ejection fraction is 60%. Diastolic function cannot be reliably determined in the presence of mitral annular calcification.    Right Ventricle: Normal right ventricular cavity size. Wall thickness is normal. Right ventricle wall motion  is normal. Systolic function is normal.    Left Atrium: Left atrium is severely dilated.    Aortic Valve: Moderately calcified cusps. Moderately restricted motion. There is moderate stenosis. Aortic valve peak velocity is 2.64 m/s. Mean gradient is 20 mmHg. The dimensionless index is 0.29.    Mitral Valve: There is severe bileaflet sclerosis. Moderately calcified subvalvular apparatus.    Tricuspid Valve: There is mild regurgitation.    Pulmonic Valve: There is mild regurgitation.    Pulmonary Artery: The estimated pulmonary artery systolic pressure is 20 mmHg.    IVC/SVC: Normal venous pressure at 3 mmHg.    Repeat TTE pending  Edema noted on exam  Given IV Lasix in the ED- no I&O documented  Resume Bumex 0.5 mg BID  Daily weights  Accurate intake and output  Continue BB and ARB

## 2024-07-10 NOTE — ASSESSMENT & PLAN NOTE
Chronic, uncontrolled. Latest blood pressure and vitals reviewed-     Temp:  [98.1 °F (36.7 °C)]   Pulse:  []   Resp:  [18-27]   BP: (126-213)/(61-96)   SpO2:  [90 %-96 %] .   Home meds for hypertension were reviewed and noted below.   Hypertension Medications               bumetanide (BUMEX) 0.5 MG Tab Take 1 tablet (0.5 mg total) by mouth 2 (two) times a day.    carvediloL (COREG) 12.5 MG tablet Take 1 tablet (12.5 mg total) by mouth 2 (two) times daily with meals.    nitroGLYCERIN (NITROSTAT) 0.4 MG SL tablet Place 1 tablet (0.4 mg total) under the tongue every 5 (five) minutes as needed for Chest pain (angina).    valsartan (DIOVAN) 160 MG tablet Take 160 mg by mouth once daily.            While in the hospital, will manage blood pressure as follows; Continue home antihypertensive regimen    Will utilize p.r.n. blood pressure medication only if patient's blood pressure greater than 180/110 and she develops symptoms such as worsening chest pain or shortness of breath.

## 2024-07-10 NOTE — ASSESSMENT & PLAN NOTE
Patient denies CP  Troponin chronically elevated. 0.1- repeat pending and will trend until peaks   TTE pending   EKG paced  SBP >210 on admission   Chronically on DAPT  Imdur added  Followed by [facility] as OP  Patient with back pain that radiates to neck and headache intermittently x 2-3 mo only when laying down  Further recs to follow troponin trend and TTE

## 2024-07-10 NOTE — SUBJECTIVE & OBJECTIVE
Past Medical History:   Diagnosis Date    Acute exacerbation of CHF (congestive heart failure) 10/27/2020    NATHEN (acute kidney injury) 11/30/2023    Hypertension     Syncope and collapse        Past Surgical History:   Procedure Laterality Date    A-V CARDIAC PACEMAKER INSERTION N/A 2/22/2021    Procedure: INSERTION, CARDIAC PACEMAKER, DUAL CHAMBER;  Surgeon: Norman Foote MD;  Location: [facility] EP LAB;  Service: Cardiology;  Laterality: N/A;  AVB, PPM upgrade to Dual PPM (right sided), Bio, Venogram prior to draping, MAC, CO, 3 Prep    CARDIAC CATHETERIZATION      CARDIAC PACEMAKER PLACEMENT      CHOLECYSTECTOMY      CORONARY ANGIOPLASTY      HYSTERECTOMY      REVISION OF SKIN POCKET FOR PACEMAKER  2/22/2021    Procedure: Revision, Skin Pocket, For Cardiac Pacemaker;  Surgeon: Norman Foote MD;  Location: [facility] EP LAB;  Service: Cardiology;;       Review of patient's allergies indicates:   Allergen Reactions    Codeine     Latex, natural rubber     Penicillins     Penicillin Rash       Current Facility-Administered Medications on File Prior to Encounter   Medication    vancomycin in dextrose 5 % 1 gram/250 mL IVPB 1,000 mg     Current Outpatient Medications on File Prior to Encounter   Medication Sig    albuterol (PROVENTIL/VENTOLIN HFA) 90 mcg/actuation inhaler Inhale 2 puffs into the lungs every 4 (four) hours as needed for Wheezing (cough or wheezing). Rescue    albuterol-ipratropium (DUO-NEB) 2.5 mg-0.5 mg/3 mL nebulizer solution every 6 (six) hours as needed for Wheezing.    aspirin 81 MG Chew Take 1 tablet (81 mg total) by mouth once daily.    bumetanide (BUMEX) 0.5 MG Tab Take 1 tablet (0.5 mg total) by mouth 2 (two) times a day.    calcium carbonate (OS-BERE) 600 mg calcium (1,500 mg) Tab Take 600 mg by mouth once daily.     carvediloL (COREG) 12.5 MG tablet Take 1 tablet (12.5 mg total) by mouth 2 (two) times daily with meals.    cetirizine (ZYRTEC) 10 MG tablet Take 10 mg by mouth once daily.    clopidogrel  (PLAVIX) 75 mg tablet TK 1 T PO QD    diclofenac sodium (VOLTAREN) 1 % Gel as needed.     donepeziL (ARICEPT) 5 MG tablet Take 5 mg by mouth.    meclizine (ANTIVERT) 25 mg tablet Take 25 mg by mouth 3 (three) times daily as needed.    nitroGLYCERIN (NITROSTAT) 0.4 MG SL tablet Place 1 tablet (0.4 mg total) under the tongue every 5 (five) minutes as needed for Chest pain (angina). (Patient not taking: Reported on 12/4/2023)    ondansetron (ZOFRAN-ODT) 4 MG TbDL Take 1 tablet (4 mg total) by mouth every 6 (six) hours as needed (nausea). (Patient not taking: Reported on 12/4/2023)    potassium chloride SA (K-DUR,KLOR-CON) 10 MEQ tablet Take 2 tablets (20 mEq total) by mouth once daily.    rosuvastatin (CRESTOR) 5 MG tablet Take 5 mg by mouth every evening.    tamsulosin (FLOMAX) 0.4 mg Cap Take 1 capsule by mouth every evening.    tobramycin sulfate 0.3% (TOBREX) 0.3 % ophthalmic solution INSTILL 1 DROP IN RIGHT EYE FOUR TIMES DAILY    valsartan (DIOVAN) 160 MG tablet Take 160 mg by mouth once daily.    ZINC ACETATE ORAL Take by mouth once daily.      Family History       Problem Relation (Age of Onset)    Liver disease Mother          Tobacco Use    Smoking status: Never    Smokeless tobacco: Never   Substance and Sexual Activity    Alcohol use: No    Drug use: No    Sexual activity: Not on file     Review of Systems   Constitutional: Negative. Negative for diaphoresis.   HENT: Negative.     Cardiovascular:  Positive for leg swelling. Negative for chest pain, irregular heartbeat, near-syncope, palpitations, paroxysmal nocturnal dyspnea and syncope.   Respiratory:  Negative for cough and shortness of breath.    Endocrine: Negative.    Hematologic/Lymphatic: Negative.    Musculoskeletal:  Positive for back pain and myalgias.   Gastrointestinal: Negative.  Negative for nausea and vomiting.   Genitourinary: Negative.    Neurological:  Positive for weakness. Negative for dizziness and light-headedness.    Psychiatric/Behavioral: Negative.       Objective:     Vital Signs (Most Recent):  Temp: 97.8 °F (36.6 °C) (07/10/24 0923)  Pulse: 80 (07/10/24 0923)  Resp: 18 (07/10/24 0923)  BP: 124/71 (07/10/24 0923)  SpO2: (!) 94 % (07/10/24 0923) Vital Signs (24h Range):  Temp:  [97.4 °F (36.3 °C)-98.1 °F (36.7 °C)] 97.8 °F (36.6 °C)  Pulse:  [] 80  Resp:  [18-27] 18  SpO2:  [90 %-96 %] 94 %  BP: (122-213)/(61-96) 124/71     Weight: 81.6 kg (180 lb)  Body mass index is 36.36 kg/m².    SpO2: (!) 94 %         Intake/Output Summary (Last 24 hours) at 7/10/2024 0939  Last data filed at 7/10/2024 0800  Gross per 24 hour   Intake --   Output 750 ml   Net -750 ml       Lines/Drains/Airways       Drain  Duration             Female External Urinary Catheter w/ Suction 07/10/24 0240 <1 day              Peripheral Intravenous Line  Duration                  Peripheral IV - Single Lumen 07/10/24 0229 20 G Posterior;Right Wrist <1 day                     Physical Exam  Constitutional:       General: She is not in acute distress.     Appearance: She is not diaphoretic.   HENT:      Head: Atraumatic.   Eyes:      General:         Right eye: No discharge.         Left eye: No discharge.   Cardiovascular:      Rate and Rhythm: Normal rate and regular rhythm.   Pulmonary:      Effort: Pulmonary effort is normal.      Breath sounds: Normal breath sounds. No rales.   Abdominal:      General: Bowel sounds are normal.      Palpations: Abdomen is soft.   Skin:     General: Skin is warm and dry.   Neurological:      Mental Status: She is alert. Mental status is at baseline.   Psychiatric:         Mood and Affect: Mood normal.         Thought Content: Thought content normal.         Judgment: Judgment normal.          Significant Labs: BMP:   Recent Labs   Lab 07/10/24  0308   *      K 3.4*   *   CO2 23   BUN 24   CREATININE 1.0   CALCIUM 8.8   , CMP   Recent Labs   Lab 07/10/24  0308      K 3.4*   *   CO2 23  "  *   BUN 24   CREATININE 1.0   CALCIUM 8.8   PROT 6.2   ALBUMIN 3.0*   BILITOT 0.2   ALKPHOS 100   AST 15   ALT <5*   ANIONGAP 11   , CBC   Recent Labs   Lab 07/10/24  0239   WBC 9.22   HGB 13.9   HCT 42.1      , INR No results for input(s): "INR", "PROTIME" in the last 48 hours., Lipid Panel No results for input(s): "CHOL", "HDL", "LDLCALC", "TRIG", "CHOLHDL" in the last 48 hours., Troponin   Recent Labs   Lab 07/10/24  0308   TROPONINI 0.154*   , and All pertinent lab results from the last 24 hours have been reviewed.    Significant Imaging: Echocardiogram: Transthoracic echo (TTE) complete (Cupid Only):   Results for orders placed or performed during the hospital encounter of 07/10/24   Echo   Result Value Ref Range    BSA 1.84 m2    Garcia's Biplane MOD Ejection Fraction 63 %    A2C EF 64 %    A4C EF 60 %    LVOT stroke volume 56.42 cm3    LVIDd 2.83 (A) 3.5 - 6.0 cm    LV Systolic Volume 12.49 mL    LV Systolic Volume Index 7.1 mL/m2    LVIDs 1.98 (A) 2.1 - 4.0 cm    LV ESV A2C 101.80 mL    LV Diastolic Volume 30.37 mL    LV ESV A4C 83.77 mL    LV Diastolic Volume Index 17.26 mL/m2    LV EDV A2C [id number] mL    LV EDV A4C 42.00 mL    Left Ventricular End Systolic Volume by Teichholz Method 12.49 mL    Left Ventricular End Diastolic Volume by Teichholz Method 30.37 mL    IVS 1.15 (A) 0.6 - 1.1 cm    LVOT diameter 1.85 cm    LVOT area 2.7 cm2    FS 30 28 - 44 %    Left Ventricle Relative Wall Thickness 0.77 cm    Posterior Wall 1.09 0.6 - 1.1 cm    LV mass 90.17 g    LV Mass Index 51 g/m2    MV Peak E Juan M 1.03 m/s    TDI LATERAL 0.06 m/s    TDI SEPTAL 0.06 m/s    E/E' ratio 17.17 m/s    MV Peak A Juan M 1.90 m/s    TR Max Juan M 2.84 m/s    E/A ratio 0.54     E wave deceleration time 431.06 msec    LV SEPTAL E/E' RATIO 17.17 m/s    LV LATERAL E/E' RATIO 17.17 m/s    PV Peak S Juan M 0.52 m/s    PV Peak D Juan M 0.27 m/s    Pulm vein S/D ratio 1.93     LVOT peak juan m 1.02 m/s    Left Ventricular " Outflow Tract Mean Velocity 0.77 cm/s    Left Ventricular Outflow Tract Mean Gradient 2.59 mmHg    RV- rangel basal diam 2.7 cm    RV S' 15.99 cm/s    TAPSE 1.93 cm    RV/LV Ratio 1.29 cm    LA size 4.35 cm    Left Atrium Minor Axis 6.11 cm    Left Atrium Major Axis 5.74 cm    LA volume (mod) 94.40 cm3    LA Volume Index (Mod) 53.6 mL/m2    RA Major Axis 5.18 cm    RA Width 2.74 cm    AV mean gradient 17 mmHg    AV peak gradient 27 mmHg    Ao peak gabo 2.59 m/s    Ao VTI 49.10 cm    LVOT peak VTI 21.00 cm    AV valve area 1.15 cm²    AV Velocity Ratio 0.39     AV index (prosthetic) 0.43     ELMIRA by Velocity Ratio 1.06 cm²    MV mean gradient 7 mmHg    MV peak gradient 18 mmHg    MV stenosis pressure 1/2 time 125.01 ms    MV valve area p 1/2 method 1.76 cm2    MV valve area by continuity eq 1.45 cm2    MV VTI 39.0 cm    Triscuspid Valve Regurgitation Peak Gradient 32 mmHg    PV PEAK VELOCITY 0.94 m/s    PV peak gradient 4 mmHg    Pulmonary Valve Mean Velocity 0.72 m/s    Sinus 2.93 cm    STJ 2.72 cm    Ascending aorta 3.29 cm    IVC diameter 1.30 cm    Mean e' 0.06 m/s    ZLVIDS -3.31     ZLVIDD -5.42     LA area A4C 24.85 cm2    LA area A2C 27.58 cm2    RVDD 3.66 cm    LA Volume Index 58.4 mL/m2    LA volume 102.87 cm3    LA WIDTH 4.7 cm

## 2024-07-10 NOTE — ASSESSMENT & PLAN NOTE
EKG without acute changes  Initial troponin 0.154, trend.  This was baseline back since 2023.    Noted to have small pericardial effusion per ED MD  Chest x-ray with chronic stable changes    Given aspirin, Lasix, morphine, topical nitro  Consult Cardiology for eval  Trend troponin

## 2024-07-10 NOTE — PHARMACY MED REC
"  [facility]           Pharmacy  Admission Medication History     The home medication history was taken by Janice Moreno.      Medication history obtained from Medications listed below were obtained from: Patient/family and Medications brought from home    Based on information gathered for medication list, you may go to "Admission" then "Reconcile Home Medications" tabs to review and/or act upon those items.     The home medication list has been updated by the Pharmacy department.   Please read ALL comments highlighted in yellow.   Please address this information as you see fit.    Feel free to contact us if you have any questions or require assistance.    The medications listed below were removed from the home medication list.  Please reorder if appropriate:    Patient reports NOT TAKING the following medication(s):  Zyrtec 10mg  Zoloft 25mg  Zofran odt 4mg  Flomax 0.4mg  Tobrex opht sol        Current Facility-Administered Medications on File Prior to Encounter   Medication Dose Route Frequency Provider Last Rate Last Admin    vancomycin in dextrose 5 % 1 gram/250 mL IVPB 1,000 mg  1,000 mg Intravenous On Call Procedure Rocío Blevins, NP   1,000 mg at 02/22/21 0947     Current Outpatient Medications on File Prior to Encounter   Medication Sig Dispense Refill    bumetanide (BUMEX) 0.5 MG Tab Take 1 tablet (0.5 mg total) by mouth 2 (two) times a day. (Patient taking differently: Take 1 mg by mouth once daily. 2 tablets) 60 tablet 3    carvediloL (COREG) 12.5 MG tablet Take 1 tablet (12.5 mg total) by mouth 2 (two) times daily with meals. 60 tablet 11    clopidogrel (PLAVIX) 75 mg tablet Take 75 mg by mouth once daily.  2    magnesium oxide (MAG-OX) 400 mg (241.3 mg magnesium) tablet Take 400 mg by mouth 2 (two) times daily.      meclizine (ANTIVERT) 25 mg tablet Take 25 mg by mouth 3 (three) times daily as needed (vertigo).      metoprolol succinate (TOPROL-XL) 25 MG 24 hr tablet Take 25 mg " by mouth once daily.      valsartan (DIOVAN) 160 MG tablet Take 160 mg by mouth once daily.      aspirin 81 MG Chew Take 1 tablet (81 mg total) by mouth once daily. 30 tablet 2    calcium carbonate (OS-BERE) 600 mg calcium (1,500 mg) Tab Take 600 mg by mouth once daily.       donepeziL (ARICEPT) 5 MG tablet Take 5 mg by mouth.      metoprolol succinate (TOPROL-XL) 100 MG 24 hr tablet Take 100 mg by mouth once daily.      nitroGLYCERIN (NITROSTAT) 0.4 MG SL tablet Place 1 tablet (0.4 mg total) under the tongue every 5 (five) minutes as needed for Chest pain (angina). (Patient not taking: Reported on 12/4/2023) 30 tablet 1    ZINC ACETATE ORAL Take by mouth once daily.          Please address this information as you see fit.  Feel free to contact us if you have any questions or require assistance.    Janice Moreno  [phone]                .

## 2024-07-11 LAB
ANION GAP SERPL CALC-SCNC: 8 MMOL/L (ref 8–16)
BASOPHILS # BLD AUTO: 0.09 K/UL (ref 0–0.2)
BASOPHILS NFR BLD: 1.2 % (ref 0–1.9)
BUN SERPL-MCNC: 27 MG/DL (ref 10–30)
CALCIUM SERPL-MCNC: 9 MG/DL (ref 8.7–10.5)
CHLORIDE SERPL-SCNC: 107 MMOL/L (ref 95–110)
CO2 SERPL-SCNC: 26 MMOL/L (ref 23–29)
CREAT SERPL-MCNC: 1.1 MG/DL (ref 0.5–1.4)
DIFFERENTIAL METHOD BLD: ABNORMAL
EOSINOPHIL # BLD AUTO: 0.4 K/UL (ref 0–0.5)
EOSINOPHIL NFR BLD: 5.2 % (ref 0–8)
ERYTHROCYTE [DISTWIDTH] IN BLOOD BY AUTOMATED COUNT: 13.8 % (ref 11.5–14.5)
EST. GFR  (NO RACE VARIABLE): 47 ML/MIN/1.73 M^2
GLUCOSE SERPL-MCNC: 101 MG/DL (ref 70–110)
HCT VFR BLD AUTO: 35.2 % (ref 37–48.5)
HGB BLD-MCNC: 11.4 G/DL (ref 12–16)
IMM GRANULOCYTES # BLD AUTO: 0.02 K/UL (ref 0–0.04)
IMM GRANULOCYTES NFR BLD AUTO: 0.3 % (ref 0–0.5)
LYMPHOCYTES # BLD AUTO: 2.2 K/UL (ref 1–4.8)
LYMPHOCYTES NFR BLD: 29.8 % (ref 18–48)
MCH RBC QN AUTO: 29.6 PG (ref 27–31)
MCHC RBC AUTO-ENTMCNC: 32.4 G/DL (ref 32–36)
MCV RBC AUTO: 91 FL (ref 82–98)
MONOCYTES # BLD AUTO: 0.7 K/UL (ref 0.3–1)
MONOCYTES NFR BLD: 9.5 % (ref 4–15)
NEUTROPHILS # BLD AUTO: 4 K/UL (ref 1.8–7.7)
NEUTROPHILS NFR BLD: 54 % (ref 38–73)
NRBC BLD-RTO: 0 /100 WBC
PLATELET # BLD AUTO: 174 K/UL (ref 150–450)
PMV BLD AUTO: 11.4 FL (ref 9.2–12.9)
POTASSIUM SERPL-SCNC: 4 MMOL/L (ref 3.5–5.1)
RBC # BLD AUTO: 3.85 M/UL (ref 4–5.4)
SODIUM SERPL-SCNC: 141 MMOL/L (ref 136–145)
TROPONIN I SERPL DL<=0.01 NG/ML-MCNC: 1.02 NG/ML (ref 0–0.03)
TROPONIN I SERPL DL<=0.01 NG/ML-MCNC: 1.26 NG/ML (ref 0–0.03)
TROPONIN I SERPL DL<=0.01 NG/ML-MCNC: 1.52 NG/ML (ref 0–0.03)
WBC # BLD AUTO: 7.45 K/UL (ref 3.9–12.7)

## 2024-07-11 PROCEDURE — 25000003 PHARM REV CODE 250: Performed by: INTERNAL MEDICINE

## 2024-07-11 PROCEDURE — 99213 OFFICE O/P EST LOW 20 MIN: CPT | Mod: ,,, | Performed by: NURSE PRACTITIONER

## 2024-07-11 PROCEDURE — 97535 SELF CARE MNGMENT TRAINING: CPT

## 2024-07-11 PROCEDURE — 97530 THERAPEUTIC ACTIVITIES: CPT

## 2024-07-11 PROCEDURE — 25000003 PHARM REV CODE 250: Performed by: REGISTERED NURSE

## 2024-07-11 PROCEDURE — 84484 ASSAY OF TROPONIN QUANT: CPT | Mod: 91 | Performed by: INTERNAL MEDICINE

## 2024-07-11 PROCEDURE — 99900035 HC TECH TIME PER 15 MIN (STAT)

## 2024-07-11 PROCEDURE — 97165 OT EVAL LOW COMPLEX 30 MIN: CPT

## 2024-07-11 PROCEDURE — G0378 HOSPITAL OBSERVATION PER HR: HCPCS

## 2024-07-11 PROCEDURE — 97161 PT EVAL LOW COMPLEX 20 MIN: CPT

## 2024-07-11 PROCEDURE — 94761 N-INVAS EAR/PLS OXIMETRY MLT: CPT

## 2024-07-11 PROCEDURE — 25000003 PHARM REV CODE 250: Performed by: NURSE PRACTITIONER

## 2024-07-11 PROCEDURE — 80048 BASIC METABOLIC PNL TOTAL CA: CPT | Performed by: REGISTERED NURSE

## 2024-07-11 PROCEDURE — 36415 COLL VENOUS BLD VENIPUNCTURE: CPT | Performed by: INTERNAL MEDICINE

## 2024-07-11 PROCEDURE — 85025 COMPLETE CBC W/AUTO DIFF WBC: CPT | Performed by: REGISTERED NURSE

## 2024-07-11 PROCEDURE — 36415 COLL VENOUS BLD VENIPUNCTURE: CPT | Performed by: REGISTERED NURSE

## 2024-07-11 PROCEDURE — 84484 ASSAY OF TROPONIN QUANT: CPT | Performed by: INTERNAL MEDICINE

## 2024-07-11 RX ORDER — ISOSORBIDE MONONITRATE 30 MG/1
30 TABLET, EXTENDED RELEASE ORAL DAILY
Qty: 30 TABLET | Refills: 11 | Status: SHIPPED | OUTPATIENT
Start: 2024-07-12 | End: 2024-07-11

## 2024-07-11 RX ORDER — BUMETANIDE 0.5 MG/1
0.5 TABLET ORAL DAILY
Status: DISCONTINUED | OUTPATIENT
Start: 2024-07-12 | End: 2024-07-12 | Stop reason: HOSPADM

## 2024-07-11 RX ORDER — ISOSORBIDE MONONITRATE 30 MG/1
30 TABLET, EXTENDED RELEASE ORAL DAILY
Qty: 30 TABLET | Refills: 11 | Status: SHIPPED | OUTPATIENT
Start: 2024-07-12 | End: 2025-07-12

## 2024-07-11 RX ADMIN — CLOPIDOGREL BISULFATE 75 MG: 75 TABLET ORAL at 08:07

## 2024-07-11 RX ADMIN — ISOSORBIDE MONONITRATE 30 MG: 30 TABLET, EXTENDED RELEASE ORAL at 08:07

## 2024-07-11 RX ADMIN — CARVEDILOL 12.5 MG: 6.25 TABLET, FILM COATED ORAL at 08:07

## 2024-07-11 RX ADMIN — ASPIRIN 81 MG CHEWABLE TABLET 81 MG: 81 TABLET CHEWABLE at 08:07

## 2024-07-11 RX ADMIN — CARVEDILOL 12.5 MG: 6.25 TABLET, FILM COATED ORAL at 04:07

## 2024-07-11 RX ADMIN — BUMETANIDE 1 MG: 1 TABLET ORAL at 08:07

## 2024-07-11 NOTE — ASSESSMENT & PLAN NOTE
Patient denies CP  TTE with normal LVEF  EKG paced  SBP >210 on admission   Chronically on DAPT  Imdur added  Followed by [facility] as OP  No further IP work up is indicated at this time  Pain in setting of hypertensive emergency

## 2024-07-11 NOTE — NURSING
Patient reports dizziness and diaphoretic while on the bathroom, unable to obtain B sitting. Dizziness also reported with standing. Vital signs stable when patient back in bed lying. Notified Dr. Euceda.

## 2024-07-11 NOTE — SUBJECTIVE & OBJECTIVE
Past Medical History:   Diagnosis Date    Acute exacerbation of CHF (congestive heart failure) 10/27/2020    NATHEN (acute kidney injury) 11/30/2023    Hypertension     Syncope and collapse        Past Surgical History:   Procedure Laterality Date    A-V CARDIAC PACEMAKER INSERTION N/A 2/22/2021    Procedure: INSERTION, CARDIAC PACEMAKER, DUAL CHAMBER;  Surgeon: Norman Foote MD;  Location: [facility] EP LAB;  Service: Cardiology;  Laterality: N/A;  AVB, PPM upgrade to Dual PPM (right sided), Bio, Venogram prior to draping, MAC, IN, 3 Prep    CARDIAC CATHETERIZATION      CARDIAC PACEMAKER PLACEMENT      CHOLECYSTECTOMY      CORONARY ANGIOPLASTY      HYSTERECTOMY      REVISION OF SKIN POCKET FOR PACEMAKER  2/22/2021    Procedure: Revision, Skin Pocket, For Cardiac Pacemaker;  Surgeon: Norman Foote MD;  Location: [facility] EP LAB;  Service: Cardiology;;       Review of patient's allergies indicates:   Allergen Reactions    Codeine     Latex, natural rubber     Penicillins     Penicillin Rash       Current Facility-Administered Medications on File Prior to Encounter   Medication    vancomycin in dextrose 5 % 1 gram/250 mL IVPB 1,000 mg     Current Outpatient Medications on File Prior to Encounter   Medication Sig    bumetanide (BUMEX) 0.5 MG Tab Take 1 tablet (0.5 mg total) by mouth 2 (two) times a day. (Patient taking differently: Take 1 mg by mouth once daily. 2 tablets)    carvediloL (COREG) 12.5 MG tablet Take 1 tablet (12.5 mg total) by mouth 2 (two) times daily with meals.    clopidogrel (PLAVIX) 75 mg tablet Take 75 mg by mouth once daily.    magnesium oxide (MAG-OX) 400 mg (241.3 mg magnesium) tablet Take 400 mg by mouth 2 (two) times daily.    meclizine (ANTIVERT) 25 mg tablet Take 25 mg by mouth 3 (three) times daily as needed (vertigo).    metoprolol succinate (TOPROL-XL) 25 MG 24 hr tablet Take 25 mg by mouth once daily.    valsartan (DIOVAN) 160 MG tablet Take 160 mg by mouth once daily.     albuterol (PROVENTIL/VENTOLIN HFA) 90 mcg/actuation inhaler Inhale 2 puffs into the lungs every 4 (four) hours as needed for Wheezing (cough or wheezing). Rescue (Patient not taking: Reported on 7/10/2024)    albuterol-ipratropium (DUO-NEB) 2.5 mg-0.5 mg/3 mL nebulizer solution every 6 (six) hours as needed for Wheezing.    aspirin 81 MG Chew Take 1 tablet (81 mg total) by mouth once daily.    calcium carbonate (OS-BERE) 600 mg calcium (1,500 mg) Tab Take 600 mg by mouth once daily.     donepeziL (ARICEPT) 5 MG tablet Take 5 mg by mouth.    metoprolol succinate (TOPROL-XL) 100 MG 24 hr tablet Take 100 mg by mouth once daily.    nitroGLYCERIN (NITROSTAT) 0.4 MG SL tablet Place 1 tablet (0.4 mg total) under the tongue every 5 (five) minutes as needed for Chest pain (angina). (Patient not taking: Reported on 12/4/2023)    potassium chloride SA (K-DUR,KLOR-CON) 10 MEQ tablet Take 2 tablets (20 mEq total) by mouth once daily. (Patient not taking: Reported on 7/10/2024)    rosuvastatin (CRESTOR) 5 MG tablet Take 5 mg by mouth every evening.    ZINC ACETATE ORAL Take by mouth once daily.      Family History       Problem Relation (Age of Onset)    Liver disease Mother          Tobacco Use    Smoking status: Never    Smokeless tobacco: Never   Substance and Sexual Activity    Alcohol use: No    Drug use: No    Sexual activity: Not on file     Review of Systems   Constitutional: Negative. Negative for diaphoresis.   HENT: Negative.     Cardiovascular:  Positive for leg swelling. Negative for chest pain, irregular heartbeat, near-syncope, palpitations, paroxysmal nocturnal dyspnea and syncope.   Respiratory:  Negative for cough and shortness of breath.    Endocrine: Negative.    Hematologic/Lymphatic: Negative.    Musculoskeletal:  Positive for back pain and myalgias.   Gastrointestinal: Negative.  Negative for nausea and vomiting.   Genitourinary: Negative.    Neurological:  Positive for weakness. Negative for  dizziness and light-headedness.   Psychiatric/Behavioral: Negative.       Objective:     Vital Signs (Most Recent):  Temp: 97.5 °F (36.4 °C) (07/11/24 0811)  Pulse: 77 (07/11/24 0833)  Resp: 18 (07/11/24 0811)  BP: (!) 145/65 (07/11/24 0811)  SpO2: (!) 92 % (07/11/24 0833) Vital Signs (24h Range):  Temp:  [97.4 °F (36.3 °C)-97.7 °F (36.5 °C)] 97.5 °F (36.4 °C)  Pulse:  [63-77] 77  Resp:  [16-19] 18  SpO2:  [91 %-94 %] 92 %  BP: ()/(53-65) 145/65     Weight: 81.6 kg (180 lb)  Body mass index is 36.36 kg/m².    SpO2: (!) 92 %         Intake/Output Summary (Last 24 hours) at 7/11/2024 1121  Last data filed at 7/11/2024 0502  Gross per 24 hour   Intake --   Output 650 ml   Net -650 ml       Lines/Drains/Airways       Drain  Duration             Female External Urinary Catheter w/ Suction 07/10/24 0240 1 day              Peripheral Intravenous Line  Duration                  Peripheral IV - Single Lumen 07/10/24 0229 20 G Posterior;Right Wrist 1 day                     Physical Exam  Constitutional:       General: She is not in acute distress.     Appearance: She is not diaphoretic.   HENT:      Head: Atraumatic.   Eyes:      General:         Right eye: No discharge.         Left eye: No discharge.   Cardiovascular:      Rate and Rhythm: Normal rate and regular rhythm.   Pulmonary:      Effort: Pulmonary effort is normal.      Breath sounds: Normal breath sounds. No rales.   Abdominal:      General: Bowel sounds are normal.      Palpations: Abdomen is soft.   Skin:     General: Skin is warm and dry.   Neurological:      Mental Status: She is alert. Mental status is at baseline.   Psychiatric:         Mood and Affect: Mood normal.         Thought Content: Thought content normal.         Judgment: Judgment normal.          Significant Labs: BMP:   Recent Labs   Lab 07/10/24  0308 07/11/24  0431   * 101    141   K 3.4* 4.0   * 107   CO2 23 26   BUN 24 27   CREATININE 1.0 1.1   CALCIUM 8.8 9.0   ,  "CMP   Recent Labs   Lab 07/10/24  0308 07/11/24  0431    141   K 3.4* 4.0   * 107   CO2 23 26   * 101   BUN 24 27   CREATININE 1.0 1.1   CALCIUM 8.8 9.0   PROT 6.2  --    ALBUMIN 3.0*  --    BILITOT 0.2  --    ALKPHOS 100  --    AST 15  --    ALT <5*  --    ANIONGAP 11 8   , CBC   Recent Labs   Lab 07/10/24  0239 07/11/24  0431   WBC 9.22 7.45   HGB 13.9 11.4*   HCT 42.1 35.2*    174   , INR No results for input(s): "INR", "PROTIME" in the last 48 hours., Lipid Panel No results for input(s): "CHOL", "HDL", "LDLCALC", "TRIG", "CHOLHDL" in the last 48 hours., Troponin   Recent Labs   Lab 07/10/24  1347 07/11/24  0018 07/11/24 0431   TROPONINI 2.114* 1.525* 1.258*   , and All pertinent lab results from the last 24 hours have been reviewed.    Significant Imaging: Echocardiogram: Transthoracic echo (TTE) complete (Cupid Only):   Results for orders placed or performed during the hospital encounter of 07/10/24   Echo   Result Value Ref Range    BSA 1.84 m2    Garcia's Biplane MOD Ejection Fraction 63 %    A2C EF 64 %    A4C EF 60 %    LVOT stroke volume 56.42 cm3    LVIDd 2.83 (A) 3.5 - 6.0 cm    LV Systolic Volume 12.49 mL    LV Systolic Volume Index 7.1 mL/m2    LVIDs 1.98 (A) 2.1 - 4.0 cm    LV ESV A2C 101.80 mL    LV Diastolic Volume 30.37 mL    LV ESV A4C 83.77 mL    LV Diastolic Volume Index 17.26 mL/m2    LV EDV A2C 39.778862532916096 mL    LV EDV A4C 42.00 mL    Left Ventricular End Systolic Volume by Teichholz Method 12.49 mL    Left Ventricular End Diastolic Volume by Teichholz Method 30.37 mL    IVS 1.15 (A) 0.6 - 1.1 cm    LVOT diameter 1.85 cm    LVOT area 2.7 cm2    FS 30 28 - 44 %    Left Ventricle Relative Wall Thickness 0.77 cm    Posterior Wall 1.09 0.6 - 1.1 cm    LV mass 90.17 g    LV Mass Index 51 g/m2    MV Peak E Juan M 1.03 m/s    TDI LATERAL 0.06 m/s    TDI SEPTAL 0.06 m/s    E/E' ratio 17.17 m/s    MV Peak A Juan M 1.90 m/s    TR Max Juan M 2.84 m/s    E/A ratio 0.54     E wave " deceleration time 431.06 msec    LV SEPTAL E/E' RATIO 17.17 m/s    LV LATERAL E/E' RATIO 17.17 m/s    PV Peak S Juan M 0.52 m/s    PV Peak D Juan M 0.27 m/s    Pulm vein S/D ratio 1.93     LVOT peak juan m 1.02 m/s    Left Ventricular Outflow Tract Mean Velocity 0.77 cm/s    Left Ventricular Outflow Tract Mean Gradient 2.59 mmHg    RV- rangel basal diam 2.7 cm    RV S' 15.99 cm/s    TAPSE 1.93 cm    RV/LV Ratio 1.29 cm    LA size 4.35 cm    Left Atrium Minor Axis 6.11 cm    Left Atrium Major Axis 5.74 cm    LA volume (mod) 94.40 cm3    LA Volume Index (Mod) 53.6 mL/m2    RA Major Axis 5.18 cm    RA Width 2.74 cm    AV mean gradient 17 mmHg    AV peak gradient 27 mmHg    Ao peak juan m 2.59 m/s    Ao VTI 49.10 cm    LVOT peak VTI 21.00 cm    AV valve area 1.15 cm²    AV Velocity Ratio 0.39     AV index (prosthetic) 0.43     ELMIRA by Velocity Ratio 1.06 cm²    MV mean gradient 7 mmHg    MV peak gradient 18 mmHg    MV stenosis pressure 1/2 time 125.01 ms    MV valve area p 1/2 method 1.76 cm2    MV valve area by continuity eq 1.45 cm2    MV VTI 39.0 cm    Triscuspid Valve Regurgitation Peak Gradient 32 mmHg    PV PEAK VELOCITY 0.94 m/s    PV peak gradient 4 mmHg    Pulmonary Valve Mean Velocity 0.72 m/s    Sinus 2.93 cm    STJ 2.72 cm    Ascending aorta 3.29 cm    IVC diameter 1.30 cm    Mean e' 0.06 m/s    ZLVIDS -3.31     ZLVIDD -5.42     LA area A4C 24.85 cm2    LA area A2C 27.58 cm2    RVDD 3.66 cm    LA Volume Index 58.4 mL/m2    LA volume 102.87 cm3    LA WIDTH 4.7 cm    TV resting pulmonary artery pressure 35 mmHg    RV TB RVSP 6 mmHg    Est. RA pres 3 mmHg    Mitral Valve Heart Rate 81 bpm    Narrative      Left Ventricle: Normal wall thickness. There is concentric remodeling.   There is normal systolic function with a visually estimated ejection   fraction of 65 - 70%. Biplane (2D) method of discs ejection fraction is   63%. Diastolic function cannot be reliably determined in the presence of   mitral annular  calcification.    Right Ventricle: Normal right ventricular cavity size. Wall thickness   is normal. Systolic function is normal.    Left Atrium: Left atrium is severely dilated.    Aortic Valve: There is moderate aortic valve sclerosis. Moderately   restricted motion. There is mild to moderate stenosis. Aortic valve area   by VTI is 1.15 cm². Aortic valve peak velocity is 2.59 m/s. Mean gradient   is 17 mmHg. The dimensionless index is 0.43.    Mitral Valve: There is moderate anterior leaflet sclerosis. Moderately   thickened leaflets. There is severe mitral annular calcification present.   There is mild to moderate stenosis. The mean pressure gradient across the   mitral valve is 7 mmHg at a heart rate of 81 bpm. There is mild   regurgitation.    Tricuspid Valve: There is mild regurgitation.    Pulmonic Valve: There is mild regurgitation.    Pulmonary Artery: The estimated pulmonary artery systolic pressure is   35 mmHg.    IVC/SVC: Normal venous pressure at 3 mmHg.

## 2024-07-11 NOTE — PLAN OF CARE
Problem: Occupational Therapy  Goal: Occupational Therapy Goal  Outcome: Met   Pt found on toilet w/ dgtr in room & agreeable to OT eval/tx this date.  Pt w/o c/o pain & perf the following:  -standing via GB & RW w/ CGA for toileting w/ Mod A for clothing mgmt  -fxnl mobility via RW w/ CGA & vc's for proximity  -returned to supine w/ Mod A for BLEs  Edu/tx re: general safety techs & HEP.    Per OT brie, pt at baseline fxnl status & no further acute OT svcs indicated at this time. DC OT.

## 2024-07-11 NOTE — PLAN OF CARE
SOCIAL WORK DISCHARGE PLANNING ASSESSMENT    SALAZAR completed discharge planning assessment with pt's daughter Lily ([phone]). Lily was easily engaged and education on the role of  was provided. Lily reported that pt lives with her and her grandson. Lily reported pt has good support from family and friends and advised that she will provide assistance as needed after returning home. Lily reported that pt has the following DME: rw, bsc, wc, and shower chair. Lily reported pt is not current with  services. Lily reported that she drives pt to doctor appointments and she will provide transportation home following discharge. No needs for community resources were reported. Lily was encouraged to call with any questions or concerns. Lily verbalized understanding.     Patient Active Problem List   Diagnosis    Cardiac pacemaker    S/P PTCA (percutaneous transluminal coronary angioplasty)    Essential hypertension    Rheumatic aortic stenosis    Mild mitral regurgitation    Localized edema    Rheumatic mitral stenosis    Chest pain    Leg pain    Coronary artery disease involving native coronary artery    Complete heart block    Pacemaker-dependent    Hypokalemia    Near syncope    Chronic heart failure with preserved ejection fraction (HFpEF)    Vasovagal syncope    PAD (peripheral artery disease)    Dysuria    Hyperglycemia    Morbid (severe) obesity due to excess calories    Elevated troponin      07/11/24 1006   Discharge Assessment   Assessment Type Discharge Planning Assessment   Confirmed/corrected address, phone number and insurance Yes   Confirmed Demographics Correct on Facesheet   Source of Information family  (pt's daughter Lily [phone])   Communicated ANA with patient/caregiver Date not available/Unable to determine   Reason For Admission chest pain   People in Home child(chris), adult;grandchild(chris)   Facility Arrived From: home   Do you expect to return to your current living  situation? Yes   Do you have help at home or someone to help you manage your care at home? Yes   Who are your caregiver(s) and their phone number(s)? pt's daughter Lily [phone]   Walking or Climbing Stairs Difficulty yes   Walking or Climbing Stairs ambulation difficulty, requires equipment   Dressing/Bathing Difficulty yes   Dressing/Bathing bathing difficulty, requires equipment   Home Accessibility wheelchair accessible   Home Layout Able to live on 1st floor   Equipment Currently Used at Home walker, rolling;wheelchair;bedside commode;shower chair   Readmission within 30 days? No   Patient currently being followed by outpatient case management? No   Do you currently have service(s) that help you manage your care at home? No   Do you take prescription medications? Yes   Do you have prescription coverage? Yes   Coverage [employer]   Do you have any problems affording any of your prescribed medications? No   Is the patient taking medications as prescribed? yes   Who is going to help you get home at discharge? pt's daughter Lily [phone]   How do you get to doctors appointments? family or friend will provide   Are you on dialysis? No   Do you take coumadin? No   Discharge Plan A Home with family   Discharge Plan B Home Health   DME Needed Upon Discharge    (TBD)   Discharge Plan discussed with: Adult children   Transition of Care Barriers None   Physical Activity   On average, how many days per week do you engage in moderate to strenuous exercise (like a brisk walk)? 0 days   On average, how many minutes do you engage in exercise at this level? 0 min   Financial Resource Strain   How hard is it for you to pay for the very basics like food, housing, medical care, and heating? Not hard   Housing Stability   In the last 12 months, was there a time when you were not able to pay the mortgage or rent on time? N   At any time in the past 12 months, were you homeless or living in a shelter (including  now)? N   Transportation Needs   Has the lack of transportation kept you from medical appointments, meetings, work or from getting things needed for daily living? No   Food Insecurity   Within the past 12 months, you worried that your food would run out before you got the money to buy more. Never true   Within the past 12 months, the food you bought just didn't last and you didn't have money to get more. Never true   Stress   Do you feel stress - tense, restless, nervous, or anxious, or unable to sleep at night because your mind is troubled all the time - these days? Pt Unable   Social Isolation   How often do you feel lonely or isolated from those around you?  Never   Alcohol Use   Q1: How often do you have a drink containing alcohol? Never   Q2: How many drinks containing alcohol do you have on a typical day when you are drinking? None   Q3: How often do you have six or more drinks on one occasion? Never   Utilities   In the past 12 months has the electric, gas, oil, or water company threatened to shut off services in your home? No   Health Literacy   How often do you need to have someone help you when you read instructions, pamphlets, or other written material from your doctor or pharmacy? Never   OTHER   Name(s) of People in Home pt's daughter Lily [phone] and pt's grandson

## 2024-07-11 NOTE — PT/OT/SLP EVAL
Occupational Therapy   Evaluation and Discharge Note    Name: Marycruz Perez  MRN: [id number]  Admitting Diagnosis: Chest pain  Recent Surgery: * No surgery found *      Recommendations:     Discharge Recommendations: Low Intensity Therapy  Discharge Equipment Recommendations: none  Barriers to discharge:  None    Assessment:      Per OT eval, pt at baseline fxnl status & no further acute OT svcs indicated at this time. DC OT.    Marycruz Perez is a 89 y.o. female with a medical diagnosis of Chest pain. At this time, patient is functioning at their prior level of function and does not require further acute OT services.     Plan:     During this hospitalization, patient does not require further acute OT services.  Please re-consult if situation changes.    Plan of Care Reviewed with: patient, daughter    Subjective     Chief Complaint: chest pain   Patient/Family Comments/goals: return home    Occupational Profile:  Living Environment: w/ dgtr/grdson in SSH w/ THE; WIS w/ GB & blt in seat  Previous level of function: required varying degrees of Asst w/ all fxnl tasks  Roles and Routines: homebound  Equipment Used at home: walker, rolling, rollator, bedside commode, wheelchair, grab bar  Assistance upon Discharge: fly    Pain/Comfort:  Pain Rating 1: 0/10  Pain Rating Post-Intervention 1: 0/10    Patients cultural, spiritual, Denominational conflicts given the current situation:      Objective:     Communicated with: nsg prior to session.  Patient found  on toilet  with peripheral IV, telemetry upon OT entry to room.    General Precautions: Standard, fall  Orthopedic Precautions: N/A  Braces: N/A  Respiratory Status: Room air     Occupational Performance:    Bed Mobility:    Patient completed Sit to Supine with moderate assistance and with leg lift    Functional Mobility/Transfers:  Patient completed Sit <> Stand Transfer with contact guard assistance  with  rolling walker and grab bars(s)   Patient completed Toilet Transfer  Step Transfer technique with contact guard assistance with  rolling walker and grab bars  Functional Mobility: via RW w/ CGA & vc's for proximity    Activities of Daily Living:  Toileting: moderate assistance for clothing mgmt    Cognitive/Visual Perceptual:  AO4    No signif deficits noted    Physical Exam:  Farhana WFL     Sit balance:F+  Stand balance: F-    AMPAC 6 Click ADL:  AMPAC Total Score: 17    Treatment & Education:   Pt found on toilet w/ dgtr in room & agreeable to OT eval/tx this date.  Pt w/o c/o pain & perf the following:  -standing via GB & RW w/ CGA for toileting w/ Mod A for clothing mgmt  -fxnl mobility via RW w/ CGA & vc's for proximity  -returned to supine w/ Mod A for BLEs  Edu/tx re: general safety techs & HEP.    Patient left HOB elevated with all lines intact, call button in reach, bed alarm on, nsg notified, and dgtr present    GOALS:   Multidisciplinary Problems       Occupational Therapy Goals       Not on file              Multidisciplinary Problems (Resolved)          Problem: Occupational Therapy    Goal Priority Disciplines Outcome Interventions   Occupational Therapy Goal   (Resolved)     OT, PT/OT Met                        History:     Past Medical History:   Diagnosis Date    Acute exacerbation of CHF (congestive heart failure) 10/27/2020    NATHEN (acute kidney injury) 11/30/2023    Hypertension     Syncope and collapse          Past Surgical History:   Procedure Laterality Date    A-V CARDIAC PACEMAKER INSERTION N/A 2/22/2021    Procedure: INSERTION, CARDIAC PACEMAKER, DUAL CHAMBER;  Surgeon: Norman Foote MD;  Location: [facility] EP [facility];  Service: Cardiology;  Laterality: N/A;  AVB, PPM upgrade to Dual PPM (right sided), Bio, Venogram prior to draping, MAC, SC, 3 Prep    CARDIAC CATHETERIZATION      CARDIAC PACEMAKER PLACEMENT      CHOLECYSTECTOMY      CORONARY ANGIOPLASTY      HYSTERECTOMY      REVISION OF SKIN POCKET FOR PACEMAKER  2/22/2021    Procedure: Revision, Skin Pocket, For  Cardiac Pacemaker;  Surgeon: Norman Foote MD;  Location: [facility];  Service: Cardiology;;       Time Tracking:     OT Date of Treatment: 07/11/24  OT Start Time: 1157  OT Stop Time: 1226  OT Total Time (min): 29 min    Billable Minutes:Evaluation 10  Self Care/Home Management 19  Total Time 29    7/11/2024

## 2024-07-11 NOTE — PT/OT/SLP EVAL
Physical Therapy Evaluation and Treatment    Patient Name:  Marycruz Perez   MRN:  [id number]    Recommendations:     Discharge Recommendations: Low Intensity Therapy (HH PT with continued 24/7 daughter assistance)   Discharge Equipment Recommendations: none   Barriers to discharge:  limited functional endurance    Assessment:     Marycruz Perez is a 89 y.o. female admitted with a medical diagnosis of Chest pain.  She presents with the following impairments/functional limitations: weakness, impaired endurance, impaired self care skills, impaired functional mobility, gait instability, impaired balance, decreased lower extremity function, edema, impaired cardiopulmonary response to activity.      PT evaluation completed. Pt's PLOF: MOD I with use of RW; daughter assist with ADLs including: dressing, bathing back/feet, and meal prep, as well as toileting as needed. Pt at this time requires MIN A with bed mobility and transfers to standing/few steps to bedside chair with use of RW. Therapy will continue to progress pt as able.      Rehab Prognosis: Good; patient would benefit from acute skilled PT services to address these deficits and reach maximum level of function.    Recent Surgery: * No surgery found *      Plan:     During this hospitalization, patient to be seen 3 x/week to address the identified rehab impairments via gait training, therapeutic activities, therapeutic exercises, neuromuscular re-education and progress toward the following goals:    Plan of Care Expires:  08/11/24    Subjective     Chief Complaint: mild dizziness upon sitting up  Patient/Family Comments/goals: to increase functional independence  Pain/Comfort:  Pain Rating 1: 0/10  Pain Rating Post-Intervention 1: 0/10    Patients cultural, spiritual, [employer]ec[employer]alism conflicts given the current situation: no    Living Environment:  Lives with daughter and grandson in 1 story home with threshold step to enter.   Prior to admission, patients level of  function was MOD I with use of RW; assistance per daughter with ADLs including dressing, back/BLE bathing, on occasion to assist with standing from chair.  Equipment used at home: walker, rolling, rollator, bedside commode (transport chair; built in shower chair).  DME owned (not currently used): none.  Upon discharge, patient will have assistance from daughter (per daughter she can assist 24/7).    Objective:     Communicated with Nurse prior to session.  Patient found HOB elevated with bed alarm, telemetry, PureWick  upon PT entry to room.    General Precautions: Standard, fall  Orthopedic Precautions:N/A   Braces: N/A  Respiratory Status: Room air    Exams:  Cognitive Exam:  Patient is oriented to Person, Place, Time, and Situation  Sensation:    -       Intact  light/touch to BLE  Noted BLE swelling; per daughter this is improved from baseline  RLE ROM: WFL  RLE Strength: WFL  LLE ROM: WFL  LLE Strength: WFL    Functional Mobility:  Bed Mobility:     Supine to Sit: minimum assistance with HOB elevated and increased time  Sit to Supine: MOD assistance with use of BLE  Transfers:     Sit to Stand:  minimum assistance with rolling walker  Bed to Chair: minimum assistance with  rolling walker  using  Step Transfer  Gait: ~5ft forward/lateral steps with use of RW and MIN A      AM-PAC 6 CLICK MOBILITY  Total Score:17       Treatment & Education:  Pt/daughter educated on role of PT.   Pt/daughter decline need for  services; able to understand/communicate in English.  Pt requires verbal cues for safety/sequencing and body positioning/postural improvement with functional mobility as well as cues for hand placement with use of RW.   Pt has noted wheezing during session which improves with seated rest breaks. SpO2 on RA 92-94%.   BP sitting /68; BP post first standing trial with return to sitting 122/67 (mild dizziness reported); BP post transfers to bedside chair 123/68 (resolved dizziness reported).    Pt has no complaints of pain throughout session.   Educated to call for staff assistance when ready to return to bed; pt/daughter understanding of education provided.       Patient left up in chair with all lines intact, call button in reach, chair alarm on, Nurse notified, and daughter present.    GOALS:   Multidisciplinary Problems       Physical Therapy Goals          Problem: Physical Therapy    Goal Priority Disciplines Outcome Goal Variances Interventions   Physical Therapy Goal     PT, PT/OT Progressing     Description: Goals to be met by: 24    Patient will increase functional independence with mobility by performin. Supine to sit with Stand-by Assistance  2. Sit to supine with Stand-by Assistance  3. Sit to stand transfer with Stand-by Assistance with use of RW.   4. Bed to chair transfer with Stand-by Assistance using Rolling Walker  5. Gait  x 50 feet with Stand-by Assistance using Rolling Walker.                          History:     Past Medical History:   Diagnosis Date    Acute exacerbation of CHF (congestive heart failure) 10/27/2020    NATHEN (acute kidney injury) 2023    Hypertension     Syncope and collapse        Past Surgical History:   Procedure Laterality Date    A-V CARDIAC PACEMAKER INSERTION N/A 2021    Procedure: INSERTION, CARDIAC PACEMAKER, DUAL CHAMBER;  Surgeon: Norman Foote MD;  Location: [facility] EP LAB;  Service: Cardiology;  Laterality: N/A;  AVB, PPM upgrade to Dual PPM (right sided), Bio, Venogram prior to draping, MAC, [place], 3 Prep    CARDIAC CATHETERIZATION      CARDIAC PACEMAKER PLACEMENT      CHOLECYSTECTOMY      CORONARY ANGIOPLASTY      HYSTERECTOMY      REVISION OF SKIN POCKET FOR PACEMAKER  2021    Procedure: Revision, Skin Pocket, For Cardiac Pacemaker;  Surgeon: Norman Foote MD;  Location: [facility] EP LAB;  Service: Cardiology;;       Time Tracking:     PT Received On: 24  PT Start Time: 1112     PT Stop Time: 1135  PT Total Time (min): 23 min      Billable Minutes: Evaluation 10 and Therapeutic Activity 13      07/11/2024

## 2024-07-11 NOTE — PLAN OF CARE
Problem: Adult Inpatient Plan of Care  Goal: Plan of Care Review  Outcome: Progressing  VIRTUAL NURSE:  Labs, notes, orders, and careplan reviewed.

## 2024-07-11 NOTE — PLAN OF CARE
D/c orders noted, no DME, no HH.     Pt's daughter Lily will provide transportation home following discharge.     Pt is cleared to go from CM standpoint.     12:55p.m. SW was informed discharge is cancelled for today.        07/11/24 1239   Final Note   Assessment Type Final Discharge Note   Anticipated Discharge Disposition Home   Post-Acute Status   Discharge Delays None known at this time

## 2024-07-11 NOTE — PROGRESS NOTES
[facility] - Telemetry  Cardiology  Progress Note    Patient Name: Marycruz Perez  MRN: [id number]  Admission Date: 7/10/2024  Hospital Length of Stay: 0 days  Code Status: Full Code   Attending Physician: Addie Euceda MD   Primary Care Physician: Melchor Menchaca -  Expected Discharge Date:   Principal Problem:Chest pain    Subjective:     Hospital Course:   07/10/2024 Per HPI   07/11/2024 LVEF 65% per TTE. No CP overnight. Troponin peaked at 2.1. Diuresing with p.o. Bumex.     Past Medical History:   Diagnosis Date    Acute exacerbation of CHF (congestive heart failure) 10/27/2020    NATHEN (acute kidney injury) 11/30/2023    Hypertension     Syncope and collapse        Past Surgical History:   Procedure Laterality Date    A-V CARDIAC PACEMAKER INSERTION N/A 2/22/2021    Procedure: INSERTION, CARDIAC PACEMAKER, DUAL CHAMBER;  Surgeon: Norman Foote MD;  Location: [facility] EP LAB;  Service: Cardiology;  Laterality: N/A;  AVB, PPM upgrade to Dual PPM (right sided), Bio, Venogram prior to draping, MAC, [place], 3 Prep    CARDIAC CATHETERIZATION      CARDIAC PACEMAKER PLACEMENT      CHOLECYSTECTOMY      CORONARY ANGIOPLASTY      HYSTERECTOMY      REVISION OF SKIN POCKET FOR PACEMAKER  2/22/2021    Procedure: Revision, Skin Pocket, For Cardiac Pacemaker;  Surgeon: Norman Foote MD;  Location: [facility] EP LAB;  Service: Cardiology;;       Review of patient's allergies indicates:   Allergen Reactions    Codeine     Latex, natural rubber     Penicillins     Penicillin Rash       Current Facility-Administered Medications on File Prior to Encounter   Medication    vancomycin in dextrose 5 % 1 gram/250 mL IVPB 1,000 mg     Current Outpatient Medications on File Prior to Encounter   Medication Sig    bumetanide (BUMEX) 0.5 MG Tab Take 1 tablet (0.5 mg total) by mouth 2 (two) times a day. (Patient taking differently: Take 1 mg by mouth once daily. 2 tablets)    carvediloL (COREG) 12.5 MG tablet Take 1 tablet (12.5 mg total) by mouth 2 (two)  times daily with meals.    clopidogrel (PLAVIX) 75 mg tablet Take 75 mg by mouth once daily.    magnesium oxide (MAG-OX) 400 mg (241.3 mg magnesium) tablet Take 400 mg by mouth 2 (two) times daily.    meclizine (ANTIVERT) 25 mg tablet Take 25 mg by mouth 3 (three) times daily as needed (vertigo).    metoprolol succinate (TOPROL-XL) 25 MG 24 hr tablet Take 25 mg by mouth once daily.    valsartan (DIOVAN) 160 MG tablet Take 160 mg by mouth once daily.    albuterol (PROVENTIL/VENTOLIN HFA) 90 mcg/actuation inhaler Inhale 2 puffs into the lungs every 4 (four) hours as needed for Wheezing (cough or wheezing). Rescue (Patient not taking: Reported on 7/10/2024)    albuterol-ipratropium (DUO-NEB) 2.5 mg-0.5 mg/3 mL nebulizer solution every 6 (six) hours as needed for Wheezing.    aspirin 81 MG Chew Take 1 tablet (81 mg total) by mouth once daily.    calcium carbonate (OS-BERE) 600 mg calcium (1,500 mg) Tab Take 600 mg by mouth once daily.     donepeziL (ARICEPT) 5 MG tablet Take 5 mg by mouth.    metoprolol succinate (TOPROL-XL) 100 MG 24 hr tablet Take 100 mg by mouth once daily.    nitroGLYCERIN (NITROSTAT) 0.4 MG SL tablet Place 1 tablet (0.4 mg total) under the tongue every 5 (five) minutes as needed for Chest pain (angina). (Patient not taking: Reported on 12/4/2023)    potassium chloride SA (K-DUR,KLOR-CON) 10 MEQ tablet Take 2 tablets (20 mEq total) by mouth once daily. (Patient not taking: Reported on 7/10/2024)    rosuvastatin (CRESTOR) 5 MG tablet Take 5 mg by mouth every evening.    ZINC ACETATE ORAL Take by mouth once daily.      Family History       Problem Relation (Age of Onset)    Liver disease Mother          Tobacco Use    Smoking status: Never    Smokeless tobacco: Never   Substance and Sexual Activity    Alcohol use: No    Drug use: No    Sexual activity: Not on file     Review of Systems   Constitutional: Negative. Negative for diaphoresis.   HENT: Negative.     Cardiovascular:  Positive for leg  swelling. Negative for chest pain, irregular heartbeat, near-syncope, palpitations, paroxysmal nocturnal dyspnea and syncope.   Respiratory:  Negative for cough and shortness of breath.    Endocrine: Negative.    Hematologic/Lymphatic: Negative.    Musculoskeletal:  Positive for back pain and myalgias.   Gastrointestinal: Negative.  Negative for nausea and vomiting.   Genitourinary: Negative.    Neurological:  Positive for weakness. Negative for dizziness and light-headedness.   Psychiatric/Behavioral: Negative.       Objective:     Vital Signs (Most Recent):  Temp: 97.5 °F (36.4 °C) (07/11/24 0811)  Pulse: 77 (07/11/24 0833)  Resp: 18 (07/11/24 0811)  BP: (!) 145/65 (07/11/24 0811)  SpO2: (!) 92 % (07/11/24 0833) Vital Signs (24h Range):  Temp:  [97.4 °F (36.3 °C)-97.7 °F (36.5 °C)] 97.5 °F (36.4 °C)  Pulse:  [63-77] 77  Resp:  [16-19] 18  SpO2:  [91 %-94 %] 92 %  BP: ()/(53-65) 145/65     Weight: 81.6 kg (180 lb)  Body mass index is 36.36 kg/m².    SpO2: (!) 92 %         Intake/Output Summary (Last 24 hours) at 7/11/2024 1121  Last data filed at 7/11/2024 0502  Gross per 24 hour   Intake --   Output 650 ml   Net -650 ml       Lines/Drains/Airways       Drain  Duration             Female External Urinary Catheter w/ Suction 07/10/24 0240 1 day              Peripheral Intravenous Line  Duration                  Peripheral IV - Single Lumen 07/10/24 0229 20 G Posterior;Right Wrist 1 day                     Physical Exam  Constitutional:       General: She is not in acute distress.     Appearance: She is not diaphoretic.   HENT:      Head: Atraumatic.   Eyes:      General:         Right eye: No discharge.         Left eye: No discharge.   Cardiovascular:      Rate and Rhythm: Normal rate and regular rhythm.   Pulmonary:      Effort: Pulmonary effort is normal.      Breath sounds: Normal breath sounds. No rales.   Abdominal:      General: Bowel sounds are normal.      Palpations: Abdomen is soft.   Skin:      "General: Skin is warm and dry.   Neurological:      Mental Status: She is alert. Mental status is at baseline.   Psychiatric:         Mood and Affect: Mood normal.         Thought Content: Thought content normal.         Judgment: Judgment normal.          Significant Labs: BMP:   Recent Labs   Lab 07/10/24  0308 07/11/24  0431   * 101    141   K 3.4* 4.0   * 107   CO2 23 26   BUN 24 27   CREATININE 1.0 1.1   CALCIUM 8.8 9.0   , CMP   Recent Labs   Lab 07/10/24  0308 07/11/24  0431    141   K 3.4* 4.0   * 107   CO2 23 26   * 101   BUN 24 27   CREATININE 1.0 1.1   CALCIUM 8.8 9.0   PROT 6.2  --    ALBUMIN 3.0*  --    BILITOT 0.2  --    ALKPHOS 100  --    AST 15  --    ALT <5*  --    ANIONGAP 11 8   , CBC   Recent Labs   Lab 07/10/24  0239 07/11/24  0431   WBC 9.22 7.45   HGB 13.9 11.4*   HCT 42.1 35.2*    174   , INR No results for input(s): "INR", "PROTIME" in the last 48 hours., Lipid Panel No results for input(s): "CHOL", "HDL", "LDLCALC", "TRIG", "CHOLHDL" in the last 48 hours., Troponin   Recent Labs   Lab 07/10/24  1347 07/11/24  0018 07/11/24  0431   TROPONINI 2.114* 1.525* 1.258*   , and All pertinent lab results from the last 24 hours have been reviewed.    Significant Imaging: Echocardiogram: Transthoracic echo (TTE) complete ([facility] Only):   Results for orders placed or performed during the hospital encounter of 07/10/24   Echo   Result Value Ref Range    BSA 1.84 m2    Garcia's Biplane MOD Ejection Fraction 63 %    A2C EF 64 %    A4C EF 60 %    LVOT stroke volume 56.42 cm3    LVIDd 2.83 (A) 3.5 - 6.0 cm    LV Systolic Volume 12.49 mL    LV Systolic Volume Index 7.1 mL/m2    LVIDs 1.98 (A) 2.1 - 4.0 cm    LV ESV A2C 101.80 mL    LV Diastolic Volume 30.37 mL    LV ESV A4C 83.77 mL    LV Diastolic Volume Index 17.26 mL/m2    LV EDV A2C 39.857051389415147 mL    LV EDV A4C 42.00 mL    Left Ventricular End Systolic Volume by Teichholz Method 12.49 mL    Left " Ventricular End Diastolic Volume by Teichholz Method 30.37 mL    IVS 1.15 (A) 0.6 - 1.1 cm    LVOT diameter 1.85 cm    LVOT area 2.7 cm2    FS 30 28 - 44 %    Left Ventricle Relative Wall Thickness 0.77 cm    Posterior Wall 1.09 0.6 - 1.1 cm    LV mass 90.17 g    LV Mass Index 51 g/m2    MV Peak E Juan M 1.03 m/s    TDI LATERAL 0.06 m/s    TDI SEPTAL 0.06 m/s    E/E' ratio 17.17 m/s    MV Peak A Juan M 1.90 m/s    TR Max Juan M 2.84 m/s    E/A ratio 0.54     E wave deceleration time 431.06 msec    LV SEPTAL E/E' RATIO 17.17 m/s    LV LATERAL E/E' RATIO 17.17 m/s    PV Peak S Juan M 0.52 m/s    PV Peak D Juan M 0.27 m/s    Pulm vein S/D ratio 1.93     LVOT peak juan m 1.02 m/s    Left Ventricular Outflow Tract Mean Velocity 0.77 cm/s    Left Ventricular Outflow Tract Mean Gradient 2.59 mmHg    RV- rangel basal diam 2.7 cm    RV S' 15.99 cm/s    TAPSE 1.93 cm    RV/LV Ratio 1.29 cm    LA size 4.35 cm    Left Atrium Minor Axis 6.11 cm    Left Atrium Major Axis 5.74 cm    LA volume (mod) 94.40 cm3    LA Volume Index (Mod) 53.6 mL/m2    RA Major Axis 5.18 cm    RA Width 2.74 cm    AV mean gradient 17 mmHg    AV peak gradient 27 mmHg    Ao peak juan m 2.59 m/s    Ao VTI 49.10 cm    LVOT peak VTI 21.00 cm    AV valve area 1.15 cm²    AV Velocity Ratio 0.39     AV index (prosthetic) 0.43     ELMIRA by Velocity Ratio 1.06 cm²    MV mean gradient 7 mmHg    MV peak gradient 18 mmHg    MV stenosis pressure 1/2 time 125.01 ms    MV valve area p 1/2 method 1.76 cm2    MV valve area by continuity eq 1.45 cm2    MV VTI 39.0 cm    Triscuspid Valve Regurgitation Peak Gradient 32 mmHg    PV PEAK VELOCITY 0.94 m/s    PV peak gradient 4 mmHg    Pulmonary Valve Mean Velocity 0.72 m/s    Sinus 2.93 cm    STJ 2.72 cm    Ascending aorta 3.29 cm    IVC diameter 1.30 cm    Mean e' 0.06 m/s    ZLVIDS -3.31     ZLVIDD -5.42     LA area A4C 24.85 cm2    LA area A2C 27.58 cm2    RVDD 3.66 cm    LA Volume Index 58.4 mL/m2    LA volume 102.87 cm3    LA WIDTH 4.7 cm    TV  resting pulmonary artery pressure 35 mmHg    RV TB RVSP 6 mmHg    Est. RA pres 3 mmHg    Mitral Valve Heart Rate 81 bpm    Narrative      Left Ventricle: Normal wall thickness. There is concentric remodeling.   There is normal systolic function with a visually estimated ejection   fraction of 65 - 70%. Biplane (2D) method of discs ejection fraction is   63%. Diastolic function cannot be reliably determined in the presence of   mitral annular calcification.    Right Ventricle: Normal right ventricular cavity size. Wall thickness   is normal. Systolic function is normal.    Left Atrium: Left atrium is severely dilated.    Aortic Valve: There is moderate aortic valve sclerosis. Moderately   restricted motion. There is mild to moderate stenosis. Aortic valve area   by VTI is 1.15 cm². Aortic valve peak velocity is 2.59 m/s. Mean gradient   is 17 mmHg. The dimensionless index is 0.43.    Mitral Valve: There is moderate anterior leaflet sclerosis. Moderately   thickened leaflets. There is severe mitral annular calcification present.   There is mild to moderate stenosis. The mean pressure gradient across the   mitral valve is 7 mmHg at a heart rate of 81 bpm. There is mild   regurgitation.    Tricuspid Valve: There is mild regurgitation.    Pulmonic Valve: There is mild regurgitation.    Pulmonary Artery: The estimated pulmonary artery systolic pressure is   35 mmHg.    IVC/SVC: Normal venous pressure at 3 mmHg.       Assessment and Plan:     Brief HPI: Patient seen this morning on rounds without cardiac complaint. Hemodynamically stable.     * Chest pain  Patient denies CP  TTE with normal LVEF  EKG paced  SBP >210 on admission   Chronically on DAPT  Imdur added  Followed by [facility] as OP  No further IP work up is indicated at this time  Pain in setting of hypertensive emergency         Chronic heart failure with preserved ejection fraction (HFpEF)  TTE    Left Ventricle: Normal wall thickness. There is concentric  remodeling. There is normal systolic function with a visually estimated ejection fraction of 65 - 70%. Biplane (2D) method of discs ejection fraction is 63%. Diastolic function cannot be reliably determined in the presence of mitral annular calcification.    Right Ventricle: Normal right ventricular cavity size. Wall thickness is normal. Systolic function is normal.    Left Atrium: Left atrium is severely dilated.    Aortic Valve: There is moderate aortic valve sclerosis. Moderately restricted motion. There is mild to moderate stenosis. Aortic valve area by VTI is 1.15 cm². Aortic valve peak velocity is 2.59 m/s. Mean gradient is 17 mmHg. The dimensionless index is 0.43.    Mitral Valve: There is moderate anterior leaflet sclerosis. Moderately thickened leaflets. There is severe mitral annular calcification present. There is mild to moderate stenosis. The mean pressure gradient across the mitral valve is 7 mmHg at a heart rate of 81 bpm. There is mild regurgitation.    Tricuspid Valve: There is mild regurgitation.    Pulmonic Valve: There is mild regurgitation.    Pulmonary Artery: The estimated pulmonary artery systolic pressure is 35 mmHg.    IVC/SVC: Normal venous pressure at 3 mmHg.    Continue Bumex 0.5 mg BID  Daily weights  Accurate intake and output  Continue BB and ARB  Patient appropriate for DC from CV perspective     Complete heart block  S/p PPM  Followed by EP  Paced per EKG and on tele review     Coronary artery disease involving native coronary artery  Remote history of PCI   Troponin peaked at 2.1  EKG paced  Continue DAPT, BB, statin  Troponin elevation felt to be demand in setting of hypertensive emergency   Follow up with cardiology as OP    Essential hypertension  SBP >210 on admission - improved at this time  Continue BB and ARB- up titrate PRN        VTE Risk Mitigation (From admission, onward)           Ordered     Reason for No Pharmacological VTE Prophylaxis  Once        Question:  Reasons:   Answer:  Patient is Ambulatory    07/10/24 0632     IP VTE HIGH RISK PATIENT  Once         07/10/24 0632     Place sequential compression device  Until discontinued         07/10/24 0632                    Mansoor Luis NP  Cardiology  [facility] - Telemetry

## 2024-07-11 NOTE — PLAN OF CARE
Problem: Physical Therapy  Goal: Physical Therapy Goal  Description: Goals to be met by: 24    Patient will increase functional independence with mobility by performin. Supine to sit with Stand-by Assistance  2. Sit to supine with Stand-by Assistance  3. Sit to stand transfer with Stand-by Assistance with use of RW.   4. Bed to chair transfer with Stand-by Assistance using Rolling Walker  5. Gait  x 50 feet with Stand-by Assistance using Rolling Walker.     Outcome: Progressing    PT evaluation completed. Pt's PLOF: MOD I with use of RW; daughter assist with ADLs including: dressing, bathing back/feet, and meal prep, as well as toileting as needed. Pt at this requires MIN A with bed mobility and transfers to standing/few steps to bedside chair with use of RW. Therapy will continue to progress pt as able.

## 2024-07-11 NOTE — ASSESSMENT & PLAN NOTE
TTE    Left Ventricle: Normal wall thickness. There is concentric remodeling. There is normal systolic function with a visually estimated ejection fraction of 65 - 70%. Biplane (2D) method of discs ejection fraction is 63%. Diastolic function cannot be reliably determined in the presence of mitral annular calcification.    Right Ventricle: Normal right ventricular cavity size. Wall thickness is normal. Systolic function is normal.    Left Atrium: Left atrium is severely dilated.    Aortic Valve: There is moderate aortic valve sclerosis. Moderately restricted motion. There is mild to moderate stenosis. Aortic valve area by VTI is 1.15 cm². Aortic valve peak velocity is 2.59 m/s. Mean gradient is 17 mmHg. The dimensionless index is 0.43.    Mitral Valve: There is moderate anterior leaflet sclerosis. Moderately thickened leaflets. There is severe mitral annular calcification present. There is mild to moderate stenosis. The mean pressure gradient across the mitral valve is 7 mmHg at a heart rate of 81 bpm. There is mild regurgitation.    Tricuspid Valve: There is mild regurgitation.    Pulmonic Valve: There is mild regurgitation.    Pulmonary Artery: The estimated pulmonary artery systolic pressure is 35 mmHg.    IVC/SVC: Normal venous pressure at 3 mmHg.    Continue Bumex 0.5 mg BID  Daily weights  Accurate intake and output  Continue BB and ARB  Patient appropriate for DC from CV perspective

## 2024-07-11 NOTE — ASSESSMENT & PLAN NOTE
Remote history of PCI   Troponin peaked at 2.1  EKG paced  Continue DAPT, BB, statin  Troponin elevation felt to be demand in setting of hypertensive emergency   Follow up with cardiology as OP

## 2024-07-12 VITALS
TEMPERATURE: 98 F | OXYGEN SATURATION: 94 % | DIASTOLIC BLOOD PRESSURE: 58 MMHG | WEIGHT: 180 LBS | RESPIRATION RATE: 16 BRPM | HEIGHT: 59 IN | BODY MASS INDEX: 36.29 KG/M2 | SYSTOLIC BLOOD PRESSURE: 125 MMHG | HEART RATE: 69 BPM

## 2024-07-12 LAB
ANION GAP SERPL CALC-SCNC: 8 MMOL/L (ref 8–16)
BASOPHILS # BLD AUTO: 0.08 K/UL (ref 0–0.2)
BASOPHILS NFR BLD: 1 % (ref 0–1.9)
BUN SERPL-MCNC: 23 MG/DL (ref 10–30)
CALCIUM SERPL-MCNC: 9.3 MG/DL (ref 8.7–10.5)
CHLORIDE SERPL-SCNC: 106 MMOL/L (ref 95–110)
CO2 SERPL-SCNC: 27 MMOL/L (ref 23–29)
CREAT SERPL-MCNC: 1 MG/DL (ref 0.5–1.4)
DIFFERENTIAL METHOD BLD: ABNORMAL
EOSINOPHIL # BLD AUTO: 0.3 K/UL (ref 0–0.5)
EOSINOPHIL NFR BLD: 4.2 % (ref 0–8)
ERYTHROCYTE [DISTWIDTH] IN BLOOD BY AUTOMATED COUNT: 13.5 % (ref 11.5–14.5)
EST. GFR  (NO RACE VARIABLE): 52 ML/MIN/1.73 M^2
GLUCOSE SERPL-MCNC: 101 MG/DL (ref 70–110)
HCT VFR BLD AUTO: 36.9 % (ref 37–48.5)
HGB BLD-MCNC: 12.3 G/DL (ref 12–16)
IMM GRANULOCYTES # BLD AUTO: 0.02 K/UL (ref 0–0.04)
IMM GRANULOCYTES NFR BLD AUTO: 0.2 % (ref 0–0.5)
LYMPHOCYTES # BLD AUTO: 2.3 K/UL (ref 1–4.8)
LYMPHOCYTES NFR BLD: 28.5 % (ref 18–48)
MCH RBC QN AUTO: 29.8 PG (ref 27–31)
MCHC RBC AUTO-ENTMCNC: 33.3 G/DL (ref 32–36)
MCV RBC AUTO: 89 FL (ref 82–98)
MONOCYTES # BLD AUTO: 0.6 K/UL (ref 0.3–1)
MONOCYTES NFR BLD: 7 % (ref 4–15)
NEUTROPHILS # BLD AUTO: 4.8 K/UL (ref 1.8–7.7)
NEUTROPHILS NFR BLD: 59.1 % (ref 38–73)
NRBC BLD-RTO: 0 /100 WBC
PLATELET # BLD AUTO: 168 K/UL (ref 150–450)
PMV BLD AUTO: 10.9 FL (ref 9.2–12.9)
POTASSIUM SERPL-SCNC: 4 MMOL/L (ref 3.5–5.1)
RBC # BLD AUTO: 4.13 M/UL (ref 4–5.4)
SODIUM SERPL-SCNC: 141 MMOL/L (ref 136–145)
WBC # BLD AUTO: 8.18 K/UL (ref 3.9–12.7)

## 2024-07-12 PROCEDURE — 97530 THERAPEUTIC ACTIVITIES: CPT | Mod: CQ

## 2024-07-12 PROCEDURE — 94761 N-INVAS EAR/PLS OXIMETRY MLT: CPT

## 2024-07-12 PROCEDURE — 99900035 HC TECH TIME PER 15 MIN (STAT)

## 2024-07-12 PROCEDURE — 36415 COLL VENOUS BLD VENIPUNCTURE: CPT | Performed by: REGISTERED NURSE

## 2024-07-12 PROCEDURE — 25000003 PHARM REV CODE 250: Performed by: INTERNAL MEDICINE

## 2024-07-12 PROCEDURE — 80048 BASIC METABOLIC PNL TOTAL CA: CPT | Performed by: REGISTERED NURSE

## 2024-07-12 PROCEDURE — G0378 HOSPITAL OBSERVATION PER HR: HCPCS

## 2024-07-12 PROCEDURE — 25000003 PHARM REV CODE 250: Performed by: REGISTERED NURSE

## 2024-07-12 PROCEDURE — 85025 COMPLETE CBC W/AUTO DIFF WBC: CPT | Performed by: REGISTERED NURSE

## 2024-07-12 RX ORDER — FAMOTIDINE 20 MG/1
20 TABLET, FILM COATED ORAL NIGHTLY PRN
Qty: 90 TABLET | Refills: 3 | Status: SHIPPED | OUTPATIENT
Start: 2024-07-12 | End: 2025-07-12

## 2024-07-12 RX ORDER — VALSARTAN 80 MG/1
80 TABLET ORAL DAILY
Qty: 90 TABLET | Refills: 3 | Status: SHIPPED | OUTPATIENT
Start: 2024-07-12 | End: 2025-07-12

## 2024-07-12 RX ORDER — FAMOTIDINE 20 MG/1
20 TABLET, FILM COATED ORAL 2 TIMES DAILY PRN
Status: DISCONTINUED | OUTPATIENT
Start: 2024-07-12 | End: 2024-07-12 | Stop reason: HOSPADM

## 2024-07-12 RX ORDER — METHOCARBAMOL 500 MG/1
500 TABLET, FILM COATED ORAL 3 TIMES DAILY PRN
Qty: 30 TABLET | Refills: 0 | Status: SHIPPED | OUTPATIENT
Start: 2024-07-12 | End: 2024-07-22

## 2024-07-12 RX ADMIN — CARVEDILOL 12.5 MG: 6.25 TABLET, FILM COATED ORAL at 09:07

## 2024-07-12 RX ADMIN — CLOPIDOGREL BISULFATE 75 MG: 75 TABLET ORAL at 09:07

## 2024-07-12 RX ADMIN — CARVEDILOL 12.5 MG: 6.25 TABLET, FILM COATED ORAL at 04:07

## 2024-07-12 RX ADMIN — ASPIRIN 81 MG CHEWABLE TABLET 81 MG: 81 TABLET CHEWABLE at 09:07

## 2024-07-12 RX ADMIN — BUMETANIDE 0.5 MG: 0.5 TABLET ORAL at 04:07

## 2024-07-12 NOTE — PLAN OF CARE
Problem: Adult Inpatient Plan of Care  Goal: Plan of Care Review  Outcome: Progressing  Goal: Patient-Specific Goal (Individualized)  Outcome: Progressing  Goal: Absence of Hospital-Acquired Illness or Injury  Outcome: Progressing  Goal: Optimal Comfort and Wellbeing  Outcome: Progressing  Goal: Readiness for Transition of Care  Outcome: Progressing     Problem: Fall Injury Risk  Goal: Absence of Fall and Fall-Related Injury  Outcome: Progressing     Problem: Skin Injury Risk Increased  Goal: Skin Health and Integrity  Outcome: Progressing     Problem: Comorbidity Management  Goal: Maintenance of Heart Failure Symptom Control  Outcome: Progressing  Goal: Blood Pressure in Desired Range  Outcome: Progressing

## 2024-07-12 NOTE — PLAN OF CARE
SW made aware of possible dc today. Pts daughter at bedside. Pts daughter agreeable to HH services. SW sent referral via careport. Pt will be contacted by HH agency to schedule first appointment time/date. Pts daughter will provide transport home. SW will continue to follow pt throughout her transitions of care and assist with any dc needs. SW sent HH orders via careport.     SW requested Cardi follow up    CarePort Alert: YES response from [facility] re: Referral [id number] for patient in Unit: [facility] TELE Bed: [id number] A Facility, [facility]  Yes, willing to accept patient NEED HH ORDER TO ACCEPT    Cleared from  . Bedside Nurse and VN notified.    Future Appointments   Date Time Provider Department Center   7/23/2024  1:00 PM Thomas Soto DNP [facility] CARDIO [facility]        07/12/24 1015   Final Note   Assessment Type Final Discharge Note   Anticipated Discharge Disposition Home-Health   Post-Acute Status   Discharge Delays None known at this time

## 2024-07-12 NOTE — NURSING
Nurse accompanied PT and performed orthostatic BP:      190/84 (121) lying  182/77(111) sitting   165/126 (131) standing  119/55 (79) standing     all on RA sats range 91-96% when dropped to 91% while standing ; however pt went up shortly after; wheezing and dizziness noted on sitting as well as standing.    Two standing BP performed b/c initial standing BP was so elevated (165/126), reassessed 1  mins later 119/55. Pt stood for about approx 4-5 mins.

## 2024-07-12 NOTE — PT/OT/SLP PROGRESS
Physical Therapy Treatment    Patient Name:  Marycruz Perez   MRN:  [id number]    Recommendations:     Discharge Recommendations: Low Intensity Therapy  Discharge Equipment Recommendations: none  Barriers to discharge:  decreased mobility,strength and endurance    Assessment:     Marycruz Perez is a 89 y.o. female admitted with a medical diagnosis of Chest pain.  She presents with the following impairments/functional limitations: weakness, impaired endurance, impaired functional mobility, gait instability, impaired balance, decreased upper extremity function, decreased lower extremity function, decreased ROM, impaired coordination, impaired cardiopulmonary response to activity,pt requires assistance with all mobility at this time and will benefit from low intensity therapy upon discharge.    Rehab Prognosis: Good; patient would benefit from acute skilled PT services to address these deficits and reach maximum level of function.    Recent Surgery: * No surgery found *      Plan:     During this hospitalization, patient to be seen 3 x/week to address the identified rehab impairments via gait training, therapeutic activities, therapeutic exercises, neuromuscular re-education and progress toward the following goals:    Plan of Care Expires:  08/11/24    Subjective     Chief Complaint: n/a  Patient/Family Comments/goals: pt with dizziness.  Pain/Comfort:  Pain Rating 1: 0/10      Objective:     Communicated with nsg prior to session.  Patient found supine with bed alarm, PureWick, peripheral IV, telemetry upon PT entry to room.     General Precautions: Standard, fall  Orthopedic Precautions: N/A  Braces: N/A  Respiratory Status: Room air     Functional Mobility:  Bed Mobility:     Supine to Sit: moderate assistance  Sit to Supine: maximal assistance and of 2 persons  Transfers:     Sit to Stand:  minimum assistance with rolling walker  Balance: fair sitting balance      AM-PAC 6 CLICK MOBILITY  Turning over in bed  (including adjusting bedclothes, sheets and blankets)?: 3  Sitting down on and standing up from a chair with arms (e.g., wheelchair, bedside commode, etc.): 3  Moving from lying on back to sitting on the side of the bed?: 3  Moving to and from a bed to a chair (including a wheelchair)?: 3  Need to walk in hospital room?: 3  Climbing 3-5 steps with a railing?: 2  Basic Mobility Total Score: 17       Treatment & Education: assisted nsg while she was taking orthostatics,pt's BP's are off  so pt back in supine along with dizziness.      Patient left supine with all lines intact, call button in reach, and nsg and daughter present..    GOALS: see general POC  Multidisciplinary Problems       Physical Therapy Goals          Problem: Physical Therapy    Goal Priority Disciplines Outcome Goal Variances Interventions   Physical Therapy Goal     PT, PT/OT Progressing     Description: Goals to be met by: 24    Patient will increase functional independence with mobility by performin. Supine to sit with Stand-by Assistance  2. Sit to supine with Stand-by Assistance  3. Sit to stand transfer with Stand-by Assistance with use of RW.   4. Bed to chair transfer with Stand-by Assistance using Rolling Walker  5. Gait  x 50 feet with Stand-by Assistance using Rolling Walker.                          Time Tracking:     PT Received On: 24  PT Start Time: 1303     PT Stop Time: 1328  PT Total Time (min): 25 min     Billable Minutes: Therapeutic Activity 25    Treatment Type: Treatment  PT/PTA: PTA     Number of PTA visits since last PT visit: 2024

## 2024-07-12 NOTE — PLAN OF CARE
Problem: Physical Therapy  Goal: Physical Therapy Goal  Description: Goals to be met by: 24    Patient will increase functional independence with mobility by performin. Supine to sit with Stand-by Assistance  2. Sit to supine with Stand-by Assistance  3. Sit to stand transfer with Stand-by Assistance with use of RW.   4. Bed to chair transfer with Stand-by Assistance using Rolling Walker  5. Gait  x 50 feet with Stand-by Assistance using Rolling Walker.     Outcome: Progressing

## 2024-07-13 NOTE — NURSING
VIRTUAL NURSE:  Discharge orders noted; additional clinical references attached.    Patient's discharge instruction packet given by bedside RN.    Cued into patient's room.  Permission received per patient to turn camera to view patient.  Introduced as VN for night shift that will be instructing her on discharge instructions.  Family member at bedside.  Educated patient on reason for admission; medications to hold, continue, and start; care of incision; appointment to follow-up with doctor, and when to return to ED.  Number given for 24/7 Nurse Line.  Education per flowsheet.  Opportunity given for questions and questions answered. Bedside nurse notified of completion of discharge education, bedside nurse notified patient ready for D/C needs wheelchair to be d/c to front [facility].        07/12/24 2110   AVS Confirmation   Discharge instructions and AVS provided to and reviewed with patient and/or significant other. Yes

## 2024-07-15 NOTE — DISCHARGE SUMMARY
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 Patient was brought in by herself for chest pain which has been intermittent over the last several weeks however TIA it became severe. In ED initial blood pressure with isolated systolic hypertension with 210s/90s. In ED labs remarkable for mild hypokalemia of 3.4. , troponin 0.154. Elevated but at baseline. Bedside echo done which showed a pericardial effusion in ED. Chest x-ray with chronic stable interstitial changes. Patient given full-dose aspirin, 80 mg Lasix, morphine, topical nitro. Patient will be admitted to Hospital Medicine will consult Cardiology for eval, trend troponin     For the full HPI please refer to the History & Physical from this admission.    Hospital Course By Problem with Pertinent Findings     Chronic heart failure with preserved ejection fraction (HFpEF)  Eight months ago last echo EF of 60 %   improved from previous few labs  Chest x-ray stable  Given 1 dose of 80 mg Lasix in ED.  Cardiology consulted for eval  Imdur started but discontinued due to orthostatic hypotension, dizziness with standing  Will start Pepcid for heartburn  DC with      Pacemaker-dependent  Patient 100% paced  Placed in the past for third-degree block     Chest pain  EKG without acute changes  Initial troponin 0.154, trend.  This was baseline back since 2023.    Noted to have small pericardial effusion per ED MD  Chest x-ray with chronic stable  "changes        Essential hypertension  Chronic, uncontrolled.    Discharge Time: 45 minutes    BP (!) 125/58 (BP Location: Left arm, Patient Position: Lying)   Pulse 69   Temp 97.9 °F (36.6 °C) (Oral)   Resp 16   Ht 4' 11" (1.499 m)   Wt 81.6 kg (180 lb)   SpO2 (!) 94%   Breastfeeding No   BMI 36.36 kg/m²       Discharge Medications        Medication List        START taking these medications      famotidine 20 MG tablet  Commonly known as: PEPCID  Take 1 tablet (20 mg total) by mouth nightly as needed for Heartburn (heartburn).     isosorbide mononitrate 30 MG 24 hr tablet  Commonly known as: IMDUR  Retsof bryson tableta (30 mg en total) por vía oral diariamente.  (Take 1 tablet (30 mg total) by mouth once daily.)     methocarbamoL 500 MG Tab  Commonly known as: ROBAXIN  Take 1 tablet (500 mg total) by mouth 3 (three) times daily as needed.            CHANGE how you take these medications      bumetanide 0.5 MG Tab  Commonly known as: BUMEX  Take 1 tablet (0.5 mg total) by mouth 2 (two) times a day.  What changed:   how much to take  when to take this  additional instructions     valsartan 80 MG tablet  Commonly known as: DIOVAN  Retsof bryson tableta (80 mg en total) por vía oral diariamente.  (Take 1 tablet (80 mg total) by mouth once daily.)  What changed:   medication strength  how much to take            CONTINUE taking these medications      albuterol-ipratropium 2.5 mg-0.5 mg/3 mL nebulizer solution  Commonly known as: DUO-NEB     aspirin 81 MG Chew  Take 1 tablet (81 mg total) by mouth once daily.     calcium carbonate 600 mg calcium (1,500 mg) Tab  Commonly known as: OS-BERE     carvediloL 12.5 MG tablet  Commonly known as: COREG  Take 1 tablet (12.5 mg total) by mouth 2 (two) times daily with meals.     clopidogreL 75 mg tablet  Commonly known as: PLAVIX     donepeziL 5 MG tablet  Commonly known as: ARICEPT     magnesium oxide 400 mg (241.3 mg magnesium) tablet  Commonly known as: MAG-OX     meclizine 25 mg " tablet  Commonly known as: ANTIVERT     nitroGLYCERIN 0.4 MG SL tablet  Commonly known as: NITROSTAT  Place 1 tablet (0.4 mg total) under the tongue every 5 (five) minutes as needed for Chest pain (angina).            STOP taking these medications      metoprolol succinate 100 MG 24 hr tablet  Commonly known as: TOPROL-XL     metoprolol succinate 25 MG 24 hr tablet  Commonly known as: TOPROL-XL     potassium chloride SA 10 MEQ tablet  Commonly known as: K-DUR,KLOR-CON M     rosuvastatin 5 MG tablet  Commonly known as: CRESTOR     ZINC ACETATE ORAL            ASK your doctor about these medications      albuterol 90 mcg/actuation inhaler  Commonly known as: PROVENTIL/VENTOLIN HFA  Inhale 2 puffs into the lungs every 4 (four) hours as needed for Wheezing (cough or wheezing). Rescue               Where to Get Your Medications        These medications were sent to [facility]  [address], [place] [address]      Hours: Mon-Fri, 8a-5:30p Phone: [phone]   famotidine 20 MG tablet  isosorbide mononitrate 30 MG 24 hr tablet  methocarbamoL 500 MG Tab  valsartan 80 MG tablet         Discharge Information:   Diet:  Cardiac. 1.5L/day fluid restriction    Physical Activity:  As tolerated    Instructions:  1. Take all medications as prescribed  2. Keep all follow-up appointments  3. Return to the hospital or call your primary care physicians if any worsening symptoms such as syncope, dizziness, chest pain, SOB or other concerns occur occur.      Follow-Up Appointments:  PCP in 1 week  Cardiology Clinic 1-2 weeks      Follow up items for PCP and tests that have not resulted at time of discharge:   None       Addie Euceda MD  [facility]

## 2024-07-22 NOTE — PROGRESS NOTES
Cardiology Clinic note    Subjective:   Patient ID:  Marycruz Perez is a 89 y.o. female who presents for follow-up of HTN, HFpEF    HPI    7/22/204: 89-year-old female with CAD/ PCI, CHF/HFpEF, rheumatic mitral stenosis, rheumatic AS, right ventricular pacemaker for third-degree AV block present to clinic for HFU after admission with fluid overload/ HTN as below. Seen in clinic with daughter reports overall doing well. Breathing much improved since hospital D/C, CP has resolved. With some intermittent fatigue and positional dizziness; LE edema improved, stable orthopnea. Patient denies CP, SOB, PND, syncope, palpitations. Reports compliance and tolerance with all medications.    7/10/2024  89-year-old female with CAD with this and PCI, CHF/HFpEF, rheumatic mitral stenosis, rheumatic AS, right ventricular pacemaker for third-degree AV block. Patient presented to the ED with back pain which radiates to her neck and headache which is intermittent and only present when she lays in bed. She denies CP, SOB, diaphoresis, palpitations, PND. + edema to BLE which has been present for several months despite taking Bumex BID. Patient reports compliance with all medications. She lives with family and utilizes a walker. Troponin noted mildly elevated at baseline of 0.1, repeat pending. EKG paced. SBP >210 in the ED, improved at this time. TTE pending.       Cardiovascular hx  ---------------------------------------    Echo 7/10/2024    Left Ventricle: Normal wall thickness. There is concentric remodeling. There is normal systolic function with a visually estimated ejection fraction of 65 - 70%. Biplane (2D) method of discs ejection fraction is 63%. Diastolic function cannot be reliably determined in the presence of mitral annular calcification.    Right Ventricle: Normal right ventricular cavity size. Wall thickness is normal. Systolic function is normal.    Left Atrium: Left atrium is severely dilated.    Aortic Valve: There  is moderate aortic valve sclerosis. Moderately restricted motion. There is mild to moderate stenosis. Aortic valve area by VTI is 1.15 cm². Aortic valve peak velocity is 2.59 m/s. Mean gradient is 17 mmHg. The dimensionless index is 0.43.    Mitral Valve: There is moderate anterior leaflet sclerosis. Moderately thickened leaflets. There is severe mitral annular calcification present. There is mild to moderate stenosis. The mean pressure gradient across the mitral valve is 7 mmHg at a heart rate of 81 bpm. There is mild regurgitation.    Tricuspid Valve: There is mild regurgitation.    Pulmonic Valve: There is mild regurgitation.    Pulmonary Artery: The estimated pulmonary artery systolic pressure is 35 mmHg.    IVC/SVC: Normal venous pressure at 3 mmHg.    Echo 11/2023    Left Ventricle: There is concentric remodeling. Normal wall motion. There is normal systolic function. Ejection fraction by visual approximation is 65%. Biplane (2D) method of discs ejection fraction is 60%. Diastolic function cannot be reliably determined in the presence of mitral annular calcification.    Right Ventricle: Normal right ventricular cavity size. Wall thickness is normal. Right ventricle wall motion  is normal. Systolic function is normal.    Left Atrium: Left atrium is severely dilated.    Aortic Valve: Moderately calcified cusps. Moderately restricted motion. There is moderate stenosis. Aortic valve peak velocity is 2.64 m/s. Mean gradient is 20 mmHg. The dimensionless index is 0.29.    Mitral Valve: There is severe bileaflet sclerosis. Moderately calcified subvalvular apparatus.    Tricuspid Valve: There is mild regurgitation.    Pulmonic Valve: There is mild regurgitation.    Pulmonary Artery: The estimated pulmonary artery systolic pressure is 20 mmHg.    IVC/SVC: Normal venous pressure at 3 mmHg.    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 01/06/2023    Vasovagal syncope 04/13/2021    PAD (peripheral artery disease) 04/13/2021    Dysuria 04/13/2021    Hyperglycemia 04/13/2021    Chronic heart failure with preserved ejection fraction (HFpEF)     Near syncope 02/18/2021    Complete heart block 12/22/2020    Pacemaker-dependent 12/22/2020    Hypokalemia 12/22/2020    Chest pain 10/27/2020    Leg pain 10/27/2020    Rheumatic mitral stenosis 01/09/2020    Rheumatic aortic stenosis 11/12/2019    Mild mitral regurgitation 11/12/2019    Localized edema 11/12/2019    Cardiac pacemaker 10/31/2019    S/P PTCA (percutaneous transluminal coronary angioplasty) 10/31/2019    Essential hypertension 10/31/2019       Patient's Medications   New Prescriptions    EZETIMIBE (ZETIA) 10 MG TABLET    Take 1 tablet (10 mg total) by mouth once daily.   Previous Medications    ALBUTEROL (PROVENTIL/VENTOLIN HFA) 90 MCG/ACTUATION INHALER    Inhale 2 puffs into the lungs every 4 (four) hours as needed for Wheezing (cough or wheezing). Rescue    ALBUTEROL-IPRATROPIUM (DUO-NEB) 2.5 MG-0.5 MG/3 ML NEBULIZER SOLUTION    every 6 (six) hours as needed for Wheezing.    ASPIRIN 81 MG CHEW    Take 1 tablet (81 mg total) by mouth once daily.    CALCIUM CARBONATE (OS-BERE) 600 MG CALCIUM (1,500 MG) TAB    Take 600 mg by mouth once daily.     CARVEDILOL (COREG) 12.5 MG TABLET    Take 1 tablet (12.5 mg total) by mouth 2 (two) times daily with meals.    CLOPIDOGREL (PLAVIX) 75 MG TABLET    Take 75 mg by mouth once daily.    DONEPEZIL (ARICEPT) 5 MG TABLET    Take 5 mg by mouth.    FAMOTIDINE (PEPCID) 20 MG TABLET    Take 1 tablet (20 mg total) by mouth nightly as needed for Heartburn (heartburn).    ISOSORBIDE MONONITRATE  (IMDUR) 30 MG 24 HR TABLET    Take 1 tablet (30 mg total) by mouth once daily.    MAGNESIUM OXIDE (MAG-OX) 400 MG (241.3 MG MAGNESIUM) TABLET    Take 400 mg by mouth 2 (two) times daily.    MECLIZINE (ANTIVERT) 25 MG TABLET    Take 25 mg by mouth 3 (three) times daily as needed (vertigo).    NITROGLYCERIN (NITROSTAT) 0.4 MG SL TABLET    Place 1 tablet (0.4 mg total) under the tongue every 5 (five) minutes as needed for Chest pain (angina).    PREDNISONE (DELTASONE) 10 MG TABLET    Take 10 mg by mouth.    VALSARTAN (DIOVAN) 80 MG TABLET    Take 1 tablet (80 mg total) by mouth once daily.   Modified Medications    Modified Medication Previous Medication    BUMETANIDE (BUMEX) 0.5 MG TAB bumetanide (BUMEX) 0.5 MG Tab       Take 2 tablets (1 mg total) by mouth 2 (two) times a day for 3 days, THEN 2 tablets (1 mg total) once daily.    Take 1 tablet (0.5 mg total) by mouth 2 (two) times a day.   Discontinued Medications    No medications on file        Review of Systems   Constitutional: Negative for chills, decreased appetite, diaphoresis, malaise/fatigue, weight gain and weight loss.   Cardiovascular:  Positive for dyspnea on exertion, leg swelling and orthopnea. Negative for chest pain, claudication, irregular heartbeat, near-syncope, palpitations, paroxysmal nocturnal dyspnea and syncope.   Respiratory:  Negative for cough, hemoptysis, shortness of breath and snoring.    Gastrointestinal:  Negative for bloating, abdominal pain, nausea and vomiting.   Neurological:  Positive for light-headedness. Negative for weakness.     Family History   Problem Relation Name Age of Onset    Liver disease Mother         Social History     Socioeconomic History    Marital status: Single   Tobacco Use    Smoking status: Never    Smokeless tobacco: Never   Substance and Sexual Activity    Alcohol use: No    Drug use: No     Social Determinants of Health     Financial Resource Strain: Low Risk  (7/11/2024)    Overall Financial Resource  "Strain (CARDIA)     Difficulty of Paying Living Expenses: Not hard at all   Food Insecurity: No Food Insecurity (7/11/2024)    Hunger Vital Sign     Worried About Running Out of Food in the Last Year: Never true     Ran Out of Food in the Last Year: Never true   Transportation Needs: No Transportation Needs (7/11/2024)    TRANSPORTATION NEEDS     Transportation : No   Physical Activity: Inactive (7/11/2024)    Exercise Vital Sign     Days of Exercise per Week: 0 days     Minutes of Exercise per Session: 0 min   Stress: Patient Unable To Answer (7/11/2024)    [employer] - Occupational Stress Questionnaire     Feeling of Stress : Patient unable to answer   Housing Stability: Low Risk  (7/11/2024)    Housing Stability Vital Sign     Unable to Pay for Housing in the Last Year: No     Homeless in the Last Year: No            Objective:   Vitals  Vitals:    07/23/24 1309 07/23/24 1312   BP: 109/61 116/68   Pulse: 82    SpO2: (!) 94%    Weight: 82.2 kg (181 lb 5.3 oz)    Height: 4' 11" (1.499 m)           Physical Exam  Vitals and nursing note reviewed.   Constitutional:       Appearance: Normal appearance.   Cardiovascular:      Rate and Rhythm: Normal rate and regular rhythm.      Heart sounds: No murmur heard.     No gallop.   Pulmonary:      Effort: Pulmonary effort is normal.      Breath sounds: Normal breath sounds.   Abdominal:      General: Bowel sounds are normal. There is no distension.      Palpations: Abdomen is soft.      Tenderness: There is no abdominal tenderness.   Musculoskeletal:      Right lower leg: Edema present.      Left lower leg: Edema present.   Skin:     General: Skin is warm and dry.   Neurological:      Mental Status: She is alert and oriented to person, place, and time.       Lab Results    Lab Results   Component Value Date    WBC 8.18 07/12/2024    HGB 12.3 07/12/2024    HCT 36.9 (L) 07/12/2024    MCV 89 07/12/2024       Lab Results   Component Value Date    "  07/12/2024    INR 1.0 11/30/2023    INR 1.0 11/30/2023       Lab Results   Component Value Date    K 4.0 07/12/2024    MG 1.8 12/02/2023    BUN 23 07/12/2024    CREATININE 1.0 07/12/2024       Lab Results   Component Value Date     07/12/2024    HGBA1C 5.9 (H) 11/30/2023       Lab Results   Component Value Date    AST 15 07/10/2024    ALT <5 (L) 07/10/2024    ALBUMIN 3.0 (L) 07/10/2024    PROT 6.2 07/10/2024       Lab Results   Component Value Date    CHOL 197 11/30/2023    HDL 37 (L) 11/30/2023    LDLCALC 138.2 11/30/2023    TRIG 109 11/30/2023       Lab Results   Component Value Date    CRP 3.0 08/24/2021     (H) 07/10/2024         Assessment:     1. Cardiac pacemaker    2. Chronic heart failure with preserved ejection fraction (HFpEF)    3. Complete heart block    4. Essential hypertension    5. Mild mitral regurgitation    6. Pacemaker-dependent    7. PAD (peripheral artery disease)    8. Rheumatic mitral stenosis    9. Rheumatic aortic stenosis    10. S/P PTCA (percutaneous transluminal coronary angioplasty)    11. Chest pain    12. Elevated troponin    13. Hyperlipidemia, unspecified hyperlipidemia type    14. Morbid (severe) obesity due to excess calories    15. Stage 3a chronic kidney disease        Plan:     HFpEF  -currently taking bumex 1mg daily  -still with 2 pillow orthopnea, JOHNSON worse than usual, ongoing LE edema  -will increase bumex to 1mg BID x 3d and follow up close  -continue BB, ARB, Imdur  -daily weights and log; following 3d increased dosing can take additional dose of bumex PRN 2 lb weight gain in a day, 5 lb weight gain in a week, SOB, worsening LE edema  -continue low sodium diet  -limit fluid to 2L / day  -recheck BMP, Mag ~ 2 weeks    2. CAD  - LAD PCI 2002 Dr. Puente   -no new cardiac symptoms since HTN resolved, continue DAPT, BB, Imdur  -statin D/C'd due to ?myopathies; will trial zetia  -tight lipid control    3. Complete heart block s/p PPM  -continue F/U with  EP    4. HTN  -well controlled, continue BB, ARB, Imdur  -goal BP < 130/80  -low salt diet, activity as tolerated    5. HLD  -goal LDLc < 70, not at goal  -statin D/C at hospital discharge due to ? Myopathies  -will trial zetia, repeat lipid panel, LFTs in 3m  -if above goal will rec repatha     6. CKD  -stable, Cr 1, eGFR 52  -avoid NSAIDs, nephrotoxic agents  -repeat BMP ~ 2 weeks, will monitor closely    7. Rheumatic MVS/ AS  -stable  -continue monitoring with yearly echos  -tight BP control       The ASCVD Risk score (Guille AMADOR, et al., 2019) failed to calculate for the following reasons:    The 2019 ASCVD risk score is only valid for ages 40 to 79    The patient has a prior MI or stroke diagnosis    Orders Placed This Encounter   Procedures    Basic metabolic panel     Standing Status:   Future     Standing Expiration Date:   10/21/2025    Magnesium     Standing Status:   Future     Standing Expiration Date:   10/21/2025       She expressed verbal understanding and agreed with the plan    Follow up in 2 weeks    Pertinent cardiac images and EKG reviewed independently.    Continue with current medical plan and lifestyle changes.  Return sooner for concerns or questions. If symptoms persist go to the ED.  I have reviewed all pertinent data including patient's medical history in detail and updated the computerized patient record.     Counseling included discussion regarding imaging findings, diagnosis, possibilities, treatment options, risks and benefits.    Thank you for the opportunity to care for this patient. Will be available for questions if needed.        Signed:  Thomas Soto DNP  07/23/2024

## 2024-07-23 ENCOUNTER — OFFICE VISIT (OUTPATIENT)
Dept: CARDIOLOGY | Facility: CLINIC | Age: 89
End: 2024-07-23
Payer: MEDICARE

## 2024-07-23 VITALS
HEART RATE: 82 BPM | OXYGEN SATURATION: 94 % | WEIGHT: 181.31 LBS | HEIGHT: 59 IN | DIASTOLIC BLOOD PRESSURE: 68 MMHG | SYSTOLIC BLOOD PRESSURE: 116 MMHG | BODY MASS INDEX: 36.55 KG/M2

## 2024-07-23 DIAGNOSIS — I05.0 RHEUMATIC MITRAL STENOSIS: ICD-10-CM

## 2024-07-23 DIAGNOSIS — E66.01 MORBID (SEVERE) OBESITY DUE TO EXCESS CALORIES: ICD-10-CM

## 2024-07-23 DIAGNOSIS — Z95.0 PACEMAKER-DEPENDENT DUE TO NATIVE CARDIAC RHYTHM INSUFFICIENT TO SUPPORT LIFE: ICD-10-CM

## 2024-07-23 DIAGNOSIS — E78.5 HYPERLIPIDEMIA, UNSPECIFIED HYPERLIPIDEMIA TYPE: ICD-10-CM

## 2024-07-23 DIAGNOSIS — Z95.0 CARDIAC PACEMAKER: ICD-10-CM

## 2024-07-23 DIAGNOSIS — I50.32 CHRONIC HEART FAILURE WITH PRESERVED EJECTION FRACTION (HFPEF): Primary | ICD-10-CM

## 2024-07-23 DIAGNOSIS — I49.8 PACEMAKER-DEPENDENT DUE TO NATIVE CARDIAC RHYTHM INSUFFICIENT TO SUPPORT LIFE: ICD-10-CM

## 2024-07-23 DIAGNOSIS — R79.89 ELEVATED TROPONIN: ICD-10-CM

## 2024-07-23 DIAGNOSIS — Z98.61 S/P PTCA (PERCUTANEOUS TRANSLUMINAL CORONARY ANGIOPLASTY): ICD-10-CM

## 2024-07-23 DIAGNOSIS — I06.0 RHEUMATIC AORTIC STENOSIS: ICD-10-CM

## 2024-07-23 DIAGNOSIS — N18.31 STAGE 3A CHRONIC KIDNEY DISEASE: Chronic | ICD-10-CM

## 2024-07-23 DIAGNOSIS — I44.2 COMPLETE HEART BLOCK: ICD-10-CM

## 2024-07-23 DIAGNOSIS — I34.0 MILD MITRAL REGURGITATION: ICD-10-CM

## 2024-07-23 DIAGNOSIS — R07.89 OTHER CHEST PAIN: ICD-10-CM

## 2024-07-23 DIAGNOSIS — I10 ESSENTIAL HYPERTENSION: ICD-10-CM

## 2024-07-23 DIAGNOSIS — I73.9 PAD (PERIPHERAL ARTERY DISEASE): ICD-10-CM

## 2024-07-23 PROBLEM — I25.119 ATHEROSCLEROSIS OF NATIVE CORONARY ARTERY OF NATIVE HEART WITH ANGINA PECTORIS: Status: RESOLVED | Noted: 2020-10-27 | Resolved: 2024-07-23

## 2024-07-23 PROBLEM — N18.9 CKD (CHRONIC KIDNEY DISEASE): Chronic | Status: ACTIVE | Noted: 2024-07-23

## 2024-07-23 PROBLEM — E66.812 CLASS 2 OBESITY WITH ALVEOLAR HYPOVENTILATION AND SERIOUS COMORBIDITY IN ADULT: Status: ACTIVE | Noted: 2023-01-06

## 2024-07-23 PROBLEM — I25.119 ATHEROSCLEROSIS OF NATIVE CORONARY ARTERY OF NATIVE HEART WITH ANGINA PECTORIS: Status: ACTIVE | Noted: 2020-10-27

## 2024-07-23 PROBLEM — E66.2 CLASS 2 OBESITY WITH ALVEOLAR HYPOVENTILATION AND SERIOUS COMORBIDITY IN ADULT: Status: ACTIVE | Noted: 2023-01-06

## 2024-07-23 PROCEDURE — 1126F AMNT PAIN NOTED NONE PRSNT: CPT | Mod: CPTII,S$GLB,,

## 2024-07-23 PROCEDURE — 99999 PR PBB SHADOW E&M-EST. PATIENT-LVL IV: CPT | Mod: PBBFAC,,,

## 2024-07-23 PROCEDURE — 1159F MED LIST DOCD IN RCRD: CPT | Mod: CPTII,S$GLB,,

## 2024-07-23 PROCEDURE — 99214 OFFICE O/P EST MOD 30 MIN: CPT | Mod: S$GLB,,,

## 2024-07-23 PROCEDURE — 3288F FALL RISK ASSESSMENT DOCD: CPT | Mod: CPTII,S$GLB,,

## 2024-07-23 PROCEDURE — 1101F PT FALLS ASSESS-DOCD LE1/YR: CPT | Mod: CPTII,S$GLB,,

## 2024-07-23 RX ORDER — EZETIMIBE 10 MG/1
10 TABLET ORAL DAILY
Qty: 90 TABLET | Refills: 3 | Status: SHIPPED | OUTPATIENT
Start: 2024-07-23 | End: 2025-07-23

## 2024-07-23 RX ORDER — PREDNISONE 10 MG/1
10 TABLET ORAL
COMMUNITY
Start: 2024-07-15

## 2024-07-23 RX ORDER — BUMETANIDE 0.5 MG/1
TABLET ORAL
Qty: 180 TABLET | Refills: 3 | Status: SHIPPED | OUTPATIENT
Start: 2024-07-23 | End: 2025-07-23

## 2024-08-05 ENCOUNTER — LAB VISIT (OUTPATIENT)
Dept: LAB | Facility: HOSPITAL | Age: 89
End: 2024-08-05
Payer: MEDICARE

## 2024-08-05 DIAGNOSIS — I50.32 CHRONIC HEART FAILURE WITH PRESERVED EJECTION FRACTION (HFPEF): ICD-10-CM

## 2024-08-05 LAB
ANION GAP SERPL CALC-SCNC: 12 MMOL/L (ref 8–16)
BUN SERPL-MCNC: 23 MG/DL (ref 10–30)
CALCIUM SERPL-MCNC: 9.2 MG/DL (ref 8.7–10.5)
CHLORIDE SERPL-SCNC: 111 MMOL/L (ref 95–110)
CO2 SERPL-SCNC: 21 MMOL/L (ref 23–29)
CREAT SERPL-MCNC: 1.2 MG/DL (ref 0.5–1.4)
EST. GFR  (NO RACE VARIABLE): 42 ML/MIN/1.73 M^2
GLUCOSE SERPL-MCNC: 80 MG/DL (ref 70–110)
MAGNESIUM SERPL-MCNC: 2 MG/DL (ref 1.6–2.6)
POTASSIUM SERPL-SCNC: 4.1 MMOL/L (ref 3.5–5.1)
SODIUM SERPL-SCNC: 144 MMOL/L (ref 136–145)

## 2024-08-05 PROCEDURE — 80048 BASIC METABOLIC PNL TOTAL CA: CPT

## 2024-08-05 PROCEDURE — 36415 COLL VENOUS BLD VENIPUNCTURE: CPT

## 2024-08-05 PROCEDURE — 83735 ASSAY OF MAGNESIUM: CPT

## 2024-08-06 ENCOUNTER — OFFICE VISIT (OUTPATIENT)
Dept: CARDIOLOGY | Facility: CLINIC | Age: 89
End: 2024-08-06
Payer: MEDICARE

## 2024-08-06 VITALS
WEIGHT: 187.25 LBS | BODY MASS INDEX: 37.75 KG/M2 | HEART RATE: 64 BPM | OXYGEN SATURATION: 91 % | SYSTOLIC BLOOD PRESSURE: 100 MMHG | HEIGHT: 59 IN | DIASTOLIC BLOOD PRESSURE: 60 MMHG

## 2024-08-06 DIAGNOSIS — I10 ESSENTIAL HYPERTENSION: ICD-10-CM

## 2024-08-06 DIAGNOSIS — I73.9 PAD (PERIPHERAL ARTERY DISEASE): ICD-10-CM

## 2024-08-06 DIAGNOSIS — I44.2 COMPLETE HEART BLOCK: ICD-10-CM

## 2024-08-06 DIAGNOSIS — Z95.0 CARDIAC PACEMAKER: ICD-10-CM

## 2024-08-06 DIAGNOSIS — R60.0 LOWER EXTREMITY EDEMA: ICD-10-CM

## 2024-08-06 DIAGNOSIS — I34.0 MILD MITRAL REGURGITATION: ICD-10-CM

## 2024-08-06 DIAGNOSIS — Z98.61 S/P PTCA (PERCUTANEOUS TRANSLUMINAL CORONARY ANGIOPLASTY): ICD-10-CM

## 2024-08-06 DIAGNOSIS — Z95.0 PACEMAKER-DEPENDENT DUE TO NATIVE CARDIAC RHYTHM INSUFFICIENT TO SUPPORT LIFE: ICD-10-CM

## 2024-08-06 DIAGNOSIS — I50.32 CHRONIC HEART FAILURE WITH PRESERVED EJECTION FRACTION (HFPEF): Primary | ICD-10-CM

## 2024-08-06 DIAGNOSIS — I49.8 PACEMAKER-DEPENDENT DUE TO NATIVE CARDIAC RHYTHM INSUFFICIENT TO SUPPORT LIFE: ICD-10-CM

## 2024-08-06 DIAGNOSIS — I06.0 RHEUMATIC AORTIC STENOSIS: ICD-10-CM

## 2024-08-06 DIAGNOSIS — I05.0 RHEUMATIC MITRAL STENOSIS: ICD-10-CM

## 2024-08-06 PROCEDURE — 1101F PT FALLS ASSESS-DOCD LE1/YR: CPT | Mod: CPTII,S$GLB,,

## 2024-08-06 PROCEDURE — 3288F FALL RISK ASSESSMENT DOCD: CPT | Mod: CPTII,S$GLB,,

## 2024-08-06 PROCEDURE — 99999 PR PBB SHADOW E&M-EST. PATIENT-LVL III: CPT | Mod: PBBFAC,,,

## 2024-08-06 PROCEDURE — 99214 OFFICE O/P EST MOD 30 MIN: CPT | Mod: S$GLB,,,

## 2024-08-06 PROCEDURE — 1126F AMNT PAIN NOTED NONE PRSNT: CPT | Mod: CPTII,S$GLB,,

## 2024-08-06 RX ORDER — BUMETANIDE 0.5 MG/1
TABLET ORAL
Qty: 180 TABLET | Refills: 3 | Status: SHIPPED | OUTPATIENT
Start: 2024-08-06 | End: 2025-08-03

## 2024-09-16 ENCOUNTER — HOSPITAL ENCOUNTER (INPATIENT)
Facility: HOSPITAL | Age: 89
LOS: 11 days | Discharge: HOME HEALTH | DRG: 391 | End: 2024-09-27
Attending: EMERGENCY MEDICINE | Admitting: HOSPITALIST
Payer: MEDICARE

## 2024-09-16 DIAGNOSIS — K57.92 DIVERTICULITIS: ICD-10-CM

## 2024-09-16 DIAGNOSIS — K57.80: ICD-10-CM

## 2024-09-16 DIAGNOSIS — K65.1 INTRA-ABDOMINAL ABSCESS: Primary | ICD-10-CM

## 2024-09-16 DIAGNOSIS — I50.32 CHRONIC HEART FAILURE WITH PRESERVED EJECTION FRACTION (HFPEF): ICD-10-CM

## 2024-09-16 LAB
ALBUMIN SERPL BCP-MCNC: 3.1 G/DL (ref 3.5–5.2)
ALBUMIN SERPL BCP-MCNC: 3.2 G/DL (ref 3.5–5.2)
ALP SERPL-CCNC: 95 U/L (ref 55–135)
ALP SERPL-CCNC: 96 U/L (ref 55–135)
ALT SERPL W/O P-5'-P-CCNC: 8 U/L (ref 10–44)
ALT SERPL W/O P-5'-P-CCNC: <5 U/L (ref 10–44)
ANION GAP SERPL CALC-SCNC: 11 MMOL/L (ref 8–16)
ANION GAP SERPL CALC-SCNC: 11 MMOL/L (ref 8–16)
AST SERPL-CCNC: 13 U/L (ref 10–40)
AST SERPL-CCNC: 20 U/L (ref 10–40)
BASOPHILS # BLD AUTO: 0.05 K/UL (ref 0–0.2)
BASOPHILS # BLD AUTO: 0.06 K/UL (ref 0–0.2)
BASOPHILS NFR BLD: 0.4 % (ref 0–1.9)
BASOPHILS NFR BLD: 0.5 % (ref 0–1.9)
BILIRUB SERPL-MCNC: 0.3 MG/DL (ref 0.1–1)
BILIRUB SERPL-MCNC: 0.4 MG/DL (ref 0.1–1)
BILIRUB UR QL STRIP: NEGATIVE
BUN SERPL-MCNC: 23 MG/DL (ref 10–30)
BUN SERPL-MCNC: 23 MG/DL (ref 10–30)
CALCIUM SERPL-MCNC: 9.4 MG/DL (ref 8.7–10.5)
CALCIUM SERPL-MCNC: 9.6 MG/DL (ref 8.7–10.5)
CHLORIDE SERPL-SCNC: 105 MMOL/L (ref 95–110)
CHLORIDE SERPL-SCNC: 106 MMOL/L (ref 95–110)
CLARITY UR: CLEAR
CO2 SERPL-SCNC: 22 MMOL/L (ref 23–29)
CO2 SERPL-SCNC: 24 MMOL/L (ref 23–29)
COLOR UR: YELLOW
CREAT SERPL-MCNC: 1.1 MG/DL (ref 0.5–1.4)
CREAT SERPL-MCNC: 1.1 MG/DL (ref 0.5–1.4)
DIFFERENTIAL METHOD BLD: ABNORMAL
DIFFERENTIAL METHOD BLD: ABNORMAL
EOSINOPHIL # BLD AUTO: 0 K/UL (ref 0–0.5)
EOSINOPHIL # BLD AUTO: 0 K/UL (ref 0–0.5)
EOSINOPHIL NFR BLD: 0.2 % (ref 0–8)
EOSINOPHIL NFR BLD: 0.2 % (ref 0–8)
ERYTHROCYTE [DISTWIDTH] IN BLOOD BY AUTOMATED COUNT: 13.4 % (ref 11.5–14.5)
ERYTHROCYTE [DISTWIDTH] IN BLOOD BY AUTOMATED COUNT: 13.7 % (ref 11.5–14.5)
EST. GFR  (NO RACE VARIABLE): 47 ML/MIN/1.73 M^2
EST. GFR  (NO RACE VARIABLE): 47 ML/MIN/1.73 M^2
GLUCOSE SERPL-MCNC: 121 MG/DL (ref 70–110)
GLUCOSE SERPL-MCNC: 136 MG/DL (ref 70–110)
GLUCOSE UR QL STRIP: NEGATIVE
HCT VFR BLD AUTO: 34.3 % (ref 37–48.5)
HCT VFR BLD AUTO: 34.4 % (ref 37–48.5)
HGB BLD-MCNC: 11.2 G/DL (ref 12–16)
HGB BLD-MCNC: 11.2 G/DL (ref 12–16)
HGB UR QL STRIP: NEGATIVE
HYALINE CASTS #/AREA URNS LPF: 2 /LPF
IMM GRANULOCYTES # BLD AUTO: 0.05 K/UL (ref 0–0.04)
IMM GRANULOCYTES # BLD AUTO: 0.07 K/UL (ref 0–0.04)
IMM GRANULOCYTES NFR BLD AUTO: 0.4 % (ref 0–0.5)
IMM GRANULOCYTES NFR BLD AUTO: 0.6 % (ref 0–0.5)
KETONES UR QL STRIP: NEGATIVE
LACTATE SERPL-SCNC: 1.2 MMOL/L (ref 0.5–2.2)
LACTATE SERPL-SCNC: 1.2 MMOL/L (ref 0.5–2.2)
LEUKOCYTE ESTERASE UR QL STRIP: ABNORMAL
LYMPHOCYTES # BLD AUTO: 1.6 K/UL (ref 1–4.8)
LYMPHOCYTES # BLD AUTO: 1.7 K/UL (ref 1–4.8)
LYMPHOCYTES NFR BLD: 12.5 % (ref 18–48)
LYMPHOCYTES NFR BLD: 14 % (ref 18–48)
MCH RBC QN AUTO: 29.4 PG (ref 27–31)
MCH RBC QN AUTO: 29.4 PG (ref 27–31)
MCHC RBC AUTO-ENTMCNC: 32.6 G/DL (ref 32–36)
MCHC RBC AUTO-ENTMCNC: 32.7 G/DL (ref 32–36)
MCV RBC AUTO: 90 FL (ref 82–98)
MCV RBC AUTO: 90 FL (ref 82–98)
MICROSCOPIC COMMENT: ABNORMAL
MONOCYTES # BLD AUTO: 0.6 K/UL (ref 0.3–1)
MONOCYTES # BLD AUTO: 0.7 K/UL (ref 0.3–1)
MONOCYTES NFR BLD: 4.7 % (ref 4–15)
MONOCYTES NFR BLD: 5.6 % (ref 4–15)
NEUTROPHILS # BLD AUTO: 10.3 K/UL (ref 1.8–7.7)
NEUTROPHILS # BLD AUTO: 9.7 K/UL (ref 1.8–7.7)
NEUTROPHILS NFR BLD: 79.3 % (ref 38–73)
NEUTROPHILS NFR BLD: 81.6 % (ref 38–73)
NITRITE UR QL STRIP: NEGATIVE
NRBC BLD-RTO: 0 /100 WBC
NRBC BLD-RTO: 0 /100 WBC
PH UR STRIP: 6 [PH] (ref 5–8)
PLATELET # BLD AUTO: 172 K/UL (ref 150–450)
PLATELET # BLD AUTO: 186 K/UL (ref 150–450)
PLATELET BLD QL SMEAR: ABNORMAL
PMV BLD AUTO: 11.5 FL (ref 9.2–12.9)
PMV BLD AUTO: 12.1 FL (ref 9.2–12.9)
POTASSIUM SERPL-SCNC: 4.2 MMOL/L (ref 3.5–5.1)
POTASSIUM SERPL-SCNC: 5.1 MMOL/L (ref 3.5–5.1)
PROT SERPL-MCNC: 7.3 G/DL (ref 6–8.4)
PROT SERPL-MCNC: 7.6 G/DL (ref 6–8.4)
PROT UR QL STRIP: ABNORMAL
RBC # BLD AUTO: 3.81 M/UL (ref 4–5.4)
RBC # BLD AUTO: 3.81 M/UL (ref 4–5.4)
RBC #/AREA URNS HPF: 1 /HPF (ref 0–4)
SODIUM SERPL-SCNC: 139 MMOL/L (ref 136–145)
SODIUM SERPL-SCNC: 140 MMOL/L (ref 136–145)
SP GR UR STRIP: 1.03 (ref 1–1.03)
SQUAMOUS #/AREA URNS HPF: 3 /HPF
URN SPEC COLLECT METH UR: ABNORMAL
UROBILINOGEN UR STRIP-ACNC: NEGATIVE EU/DL
WBC # BLD AUTO: 12.25 K/UL (ref 3.9–12.7)
WBC # BLD AUTO: 12.57 K/UL (ref 3.9–12.7)
WBC #/AREA URNS HPF: 5 /HPF (ref 0–5)

## 2024-09-16 PROCEDURE — 87040 BLOOD CULTURE FOR BACTERIA: CPT | Performed by: HOSPITALIST

## 2024-09-16 PROCEDURE — 81000 URINALYSIS NONAUTO W/SCOPE: CPT | Performed by: EMERGENCY MEDICINE

## 2024-09-16 PROCEDURE — A4216 STERILE WATER/SALINE, 10 ML: HCPCS | Performed by: HOSPITALIST

## 2024-09-16 PROCEDURE — 96365 THER/PROPH/DIAG IV INF INIT: CPT

## 2024-09-16 PROCEDURE — 83605 ASSAY OF LACTIC ACID: CPT | Mod: 91 | Performed by: HOSPITALIST

## 2024-09-16 PROCEDURE — 80053 COMPREHEN METABOLIC PANEL: CPT | Mod: 91 | Performed by: NURSE PRACTITIONER

## 2024-09-16 PROCEDURE — 63600175 PHARM REV CODE 636 W HCPCS: Mod: JZ,JG | Performed by: HOSPITALIST

## 2024-09-16 PROCEDURE — 96375 TX/PRO/DX INJ NEW DRUG ADDON: CPT

## 2024-09-16 PROCEDURE — 99222 1ST HOSP IP/OBS MODERATE 55: CPT | Mod: ,,, | Performed by: STUDENT IN AN ORGANIZED HEALTH CARE EDUCATION/TRAINING PROGRAM

## 2024-09-16 PROCEDURE — 25000003 PHARM REV CODE 250: Performed by: HOSPITALIST

## 2024-09-16 PROCEDURE — 63600175 PHARM REV CODE 636 W HCPCS: Performed by: HOSPITALIST

## 2024-09-16 PROCEDURE — 99285 EMERGENCY DEPT VISIT HI MDM: CPT | Mod: 25

## 2024-09-16 PROCEDURE — 85025 COMPLETE CBC W/AUTO DIFF WBC: CPT | Performed by: NURSE PRACTITIONER

## 2024-09-16 PROCEDURE — 63600175 PHARM REV CODE 636 W HCPCS: Performed by: NURSE PRACTITIONER

## 2024-09-16 PROCEDURE — 11000001 HC ACUTE MED/SURG PRIVATE ROOM

## 2024-09-16 RX ORDER — POLYETHYLENE GLYCOL 3350 17 G/17G
17 POWDER, FOR SOLUTION ORAL DAILY
Status: DISCONTINUED | OUTPATIENT
Start: 2024-09-16 | End: 2024-09-18

## 2024-09-16 RX ORDER — ONDANSETRON HYDROCHLORIDE 2 MG/ML
4 INJECTION, SOLUTION INTRAVENOUS EVERY 8 HOURS PRN
Status: DISCONTINUED | OUTPATIENT
Start: 2024-09-16 | End: 2024-09-27 | Stop reason: HOSPADM

## 2024-09-16 RX ORDER — CARVEDILOL 12.5 MG/1
12.5 TABLET ORAL 2 TIMES DAILY WITH MEALS
Status: DISCONTINUED | OUTPATIENT
Start: 2024-09-16 | End: 2024-09-18

## 2024-09-16 RX ORDER — SODIUM CHLORIDE 0.9 % (FLUSH) 0.9 %
10 SYRINGE (ML) INJECTION EVERY 8 HOURS
Status: DISCONTINUED | OUTPATIENT
Start: 2024-09-16 | End: 2024-09-27 | Stop reason: HOSPADM

## 2024-09-16 RX ORDER — TALC
6 POWDER (GRAM) TOPICAL NIGHTLY PRN
Status: DISCONTINUED | OUTPATIENT
Start: 2024-09-16 | End: 2024-09-27 | Stop reason: HOSPADM

## 2024-09-16 RX ORDER — ACETAMINOPHEN 325 MG/1
650 TABLET ORAL EVERY 4 HOURS PRN
Status: DISCONTINUED | OUTPATIENT
Start: 2024-09-16 | End: 2024-09-27 | Stop reason: HOSPADM

## 2024-09-16 RX ORDER — ISOSORBIDE MONONITRATE 30 MG/1
30 TABLET, EXTENDED RELEASE ORAL DAILY
Status: DISCONTINUED | OUTPATIENT
Start: 2024-09-16 | End: 2024-09-27 | Stop reason: HOSPADM

## 2024-09-16 RX ORDER — METRONIDAZOLE 500 MG/100ML
500 INJECTION, SOLUTION INTRAVENOUS
Status: DISCONTINUED | OUTPATIENT
Start: 2024-09-16 | End: 2024-09-19

## 2024-09-16 RX ORDER — IBUPROFEN 200 MG
16 TABLET ORAL
Status: DISCONTINUED | OUTPATIENT
Start: 2024-09-16 | End: 2024-09-27 | Stop reason: HOSPADM

## 2024-09-16 RX ORDER — ONDANSETRON HYDROCHLORIDE 2 MG/ML
4 INJECTION, SOLUTION INTRAVENOUS
Status: COMPLETED | OUTPATIENT
Start: 2024-09-16 | End: 2024-09-16

## 2024-09-16 RX ORDER — MORPHINE SULFATE 2 MG/ML
1 INJECTION, SOLUTION INTRAMUSCULAR; INTRAVENOUS EVERY 6 HOURS PRN
Status: DISCONTINUED | OUTPATIENT
Start: 2024-09-16 | End: 2024-09-25

## 2024-09-16 RX ORDER — VALSARTAN 40 MG/1
40 TABLET ORAL DAILY
Status: ON HOLD | COMMUNITY
Start: 2024-07-26 | End: 2024-09-27 | Stop reason: HOSPADM

## 2024-09-16 RX ORDER — ACETAMINOPHEN 500 MG
1000 TABLET ORAL EVERY 8 HOURS PRN
Status: DISCONTINUED | OUTPATIENT
Start: 2024-09-16 | End: 2024-09-25

## 2024-09-16 RX ORDER — EZETIMIBE 10 MG/1
10 TABLET ORAL DAILY
Status: DISCONTINUED | OUTPATIENT
Start: 2024-09-16 | End: 2024-09-27 | Stop reason: HOSPADM

## 2024-09-16 RX ORDER — BUMETANIDE 1 MG/1
2 TABLET ORAL DAILY
Status: DISCONTINUED | OUTPATIENT
Start: 2024-09-16 | End: 2024-09-17

## 2024-09-16 RX ORDER — GLUCAGON 1 MG
1 KIT INJECTION
Status: DISCONTINUED | OUTPATIENT
Start: 2024-09-16 | End: 2024-09-27 | Stop reason: HOSPADM

## 2024-09-16 RX ORDER — ONDANSETRON 8 MG/1
8 TABLET, ORALLY DISINTEGRATING ORAL EVERY 8 HOURS PRN
Status: DISCONTINUED | OUTPATIENT
Start: 2024-09-16 | End: 2024-09-27 | Stop reason: HOSPADM

## 2024-09-16 RX ORDER — DICLOFENAC SODIUM 10 MG/G
4 GEL TOPICAL 3 TIMES DAILY PRN
COMMUNITY
Start: 2024-07-15

## 2024-09-16 RX ORDER — ENOXAPARIN SODIUM 100 MG/ML
30 INJECTION SUBCUTANEOUS EVERY 24 HOURS
Status: DISCONTINUED | OUTPATIENT
Start: 2024-09-16 | End: 2024-09-23

## 2024-09-16 RX ORDER — NAPROXEN SODIUM 220 MG/1
81 TABLET, FILM COATED ORAL DAILY
Status: DISCONTINUED | OUTPATIENT
Start: 2024-09-16 | End: 2024-09-24

## 2024-09-16 RX ORDER — IBUPROFEN 200 MG
24 TABLET ORAL
Status: DISCONTINUED | OUTPATIENT
Start: 2024-09-16 | End: 2024-09-27 | Stop reason: HOSPADM

## 2024-09-16 RX ORDER — VALSARTAN 40 MG/1
80 TABLET ORAL DAILY
Status: DISCONTINUED | OUTPATIENT
Start: 2024-09-16 | End: 2024-09-18

## 2024-09-16 RX ORDER — METHOCARBAMOL 500 MG/1
500 TABLET, FILM COATED ORAL 2 TIMES DAILY PRN
Status: ON HOLD | COMMUNITY
Start: 2024-08-15 | End: 2024-09-27 | Stop reason: HOSPADM

## 2024-09-16 RX ORDER — NALOXONE HCL 0.4 MG/ML
0.02 VIAL (ML) INJECTION
Status: DISCONTINUED | OUTPATIENT
Start: 2024-09-16 | End: 2024-09-27 | Stop reason: HOSPADM

## 2024-09-16 RX ORDER — MORPHINE SULFATE 2 MG/ML
2 INJECTION, SOLUTION INTRAMUSCULAR; INTRAVENOUS
Status: COMPLETED | OUTPATIENT
Start: 2024-09-16 | End: 2024-09-16

## 2024-09-16 RX ADMIN — CEFTRIAXONE SODIUM 1 G: 1 INJECTION, POWDER, FOR SOLUTION INTRAMUSCULAR; INTRAVENOUS at 12:09

## 2024-09-16 RX ADMIN — ONDANSETRON 4 MG: 2 INJECTION INTRAMUSCULAR; INTRAVENOUS at 09:09

## 2024-09-16 RX ADMIN — EZETIMIBE 10 MG: 10 TABLET ORAL at 01:09

## 2024-09-16 RX ADMIN — ISOSORBIDE MONONITRATE 30 MG: 30 TABLET, EXTENDED RELEASE ORAL at 01:09

## 2024-09-16 RX ADMIN — MORPHINE SULFATE 2 MG: 2 INJECTION, SOLUTION INTRAMUSCULAR; INTRAVENOUS at 09:09

## 2024-09-16 RX ADMIN — METRONIDAZOLE 500 MG: 5 INJECTION, SOLUTION INTRAVENOUS at 08:09

## 2024-09-16 RX ADMIN — VALSARTAN 80 MG: 40 TABLET, FILM COATED ORAL at 01:09

## 2024-09-16 RX ADMIN — ASPIRIN 81 MG CHEWABLE TABLET 81 MG: 81 TABLET CHEWABLE at 01:09

## 2024-09-16 RX ADMIN — ENOXAPARIN SODIUM 30 MG: 30 INJECTION SUBCUTANEOUS at 05:09

## 2024-09-16 RX ADMIN — Medication 10 ML: at 10:09

## 2024-09-16 RX ADMIN — METRONIDAZOLE 500 MG: 5 INJECTION, SOLUTION INTRAVENOUS at 03:09

## 2024-09-16 RX ADMIN — Medication 10 ML: at 03:09

## 2024-09-16 NOTE — H&P
[facility] - Emergency Dept  [facility]  History & Physical    Patient Name: Marycruz Perez  MRN: [id number]  Patient Class: IP- Inpatient  Admission Date: 9/16/2024  Attending Physician: Woody Zuñiga MD  Primary Care Provider: Melchor Menchaca -         Patient information was obtained from patient, relative(s), past medical records, and ER records.     Subjective:     Principal Problem:Diverticulitis of intestine with abscess without bleeding    Chief Complaint:   Chief Complaint   Patient presents with    Flank Pain     PT with 10/10 left flank pain and lower back pain x 4 hours.        HPI: Ms. Perez is a 89yo woman with HFpEF, CHB s/p PPM, HTN, and CAD who presents with left flank pain and lower back pain x4 hours. She reports that she has developed left-sided flank pain.  Has had a few days of constipation where she was passing small pebble like bowel movements prior to this.  Denies any fevers or chills.  No bleeding symptoms.  Does have history of diverticulitis in the past.    Past Medical History:   Diagnosis Date    Acute exacerbation of CHF (congestive heart failure) 10/27/2020    NATHEN (acute kidney injury) 11/30/2023    Hypertension     Syncope and collapse        Past Surgical History:   Procedure Laterality Date    A-V CARDIAC PACEMAKER INSERTION N/A 2/22/2021    Procedure: INSERTION, CARDIAC PACEMAKER, DUAL CHAMBER;  Surgeon: Norman Foote MD;  Location: [facility] EP LAB;  Service: Cardiology;  Laterality: N/A;  AVB, PPM upgrade to Dual PPM (right sided), Bio, Venogram prior to draping, MAC, FL, 3 Prep    CARDIAC CATHETERIZATION      CARDIAC PACEMAKER PLACEMENT      CHOLECYSTECTOMY      CORONARY ANGIOPLASTY      HYSTERECTOMY      REVISION OF SKIN POCKET FOR PACEMAKER  2/22/2021    Procedure: Revision, Skin Pocket, For Cardiac Pacemaker;  Surgeon: Norman Foote MD;  Location: [facility] EP LAB;  Service: Cardiology;;       Review of patient's allergies indicates:   Allergen Reactions    Codeine      Latex, natural rubber     Penicillins     Penicillin Rash       Current Facility-Administered Medications on File Prior to Encounter   Medication    vancomycin in dextrose 5 % 1 gram/250 mL IVPB 1,000 mg     Current Outpatient Medications on File Prior to Encounter   Medication Sig    albuterol (PROVENTIL/VENTOLIN HFA) 90 mcg/actuation inhaler Inhale 2 puffs into the lungs every 4 (four) hours as needed for Wheezing (cough or wheezing). Rescue    albuterol-ipratropium (DUO-NEB) 2.5 mg-0.5 mg/3 mL nebulizer solution every 6 (six) hours as needed for Wheezing.    aspirin 81 MG Chew Take 1 tablet (81 mg total) by mouth once daily.    bumetanide (BUMEX) 0.5 MG Tab Take 2 tablets (1 mg total) by mouth once daily. May also take 2 tablets (1 mg total) daily as needed (> 2 pound weight gain in 24 hours, > 5 pound weight gain in a week, worsening leg swelling, shortness of breath).    calcium carbonate (OS-BERE) 600 mg calcium (1,500 mg) Tab Take 600 mg by mouth once daily.     carvediloL (COREG) 12.5 MG tablet Take 1 tablet (12.5 mg total) by mouth 2 (two) times daily with meals.    clopidogrel (PLAVIX) 75 mg tablet Take 75 mg by mouth once daily.    donepeziL (ARICEPT) 5 MG tablet Take 5 mg by mouth.    ezetimibe (ZETIA) 10 mg tablet Take 1 tablet (10 mg total) by mouth once daily.    famotidine (PEPCID) 20 MG tablet Take 1 tablet (20 mg total) by mouth nightly as needed for Heartburn (heartburn).    isosorbide mononitrate (IMDUR) 30 MG 24 hr tablet Take 1 tablet (30 mg total) by mouth once daily.    magnesium oxide (MAG-OX) 400 mg (241.3 mg magnesium) tablet Take 400 mg by mouth 2 (two) times daily.    meclizine (ANTIVERT) 25 mg tablet Take 25 mg by mouth 3 (three) times daily as needed (vertigo).    nitroGLYCERIN (NITROSTAT) 0.4 MG SL tablet Place 1 tablet (0.4 mg total) under the tongue every 5 (five) minutes as needed for Chest pain (angina).    predniSONE (DELTASONE) 10 MG tablet Take 10 mg by mouth.    valsartan  (DIOVAN) 80 MG tablet Take 1 tablet (80 mg total) by mouth once daily.     Family History       Problem Relation (Age of Onset)    Liver disease Mother          Tobacco Use    Smoking status: Never    Smokeless tobacco: Never   Substance and Sexual Activity    Alcohol use: No    Drug use: No    Sexual activity: Not on file     Review of Systems   Constitutional:  Negative for chills, diaphoresis and fever.   HENT:  Negative for congestion and sore throat.    Eyes:  Negative for discharge and visual disturbance.   Respiratory:  Negative for cough and shortness of breath.    Cardiovascular:  Negative for chest pain and leg swelling.   Gastrointestinal:  Positive for abdominal pain and constipation. Negative for nausea and vomiting.   Genitourinary:  Positive for flank pain. Negative for difficulty urinating.   Musculoskeletal:  Negative for arthralgias and joint swelling.   Skin:  Negative for rash and wound.   Allergic/Immunologic: Negative for immunocompromised state.   Neurological:  Negative for light-headedness and headaches.   Psychiatric/Behavioral:  Negative for agitation and confusion.      Objective:     Vital Signs (Most Recent):  Temp: 97.9 °F (36.6 °C) (09/16/24 0628)  Pulse: 67 (09/16/24 1238)  Resp: 18 (09/16/24 0940)  BP: (!) 122/59 (09/16/24 1237)  SpO2: 95 % (09/16/24 0628) Vital Signs (24h Range):  Temp:  [97.9 °F (36.6 °C)] 97.9 °F (36.6 °C)  Pulse:  [67-79] 67  Resp:  [18] 18  SpO2:  [95 %] 95 %  BP: (119-179)/(59-81) 122/59     Weight: 81.6 kg (180 lb)  Body mass index is 36.36 kg/m².     Physical Exam  Vitals reviewed.   Constitutional:       General: She is not in acute distress.     Appearance: She is well-developed. She is obese. She is not diaphoretic.   HENT:      Head: Normocephalic and atraumatic.      Nose: Nose normal.   Eyes:      General: No scleral icterus.     Pupils: Pupils are equal, round, and reactive to light.   Neck:      Trachea: No tracheal deviation.   Cardiovascular:       Rate and Rhythm: Normal rate and regular rhythm.      Heart sounds: Normal heart sounds.   Pulmonary:      Effort: Pulmonary effort is normal. No respiratory distress.      Breath sounds: Normal breath sounds.   Abdominal:      General: There is no distension.      Palpations: Abdomen is soft.      Tenderness: There is no abdominal tenderness.   Musculoskeletal:         General: No deformity.      Cervical back: Normal range of motion.   Skin:     General: Skin is warm and dry.      Findings: No rash.   Neurological:      Mental Status: She is alert and oriented to person, place, and time.   Psychiatric:         Behavior: Behavior normal.              CRANIAL NERVES     CN III, IV, VI   Pupils are equal, round, and reactive to light.       Significant Labs: All pertinent labs within the past 24 hours have been reviewed.    Significant Imaging: I have reviewed all pertinent imaging results/findings within the past 24 hours.  Assessment/Plan:     * Diverticulitis of intestine with abscess without bleeding  -presents with left flank pain, found to have diverticulitis with abscess on CT imaging   -pain has substantially improved with morphine administration in the ER   -IV ceftriaxone and metronidazole; penicillin allergy  -general surgery consulted but do not suspect the patient will be good operative candidate; can consider IR evaluation for abscess drainage but treating with antibiotics for now      CKD (chronic kidney disease)  Creatine stable for now. BMP reviewed- noted Estimated Creatinine Clearance: 30.6 mL/min (based on SCr of 1.1 mg/dL). according to latest data. Based on current GFR, CKD stage is stage 2 - GFR 60-89.  Monitor UOP and serial BMP and adjust therapy as needed. Renally dose meds. Avoid nephrotoxic medications and procedures.    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 Morbid obesity complicates all aspects of disease  management from diagnostic modalities to treatment. Weight loss encouraged and health benefits explained to patient.         PAD (peripheral artery disease)  -continue aspirin and ezetimibe      Chronic heart failure with preserved ejection fraction (HFpEF)  - stable diastolic CHF  - continue home Bumex, carvedilol, valsartan  - daily weights, strict I/Os  - tele  - keep K >4, Mg >2  - 2D echo noted; deferring repeat at this hospitalization as she currently appears compensated        Pacemaker-dependent  -in place      Complete heart block  -status post pacemaker      Essential hypertension  Chronic, controlled. Latest blood pressure and vitals reviewed-     Temp:  [97.9 °F (36.6 °C)]   Pulse:  [67-79]   Resp:  [18]   BP: (119-179)/(59-81)   SpO2:  [95 %] .   Home meds for hypertension were reviewed and noted below.   Hypertension Medications               bumetanide (BUMEX) 0.5 MG Tab Take 2 tablets (1 mg total) by mouth once daily. May also take 2 tablets (1 mg total) daily as needed (> 2 pound weight gain in 24 hours, > 5 pound weight gain in a week, worsening leg swelling, shortness of breath).    carvediloL (COREG) 12.5 MG tablet Take 1 tablet (12.5 mg total) by mouth 2 (two) times daily with meals.    isosorbide mononitrate (IMDUR) 30 MG 24 hr tablet Take 1 tablet (30 mg total) by mouth once daily.    nitroGLYCERIN (NITROSTAT) 0.4 MG SL tablet Place 1 tablet (0.4 mg total) under the tongue every 5 (five) minutes as needed for Chest pain (angina).    valsartan (DIOVAN) 80 MG tablet Take 1 tablet (80 mg total) by mouth once daily.            While in the hospital, will manage blood pressure as follows; Continue home antihypertensive regimen    Will utilize p.r.n. blood pressure medication only if patient's blood pressure greater than 180/110 and she develops symptoms such as worsening chest pain or shortness of breath.      VTE Risk Mitigation (From admission, onward)           Ordered     enoxaparin injection  30 mg  Daily         09/16/24 1137     IP VTE HIGH RISK PATIENT  Once         09/16/24 1137     Place sequential compression device  Until discontinued         09/16/24 1137                               Pharmacist Renal Dose Adjustment Note    Marycruz Perez is a 89 y.o. female being treated with the medication LOVENOX    Patient Data:    Vital Signs (Most Recent):  Temp: 97.9 °F (36.6 °C) (09/16/24 0628)  Pulse: 79 (09/16/24 0628)  Resp: 18 (09/16/24 0940)  BP: (!) 179/81 (09/16/24 0628)  SpO2: 95 % (09/16/24 0628) Vital Signs (72h Range):  Temp:  [97.9 °F (36.6 °C)]   Pulse:  [79]   Resp:  [18]   BP: (179)/(81)   SpO2:  [95 %]      Recent Labs   Lab 09/16/24  0908   CREATININE 1.1     Serum creatinine: 1.1 mg/dL 09/16/24 0908  Estimated creatinine clearance: 30.6 mL/min    Medication:LOVENOX dose: 40MG frequency Q24 will be changed to medication:LOVENOX dose:30MG frequency:Q24    Pharmacist's Name: Robin Loo  Pharmacist's Extension: [phone]      Woody Zuñiga MD  Department of Hospital Medicine  [facility] - Emergency Dept

## 2024-09-16 NOTE — ED TRIAGE NOTES
Patient arrives to the ED w/ complaints of LLQ abdominal pain that radiates to left lower back. Reports pain 9 out of 10 when she arrived, but states the pain has diminished to 2 out of 10. Patient presents w/ 3+ edema to bilateral lower legs and feet. Reports Hx of fluid overload secondary to CHF. Reports not taking CHF medication PTA.  Today's CT Scan shows diverticulitis.

## 2024-09-16 NOTE — ASSESSMENT & PLAN NOTE
Creatine stable for now. BMP reviewed- noted Estimated Creatinine Clearance: 30.6 mL/min (based on SCr of 1.1 mg/dL). according to latest data. Based on current GFR, CKD stage is stage 2 - GFR 60-89.  Monitor UOP and serial BMP and adjust therapy as needed. Renally dose meds. Avoid nephrotoxic medications and procedures.

## 2024-09-16 NOTE — ASSESSMENT & PLAN NOTE
Chronic, controlled. Latest blood pressure and vitals reviewed-     Temp:  [97.9 °F (36.6 °C)]   Pulse:  [67-79]   Resp:  [18]   BP: (119-179)/(59-81)   SpO2:  [95 %] .   Home meds for hypertension were reviewed and noted below.   Hypertension Medications               bumetanide (BUMEX) 0.5 MG Tab Take 2 tablets (1 mg total) by mouth once daily. May also take 2 tablets (1 mg total) daily as needed (> 2 pound weight gain in 24 hours, > 5 pound weight gain in a week, worsening leg swelling, shortness of breath).    carvediloL (COREG) 12.5 MG tablet Take 1 tablet (12.5 mg total) by mouth 2 (two) times daily with meals.    isosorbide mononitrate (IMDUR) 30 MG 24 hr tablet Take 1 tablet (30 mg total) by mouth once daily.    nitroGLYCERIN (NITROSTAT) 0.4 MG SL tablet Place 1 tablet (0.4 mg total) under the tongue every 5 (five) minutes as needed for Chest pain (angina).    valsartan (DIOVAN) 80 MG tablet Take 1 tablet (80 mg total) by mouth once daily.            While in the hospital, will manage blood pressure as follows; Continue home antihypertensive regimen    Will utilize p.r.n. blood pressure medication only if patient's blood pressure greater than 180/110 and she develops symptoms such as worsening chest pain or shortness of breath.

## 2024-09-16 NOTE — SUBJECTIVE & OBJECTIVE
Past Medical History:   Diagnosis Date    Acute exacerbation of CHF (congestive heart failure) 10/27/2020    NATHEN (acute kidney injury) 11/30/2023    Hypertension     Syncope and collapse        Past Surgical History:   Procedure Laterality Date    A-V CARDIAC PACEMAKER INSERTION N/A 2/22/2021    Procedure: INSERTION, CARDIAC PACEMAKER, DUAL CHAMBER;  Surgeon: Norman Foote MD;  Location: [facility] EP LAB;  Service: Cardiology;  Laterality: N/A;  AVB, PPM upgrade to Dual PPM (right sided), Bio, Venogram prior to draping, MAC, DC, 3 Prep    CARDIAC CATHETERIZATION      CARDIAC PACEMAKER PLACEMENT      CHOLECYSTECTOMY      CORONARY ANGIOPLASTY      HYSTERECTOMY      REVISION OF SKIN POCKET FOR PACEMAKER  2/22/2021    Procedure: Revision, Skin Pocket, For Cardiac Pacemaker;  Surgeon: Norman Foote MD;  Location: [facility] EP LAB;  Service: Cardiology;;       Review of patient's allergies indicates:   Allergen Reactions    Codeine     Latex, natural rubber     Penicillins     Penicillin Rash       Current Facility-Administered Medications on File Prior to Encounter   Medication    vancomycin in dextrose 5 % 1 gram/250 mL IVPB 1,000 mg     Current Outpatient Medications on File Prior to Encounter   Medication Sig    albuterol (PROVENTIL/VENTOLIN HFA) 90 mcg/actuation inhaler Inhale 2 puffs into the lungs every 4 (four) hours as needed for Wheezing (cough or wheezing). Rescue    albuterol-ipratropium (DUO-NEB) 2.5 mg-0.5 mg/3 mL nebulizer solution every 6 (six) hours as needed for Wheezing.    aspirin 81 MG Chew Take 1 tablet (81 mg total) by mouth once daily.    bumetanide (BUMEX) 0.5 MG Tab Take 2 tablets (1 mg total) by mouth once daily. May also take 2 tablets (1 mg total) daily as needed (> 2 pound weight gain in 24 hours, > 5 pound weight gain in a week, worsening leg swelling, shortness of breath).    calcium carbonate (OS-BERE) 600 mg calcium (1,500 mg) Tab Take 600 mg by mouth once daily.     carvediloL (COREG) 12.5 MG  tablet Take 1 tablet (12.5 mg total) by mouth 2 (two) times daily with meals.    clopidogrel (PLAVIX) 75 mg tablet Take 75 mg by mouth once daily.    donepeziL (ARICEPT) 5 MG tablet Take 5 mg by mouth.    ezetimibe (ZETIA) 10 mg tablet Take 1 tablet (10 mg total) by mouth once daily.    famotidine (PEPCID) 20 MG tablet Take 1 tablet (20 mg total) by mouth nightly as needed for Heartburn (heartburn).    isosorbide mononitrate (IMDUR) 30 MG 24 hr tablet Take 1 tablet (30 mg total) by mouth once daily.    magnesium oxide (MAG-OX) 400 mg (241.3 mg magnesium) tablet Take 400 mg by mouth 2 (two) times daily.    meclizine (ANTIVERT) 25 mg tablet Take 25 mg by mouth 3 (three) times daily as needed (vertigo).    nitroGLYCERIN (NITROSTAT) 0.4 MG SL tablet Place 1 tablet (0.4 mg total) under the tongue every 5 (five) minutes as needed for Chest pain (angina).    predniSONE (DELTASONE) 10 MG tablet Take 10 mg by mouth.    valsartan (DIOVAN) 80 MG tablet Take 1 tablet (80 mg total) by mouth once daily.     Family History       Problem Relation (Age of Onset)    Liver disease Mother          Tobacco Use    Smoking status: Never    Smokeless tobacco: Never   Substance and Sexual Activity    Alcohol use: No    Drug use: No    Sexual activity: Not on file     Review of Systems   Constitutional:  Negative for chills, diaphoresis and fever.   HENT:  Negative for congestion and sore throat.    Eyes:  Negative for discharge and visual disturbance.   Respiratory:  Negative for cough and shortness of breath.    Cardiovascular:  Negative for chest pain and leg swelling.   Gastrointestinal:  Positive for abdominal pain and constipation. Negative for nausea and vomiting.   Genitourinary:  Positive for flank pain. Negative for difficulty urinating.   Musculoskeletal:  Negative for arthralgias and joint swelling.   Skin:  Negative for rash and wound.   Allergic/Immunologic: Negative for immunocompromised state.   Neurological:  Negative for  light-headedness and headaches.   Psychiatric/Behavioral:  Negative for agitation and confusion.      Objective:     Vital Signs (Most Recent):  Temp: 97.9 °F (36.6 °C) (09/16/24 0628)  Pulse: 67 (09/16/24 1238)  Resp: 18 (09/16/24 0940)  BP: (!) 122/59 (09/16/24 1237)  SpO2: 95 % (09/16/24 0628) Vital Signs (24h Range):  Temp:  [97.9 °F (36.6 °C)] 97.9 °F (36.6 °C)  Pulse:  [67-79] 67  Resp:  [18] 18  SpO2:  [95 %] 95 %  BP: (119-179)/(59-81) 122/59     Weight: 81.6 kg (180 lb)  Body mass index is 36.36 kg/m².     Physical Exam  Vitals reviewed.   Constitutional:       General: She is not in acute distress.     Appearance: She is well-developed. She is obese. She is not diaphoretic.   HENT:      Head: Normocephalic and atraumatic.      Nose: Nose normal.   Eyes:      General: No scleral icterus.     Pupils: Pupils are equal, round, and reactive to light.   Neck:      Trachea: No tracheal deviation.   Cardiovascular:      Rate and Rhythm: Normal rate and regular rhythm.      Heart sounds: Normal heart sounds.   Pulmonary:      Effort: Pulmonary effort is normal. No respiratory distress.      Breath sounds: Normal breath sounds.   Abdominal:      General: There is no distension.      Palpations: Abdomen is soft.      Tenderness: There is no abdominal tenderness.   Musculoskeletal:         General: No deformity.      Cervical back: Normal range of motion.   Skin:     General: Skin is warm and dry.      Findings: No rash.   Neurological:      Mental Status: She is alert and oriented to person, place, and time.   Psychiatric:         Behavior: Behavior normal.              CRANIAL NERVES     CN III, IV, VI   Pupils are equal, round, and reactive to light.       Significant Labs: All pertinent labs within the past 24 hours have been reviewed.    Significant Imaging: I have reviewed all pertinent imaging results/findings within the past 24 hours.

## 2024-09-16 NOTE — SUBJECTIVE & OBJECTIVE
Current Facility-Administered Medications on File Prior to Encounter   Medication    vancomycin in dextrose 5 % 1 gram/250 mL IVPB 1,000 mg     Current Outpatient Medications on File Prior to Encounter   Medication Sig    albuterol (PROVENTIL/VENTOLIN HFA) 90 mcg/actuation inhaler Inhale 2 puffs into the lungs every 4 (four) hours as needed for Wheezing (cough or wheezing). Rescue    albuterol-ipratropium (DUO-NEB) 2.5 mg-0.5 mg/3 mL nebulizer solution every 6 (six) hours as needed for Wheezing.    aspirin 81 MG Chew Take 1 tablet (81 mg total) by mouth once daily.    bumetanide (BUMEX) 0.5 MG Tab Take 2 tablets (1 mg total) by mouth once daily. May also take 2 tablets (1 mg total) daily as needed (> 2 pound weight gain in 24 hours, > 5 pound weight gain in a week, worsening leg swelling, shortness of breath).    carvediloL (COREG) 12.5 MG tablet Take 1 tablet (12.5 mg total) by mouth 2 (two) times daily with meals.    diclofenac sodium (VOLTAREN) 1 % Gel Apply 4 g topically 3 (three) times daily as needed.    ezetimibe (ZETIA) 10 mg tablet Take 1 tablet (10 mg total) by mouth once daily.    famotidine (PEPCID) 20 MG tablet Take 1 tablet (20 mg total) by mouth nightly as needed for Heartburn (heartburn).    isosorbide mononitrate (IMDUR) 30 MG 24 hr tablet Take 1 tablet (30 mg total) by mouth once daily.    meclizine (ANTIVERT) 25 mg tablet Take 25 mg by mouth 3 (three) times daily as needed (vertigo).    nitroGLYCERIN (NITROSTAT) 0.4 MG SL tablet Place 1 tablet (0.4 mg total) under the tongue every 5 (five) minutes as needed for Chest pain (angina).    valsartan (DIOVAN) 40 MG tablet Take 40 mg by mouth once daily.    calcium carbonate (OS-BERE) 600 mg calcium (1,500 mg) Tab Take 600 mg by mouth once daily.  (Patient not taking: Reported on 9/16/2024)    clopidogrel (PLAVIX) 75 mg tablet Take 75 mg by mouth once daily. (Patient not taking: Reported on 9/16/2024)    donepeziL (ARICEPT) 5 MG tablet Take 5 mg by mouth.  (Patient not taking: Reported on 9/16/2024)    methocarbamoL (ROBAXIN) 500 MG Tab Take 500 mg by mouth 2 (two) times daily as needed. (Patient not taking: Reported on 9/16/2024)    predniSONE (DELTASONE) 10 MG tablet Take 10 mg by mouth. (Patient not taking: Reported on 9/16/2024)    [DISCONTINUED] magnesium oxide (MAG-OX) 400 mg (241.3 mg magnesium) tablet Take 400 mg by mouth 2 (two) times daily.    [DISCONTINUED] valsartan (DIOVAN) 80 MG tablet Take 1 tablet (80 mg total) by mouth once daily.       Review of patient's allergies indicates:   Allergen Reactions    Codeine     Latex, natural rubber     Penicillins     Penicillin Rash       Past Medical History:   Diagnosis Date    Acute exacerbation of CHF (congestive heart failure) 10/27/2020    NATHEN (acute kidney injury) 11/30/2023    Hypertension     Syncope and collapse      Past Surgical History:   Procedure Laterality Date    A-V CARDIAC PACEMAKER INSERTION N/A 2/22/2021    Procedure: INSERTION, CARDIAC PACEMAKER, DUAL CHAMBER;  Surgeon: Norman Foote MD;  Location: [facility] EP LAB;  Service: Cardiology;  Laterality: N/A;  AVB, PPM upgrade to Dual PPM (right sided), Bio, Venogram prior to draping, MAC, TN, 3 Prep    CARDIAC CATHETERIZATION      CARDIAC PACEMAKER PLACEMENT      CHOLECYSTECTOMY      CORONARY ANGIOPLASTY      HYSTERECTOMY      REVISION OF SKIN POCKET FOR PACEMAKER  2/22/2021    Procedure: Revision, Skin Pocket, For Cardiac Pacemaker;  Surgeon: Norman Foote MD;  Location: [facility] EP LAB;  Service: Cardiology;;     Family History       Problem Relation (Age of Onset)    Liver disease Mother          Tobacco Use    Smoking status: Never    Smokeless tobacco: Never   Substance and Sexual Activity    Alcohol use: No    Drug use: No    Sexual activity: Not on file     Review of Systems   Constitutional:  Positive for activity change.   Gastrointestinal:  Positive for abdominal pain and constipation.     Objective:     Vital Signs (Most Recent):  Temp: 97.9  °F (36.6 °C) (09/16/24 0628)  Pulse: 68 (09/16/24 1524)  Resp: 19 (09/16/24 1524)  BP: (!) 118/53 (09/16/24 1330)  SpO2: 97 % (09/16/24 1524) Vital Signs (24h Range):  Temp:  [97.9 °F (36.6 °C)] 97.9 °F (36.6 °C)  Pulse:  [67-79] 68  Resp:  [17-19] 19  SpO2:  [95 %-97 %] 97 %  BP: (118-179)/(53-81) 118/53     Weight: 81.6 kg (180 lb)  Body mass index is 36.36 kg/m².     Physical Exam  Constitutional:       General: She is not in acute distress.     Appearance: Normal appearance.   HENT:      Head: Normocephalic and atraumatic.   Cardiovascular:      Rate and Rhythm: Normal rate.   Pulmonary:      Effort: Pulmonary effort is normal. No respiratory distress.   Abdominal:      General: Abdomen is flat. There is no distension.      Palpations: Abdomen is soft.      Tenderness: There is no abdominal tenderness.   Skin:     General: Skin is warm and dry.   Neurological:      General: No focal deficit present.      Mental Status: She is alert and oriented to person, place, and time.   Psychiatric:         Mood and Affect: Mood normal.         Behavior: Behavior normal.            I have reviewed all pertinent lab results within the past 24 hours.    Significant Diagnostics:  I have reviewed all pertinent imaging results/findings within the past 24 hours.

## 2024-09-16 NOTE — ED PROVIDER NOTES
Encounter Date: 9/16/2024       History     Chief Complaint   Patient presents with    Flank Pain     PT with 10/10 left flank pain and lower back pain x 4 hours.     89-year-old female with a past medical history of CHF kidney insufficiency hypertension and patient states prior diverticulitis who presents emergency room for evaluation of left lower quadrant and left flank pain.  She felt constipated for the past few days with nausea but no vomiting.  She denies any urinary complaints.  She has no fever or chills.    The history is provided by the patient and a relative. No  was used.     Review of patient's allergies indicates:   Allergen Reactions    Codeine     Latex, natural rubber     Penicillins     Penicillin Rash     Past Medical History:   Diagnosis Date    Acute exacerbation of CHF (congestive heart failure) 10/27/2020    NATHEN (acute kidney injury) 11/30/2023    Hypertension     Syncope and collapse      Past Surgical History:   Procedure Laterality Date    A-V CARDIAC PACEMAKER INSERTION N/A 2/22/2021    Procedure: INSERTION, CARDIAC PACEMAKER, DUAL CHAMBER;  Surgeon: Norman Foote MD;  Location: [facility] EP LAB;  Service: Cardiology;  Laterality: N/A;  AVB, PPM upgrade to Dual PPM (right sided), Bio, Venogram prior to draping, MAC, CT, 3 Prep    CARDIAC CATHETERIZATION      CARDIAC PACEMAKER PLACEMENT      CHOLECYSTECTOMY      CORONARY ANGIOPLASTY      HYSTERECTOMY      REVISION OF SKIN POCKET FOR PACEMAKER  2/22/2021    Procedure: Revision, Skin Pocket, For Cardiac Pacemaker;  Surgeon: Norman Foote MD;  Location: [facility] EP LAB;  Service: Cardiology;;     Family History   Problem Relation Name Age of Onset    Liver disease Mother       Social History     Tobacco Use    Smoking status: Never    Smokeless tobacco: Never   Substance Use Topics    Alcohol use: No    Drug use: No     Review of Systems   Constitutional:  Negative for diaphoresis, fatigue and fever.   HENT:  Negative for ear  pain, rhinorrhea and sore throat.    Eyes:  Negative for pain and visual disturbance.   Respiratory:  Negative for cough and shortness of breath.    Cardiovascular:  Negative for chest pain.   Gastrointestinal:  Positive for abdominal pain and constipation. Negative for diarrhea, nausea and vomiting.   Genitourinary:  Positive for flank pain. Negative for difficulty urinating.   Musculoskeletal:  Positive for back pain. Negative for arthralgias.   Skin:  Negative for rash.   Neurological:  Negative for weakness, numbness and headaches.   All other systems reviewed and are negative.      Physical Exam     Initial Vitals [09/16/24 0628]   BP Pulse Resp Temp SpO2   (!) 179/81 79 18 97.9 °F (36.6 °C) 95 %      MAP       --         Physical Exam    Constitutional: She appears well-developed and well-nourished.   HENT:   Head: Normocephalic.   Right Ear: Hearing and tympanic membrane normal.   Left Ear: Hearing and tympanic membrane normal.   Nose: Nose normal.   Mouth/Throat: Oropharynx is clear and moist.   Eyes: Conjunctivae and lids are normal. Pupils are equal, round, and reactive to light.   Neck:   Normal range of motion.  Cardiovascular:  Normal rate, regular rhythm and normal heart sounds.     Exam reveals no gallop and no friction rub.       No murmur heard.  Pulmonary/Chest: No respiratory distress. She has no wheezes. She has no rhonchi. She has rales (subtle at the bases).   Abdominal: Abdomen is soft. There is abdominal tenderness (Mild left lateral abdomen left lower quadrant) in the left upper quadrant and left lower quadrant.   There is left CVA tenderness.   Musculoskeletal:         General: Edema present. Normal range of motion.      Cervical back: Normal range of motion. No rigidity.     Neurological: She is alert and oriented to person, place, and time. She has normal strength. No sensory deficit.   Skin: Skin is warm and dry. No rash noted.   Psychiatric: She has a normal mood and affect. Her  behavior is normal. Judgment and thought content normal.         ED Course   Procedures  Labs Reviewed   URINALYSIS - Abnormal       Result Value    Specimen UA Urine, Clean Catch      Color, UA Yellow      Appearance, UA Clear      pH, UA 6.0      Specific Gravity, UA 1.030      Protein, UA Trace (*)     Glucose, UA Negative      Ketones, UA Negative      Bilirubin (UA) Negative      Occult Blood UA Negative      Nitrite, UA Negative      Urobilinogen, UA Negative      Leukocytes, UA 1+ (*)    CBC W/ AUTO DIFFERENTIAL - Abnormal    WBC 12.57      RBC 3.81 (*)     Hemoglobin 11.2 (*)     Hematocrit 34.4 (*)     MCV 90      MCH 29.4      MCHC 32.6      RDW 13.4      Platelets 186      MPV 11.5      Immature Granulocytes 0.6 (*)     Gran # (ANC) 10.3 (*)     Immature Grans (Abs) 0.07 (*)     Lymph # 1.6      Mono # 0.6      Eos # 0.0      Baso # 0.05      nRBC 0      Gran % 81.6 (*)     Lymph % 12.5 (*)     Mono % 4.7      Eosinophil % 0.2      Basophil % 0.4      Platelet Estimate Appears normal      Differential Method Automated     COMPREHENSIVE METABOLIC PANEL - Abnormal    Sodium 140      Potassium 4.2      Chloride 105      CO2 24      Glucose 136 (*)     BUN 23      Creatinine 1.1      Calcium 9.6      Total Protein 7.3      Albumin 3.2 (*)     Total Bilirubin 0.3      Alkaline Phosphatase 95      AST 13      ALT <5 (*)     eGFR 47 (*)     Anion Gap 11     URINALYSIS MICROSCOPIC - Abnormal    RBC, UA 1      WBC, UA 5      Squam Epithel, UA 3      Hyaline Casts, UA 2 (*)     Microscopic Comment SEE COMMENT     CBC W/ AUTO DIFFERENTIAL - Abnormal    WBC 12.25      RBC 3.81 (*)     Hemoglobin 11.2 (*)     Hematocrit 34.3 (*)     MCV 90      MCH 29.4      MCHC 32.7      RDW 13.7      Platelets 172      MPV 12.1      Immature Granulocytes 0.4      Gran # (ANC) 9.7 (*)     Immature Grans (Abs) 0.05 (*)     Lymph # 1.7      Mono # 0.7      Eos # 0.0      Baso # 0.06      nRBC 0      Gran % 79.3 (*)     Lymph % 14.0  (*)     Mono % 5.6      Eosinophil % 0.2      Basophil % 0.5      Differential Method Automated     COMPREHENSIVE METABOLIC PANEL - Abnormal    Sodium 139      Potassium 5.1      Chloride 106      CO2 22 (*)     Glucose 121 (*)     BUN 23      Creatinine 1.1      Calcium 9.4      Total Protein 7.6      Albumin 3.1 (*)     Total Bilirubin 0.4      Alkaline Phosphatase 96      AST 20      ALT 8 (*)     eGFR 47 (*)     Anion Gap 11     CULTURE, BLOOD   CULTURE, BLOOD   LACTIC ACID, PLASMA    Lactate (Lactic Acid) 1.2      Narrative:     Collection has been rescheduled by [facility]K2 at 09/16/2024 11:47 Reason:   Patient unavailable not along wall   LACTIC ACID, PLASMA    Lactate (Lactic Acid) 1.2      Narrative:     Collection has been rescheduled by AMK2 at 09/16/2024 11:47 Reason:   Patient unavailable not along wall          Imaging Results              CT Renal Stone Study ABD Pelvis WO (Final result)  Result time 09/16/24 11:01:44      Final result by Niki Witt MD (09/16/24 11:01:44)                   Impression:      Significant sigmoid diverticulosis with suspicion of diverticulitis.  There is a low attenuating mass left hemipelvis 4.1 x 2.5 cm containing a punctate focus of air within, adjacent to the sigmoid colon where there are numerous diverticula and mild adjacent inflammatory changes concerning for a peridiverticular abscess or communication of the inflammatory process to the adjacent left ovary, there is associated trace of fluid in the left hemipelvis.    No evidence of obstructive or forming renal stone.    Bladder stones.      Electronically signed by: Niki Witt MD  Date:    09/16/2024  Time:    11:01               Narrative:    EXAMINATION:  CT RENAL STONE STUDY ABD PELVIS WO    CLINICAL HISTORY:  Flank pain, kidney stone suspected;    TECHNIQUE:  Low dose axial images, sagittal and coronal reformations were obtained from the lung bases to the pubic symphysis.  Contrast was not  administered.    COMPARISON:  12/29/2022 CT abdomen and pelvis    FINDINGS:  The lung basis are clear, no pleural effusion.  No pericardial effusion.  Mitral valve calcifications.    The noncontrast appearance of the liver appears normal.  The intrahepatic biliary ducts are slightly prominent.  The common bile duct measures 15 mm, similar to 12/29/2022 exam, no definite obstructive process seen.  The gallbladder is removed.    The distal esophagus and stomach appear normal.  The pancreas, spleen and bilateral adrenal glands appear normal.    The abdominal aorta tapers normally, there is moderate circumferential atherosclerotic plaque.    The right kidney and right ureter appear normal.  The left kidney and left ureter appear normal.  No obstructive or forming renal stones identified.  A round stone is seen within the bladder 0.8 cm.  The uterus is removed.    Mild stool retention.  Significant diverticulosis of the sigmoid colon.  There is a low attenuating mass left hemipelvis region could represent the left ovary, it is larger compared to 12/29/2022, it contains a punctate focus of air, it measures 2.5 x 4.1 cm (2:111).  A small diverticular abscess cannot be excluded, there is a trace of fluid in the left hemipelvis, new from the prior study.    The abdominal wall is intact, the inguinal regions appear normal.    The osseous structures demonstrate significant facet joint osseous hypertrophy at the lower lumbar spine, significant degenerative disc disease at the lumbar spine, no osseous lesions, no fracture.                                       Medications   sodium chloride 0.9% flush 10 mL (10 mLs Intravenous Given 9/16/24 1523)   melatonin tablet 6 mg (has no administration in time range)   ondansetron disintegrating tablet 8 mg (has no administration in time range)   ondansetron injection 4 mg (has no administration in time range)   polyethylene glycol packet 17 g (17 g Oral Not Given 9/16/24 1251)    acetaminophen tablet 650 mg (has no administration in time range)   acetaminophen tablet 1,000 mg (has no administration in time range)   naloxone 0.4 mg/mL injection 0.02 mg (has no administration in time range)   glucose chewable tablet 16 g (has no administration in time range)   glucose chewable tablet 24 g (has no administration in time range)   glucagon (human recombinant) injection 1 mg (has no administration in time range)   enoxaparin injection 30 mg (30 mg Subcutaneous Given 9/16/24 1728)   dextrose 10% bolus 125 mL 125 mL (has no administration in time range)   dextrose 10% bolus 250 mL 250 mL (has no administration in time range)   cefTRIAXone (Rocephin) 1 g in D5W 100 mL IVPB (MB+) (0 g Intravenous Stopped 9/16/24 1322)   metronidazole IVPB 500 mg (0 mg Intravenous Stopped 9/16/24 1620)   aspirin chewable tablet 81 mg (81 mg Oral Given 9/16/24 1327)   bumetanide tablet 2 mg (2 mg Oral Not Given 9/16/24 1327)   carvediloL tablet 12.5 mg (has no administration in time range)   ezetimibe tablet 10 mg (10 mg Oral Given 9/16/24 1327)   valsartan tablet 80 mg (80 mg Oral Given 9/16/24 1327)   isosorbide mononitrate 24 hr tablet 30 mg (30 mg Oral Given 9/16/24 1327)   morphine injection 1 mg (has no administration in time range)   morphine injection 2 mg (2 mg Intravenous Given 9/16/24 0940)   ondansetron injection 4 mg (4 mg Intravenous Given 9/16/24 0940)     Medical Decision Making  Patient appears have a perforated diverticulitis.  She will be admitted to general medicine with a surgical consult.  Labs are stable.  She does not appear to be septic but will give broad-spectrum antibiotics.  Mass in the left hemipelvis appears to be.  Diverticular abscess.    Amount and/or Complexity of Data Reviewed  Labs: ordered. Decision-making details documented in ED Course.  Radiology: ordered.    Risk  Prescription drug management.  Decision regarding hospitalization.               ED Course as of 09/16/24 1268    Mon Sep 16, 2024   0842 Urinalysis Microscopic(!) [DT]   0842 Urinalysis(!) [DT]   1004 Lab work is pending.  Patient reports some improvement in her condition after medications given. [DT]   1017 CBC auto differential(!) [DT]   1106 FINDINGS:Impression:     Significant sigmoid diverticulosis with suspicion of diverticulitis.  There is a low attenuating mass left hemipelvis 4.1 x 2.5 cm containing a punctate focus of air within, adjacent to the sigmoid colon where there are numerous diverticula and mild adjacent inflammatory changes concerning for a peridiverticular abscess or communication of the inflammatory process to the adjacent left ovary, there is associated trace of fluid in the left hemipelvis.     No evidence of obstructive or forming renal stone.     Bladder stones.      [DT]   1133 On line with Dr Zuñiga for admission for admission for diverticulitis with intra-abdominal abscess.   [DT]   1133 CBC auto differential(!) [DT]   1133 Dr Crow paged [DT]   1141 Secure chat also sent to Dr Crow [DT]   1153 Dr Crow states he will take a look at imaging as he is in surgery at this time.  [DT]   1228 Lactic Acid, Plasma [DT]      ED Course User Index  [DT] Debi Martin, OSEAS                           Clinical Impression:  Final diagnoses:  [K57.92] Diverticulitis  [K65.1] Intra-abdominal abscess (Primary)          ED Disposition Condition    Admit Stable                Timoteo Yoder MD  09/16/24 1201       Debi Martin NP  09/16/24 1323       Debi Martin NP  09/16/24 1751

## 2024-09-16 NOTE — ASSESSMENT & PLAN NOTE
- stable diastolic CHF  - continue home Bumex, carvedilol, valsartan  - daily weights, strict I/Os  - tele  - keep K >4, Mg >2  - 2D echo noted; deferring repeat at this hospitalization as she currently appears compensated

## 2024-09-16 NOTE — HPI
Ms. Perez is a 89 y.o. female with PMH of HFpEF, HTN, and CAD who presented with left flank pain and lower back pain that started earlier today. CT scan obtained showed evidence of diverticulitis. General surgery was consulted for evaluation. Patient takes DAPT at home. PSH includes hysterectomy and cholecystectomy. Patient states that she has not had this happen before. She reports improvement in her pain since presentation to ED

## 2024-09-16 NOTE — ASSESSMENT & PLAN NOTE
Ms. Perez is a 89 y.o. female  with evidence of diverticulitis    - Patient seen and examined  - Labs and imaging reviewed  - No plans for surgical intervention, patient with complicated medical history and high risk for complications if she were to undergo general anesthesia  - Agree with antibiotics  - Okay for CLD from our perspective  - Possible abscess small would hold off on IR consult at this time, patient pain improving  - general surgery will follow along

## 2024-09-16 NOTE — ED NOTES
Patient reports LLQ abdominal pain returning after drinking broth. Rates pain 5 out of 10 and states it feels like stabbing.   Patient sitting in bed w/ daughter at bedside.

## 2024-09-16 NOTE — Clinical Note
Diagnosis: Diverticulitis [517815]   Reason for IP Medical Treatment  (Clinical interventions that can only be accomplished in the IP setting? ) :: complicated diverticultiis

## 2024-09-16 NOTE — NURSING
Called for PIV placement. 20G placed w/ US guidance in RUE. PIV draws back and flushes. Pt tolerated well

## 2024-09-16 NOTE — PROGRESS NOTES
Pharmacist Renal Dose Adjustment Note    Marycruz Perez is a 89 y.o. female being treated with the medication LOVENOX    Patient Data:    Vital Signs (Most Recent):  Temp: 97.9 °F (36.6 °C) (09/16/24 0628)  Pulse: 79 (09/16/24 0628)  Resp: 18 (09/16/24 0940)  BP: (!) 179/81 (09/16/24 0628)  SpO2: 95 % (09/16/24 0628) Vital Signs (72h Range):  Temp:  [97.9 °F (36.6 °C)]   Pulse:  [79]   Resp:  [18]   BP: (179)/(81)   SpO2:  [95 %]      Recent Labs   Lab 09/16/24 0908   CREATININE 1.1     Serum creatinine: 1.1 mg/dL 09/16/24 0908  Estimated creatinine clearance: 30.6 mL/min    Medication:LOVENOX dose: 40MG frequency Q24 will be changed to medication:LOVENOX dose:30MG frequency:Q24    Pharmacist's Name: Robin Loo  Pharmacist's Extension: [phone]

## 2024-09-16 NOTE — PHARMACY MED REC
"  [facility]           Pharmacy  Admission Medication History     The home medication history was taken by Janice Moreno.      Medication history obtained from Medications listed below were obtained from: Patient/family    Based on information gathered for medication list, you may go to "Admission" then "Reconcile Home Medications" tabs to review and/or act upon those items.     The home medication list has been updated by the Pharmacy department.   Please read ALL comments highlighted in yellow.   Please address this information as you see fit.    Feel free to contact us if you have any questions or require assistance.    The medications listed below were removed from the home medication list.  Please reorder if appropriate:    Patient reports NOT TAKING the following medication(s):  Mag ox 400mg      Current Facility-Administered Medications on File Prior to Encounter   Medication Dose Route Frequency Provider Last Rate Last Admin    vancomycin in dextrose 5 % 1 gram/250 mL IVPB 1,000 mg  1,000 mg Intravenous On Call Procedure Rocío Blevins NP   1,000 mg at 02/22/21 0947     Current Outpatient Medications on File Prior to Encounter   Medication Sig Dispense Refill    albuterol (PROVENTIL/VENTOLIN HFA) 90 mcg/actuation inhaler Inhale 2 puffs into the lungs every 4 (four) hours as needed for Wheezing (cough or wheezing). Rescue 18 g 11    albuterol-ipratropium (DUO-NEB) 2.5 mg-0.5 mg/3 mL nebulizer solution every 6 (six) hours as needed for Wheezing.      aspirin 81 MG Chew Take 1 tablet (81 mg total) by mouth once daily. 30 tablet 2    carvediloL (COREG) 12.5 MG tablet Take 1 tablet (12.5 mg total) by mouth 2 (two) times daily with meals. 60 tablet 11    diclofenac sodium (VOLTAREN) 1 % Gel Apply 4 g topically 3 (three) times daily as needed.      ezetimibe (ZETIA) 10 mg tablet Take 1 tablet (10 mg total) by mouth once daily. 90 tablet 3    famotidine (PEPCID) 20 MG tablet Take 1 tablet " (20 mg total) by mouth nightly as needed for Heartburn (heartburn). 90 tablet 3    isosorbide mononitrate (IMDUR) 30 MG 24 hr tablet Take 1 tablet (30 mg total) by mouth once daily. 30 tablet 11    meclizine (ANTIVERT) 25 mg tablet Take 25 mg by mouth 3 (three) times daily as needed (vertigo).      nitroGLYCERIN (NITROSTAT) 0.4 MG SL tablet Place 1 tablet (0.4 mg total) under the tongue every 5 (five) minutes as needed for Chest pain (angina). 30 tablet 1    valsartan (DIOVAN) 40 MG tablet Take 40 mg by mouth once daily.      bumetanide (BUMEX) 0.5 MG Tab Take 2 tablets (1 mg total) by mouth once daily. May also take 2 tablets (1 mg total) daily as needed (> 2 pound weight gain in 24 hours, > 5 pound weight gain in a week, worsening leg swelling, shortness of breath). 180 tablet 3       Please address this information as you see fit.  Feel free to contact us if you have any questions or require assistance.    Janice Moreno  [phone]                .

## 2024-09-16 NOTE — ASSESSMENT & PLAN NOTE
-presents with left flank pain, found to have diverticulitis with abscess on CT imaging   -pain has substantially improved with morphine administration in the ER   -IV ceftriaxone and metronidazole; penicillin allergy  -general surgery consulted but do not suspect the patient will be good operative candidate; can consider IR evaluation for abscess drainage but treating with antibiotics for now

## 2024-09-16 NOTE — ASSESSMENT & PLAN NOTE
Body mass index is 36.36 kg/m². Morbid obesity complicates all aspects of disease management from diagnostic modalities to treatment. Weight loss encouraged and health benefits explained to patient.

## 2024-09-16 NOTE — HPI
Ms. Perez is a 89yo woman with HFpEF, CHB s/p PPM, HTN, and CAD who presents with left flank pain and lower back pain x4 hours. She reports that she has developed left-sided flank pain.  Has had a few days of constipation where she was passing small pebble like bowel movements prior to this.  Denies any fevers or chills.  No bleeding symptoms.  Does have history of diverticulitis in the past.

## 2024-09-16 NOTE — CONSULTS
[facility] - Emergency Dept  General Surgery  Consult Note    Patient Name: Marycruz Perez  MRN: [id number]  Code Status: Full Code  Admission Date: 9/16/2024  Hospital Length of Stay: 0 days  Attending Physician: Woody Zuñiga,*  Primary Care Provider: Melchor Menchaca -    Patient information was obtained from patient, ER records, and primary team.     Inpatient consult to General Surgery  Consult performed by: Liss Winn MD  Consult ordered by: Debi Martin NP        Subjective:     Principal Problem: Diverticulitis of intestine with abscess without bleeding    History of Present Illness: Ms. Perez is a 89 y.o. female with PMH of HFpEF, HTN, and CAD who presented with left flank pain and lower back pain that started earlier today. CT scan obtained showed evidence of diverticulitis. General surgery was consulted for evaluation. Patient takes DAPT at home. PSH includes hysterectomy and cholecystectomy. Patient states that she has not had this happen before. She reports improvement in her pain since presentation to ED    Current Facility-Administered Medications on File Prior to Encounter   Medication    vancomycin in dextrose 5 % 1 gram/250 mL IVPB 1,000 mg     Current Outpatient Medications on File Prior to Encounter   Medication Sig    albuterol (PROVENTIL/VENTOLIN HFA) 90 mcg/actuation inhaler Inhale 2 puffs into the lungs every 4 (four) hours as needed for Wheezing (cough or wheezing). Rescue    albuterol-ipratropium (DUO-NEB) 2.5 mg-0.5 mg/3 mL nebulizer solution every 6 (six) hours as needed for Wheezing.    aspirin 81 MG Chew Take 1 tablet (81 mg total) by mouth once daily.    bumetanide (BUMEX) 0.5 MG Tab Take 2 tablets (1 mg total) by mouth once daily. May also take 2 tablets (1 mg total) daily as needed (> 2 pound weight gain in 24 hours, > 5 pound weight gain in a week, worsening leg swelling, shortness of breath).    carvediloL (COREG) 12.5 MG tablet Take 1 tablet (12.5 mg total) by  mouth 2 (two) times daily with meals.    diclofenac sodium (VOLTAREN) 1 % Gel Apply 4 g topically 3 (three) times daily as needed.    ezetimibe (ZETIA) 10 mg tablet Take 1 tablet (10 mg total) by mouth once daily.    famotidine (PEPCID) 20 MG tablet Take 1 tablet (20 mg total) by mouth nightly as needed for Heartburn (heartburn).    isosorbide mononitrate (IMDUR) 30 MG 24 hr tablet Take 1 tablet (30 mg total) by mouth once daily.    meclizine (ANTIVERT) 25 mg tablet Take 25 mg by mouth 3 (three) times daily as needed (vertigo).    nitroGLYCERIN (NITROSTAT) 0.4 MG SL tablet Place 1 tablet (0.4 mg total) under the tongue every 5 (five) minutes as needed for Chest pain (angina).    valsartan (DIOVAN) 40 MG tablet Take 40 mg by mouth once daily.    calcium carbonate (OS-BERE) 600 mg calcium (1,500 mg) Tab Take 600 mg by mouth once daily.  (Patient not taking: Reported on 9/16/2024)    clopidogrel (PLAVIX) 75 mg tablet Take 75 mg by mouth once daily. (Patient not taking: Reported on 9/16/2024)    donepeziL (ARICEPT) 5 MG tablet Take 5 mg by mouth. (Patient not taking: Reported on 9/16/2024)    methocarbamoL (ROBAXIN) 500 MG Tab Take 500 mg by mouth 2 (two) times daily as needed. (Patient not taking: Reported on 9/16/2024)    predniSONE (DELTASONE) 10 MG tablet Take 10 mg by mouth. (Patient not taking: Reported on 9/16/2024)    [DISCONTINUED] magnesium oxide (MAG-OX) 400 mg (241.3 mg magnesium) tablet Take 400 mg by mouth 2 (two) times daily.    [DISCONTINUED] valsartan (DIOVAN) 80 MG tablet Take 1 tablet (80 mg total) by mouth once daily.       Review of patient's allergies indicates:   Allergen Reactions    Codeine     Latex, natural rubber     Penicillins     Penicillin Rash       Past Medical History:   Diagnosis Date    Acute exacerbation of CHF (congestive heart failure) 10/27/2020    NATHEN (acute kidney injury) 11/30/2023    Hypertension     Syncope and collapse      Past Surgical History:   Procedure Laterality Date     A-V CARDIAC PACEMAKER INSERTION N/A 2/22/2021    Procedure: INSERTION, CARDIAC PACEMAKER, DUAL CHAMBER;  Surgeon: Norman Foote MD;  Location: [facility] EP LAB;  Service: Cardiology;  Laterality: N/A;  AVB, PPM upgrade to Dual PPM (right sided), Bio, Venogram prior to draping, MAC, [place], 3 Prep    CARDIAC CATHETERIZATION      CARDIAC PACEMAKER PLACEMENT      CHOLECYSTECTOMY      CORONARY ANGIOPLASTY      HYSTERECTOMY      REVISION OF SKIN POCKET FOR PACEMAKER  2/22/2021    Procedure: Revision, Skin Pocket, For Cardiac Pacemaker;  Surgeon: Norman Foote MD;  Location: [facility] EP LAB;  Service: Cardiology;;     Family History       Problem Relation (Age of Onset)    Liver disease Mother          Tobacco Use    Smoking status: Never    Smokeless tobacco: Never   Substance and Sexual Activity    Alcohol use: No    Drug use: No    Sexual activity: Not on file     Review of Systems   Constitutional:  Positive for activity change.   Gastrointestinal:  Positive for abdominal pain and constipation.     Objective:     Vital Signs (Most Recent):  Temp: 97.9 °F (36.6 °C) (09/16/24 0628)  Pulse: 68 (09/16/24 1524)  Resp: 19 (09/16/24 1524)  BP: (!) 118/53 (09/16/24 1330)  SpO2: 97 % (09/16/24 1524) Vital Signs (24h Range):  Temp:  [97.9 °F (36.6 °C)] 97.9 °F (36.6 °C)  Pulse:  [67-79] 68  Resp:  [17-19] 19  SpO2:  [95 %-97 %] 97 %  BP: (118-179)/(53-81) 118/53     Weight: 81.6 kg (180 lb)  Body mass index is 36.36 kg/m².     Physical Exam  Constitutional:       General: She is not in acute distress.     Appearance: Normal appearance.   HENT:      Head: Normocephalic and atraumatic.   Cardiovascular:      Rate and Rhythm: Normal rate.   Pulmonary:      Effort: Pulmonary effort is normal. No respiratory distress.   Abdominal:      General: Abdomen is flat. There is no distension.      Palpations: Abdomen is soft.      Tenderness: There is no abdominal tenderness.   Skin:     General: Skin is warm and dry.   Neurological:      General:  No focal deficit present.      Mental Status: She is alert and oriented to person, place, and time.   Psychiatric:         Mood and Affect: Mood normal.         Behavior: Behavior normal.            I have reviewed all pertinent lab results within the past 24 hours.    Significant Diagnostics:  I have reviewed all pertinent imaging results/findings within the past 24 hours.    Assessment/Plan:     * Diverticulitis of intestine with abscess without bleeding  Ms. Perez is a 89 y.o. female  with evidence of diverticulitis    - Patient seen and examined  - Labs and imaging reviewed  - No plans for surgical intervention, patient with complicated medical history and high risk for complications if she were to undergo general anesthesia  - Agree with antibiotics  - Okay for CLD from our perspective  - Possible abscess small would hold off on IR consult at this time, patient pain improving  - general surgery will follow along      VTE Risk Mitigation (From admission, onward)           Ordered     enoxaparin injection 30 mg  Daily         09/16/24 1137     IP VTE HIGH RISK PATIENT  Once         09/16/24 1137     Place sequential compression device  Until discontinued         09/16/24 1137                    Thank you for your consult. I will follow-up with patient. Please contact us if you have any additional questions.    Liss Winn MD  General Surgery  [facility] - Emergency Dept

## 2024-09-17 LAB
ANION GAP SERPL CALC-SCNC: 10 MMOL/L (ref 8–16)
BASOPHILS # BLD AUTO: 0.08 K/UL (ref 0–0.2)
BASOPHILS NFR BLD: 0.6 % (ref 0–1.9)
BUN SERPL-MCNC: 22 MG/DL (ref 10–30)
CALCIUM SERPL-MCNC: 9 MG/DL (ref 8.7–10.5)
CHLORIDE SERPL-SCNC: 107 MMOL/L (ref 95–110)
CO2 SERPL-SCNC: 21 MMOL/L (ref 23–29)
CREAT SERPL-MCNC: 1 MG/DL (ref 0.5–1.4)
DIFFERENTIAL METHOD BLD: ABNORMAL
EOSINOPHIL # BLD AUTO: 0.1 K/UL (ref 0–0.5)
EOSINOPHIL NFR BLD: 0.8 % (ref 0–8)
ERYTHROCYTE [DISTWIDTH] IN BLOOD BY AUTOMATED COUNT: 13.8 % (ref 11.5–14.5)
EST. GFR  (NO RACE VARIABLE): 52 ML/MIN/1.73 M^2
GLUCOSE SERPL-MCNC: 105 MG/DL (ref 70–110)
HCT VFR BLD AUTO: 33.3 % (ref 37–48.5)
HGB BLD-MCNC: 10.3 G/DL (ref 12–16)
IMM GRANULOCYTES # BLD AUTO: 0.06 K/UL (ref 0–0.04)
IMM GRANULOCYTES NFR BLD AUTO: 0.4 % (ref 0–0.5)
LYMPHOCYTES # BLD AUTO: 1.6 K/UL (ref 1–4.8)
LYMPHOCYTES NFR BLD: 11.3 % (ref 18–48)
MCH RBC QN AUTO: 29.3 PG (ref 27–31)
MCHC RBC AUTO-ENTMCNC: 30.9 G/DL (ref 32–36)
MCV RBC AUTO: 95 FL (ref 82–98)
MONOCYTES # BLD AUTO: 1.1 K/UL (ref 0.3–1)
MONOCYTES NFR BLD: 7.6 % (ref 4–15)
NEUTROPHILS # BLD AUTO: 11.2 K/UL (ref 1.8–7.7)
NEUTROPHILS NFR BLD: 79.3 % (ref 38–73)
NRBC BLD-RTO: 0 /100 WBC
PLATELET # BLD AUTO: 160 K/UL (ref 150–450)
PMV BLD AUTO: 11.5 FL (ref 9.2–12.9)
POTASSIUM SERPL-SCNC: 4.1 MMOL/L (ref 3.5–5.1)
RBC # BLD AUTO: 3.51 M/UL (ref 4–5.4)
SODIUM SERPL-SCNC: 138 MMOL/L (ref 136–145)
WBC # BLD AUTO: 14.13 K/UL (ref 3.9–12.7)

## 2024-09-17 PROCEDURE — 99232 SBSQ HOSP IP/OBS MODERATE 35: CPT | Mod: ,,, | Performed by: STUDENT IN AN ORGANIZED HEALTH CARE EDUCATION/TRAINING PROGRAM

## 2024-09-17 PROCEDURE — 80048 BASIC METABOLIC PNL TOTAL CA: CPT | Performed by: HOSPITALIST

## 2024-09-17 PROCEDURE — 63600175 PHARM REV CODE 636 W HCPCS: Performed by: STUDENT IN AN ORGANIZED HEALTH CARE EDUCATION/TRAINING PROGRAM

## 2024-09-17 PROCEDURE — 25000003 PHARM REV CODE 250: Performed by: HOSPITALIST

## 2024-09-17 PROCEDURE — 85025 COMPLETE CBC W/AUTO DIFF WBC: CPT | Performed by: HOSPITALIST

## 2024-09-17 PROCEDURE — 11000001 HC ACUTE MED/SURG PRIVATE ROOM

## 2024-09-17 PROCEDURE — A4216 STERILE WATER/SALINE, 10 ML: HCPCS | Performed by: HOSPITALIST

## 2024-09-17 PROCEDURE — 63600175 PHARM REV CODE 636 W HCPCS: Performed by: HOSPITALIST

## 2024-09-17 PROCEDURE — 36415 COLL VENOUS BLD VENIPUNCTURE: CPT | Performed by: HOSPITALIST

## 2024-09-17 RX ADMIN — MORPHINE SULFATE 1 MG: 2 INJECTION, SOLUTION INTRAMUSCULAR; INTRAVENOUS at 01:09

## 2024-09-17 RX ADMIN — METRONIDAZOLE 500 MG: 5 INJECTION, SOLUTION INTRAVENOUS at 05:09

## 2024-09-17 RX ADMIN — Medication 10 ML: at 01:09

## 2024-09-17 RX ADMIN — POLYETHYLENE GLYCOL 3350 17 G: 17 POWDER, FOR SOLUTION ORAL at 10:09

## 2024-09-17 RX ADMIN — METRONIDAZOLE 500 MG: 5 INJECTION, SOLUTION INTRAVENOUS at 12:09

## 2024-09-17 RX ADMIN — ASPIRIN 81 MG CHEWABLE TABLET 81 MG: 81 TABLET CHEWABLE at 10:09

## 2024-09-17 RX ADMIN — Medication 10 ML: at 10:09

## 2024-09-17 RX ADMIN — Medication 10 ML: at 06:09

## 2024-09-17 RX ADMIN — EZETIMIBE 10 MG: 10 TABLET ORAL at 10:09

## 2024-09-17 RX ADMIN — VALSARTAN 80 MG: 40 TABLET, FILM COATED ORAL at 10:09

## 2024-09-17 RX ADMIN — CEFTRIAXONE SODIUM 1 G: 1 INJECTION, POWDER, FOR SOLUTION INTRAMUSCULAR; INTRAVENOUS at 11:09

## 2024-09-17 RX ADMIN — SODIUM CHLORIDE, POTASSIUM CHLORIDE, SODIUM LACTATE AND CALCIUM CHLORIDE 250 ML: 600; 310; 30; 20 INJECTION, SOLUTION INTRAVENOUS at 05:09

## 2024-09-17 RX ADMIN — METRONIDAZOLE 500 MG: 5 INJECTION, SOLUTION INTRAVENOUS at 09:09

## 2024-09-17 RX ADMIN — ISOSORBIDE MONONITRATE 30 MG: 30 TABLET, EXTENDED RELEASE ORAL at 10:09

## 2024-09-17 RX ADMIN — ENOXAPARIN SODIUM 30 MG: 30 INJECTION SUBCUTANEOUS at 05:09

## 2024-09-17 RX ADMIN — CARVEDILOL 12.5 MG: 12.5 TABLET, FILM COATED ORAL at 10:09

## 2024-09-17 RX ADMIN — BUMETANIDE 2 MG: 1 TABLET ORAL at 10:09

## 2024-09-17 NOTE — SUBJECTIVE & OBJECTIVE
Interval History:  History obtained with the help of electronic .  Reports abdominal pain, poor p.o. intake.  Dark brown urine in canister.  Hold diuretic until p.o. intake improves.  No family bedside.    Review of Systems  Objective:     Vital Signs (Most Recent):  Temp: 98.5 °F (36.9 °C) (09/17/24 1134)  Pulse: 72 (09/17/24 1134)  Resp: 18 (09/17/24 1329)  BP: (!) 106/58 (09/17/24 1325)  SpO2: (!) 92 % (09/17/24 1134) Vital Signs (24h Range):  Temp:  [97 °F (36.1 °C)-98.5 °F (36.9 °C)] 98.5 °F (36.9 °C)  Pulse:  [67-85] 72  Resp:  [18-23] 18  SpO2:  [92 %-100 %] 92 %  BP: ()/(47-58) 106/58     Weight: 81.6 kg (180 lb)  Body mass index is 36.36 kg/m².    Intake/Output Summary (Last 24 hours) at 9/17/2024 1625  Last data filed at 9/17/2024 0521  Gross per 24 hour   Intake --   Output 200 ml   Net -200 ml         Physical Exam  Vitals and nursing note reviewed.   Constitutional:       General: She is not in acute distress.     Appearance: She is obese.   HENT:      Mouth/Throat:      Mouth: Mucous membranes are dry.   Cardiovascular:      Rate and Rhythm: Normal rate and regular rhythm.      Heart sounds: Normal heart sounds. No murmur heard.  Pulmonary:      Effort: Pulmonary effort is normal. No respiratory distress.   Abdominal:      Palpations: Abdomen is soft.      Tenderness: There is abdominal tenderness.   Musculoskeletal:      Right lower leg: No edema.      Left lower leg: No edema.   Skin:     General: Skin is warm and dry.      Findings: No bruising.   Neurological:      General: No focal deficit present.      Mental Status: She is alert.             Significant Labs: All pertinent labs within the past 24 hours have been reviewed.    Significant Imaging: I have reviewed all pertinent imaging results/findings within the past 24 hours.

## 2024-09-17 NOTE — SUBJECTIVE & OBJECTIVE
Interval History: No acute events overnight. Tolerated clear liquids yesterday, however the broth made her have abdominal pain. However, patient feels hungry and requesting regular food this morning. No abdominal pain right now. WBC up to 14 from 12 yesterday.     Medications:  Continuous Infusions:  Scheduled Meds:   aspirin  81 mg Oral Daily    bumetanide  2 mg Oral Daily    carvediloL  12.5 mg Oral BID WM    cefTRIAXone (Rocephin) IV (PEDS and ADULTS)  1 g Intravenous Q24H    enoxparin  30 mg Subcutaneous Daily    ezetimibe  10 mg Oral Daily    isosorbide mononitrate  30 mg Oral Daily    metroNIDAZOLE IV (PEDS and ADULTS)  500 mg Intravenous Q8H    polyethylene glycol  17 g Oral Daily    sodium chloride 0.9%  10 mL Intravenous Q8H    valsartan  80 mg Oral Daily     PRN Meds:  Current Facility-Administered Medications:     acetaminophen, 1,000 mg, Oral, Q8H PRN    acetaminophen, 650 mg, Oral, Q4H PRN    dextrose 10%, 12.5 g, Intravenous, PRN    dextrose 10%, 25 g, Intravenous, PRN    glucagon (human recombinant), 1 mg, Intramuscular, PRN    glucose, 16 g, Oral, PRN    glucose, 24 g, Oral, PRN    melatonin, 6 mg, Oral, Nightly PRN    morphine, 1 mg, Intravenous, Q6H PRN    naloxone, 0.02 mg, Intravenous, PRN    ondansetron, 8 mg, Oral, Q8H PRN    ondansetron, 4 mg, Intravenous, Q8H PRN    pneumoc 20-paul conj-dip cr(PF), 0.5 mL, Intramuscular, vaccine x 1 dose     Review of patient's allergies indicates:   Allergen Reactions    Codeine     Latex, natural rubber     Penicillins     Penicillin Rash     Objective:     Vital Signs (Most Recent):  Temp: 97 °F (36.1 °C) (09/17/24 0400)  Pulse: 78 (09/17/24 0400)  Resp: 18 (09/17/24 0400)  BP: (!) 110/54 (09/17/24 0400)  SpO2: (!) 93 % (09/17/24 0400) Vital Signs (24h Range):  Temp:  [97 °F (36.1 °C)-98.2 °F (36.8 °C)] 97 °F (36.1 °C)  Pulse:  [67-78] 78  Resp:  [17-23] 18  SpO2:  [93 %-100 %] 93 %  BP: ()/(51-69) 110/54     Weight: 81.6 kg (180 lb)  Body mass index  is 36.36 kg/m².    Intake/Output - Last 3 Shifts         09/15 0700  09/16 0659 09/16 0700  09/17 0659 09/17 0700  09/18 0659    IV Piggyback  188.4     Total Intake(mL/kg)  188.4 (2.3)     Urine (mL/kg/hr)  200 (0.1)     Total Output  200     Net  -11.6                     Physical Exam  Constitutional:       General: She is not in acute distress.     Appearance: Normal appearance.   HENT:      Head: Normocephalic and atraumatic.   Cardiovascular:      Rate and Rhythm: Normal rate.   Pulmonary:      Effort: Pulmonary effort is normal. No respiratory distress.   Abdominal:      General: Abdomen is flat. There is no distension.      Palpations: Abdomen is soft.      Tenderness: There is no abdominal tenderness.   Skin:     General: Skin is warm and dry.   Neurological:      General: No focal deficit present.      Mental Status: She is alert and oriented to person, place, and time.   Psychiatric:         Mood and Affect: Mood normal.         Behavior: Behavior normal.          Significant Labs:  I have reviewed all pertinent lab results within the past 24 hours.    Significant Diagnostics:  I have reviewed all pertinent imaging results/findings within the past 24 hours.

## 2024-09-17 NOTE — PLAN OF CARE
SALAZAR met with pt at bedside to discuss dc planning. Pts daughter present at bedside and answered all questions. Pts daughter declined . All information confirmed on chart. Pt resides in home with her daughter. Pt is provided assistance with ADLs from her daughter. Pt is able to ambulate with rw but daughter is near to assist. Per daughter pt uses wheelchair for long distances. Pts daughter declined  services. Upon dc pts daughter will provide transport home. SW will continue to follow pt throughout her transitions of care and assist with any dc needs.     Lily Ventura (Daughter)  [phone]     SALAZAR requested PCP follow up.     Future Appointments   Date Time Provider Department Center   9/17/2024  3:00 PM Thomas Soto DNP [facility]        09/17/24 1118   Discharge Assessment   Assessment Type Discharge Planning Assessment   Confirmed/corrected address, phone number and insurance Yes   Confirmed Demographics Correct on Facesheet   Source of Information family   Communicated ANA with patient/caregiver Yes   Reason For Admission Diverticulitis of intestine with abscess without bleeding   People in Home child(chris), adult   Do you expect to return to your current living situation? Yes   Do you have help at home or someone to help you manage your care at home? Yes   Who are your caregiver(s) and their phone number(s)? Lily Ventura (Daughter)  [phone]   Prior to hospitilization cognitive status: Unable to Assess   Current cognitive status: Unable to Assess   Walking or Climbing Stairs Difficulty yes   Walking or Climbing Stairs ambulation difficulty, requires equipment   Dressing/Bathing Difficulty yes   Dressing/Bathing bathing difficulty, requires equipment   Home Layout Able to live on 1st floor   Equipment Currently Used at Home walker, rolling;rollator;wheelchair;bedside commode   Readmission within 30 days? No   Patient currently being followed by outpatient case management?  No   Do you currently have service(s) that help you manage your care at home? No   Do you take prescription medications? Yes   Do you have prescription coverage? Yes   Coverage [employer] MANAGED MEDICARE - [employer] HMO PPO SPECIAL NEEDS   Do you have any problems affording any of your prescribed medications? No   Is the patient taking medications as prescribed? yes   Who is going to help you get home at discharge? Lily Ventura (Daughter)  [phone]   How do you get to doctors appointments? family or friend will provide   Are you on dialysis? No   Do you take coumadin? No   Discharge Plan A Home with family   DME Needed Upon Discharge  none   Discharge Plan discussed with: Patient;Adult children   Transition of Care Barriers None   Physical Activity   On average, how many days per week do you engage in moderate to strenuous exercise (like a brisk walk)? 0 days   On average, how many minutes do you engage in exercise at this level? 0 min   Financial Resource Strain   How hard is it for you to pay for the very basics like food, housing, medical care, and heating? Not hard   Housing Stability   In the last 12 months, was there a time when you were not able to pay the mortgage or rent on time? N   At any time in the past 12 months, were you homeless or living in a shelter (including now)? N   Transportation Needs   Has the lack of transportation kept you from medical appointments, meetings, work or from getting things needed for daily living? No   Food Insecurity   Within the past 12 months, you worried that your food would run out before you got the money to buy more. Sometimes   Within the past 12 months, the food you bought just didn't last and you didn't have money to get more. Sometimes   Stress   Do you feel stress - tense, restless, nervous, or anxious, or unable to sleep at night because your mind is troubled all the time - these days? Not at all   Alcohol Use   Q1: How often do you have a drink containing  alcohol? Never   Q2: How many drinks containing alcohol do you have on a typical day when you are drinking? None   Q3: How often do you have six or more drinks on one occasion? Never   Utilities   In the past 12 months has the electric, gas, oil, or water company threatened to shut off services in your home? No   Health Literacy   How often do you need to have someone help you when you read instructions, pamphlets, or other written material from your doctor or pharmacy? Always  (Daughter assist with medical care)   OTHER   Name(s) of People in Home Lily Ventura (Daughter)  [phone]        02-Oct-2018 00:13

## 2024-09-17 NOTE — ASSESSMENT & PLAN NOTE
Ms. Perez is a 89 y.o. female  with evidence of diverticulitis    - Patient seen and examined  - Labs and imaging reviewed  - No plans for surgical intervention, patient with complicated medical history and high risk for complications if she were to undergo general anesthesia  - Agree with antibiotics  - Diet: CLD for now, will discuss diet advancement with staff  - Possible abscess small would hold off on IR consult at this time, patient pain improving  - general surgery will follow along

## 2024-09-17 NOTE — PROGRESS NOTES
[facility]  General Surgery  Progress Note    Subjective:     History of Present Illness:  Ms. Perez is a 89 y.o. female with PMH of HFpEF, HTN, and CAD who presented with left flank pain and lower back pain that started earlier today. CT scan obtained showed evidence of diverticulitis. General surgery was consulted for evaluation. Patient takes DAPT at home. PSH includes hysterectomy and cholecystectomy. Patient states that she has not had this happen before. She reports improvement in her pain since presentation to ED    Post-Op Info:  * No surgery found *         Interval History: No acute events overnight. Tolerated clear liquids yesterday, however the broth made her have abdominal pain. However, patient feels hungry and requesting regular food this morning. No abdominal pain right now. WBC up to 14 from 12 yesterday.     Medications:  Continuous Infusions:  Scheduled Meds:   aspirin  81 mg Oral Daily    bumetanide  2 mg Oral Daily    carvediloL  12.5 mg Oral BID WM    cefTRIAXone (Rocephin) IV (PEDS and ADULTS)  1 g Intravenous Q24H    enoxparin  30 mg Subcutaneous Daily    ezetimibe  10 mg Oral Daily    isosorbide mononitrate  30 mg Oral Daily    metroNIDAZOLE IV (PEDS and ADULTS)  500 mg Intravenous Q8H    polyethylene glycol  17 g Oral Daily    sodium chloride 0.9%  10 mL Intravenous Q8H    valsartan  80 mg Oral Daily     PRN Meds:  Current Facility-Administered Medications:     acetaminophen, 1,000 mg, Oral, Q8H PRN    acetaminophen, 650 mg, Oral, Q4H PRN    dextrose 10%, 12.5 g, Intravenous, PRN    dextrose 10%, 25 g, Intravenous, PRN    glucagon (human recombinant), 1 mg, Intramuscular, PRN    glucose, 16 g, Oral, PRN    glucose, 24 g, Oral, PRN    melatonin, 6 mg, Oral, Nightly PRN    morphine, 1 mg, Intravenous, Q6H PRN    naloxone, 0.02 mg, Intravenous, PRN    ondansetron, 8 mg, Oral, Q8H PRN    ondansetron, 4 mg, Intravenous, Q8H PRN    pneumoc 20-paul conj-dip cr(PF), 0.5 mL, Intramuscular,  vaccine x 1 dose     Review of patient's allergies indicates:   Allergen Reactions    Codeine     Latex, natural rubber     Penicillins     Penicillin Rash     Objective:     Vital Signs (Most Recent):  Temp: 97 °F (36.1 °C) (09/17/24 0400)  Pulse: 78 (09/17/24 0400)  Resp: 18 (09/17/24 0400)  BP: (!) 110/54 (09/17/24 0400)  SpO2: (!) 93 % (09/17/24 0400) Vital Signs (24h Range):  Temp:  [97 °F (36.1 °C)-98.2 °F (36.8 °C)] 97 °F (36.1 °C)  Pulse:  [67-78] 78  Resp:  [17-23] 18  SpO2:  [93 %-100 %] 93 %  BP: ()/(51-69) 110/54     Weight: 81.6 kg (180 lb)  Body mass index is 36.36 kg/m².    Intake/Output - Last 3 Shifts         09/15 0700  09/16 0659 09/16 0700  09/17 0659 09/17 0700  09/18 0659    IV Piggyback  188.4     Total Intake(mL/kg)  188.4 (2.3)     Urine (mL/kg/hr)  200 (0.1)     Total Output  200     Net  -11.6                     Physical Exam  Constitutional:       General: She is not in acute distress.     Appearance: Normal appearance.   HENT:      Head: Normocephalic and atraumatic.   Cardiovascular:      Rate and Rhythm: Normal rate.   Pulmonary:      Effort: Pulmonary effort is normal. No respiratory distress.   Abdominal:      General: Abdomen is flat. There is no distension.      Palpations: Abdomen is soft.      Tenderness: There is no abdominal tenderness.   Skin:     General: Skin is warm and dry.   Neurological:      General: No focal deficit present.      Mental Status: She is alert and oriented to person, place, and time.   Psychiatric:         Mood and Affect: Mood normal.         Behavior: Behavior normal.          Significant Labs:  I have reviewed all pertinent lab results within the past 24 hours.    Significant Diagnostics:  I have reviewed all pertinent imaging results/findings within the past 24 hours.  Assessment/Plan:     * Diverticulitis of intestine with abscess without bleeding  Ms. Perez is a 89 y.o. female  with evidence of diverticulitis    - Patient seen and  examined  - Labs and imaging reviewed  - No plans for surgical intervention, patient with complicated medical history and high risk for complications if she were to undergo general anesthesia  - Agree with antibiotics  - Diet: CLD for now, will discuss diet advancement with staff  - Possible abscess small would hold off on IR consult at this time, patient pain improving  - general surgery will follow along        Liss Winn MD  General Surgery  [facility] - Telemetry

## 2024-09-17 NOTE — PLAN OF CARE
VN note: Progress notes, plan of care, labs, vital signs, and orders reviewed.    Problem: Adult Inpatient Plan of Care  Goal: Plan of Care Review  Outcome: Progressing

## 2024-09-17 NOTE — PROGRESS NOTES
VIRTUAL NURSE:  Cued into patient's room.  Permission received per patient's daughter, Lily, to turn camera to view patient.  Introduced as VN that will be working with floor nurse and nursing assistant.  Educated patient's daughter on VN's role in patient care and  VIP model.  Plan of care reviewed with patient's daughter.  Education per flowsheet.   Informed patient's daughter that staff will round on patient every 2 hours but to use call light for any other needs they may have; informed of fall risk and fall precautions.  Patient's daughter verbalized understanding.  Call light within reach; bed siderails up x3.  Opportunity given for questions and questions answered.  Admission assessment questions answered.  Patient denies complaints or any needs at this time. Instructed to call for assistance.  Will cont to monitor and intervene as needed.    Labs, notes, orders, and careplan reviewed.        09/16/24 1955   Admission   Initial VN Admission Questions Complete   Communication Issues? None   Shift   Virtual Nurse - Rounding Complete   Pain Management Interventions relaxation techniques promoted;quiet environment facilitated   Virtual Nurse - Patient Verbalized Approval Of VN Rounding;Camera Use   Type of Frequent Check   Type Patient Rounds   Safety/Activity   Patient Rounds bed in low position;bed wheels locked;call light in patient/parent reach;ID band on;clutter free environment maintained;placement of personal items at bedside;visualized patient   Safety Promotion/Fall Prevention Fall Risk reviewed with patient/family;instructed to call staff for mobility;family to remain at bedside   Positioning   Body Position supine   Head of Bed (HOB) Positioning HOB at 30-45 degrees

## 2024-09-17 NOTE — ASSESSMENT & PLAN NOTE
- stable diastolic CHF  - continue home carvedilol, valsartan.  Hold Bumex given poor p.o. intake  - daily weights, strict I/Os  - tele  - keep K >4, Mg >2  - 2D echo noted; deferring repeat at this hospitalization as she currently appears compensated

## 2024-09-17 NOTE — PROGRESS NOTES
[facility] Medicine  Progress Note    Patient Name: Marycruz Perez  MRN: [id number]  Patient Class: IP- Inpatient   Admission Date: 9/16/2024  Length of Stay: 1 days  Attending Physician: Kusum Keenan MD  Primary Care Provider: Melchor Menchaca -        Subjective:     Principal Problem:Diverticulitis of intestine with abscess without bleeding        HPI:  Ms. Perez is a 89yo woman with HFpEF, CHB s/p PPM, HTN, and CAD who presents with left flank pain and lower back pain x4 hours. She reports that she has developed left-sided flank pain.  Has had a few days of constipation where she was passing small pebble like bowel movements prior to this.  Denies any fevers or chills.  No bleeding symptoms.  Does have history of diverticulitis in the past.    Overview/Hospital Course:  No notes on file    Interval History:  History obtained with the help of electronic .  Reports abdominal pain, poor p.o. intake.  Dark brown urine in canister.  Hold diuretic until p.o. intake improves.  No family bedside.    Review of Systems  Objective:     Vital Signs (Most Recent):  Temp: 98.5 °F (36.9 °C) (09/17/24 1134)  Pulse: 72 (09/17/24 1134)  Resp: 18 (09/17/24 1329)  BP: (!) 106/58 (09/17/24 1325)  SpO2: (!) 92 % (09/17/24 1134) Vital Signs (24h Range):  Temp:  [97 °F (36.1 °C)-98.5 °F (36.9 °C)] 98.5 °F (36.9 °C)  Pulse:  [67-85] 72  Resp:  [18-23] 18  SpO2:  [92 %-100 %] 92 %  BP: ()/(47-58) 106/58     Weight: 81.6 kg (180 lb)  Body mass index is 36.36 kg/m².    Intake/Output Summary (Last 24 hours) at 9/17/2024 1625  Last data filed at 9/17/2024 0521  Gross per 24 hour   Intake --   Output 200 ml   Net -200 ml         Physical Exam  Vitals and nursing note reviewed.   Constitutional:       General: She is not in acute distress.     Appearance: She is obese.   HENT:      Mouth/Throat:      Mouth: Mucous membranes are dry.   Cardiovascular:      Rate and Rhythm: Normal rate and regular  rhythm.      Heart sounds: Normal heart sounds. No murmur heard.  Pulmonary:      Effort: Pulmonary effort is normal. No respiratory distress.   Abdominal:      Palpations: Abdomen is soft.      Tenderness: There is abdominal tenderness.   Musculoskeletal:      Right lower leg: No edema.      Left lower leg: No edema.   Skin:     General: Skin is warm and dry.      Findings: No bruising.   Neurological:      General: No focal deficit present.      Mental Status: She is alert.             Significant Labs: All pertinent labs within the past 24 hours have been reviewed.    Significant Imaging: I have reviewed all pertinent imaging results/findings within the past 24 hours.    Assessment/Plan:      * Diverticulitis of intestine with abscess without bleeding  -presents with left flank pain, found to have diverticulitis with abscess on CT imaging   -pain has substantially improved with morphine administration in the ER   -IV ceftriaxone and metronidazole; penicillin allergy  -general surgery consulted but do not suspect the patient will be good operative candidate; can consider IR evaluation for abscess drainage but treating with antibiotics for now      CKD (chronic kidney disease)  Creatine stable for now. BMP reviewed- noted Estimated Creatinine Clearance: 30.6 mL/min (based on SCr of 1.1 mg/dL). according to latest data. Based on current GFR, CKD stage is stage 2 - GFR 60-89.  Monitor UOP and serial BMP and adjust therapy as needed. Renally dose meds. Avoid nephrotoxic medications and procedures.    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 Morbid obesity complicates all aspects of disease management from diagnostic modalities to treatment. Weight loss encouraged and health benefits explained to patient.         PAD (peripheral artery disease)  -continue aspirin and ezetimibe      Chronic heart failure with preserved ejection fraction (HFpEF)  -  stable diastolic CHF  - continue home carvedilol, valsartan.  Hold Bumex given poor p.o. 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 Latest blood pressure and vitals reviewed-     Temp:  [97 °F (36.1 °C)-98.5 °F (36.9 °C)]   Pulse:  [67-85]   Resp:  [18-23]   BP: ()/(47-58)   SpO2:  [92 %-100 %] .   Home meds for hypertension were reviewed and noted below.   Hypertension Medications               bumetanide (BUMEX) 0.5 MG Tab Take 2 tablets (1 mg total) by mouth once daily. May also take 2 tablets (1 mg total) daily as needed (> 2 pound weight gain in 24 hours, > 5 pound weight gain in a week, worsening leg swelling, shortness of breath).    carvediloL (COREG) 12.5 MG tablet Take 1 tablet (12.5 mg total) by mouth 2 (two) times daily with meals.    isosorbide mononitrate (IMDUR) 30 MG 24 hr tablet Take 1 tablet (30 mg total) by mouth once daily.    nitroGLYCERIN (NITROSTAT) 0.4 MG SL tablet Place 1 tablet (0.4 mg total) under the tongue every 5 (five) minutes as needed for Chest pain (angina).    valsartan (DIOVAN) 80 MG tablet Take 1 tablet (80 mg total) by mouth once daily.            While in the hospital, will manage blood pressure as follows; Continue home antihypertensive regimen except hold diuretic given poor p.o. intake    Will utilize p.r.n. blood pressure medication only if patient's blood pressure greater than 180/110 and she develops symptoms such as worsening chest pain or shortness of breath.      VTE Risk Mitigation (From admission, onward)           Ordered     enoxaparin injection 30 mg  Daily         09/16/24 1137     IP VTE HIGH RISK PATIENT  Once         09/16/24 1137     Place sequential compression device  Until discontinued          09/16/24 1137                    Discharge Planning   ANA:      Code Status: Full Code   Is the patient medically ready for discharge?:     Reason for patient still in hospital (select all that apply): Patient trending condition and Consult recommendations  Discharge Plan A: Home with family              Kusum Keenan MD  Department of [facility] Medicine   [facility]

## 2024-09-17 NOTE — PLAN OF CARE
Problem: Adult Inpatient Plan of Care  Goal: Plan of Care Review  Outcome: Progressing  Goal: Patient-Specific Goal (Individualized)  Outcome: Progressing  Goal: Absence of Hospital-Acquired Illness or Injury  Outcome: Progressing  Goal: Optimal Comfort and Wellbeing  Outcome: Progressing  Goal: Readiness for Transition of Care  Outcome: Progressing     Problem: Infection  Goal: Absence of Infection Signs and Symptoms  Outcome: Progressing     Problem: Skin Injury Risk Increased  Goal: Skin Health and Integrity  Outcome: Progressing     Problem: Intestinal Obstruction  Goal: Optimal Bowel Function  Outcome: Progressing  Goal: Fluid and Electrolyte Balance  Outcome: Progressing  Goal: Absence of Infection Signs and Symptoms  Outcome: Progressing  Goal: Optimize Nutrition Status  Outcome: Progressing  Goal: Optimal Pain Control and Function  Outcome: Progressing

## 2024-09-17 NOTE — PLAN OF CARE
Problem: Skin Injury Risk Increased  Goal: Skin Health and Integrity  Outcome: Progressing     Problem: Infection  Goal: Absence of Infection Signs and Symptoms  Outcome: Progressing

## 2024-09-18 LAB
ANION GAP SERPL CALC-SCNC: 13 MMOL/L (ref 8–16)
BASOPHILS # BLD AUTO: 0.05 K/UL (ref 0–0.2)
BASOPHILS NFR BLD: 0.4 % (ref 0–1.9)
BUN SERPL-MCNC: 22 MG/DL (ref 10–30)
CALCIUM SERPL-MCNC: 8.8 MG/DL (ref 8.7–10.5)
CHLORIDE SERPL-SCNC: 106 MMOL/L (ref 95–110)
CO2 SERPL-SCNC: 20 MMOL/L (ref 23–29)
CREAT SERPL-MCNC: 1.2 MG/DL (ref 0.5–1.4)
DIFFERENTIAL METHOD BLD: ABNORMAL
EOSINOPHIL # BLD AUTO: 0.1 K/UL (ref 0–0.5)
EOSINOPHIL NFR BLD: 0.5 % (ref 0–8)
ERYTHROCYTE [DISTWIDTH] IN BLOOD BY AUTOMATED COUNT: 13.7 % (ref 11.5–14.5)
EST. GFR  (NO RACE VARIABLE): 42 ML/MIN/1.73 M^2
GLUCOSE SERPL-MCNC: 101 MG/DL (ref 70–110)
HCT VFR BLD AUTO: 33.4 % (ref 37–48.5)
HGB BLD-MCNC: 10.2 G/DL (ref 12–16)
IMM GRANULOCYTES # BLD AUTO: 0.05 K/UL (ref 0–0.04)
IMM GRANULOCYTES NFR BLD AUTO: 0.4 % (ref 0–0.5)
LYMPHOCYTES # BLD AUTO: 1.3 K/UL (ref 1–4.8)
LYMPHOCYTES NFR BLD: 10.1 % (ref 18–48)
MCH RBC QN AUTO: 28.9 PG (ref 27–31)
MCHC RBC AUTO-ENTMCNC: 30.5 G/DL (ref 32–36)
MCV RBC AUTO: 95 FL (ref 82–98)
MONOCYTES # BLD AUTO: 1 K/UL (ref 0.3–1)
MONOCYTES NFR BLD: 7.8 % (ref 4–15)
NEUTROPHILS # BLD AUTO: 10.5 K/UL (ref 1.8–7.7)
NEUTROPHILS NFR BLD: 80.8 % (ref 38–73)
NRBC BLD-RTO: 0 /100 WBC
PLATELET # BLD AUTO: 140 K/UL (ref 150–450)
PMV BLD AUTO: 11.6 FL (ref 9.2–12.9)
POTASSIUM SERPL-SCNC: 3.7 MMOL/L (ref 3.5–5.1)
RBC # BLD AUTO: 3.53 M/UL (ref 4–5.4)
SODIUM SERPL-SCNC: 139 MMOL/L (ref 136–145)
WBC # BLD AUTO: 12.95 K/UL (ref 3.9–12.7)

## 2024-09-18 PROCEDURE — 94799 UNLISTED PULMONARY SVC/PX: CPT

## 2024-09-18 PROCEDURE — 36415 COLL VENOUS BLD VENIPUNCTURE: CPT | Performed by: HOSPITALIST

## 2024-09-18 PROCEDURE — 85025 COMPLETE CBC W/AUTO DIFF WBC: CPT | Performed by: HOSPITALIST

## 2024-09-18 PROCEDURE — A4216 STERILE WATER/SALINE, 10 ML: HCPCS | Performed by: HOSPITALIST

## 2024-09-18 PROCEDURE — 11000001 HC ACUTE MED/SURG PRIVATE ROOM

## 2024-09-18 PROCEDURE — 99232 SBSQ HOSP IP/OBS MODERATE 35: CPT | Mod: ,,, | Performed by: STUDENT IN AN ORGANIZED HEALTH CARE EDUCATION/TRAINING PROGRAM

## 2024-09-18 PROCEDURE — 99900035 HC TECH TIME PER 15 MIN (STAT)

## 2024-09-18 PROCEDURE — 25000003 PHARM REV CODE 250: Performed by: STUDENT IN AN ORGANIZED HEALTH CARE EDUCATION/TRAINING PROGRAM

## 2024-09-18 PROCEDURE — 25000003 PHARM REV CODE 250: Performed by: HOSPITALIST

## 2024-09-18 PROCEDURE — 63600175 PHARM REV CODE 636 W HCPCS: Performed by: HOSPITALIST

## 2024-09-18 PROCEDURE — 94761 N-INVAS EAR/PLS OXIMETRY MLT: CPT

## 2024-09-18 PROCEDURE — 80048 BASIC METABOLIC PNL TOTAL CA: CPT | Performed by: HOSPITALIST

## 2024-09-18 RX ORDER — LACTULOSE 10 G/15ML
20 SOLUTION ORAL EVERY 6 HOURS PRN
Status: DISCONTINUED | OUTPATIENT
Start: 2024-09-18 | End: 2024-09-18

## 2024-09-18 RX ORDER — VALSARTAN 40 MG/1
40 TABLET ORAL DAILY
Status: DISCONTINUED | OUTPATIENT
Start: 2024-09-19 | End: 2024-09-27 | Stop reason: HOSPADM

## 2024-09-18 RX ORDER — AMOXICILLIN 250 MG
1 CAPSULE ORAL 2 TIMES DAILY
Status: DISCONTINUED | OUTPATIENT
Start: 2024-09-18 | End: 2024-09-18

## 2024-09-18 RX ORDER — CARVEDILOL 6.25 MG/1
6.25 TABLET ORAL 2 TIMES DAILY WITH MEALS
Status: DISCONTINUED | OUTPATIENT
Start: 2024-09-18 | End: 2024-09-27 | Stop reason: HOSPADM

## 2024-09-18 RX ORDER — POLYETHYLENE GLYCOL 3350 17 G/17G
17 POWDER, FOR SOLUTION ORAL 2 TIMES DAILY
Status: DISCONTINUED | OUTPATIENT
Start: 2024-09-18 | End: 2024-09-18

## 2024-09-18 RX ORDER — POTASSIUM CHLORIDE 750 MG/1
30 TABLET, EXTENDED RELEASE ORAL ONCE
Status: COMPLETED | OUTPATIENT
Start: 2024-09-18 | End: 2024-09-18

## 2024-09-18 RX ORDER — POLYETHYLENE GLYCOL 3350 17 G/17G
17 POWDER, FOR SOLUTION ORAL DAILY
Status: DISCONTINUED | OUTPATIENT
Start: 2024-09-19 | End: 2024-09-19

## 2024-09-18 RX ADMIN — METRONIDAZOLE 500 MG: 5 INJECTION, SOLUTION INTRAVENOUS at 04:09

## 2024-09-18 RX ADMIN — CARVEDILOL 6.25 MG: 6.25 TABLET, FILM COATED ORAL at 04:09

## 2024-09-18 RX ADMIN — ENOXAPARIN SODIUM 30 MG: 30 INJECTION SUBCUTANEOUS at 04:09

## 2024-09-18 RX ADMIN — Medication 10 ML: at 10:09

## 2024-09-18 RX ADMIN — VALSARTAN 80 MG: 40 TABLET, FILM COATED ORAL at 09:09

## 2024-09-18 RX ADMIN — ASPIRIN 81 MG CHEWABLE TABLET 81 MG: 81 TABLET CHEWABLE at 09:09

## 2024-09-18 RX ADMIN — CARVEDILOL 12.5 MG: 12.5 TABLET, FILM COATED ORAL at 09:09

## 2024-09-18 RX ADMIN — ISOSORBIDE MONONITRATE 30 MG: 30 TABLET, EXTENDED RELEASE ORAL at 09:09

## 2024-09-18 RX ADMIN — METRONIDAZOLE 500 MG: 5 INJECTION, SOLUTION INTRAVENOUS at 01:09

## 2024-09-18 RX ADMIN — EZETIMIBE 10 MG: 10 TABLET ORAL at 09:09

## 2024-09-18 RX ADMIN — SENNOSIDES AND DOCUSATE SODIUM 1 TABLET: 8.6; 5 TABLET ORAL at 12:09

## 2024-09-18 RX ADMIN — POLYETHYLENE GLYCOL 3350 17 G: 17 POWDER, FOR SOLUTION ORAL at 09:09

## 2024-09-18 RX ADMIN — MORPHINE SULFATE 1 MG: 2 INJECTION, SOLUTION INTRAMUSCULAR; INTRAVENOUS at 10:09

## 2024-09-18 RX ADMIN — CEFTRIAXONE SODIUM 1 G: 1 INJECTION, POWDER, FOR SOLUTION INTRAMUSCULAR; INTRAVENOUS at 10:09

## 2024-09-18 RX ADMIN — Medication 10 ML: at 01:09

## 2024-09-18 RX ADMIN — Medication 10 ML: at 09:09

## 2024-09-18 RX ADMIN — METRONIDAZOLE 500 MG: 5 INJECTION, SOLUTION INTRAVENOUS at 11:09

## 2024-09-18 RX ADMIN — POTASSIUM CHLORIDE 30 MEQ: 750 TABLET, EXTENDED RELEASE ORAL at 04:09

## 2024-09-18 NOTE — SUBJECTIVE & OBJECTIVE
[facility] ED  eMERGENCY dEPARTMENTeNCOUnter      Pt Name: Celestino Velasco  MRN: [id number]  Shruthigfсветлана 1946  Date ofevaluation: 8/8/2019  Provider: Radha Barrios DO    CHIEF COMPLAINT       Chief Complaint   Patient presents with   [address] her right foot goes numb, struggling with depression         HISTORY OF PRESENT ILLNESS   (Location/Symptom, Timing/Onset,Context/Setting, Quality, Duration, Modifying Factors, Severity)  Note limiting factors. Celestino Velasco is a 67 y.o. female who presents to the emergency department . Patient comes in because she is worried that the numbness she has in her left foot will eventually moved to the right foot. She is already seeing a spine surgeon and was diagnosed with spinal stenosis. The surgeon told her that he did not feel that doing surgery would cure the numbness. The patient does not have any back pain and is not complaining of any weakness to her extremities. Patient currently lives alone and is afraid that if her right foot becomes numb also that she may not be able to drive. Currently she gets around her house with a cane but if she goes anywhere at all, she either needs assistance or needs a scooter. She does not really know why she cannot walk very well. She does have osteoarthritis of her knees. She is considering getting surgery on her left knee with Dr. Danielle Davidson. She is also depressed but is not suicidal or homicidal.  She follows at [facility] with a therapist there. She is currently on Paxil and Elavil. She would like to get off Elavil. HPI    NursingNotes were reviewed. REVIEW OF SYSTEMS    (2-9 systems for level 4, 10 or more for level 5)     Review of Systems   Constitutional: Negative for activity change, appetite change and fatigue. HENT: Negative for congestion and sore throat. Eyes: Negative for pain and visual disturbance. Respiratory: Negative for chest tightness and shortness of breath. Interval History:  continues to have abdominal pain. Requesting for something to help with bowel movement as she feels constipated.   Discussed with general surgery. Will hold off on aggressive bowel regimen. Continue IV anx & CLD for now.     Review of Systems  Objective:     Vital Signs (Most Recent):  Temp: 98 °F (36.7 °C) (09/18/24 1157)  Pulse: 74 (09/18/24 1157)  Resp: 17 (09/18/24 1157)  BP: (!) 104/55 (09/18/24 1157)  SpO2: (!) 93 % (09/18/24 1250) Vital Signs (24h Range):  Temp:  [97.9 °F (36.6 °C)-99 °F (37.2 °C)] 98 °F (36.7 °C)  Pulse:  [74-85] 74  Resp:  [17-18] 17  SpO2:  [90 %-93 %] 93 %  BP: ()/(49-59) 104/55     Weight: 83.9 kg (184 lb 15.5 oz)  Body mass index is 37.36 kg/m².    Intake/Output Summary (Last 24 hours) at 9/18/2024 1542  Last data filed at 9/18/2024 0503  Gross per 24 hour   Intake 180 ml   Output 600 ml   Net -420 ml         Physical Exam  Vitals and nursing note reviewed.   Constitutional:       General: She is not in acute distress.     Appearance: She is obese.   HENT:      Mouth/Throat:      Mouth: Mucous membranes are dry.   Cardiovascular:      Rate and Rhythm: Normal rate and regular rhythm.      Heart sounds: Normal heart sounds. No murmur heard.  Pulmonary:      Effort: Pulmonary effort is normal. No respiratory distress.   Abdominal:      Palpations: Abdomen is soft.      Tenderness: There is abdominal tenderness.   Musculoskeletal:      Right lower leg: No edema.      Left lower leg: No edema.   Skin:     General: Skin is warm and dry.      Findings: No bruising.   Neurological:      General: No focal deficit present.      Mental Status: She is alert.             Significant Labs: All pertinent labs within the past 24 hours have been reviewed.    Significant Imaging: I have reviewed all pertinent imaging results/findings within the past 24 hours.   Reviewed   URINE RT REFLEX TO CULTURE - Abnormal; Notable for the following components:       Result Value    Leukocyte Esterase, Urine MODERATE (*)     All other components within normal limits   MICROSCOPIC URINALYSIS - Abnormal; Notable for the following components:    WBC, UA 10-20 (*)     All other components within normal limits   URINE CULTURE       All other labs were within normal range or not returned as of this dictation. EMERGENCY DEPARTMENT COURSE and DIFFERENTIAL DIAGNOSIS/MDM:   Vitals:    Vitals:    08/08/19 1427 08/08/19 1553   BP: (!) 161/78 (!) 141/94   Pulse: 86 99   Resp: 17 16   Temp: 98.5 °F (36.9 °C)    TempSrc: Oral    SpO2: 98% 94%   Weight: 232 lb (105.2 kg)    Height: 5' 5\" (1.651 m)        Patient came in more so because she is afraid that she will not be able to drive. She has numbness in her left foot from spinal stenosis which is certainly not new. There is no testing that needs to be done today. The patient is quite immobile and I believe that is her biggest problem. If she considers getting surgery on her left knee she will not do well because she is immobile now and will not rehab well. We are going to arrange for her to get outpatient physical therapy. MDM      REASSESSMENT          CRITICAL CARE TIME   Total Critical Care time was 0 minutes, excluding separatelyreportable procedures. There was a high probability ofclinically significant/life threatening deterioration in the patient's condition which required my urgent intervention. CONSULTS:  None    PROCEDURES:  Unless otherwise noted below, none     Procedures    FINAL IMPRESSION      1. Gait disturbance    2. Numbness of left foot    3.  History of spinal stenosis          DISPOSITION/PLAN   DISPOSITION Decision To Discharge 08/08/2019 04:10:09 PM      PATIENT REFERREDTO:  Perla Salcedo MD  [address] [place] [address] [phone]          physical therapy            DISCHARGEMEDICATIONS:  Shahrzad Barrios

## 2024-09-18 NOTE — ASSESSMENT & PLAN NOTE
Ms. Perez is a 89 y.o. female  with evidence of diverticulitis    - Patient seen and examined  - Labs and imaging reviewed  - No plans for surgical intervention, patient with complicated medical history and high risk for complications if she were to undergo general anesthesia  - Agree with antibiotics  - Diet:  We will make patient NPO at this point, given her continued abdominal pain with clear liquids and her white count remains elevated despite IV antibiotics.  - general surgery will follow along

## 2024-09-18 NOTE — PROGRESS NOTES
[facility] Medicine  Progress Note    Patient Name: Marycruz Perez  MRN: [id number]  Patient Class: IP- Inpatient   Admission Date: 9/16/2024  Length of Stay: 2 days  Attending Physician: Kusum Keenan MD  Primary Care Provider: Melchor Menchaca -        Subjective:     Principal Problem:Diverticulitis of intestine with abscess without bleeding        HPI:  Ms. Perez is a 89yo woman with HFpEF, CHB s/p PPM, HTN, and CAD who presents with left flank pain and lower back pain x4 hours. She reports that she has developed left-sided flank pain.  Has had a few days of constipation where she was passing small pebble like bowel movements prior to this.  Denies any fevers or chills.  No bleeding symptoms.  Does have history of diverticulitis in the past.    Overview/Hospital Course:  No notes on file    Interval History:  continues to have abdominal pain. Requesting for something to help with bowel movement as she feels constipated.   Discussed with general surgery. Will hold off on aggressive bowel regimen. Continue IV anx & CLD for now.     Review of Systems  Objective:     Vital Signs (Most Recent):  Temp: 98 °F (36.7 °C) (09/18/24 1157)  Pulse: 74 (09/18/24 1157)  Resp: 17 (09/18/24 1157)  BP: (!) 104/55 (09/18/24 1157)  SpO2: (!) 93 % (09/18/24 1250) Vital Signs (24h Range):  Temp:  [97.9 °F (36.6 °C)-99 °F (37.2 °C)] 98 °F (36.7 °C)  Pulse:  [74-85] 74  Resp:  [17-18] 17  SpO2:  [90 %-93 %] 93 %  BP: ()/(49-59) 104/55     Weight: 83.9 kg (184 lb 15.5 oz)  Body mass index is 37.36 kg/m².    Intake/Output Summary (Last 24 hours) at 9/18/2024 1542  Last data filed at 9/18/2024 0503  Gross per 24 hour   Intake 180 ml   Output 600 ml   Net -420 ml         Physical Exam  Vitals and nursing note reviewed.   Constitutional:       General: She is not in acute distress.     Appearance: She is obese.   HENT:      Mouth/Throat:      Mouth: Mucous membranes are dry.   Cardiovascular:      Rate and Rhythm:  Normal rate and regular rhythm.      Heart sounds: Normal heart sounds. No murmur heard.  Pulmonary:      Effort: Pulmonary effort is normal. No respiratory distress.   Abdominal:      Palpations: Abdomen is soft.      Tenderness: There is abdominal tenderness.   Musculoskeletal:      Right lower leg: No edema.      Left lower leg: No edema.   Skin:     General: Skin is warm and dry.      Findings: No bruising.   Neurological:      General: No focal deficit present.      Mental Status: She is alert.             Significant Labs: All pertinent labs within the past 24 hours have been reviewed.    Significant Imaging: I have reviewed all pertinent imaging results/findings within the past 24 hours.    Assessment/Plan:      * Diverticulitis of intestine with abscess without bleeding  -presents with left flank pain, found to have diverticulitis with abscess on CT imaging   -pain has substantially improved with morphine administration in the ER   -IV ceftriaxone and metronidazole; penicillin allergy  -general surgery consulted but do not suspect the patient will be good operative candidate; can consider IR evaluation for abscess drainage but treating with antibiotics for now  -clear liquid diet      CKD (chronic kidney disease)  Creatine stable for now. BMP reviewed- noted Estimated Creatinine Clearance: 30.6 mL/min (based on SCr of 1.1 mg/dL). according to latest data. Based on current GFR, CKD stage is stage 2 - GFR 60-89.  Monitor UOP and serial BMP and adjust therapy as needed. Renally dose meds. Avoid nephrotoxic medications and procedures.    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 Morbid obesity complicates all aspects of disease management from diagnostic modalities to treatment. Weight loss encouraged and health benefits explained to patient.         PAD (peripheral artery disease)  -continue aspirin and ezetimibe      Chronic heart failure  with preserved ejection fraction (HFpEF)  - stable diastolic CHF  - continue home carvedilol, valsartan.  Hold Bumex given poor p.o. 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 Latest blood pressure and vitals reviewed-     Temp:  [97.9 °F (36.6 °C)-99 °F (37.2 °C)]   Pulse:  [72-85]   Resp:  [17-18]   BP: ()/(49-59)   SpO2:  [90 %-93 %] .   Home meds for hypertension were reviewed and noted below.   Hypertension Medications               bumetanide (BUMEX) 0.5 MG Tab Take 2 tablets (1 mg total) by mouth once daily. May also take 2 tablets (1 mg total) daily as needed (> 2 pound weight gain in 24 hours, > 5 pound weight gain in a week, worsening leg swelling, shortness of breath).    carvediloL (COREG) 12.5 MG tablet Take 1 tablet (12.5 mg total) by mouth 2 (two) times daily with meals.    isosorbide mononitrate (IMDUR) 30 MG 24 hr tablet Take 1 tablet (30 mg total) by mouth once daily.    nitroGLYCERIN (NITROSTAT) 0.4 MG SL tablet Place 1 tablet (0.4 mg total) under the tongue every 5 (five) minutes as needed for Chest pain (angina).    valsartan (DIOVAN) 80 MG tablet Take 1 tablet (80 mg total) by mouth once daily.            While in the hospital, will manage blood pressure as follows; Adjust home antihypertensive regimen as follows- hold diuretic given poor p.o. intake, decrease dose of coreg given hypotension     Will utilize p.r.n. blood pressure medication only if patient's blood pressure greater than 180/110 and she develops symptoms such as worsening chest pain or shortness of breath.      VTE Risk Mitigation (From admission, onward)           Ordered     enoxaparin injection 30 mg  Daily         09/16/24 1137     IP VTE HIGH RISK PATIENT  Once          09/16/24 1137     Place sequential compression device  Until discontinued         09/16/24 1137                    Discharge Planning   AAN:      Code Status: Full Code   Is the patient medically ready for discharge?:     Reason for patient still in hospital (select all that apply): Patient trending condition, Treatment, and Consult recommendations  Discharge Plan A: Home with family   Discharge Delays: None known at this time        Kusum Keenan MD  Department of [facility]

## 2024-09-18 NOTE — NURSING
RAPID RESPONSE NURSE PROACTIVE ROUNDING NOTE       Left forearm 20g PIV placed via ultrasound. Blood return noted and flushed without difficulty.    FOLLOW UP    Call back the Rapid Response NurseShahid at [phone] for additional questions or concerns.

## 2024-09-18 NOTE — PROGRESS NOTES
[place] [facility]  General Surgery  Progress Note    Subjective:     History of Present Illness:  Ms. Perez is a 89 y.o. female with PMH of HFpEF, HTN, and CAD who presented with left flank pain and lower back pain that started earlier today. CT scan obtained showed evidence of diverticulitis. General surgery was consulted for evaluation. Patient takes DAPT at home. PSH includes hysterectomy and cholecystectomy. Patient states that she has not had this happen before. She reports improvement in her pain since presentation to ED    Post-Op Info:  * No surgery found *         Interval History:   Patient continues to have pain with clear liquids.  White count remains elevated although down trending 12.95 from 14.13 today.     Medications:  Continuous Infusions:  Scheduled Meds:   aspirin  81 mg Oral Daily    carvediloL  12.5 mg Oral BID WM    cefTRIAXone (Rocephin) IV (PEDS and ADULTS)  1 g Intravenous Q24H    enoxparin  30 mg Subcutaneous Daily    ezetimibe  10 mg Oral Daily    isosorbide mononitrate  30 mg Oral Daily    metroNIDAZOLE IV (PEDS and ADULTS)  500 mg Intravenous Q8H    polyethylene glycol  17 g Oral BID    senna-docusate 8.6-50 mg  1 tablet Oral BID    sodium chloride 0.9%  10 mL Intravenous Q8H    [START ON 9/19/2024] valsartan  40 mg Oral Daily     PRN Meds:  Current Facility-Administered Medications:     acetaminophen, 1,000 mg, Oral, Q8H PRN    acetaminophen, 650 mg, Oral, Q4H PRN    dextrose 10%, 12.5 g, Intravenous, PRN    dextrose 10%, 25 g, Intravenous, PRN    glucagon (human recombinant), 1 mg, Intramuscular, PRN    glucose, 16 g, Oral, PRN    glucose, 24 g, Oral, PRN    lactulose, 20 g, Oral, Q6H PRN    melatonin, 6 mg, Oral, Nightly PRN    morphine, 1 mg, Intravenous, Q6H PRN    naloxone, 0.02 mg, Intravenous, PRN    ondansetron, 8 mg, Oral, Q8H PRN    ondansetron, 4 mg, Intravenous, Q8H PRN    pneumoc 20-paul conj-dip cr(PF), 0.5 mL, Intramuscular, vaccine x 1 dose     Review of patient's  allergies indicates:   Allergen Reactions    Codeine     Latex, natural rubber     Penicillins     Penicillin Rash     Objective:     Vital Signs (Most Recent):  Temp: 99 °F (37.2 °C) (09/18/24 0750)  Pulse: 85 (09/18/24 0750)  Resp: 18 (09/18/24 1018)  BP: (!) 121/59 (09/18/24 0750)  SpO2: (!) 92 % (09/18/24 0750) Vital Signs (24h Range):  Temp:  [97.9 °F (36.6 °C)-99 °F (37.2 °C)] 99 °F (37.2 °C)  Pulse:  [75-85] 85  Resp:  [17-18] 18  SpO2:  [90 %-92 %] 92 %  BP: ()/(49-59) 121/59     Weight: 83.9 kg (184 lb 15.5 oz)  Body mass index is 37.36 kg/m².    Intake/Output - Last 3 Shifts         09/16 0700  09/17 0659 09/17 0700  09/18 0659 09/18 0700  09/19 0659    P.O.  180     IV Piggyback 188.4      Total Intake(mL/kg) 188.4 (2.3) 180 (2.1)     Urine (mL/kg/hr) 200 (0.1) 600 (0.3)     Total Output 200 600     Net -11.6 -420                     Physical Exam  Vitals and nursing note reviewed.   Constitutional:       Appearance: She is ill-appearing.   HENT:      Head: Normocephalic and atraumatic.   Cardiovascular:      Rate and Rhythm: Normal rate.   Pulmonary:      Effort: Pulmonary effort is normal.   Abdominal:      Comments: Abdomen soft, tender to palpation in the left lower quadrant   Skin:     General: Skin is warm and dry.   Neurological:      General: No focal deficit present.      Mental Status: She is alert and oriented to person, place, and time.          Significant Labs:  I have reviewed all pertinent lab results within the past 24 hours.  CBC:   Recent Labs   Lab 09/18/24  0247   WBC 12.95*   RBC 3.53*   HGB 10.2*   HCT 33.4*   *   MCV 95   MCH 28.9   MCHC 30.5*     CMP:   Recent Labs   Lab 09/16/24  1026 09/17/24  0555 09/18/24  0247   *   < > 101   CALCIUM 9.4   < > 8.8   ALBUMIN 3.1*  --   --    PROT 7.6  --   --       < > 139   K 5.1   < > 3.7   CO2 22*   < > 20*      < > 106   BUN 23   < > 22   CREATININE 1.1   < > 1.2   ALKPHOS 96  --   --    ALT 8*  --   --     AST 20  --   --    BILITOT 0.4  --   --     < > = values in this interval not displayed.       Significant Diagnostics:  I have reviewed all pertinent imaging results/findings within the past 24 hours.  Assessment/Plan:     * Diverticulitis of intestine with abscess without bleeding  Ms. Perez is a 89 y.o. female  with evidence of diverticulitis    - Patient seen and examined  - Labs and imaging reviewed  - No plans for surgical intervention, patient with complicated medical history and high risk for complications if she were to undergo general anesthesia  - Agree with antibiotics  - Diet:  We will make patient NPO at this point, given her continued abdominal pain with clear liquids and her white count remains elevated despite IV antibiotics.  - general surgery will follow along        Dez Haro MD  General Surgery  [facility] - Telemetry

## 2024-09-18 NOTE — ASSESSMENT & PLAN NOTE
Ms. Perez is a 89 y.o. female  with evidence of diverticulitis    - Patient seen and examined  - Labs and imaging reviewed  -given of trending leukocytosis, we will repeat CT scan today to evaluate for abscess progression  - No plans for surgical intervention, patient with complicated medical history and high risk for complications if she were to undergo general anesthesia  - Agree with antibiotics  - Diet:  Okay for clears  - general surgery will follow along

## 2024-09-18 NOTE — PLAN OF CARE
Problem: Adult Inpatient Plan of Care  Goal: Plan of Care Review  Outcome: Progressing  Goal: Absence of Hospital-Acquired Illness or Injury  Outcome: Progressing  Goal: Readiness for Transition of Care  Outcome: Progressing     Problem: Infection  Goal: Absence of Infection Signs and Symptoms  Outcome: Progressing     Problem: Skin Injury Risk Increased  Goal: Skin Health and Integrity  Outcome: Progressing     Problem: Intestinal Obstruction  Goal: Optimal Bowel Function  Outcome: Progressing  Goal: Optimal Pain Control and Function  Outcome: Progressing     Problem: Fall Injury Risk  Goal: Absence of Fall and Fall-Related Injury  Outcome: Progressing

## 2024-09-18 NOTE — PLAN OF CARE
SW made aware of no dc today by MD. Pts daughter will provide transport home upon dc. Pts daughter declined HH services upon dc. SW will continue to follow pt throughout her transitions of care and assist with any dc needs.     Sandra frances [facility] Follow Up  lissette sept 24, 2024  Patient has PCP follow up at [facility] with Dr. Chitra Haro on 9/24/24 at 2:10pm. [address]  [place] [id number]  [phone]        09/18/24 1037   Post-Acute Status   Post-Acute Authorization Other   Hospital Resources/Appts/Education Provided Appointments scheduled and added to AVS   Discharge Delays None known at this time   Discharge Plan   Discharge Plan A Home with family

## 2024-09-18 NOTE — SUBJECTIVE & OBJECTIVE
Interval History:  No acute events overnight.  Leukocytosis trending 14 from 12.  Continues to have abdominal pain with poor p.o. intake.    Medications:  Continuous Infusions:  Scheduled Meds:   aspirin  81 mg Oral Daily    carvediloL  12.5 mg Oral BID WM    cefTRIAXone (Rocephin) IV (PEDS and ADULTS)  1 g Intravenous Q24H    enoxparin  30 mg Subcutaneous Daily    ezetimibe  10 mg Oral Daily    isosorbide mononitrate  30 mg Oral Daily    metroNIDAZOLE IV (PEDS and ADULTS)  500 mg Intravenous Q8H    [START ON 9/19/2024] polyethylene glycol  17 g Oral Daily    senna-docusate 8.6-50 mg  1 tablet Oral BID    sodium chloride 0.9%  10 mL Intravenous Q8H    [START ON 9/19/2024] valsartan  40 mg Oral Daily     PRN Meds:  Current Facility-Administered Medications:     acetaminophen, 1,000 mg, Oral, Q8H PRN    acetaminophen, 650 mg, Oral, Q4H PRN    dextrose 10%, 12.5 g, Intravenous, PRN    dextrose 10%, 25 g, Intravenous, PRN    glucagon (human recombinant), 1 mg, Intramuscular, PRN    glucose, 16 g, Oral, PRN    glucose, 24 g, Oral, PRN    melatonin, 6 mg, Oral, Nightly PRN    morphine, 1 mg, Intravenous, Q6H PRN    naloxone, 0.02 mg, Intravenous, PRN    ondansetron, 8 mg, Oral, Q8H PRN    ondansetron, 4 mg, Intravenous, Q8H PRN    pneumoc 20-paul conj-dip cr(PF), 0.5 mL, Intramuscular, vaccine x 1 dose     Review of patient's allergies indicates:   Allergen Reactions    Codeine     Latex, natural rubber     Penicillins     Penicillin Rash     Objective:     Vital Signs (Most Recent):  Temp: 98 °F (36.7 °C) (09/18/24 1157)  Pulse: 74 (09/18/24 1157)  Resp: 17 (09/18/24 1157)  BP: (!) 104/55 (09/18/24 1157)  SpO2: (!) 93 % (09/18/24 1250) Vital Signs (24h Range):  Temp:  [97.9 °F (36.6 °C)-99 °F (37.2 °C)] 98 °F (36.7 °C)  Pulse:  [74-85] 74  Resp:  [17-18] 17  SpO2:  [90 %-93 %] 93 %  BP: ()/(49-59) 104/55     Weight: 83.9 kg (184 lb 15.5 oz)  Body mass index is 37.36 kg/m².    Intake/Output - Last 3 Shifts          09/16 0700  09/17 0659 09/17 0700  09/18 0659 09/18 0700  09/19 0659    P.O.  180     IV Piggyback 188.4      Total Intake(mL/kg) 188.4 (2.3) 180 (2.1)     Urine (mL/kg/hr) 200 (0.1) 600 (0.3)     Total Output 200 600     Net -11.6 -420                     Physical Exam  Vitals and nursing note reviewed.   Constitutional:       Appearance: She is ill-appearing.   HENT:      Head: Normocephalic and atraumatic.   Cardiovascular:      Rate and Rhythm: Normal rate.   Pulmonary:      Effort: Pulmonary effort is normal.   Abdominal:      General: Abdomen is flat.      Palpations: Abdomen is soft.      Comments: Abdomen soft, tender to palpation in the left lower quadrant   Skin:     General: Skin is warm and dry.   Neurological:      General: No focal deficit present.      Mental Status: She is alert and oriented to person, place, and time.          Significant Labs:  I have reviewed all pertinent lab results within the past 24 hours.    Significant Diagnostics:  I have reviewed all pertinent imaging results/findings within the past 24 hours.

## 2024-09-18 NOTE — ASSESSMENT & PLAN NOTE
Chronic, controlled. Latest blood pressure and vitals reviewed-     Temp:  [97.9 °F (36.6 °C)-99 °F (37.2 °C)]   Pulse:  [72-85]   Resp:  [17-18]   BP: ()/(49-59)   SpO2:  [90 %-93 %] .   Home meds for hypertension were reviewed and noted below.   Hypertension Medications               bumetanide (BUMEX) 0.5 MG Tab Take 2 tablets (1 mg total) by mouth once daily. May also take 2 tablets (1 mg total) daily as needed (> 2 pound weight gain in 24 hours, > 5 pound weight gain in a week, worsening leg swelling, shortness of breath).    carvediloL (COREG) 12.5 MG tablet Take 1 tablet (12.5 mg total) by mouth 2 (two) times daily with meals.    isosorbide mononitrate (IMDUR) 30 MG 24 hr tablet Take 1 tablet (30 mg total) by mouth once daily.    nitroGLYCERIN (NITROSTAT) 0.4 MG SL tablet Place 1 tablet (0.4 mg total) under the tongue every 5 (five) minutes as needed for Chest pain (angina).    valsartan (DIOVAN) 80 MG tablet Take 1 tablet (80 mg total) by mouth once daily.            While in the hospital, will manage blood pressure as follows; Adjust home antihypertensive regimen as follows- hold diuretic given poor p.o. intake, decrease dose of coreg given hypotension     Will utilize p.r.n. blood pressure medication only if patient's blood pressure greater than 180/110 and she develops symptoms such as worsening chest pain or shortness of breath.

## 2024-09-18 NOTE — ASSESSMENT & PLAN NOTE
-presents with left flank pain, found to have diverticulitis with abscess on CT imaging   -pain has substantially improved with morphine administration in the ER   -IV ceftriaxone and metronidazole; penicillin allergy  -general surgery consulted but do not suspect the patient will be good operative candidate; can consider IR evaluation for abscess drainage but treating with antibiotics for now  -clear liquid diet

## 2024-09-18 NOTE — SUBJECTIVE & OBJECTIVE
Interval History:   Patient continues to have pain with clear liquids.  White count remains elevated although down trending 12.95 from 14.13 today.     Medications:  Continuous Infusions:  Scheduled Meds:   aspirin  81 mg Oral Daily    carvediloL  12.5 mg Oral BID WM    cefTRIAXone (Rocephin) IV (PEDS and ADULTS)  1 g Intravenous Q24H    enoxparin  30 mg Subcutaneous Daily    ezetimibe  10 mg Oral Daily    isosorbide mononitrate  30 mg Oral Daily    metroNIDAZOLE IV (PEDS and ADULTS)  500 mg Intravenous Q8H    polyethylene glycol  17 g Oral BID    senna-docusate 8.6-50 mg  1 tablet Oral BID    sodium chloride 0.9%  10 mL Intravenous Q8H    [START ON 9/19/2024] valsartan  40 mg Oral Daily     PRN Meds:  Current Facility-Administered Medications:     acetaminophen, 1,000 mg, Oral, Q8H PRN    acetaminophen, 650 mg, Oral, Q4H PRN    dextrose 10%, 12.5 g, Intravenous, PRN    dextrose 10%, 25 g, Intravenous, PRN    glucagon (human recombinant), 1 mg, Intramuscular, PRN    glucose, 16 g, Oral, PRN    glucose, 24 g, Oral, PRN    lactulose, 20 g, Oral, Q6H PRN    melatonin, 6 mg, Oral, Nightly PRN    morphine, 1 mg, Intravenous, Q6H PRN    naloxone, 0.02 mg, Intravenous, PRN    ondansetron, 8 mg, Oral, Q8H PRN    ondansetron, 4 mg, Intravenous, Q8H PRN    pneumoc 20-paul conj-dip cr(PF), 0.5 mL, Intramuscular, vaccine x 1 dose     Review of patient's allergies indicates:   Allergen Reactions    Codeine     Latex, natural rubber     Penicillins     Penicillin Rash     Objective:     Vital Signs (Most Recent):  Temp: 99 °F (37.2 °C) (09/18/24 0750)  Pulse: 85 (09/18/24 0750)  Resp: 18 (09/18/24 1018)  BP: (!) 121/59 (09/18/24 0750)  SpO2: (!) 92 % (09/18/24 0750) Vital Signs (24h Range):  Temp:  [97.9 °F (36.6 °C)-99 °F (37.2 °C)] 99 °F (37.2 °C)  Pulse:  [75-85] 85  Resp:  [17-18] 18  SpO2:  [90 %-92 %] 92 %  BP: ()/(49-59) 121/59     Weight: 83.9 kg (184 lb 15.5 oz)  Body mass index is 37.36 kg/m².    Intake/Output -  Last 3 Shifts         09/16 0700 09/17 0659 09/17 0700 09/18 0659 09/18 0700 09/19 0659    P.O.  180     IV Piggyback 188.4      Total Intake(mL/kg) 188.4 (2.3) 180 (2.1)     Urine (mL/kg/hr) 200 (0.1) 600 (0.3)     Total Output 200 600     Net -11.6 -420                     Physical Exam  Vitals and nursing note reviewed.   Constitutional:       Appearance: She is ill-appearing.   HENT:      Head: Normocephalic and atraumatic.   Cardiovascular:      Rate and Rhythm: Normal rate.   Pulmonary:      Effort: Pulmonary effort is normal.   Abdominal:      Comments: Abdomen soft, tender to palpation in the left lower quadrant   Skin:     General: Skin is warm and dry.   Neurological:      General: No focal deficit present.      Mental Status: She is alert and oriented to person, place, and time.          Significant Labs:  I have reviewed all pertinent lab results within the past 24 hours.  CBC:   Recent Labs   Lab 09/18/24  0247   WBC 12.95*   RBC 3.53*   HGB 10.2*   HCT 33.4*   *   MCV 95   MCH 28.9   MCHC 30.5*     CMP:   Recent Labs   Lab 09/16/24  1026 09/17/24  0555 09/18/24  0247   *   < > 101   CALCIUM 9.4   < > 8.8   ALBUMIN 3.1*  --   --    PROT 7.6  --   --       < > 139   K 5.1   < > 3.7   CO2 22*   < > 20*      < > 106   BUN 23   < > 22   CREATININE 1.1   < > 1.2   ALKPHOS 96  --   --    ALT 8*  --   --    AST 20  --   --    BILITOT 0.4  --   --     < > = values in this interval not displayed.       Significant Diagnostics:  I have reviewed all pertinent imaging results/findings within the past 24 hours.

## 2024-09-19 PROBLEM — Z71.89 GOALS OF CARE, COUNSELING/DISCUSSION: Status: ACTIVE | Noted: 2024-09-19

## 2024-09-19 LAB
ANION GAP SERPL CALC-SCNC: 7 MMOL/L (ref 8–16)
BASOPHILS # BLD AUTO: 0.05 K/UL (ref 0–0.2)
BASOPHILS NFR BLD: 0.4 % (ref 0–1.9)
BNP SERPL-MCNC: 333 PG/ML (ref 0–99)
BUN SERPL-MCNC: 26 MG/DL (ref 10–30)
CALCIUM SERPL-MCNC: 8.9 MG/DL (ref 8.7–10.5)
CHLORIDE SERPL-SCNC: 106 MMOL/L (ref 95–110)
CO2 SERPL-SCNC: 26 MMOL/L (ref 23–29)
CREAT SERPL-MCNC: 1.2 MG/DL (ref 0.5–1.4)
DIFFERENTIAL METHOD BLD: ABNORMAL
EOSINOPHIL # BLD AUTO: 0.1 K/UL (ref 0–0.5)
EOSINOPHIL NFR BLD: 0.6 % (ref 0–8)
ERYTHROCYTE [DISTWIDTH] IN BLOOD BY AUTOMATED COUNT: 13.8 % (ref 11.5–14.5)
EST. GFR  (NO RACE VARIABLE): 42 ML/MIN/1.73 M^2
GLUCOSE SERPL-MCNC: 97 MG/DL (ref 70–110)
HCT VFR BLD AUTO: 29.9 % (ref 37–48.5)
HGB BLD-MCNC: 9.3 G/DL (ref 12–16)
IMM GRANULOCYTES # BLD AUTO: 0.07 K/UL (ref 0–0.04)
IMM GRANULOCYTES NFR BLD AUTO: 0.5 % (ref 0–0.5)
LYMPHOCYTES # BLD AUTO: 1.9 K/UL (ref 1–4.8)
LYMPHOCYTES NFR BLD: 13.6 % (ref 18–48)
MCH RBC QN AUTO: 28.7 PG (ref 27–31)
MCHC RBC AUTO-ENTMCNC: 31.1 G/DL (ref 32–36)
MCV RBC AUTO: 92 FL (ref 82–98)
MONOCYTES # BLD AUTO: 1.1 K/UL (ref 0.3–1)
MONOCYTES NFR BLD: 7.4 % (ref 4–15)
NEUTROPHILS # BLD AUTO: 11 K/UL (ref 1.8–7.7)
NEUTROPHILS NFR BLD: 77.5 % (ref 38–73)
NRBC BLD-RTO: 0 /100 WBC
PLATELET # BLD AUTO: 173 K/UL (ref 150–450)
PMV BLD AUTO: 11.9 FL (ref 9.2–12.9)
POTASSIUM SERPL-SCNC: 4.1 MMOL/L (ref 3.5–5.1)
RBC # BLD AUTO: 3.24 M/UL (ref 4–5.4)
SODIUM SERPL-SCNC: 139 MMOL/L (ref 136–145)
WBC # BLD AUTO: 14.14 K/UL (ref 3.9–12.7)

## 2024-09-19 PROCEDURE — 63600175 PHARM REV CODE 636 W HCPCS: Performed by: STUDENT IN AN ORGANIZED HEALTH CARE EDUCATION/TRAINING PROGRAM

## 2024-09-19 PROCEDURE — 25000003 PHARM REV CODE 250: Performed by: STUDENT IN AN ORGANIZED HEALTH CARE EDUCATION/TRAINING PROGRAM

## 2024-09-19 PROCEDURE — 36415 COLL VENOUS BLD VENIPUNCTURE: CPT | Performed by: HOSPITALIST

## 2024-09-19 PROCEDURE — 94799 UNLISTED PULMONARY SVC/PX: CPT

## 2024-09-19 PROCEDURE — 63600175 PHARM REV CODE 636 W HCPCS: Mod: JZ,JG

## 2024-09-19 PROCEDURE — 25500020 PHARM REV CODE 255: Performed by: STUDENT IN AN ORGANIZED HEALTH CARE EDUCATION/TRAINING PROGRAM

## 2024-09-19 PROCEDURE — 99223 1ST HOSP IP/OBS HIGH 75: CPT | Mod: NSCH,GC,, | Performed by: PHYSICIAN ASSISTANT

## 2024-09-19 PROCEDURE — 25000003 PHARM REV CODE 250: Performed by: HOSPITALIST

## 2024-09-19 PROCEDURE — 11000001 HC ACUTE MED/SURG PRIVATE ROOM

## 2024-09-19 PROCEDURE — 80048 BASIC METABOLIC PNL TOTAL CA: CPT | Performed by: HOSPITALIST

## 2024-09-19 PROCEDURE — 83880 ASSAY OF NATRIURETIC PEPTIDE: CPT | Performed by: STUDENT IN AN ORGANIZED HEALTH CARE EDUCATION/TRAINING PROGRAM

## 2024-09-19 PROCEDURE — 94761 N-INVAS EAR/PLS OXIMETRY MLT: CPT

## 2024-09-19 PROCEDURE — 99900035 HC TECH TIME PER 15 MIN (STAT)

## 2024-09-19 PROCEDURE — 63600175 PHARM REV CODE 636 W HCPCS: Mod: JZ,JG | Performed by: HOSPITALIST

## 2024-09-19 PROCEDURE — A4216 STERILE WATER/SALINE, 10 ML: HCPCS | Performed by: HOSPITALIST

## 2024-09-19 PROCEDURE — A9698 NON-RAD CONTRAST MATERIALNOC: HCPCS | Performed by: STUDENT IN AN ORGANIZED HEALTH CARE EDUCATION/TRAINING PROGRAM

## 2024-09-19 PROCEDURE — 99232 SBSQ HOSP IP/OBS MODERATE 35: CPT | Mod: ,,, | Performed by: STUDENT IN AN ORGANIZED HEALTH CARE EDUCATION/TRAINING PROGRAM

## 2024-09-19 PROCEDURE — 27000221 HC OXYGEN, UP TO 24 HOURS

## 2024-09-19 PROCEDURE — 85025 COMPLETE CBC W/AUTO DIFF WBC: CPT | Performed by: HOSPITALIST

## 2024-09-19 RX ORDER — CIPROFLOXACIN 2 MG/ML
400 INJECTION, SOLUTION INTRAVENOUS
Status: DISCONTINUED | OUTPATIENT
Start: 2024-09-19 | End: 2024-09-20

## 2024-09-19 RX ORDER — METRONIDAZOLE 500 MG/100ML
500 INJECTION, SOLUTION INTRAVENOUS
Status: DISCONTINUED | OUTPATIENT
Start: 2024-09-19 | End: 2024-09-27 | Stop reason: HOSPADM

## 2024-09-19 RX ORDER — DOCUSATE SODIUM 100 MG/1
100 CAPSULE, LIQUID FILLED ORAL DAILY
Status: DISCONTINUED | OUTPATIENT
Start: 2024-09-20 | End: 2024-09-20

## 2024-09-19 RX ORDER — BUMETANIDE 1 MG/1
1 TABLET ORAL DAILY
Status: DISCONTINUED | OUTPATIENT
Start: 2024-09-19 | End: 2024-09-20

## 2024-09-19 RX ADMIN — ASPIRIN 81 MG CHEWABLE TABLET 81 MG: 81 TABLET CHEWABLE at 08:09

## 2024-09-19 RX ADMIN — Medication 10 ML: at 05:09

## 2024-09-19 RX ADMIN — IOHEXOL 75 ML: 350 INJECTION, SOLUTION INTRAVENOUS at 12:09

## 2024-09-19 RX ADMIN — CARVEDILOL 6.25 MG: 6.25 TABLET, FILM COATED ORAL at 05:09

## 2024-09-19 RX ADMIN — METRONIDAZOLE 500 MG: 5 INJECTION, SOLUTION INTRAVENOUS at 02:09

## 2024-09-19 RX ADMIN — VALSARTAN 40 MG: 40 TABLET, FILM COATED ORAL at 08:09

## 2024-09-19 RX ADMIN — CIPROFLOXACIN 400 MG: 2 INJECTION, SOLUTION INTRAVENOUS at 11:09

## 2024-09-19 RX ADMIN — METRONIDAZOLE 500 MG: 5 INJECTION, SOLUTION INTRAVENOUS at 11:09

## 2024-09-19 RX ADMIN — ISOSORBIDE MONONITRATE 30 MG: 30 TABLET, EXTENDED RELEASE ORAL at 08:09

## 2024-09-19 RX ADMIN — IOHEXOL 1000 ML: 9 SOLUTION ORAL at 10:09

## 2024-09-19 RX ADMIN — CARVEDILOL 6.25 MG: 6.25 TABLET, FILM COATED ORAL at 08:09

## 2024-09-19 RX ADMIN — BUMETANIDE 1 MG: 1 TABLET ORAL at 10:09

## 2024-09-19 RX ADMIN — SODIUM CHLORIDE, POTASSIUM CHLORIDE, SODIUM LACTATE AND CALCIUM CHLORIDE 500 ML: 600; 310; 30; 20 INJECTION, SOLUTION INTRAVENOUS at 05:09

## 2024-09-19 RX ADMIN — METRONIDAZOLE 500 MG: 5 INJECTION, SOLUTION INTRAVENOUS at 06:09

## 2024-09-19 RX ADMIN — EZETIMIBE 10 MG: 10 TABLET ORAL at 08:09

## 2024-09-19 RX ADMIN — ENOXAPARIN SODIUM 30 MG: 30 INJECTION SUBCUTANEOUS at 05:09

## 2024-09-19 RX ADMIN — CIPROFLOXACIN 400 MG: 2 INJECTION, SOLUTION INTRAVENOUS at 08:09

## 2024-09-19 RX ADMIN — Medication 10 ML: at 06:09

## 2024-09-19 NOTE — PLAN OF CARE
Problem: Infection  Goal: Absence of Infection Signs and Symptoms  Outcome: Progressing     Problem: Skin Injury Risk Increased  Goal: Skin Health and Integrity  Outcome: Progressing     Problem: Intestinal Obstruction  Goal: Optimal Bowel Function  Outcome: Progressing  Goal: Absence of Infection Signs and Symptoms  Outcome: Progressing  Goal: Optimize Nutrition Status  Outcome: Progressing     Problem: Fall Injury Risk  Goal: Absence of Fall and Fall-Related Injury  Outcome: Progressing

## 2024-09-19 NOTE — ASSESSMENT & PLAN NOTE
Advance Care Planning     Briefly discussed code status with patient at bedside.  At this time she would like to remain a full code.  She has 4 living children.  No healthcare power of .  Patient would like daughter yvrose Bautista to help her make medical decisions if necessary.  Spent 5-8 minutes for ACP discussion.  Consider palliative care consult.

## 2024-09-19 NOTE — PT/OT/SLP PROGRESS
Physical Therapy      Patient Name:  Marycruz Perez   MRN:  [id number]    Patient not seen today secondary to Therapist assessment. PT attempted evaluation; daughter present in room and declined use of  as she is agreeable and able to provide translations as needed; Pt able to state name and ; however, reporting 10/10 back pain; daughter present and reporting pt has not been able to eat pending imaging. Pt falling asleep- not able to stay awake for long; not deemed appropriate/safe at this time to complete PT evaluation. Will follow-up as able.    24- (09:44-09:49)    JUDITH: CT 11:31    2nd therapy attempt: 13:17- Pt asleep- daughter declining therapy session at this time due to pt being very fatigued; asks to hold off on therapy evaluation until tomorrow (24)

## 2024-09-19 NOTE — PT/OT/SLP PROGRESS
Occupational Therapy missed visit      Patient Name:  Marycruz Perez   MRN:  [id number]    Patient not seen today secondary to Therapist assessment (per PT patient in 10/10 back pain and sleepy; daughter present and reporting patient has not been able to eat pending imaging.). Will follow-up as able.    9/19/2024

## 2024-09-19 NOTE — ASSESSMENT & PLAN NOTE
-presents with left flank pain, found to have diverticulitis with abscess on CT imaging   -pain has substantially improved with morphine administration in the ER   -IV ceftriaxone and metronidazole; penicillin allergy  -general surgery consulted but do not suspect the patient will be good operative candidate  -clear liquid diet    Repeat CT scan shows abscess has enlarged.  IR consult placed.  Does not appear to have a good window.

## 2024-09-19 NOTE — PROGRESS NOTES
[facility] Medicine  Progress Note    Patient Name: Marycruz Perez  MRN: [id number]  Patient Class: IP- Inpatient   Admission Date: 9/16/2024  Length of Stay: 3 days  Attending Physician: Kusum Keenan MD  Primary Care Provider: Melchor Menchaca -        Subjective:     Principal Problem:Diverticulitis of intestine with abscess without bleeding        HPI:  Ms. Perez is a 89yo woman with HFpEF, CHB s/p PPM, HTN, and CAD who presents with left flank pain and lower back pain x4 hours. She reports that she has developed left-sided flank pain.  Has had a few days of constipation where she was passing small pebble like bowel movements prior to this.  Denies any fevers or chills.  No bleeding symptoms.  Does have history of diverticulitis in the past.    Overview/Hospital Course:  No notes on file    Interval History:  Reports having 1 large loose stool following which feels better overall but continues to have abdominal pain.  Per daughter at bedside p.o. intake has improved.    Patient prefer daughter acted as .    Repeat CT scan shows slight enlargement of peridiverticular abscess.  IR consulted.    Review of Systems  Objective:     Vital Signs (Most Recent):  Temp: 97.6 °F (36.4 °C) (09/19/24 1623)  Pulse: 70 (09/19/24 1623)  Resp: 18 (09/19/24 1623)  BP: (!) 111/56 (09/19/24 1623)  SpO2: 96 % (09/19/24 1623) Vital Signs (24h Range):  Temp:  [97.3 °F (36.3 °C)-98.8 °F (37.1 °C)] 97.6 °F (36.4 °C)  Pulse:  [64-73] 70  Resp:  [18] 18  SpO2:  [92 %-98 %] 96 %  BP: ()/(44-59) 111/56     Weight: 83.9 kg (184 lb 15.5 oz)  Body mass index is 37.36 kg/m².    Intake/Output Summary (Last 24 hours) at 9/19/2024 1654  Last data filed at 9/19/2024 0459  Gross per 24 hour   Intake 400 ml   Output 250 ml   Net 150 ml         Physical Exam  Vitals and nursing note reviewed.   Constitutional:       General: She is not in acute distress.     Appearance: She is obese.   HENT:       Mouth/Throat:      Mouth: Mucous membranes are dry.   Cardiovascular:      Rate and Rhythm: Normal rate and regular rhythm.      Heart sounds: Normal heart sounds. No murmur heard.  Pulmonary:      Effort: Pulmonary effort is normal. No respiratory distress.   Abdominal:      Palpations: Abdomen is soft.      Tenderness: There is abdominal tenderness.   Musculoskeletal:      Right lower leg: No edema.      Left lower leg: No edema.   Skin:     General: Skin is warm and dry.      Findings: No bruising.   Neurological:      General: No focal deficit present.      Mental Status: She is alert.             Significant Labs: All pertinent labs within the past 24 hours have been reviewed.    Significant Imaging: I have reviewed all pertinent imaging results/findings within the past 24 hours.    Assessment/Plan:      * Diverticulitis of intestine with abscess without bleeding  -presents with left flank pain, found to have diverticulitis with abscess on CT imaging   -pain has substantially improved with morphine administration in the ER   -IV ceftriaxone and metronidazole; penicillin allergy  -general surgery consulted but do not suspect the patient will be good operative candidate  -clear liquid diet    Repeat CT scan shows abscess has enlarged.  IR consult placed.  Does not appear to have a good window.      Goals of care, counseling/discussion  Advance Care Planning    Briefly discussed code status with patient at bedside.  At this time she would like to remain a full code.  She has 4 living children.  No healthcare power of .  Patient would like daughter yvrose Bautista to help her make medical decisions if necessary.  Spent 5-8 minutes for ACP discussion.  Consider palliative care consult.         CKD (chronic kidney disease)  Creatine stable for now. BMP reviewed- noted Estimated Creatinine Clearance: 30.6 mL/min (based on SCr of 1.1 mg/dL). according to latest data. Based on current GFR, CKD stage is stage 2 - GFR  60-89.  Monitor UOP and serial BMP and adjust therapy as needed. Renally dose meds. Avoid nephrotoxic medications and procedures.    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 Morbid obesity complicates all aspects of disease management from diagnostic modalities to treatment. Weight loss encouraged and health benefits explained to patient.         PAD (peripheral artery disease)  -continue aspirin and ezetimibe      Chronic heart failure with preserved ejection fraction (HFpEF)  - stable diastolic CHF  - continue home carvedilol, valsartan.  Hold Bumex given poor p.o. 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 Latest blood pressure and vitals reviewed-     Temp:  [97.9 °F (36.6 °C)-99 °F (37.2 °C)]   Pulse:  [72-85]   Resp:  [17-18]   BP: ()/(49-59)   SpO2:  [90 %-93 %] .   Home meds for hypertension were reviewed and noted below.   Hypertension Medications               bumetanide (BUMEX) 0.5 MG Tab Take 2 tablets (1 mg total) by mouth once daily. May also take 2 tablets (1 mg total) daily as needed (> 2 pound weight gain in 24 hours, > 5 pound weight gain in a week, worsening leg swelling, shortness of breath).    carvediloL (COREG) 12.5 MG tablet Take 1 tablet (12.5 mg total) by mouth 2 (two) times daily with meals.    isosorbide mononitrate (IMDUR) 30 MG 24 hr tablet Take 1 tablet (30 mg total) by mouth once daily.    nitroGLYCERIN (NITROSTAT) 0.4 MG SL tablet Place 1 tablet (0.4 mg total) under the tongue every 5 (five) minutes as needed for Chest pain (angina).    valsartan (DIOVAN) 80 MG tablet Take 1 tablet (80 mg total) by mouth once daily.            While in the hospital, will  manage blood pressure as follows; Adjust home antihypertensive regimen as follows- hold diuretic given poor p.o. intake, decrease dose of coreg given hypotension     Will utilize p.r.n. blood pressure medication only if patient's blood pressure greater than 180/110 and she develops symptoms such as worsening chest pain or shortness of breath.      VTE Risk Mitigation (From admission, onward)           Ordered     enoxaparin injection 30 mg  Daily         09/16/24 1137     IP VTE HIGH RISK PATIENT  Once         09/16/24 1137     Place sequential compression device  Until discontinued         09/16/24 1137                    Discharge Planning   ANA:      Code Status: Full Code   Is the patient medically ready for discharge?:     Reason for patient still in hospital (select all that apply): Patient trending condition, Treatment, and Consult recommendations  Discharge Plan A: Home with family   Discharge Delays: None known at this time              Kusum Keenan MD  Department of Hospital Medicine   [facility]

## 2024-09-19 NOTE — PLAN OF CARE
Patient on oxygen with documented flow.  Will attempt to wean per O2 order protocol.  IS  Will continue to monitor.

## 2024-09-19 NOTE — SUBJECTIVE & OBJECTIVE
Interval History:  Reports having 1 large loose stool following which feels better overall but continues to have abdominal pain.  Per daughter at bedside p.o. intake has improved.    Patient prefer daughter acted as .    Repeat CT scan shows slight enlargement of peridiverticular abscess.  IR consulted.    Review of Systems  Objective:     Vital Signs (Most Recent):  Temp: 97.6 °F (36.4 °C) (09/19/24 1623)  Pulse: 70 (09/19/24 1623)  Resp: 18 (09/19/24 1623)  BP: (!) 111/56 (09/19/24 1623)  SpO2: 96 % (09/19/24 1623) Vital Signs (24h Range):  Temp:  [97.3 °F (36.3 °C)-98.8 °F (37.1 °C)] 97.6 °F (36.4 °C)  Pulse:  [64-73] 70  Resp:  [18] 18  SpO2:  [92 %-98 %] 96 %  BP: ()/(44-59) 111/56     Weight: 83.9 kg (184 lb 15.5 oz)  Body mass index is 37.36 kg/m².    Intake/Output Summary (Last 24 hours) at 9/19/2024 1654  Last data filed at 9/19/2024 0459  Gross per 24 hour   Intake 400 ml   Output 250 ml   Net 150 ml         Physical Exam  Vitals and nursing note reviewed.   Constitutional:       General: She is not in acute distress.     Appearance: She is obese.   HENT:      Mouth/Throat:      Mouth: Mucous membranes are dry.   Cardiovascular:      Rate and Rhythm: Normal rate and regular rhythm.      Heart sounds: Normal heart sounds. No murmur heard.  Pulmonary:      Effort: Pulmonary effort is normal. No respiratory distress.   Abdominal:      Palpations: Abdomen is soft.      Tenderness: There is abdominal tenderness.   Musculoskeletal:      Right lower leg: No edema.      Left lower leg: No edema.   Skin:     General: Skin is warm and dry.      Findings: No bruising.   Neurological:      General: No focal deficit present.      Mental Status: She is alert.             Significant Labs: All pertinent labs within the past 24 hours have been reviewed.    Significant Imaging: I have reviewed all pertinent imaging results/findings within the past 24 hours.

## 2024-09-19 NOTE — PLAN OF CARE
SW communicated with pts daughter to discuss dc planning. No  needed w/pts daughter. Pts daughter aware of no dc today. Pts dc dispo pending at this time. PT unable to work with pt today due to pain per there note. SW will continue to follow pt throughout her transitions of care and assist with any dc needs.       Lily Ventura (Daughter)  [phone]       09/19/24 1000   Post-Acute Status   Post-Acute Authorization Other   Hospital Resources/Appts/Education Provided Appointments scheduled and added to AVS   Discharge Plan   Discharge Plan A Home with family

## 2024-09-19 NOTE — PROGRESS NOTES
[facility]  General [facility]  Progress Note    Subjective:     History of Present Illness:  Ms. Perez is a 89 y.o. female with PMH of HFpEF, HTN, and CAD who presented with left flank pain and lower back pain that started earlier today. CT scan obtained showed evidence of diverticulitis. General surgery was consulted for evaluation. Patient takes DAPT at home. PSH includes hysterectomy and cholecystectomy. Patient states that she has not had this happen before. She reports improvement in her pain since presentation to ED    Post-Op Info:  * No surgery found *         Interval History:  No acute events overnight.  Leukocytosis trending 14 from 12.  Continues to have abdominal pain with poor p.o. intake.    Medications:  Continuous Infusions:  Scheduled Meds:   aspirin  81 mg Oral Daily    carvediloL  12.5 mg Oral BID WM    cefTRIAXone (Rocephin) IV (PEDS and ADULTS)  1 g Intravenous Q24H    enoxparin  30 mg Subcutaneous Daily    ezetimibe  10 mg Oral Daily    isosorbide mononitrate  30 mg Oral Daily    metroNIDAZOLE IV (PEDS and ADULTS)  500 mg Intravenous Q8H    [START ON 9/19/2024] polyethylene glycol  17 g Oral Daily    senna-docusate 8.6-50 mg  1 tablet Oral BID    sodium chloride 0.9%  10 mL Intravenous Q8H    [START ON 9/19/2024] valsartan  40 mg Oral Daily     PRN Meds:  Current Facility-Administered Medications:     acetaminophen, 1,000 mg, Oral, Q8H PRN    acetaminophen, 650 mg, Oral, Q4H PRN    dextrose 10%, 12.5 g, Intravenous, PRN    dextrose 10%, 25 g, Intravenous, PRN    glucagon (human recombinant), 1 mg, Intramuscular, PRN    glucose, 16 g, Oral, PRN    glucose, 24 g, Oral, PRN    melatonin, 6 mg, Oral, Nightly PRN    morphine, 1 mg, Intravenous, Q6H PRN    naloxone, 0.02 mg, Intravenous, PRN    ondansetron, 8 mg, Oral, Q8H PRN    ondansetron, 4 mg, Intravenous, Q8H PRN    pneumoc 20-paul conj-dip cr(PF), 0.5 mL, Intramuscular, vaccine x 1 dose     Review of patient's allergies indicates:    Allergen Reactions    Codeine     Latex, natural rubber     Penicillins     Penicillin Rash     Objective:     Vital Signs (Most Recent):  Temp: 98 °F (36.7 °C) (09/18/24 1157)  Pulse: 74 (09/18/24 1157)  Resp: 17 (09/18/24 1157)  BP: (!) 104/55 (09/18/24 1157)  SpO2: (!) 93 % (09/18/24 1250) Vital Signs (24h Range):  Temp:  [97.9 °F (36.6 °C)-99 °F (37.2 °C)] 98 °F (36.7 °C)  Pulse:  [74-85] 74  Resp:  [17-18] 17  SpO2:  [90 %-93 %] 93 %  BP: ()/(49-59) 104/55     Weight: 83.9 kg (184 lb 15.5 oz)  Body mass index is 37.36 kg/m².    Intake/Output - Last 3 Shifts         09/16 0700  09/17 0659 09/17 0700  09/18 0659 09/18 0700  09/19 0659    P.O.  180     IV Piggyback 188.4      Total Intake(mL/kg) 188.4 (2.3) 180 (2.1)     Urine (mL/kg/hr) 200 (0.1) 600 (0.3)     Total Output 200 600     Net -11.6 -420                     Physical Exam  Vitals and nursing note reviewed.   Constitutional:       Appearance: She is ill-appearing.   HENT:      Head: Normocephalic and atraumatic.   Cardiovascular:      Rate and Rhythm: Normal rate.   Pulmonary:      Effort: Pulmonary effort is normal.   Abdominal:      General: Abdomen is flat.      Palpations: Abdomen is soft.      Comments: Abdomen soft, tender to palpation in the left lower quadrant   Skin:     General: Skin is warm and dry.   Neurological:      General: No focal deficit present.      Mental Status: She is alert and oriented to person, place, and time.          Significant Labs:  I have reviewed all pertinent lab results within the past 24 hours.    Significant Diagnostics:  I have reviewed all pertinent imaging results/findings within the past 24 hours.  Assessment/Plan:     * Diverticulitis of intestine with abscess without bleeding  Ms. Perez is a 89 y.o. female  with evidence of diverticulitis    - Patient seen and examined  - Labs and imaging reviewed  -given of trending leukocytosis, we will repeat CT scan today to evaluate for abscess progression  -  No plans for surgical intervention, patient with complicated medical history and high risk for complications if she were to undergo general anesthesia  - Agree with antibiotics  - Diet:  Okay for clears  - general surgery will follow along        Dez Haro MD  General Surgery  [facility] - Telemetry

## 2024-09-19 NOTE — NURSING
"IV Insertion Nurse Note     #20G 1 3/4" PIV placed in right forearm utilizing ultrasound guidance. Blood return noted. Flushed and saline locked.  Gold colored bracelet removed to apply dressing.  Bracelet given to patient's daughter at the bedside.     "

## 2024-09-20 LAB
BASOPHILS # BLD AUTO: 0.04 K/UL (ref 0–0.2)
BASOPHILS NFR BLD: 0.3 % (ref 0–1.9)
DIFFERENTIAL METHOD BLD: ABNORMAL
EOSINOPHIL # BLD AUTO: 0.1 K/UL (ref 0–0.5)
EOSINOPHIL NFR BLD: 0.6 % (ref 0–8)
ERYTHROCYTE [DISTWIDTH] IN BLOOD BY AUTOMATED COUNT: 13.6 % (ref 11.5–14.5)
HCT VFR BLD AUTO: 33.5 % (ref 37–48.5)
HGB BLD-MCNC: 10.7 G/DL (ref 12–16)
IMM GRANULOCYTES # BLD AUTO: 0.05 K/UL (ref 0–0.04)
IMM GRANULOCYTES NFR BLD AUTO: 0.4 % (ref 0–0.5)
INR PPP: 1.2 (ref 0.8–1.2)
LYMPHOCYTES # BLD AUTO: 1.3 K/UL (ref 1–4.8)
LYMPHOCYTES NFR BLD: 10.7 % (ref 18–48)
MCH RBC QN AUTO: 29.2 PG (ref 27–31)
MCHC RBC AUTO-ENTMCNC: 31.9 G/DL (ref 32–36)
MCV RBC AUTO: 91 FL (ref 82–98)
MONOCYTES # BLD AUTO: 1 K/UL (ref 0.3–1)
MONOCYTES NFR BLD: 8 % (ref 4–15)
NEUTROPHILS # BLD AUTO: 9.5 K/UL (ref 1.8–7.7)
NEUTROPHILS NFR BLD: 80 % (ref 38–73)
NRBC BLD-RTO: 0 /100 WBC
PLATELET # BLD AUTO: 217 K/UL (ref 150–450)
PMV BLD AUTO: 11.5 FL (ref 9.2–12.9)
PROTHROMBIN TIME: 13 SEC (ref 9–12.5)
RBC # BLD AUTO: 3.67 M/UL (ref 4–5.4)
WBC # BLD AUTO: 11.86 K/UL (ref 3.9–12.7)

## 2024-09-20 PROCEDURE — 63600175 PHARM REV CODE 636 W HCPCS: Performed by: STUDENT IN AN ORGANIZED HEALTH CARE EDUCATION/TRAINING PROGRAM

## 2024-09-20 PROCEDURE — 36415 COLL VENOUS BLD VENIPUNCTURE: CPT | Performed by: STUDENT IN AN ORGANIZED HEALTH CARE EDUCATION/TRAINING PROGRAM

## 2024-09-20 PROCEDURE — 94761 N-INVAS EAR/PLS OXIMETRY MLT: CPT

## 2024-09-20 PROCEDURE — 97162 PT EVAL MOD COMPLEX 30 MIN: CPT

## 2024-09-20 PROCEDURE — 63600175 PHARM REV CODE 636 W HCPCS: Mod: JZ,JG

## 2024-09-20 PROCEDURE — 99900035 HC TECH TIME PER 15 MIN (STAT)

## 2024-09-20 PROCEDURE — 27000221 HC OXYGEN, UP TO 24 HOURS

## 2024-09-20 PROCEDURE — 97535 SELF CARE MNGMENT TRAINING: CPT

## 2024-09-20 PROCEDURE — 97530 THERAPEUTIC ACTIVITIES: CPT

## 2024-09-20 PROCEDURE — A4216 STERILE WATER/SALINE, 10 ML: HCPCS | Performed by: HOSPITALIST

## 2024-09-20 PROCEDURE — 36415 COLL VENOUS BLD VENIPUNCTURE: CPT | Performed by: PHYSICIAN ASSISTANT

## 2024-09-20 PROCEDURE — 11000001 HC ACUTE MED/SURG PRIVATE ROOM

## 2024-09-20 PROCEDURE — 99232 SBSQ HOSP IP/OBS MODERATE 35: CPT | Mod: ,,, | Performed by: STUDENT IN AN ORGANIZED HEALTH CARE EDUCATION/TRAINING PROGRAM

## 2024-09-20 PROCEDURE — 25000003 PHARM REV CODE 250: Performed by: STUDENT IN AN ORGANIZED HEALTH CARE EDUCATION/TRAINING PROGRAM

## 2024-09-20 PROCEDURE — 94799 UNLISTED PULMONARY SVC/PX: CPT | Mod: XB

## 2024-09-20 PROCEDURE — 85610 PROTHROMBIN TIME: CPT | Performed by: PHYSICIAN ASSISTANT

## 2024-09-20 PROCEDURE — 85025 COMPLETE CBC W/AUTO DIFF WBC: CPT | Performed by: STUDENT IN AN ORGANIZED HEALTH CARE EDUCATION/TRAINING PROGRAM

## 2024-09-20 PROCEDURE — 97165 OT EVAL LOW COMPLEX 30 MIN: CPT

## 2024-09-20 PROCEDURE — 25000003 PHARM REV CODE 250: Performed by: HOSPITALIST

## 2024-09-20 PROCEDURE — 63600175 PHARM REV CODE 636 W HCPCS: Performed by: HOSPITALIST

## 2024-09-20 RX ORDER — CIPROFLOXACIN 2 MG/ML
400 INJECTION, SOLUTION INTRAVENOUS
Status: DISCONTINUED | OUTPATIENT
Start: 2024-09-20 | End: 2024-09-26

## 2024-09-20 RX ORDER — BUMETANIDE 1 MG/1
2 TABLET ORAL DAILY
Status: DISCONTINUED | OUTPATIENT
Start: 2024-09-20 | End: 2024-09-27 | Stop reason: HOSPADM

## 2024-09-20 RX ADMIN — CARVEDILOL 6.25 MG: 6.25 TABLET, FILM COATED ORAL at 05:09

## 2024-09-20 RX ADMIN — VALSARTAN 40 MG: 40 TABLET, FILM COATED ORAL at 10:09

## 2024-09-20 RX ADMIN — Medication 10 ML: at 10:09

## 2024-09-20 RX ADMIN — Medication 10 ML: at 06:09

## 2024-09-20 RX ADMIN — METRONIDAZOLE 500 MG: 5 INJECTION, SOLUTION INTRAVENOUS at 06:09

## 2024-09-20 RX ADMIN — CIPROFLOXACIN 400 MG: 2 INJECTION, SOLUTION INTRAVENOUS at 09:09

## 2024-09-20 RX ADMIN — EZETIMIBE 10 MG: 10 TABLET ORAL at 10:09

## 2024-09-20 RX ADMIN — ISOSORBIDE MONONITRATE 30 MG: 30 TABLET, EXTENDED RELEASE ORAL at 10:09

## 2024-09-20 RX ADMIN — CARVEDILOL 6.25 MG: 6.25 TABLET, FILM COATED ORAL at 10:09

## 2024-09-20 RX ADMIN — MORPHINE SULFATE 1 MG: 2 INJECTION, SOLUTION INTRAMUSCULAR; INTRAVENOUS at 06:09

## 2024-09-20 RX ADMIN — METRONIDAZOLE 500 MG: 5 INJECTION, SOLUTION INTRAVENOUS at 10:09

## 2024-09-20 RX ADMIN — DOCUSATE SODIUM 100 MG: 100 CAPSULE, LIQUID FILLED ORAL at 10:09

## 2024-09-20 RX ADMIN — Medication 10 ML: at 01:09

## 2024-09-20 RX ADMIN — ENOXAPARIN SODIUM 30 MG: 30 INJECTION SUBCUTANEOUS at 05:09

## 2024-09-20 RX ADMIN — BUMETANIDE 2 MG: 1 TABLET ORAL at 10:09

## 2024-09-20 RX ADMIN — ASPIRIN 81 MG CHEWABLE TABLET 81 MG: 81 TABLET CHEWABLE at 10:09

## 2024-09-20 RX ADMIN — METRONIDAZOLE 500 MG: 5 INJECTION, SOLUTION INTRAVENOUS at 01:09

## 2024-09-20 NOTE — PLAN OF CARE
Problem: Physical Therapy  Goal: Physical Therapy Goal  Description: Goals to be met by: 10/20/24     Patient will increase functional independence with mobility by performin. Supine to sit with Moderate Assistance  2. Sit to supine with Moderate Assistance  3. Sit to stand transfer with Moderate Assistance with use of RW.   4. Bed to chair transfer with Maximum Assistance using Rolling Walker  5. Gait  x 10 feet with Maximum Assistance using Rolling Walker.     Outcome: Progressing    PT/OT co-evaluation completed due to anticipated complexity of pt's presentation. Pt's PLOF: ambulating with use of RW for short house distance; daughter assist with ADLs. Pt at this time requires MAX Ax2 with bed mobility and transfers to standing with use of RW. Therapy recommending moderate intensity therapy. Therapy will continue to progress pt as able.

## 2024-09-20 NOTE — SUBJECTIVE & OBJECTIVE
Interval History: CT scan performed yesterday showed large in size abscess, however IR does not have a window for drain placement at this time. Abdominal pain improving. WBC improving.     Medications:  Continuous Infusions:  Scheduled Meds:   aspirin  81 mg Oral Daily    bumetanide  2 mg Oral Daily    carvediloL  6.25 mg Oral BID WM    ciprofloxacin  400 mg Intravenous Q24H    docusate sodium  100 mg Oral Daily    enoxparin  30 mg Subcutaneous Daily    ezetimibe  10 mg Oral Daily    isosorbide mononitrate  30 mg Oral Daily    metroNIDAZOLE IV (PEDS and ADULTS)  500 mg Intravenous Q8H    sodium chloride 0.9%  10 mL Intravenous Q8H    valsartan  40 mg Oral Daily     PRN Meds:  Current Facility-Administered Medications:     acetaminophen, 1,000 mg, Oral, Q8H PRN    acetaminophen, 650 mg, Oral, Q4H PRN    dextrose 10%, 12.5 g, Intravenous, PRN    dextrose 10%, 25 g, Intravenous, PRN    glucagon (human recombinant), 1 mg, Intramuscular, PRN    glucose, 16 g, Oral, PRN    glucose, 24 g, Oral, PRN    melatonin, 6 mg, Oral, Nightly PRN    morphine, 1 mg, Intravenous, Q6H PRN    naloxone, 0.02 mg, Intravenous, PRN    ondansetron, 8 mg, Oral, Q8H PRN    ondansetron, 4 mg, Intravenous, Q8H PRN    pneumoc 20-paul conj-dip cr(PF), 0.5 mL, Intramuscular, vaccine x 1 dose     Review of patient's allergies indicates:   Allergen Reactions    Codeine     Latex, natural rubber     Penicillins     Penicillin Rash     Objective:     Vital Signs (Most Recent):  Temp: 97.9 °F (36.6 °C) (09/20/24 0743)  Pulse: 79 (09/20/24 0743)  Resp: 16 (09/20/24 0743)  BP: (!) 140/79 (09/20/24 0743)  SpO2: 95 % (09/20/24 0818) Vital Signs (24h Range):  Temp:  [97.6 °F (36.4 °C)-99.2 °F (37.3 °C)] 97.9 °F (36.6 °C)  Pulse:  [64-79] 79  Resp:  [16-19] 16  SpO2:  [94 %-97 %] 95 %  BP: (111-140)/(56-79) 140/79     Weight: 83.9 kg (184 lb 15.5 oz)  Body mass index is 37.36 kg/m².    Intake/Output - Last 3 Shifts         09/18 0700  09/19 0659 09/19  0700  09/20 0659 09/20 0700  09/21 0659    P.O. 300 120 0    IV Piggyback 200 300     Total Intake(mL/kg) 500 (6) 420 (5) 0 (0)    Urine (mL/kg/hr) 250 (0.1)      Stool 0      Total Output 250      Net +250 +420 0           Urine Occurrence  1 x     Stool Occurrence 1 x 2 x 2 x             Physical Exam  Vitals and nursing note reviewed.   Constitutional:       Appearance: She is ill-appearing.   HENT:      Head: Normocephalic and atraumatic.   Cardiovascular:      Rate and Rhythm: Normal rate.   Pulmonary:      Effort: Pulmonary effort is normal.   Abdominal:      General: Abdomen is flat.      Palpations: Abdomen is soft.      Comments: Abdomen soft, tender to palpation in the left lower quadrant   Skin:     General: Skin is warm and dry.   Neurological:      General: No focal deficit present.      Mental Status: She is alert and oriented to person, place, and time.          Significant Labs:  I have reviewed all pertinent lab results within the past 24 hours.    Significant Diagnostics:  I have reviewed all pertinent imaging results/findings within the past 24 hours.

## 2024-09-20 NOTE — PLAN OF CARE
Problem: Adult Inpatient Plan of Care  Goal: Plan of Care Review  Outcome: Progressing  Goal: Absence of Hospital-Acquired Illness or Injury  Outcome: Progressing     Problem: Infection  Goal: Absence of Infection Signs and Symptoms  Outcome: Progressing     Problem: Skin Injury Risk Increased  Goal: Skin Health and Integrity  Outcome: Progressing     Problem: Intestinal Obstruction  Goal: Optimal Bowel Function  Outcome: Progressing  Goal: Fluid and Electrolyte Balance  Outcome: Progressing  Goal: Optimal Pain Control and Function  Outcome: Progressing     Problem: Fall Injury Risk  Goal: Absence of Fall and Fall-Related Injury  Outcome: Progressing     Problem: Comorbidity Management  Goal: Maintenance of Heart Failure Symptom Control  Outcome: Progressing  Goal: Blood Pressure in Desired Range  Outcome: Progressing

## 2024-09-20 NOTE — PROGRESS NOTES
Pharmacist Renal Dose Adjustment Note    Marycruz Perez is a 89 y.o. female being treated with the medication ciprofloxacin    Patient Data:    Vital Signs (Most Recent):  Temp: 97.9 °F (36.6 °C) (09/20/24 0743)  Pulse: 79 (09/20/24 0743)  Resp: 16 (09/20/24 0743)  BP: (!) 140/79 (09/20/24 0743)  SpO2: (!) 94 % (09/20/24 0743) Vital Signs (72h Range):  Temp:  [97.3 °F (36.3 °C)-99.2 °F (37.3 °C)]   Pulse:  [64-85]   Resp:  [16-19]   BP: ()/(44-79)   SpO2:  [90 %-98 %]      Recent Labs   Lab 09/17/24  0555 09/18/24  0247 09/19/24  0318   CREATININE 1.0 1.2 1.2     Serum creatinine: 1.2 mg/dL 09/19/24 0318  Estimated creatinine clearance: 28.4 mL/min    Medication:ciprofloxacin dose: 400mg frequency q12h will be changed to medication:ciprofloxacin dose:400mg frequency:q24h    Pharmacist's Name: Remi Loo  Pharmacist's Extension: [phone]

## 2024-09-20 NOTE — SUBJECTIVE & OBJECTIVE
Interval History:  Patient prefer daughter acted as .  No safe window for draining intra-abdominal abscess per IR.  Patient had 2 large loose bowel movements this morning.  Denies any abdominal pain today.  Requesting to have clear liquid diet.    Daughter states patient complains of left shoulder pain usually at nighttime at home.  She had similar pain last night in the hospital.  Usually this is associated with fluid building up.  Plan to increase Bumex dose back to baseline 2 mg daily.    Review of Systems  Objective:     Vital Signs (Most Recent):  Temp: 98 °F (36.7 °C) (09/20/24 1110)  Pulse: 80 (09/20/24 1110)  Resp: 17 (09/20/24 1110)  BP: 131/61 (09/20/24 1110)  SpO2: 96 % (09/20/24 1137) Vital Signs (24h Range):  Temp:  [97.6 °F (36.4 °C)-99.2 °F (37.3 °C)] 98 °F (36.7 °C)  Pulse:  [70-80] 80  Resp:  [16-19] 17  SpO2:  [94 %-97 %] 96 %  BP: (111-140)/(56-79) 131/61     Weight: 83.9 kg (184 lb 15.5 oz)  Body mass index is 37.36 kg/m².    Intake/Output Summary (Last 24 hours) at 9/20/2024 1314  Last data filed at 9/20/2024 1256  Gross per 24 hour   Intake 420 ml   Output --   Net 420 ml         Physical Exam  Vitals and nursing note reviewed.   Constitutional:       General: She is not in acute distress.     Appearance: She is obese.   HENT:      Mouth/Throat:      Mouth: Mucous membranes are dry.   Cardiovascular:      Rate and Rhythm: Normal rate and regular rhythm.      Heart sounds: Normal heart sounds. No murmur heard.  Pulmonary:      Effort: Pulmonary effort is normal. No respiratory distress.   Abdominal:      Palpations: Abdomen is soft.      Tenderness: There is no abdominal tenderness.   Musculoskeletal:      Right lower leg: No edema.      Left lower leg: No edema.   Skin:     General: Skin is warm and dry.      Findings: No bruising.   Neurological:      General: No focal deficit present.      Mental Status: She is alert.             Significant Labs: All pertinent labs within  the past 24 hours have been reviewed.    Significant Imaging: I have reviewed all pertinent imaging results/findings within the past 24 hours.

## 2024-09-20 NOTE — PLAN OF CARE
OT / PT coeval completed, coeval due to decreased functional mobility and need for two skilled staff. Patient with PLOF of set up assistance for UB ADLs, max Assist for LB ADLs and light assist for short distance mobility with rolling walker and with hx of 0 falls in the past 3 months. On evaluation this date patient able to stand requiring max A x2 with rolling walker and requiring total A for LB dressing. Recommend moderate level intensity. DME anticipated recommendations: hospital bed and w/c.   Problem: Occupational Therapy  Goal: Occupational Therapy Goal  Description: Goals to be met by: 10/18/2024     Patient will increase functional independence with ADLs by performing:    Feeding with Supervision with upright sitting posture to reduce the risk of aspiration.  UE Dressing with Supervision.  Grooming while seated at sink with Supervision.  Toileting from bedside commode with Moderate Assistance for hygiene and clothing management.   Sitting EOB x5 minutes with supervision without adverse response.   Toilet transfer to bedside commode with Minimal Assistance.  Pt and family 100% reciprocation of DME recommendations and energy conservation techniques.     Outcome: Progressing

## 2024-09-20 NOTE — ASSESSMENT & PLAN NOTE
- stable diastolic CHF  - continue home carvedilol, valsartan, Bumex  - daily weights, strict I/Os  - tele  - keep K >4, Mg >2  - 2D echo noted; deferring repeat at this hospitalization as she currently appears compensated

## 2024-09-20 NOTE — PROGRESS NOTES
[facility]  Progress Note    Patient Name: Marycruz Perez  MRN: [id number]  Patient Class: IP- Inpatient   Admission Date: 9/16/2024  Length of Stay: 4 days  Attending Physician: Kusum Keenan MD  Primary Care Provider: Melchor Menchaca -        Subjective:     Principal Problem:Diverticulitis of intestine with abscess without bleeding        HPI:  Ms. Perez is a 89yo woman with HFpEF, CHB s/p PPM, HTN, and CAD who presents with left flank pain and lower back pain x4 hours. She reports that she has developed left-sided flank pain.  Has had a few days of constipation where she was passing small pebble like bowel movements prior to this.  Denies any fevers or chills.  No bleeding symptoms.  Does have history of diverticulitis in the past.    Overview/Hospital Course:  No notes on file    Interval History:  Patient prefer daughter acted as .  No safe window for draining intra-abdominal abscess per IR.  Patient had 2 large loose bowel movements this morning.  Denies any abdominal pain today.  Requesting to have clear liquid diet.    Daughter states patient complains of left shoulder pain usually at nighttime at home.  She had similar pain last night in the hospital.  Usually this is associated with fluid building up.  Plan to increase Bumex dose back to baseline 2 mg daily.    Review of Systems  Objective:     Vital Signs (Most Recent):  Temp: 98 °F (36.7 °C) (09/20/24 1110)  Pulse: 80 (09/20/24 1110)  Resp: 17 (09/20/24 1110)  BP: 131/61 (09/20/24 1110)  SpO2: 96 % (09/20/24 1137) Vital Signs (24h Range):  Temp:  [97.6 °F (36.4 °C)-99.2 °F (37.3 °C)] 98 °F (36.7 °C)  Pulse:  [70-80] 80  Resp:  [16-19] 17  SpO2:  [94 %-97 %] 96 %  BP: (111-140)/(56-79) 131/61     Weight: 83.9 kg (184 lb 15.5 oz)  Body mass index is 37.36 kg/m².    Intake/Output Summary (Last 24 hours) at 9/20/2024 1314  Last data filed at 9/20/2024 1256  Gross per 24 hour   Intake 420 ml   Output --   Net  420 ml         Physical Exam  Vitals and nursing note reviewed.   Constitutional:       General: She is not in acute distress.     Appearance: She is obese.   HENT:      Mouth/Throat:      Mouth: Mucous membranes are dry.   Cardiovascular:      Rate and Rhythm: Normal rate and regular rhythm.      Heart sounds: Normal heart sounds. No murmur heard.  Pulmonary:      Effort: Pulmonary effort is normal. No respiratory distress.   Abdominal:      Palpations: Abdomen is soft.      Tenderness: There is no abdominal tenderness.   Musculoskeletal:      Right lower leg: No edema.      Left lower leg: No edema.   Skin:     General: Skin is warm and dry.      Findings: No bruising.   Neurological:      General: No focal deficit present.      Mental Status: She is alert.             Significant Labs: All pertinent labs within the past 24 hours have been reviewed.    Significant Imaging: I have reviewed all pertinent imaging results/findings within the past 24 hours.    Assessment/Plan:      * Diverticulitis of intestine with abscess without bleeding  -presents with left flank pain, found to have diverticulitis with abscess on CT imaging   -IV ciprofloxacin and metronidazole; penicillin allergy  -general surgery consulted but do not suspect the patient will be good operative candidate  -clear liquid diet    Repeat CT scan shows abscess has enlarged.  IR consult placed.  Does not appear to have a good window.  Abdominal pain resolved, leukocytosis improving.  Continue IV antibiotics  Appreciate general surgery input.      Goals of care, counseling/discussion  Advance Care Planning    Briefly discussed code status with patient at bedside.  At this time she would like to remain a full code.  She has 4 living children.  No healthcare power of .  Patient would like daughter a Lily to help her make medical decisions if necessary.  Spent 5-8 minutes for ACP discussion.  Consider palliative care consult.         CKD (chronic  kidney disease)  Creatine stable for now. BMP reviewed- noted Estimated Creatinine Clearance: 30.6 mL/min (based on SCr of 1.1 mg/dL). according to latest data. Based on current GFR, CKD stage is stage 2 - GFR 60-89.  Monitor UOP and serial BMP and adjust therapy as needed. Renally dose meds. Avoid nephrotoxic medications and procedures.    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 Morbid obesity complicates all aspects of disease management from diagnostic modalities to treatment. Weight loss encouraged and health benefits explained to patient.         PAD (peripheral artery disease)  -continue aspirin and ezetimibe      Chronic heart failure with preserved ejection fraction (HFpEF)  - stable diastolic CHF  - continue home carvedilol, valsartan, Bumex  - daily weights, strict I/Os  - tele  - keep K >4, Mg >2  - 2D echo noted; deferring repeat at this hospitalization as she currently appears compensated        Pacemaker-dependent  -in place      Complete heart block  -status post pacemaker      Essential hypertension  Chronic, controlled. Latest blood pressure and vitals reviewed-     Temp:  [97.6 °F (36.4 °C)-99.2 °F (37.3 °C)]   Pulse:  [70-80]   Resp:  [16-19]   BP: (111-140)/(56-79)   SpO2:  [94 %-97 %] .   Home meds for hypertension were reviewed and noted below.   Hypertension Medications               bumetanide (BUMEX) 0.5 MG Tab Take 2 tablets (1 mg total) by mouth once daily. May also take 2 tablets (1 mg total) daily as needed (> 2 pound weight gain in 24 hours, > 5 pound weight gain in a week, worsening leg swelling, shortness of breath).    carvediloL (COREG) 12.5 MG tablet Take 1 tablet (12.5 mg total) by mouth 2 (two) times daily with meals.    isosorbide mononitrate (IMDUR) 30 MG 24 hr tablet Take 1 tablet (30 mg total) by mouth once daily.    nitroGLYCERIN (NITROSTAT) 0.4 MG SL tablet Place 1 tablet (0.4 mg total) under the tongue  every 5 (five) minutes as needed for Chest pain (angina).    valsartan (DIOVAN) 80 MG tablet Take 1 tablet (80 mg total) by mouth once daily.            While in the hospital, will manage blood pressure as follows; Continue home antihypertensive regimen   Will utilize p.r.n. blood pressure medication only if patient's blood pressure greater than 180/110 and she develops symptoms such as worsening chest pain or shortness of breath.      VTE Risk Mitigation (From admission, onward)           Ordered     enoxaparin injection 30 mg  Daily         09/16/24 1137     IP VTE HIGH RISK PATIENT  Once         09/16/24 1137     Place sequential compression device  Until discontinued         09/16/24 1137                    Discharge Planning   ANA: 9/23/2024     Code Status: Full Code   Is the patient medically ready for discharge?:     Reason for patient still in hospital (select all that apply): Patient trending condition, Treatment, and Consult recommendations  Discharge Plan A:  (TBD)   Discharge Delays: None known at this time              Kusum Keenan MD  Department of [facility]   [facility]

## 2024-09-20 NOTE — ASSESSMENT & PLAN NOTE
Ms. Perez is a 89 y.o. female  with evidence of diverticulitis    - Patient seen and examined  - Labs and imaging reviewed  -CT performed 9/19 with increase in size of abscess, no safe window per IR   -Recommending short interval CT scan to re-eval for safe window  - No plans for surgical intervention, patient with complicated medical history and high risk for complications if she were to undergo general anesthesia  - Agree with antibiotics  - Diet:  Okay for clears  - general surgery will follow along

## 2024-09-20 NOTE — PT/OT/SLP EVAL
Occupational Therapy   Evaluation and Treatment    Name: Marycruz Perez  MRN: [id number]  Admitting Diagnosis: Diverticulitis of intestine with abscess without bleeding  Recent Surgery: * No surgery found *      Recommendations:     Discharge Recommendations: Moderate Intensity Therapy (Consider palliative consult)  Discharge Equipment Recommendations:  hospital bed, wheelchair  Barriers to discharge:  Other (Comment) (decreased cognition, decreased self care/mobility, increased O2 needs)    Assessment:     Marycruz Perez is a 89 y.o. female with a medical diagnosis of Diverticulitis of intestine with abscess without bleeding.  She presents with The primary encounter diagnosis was Intra-abdominal abscess. A diagnosis of Diverticulitis was also pertinent to this visit.   Performance deficits affecting function: weakness, impaired balance, decreased safety awareness, impaired endurance, pain, edema, impaired cognition, impaired self care skills, decreased coordination, impaired functional mobility, decreased upper extremity function, impaired coordination, gait instability, decreased lower extremity function, impaired sensation, decreased ROM.      OT / PT coeval completed, coeval due to decreased functional mobility and need for two skilled staff. . On evaluation this date patient able to stand requiring max A x2 with rolling walker and requiring total A for LB dressing. Recommend moderate level intensity. DME anticipated recommendations: hospital bed and wheelchair.     Rehab Prognosis: Good; patient would benefit from acute skilled OT services to address these deficits and reach maximum level of function.       Plan:     Patient to be seen 3 x/week to address the above listed problems via self-care/home management, therapeutic activities, therapeutic exercises  Plan of Care Expires: 10/18/24  Plan of Care Reviewed with: patient, daughter    Subjective     Chief Complaint: Pain on left side of body    Occupational  Profile:  Living Environment: Pt lives in single story home with a threshold with daughter and grandson with walk in shower with grab bars and a shower chair.   Previous level of function: Patient with PLOF of set up assistance for UB ADLs, max Assist for LB ADLs and light assist for short distance mobility with rolling walker and with hx of 0 falls in the past 3 months. Does not use home O2  Equipment Used at Home: walker, rolling, rollator, wheelchair, bedside commode, shower chair (Transport chair)  Assistance upon Discharge: Hospital bed    Pain/Comfort:  Pain Rating 1: 8/10  Location - Side 1: Left  Location 1: shoulder  Pain Addressed 1: Reposition, Distraction, Cessation of Activity    Patients cultural, spiritual, Yarsani conflicts given the current situation: yes (Welsh speaking, daughter firmly wants to act as )    Objective:     Communicated with: nurse prior to session.  Patient found HOB elevated with peripheral IV, PureWick, oxygen, Other (comments) (Tele sitter) upon OT entry to room.    General Precautions: Standard, fall  Orthopedic Precautions: N/A  Braces: N/A  Respiratory Status: Nasal cannula, flow 2 L/min greater than 92%     Occupational Performance:    Bed Mobility:    Patient completed Rolling/Turning to Left with  maximal assistance and 2 persons  Patient completed Supine to Sit with maximal assistance and 2 persons  Patient completed Sit to Supine with maximal assistance and 2 persons    Functional Mobility/Transfers:  Patient completed Sit <> Stand Transfer with maximal assistance and of 2 persons  with  rolling walker and for two trials.    Functional Mobility: Seated eob with min A for sitting balance due to forward trunk lean; lateral side scoots with Max A    Activities of Daily Living:  Feeding:  minimum assistance ; Pt coughing phlegm  Upper Body Dressing: moderate assistance ; grossly total A total body ADL    Cognitive/Visual Perceptual:  Cognitive/Psychosocial  Skills:     -       Oriented to: Person and hospital   -       Follows Commands/attention:Follows one-step commands;  decreased response due to non comprehensive translation by daughter; daughter and patient declines use of virtual .   -       Safety awareness/insight to disability: impaired     Physical Exam:  Postural examination/scapula alignment:    -       Rounded shoulders  Edema:  Max BLE   Sensation:    -       Impaired  light/touch    Upper Extremity Range of Motion:     -       Right Upper Extremity: WFL  -       Left Upper Extremity: WFL  Upper Extremity Strength:    -       Right Upper Extremity: WFL  -       Left Upper Extremity: WFL    AMPAC 6 Click ADL:  AMPAC Total Score: 12    Treatment & Education:  Patient educated on role of OT/ POC development.   Co-evaluation with physical therapy in consideration of decreased mobility and decreased activity tolerance . Occupational profile developed with increased time for processing/ translation.   Patient guided to transition eob for assessment with increased exertion noticed.  Initiated ADL / functional mobility training as above with instructions on proper hand placement on bed and rolling walker.   Instructed on short term recall of date and month, unable to recall after two minutes.  Educated patient on importance of not wearing socks due to BLE edema   Answered questions within scope.      Patient left HOB elevated with all lines intact, call button in reach, and nurse notified    GOALS:   Multidisciplinary Problems       Occupational Therapy Goals          Problem: Occupational Therapy    Goal Priority Disciplines Outcome Interventions   Occupational Therapy Goal     OT, PT/OT Progressing    Description: Goals to be met by: 10/18/2024     Patient will increase functional independence with ADLs by performing:    Feeding with Supervision with upright sitting posture to reduce the risk of aspiration.  UE Dressing with Supervision.  Grooming  while seated at sink with Supervision.  Toileting from bedside commode with Moderate Assistance for hygiene and clothing management.   Sitting EOB x5 minutes with supervision without adverse response.   Toilet transfer to bedside commode with Minimal Assistance.  Pt and family 100% reciprocation of DME recommendations and energy conservation techniques.                          History:     Past Medical History:   Diagnosis Date    Acute exacerbation of CHF (congestive heart failure) 10/27/2020    NATHEN (acute kidney injury) 11/30/2023    Hypertension     Syncope and collapse          Past Surgical History:   Procedure Laterality Date    A-V CARDIAC PACEMAKER INSERTION N/A 2/22/2021    Procedure: INSERTION, CARDIAC PACEMAKER, DUAL CHAMBER;  Surgeon: Norman Foote MD;  Location: [facility] EP LAB;  Service: Cardiology;  Laterality: N/A;  AVB, PPM upgrade to Dual PPM (right sided), Bio, Venogram prior to draping, MAC, HI, 3 Prep    CARDIAC CATHETERIZATION      CARDIAC PACEMAKER PLACEMENT      CHOLECYSTECTOMY      CORONARY ANGIOPLASTY      HYSTERECTOMY      REVISION OF SKIN POCKET FOR PACEMAKER  2/22/2021    Procedure: Revision, Skin Pocket, For Cardiac Pacemaker;  Surgeon: Norman Foote MD;  Location: [facility] EP LAB;  Service: Cardiology;;       Time Tracking:     OT Date of Treatment: 09/20/24  OT Start Time: 0913  OT Stop Time: 0940  OT Total Time (min): 27 min    Billable Minutes:Evaluation 10  Self Care/Home Management 8  Therapeutic Activity 9    9/20/2024

## 2024-09-20 NOTE — PT/OT/SLP EVAL
Physical Therapy Evaluation and Treatment    Patient Name:  Marycruz Perez   MRN:  [id number]    Recommendations:     Discharge Recommendations: Moderate Intensity Therapy   Discharge Equipment Recommendations: hospital bed, wheelchair   Barriers to discharge:  limited functional endurance/independence    Assessment:     Marycruz Peerz is a 89 y.o. female admitted with a medical diagnosis of Diverticulitis of intestine with abscess without bleeding.  She presents with the following impairments/functional limitations: weakness, impaired endurance, impaired self care skills, impaired functional mobility, gait instability, impaired balance, impaired cognition, pain, decreased safety awareness, decreased lower extremity function, decreased ROM, edema, impaired cardiopulmonary response to activity.      PT/OT co-evaluation completed due to anticipated complexity of pt's presentation. Pt's PLOF: ambulating with use of RW for short house distance; daughter assist with ADLs. Pt at this time requires MAX Ax2 with bed mobility and transfers to standing with use of RW. Therapy recommending moderate intensity therapy. Therapy will continue to progress pt as able.     Rehab Prognosis: Good; patient would benefit from acute skilled PT services to address these deficits and reach maximum level of function.    Recent Surgery: * No surgery found *      Plan:     During this hospitalization, patient to be seen 5 x/week to address the identified rehab impairments via gait training, therapeutic activities, therapeutic exercises, neuromuscular re-education, wheelchair management/training and progress toward the following goals:    Plan of Care Expires:  10/20/24    Subjective     Chief Complaint: pain  Patient/Family Comments/goals: not stated  Pain/Comfort:  Pain Rating 1: 8/10 (back and L shoulder)  Pain Addressed 1: Reposition, Cessation of Activity, Nurse notified  Pain Rating Post-Intervention 1:  (not rated)    Patients cultural,  spiritual, Voodoo conflicts given the current situation: no    Living Environment:  Lives with daughter and grandson in 1 story home with threshold step to enter. WIS with SC and grab bars.   Prior to admission, patients level of function was: use of RW for mobility; daughter around to assistance 24/7 with mobility and ADLs as needed..  Equipment used at home: walker, rolling, rollator, bedside commode, shower chair (transport chair).  DME owned (not currently used): none.  Upon discharge, patient will have assistance from daughter.    Objective:     Communicated with Nurse prior to session.  Patient found HOB elevated with peripheral IV, PureWick, oxygen (telesitter)  upon PT entry to room.    General Precautions: Standard, fall  Orthopedic Precautions:N/A   Braces: N/A  Respiratory Status: Nasal cannula, flow 2 L/min; 98% SpO2    Exams:  Cognitive Exam:  Patient is oriented to Person, Place, and Situation; not able to recall current month/year  Sensation:    -       Intact  light/touch to BLE  RLE ROM: limited due to weakness  RLE Strength: 2-/5 hip flexion, 2+/5 knee extension, 3-/5 ankle PF/DF  LLE ROM: limited due to weakness  LLE Strength: 2-/5 hip flexion, 2+/5 knee extension, 3-/5 ankle PF/DF    Functional Mobility:  Bed Mobility:     Scooting: maximal assistance and of 2 persons  Supine to Sit: maximal assistance and of 2 persons  Sit to Supine: maximal assistance and of 2 persons  Transfers:     Sit to Stand:  maximal assistance and of 2 persons with rolling walker; forward trunk lean      AM-PAC 6 CLICK MOBILITY  Total Score:9       Treatment & Education:  Declined use of virtual ; Daughter present and agreeable to provide translations during session as needed; pt able to understand some English.   Pt educated on role of therapy.   Pt educated on proper hand placement/sequencing with transfers to improve safety.   Pt noted to be soiled in bowel movement; PCT notified pt needing to be  cleaned.     Patient left HOB elevated with all lines intact, call button in reach, bed alarm on, Nurse/PCT notified, and daughter present.    GOALS:   Multidisciplinary Problems       Physical Therapy Goals          Problem: Physical Therapy    Goal Priority Disciplines Outcome Goal Variances Interventions   Physical Therapy Goal     PT, PT/OT Progressing     Description: Goals to be met by: 10/20/24     Patient will increase functional independence with mobility by performin. Supine to sit with Moderate Assistance  2. Sit to supine with Moderate Assistance  3. Sit to stand transfer with Moderate Assistance with use of RW.   4. Bed to chair transfer with Maximum Assistance using Rolling Walker  5. Gait  x 10 feet with Maximum Assistance using Rolling Walker.                          History:     Past Medical History:   Diagnosis Date    Acute exacerbation of CHF (congestive heart failure) 10/27/2020    NATHEN (acute kidney injury) 2023    Hypertension     Syncope and collapse        Past Surgical History:   Procedure Laterality Date    A-V CARDIAC PACEMAKER INSERTION N/A 2021    Procedure: INSERTION, CARDIAC PACEMAKER, DUAL CHAMBER;  Surgeon: Norman Foote MD;  Location: [facility] EP LAB;  Service: Cardiology;  Laterality: N/A;  AVB, PPM upgrade to Dual PPM (right sided), Bio, Venogram prior to draping, MAC, NE, 3 Prep    CARDIAC CATHETERIZATION      CARDIAC PACEMAKER PLACEMENT      CHOLECYSTECTOMY      CORONARY ANGIOPLASTY      HYSTERECTOMY      REVISION OF SKIN POCKET FOR PACEMAKER  2021    Procedure: Revision, Skin Pocket, For Cardiac Pacemaker;  Surgeon: Norman Foote MD;  Location: [facility] EP LAB;  Service: Cardiology;;       Time Tracking:     PT Received On: 24  PT Start Time: 913     PT Stop Time: 940  PT Total Time (min): 27 min With OT    Billable Minutes: Evaluation 10 and Therapeutic Activity 10      2024

## 2024-09-20 NOTE — ASSESSMENT & PLAN NOTE
Chronic, controlled. Latest blood pressure and vitals reviewed-     Temp:  [97.6 °F (36.4 °C)-99.2 °F (37.3 °C)]   Pulse:  [70-80]   Resp:  [16-19]   BP: (111-140)/(56-79)   SpO2:  [94 %-97 %] .   Home meds for hypertension were reviewed and noted below.   Hypertension Medications               bumetanide (BUMEX) 0.5 MG Tab Take 2 tablets (1 mg total) by mouth once daily. May also take 2 tablets (1 mg total) daily as needed (> 2 pound weight gain in 24 hours, > 5 pound weight gain in a week, worsening leg swelling, shortness of breath).    carvediloL (COREG) 12.5 MG tablet Take 1 tablet (12.5 mg total) by mouth 2 (two) times daily with meals.    isosorbide mononitrate (IMDUR) 30 MG 24 hr tablet Take 1 tablet (30 mg total) by mouth once daily.    nitroGLYCERIN (NITROSTAT) 0.4 MG SL tablet Place 1 tablet (0.4 mg total) under the tongue every 5 (five) minutes as needed for Chest pain (angina).    valsartan (DIOVAN) 80 MG tablet Take 1 tablet (80 mg total) by mouth once daily.            While in the hospital, will manage blood pressure as follows; Continue home antihypertensive regimen   Will utilize p.r.n. blood pressure medication only if patient's blood pressure greater than 180/110 and she develops symptoms such as worsening chest pain or shortness of breath.

## 2024-09-20 NOTE — ASSESSMENT & PLAN NOTE
-presents with left flank pain, found to have diverticulitis with abscess on CT imaging   -IV ciprofloxacin and metronidazole; penicillin allergy  -general surgery consulted but do not suspect the patient will be good operative candidate  -clear liquid diet    Repeat CT scan shows abscess has enlarged.  IR consult placed.  Does not appear to have a good window.  Abdominal pain resolved, leukocytosis improving.  Continue IV antibiotics  Appreciate general surgery input.

## 2024-09-20 NOTE — PLAN OF CARE
SW made aware by MD of no dc today. Pts dc dispo pending at this time. Pts daughter is primary caretaker. SW will continue to follow pt throughout her transitions of care and assist with any dc needs.      09/20/24 1016   Post-Acute Status   Post-Acute Authorization Other   Hospital Resources/Appts/Education Provided Appointments scheduled and added to AVS   Discharge Delays None known at this time   Discharge Plan   Discharge Plan A   (TBD)

## 2024-09-20 NOTE — PROGRESS NOTES
[facility]  General Surgery  Progress Note    Subjective:     History of Present Illness:  Ms. Perez is a 89 y.o. female with PMH of HFpEF, HTN, and CAD who presented with left flank pain and lower back pain that started earlier today. CT scan obtained showed evidence of diverticulitis. General surgery was consulted for evaluation. Patient takes DAPT at home. PSH includes hysterectomy and cholecystectomy. Patient states that she has not had this happen before. She reports improvement in her pain since presentation to ED    Post-Op Info:  * No surgery found *         Interval History: CT scan performed yesterday showed large in size abscess, however IR does not have a window for drain placement at this time. Abdominal pain improving. WBC improving.     Medications:  Continuous Infusions:  Scheduled Meds:   aspirin  81 mg Oral Daily    bumetanide  2 mg Oral Daily    carvediloL  6.25 mg Oral BID WM    ciprofloxacin  400 mg Intravenous Q24H    docusate sodium  100 mg Oral Daily    enoxparin  30 mg Subcutaneous Daily    ezetimibe  10 mg Oral Daily    isosorbide mononitrate  30 mg Oral Daily    metroNIDAZOLE IV (PEDS and ADULTS)  500 mg Intravenous Q8H    sodium chloride 0.9%  10 mL Intravenous Q8H    valsartan  40 mg Oral Daily     PRN Meds:  Current Facility-Administered Medications:     acetaminophen, 1,000 mg, Oral, Q8H PRN    acetaminophen, 650 mg, Oral, Q4H PRN    dextrose 10%, 12.5 g, Intravenous, PRN    dextrose 10%, 25 g, Intravenous, PRN    glucagon (human recombinant), 1 mg, Intramuscular, PRN    glucose, 16 g, Oral, PRN    glucose, 24 g, Oral, PRN    melatonin, 6 mg, Oral, Nightly PRN    morphine, 1 mg, Intravenous, Q6H PRN    naloxone, 0.02 mg, Intravenous, PRN    ondansetron, 8 mg, Oral, Q8H PRN    ondansetron, 4 mg, Intravenous, Q8H PRN    pneumoc 20-paul conj-dip cr(PF), 0.5 mL, Intramuscular, vaccine x 1 dose     Review of patient's allergies indicates:   Allergen Reactions    Codeine     Latex,  natural rubber     Penicillins     Penicillin Rash     Objective:     Vital Signs (Most Recent):  Temp: 97.9 °F (36.6 °C) (09/20/24 0743)  Pulse: 79 (09/20/24 0743)  Resp: 16 (09/20/24 0743)  BP: (!) 140/79 (09/20/24 0743)  SpO2: 95 % (09/20/24 0818) Vital Signs (24h Range):  Temp:  [97.6 °F (36.4 °C)-99.2 °F (37.3 °C)] 97.9 °F (36.6 °C)  Pulse:  [64-79] 79  Resp:  [16-19] 16  SpO2:  [94 %-97 %] 95 %  BP: (111-140)/(56-79) 140/79     Weight: 83.9 kg (184 lb 15.5 oz)  Body mass index is 37.36 kg/m².    Intake/Output - Last 3 Shifts         09/18 0700 09/19 0659 09/19 0700 09/20 0659 09/20 0700 09/21 0659    P.O. 300 120 0    IV Piggyback 200 300     Total Intake(mL/kg) 500 (6) 420 (5) 0 (0)    Urine (mL/kg/hr) 250 (0.1)      Stool 0      Total Output 250      Net +250 +420 0           Urine Occurrence  1 x     Stool Occurrence 1 x 2 x 2 x             Physical Exam  Vitals and nursing note reviewed.   Constitutional:       Appearance: She is ill-appearing.   HENT:      Head: Normocephalic and atraumatic.   Cardiovascular:      Rate and Rhythm: Normal rate.   Pulmonary:      Effort: Pulmonary effort is normal.   Abdominal:      General: Abdomen is flat.      Palpations: Abdomen is soft.      Comments: Abdomen soft, tender to palpation in the left lower quadrant   Skin:     General: Skin is warm and dry.   Neurological:      General: No focal deficit present.      Mental Status: She is alert and oriented to person, place, and time.          Significant Labs:  I have reviewed all pertinent lab results within the past 24 hours.    Significant Diagnostics:  I have reviewed all pertinent imaging results/findings within the past 24 hours.  Assessment/Plan:     * Diverticulitis of intestine with abscess without bleeding  Ms. Perez is a 89 y.o. female  with evidence of diverticulitis    - Patient seen and examined  - Labs and imaging reviewed  -CT performed 9/19 with increase in size of abscess, no safe window per  IR   -Recommending short interval CT scan to re-eval for safe window  - No plans for surgical intervention, patient with complicated medical history and high risk for complications if she were to undergo general anesthesia  - Agree with antibiotics  - Diet:  Okay for clears  - general surgery will follow along        Liss Winn MD  General Surgery  [facility] - Telemetry

## 2024-09-21 LAB
ANION GAP SERPL CALC-SCNC: 10 MMOL/L (ref 8–16)
BACTERIA BLD CULT: NORMAL
BACTERIA BLD CULT: NORMAL
BASOPHILS # BLD AUTO: 0.04 K/UL (ref 0–0.2)
BASOPHILS NFR BLD: 0.3 % (ref 0–1.9)
BUN SERPL-MCNC: 22 MG/DL (ref 10–30)
CALCIUM SERPL-MCNC: 8.9 MG/DL (ref 8.7–10.5)
CHLORIDE SERPL-SCNC: 102 MMOL/L (ref 95–110)
CO2 SERPL-SCNC: 24 MMOL/L (ref 23–29)
CREAT SERPL-MCNC: 0.9 MG/DL (ref 0.5–1.4)
DIFFERENTIAL METHOD BLD: ABNORMAL
EOSINOPHIL # BLD AUTO: 0.1 K/UL (ref 0–0.5)
EOSINOPHIL NFR BLD: 0.4 % (ref 0–8)
ERYTHROCYTE [DISTWIDTH] IN BLOOD BY AUTOMATED COUNT: 13.6 % (ref 11.5–14.5)
EST. GFR  (NO RACE VARIABLE): 59 ML/MIN/1.73 M^2
GLUCOSE SERPL-MCNC: 94 MG/DL (ref 70–110)
HCT VFR BLD AUTO: 31.9 % (ref 37–48.5)
HGB BLD-MCNC: 10.2 G/DL (ref 12–16)
IMM GRANULOCYTES # BLD AUTO: 0.07 K/UL (ref 0–0.04)
IMM GRANULOCYTES NFR BLD AUTO: 0.5 % (ref 0–0.5)
LYMPHOCYTES # BLD AUTO: 1.3 K/UL (ref 1–4.8)
LYMPHOCYTES NFR BLD: 9.4 % (ref 18–48)
MCH RBC QN AUTO: 28.9 PG (ref 27–31)
MCHC RBC AUTO-ENTMCNC: 32 G/DL (ref 32–36)
MCV RBC AUTO: 90 FL (ref 82–98)
MONOCYTES # BLD AUTO: 1.2 K/UL (ref 0.3–1)
MONOCYTES NFR BLD: 8.7 % (ref 4–15)
NEUTROPHILS # BLD AUTO: 10.9 K/UL (ref 1.8–7.7)
NEUTROPHILS NFR BLD: 80.7 % (ref 38–73)
NRBC BLD-RTO: 0 /100 WBC
PLATELET # BLD AUTO: 204 K/UL (ref 150–450)
PMV BLD AUTO: 11.6 FL (ref 9.2–12.9)
POTASSIUM SERPL-SCNC: 4 MMOL/L (ref 3.5–5.1)
RBC # BLD AUTO: 3.53 M/UL (ref 4–5.4)
SODIUM SERPL-SCNC: 136 MMOL/L (ref 136–145)
WBC # BLD AUTO: 13.52 K/UL (ref 3.9–12.7)

## 2024-09-21 PROCEDURE — 63600175 PHARM REV CODE 636 W HCPCS: Mod: JZ,JG

## 2024-09-21 PROCEDURE — 97530 THERAPEUTIC ACTIVITIES: CPT

## 2024-09-21 PROCEDURE — 99232 SBSQ HOSP IP/OBS MODERATE 35: CPT | Mod: ,,, | Performed by: STUDENT IN AN ORGANIZED HEALTH CARE EDUCATION/TRAINING PROGRAM

## 2024-09-21 PROCEDURE — 11000001 HC ACUTE MED/SURG PRIVATE ROOM

## 2024-09-21 PROCEDURE — 99900035 HC TECH TIME PER 15 MIN (STAT)

## 2024-09-21 PROCEDURE — 80048 BASIC METABOLIC PNL TOTAL CA: CPT | Performed by: STUDENT IN AN ORGANIZED HEALTH CARE EDUCATION/TRAINING PROGRAM

## 2024-09-21 PROCEDURE — 85025 COMPLETE CBC W/AUTO DIFF WBC: CPT | Performed by: STUDENT IN AN ORGANIZED HEALTH CARE EDUCATION/TRAINING PROGRAM

## 2024-09-21 PROCEDURE — A4216 STERILE WATER/SALINE, 10 ML: HCPCS | Performed by: HOSPITALIST

## 2024-09-21 PROCEDURE — 63600175 PHARM REV CODE 636 W HCPCS: Performed by: STUDENT IN AN ORGANIZED HEALTH CARE EDUCATION/TRAINING PROGRAM

## 2024-09-21 PROCEDURE — 25000003 PHARM REV CODE 250: Performed by: STUDENT IN AN ORGANIZED HEALTH CARE EDUCATION/TRAINING PROGRAM

## 2024-09-21 PROCEDURE — 63600175 PHARM REV CODE 636 W HCPCS: Performed by: HOSPITALIST

## 2024-09-21 PROCEDURE — 25000003 PHARM REV CODE 250: Performed by: HOSPITALIST

## 2024-09-21 PROCEDURE — 36415 COLL VENOUS BLD VENIPUNCTURE: CPT | Performed by: STUDENT IN AN ORGANIZED HEALTH CARE EDUCATION/TRAINING PROGRAM

## 2024-09-21 PROCEDURE — 97110 THERAPEUTIC EXERCISES: CPT

## 2024-09-21 PROCEDURE — 94761 N-INVAS EAR/PLS OXIMETRY MLT: CPT

## 2024-09-21 PROCEDURE — 94799 UNLISTED PULMONARY SVC/PX: CPT

## 2024-09-21 PROCEDURE — 27000221 HC OXYGEN, UP TO 24 HOURS

## 2024-09-21 RX ADMIN — Medication 10 ML: at 09:09

## 2024-09-21 RX ADMIN — ACETAMINOPHEN 1000 MG: 500 TABLET ORAL at 03:09

## 2024-09-21 RX ADMIN — BUMETANIDE 2 MG: 1 TABLET ORAL at 08:09

## 2024-09-21 RX ADMIN — Medication 10 ML: at 05:09

## 2024-09-21 RX ADMIN — ISOSORBIDE MONONITRATE 30 MG: 30 TABLET, EXTENDED RELEASE ORAL at 08:09

## 2024-09-21 RX ADMIN — Medication 10 ML: at 02:09

## 2024-09-21 RX ADMIN — METRONIDAZOLE 500 MG: 5 INJECTION, SOLUTION INTRAVENOUS at 05:09

## 2024-09-21 RX ADMIN — EZETIMIBE 10 MG: 10 TABLET ORAL at 08:09

## 2024-09-21 RX ADMIN — ASPIRIN 81 MG CHEWABLE TABLET 81 MG: 81 TABLET CHEWABLE at 08:09

## 2024-09-21 RX ADMIN — CARVEDILOL 6.25 MG: 6.25 TABLET, FILM COATED ORAL at 05:09

## 2024-09-21 RX ADMIN — METRONIDAZOLE 500 MG: 5 INJECTION, SOLUTION INTRAVENOUS at 02:09

## 2024-09-21 RX ADMIN — CARVEDILOL 6.25 MG: 6.25 TABLET, FILM COATED ORAL at 08:09

## 2024-09-21 RX ADMIN — ENOXAPARIN SODIUM 30 MG: 30 INJECTION SUBCUTANEOUS at 05:09

## 2024-09-21 RX ADMIN — VALSARTAN 40 MG: 40 TABLET, FILM COATED ORAL at 08:09

## 2024-09-21 NOTE — ASSESSMENT & PLAN NOTE
-presents with left flank pain, found to have diverticulitis with abscess on CT imaging   -IV ciprofloxacin and metronidazole; penicillin allergy  -general surgery consulted but do not suspect the patient will be good operative candidate  -clear liquid diet    Repeat CT scan shows abscess has enlarged.  IR consult placed.  Does not appear to have a good window.  Recommends short-term repeat CT.  Abdominal pain resolved, leukocytosis improving.  Continue IV antibiotics  Appreciate general surgery input.

## 2024-09-21 NOTE — ASSESSMENT & PLAN NOTE
Ms. Perez is a 89 y.o. female  with evidence of diverticulitis    - Patient seen and examined  - Labs and imaging reviewed  -CT performed 9/19 with increase in size of abscess, no safe window per IR   -Recommending short interval CT scan to re-eval for safe window  - No plans for surgical intervention, patient with complicated medical history and high risk for complications if she were to undergo general anesthesia  - Agree with antibiotics  - Diet:  Okay for clears, would hold off on advancement at this time  - general surgery will follow along

## 2024-09-21 NOTE — SUBJECTIVE & OBJECTIVE
Interval History: Patient looks better clinically however WBC increased. Pain improved since presentation. Hungry    Medications:  Continuous Infusions:  Scheduled Meds:   aspirin  81 mg Oral Daily    bumetanide  2 mg Oral Daily    carvediloL  6.25 mg Oral BID WM    ciprofloxacin  400 mg Intravenous Q24H    enoxparin  30 mg Subcutaneous Daily    ezetimibe  10 mg Oral Daily    isosorbide mononitrate  30 mg Oral Daily    metroNIDAZOLE IV (PEDS and ADULTS)  500 mg Intravenous Q8H    sodium chloride 0.9%  10 mL Intravenous Q8H    valsartan  40 mg Oral Daily     PRN Meds:  Current Facility-Administered Medications:     acetaminophen, 1,000 mg, Oral, Q8H PRN    acetaminophen, 650 mg, Oral, Q4H PRN    dextrose 10%, 12.5 g, Intravenous, PRN    dextrose 10%, 25 g, Intravenous, PRN    glucagon (human recombinant), 1 mg, Intramuscular, PRN    glucose, 16 g, Oral, PRN    glucose, 24 g, Oral, PRN    melatonin, 6 mg, Oral, Nightly PRN    morphine, 1 mg, Intravenous, Q6H PRN    naloxone, 0.02 mg, Intravenous, PRN    ondansetron, 8 mg, Oral, Q8H PRN    ondansetron, 4 mg, Intravenous, Q8H PRN    pneumoc 20-paul conj-dip cr(PF), 0.5 mL, Intramuscular, vaccine x 1 dose     Review of patient's allergies indicates:   Allergen Reactions    Codeine     Latex, natural rubber     Penicillins     Penicillin Rash     Objective:     Vital Signs (Most Recent):  Temp: 98.3 °F (36.8 °C) (09/21/24 0803)  Pulse: 68 (09/21/24 0803)  Resp: 18 (09/21/24 0803)  BP: (!) 123/56 (09/21/24 0803)  SpO2: 95 % (09/21/24 0830) Vital Signs (24h Range):  Temp:  [98 °F (36.7 °C)-99.2 °F (37.3 °C)] 98.3 °F (36.8 °C)  Pulse:  [62-80] 68  Resp:  [16-18] 18  SpO2:  [92 %-97 %] 95 %  BP: (110-131)/(53-61) 123/56     Weight: 83.9 kg (184 lb 15.5 oz)  Body mass index is 37.36 kg/m².    Intake/Output - Last 3 Shifts         09/19 0700 09/20 0659 09/20 0700 09/21 0659 09/21 0700 09/22 0659    P.O. 120 0     IV Piggyback 300      Total Intake(mL/kg) 420 (5) 0 (0)      Urine (mL/kg/hr)  1100 (0.5)     Stool       Total Output  1100     Net +420 -1100            Urine Occurrence 1 x 2 x     Stool Occurrence 2 x 2 x              Physical Exam  Vitals and nursing note reviewed.   Constitutional:       Appearance: She is ill-appearing.   HENT:      Head: Normocephalic and atraumatic.   Cardiovascular:      Rate and Rhythm: Normal rate.   Pulmonary:      Effort: Pulmonary effort is normal.   Abdominal:      General: Abdomen is flat.      Palpations: Abdomen is soft.      Comments: Abdomen soft, tender to palpation in the left lower quadrant   Skin:     General: Skin is warm and dry.   Neurological:      General: No focal deficit present.      Mental Status: She is alert and oriented to person, place, and time.          Significant Labs:  I have reviewed all pertinent lab results within the past 24 hours.    Significant Diagnostics:  I have reviewed all pertinent imaging results/findings within the past 24 hours.

## 2024-09-21 NOTE — ASSESSMENT & PLAN NOTE
Advance Care Planning     Briefly discussed code status with patient at bedside.  At this time she would like to remain a full code.  She has 4 living children.  No healthcare power of .  Patient would like daughter yvrose Bautista to help her make medical decisions if necessary.  Spent 5-8 minutes for ACP discussion.      Again discussed goals of care on 09/21.  Spent 8-10 minutes on ACP discussion.  Daughter agreeable to palliative care consult.  Understands they are not present here over the weekend, tentatively will be seen on Monday.  Palliative Care consult placed.

## 2024-09-21 NOTE — PROGRESS NOTES
[facility] Medicine  Progress Note    Patient Name: Marycruz Perez  MRN: [id number]  Patient Class: IP- Inpatient   Admission Date: 9/16/2024  Length of Stay: 5 days  Attending Physician: Kusum Keenan MD  Primary Care Provider: Melchor Menchaca -        Subjective:     Principal Problem:Diverticulitis of intestine with abscess without bleeding        HPI:  Ms. Perez is a 89yo woman with HFpEF, CHB s/p PPM, HTN, and CAD who presents with left flank pain and lower back pain x4 hours. She reports that she has developed left-sided flank pain.  Has had a few days of constipation where she was passing small pebble like bowel movements prior to this.  Denies any fevers or chills.  No bleeding symptoms.  Does have history of diverticulitis in the past.    Overview/Hospital Course:  No notes on file    Interval History:  Patient prefer daughter acted as .  Reports feeling weak.  Has mild abdominal pain.  Does not like clear liquid diet.    Briefly discussed code status and palliative care consult again today.  Daughter states she was hopeful patient would get better but understands she has limited treatment options at this time.  Agreeable to a palliative care consult.    Review of Systems  Objective:     Vital Signs (Most Recent):  Temp: 98.2 °F (36.8 °C) (09/21/24 1200)  Pulse: 71 (09/21/24 1200)  Resp: 18 (09/21/24 1200)  BP: (!) 106/55 (09/21/24 1200)  SpO2: 96 % (09/21/24 1200) Vital Signs (24h Range):  Temp:  [98.1 °F (36.7 °C)-99.2 °F (37.3 °C)] 98.2 °F (36.8 °C)  Pulse:  [62-80] 71  Resp:  [16-18] 18  SpO2:  [92 %-97 %] 96 %  BP: (106-129)/(53-60) 106/55     Weight: 83.9 kg (184 lb 15.5 oz)  Body mass index is 37.36 kg/m².    Intake/Output Summary (Last 24 hours) at 9/21/2024 1503  Last data filed at 9/21/2024 1040  Gross per 24 hour   Intake 360 ml   Output 1100 ml   Net -740 ml         Physical Exam  Vitals and nursing note reviewed.   Constitutional:       General: She is not  in acute distress.     Appearance: She is obese.   HENT:      Mouth/Throat:      Mouth: Mucous membranes are dry.   Cardiovascular:      Rate and Rhythm: Normal rate and regular rhythm.      Heart sounds: Normal heart sounds. No murmur heard.  Pulmonary:      Effort: Pulmonary effort is normal. No respiratory distress.   Abdominal:      Palpations: Abdomen is soft.      Tenderness: There is abdominal tenderness.   Musculoskeletal:      Right lower leg: No edema.      Left lower leg: No edema.   Skin:     General: Skin is warm and dry.      Findings: No bruising.   Neurological:      General: No focal deficit present.      Mental Status: She is alert.             Significant Labs: All pertinent labs within the past 24 hours have been reviewed.    Significant Imaging: I have reviewed all pertinent imaging results/findings within the past 24 hours.    Assessment/Plan:      * Diverticulitis of intestine with abscess without bleeding  -presents with left flank pain, found to have diverticulitis with abscess on CT imaging   -IV ciprofloxacin and metronidazole; penicillin allergy  -general surgery consulted but do not suspect the patient will be good operative candidate  -clear liquid diet    Repeat CT scan shows abscess has enlarged.  IR consult placed.  Does not appear to have a good window.  Recommends short-term repeat CT.  Abdominal pain resolved, leukocytosis improving.  Continue IV antibiotics  Appreciate general surgery input.      Goals of care, counseling/discussion  Advance Care Planning    Briefly discussed code status with patient at bedside.  At this time she would like to remain a full code.  She has 4 living children.  No healthcare power of .  Patient would like daughter yvrose Bautista to help her make medical decisions if necessary.  Spent 5-8 minutes for ACP discussion.      Again discussed goals of care on 09/21.  Spent 8-10 minutes on ACP discussion.  Daughter agreeable to palliative care consult.   Understands they are not present here over the weekend, tentatively will be seen on Monday.  Palliative Care consult placed.         CKD (chronic kidney disease)  Creatine stable for now. BMP reviewed- noted Estimated Creatinine Clearance: 30.6 mL/min (based on SCr of 1.1 mg/dL). according to latest data. Based on current GFR, CKD stage is stage 2 - GFR 60-89.  Monitor UOP and serial BMP and adjust therapy as needed. Renally dose meds. Avoid nephrotoxic medications and procedures.    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 Morbid obesity complicates all aspects of disease management from diagnostic modalities to treatment. Weight loss encouraged and health benefits explained to patient.         PAD (peripheral artery disease)  -continue aspirin and ezetimibe      Chronic heart failure with preserved ejection fraction (HFpEF)  - stable diastolic CHF  - continue home carvedilol, valsartan, Bumex  - daily weights, strict I/Os  - tele  - keep K >4, Mg >2  - 2D echo noted; deferring repeat at this hospitalization as she currently appears compensated        Pacemaker-dependent  -in place      Complete heart block  -status post pacemaker      Essential hypertension  Chronic, controlled. Latest blood pressure and vitals reviewed-     Temp:  [97.6 °F (36.4 °C)-99.2 °F (37.3 °C)]   Pulse:  [70-80]   Resp:  [16-19]   BP: (111-140)/(56-79)   SpO2:  [94 %-97 %] .   Home meds for hypertension were reviewed and noted below.   Hypertension Medications               bumetanide (BUMEX) 0.5 MG Tab Take 2 tablets (1 mg total) by mouth once daily. May also take 2 tablets (1 mg total) daily as needed (> 2 pound weight gain in 24 hours, > 5 pound weight gain in a week, worsening leg swelling, shortness of breath).    carvediloL (COREG) 12.5 MG tablet Take 1 tablet (12.5 mg total) by mouth 2 (two) times daily with meals.    isosorbide mononitrate (IMDUR) 30 MG 24 hr  tablet Take 1 tablet (30 mg total) by mouth once daily.    nitroGLYCERIN (NITROSTAT) 0.4 MG SL tablet Place 1 tablet (0.4 mg total) under the tongue every 5 (five) minutes as needed for Chest pain (angina).    valsartan (DIOVAN) 80 MG tablet Take 1 tablet (80 mg total) by mouth once daily.            While in the hospital, will manage blood pressure as follows; Continue home antihypertensive regimen   Will utilize p.r.n. blood pressure medication only if patient's blood pressure greater than 180/110 and she develops symptoms such as worsening chest pain or shortness of breath.      VTE Risk Mitigation (From admission, onward)           Ordered     enoxaparin injection 30 mg  Daily         09/16/24 1137     IP VTE HIGH RISK PATIENT  Once         09/16/24 1137     Place sequential compression device  Until discontinued         09/16/24 1137                    Discharge Planning   ANA: 9/23/2024     Code Status: Full Code   Is the patient medically ready for discharge?:     Reason for patient still in hospital (select all that apply): Patient trending condition, Treatment, and Consult recommendations  Discharge Plan A:  (TBD)   Discharge Delays: None known at this time              Kusum Keenan MD  Department of Hospital Medicine   [facility]

## 2024-09-21 NOTE — PROGRESS NOTES
[facility]  General Surgery  Progress Note    Subjective:     History of Present Illness:  Ms. Perez is a 89 y.o. female with PMH of HFpEF, HTN, and CAD who presented with left flank pain and lower back pain that started earlier today. CT scan obtained showed evidence of diverticulitis. General surgery was consulted for evaluation. Patient takes DAPT at home. PSH includes hysterectomy and cholecystectomy. Patient states that she has not had this happen before. She reports improvement in her pain since presentation to ED    Post-Op Info:  * No surgery found *         Interval History: Patient looks better clinically however WBC increased. Pain improved since presentation. Hungry    Medications:  Continuous Infusions:  Scheduled Meds:   aspirin  81 mg Oral Daily    bumetanide  2 mg Oral Daily    carvediloL  6.25 mg Oral BID WM    ciprofloxacin  400 mg Intravenous Q24H    enoxparin  30 mg Subcutaneous Daily    ezetimibe  10 mg Oral Daily    isosorbide mononitrate  30 mg Oral Daily    metroNIDAZOLE IV (PEDS and ADULTS)  500 mg Intravenous Q8H    sodium chloride 0.9%  10 mL Intravenous Q8H    valsartan  40 mg Oral Daily     PRN Meds:  Current Facility-Administered Medications:     acetaminophen, 1,000 mg, Oral, Q8H PRN    acetaminophen, 650 mg, Oral, Q4H PRN    dextrose 10%, 12.5 g, Intravenous, PRN    dextrose 10%, 25 g, Intravenous, PRN    glucagon (human recombinant), 1 mg, Intramuscular, PRN    glucose, 16 g, Oral, PRN    glucose, 24 g, Oral, PRN    melatonin, 6 mg, Oral, Nightly PRN    morphine, 1 mg, Intravenous, Q6H PRN    naloxone, 0.02 mg, Intravenous, PRN    ondansetron, 8 mg, Oral, Q8H PRN    ondansetron, 4 mg, Intravenous, Q8H PRN    pneumoc 20-paul conj-dip cr(PF), 0.5 mL, Intramuscular, vaccine x 1 dose     Review of patient's allergies indicates:   Allergen Reactions    Codeine     Latex, natural rubber     Penicillins     Penicillin Rash     Objective:     Vital Signs (Most Recent):  Temp: 98.3  °F (36.8 °C) (09/21/24 0803)  Pulse: 68 (09/21/24 0803)  Resp: 18 (09/21/24 0803)  BP: (!) 123/56 (09/21/24 0803)  SpO2: 95 % (09/21/24 0830) Vital Signs (24h Range):  Temp:  [98 °F (36.7 °C)-99.2 °F (37.3 °C)] 98.3 °F (36.8 °C)  Pulse:  [62-80] 68  Resp:  [16-18] 18  SpO2:  [92 %-97 %] 95 %  BP: (110-131)/(53-61) 123/56     Weight: 83.9 kg (184 lb 15.5 oz)  Body mass index is 37.36 kg/m².    Intake/Output - Last 3 Shifts         09/19 0700  09/20 0659 09/20 0700  09/21 0659 09/21 0700  09/22 0659    P.O. 120 0     IV Piggyback 300      Total Intake(mL/kg) 420 (5) 0 (0)     Urine (mL/kg/hr)  1100 (0.5)     Stool       Total Output  1100     Net +420 -1100            Urine Occurrence 1 x 2 x     Stool Occurrence 2 x 2 x              Physical Exam  Vitals and nursing note reviewed.   Constitutional:       Appearance: She is ill-appearing.   HENT:      Head: Normocephalic and atraumatic.   Cardiovascular:      Rate and Rhythm: Normal rate.   Pulmonary:      Effort: Pulmonary effort is normal.   Abdominal:      General: Abdomen is flat.      Palpations: Abdomen is soft.      Comments: Abdomen soft, tender to palpation in the left lower quadrant   Skin:     General: Skin is warm and dry.   Neurological:      General: No focal deficit present.      Mental Status: She is alert and oriented to person, place, and time.          Significant Labs:  I have reviewed all pertinent lab results within the past 24 hours.    Significant Diagnostics:  I have reviewed all pertinent imaging results/findings within the past 24 hours.  Assessment/Plan:     * Diverticulitis of intestine with abscess without bleeding  Ms. Perez is a 89 y.o. female  with evidence of diverticulitis    - Patient seen and examined  - Labs and imaging reviewed  -CT performed 9/19 with increase in size of abscess, no safe window per IR   -Recommending short interval CT scan to re-eval for safe window  - No plans for surgical intervention, patient with  complicated medical history and high risk for complications if she were to undergo general anesthesia  - Agree with antibiotics  - Diet:  Okay for clears, would hold off on advancement at this time  - general surgery will follow along        Liss Winn MD  General Surgery  [place] - Telemetry

## 2024-09-21 NOTE — SUBJECTIVE & OBJECTIVE
Interval History:  Patient prefer daughter acted as .  Reports feeling weak.  Has mild abdominal pain.  Does not like clear liquid diet.    Briefly discussed code status and palliative care consult again today.  Daughter states she was hopeful patient would get better but understands she has limited treatment options at this time.  Agreeable to a palliative care consult.    Review of Systems  Objective:     Vital Signs (Most Recent):  Temp: 98.2 °F (36.8 °C) (09/21/24 1200)  Pulse: 71 (09/21/24 1200)  Resp: 18 (09/21/24 1200)  BP: (!) 106/55 (09/21/24 1200)  SpO2: 96 % (09/21/24 1200) Vital Signs (24h Range):  Temp:  [98.1 °F (36.7 °C)-99.2 °F (37.3 °C)] 98.2 °F (36.8 °C)  Pulse:  [62-80] 71  Resp:  [16-18] 18  SpO2:  [92 %-97 %] 96 %  BP: (106-129)/(53-60) 106/55     Weight: 83.9 kg (184 lb 15.5 oz)  Body mass index is 37.36 kg/m².    Intake/Output Summary (Last 24 hours) at 9/21/2024 1503  Last data filed at 9/21/2024 1040  Gross per 24 hour   Intake 360 ml   Output 1100 ml   Net -740 ml         Physical Exam  Vitals and nursing note reviewed.   Constitutional:       General: She is not in acute distress.     Appearance: She is obese.   HENT:      Mouth/Throat:      Mouth: Mucous membranes are dry.   Cardiovascular:      Rate and Rhythm: Normal rate and regular rhythm.      Heart sounds: Normal heart sounds. No murmur heard.  Pulmonary:      Effort: Pulmonary effort is normal. No respiratory distress.   Abdominal:      Palpations: Abdomen is soft.      Tenderness: There is abdominal tenderness.   Musculoskeletal:      Right lower leg: No edema.      Left lower leg: No edema.   Skin:     General: Skin is warm and dry.      Findings: No bruising.   Neurological:      General: No focal deficit present.      Mental Status: She is alert.             Significant Labs: All pertinent labs within the past 24 hours have been reviewed.    Significant Imaging: I have reviewed all pertinent imaging  results/findings within the past 24 hours.

## 2024-09-22 LAB
ANION GAP SERPL CALC-SCNC: 11 MMOL/L (ref 8–16)
BASOPHILS # BLD AUTO: 0.04 K/UL (ref 0–0.2)
BASOPHILS NFR BLD: 0.3 % (ref 0–1.9)
BUN SERPL-MCNC: 21 MG/DL (ref 10–30)
CALCIUM SERPL-MCNC: 8.8 MG/DL (ref 8.7–10.5)
CHLORIDE SERPL-SCNC: 101 MMOL/L (ref 95–110)
CO2 SERPL-SCNC: 24 MMOL/L (ref 23–29)
CREAT SERPL-MCNC: 0.9 MG/DL (ref 0.5–1.4)
DIFFERENTIAL METHOD BLD: ABNORMAL
EOSINOPHIL # BLD AUTO: 0.1 K/UL (ref 0–0.5)
EOSINOPHIL NFR BLD: 0.8 % (ref 0–8)
ERYTHROCYTE [DISTWIDTH] IN BLOOD BY AUTOMATED COUNT: 13.7 % (ref 11.5–14.5)
EST. GFR  (NO RACE VARIABLE): 59 ML/MIN/1.73 M^2
GLUCOSE SERPL-MCNC: 84 MG/DL (ref 70–110)
HCT VFR BLD AUTO: 29.6 % (ref 37–48.5)
HGB BLD-MCNC: 9.6 G/DL (ref 12–16)
IMM GRANULOCYTES # BLD AUTO: 0.11 K/UL (ref 0–0.04)
IMM GRANULOCYTES NFR BLD AUTO: 0.8 % (ref 0–0.5)
LYMPHOCYTES # BLD AUTO: 1.1 K/UL (ref 1–4.8)
LYMPHOCYTES NFR BLD: 8.5 % (ref 18–48)
MCH RBC QN AUTO: 29.1 PG (ref 27–31)
MCHC RBC AUTO-ENTMCNC: 32.4 G/DL (ref 32–36)
MCV RBC AUTO: 90 FL (ref 82–98)
MONOCYTES # BLD AUTO: 0.9 K/UL (ref 0.3–1)
MONOCYTES NFR BLD: 7 % (ref 4–15)
NEUTROPHILS # BLD AUTO: 10.8 K/UL (ref 1.8–7.7)
NEUTROPHILS NFR BLD: 82.6 % (ref 38–73)
NRBC BLD-RTO: 0 /100 WBC
PLATELET # BLD AUTO: 208 K/UL (ref 150–450)
PMV BLD AUTO: 11.1 FL (ref 9.2–12.9)
POTASSIUM SERPL-SCNC: 3.8 MMOL/L (ref 3.5–5.1)
RBC # BLD AUTO: 3.3 M/UL (ref 4–5.4)
SODIUM SERPL-SCNC: 136 MMOL/L (ref 136–145)
WBC # BLD AUTO: 13.05 K/UL (ref 3.9–12.7)

## 2024-09-22 PROCEDURE — 63600175 PHARM REV CODE 636 W HCPCS: Mod: JZ,JG

## 2024-09-22 PROCEDURE — 94761 N-INVAS EAR/PLS OXIMETRY MLT: CPT

## 2024-09-22 PROCEDURE — 36410 VNPNXR 3YR/> PHY/QHP DX/THER: CPT

## 2024-09-22 PROCEDURE — 27000221 HC OXYGEN, UP TO 24 HOURS

## 2024-09-22 PROCEDURE — 25000003 PHARM REV CODE 250: Performed by: STUDENT IN AN ORGANIZED HEALTH CARE EDUCATION/TRAINING PROGRAM

## 2024-09-22 PROCEDURE — A4216 STERILE WATER/SALINE, 10 ML: HCPCS | Performed by: STUDENT IN AN ORGANIZED HEALTH CARE EDUCATION/TRAINING PROGRAM

## 2024-09-22 PROCEDURE — 99900035 HC TECH TIME PER 15 MIN (STAT)

## 2024-09-22 PROCEDURE — 25000003 PHARM REV CODE 250: Performed by: HOSPITALIST

## 2024-09-22 PROCEDURE — 63600175 PHARM REV CODE 636 W HCPCS: Performed by: STUDENT IN AN ORGANIZED HEALTH CARE EDUCATION/TRAINING PROGRAM

## 2024-09-22 PROCEDURE — 36415 COLL VENOUS BLD VENIPUNCTURE: CPT | Performed by: STUDENT IN AN ORGANIZED HEALTH CARE EDUCATION/TRAINING PROGRAM

## 2024-09-22 PROCEDURE — 63600175 PHARM REV CODE 636 W HCPCS: Performed by: HOSPITALIST

## 2024-09-22 PROCEDURE — 94799 UNLISTED PULMONARY SVC/PX: CPT

## 2024-09-22 PROCEDURE — 99232 SBSQ HOSP IP/OBS MODERATE 35: CPT | Mod: ,,, | Performed by: STUDENT IN AN ORGANIZED HEALTH CARE EDUCATION/TRAINING PROGRAM

## 2024-09-22 PROCEDURE — A4216 STERILE WATER/SALINE, 10 ML: HCPCS | Performed by: HOSPITALIST

## 2024-09-22 PROCEDURE — C1751 CATH, INF, PER/CENT/MIDLINE: HCPCS

## 2024-09-22 PROCEDURE — 80048 BASIC METABOLIC PNL TOTAL CA: CPT | Performed by: STUDENT IN AN ORGANIZED HEALTH CARE EDUCATION/TRAINING PROGRAM

## 2024-09-22 PROCEDURE — 85025 COMPLETE CBC W/AUTO DIFF WBC: CPT | Performed by: STUDENT IN AN ORGANIZED HEALTH CARE EDUCATION/TRAINING PROGRAM

## 2024-09-22 PROCEDURE — 11000001 HC ACUTE MED/SURG PRIVATE ROOM

## 2024-09-22 RX ORDER — SODIUM CHLORIDE 0.9 % (FLUSH) 0.9 %
10 SYRINGE (ML) INJECTION
Status: DISCONTINUED | OUTPATIENT
Start: 2024-09-22 | End: 2024-09-27 | Stop reason: HOSPADM

## 2024-09-22 RX ORDER — SODIUM CHLORIDE 0.9 % (FLUSH) 0.9 %
10 SYRINGE (ML) INJECTION EVERY 6 HOURS
Status: DISCONTINUED | OUTPATIENT
Start: 2024-09-22 | End: 2024-09-27 | Stop reason: HOSPADM

## 2024-09-22 RX ADMIN — Medication 10 ML: at 02:09

## 2024-09-22 RX ADMIN — ACETAMINOPHEN 1000 MG: 500 TABLET ORAL at 05:09

## 2024-09-22 RX ADMIN — Medication 10 ML: at 10:09

## 2024-09-22 RX ADMIN — EZETIMIBE 10 MG: 10 TABLET ORAL at 09:09

## 2024-09-22 RX ADMIN — CIPROFLOXACIN 400 MG: 2 INJECTION, SOLUTION INTRAVENOUS at 08:09

## 2024-09-22 RX ADMIN — ENOXAPARIN SODIUM 30 MG: 30 INJECTION SUBCUTANEOUS at 05:09

## 2024-09-22 RX ADMIN — SODIUM CHLORIDE, PRESERVATIVE FREE 10 ML: 5 INJECTION INTRAVENOUS at 06:09

## 2024-09-22 RX ADMIN — Medication 10 ML: at 05:09

## 2024-09-22 RX ADMIN — METRONIDAZOLE 500 MG: 5 INJECTION, SOLUTION INTRAVENOUS at 12:09

## 2024-09-22 RX ADMIN — CARVEDILOL 6.25 MG: 6.25 TABLET, FILM COATED ORAL at 05:09

## 2024-09-22 RX ADMIN — VALSARTAN 40 MG: 40 TABLET, FILM COATED ORAL at 09:09

## 2024-09-22 RX ADMIN — ISOSORBIDE MONONITRATE 30 MG: 30 TABLET, EXTENDED RELEASE ORAL at 09:09

## 2024-09-22 RX ADMIN — CARVEDILOL 6.25 MG: 6.25 TABLET, FILM COATED ORAL at 09:09

## 2024-09-22 RX ADMIN — METRONIDAZOLE 500 MG: 5 INJECTION, SOLUTION INTRAVENOUS at 10:09

## 2024-09-22 RX ADMIN — BUMETANIDE 2 MG: 1 TABLET ORAL at 09:09

## 2024-09-22 RX ADMIN — ASPIRIN 81 MG CHEWABLE TABLET 81 MG: 81 TABLET CHEWABLE at 09:09

## 2024-09-22 RX ADMIN — METRONIDAZOLE 500 MG: 5 INJECTION, SOLUTION INTRAVENOUS at 02:09

## 2024-09-22 NOTE — NURSING
"RAPID RESPONSE NURSE PROACTIVE ROUNDING NOTE     Time of Visit:     Admit Date: 2024  LOS: 5  Code Status: Full Code   Date of Visit: 2024  : 1/3/1930  Age: 89 y.o.  Sex: female  Race: White  Bed: K465/K465 A:   MRN: [id number]  Was the patient discharged from an ICU this admission? No   Was the patient discharged from a PACU within last 24 hours?  No  Did the patient receive conscious sedation/general anesthesia in last 24 hours?  No  Was the patient in the ED within the past 24 hours?  No  Was the patient started on NIPPV within the past 24 hours?  No  Attending Physician: Kusum Keenan MD  Primary Service: Networked reference to record PCT     ASSESSMENT     Notified by bedside RN via phone call.  Reason for alert: need for PIV placement    Diagnosis: Diverticulitis of intestine with abscess without bleeding    Abnormal Vital Signs: BP (!) 117/57 (BP Location: Left arm, Patient Position: Lying)   Pulse 68   Temp 98.4 °F (36.9 °C) (Oral)   Resp 16   Ht 4' 11" (1.499 m)   Wt 83.9 kg (184 lb 15.5 oz)   SpO2 (!) 94%   BMI 37.36 kg/m²      Clinical Issues:  needs PIV placement    Patient  has a past medical history of Acute exacerbation of CHF (congestive heart failure), NATHEN (acute kidney injury), Hypertension, and Syncope and collapse.      Notified by bedside RN of need for PIV placement, previous IV infiltrated. 20g PIV placed to LFA via US. Blood return noted. LDA placed in chart.     Discussed plan of care with RNRyan.      FOLLOW-UP     Call back the Rapid Response Nurse, MADY PARKER RN at [facility] Phone: [phone] for additional questions or concerns.    "

## 2024-09-22 NOTE — PLAN OF CARE
Problem: Adult Inpatient Plan of Care  Goal: Plan of Care Review  Outcome: Progressing  Goal: Patient-Specific Goal (Individualized)  Outcome: Progressing  Goal: Readiness for Transition of Care  Outcome: Progressing     Problem: Infection  Goal: Absence of Infection Signs and Symptoms  Outcome: Progressing     Problem: Skin Injury Risk Increased  Goal: Skin Health and Integrity  Outcome: Progressing     Problem: Intestinal Obstruction  Goal: Fluid and Electrolyte Balance  Outcome: Progressing  Goal: Absence of Infection Signs and Symptoms  Outcome: Progressing  Goal: Optimal Pain Control and Function  Outcome: Progressing     Problem: Fall Injury Risk  Goal: Absence of Fall and Fall-Related Injury  Outcome: Progressing     Problem: Coping Ineffective  Goal: Effective Coping  Outcome: Progressing

## 2024-09-22 NOTE — PROGRESS NOTES
[facility]  General Surgery  Progress Note    Subjective:     History of Present Illness:  Ms. Perez is a 89 y.o. female with PMH of HFpEF, HTN, and CAD who presented with left flank pain and lower back pain that started earlier today. CT scan obtained showed evidence of diverticulitis. General surgery was consulted for evaluation. Patient takes DAPT at home. PSH includes hysterectomy and cholecystectomy. Patient states that she has not had this happen before. She reports improvement in her pain since presentation to ED    Post-Op Info:  * No surgery found *         Interval History: No acute events overnight. Patient stable. WBC stable.     Medications:  Continuous Infusions:  Scheduled Meds:   aspirin  81 mg Oral Daily    bumetanide  2 mg Oral Daily    carvediloL  6.25 mg Oral BID WM    ciprofloxacin  400 mg Intravenous Q24H    enoxparin  30 mg Subcutaneous Daily    ezetimibe  10 mg Oral Daily    isosorbide mononitrate  30 mg Oral Daily    metroNIDAZOLE IV (PEDS and ADULTS)  500 mg Intravenous Q8H    sodium chloride 0.9%  10 mL Intravenous Q8H    valsartan  40 mg Oral Daily     PRN Meds:  Current Facility-Administered Medications:     acetaminophen, 1,000 mg, Oral, Q8H PRN    acetaminophen, 650 mg, Oral, Q4H PRN    dextrose 10%, 12.5 g, Intravenous, PRN    dextrose 10%, 25 g, Intravenous, PRN    glucagon (human recombinant), 1 mg, Intramuscular, PRN    glucose, 16 g, Oral, PRN    glucose, 24 g, Oral, PRN    melatonin, 6 mg, Oral, Nightly PRN    morphine, 1 mg, Intravenous, Q6H PRN    naloxone, 0.02 mg, Intravenous, PRN    ondansetron, 8 mg, Oral, Q8H PRN    ondansetron, 4 mg, Intravenous, Q8H PRN    pneumoc 20-paul conj-dip cr(PF), 0.5 mL, Intramuscular, vaccine x 1 dose     Review of patient's allergies indicates:   Allergen Reactions    Codeine     Latex, natural rubber     Penicillins     Penicillin Rash     Objective:     Vital Signs (Most Recent):  Temp: 98.2 °F (36.8 °C) (09/22/24 0810)  Pulse: 82  (09/22/24 0810)  Resp: 18 (09/22/24 0810)  BP: (!) 130/58 (09/22/24 0810)  SpO2: 98 % (09/22/24 0834) Vital Signs (24h Range):  Temp:  [98.2 °F (36.8 °C)-98.4 °F (36.9 °C)] 98.2 °F (36.8 °C)  Pulse:  [67-82] 82  Resp:  [16-19] 18  SpO2:  [94 %-98 %] 98 %  BP: (114-137)/(56-63) 130/58     Weight: 83.9 kg (184 lb 15.5 oz)  Body mass index is 37.36 kg/m².    Intake/Output - Last 3 Shifts         09/20 0700  09/21 0659 09/21 0700  09/22 0659 09/22 0700 09/23 0659    P.O. 0 960     IV Piggyback       Total Intake(mL/kg) 0 (0) 960 (11.4)     Urine (mL/kg/hr) 1100 (0.5) 1600 (0.8)     Total Output 1100 1600     Net -1100 -640            Urine Occurrence 2 x      Stool Occurrence 2 x               Physical Exam  Vitals and nursing note reviewed.   Constitutional:       Appearance: She is ill-appearing.   HENT:      Head: Normocephalic and atraumatic.   Cardiovascular:      Rate and Rhythm: Normal rate.   Pulmonary:      Effort: Pulmonary effort is normal.   Abdominal:      General: Abdomen is flat.      Palpations: Abdomen is soft.      Comments: Abdomen soft, tender to palpation in the left lower quadrant   Skin:     General: Skin is warm and dry.   Neurological:      General: No focal deficit present.      Mental Status: She is alert and oriented to person, place, and time.          Significant Labs:  I have reviewed all pertinent lab results within the past 24 hours.    Significant Diagnostics:  I have reviewed all pertinent imaging results/findings within the past 24 hours.    Assessment/Plan:     * Diverticulitis of intestine with abscess without bleeding  Ms. Perez is a 89 y.o. female  with evidence of diverticulitis    - Patient seen and examined  - Labs and imaging reviewed  -CT performed 9/19 with increase in size of abscess, no safe window per IR   -Recommending short interval CT scan to re-eval for safe window  - No plans for surgical intervention, patient with complicated medical history and high risk for  complications if she were to undergo general anesthesia  - Agree with antibiotics  - Diet:  Okay for clears, would hold off on advancement at this time  - general surgery will follow along  - likely plan for repeat CT scan tomorrow        Liss Winn MD  General Surgery  [place] - Telemetry

## 2024-09-22 NOTE — PROGRESS NOTES
[facility] Medicine  Progress Note    Patient Name: Marycruz Perez  MRN: [id number]  Patient Class: IP- Inpatient   Admission Date: 9/16/2024  Length of Stay: 6 days  Attending Physician: Kusum Keenan MD  Primary Care Provider: Melchor Menchaca -        Subjective:     Principal Problem:Diverticulitis of intestine with abscess without bleeding        HPI:  Ms. Perez is a 89yo woman with HFpEF, CHB s/p PPM, HTN, and CAD who presents with left flank pain and lower back pain x4 hours. She reports that she has developed left-sided flank pain.  Has had a few days of constipation where she was passing small pebble like bowel movements prior to this.  Denies any fevers or chills.  No bleeding symptoms.  Does have history of diverticulitis in the past.    Overview/Hospital Course:  No notes on file    Interval History:  Abdominal pain somewhat improved.  Patient would like to try solid food.  Understands will hold off on solid food for now.  Family at bedside.  Tentative plan for repeat CT tomorrow.    Review of Systems  Objective:     Vital Signs (Most Recent):  Temp: 98.3 °F (36.8 °C) (09/22/24 1241)  Pulse: 77 (09/22/24 1241)  Resp: 18 (09/22/24 1241)  BP: 137/61 (09/22/24 1241)  SpO2: 96 % (09/22/24 1241) Vital Signs (24h Range):  Temp:  [98.2 °F (36.8 °C)-98.4 °F (36.9 °C)] 98.3 °F (36.8 °C)  Pulse:  [67-82] 77  Resp:  [16-19] 18  SpO2:  [94 %-98 %] 96 %  BP: (114-137)/(56-63) 137/61     Weight: 83.9 kg (184 lb 15.5 oz)  Body mass index is 37.36 kg/m².    Intake/Output Summary (Last 24 hours) at 9/22/2024 1648  Last data filed at 9/22/2024 1447  Gross per 24 hour   Intake 1080 ml   Output 2100 ml   Net -1020 ml         Physical Exam  Vitals and nursing note reviewed.   Constitutional:       General: She is not in acute distress.     Appearance: She is obese.   HENT:      Mouth/Throat:      Mouth: Mucous membranes are dry.   Cardiovascular:      Rate and Rhythm: Normal rate and regular rhythm.       Heart sounds: Normal heart sounds. No murmur heard.  Pulmonary:      Effort: Pulmonary effort is normal. No respiratory distress.   Abdominal:      Palpations: Abdomen is soft.      Tenderness: There is abdominal tenderness.   Musculoskeletal:      Right lower leg: No edema.      Left lower leg: No edema.   Skin:     General: Skin is warm and dry.      Findings: No bruising.   Neurological:      General: No focal deficit present.      Mental Status: She is alert.             Significant Labs: All pertinent labs within the past 24 hours have been reviewed.    Significant Imaging: I have reviewed all pertinent imaging results/findings within the past 24 hours.    Assessment/Plan:      * Diverticulitis of intestine with abscess without bleeding  -presents with left flank pain, found to have diverticulitis with abscess on CT imaging   -IV ciprofloxacin and metronidazole; penicillin allergy  -general surgery consulted but do not suspect the patient will be good operative candidate  -clear liquid diet    Repeat CT scan shows abscess has enlarged.  IR consult placed.  Does not appear to have a good window.  Recommends short-term repeat CT.  Abdominal pain resolved, leukocytosis improving.  Continue IV antibiotics  Appreciate general surgery input.    Recommend short term follow-up CT tomorrow.      Goals of care, counseling/discussion  Advance Care Planning    Briefly discussed code status with patient at bedside.  At this time she would like to remain a full code.  She has 4 living children.  No healthcare power of .  Patient would like daughter yvrose Bautista to help her make medical decisions if necessary.  Spent 5-8 minutes for ACP discussion.      Again discussed goals of care on 09/21.  Spent 8-10 minutes on ACP discussion.  Daughter agreeable to palliative care consult.  Understands they are not present here over the weekend, tentatively will be seen on Monday.  Palliative Care consult placed.         CKD (chronic  kidney disease)  Creatine stable for now. BMP reviewed- noted Estimated Creatinine Clearance: 30.6 mL/min (based on SCr of 1.1 mg/dL). according to latest data. Based on current GFR, CKD stage is stage 2 - GFR 60-89.  Monitor UOP and serial BMP and adjust therapy as needed. Renally dose meds. Avoid nephrotoxic medications and procedures.    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 Morbid obesity complicates all aspects of disease management from diagnostic modalities to treatment. Weight loss encouraged and health benefits explained to patient.         PAD (peripheral artery disease)  -continue aspirin and ezetimibe      Chronic heart failure with preserved ejection fraction (HFpEF)  - stable diastolic CHF  - continue home carvedilol, valsartan, Bumex  - daily weights, strict I/Os  - tele  - keep K >4, Mg >2  - 2D echo noted; deferring repeat at this hospitalization as she currently appears compensated        Pacemaker-dependent  -in place      Complete heart block  -status post pacemaker      Essential hypertension  Chronic, controlled. Latest blood pressure and vitals reviewed-     Temp:  [97.6 °F (36.4 °C)-99.2 °F (37.3 °C)]   Pulse:  [70-80]   Resp:  [16-19]   BP: (111-140)/(56-79)   SpO2:  [94 %-97 %] .   Home meds for hypertension were reviewed and noted below.   Hypertension Medications               bumetanide (BUMEX) 0.5 MG Tab Take 2 tablets (1 mg total) by mouth once daily. May also take 2 tablets (1 mg total) daily as needed (> 2 pound weight gain in 24 hours, > 5 pound weight gain in a week, worsening leg swelling, shortness of breath).    carvediloL (COREG) 12.5 MG tablet Take 1 tablet (12.5 mg total) by mouth 2 (two) times daily with meals.    isosorbide mononitrate (IMDUR) 30 MG 24 hr tablet Take 1 tablet (30 mg total) by mouth once daily.    nitroGLYCERIN (NITROSTAT) 0.4 MG SL tablet Place 1 tablet (0.4 mg total) under the tongue  every 5 (five) minutes as needed for Chest pain (angina).    valsartan (DIOVAN) 80 MG tablet Take 1 tablet (80 mg total) by mouth once daily.            While in the hospital, will manage blood pressure as follows; Continue home antihypertensive regimen   Will utilize p.r.n. blood pressure medication only if patient's blood pressure greater than 180/110 and she develops symptoms such as worsening chest pain or shortness of breath.      VTE Risk Mitigation (From admission, onward)           Ordered     enoxaparin injection 30 mg  Daily         09/16/24 1137     IP VTE HIGH RISK PATIENT  Once         09/16/24 1137     Place sequential compression device  Until discontinued         09/16/24 1137                    Discharge Planning   ANA: 9/23/2024     Code Status: Full Code   Is the patient medically ready for discharge?:     Reason for patient still in hospital (select all that apply): Patient trending condition, Treatment, and Consult recommendations  Discharge Plan A:  (TBD)   Discharge Delays: None known at this time        Kusum Keenan MD  Department of [facility] Medicine   [facility]

## 2024-09-22 NOTE — ASSESSMENT & PLAN NOTE
-presents with left flank pain, found to have diverticulitis with abscess on CT imaging   -IV ciprofloxacin and metronidazole; penicillin allergy  -general surgery consulted but do not suspect the patient will be good operative candidate  -clear liquid diet    Repeat CT scan shows abscess has enlarged.  IR consult placed.  Does not appear to have a good window.  Recommends short-term repeat CT.  Abdominal pain resolved, leukocytosis improving.  Continue IV antibiotics  Appreciate general surgery input.    Recommend short term follow-up CT tomorrow.

## 2024-09-22 NOTE — SUBJECTIVE & OBJECTIVE
Interval History: No acute events overnight. Patient stable. WBC stable.     Medications:  Continuous Infusions:  Scheduled Meds:   aspirin  81 mg Oral Daily    bumetanide  2 mg Oral Daily    carvediloL  6.25 mg Oral BID WM    ciprofloxacin  400 mg Intravenous Q24H    enoxparin  30 mg Subcutaneous Daily    ezetimibe  10 mg Oral Daily    isosorbide mononitrate  30 mg Oral Daily    metroNIDAZOLE IV (PEDS and ADULTS)  500 mg Intravenous Q8H    sodium chloride 0.9%  10 mL Intravenous Q8H    valsartan  40 mg Oral Daily     PRN Meds:  Current Facility-Administered Medications:     acetaminophen, 1,000 mg, Oral, Q8H PRN    acetaminophen, 650 mg, Oral, Q4H PRN    dextrose 10%, 12.5 g, Intravenous, PRN    dextrose 10%, 25 g, Intravenous, PRN    glucagon (human recombinant), 1 mg, Intramuscular, PRN    glucose, 16 g, Oral, PRN    glucose, 24 g, Oral, PRN    melatonin, 6 mg, Oral, Nightly PRN    morphine, 1 mg, Intravenous, Q6H PRN    naloxone, 0.02 mg, Intravenous, PRN    ondansetron, 8 mg, Oral, Q8H PRN    ondansetron, 4 mg, Intravenous, Q8H PRN    pneumoc 20-paul conj-dip cr(PF), 0.5 mL, Intramuscular, vaccine x 1 dose     Review of patient's allergies indicates:   Allergen Reactions    Codeine     Latex, natural rubber     Penicillins     Penicillin Rash     Objective:     Vital Signs (Most Recent):  Temp: 98.2 °F (36.8 °C) (09/22/24 0810)  Pulse: 82 (09/22/24 0810)  Resp: 18 (09/22/24 0810)  BP: (!) 130/58 (09/22/24 0810)  SpO2: 98 % (09/22/24 0834) Vital Signs (24h Range):  Temp:  [98.2 °F (36.8 °C)-98.4 °F (36.9 °C)] 98.2 °F (36.8 °C)  Pulse:  [67-82] 82  Resp:  [16-19] 18  SpO2:  [94 %-98 %] 98 %  BP: (114-137)/(56-63) 130/58     Weight: 83.9 kg (184 lb 15.5 oz)  Body mass index is 37.36 kg/m².    Intake/Output - Last 3 Shifts         09/20 0700 09/21 0659 09/21 0700 09/22 0659 09/22 0700 09/23 0659    P.O. 0 960     IV Piggyback       Total Intake(mL/kg) 0 (0) 960 (11.4)     Urine (mL/kg/hr) 1100 (0.5) 1600 (0.8)      Total Output 1100 1600     Net -1100 -640            Urine Occurrence 2 x      Stool Occurrence 2 x               Physical Exam  Vitals and nursing note reviewed.   Constitutional:       Appearance: She is ill-appearing.   HENT:      Head: Normocephalic and atraumatic.   Cardiovascular:      Rate and Rhythm: Normal rate.   Pulmonary:      Effort: Pulmonary effort is normal.   Abdominal:      General: Abdomen is flat.      Palpations: Abdomen is soft.      Comments: Abdomen soft, tender to palpation in the left lower quadrant   Skin:     General: Skin is warm and dry.   Neurological:      General: No focal deficit present.      Mental Status: She is alert and oriented to person, place, and time.          Significant Labs:  I have reviewed all pertinent lab results within the past 24 hours.    Significant Diagnostics:  I have reviewed all pertinent imaging results/findings within the past 24 hours.

## 2024-09-22 NOTE — PLAN OF CARE
Problem: Physical Therapy  Goal: Physical Therapy Goal  Description: Goals to be met by: 10/20/24     Patient will increase functional independence with mobility by performin. Supine to sit with Moderate Assistance  2. Sit to supine with Moderate Assistance  3. Sit to stand transfer with Moderate Assistance with use of RW.   4. Bed to chair transfer with Maximum Assistance using Rolling Walker  5. Gait  x 10 feet with Maximum Assistance using Rolling Walker.     Outcome: Progressing   Pt performed bed mobility  with decreased assist today but still overall  decreased mm endurance and fatigues quickly.

## 2024-09-22 NOTE — ASSESSMENT & PLAN NOTE
Ms. Perez is a 89 y.o. female  with evidence of diverticulitis    - Patient seen and examined  - Labs and imaging reviewed  -CT performed 9/19 with increase in size of abscess, no safe window per IR   -Recommending short interval CT scan to re-eval for safe window  - No plans for surgical intervention, patient with complicated medical history and high risk for complications if she were to undergo general anesthesia  - Agree with antibiotics  - Diet:  Okay for clears, would hold off on advancement at this time  - general surgery will follow along  - likely plan for repeat CT scan tomorrow

## 2024-09-22 NOTE — SUBJECTIVE & OBJECTIVE
Interval History:  Abdominal pain somewhat improved.  Patient would like to try solid food.  Understands will hold off on solid food for now.  Family at bedside.  Tentative plan for repeat CT tomorrow.    Review of Systems  Objective:     Vital Signs (Most Recent):  Temp: 98.3 °F (36.8 °C) (09/22/24 1241)  Pulse: 77 (09/22/24 1241)  Resp: 18 (09/22/24 1241)  BP: 137/61 (09/22/24 1241)  SpO2: 96 % (09/22/24 1241) Vital Signs (24h Range):  Temp:  [98.2 °F (36.8 °C)-98.4 °F (36.9 °C)] 98.3 °F (36.8 °C)  Pulse:  [67-82] 77  Resp:  [16-19] 18  SpO2:  [94 %-98 %] 96 %  BP: (114-137)/(56-63) 137/61     Weight: 83.9 kg (184 lb 15.5 oz)  Body mass index is 37.36 kg/m².    Intake/Output Summary (Last 24 hours) at 9/22/2024 1648  Last data filed at 9/22/2024 1447  Gross per 24 hour   Intake 1080 ml   Output 2100 ml   Net -1020 ml         Physical Exam  Vitals and nursing note reviewed.   Constitutional:       General: She is not in acute distress.     Appearance: She is obese.   HENT:      Mouth/Throat:      Mouth: Mucous membranes are dry.   Cardiovascular:      Rate and Rhythm: Normal rate and regular rhythm.      Heart sounds: Normal heart sounds. No murmur heard.  Pulmonary:      Effort: Pulmonary effort is normal. No respiratory distress.   Abdominal:      Palpations: Abdomen is soft.      Tenderness: There is abdominal tenderness.   Musculoskeletal:      Right lower leg: No edema.      Left lower leg: No edema.   Skin:     General: Skin is warm and dry.      Findings: No bruising.   Neurological:      General: No focal deficit present.      Mental Status: She is alert.             Significant Labs: All pertinent labs within the past 24 hours have been reviewed.    Significant Imaging: I have reviewed all pertinent imaging results/findings within the past 24 hours.

## 2024-09-22 NOTE — PT/OT/SLP PROGRESS
Physical Therapy Treatment    Patient Name:  Marycruz Perez   MRN:  [id number]    Recommendations:     Discharge Recommendations: Moderate Intensity Therapy  Discharge Equipment Recommendations: hospital bed, wheelchair  Barriers to discharge:  decreased functional mobility    Assessment:     Marycruz Perez is a 89 y.o. female admitted with a medical diagnosis of Diverticulitis of intestine with abscess without bleeding.  She presents with the following impairments/functional limitations: weakness, impaired endurance, impaired self care skills, impaired functional mobility, gait instability, impaired balance, pain, decreased safety awareness, decreased lower extremity function, decreased ROM, edema, impaired cardiopulmonary response to activity .  Pt performed bed mobility  with decreased assist today but still overall  decreased mm endurance and fatigues quickly.    Rehab Prognosis: Good; patient would benefit from acute skilled PT services to address these deficits and reach maximum level of function.    Recent Surgery: * No surgery found *      Plan:     During this hospitalization, patient to be seen 5 x/week to address the identified rehab impairments via gait training, therapeutic activities, neuromuscular re-education, wheelchair management/training and progress toward the following goals:    Plan of Care Expires:  10/20/24    Subjective     Chief Complaint: c/o fatigue sitting at EOB after 5 min  Patient/Family Comments/goals: to  get stronger  Pain/Comfort:  Pain Rating 1: 0/10  Pain Rating Post-Intervention 1: 0/10      Objective:     Communicated with RN prior to session.  Patient found HOB elevated with peripheral IV, PureWick, oxygen upon PT entry to room.     General Precautions: Standard, fall  Orthopedic Precautions: N/A  Braces: N/A  Respiratory Status: Nasal cannula, flow 2 L/min     Functional Mobility:  Bed Mobility:     Rolling Left:  maximal assistance using  bed rail  with cues for proper hand  placement  Scooting: maximal assistance x 2  up to HOB  Supine to Sit: maximal assistance with HOB elevated , cues for proper wt shifting and sequencing  Sit to Supine: maximal assistance with cues to properly utilize bed rail and proper technique  Balance: Static sit: SBA once positioned      AM-PAC 6 CLICK MOBILITY  Turning over in bed (including adjusting bedclothes, sheets and blankets)?: 2  Sitting down on and standing up from a chair with arms (e.g., wheelchair, bedside commode, etc.): 2  Moving from lying on back to sitting on the side of the bed?: 2  Moving to and from a bed to a chair (including a wheelchair)?: 1  Need to walk in hospital room?: 1  Climbing 3-5 steps with a railing?: 1  Basic Mobility Total Score: 9       Treatment & Education:  Pt  performed AAROM R LE and A/AAROM L LE in supine x 10 reps: AP, HS,SAQ and hip abd/add with cues for full ROM and proper execution.  Pt  performed supine<>sit with MAXAx 1 as reported above. Pt required MAXAx 1 for scooting ant at EOB but able to perform static sit balance with SBA once positioned.  Pt tolerated sitting at EOB  for 5 min prior to requesting to return to supine due to fatigue. Pt's daughter and son were present  and agreeable to provide translation as needed.  However pt was able to speak and understand english during tx session.    Patient left HOB elevated with all lines intact, call button in reach, bed alarm on, RN notified, and son/daughter present..    GOALS:   Multidisciplinary Problems       Physical Therapy Goals          Problem: Physical Therapy    Goal Priority Disciplines Outcome Goal Variances Interventions   Physical Therapy Goal     PT, PT/OT Progressing     Description: Goals to be met by: 10/20/24     Patient will increase functional independence with mobility by performin. Supine to sit with Moderate Assistance  2. Sit to supine with Moderate Assistance  3. Sit to stand transfer with Moderate Assistance with use of RW.    4. Bed to chair transfer with Maximum Assistance using Rolling Walker  5. Gait  x 10 feet with Maximum Assistance using Rolling Walker.                          Time Tracking:     PT Received On: 09/21/24  PT Start Time: 1301     PT Stop Time: 1325  PT Total Time (min): 24 min     Billable Minutes: Therapeutic Activity 10 and Therapeutic Exercise 14    Treatment Type: Treatment  PT/PTA: PT     Number of PTA visits since last PT visit: 0     09/21/2024

## 2024-09-23 LAB
BASOPHILS # BLD AUTO: 0.05 K/UL (ref 0–0.2)
BASOPHILS NFR BLD: 0.4 % (ref 0–1.9)
DIFFERENTIAL METHOD BLD: ABNORMAL
EOSINOPHIL # BLD AUTO: 0.1 K/UL (ref 0–0.5)
EOSINOPHIL NFR BLD: 0.8 % (ref 0–8)
ERYTHROCYTE [DISTWIDTH] IN BLOOD BY AUTOMATED COUNT: 13.6 % (ref 11.5–14.5)
HCT VFR BLD AUTO: 34.2 % (ref 37–48.5)
HGB BLD-MCNC: 10.8 G/DL (ref 12–16)
IMM GRANULOCYTES # BLD AUTO: 0.14 K/UL (ref 0–0.04)
IMM GRANULOCYTES NFR BLD AUTO: 1.2 % (ref 0–0.5)
LYMPHOCYTES # BLD AUTO: 1.2 K/UL (ref 1–4.8)
LYMPHOCYTES NFR BLD: 10.3 % (ref 18–48)
MCH RBC QN AUTO: 28.7 PG (ref 27–31)
MCHC RBC AUTO-ENTMCNC: 31.6 G/DL (ref 32–36)
MCV RBC AUTO: 91 FL (ref 82–98)
MONOCYTES # BLD AUTO: 1 K/UL (ref 0.3–1)
MONOCYTES NFR BLD: 8.3 % (ref 4–15)
NEUTROPHILS # BLD AUTO: 9.5 K/UL (ref 1.8–7.7)
NEUTROPHILS NFR BLD: 79 % (ref 38–73)
NRBC BLD-RTO: 0 /100 WBC
PLATELET # BLD AUTO: 240 K/UL (ref 150–450)
PLATELET BLD QL SMEAR: ABNORMAL
PMV BLD AUTO: 11.8 FL (ref 9.2–12.9)
RBC # BLD AUTO: 3.76 M/UL (ref 4–5.4)
WBC # BLD AUTO: 12 K/UL (ref 3.9–12.7)

## 2024-09-23 PROCEDURE — 63600175 PHARM REV CODE 636 W HCPCS: Performed by: STUDENT IN AN ORGANIZED HEALTH CARE EDUCATION/TRAINING PROGRAM

## 2024-09-23 PROCEDURE — 85025 COMPLETE CBC W/AUTO DIFF WBC: CPT | Performed by: STUDENT IN AN ORGANIZED HEALTH CARE EDUCATION/TRAINING PROGRAM

## 2024-09-23 PROCEDURE — 99900035 HC TECH TIME PER 15 MIN (STAT)

## 2024-09-23 PROCEDURE — 63600175 PHARM REV CODE 636 W HCPCS: Mod: JZ,JG

## 2024-09-23 PROCEDURE — 36415 COLL VENOUS BLD VENIPUNCTURE: CPT | Performed by: STUDENT IN AN ORGANIZED HEALTH CARE EDUCATION/TRAINING PROGRAM

## 2024-09-23 PROCEDURE — 97110 THERAPEUTIC EXERCISES: CPT | Mod: CO

## 2024-09-23 PROCEDURE — 99232 SBSQ HOSP IP/OBS MODERATE 35: CPT | Mod: ,,, | Performed by: STUDENT IN AN ORGANIZED HEALTH CARE EDUCATION/TRAINING PROGRAM

## 2024-09-23 PROCEDURE — 11000001 HC ACUTE MED/SURG PRIVATE ROOM

## 2024-09-23 PROCEDURE — 97530 THERAPEUTIC ACTIVITIES: CPT

## 2024-09-23 PROCEDURE — A4216 STERILE WATER/SALINE, 10 ML: HCPCS | Performed by: STUDENT IN AN ORGANIZED HEALTH CARE EDUCATION/TRAINING PROGRAM

## 2024-09-23 PROCEDURE — 25000003 PHARM REV CODE 250: Performed by: STUDENT IN AN ORGANIZED HEALTH CARE EDUCATION/TRAINING PROGRAM

## 2024-09-23 PROCEDURE — 25000003 PHARM REV CODE 250: Performed by: HOSPITALIST

## 2024-09-23 PROCEDURE — 25500020 PHARM REV CODE 255: Performed by: HOSPITALIST

## 2024-09-23 PROCEDURE — A4216 STERILE WATER/SALINE, 10 ML: HCPCS | Performed by: HOSPITALIST

## 2024-09-23 PROCEDURE — 97530 THERAPEUTIC ACTIVITIES: CPT | Mod: CO

## 2024-09-23 PROCEDURE — 94799 UNLISTED PULMONARY SVC/PX: CPT

## 2024-09-23 PROCEDURE — 94761 N-INVAS EAR/PLS OXIMETRY MLT: CPT

## 2024-09-23 PROCEDURE — 27000221 HC OXYGEN, UP TO 24 HOURS

## 2024-09-23 RX ORDER — ENOXAPARIN SODIUM 100 MG/ML
40 INJECTION SUBCUTANEOUS EVERY 24 HOURS
Status: DISCONTINUED | OUTPATIENT
Start: 2024-09-23 | End: 2024-09-24

## 2024-09-23 RX ADMIN — SODIUM CHLORIDE, PRESERVATIVE FREE 10 ML: 5 INJECTION INTRAVENOUS at 11:09

## 2024-09-23 RX ADMIN — ASPIRIN 81 MG CHEWABLE TABLET 81 MG: 81 TABLET CHEWABLE at 09:09

## 2024-09-23 RX ADMIN — ENOXAPARIN SODIUM 40 MG: 40 INJECTION SUBCUTANEOUS at 05:09

## 2024-09-23 RX ADMIN — IOHEXOL 75 ML: 350 INJECTION, SOLUTION INTRAVENOUS at 09:09

## 2024-09-23 RX ADMIN — CARVEDILOL 6.25 MG: 6.25 TABLET, FILM COATED ORAL at 09:09

## 2024-09-23 RX ADMIN — CIPROFLOXACIN 400 MG: 2 INJECTION, SOLUTION INTRAVENOUS at 08:09

## 2024-09-23 RX ADMIN — SODIUM CHLORIDE, PRESERVATIVE FREE 10 ML: 5 INJECTION INTRAVENOUS at 05:09

## 2024-09-23 RX ADMIN — Medication 10 ML: at 11:09

## 2024-09-23 RX ADMIN — ISOSORBIDE MONONITRATE 30 MG: 30 TABLET, EXTENDED RELEASE ORAL at 09:09

## 2024-09-23 RX ADMIN — CARVEDILOL 6.25 MG: 6.25 TABLET, FILM COATED ORAL at 05:09

## 2024-09-23 RX ADMIN — Medication 10 ML: at 02:09

## 2024-09-23 RX ADMIN — METRONIDAZOLE 500 MG: 5 INJECTION, SOLUTION INTRAVENOUS at 05:09

## 2024-09-23 RX ADMIN — VALSARTAN 40 MG: 40 TABLET, FILM COATED ORAL at 09:09

## 2024-09-23 RX ADMIN — BUMETANIDE 2 MG: 1 TABLET ORAL at 09:09

## 2024-09-23 RX ADMIN — METRONIDAZOLE 500 MG: 5 INJECTION, SOLUTION INTRAVENOUS at 11:09

## 2024-09-23 RX ADMIN — ACETAMINOPHEN 1000 MG: 500 TABLET ORAL at 09:09

## 2024-09-23 RX ADMIN — EZETIMIBE 10 MG: 10 TABLET ORAL at 09:09

## 2024-09-23 RX ADMIN — Medication 10 ML: at 05:09

## 2024-09-23 RX ADMIN — METRONIDAZOLE 500 MG: 5 INJECTION, SOLUTION INTRAVENOUS at 02:09

## 2024-09-23 NOTE — PT/OT/SLP PROGRESS
Physical Therapy Treatment    Patient Name:  Marycruz Perez   MRN:  [id number]    Recommendations:     Discharge Recommendations: Moderate Intensity Therapy  Discharge Equipment Recommendations: wheelchair, hospital bed, lift device  Barriers to discharge:  requires 24/7 care/assistance    Assessment:     Marycruz Perez is a 89 y.o. female admitted with a medical diagnosis of Diverticulitis of intestine with abscess without bleeding.  She presents with the following impairments/functional limitations: weakness, impaired endurance, impaired self care skills, impaired functional mobility, gait instability, impaired balance, decreased lower extremity function, decreased ROM, edema, impaired cardiopulmonary response to activity.       PT/OT co-session performed to safely progress pt's functional mobility. Pt participates in bed mobility and transfers to standing. Therapy will continue to progress pt as able.     Rehab Prognosis: Good; patient would benefit from acute skilled PT services to address these deficits and reach maximum level of function.    Recent Surgery: * No surgery found *      Plan:     During this hospitalization, patient to be seen 5 x/week to address the identified rehab impairments via gait training, therapeutic activities, therapeutic exercises, neuromuscular re-education, wheelchair management/training and progress toward the following goals:    Plan of Care Expires:  10/20/24    Subjective     Chief Complaint: fatigue  Patient/Family Comments/goals: not stated  Pain/Comfort:  Pain Rating 1: 0/10  Pain Rating Post-Intervention 1: 0/10      Objective:     Communicated with Nurse prior to session.  Patient found HOB elevated with bed alarm, telemetry, PureWick, oxygen upon PT entry to room.     General Precautions: Standard, fall  Orthopedic Precautions: N/A  Braces: N/A  Respiratory Status: Nasal cannula, flow 2 L/min     Functional Mobility:  Bed Mobility:     Scooting: maximal assistance and of 2  persons  Supine to Sit: moderate assistance and of 2 persons  Sit to Supine: maximal assistance and of 2 persons  Transfers:     Sit to Stand:  maximal assistance and of 2 persons with rolling walker; able to complete glute clearance; but with poor forward trunk posture and incomplete BLE knee extension; x2 standing attempts      AM-PAC 6 CLICK MOBILITY  Turning over in bed (including adjusting bedclothes, sheets and blankets)?: 2  Sitting down on and standing up from a chair with arms (e.g., wheelchair, bedside commode, etc.): 2  Moving from lying on back to sitting on the side of the bed?: 2  Moving to and from a bed to a chair (including a wheelchair)?: 1  Need to walk in hospital room?: 1  Climbing 3-5 steps with a railing?: 1  Basic Mobility Total Score: 9       Treatment & Education:  Pt declines need for ; reporting she is able to understand/communicate in English during session.   Pt able to maintain static sitting balance with SBA once positioned properly.   Pt noted to be soiled in bowel movement upon sitting EOB; PCT notified to assist with cleaning pt at end of session.     Patient left HOB elevated with all lines intact, call button in reach, bed alarm on, and Nurse/PCT notified.    GOALS:   Multidisciplinary Problems       Physical Therapy Goals          Problem: Physical Therapy    Goal Priority Disciplines Outcome Goal Variances Interventions   Physical Therapy Goal     PT, PT/OT Progressing     Description: Goals to be met by: 10/20/24     Patient will increase functional independence with mobility by performin. Supine to sit with Moderate Assistance  2. Sit to supine with Moderate Assistance  3. Sit to stand transfer with Moderate Assistance with use of RW.   4. Bed to chair transfer with Maximum Assistance using Rolling Walker  5. Gait  x 10 feet with Maximum Assistance using Rolling Walker.                          Time Tracking:     PT Received On: 24  PT Start Time: 1886      PT Stop Time: 1345  PT Total Time (min): 21 min With OT    Billable Minutes: Therapeutic Activity 15    Treatment Type: Treatment  PT/PTA: PT     Number of PTA visits since last PT visit: 0     09/23/2024

## 2024-09-23 NOTE — SUBJECTIVE & OBJECTIVE
Interval History: No acute events overnight. Still with occasional pain. WBC pending this morning.     Medications:  Continuous Infusions:  Scheduled Meds:   aspirin  81 mg Oral Daily    bumetanide  2 mg Oral Daily    carvediloL  6.25 mg Oral BID WM    ciprofloxacin  400 mg Intravenous Q24H    enoxparin  40 mg Subcutaneous Daily    ezetimibe  10 mg Oral Daily    isosorbide mononitrate  30 mg Oral Daily    metroNIDAZOLE IV (PEDS and ADULTS)  500 mg Intravenous Q8H    sodium chloride 0.9%  10 mL Intravenous Q8H    sodium chloride 0.9%  10 mL Intravenous Q6H    valsartan  40 mg Oral Daily     PRN Meds:  Current Facility-Administered Medications:     acetaminophen, 1,000 mg, Oral, Q8H PRN    acetaminophen, 650 mg, Oral, Q4H PRN    dextrose 10%, 12.5 g, Intravenous, PRN    dextrose 10%, 25 g, Intravenous, PRN    glucagon (human recombinant), 1 mg, Intramuscular, PRN    glucose, 16 g, Oral, PRN    glucose, 24 g, Oral, PRN    melatonin, 6 mg, Oral, Nightly PRN    morphine, 1 mg, Intravenous, Q6H PRN    naloxone, 0.02 mg, Intravenous, PRN    ondansetron, 8 mg, Oral, Q8H PRN    ondansetron, 4 mg, Intravenous, Q8H PRN    pneumoc 20-paul conj-dip cr(PF), 0.5 mL, Intramuscular, vaccine x 1 dose    Flushing PICC/Midline Protocol, , , Until Discontinued **AND** sodium chloride 0.9%, 10 mL, Intravenous, Q6H **AND** sodium chloride 0.9%, 10 mL, Intravenous, PRN     Review of patient's allergies indicates:   Allergen Reactions    Codeine     Latex, natural rubber     Penicillins     Penicillin Rash     Objective:     Vital Signs (Most Recent):  Temp: 98 °F (36.7 °C) (09/23/24 0444)  Pulse: 75 (09/23/24 0444)  Resp: 18 (09/23/24 0444)  BP: (!) 150/66 (09/23/24 0444)  SpO2: 97 % (09/23/24 0444) Vital Signs (24h Range):  Temp:  [98 °F (36.7 °C)-98.7 °F (37.1 °C)] 98 °F (36.7 °C)  Pulse:  [74-82] 75  Resp:  [18] 18  SpO2:  [95 %-98 %] 97 %  BP: (119-150)/(57-66) 150/66     Weight: 83.9 kg (184 lb 15.5 oz)  Body mass index is 37.36  kg/m².    Intake/Output - Last 3 Shifts         09/21 0700  09/22 0659 09/22 0700  09/23 0659 09/23 0700  09/24 0659    P.O. 960 1080     Total Intake(mL/kg) 960 (11.4) 1080 (12.9)     Urine (mL/kg/hr) 1600 (0.8) 1600 (0.8)     Stool  0     Total Output 1600 1600     Net -640 -520            Stool Occurrence  3 x              Physical Exam  Vitals and nursing note reviewed.   HENT:      Head: Normocephalic and atraumatic.   Cardiovascular:      Rate and Rhythm: Normal rate.   Pulmonary:      Effort: Pulmonary effort is normal.   Abdominal:      General: Abdomen is flat.      Palpations: Abdomen is soft.      Comments: Abdomen soft, tender to palpation in the left lower quadrant   Skin:     General: Skin is warm and dry.   Neurological:      General: No focal deficit present.      Mental Status: She is alert and oriented to person, place, and time.   Psychiatric:         Mood and Affect: Mood normal.         Behavior: Behavior normal.          Significant Labs:  I have reviewed all pertinent lab results within the past 24 hours.    Significant Diagnostics:  I have reviewed all pertinent imaging results/findings within the past 24 hours.

## 2024-09-23 NOTE — PLAN OF CARE
SALAZAR spoke with pt's daughter Lily [phone] to discuss d/c planning. Per therapy recommendations, SALAZAR discussed with Lily about pt transferring to a SNF facility upon discharge. Lily stated she feels pt would not benefit from transferring to a SNF facility upon discharge. Lily stated pt would benefit more from discharging home with having HH services. Lily is denying placement at this time. Lily is agreeable for pt to discharge home with HH services. SALAZAR sent HH referral via [facility]. Lily was encouraged to call with any questions or concerns. Lily verbalized understanding.     Pt requested PCP follow up.     SW will continue to follow.     Future Appointments   Date Time Provider Department Center   9/23/2024  3:30 PM [facility] CT2 LIMIT 450 LBS [facility] CT SCAN [facility]         09/23/24 1129   Discharge Reassessment   Assessment Type Discharge Planning Reassessment   Did the patient's condition or plan change since previous assessment? No   Discharge Plan discussed with: Adult children  (pt's daughter Lily [phone])   Communicated ANA with patient/caregiver Date not available/Unable to determine   Discharge Plan A Home Health   Discharge Plan B Skilled Nursing Facility   DME Needed Upon Discharge    (TBD)   Transition of Care Barriers None   Why the patient remains in the hospital Requires continued medical care   Post-Acute Status   Post-Acute Authorization Home Health;Placement   Post-Acute Placement Status Patient declined/refused   Home Health Status Referrals Sent   Coverage [employer]   Discharge Delays None known at this time

## 2024-09-23 NOTE — ASSESSMENT & PLAN NOTE
Creatine stable for now. BMP reviewed- noted Estimated Creatinine Clearance: 37.9 mL/min (based on SCr of 0.9 mg/dL). according to latest data. Based on current GFR, CKD stage is stage 2 - GFR 60-89.  Monitor UOP and serial BMP and adjust therapy as needed. Renally dose meds. Avoid nephrotoxic medications and procedures.

## 2024-09-23 NOTE — CONSULTS
Palliative Care Consult Note:    Consult received.  Medical record reviewed,discussed with referring provider, Dr. Blake . Physician's goal for PM consult is goals of care. Pt requests family at bedside; her daughter Lily is currently seeking medical attention in the ED. Full consult with Palliative Medicine provider to follow tomorrow when daughter is participatory.    Thank you for allowing the Palliative Medicine team  to be apart of this patient's care.    Eulalio Parikh MD  Director of Hospice and Palliative Medicine  [facility]

## 2024-09-23 NOTE — PROGRESS NOTES
[facility]  General Surgery  Progress Note    Subjective:     History of Present Illness:  Ms. Perez is a 89 y.o. female with PMH of HFpEF, HTN, and CAD who presented with left flank pain and lower back pain that started earlier today. CT scan obtained showed evidence of diverticulitis. General surgery was consulted for evaluation. Patient takes DAPT at home. PSH includes hysterectomy and cholecystectomy. Patient states that she has not had this happen before. She reports improvement in her pain since presentation to ED    Post-Op Info:  * No surgery found *         Interval History: No acute events overnight. Still with occasional pain. WBC pending this morning.     Medications:  Continuous Infusions:  Scheduled Meds:   aspirin  81 mg Oral Daily    bumetanide  2 mg Oral Daily    carvediloL  6.25 mg Oral BID WM    ciprofloxacin  400 mg Intravenous Q24H    enoxparin  40 mg Subcutaneous Daily    ezetimibe  10 mg Oral Daily    isosorbide mononitrate  30 mg Oral Daily    metroNIDAZOLE IV (PEDS and ADULTS)  500 mg Intravenous Q8H    sodium chloride 0.9%  10 mL Intravenous Q8H    sodium chloride 0.9%  10 mL Intravenous Q6H    valsartan  40 mg Oral Daily     PRN Meds:  Current Facility-Administered Medications:     acetaminophen, 1,000 mg, Oral, Q8H PRN    acetaminophen, 650 mg, Oral, Q4H PRN    dextrose 10%, 12.5 g, Intravenous, PRN    dextrose 10%, 25 g, Intravenous, PRN    glucagon (human recombinant), 1 mg, Intramuscular, PRN    glucose, 16 g, Oral, PRN    glucose, 24 g, Oral, PRN    melatonin, 6 mg, Oral, Nightly PRN    morphine, 1 mg, Intravenous, Q6H PRN    naloxone, 0.02 mg, Intravenous, PRN    ondansetron, 8 mg, Oral, Q8H PRN    ondansetron, 4 mg, Intravenous, Q8H PRN    pneumoc 20-paul conj-dip cr(PF), 0.5 mL, Intramuscular, vaccine x 1 dose    Flushing PICC/Midline Protocol, , , Until Discontinued **AND** sodium chloride 0.9%, 10 mL, Intravenous, Q6H **AND** sodium chloride 0.9%, 10 mL, Intravenous, PRN      Review of patient's allergies indicates:   Allergen Reactions    Codeine     Latex, natural rubber     Penicillins     Penicillin Rash     Objective:     Vital Signs (Most Recent):  Temp: 98 °F (36.7 °C) (09/23/24 0444)  Pulse: 75 (09/23/24 0444)  Resp: 18 (09/23/24 0444)  BP: (!) 150/66 (09/23/24 0444)  SpO2: 97 % (09/23/24 0444) Vital Signs (24h Range):  Temp:  [98 °F (36.7 °C)-98.7 °F (37.1 °C)] 98 °F (36.7 °C)  Pulse:  [74-82] 75  Resp:  [18] 18  SpO2:  [95 %-98 %] 97 %  BP: (119-150)/(57-66) 150/66     Weight: 83.9 kg (184 lb 15.5 oz)  Body mass index is 37.36 kg/m².    Intake/Output - Last 3 Shifts         09/21 0700  09/22 0659 09/22 0700 09/23 0659 09/23 0700  09/24 0659    P.O. 960 1080     Total Intake(mL/kg) 960 (11.4) 1080 (12.9)     Urine (mL/kg/hr) 1600 (0.8) 1600 (0.8)     Stool  0     Total Output 1600 1600     Net -640 -520            Stool Occurrence  3 x              Physical Exam  Vitals and nursing note reviewed.   HENT:      Head: Normocephalic and atraumatic.   Cardiovascular:      Rate and Rhythm: Normal rate.   Pulmonary:      Effort: Pulmonary effort is normal.   Abdominal:      General: Abdomen is flat.      Palpations: Abdomen is soft.      Comments: Abdomen soft, tender to palpation in the left lower quadrant   Skin:     General: Skin is warm and dry.   Neurological:      General: No focal deficit present.      Mental Status: She is alert and oriented to person, place, and time.   Psychiatric:         Mood and Affect: Mood normal.         Behavior: Behavior normal.          Significant Labs:  I have reviewed all pertinent lab results within the past 24 hours.    Significant Diagnostics:  I have reviewed all pertinent imaging results/findings within the past 24 hours.  Assessment/Plan:     * Diverticulitis of intestine with abscess without bleeding  Ms. Perez is a 89 y.o. female  with evidence of diverticulitis    - Patient seen and examined  - Labs and imaging reviewed  -CT  performed 9/19 with increase in size of abscess, no safe window per IR   -Repeat CT abdomen, pelvis today to eval abscess  - No plans for surgical intervention, patient with complicated medical history and high risk for complications if she were to undergo general anesthesia  - Agree with antibiotics  - Diet:  Okay for clears, would hold off on advancement at this time  - general surgery will follow along        Liss Winn MD  General Surgery  [facility] - Telemetry

## 2024-09-23 NOTE — PLAN OF CARE
Problem: Adult Inpatient Plan of Care  Goal: Plan of Care Review  Outcome: Progressing  Goal: Patient-Specific Goal (Individualized)  Outcome: Progressing  Goal: Readiness for Transition of Care  Outcome: Progressing     Problem: Infection  Goal: Absence of Infection Signs and Symptoms  Outcome: Progressing     Problem: Skin Injury Risk Increased  Goal: Skin Health and Integrity  Outcome: Progressing     Problem: Intestinal Obstruction  Goal: Absence of Infection Signs and Symptoms  Outcome: Progressing  Goal: Optimal Pain Control and Function  Outcome: Progressing     Problem: Fall Injury Risk  Goal: Absence of Fall and Fall-Related Injury  Outcome: Progressing     Problem: Comorbidity Management  Goal: Blood Pressure in Desired Range  Outcome: Progressing     Problem: Coping Ineffective  Goal: Effective Coping  Outcome: Progressing     Unable to draw back on midline to collect pt's CBC.

## 2024-09-23 NOTE — PLAN OF CARE
Problem: Occupational Therapy  Goal: Occupational Therapy Goal  Description: Goals to be met by: 10/18/2024     Patient will increase functional independence with ADLs by performing:    Feeding with Supervision with upright sitting posture to reduce the risk of aspiration.  UE Dressing with Supervision.  Grooming while seated at sink with Supervision.  Toileting from bedside commode with Moderate Assistance for hygiene and clothing management.   Sitting EOB x5 minutes with supervision without adverse response.   Toilet transfer to bedside commode with Minimal Assistance.  Pt and family 100% reciprocation of DME recommendations and energy conservation techniques.     Outcome: Nyasia Perez is a 89 y.o. female with a medical diagnosis of Diverticulitis of intestine with abscess without bleeding.   Performance deficits affecting function are weakness, impaired endurance, impaired self care skills, impaired functional mobility, gait instability, impaired balance, decreased upper extremity function, decreased lower extremity function, decreased safety awareness, pain, impaired skin, edema, impaired cardiopulmonary response to activity, decreased ROM, impaired coordination.    Pt found in bed, agreeable to therapy.  Pt with fair tolerance of therapy. She is progressing towards goals. Continue OT services to address functional goals, progressing as able.

## 2024-09-23 NOTE — ASSESSMENT & PLAN NOTE
-presents with left flank pain, found to have diverticulitis with abscess on CT imaging   -IV ciprofloxacin and metronidazole; penicillin allergy  -general surgery consulted but do not suspect the patient will be good operative candidate  -clear liquid diet    Repeat CT scan shows abscess has enlarged.  IR consult placed.  Does not appear to have a good window.      F/u repeat CT.    Abdominal pain resolved, leukocytosis improving.  Continue IV antibiotics  Appreciate general surgery input.

## 2024-09-23 NOTE — ASSESSMENT & PLAN NOTE
Body mass index is 37.36 kg/m². Morbid obesity complicates all aspects of disease management from diagnostic modalities to treatment. Weight loss encouraged and health benefits explained to patient.

## 2024-09-23 NOTE — ASSESSMENT & PLAN NOTE
Advance Care Planning     Briefly discussed code status with patient at bedside.  At this time she would like to remain a full code.  She has 4 living children.  No healthcare power of .  Patient would like daughter yvrose Batuista to help her make medical decisions if necessary.  Spent 5-8 minutes for ACP discussion.      Again discussed goals of care on 09/21.  Spent 8-10 minutes on ACP discussion.  Daughter agreeable to palliative care consult.  Understands they are not present here over the weekend, tentatively will be seen on Monday.  Palliative Care consult placed.

## 2024-09-23 NOTE — SUBJECTIVE & OBJECTIVE
Interval History:   No acute events  Pt tolerating some liquid diet  Pt is eager to go home  Reports lower abdominal pain well controlled  No other complaints    Review of Systems   Constitutional:  Negative for chills, fatigue and fever.   Respiratory:  Negative for cough and shortness of breath.    Cardiovascular:  Negative for chest pain and palpitations.   Gastrointestinal:  Negative for abdominal distention, abdominal pain, nausea and vomiting.   Neurological:  Negative for dizziness and headaches.   Psychiatric/Behavioral:  Negative for agitation and confusion.      Objective:     Vital Signs (Most Recent):  Temp: 98.7 °F (37.1 °C) (09/23/24 1141)  Pulse: 80 (09/23/24 1141)  Resp: 17 (09/23/24 1141)  BP: (!) 149/68 (09/23/24 1141)  SpO2: 97 % (09/23/24 1141) Vital Signs (24h Range):  Temp:  [98 °F (36.7 °C)-98.7 °F (37.1 °C)] 98.7 °F (37.1 °C)  Pulse:  [74-81] 80  Resp:  [17-18] 17  SpO2:  [95 %-98 %] 97 %  BP: (119-150)/(57-71) 149/68     Weight: 83.9 kg (184 lb 15.5 oz)  Body mass index is 37.36 kg/m².    Intake/Output Summary (Last 24 hours) at 9/23/2024 1312  Last data filed at 9/23/2024 0336  Gross per 24 hour   Intake 360 ml   Output 1300 ml   Net -940 ml         Physical Exam  Constitutional:       General: She is not in acute distress.     Appearance: Normal appearance. She is not toxic-appearing.   HENT:      Mouth/Throat:      Mouth: Mucous membranes are moist.      Pharynx: Oropharynx is clear.   Eyes:      Extraocular Movements: Extraocular movements intact.      Conjunctiva/sclera: Conjunctivae normal.   Cardiovascular:      Rate and Rhythm: Normal rate.   Pulmonary:      Effort: Pulmonary effort is normal. No respiratory distress.   Abdominal:      General: There is no distension.      Tenderness: There is no abdominal tenderness. There is no guarding.   Musculoskeletal:         General: Normal range of motion.      Right lower leg: Edema present.      Left lower leg: Edema present.   Skin:      General: Skin is warm and dry.   Neurological:      General: No focal deficit present.      Mental Status: She is alert and oriented to person, place, and time.   Psychiatric:         Mood and Affect: Mood normal.         Behavior: Behavior normal.             Significant Labs: All pertinent labs within the past 24 hours have been reviewed.    Significant Imaging: I have reviewed all pertinent imaging results/findings within the past 24 hours.

## 2024-09-23 NOTE — ASSESSMENT & PLAN NOTE
Chronic, controlled. Latest blood pressure and vitals reviewed-     Temp:  [98 °F (36.7 °C)-98.7 °F (37.1 °C)]   Pulse:  [74-81]   Resp:  [17-18]   BP: (119-150)/(57-71)   SpO2:  [95 %-98 %] .   Home meds for hypertension were reviewed and noted below.   Hypertension Medications               bumetanide (BUMEX) 0.5 MG Tab Take 2 tablets (1 mg total) by mouth once daily. May also take 2 tablets (1 mg total) daily as needed (> 2 pound weight gain in 24 hours, > 5 pound weight gain in a week, worsening leg swelling, shortness of breath).    carvediloL (COREG) 12.5 MG tablet Take 1 tablet (12.5 mg total) by mouth 2 (two) times daily with meals.    isosorbide mononitrate (IMDUR) 30 MG 24 hr tablet Take 1 tablet (30 mg total) by mouth once daily.    nitroGLYCERIN (NITROSTAT) 0.4 MG SL tablet Place 1 tablet (0.4 mg total) under the tongue every 5 (five) minutes as needed for Chest pain (angina).    valsartan (DIOVAN) 80 MG tablet Take 1 tablet (80 mg total) by mouth once daily.            While in the hospital, will manage blood pressure as follows; Continue home antihypertensive regimen   Will utilize p.r.n. blood pressure medication only if patient's blood pressure greater than 180/110 and she develops symptoms such as worsening chest pain or shortness of breath.

## 2024-09-23 NOTE — PT/OT/SLP PROGRESS
Occupational Therapy   Treatment    Name: Marycruz Perez  MRN: [id number]  Admitting Diagnosis:  Diverticulitis of intestine with abscess without bleeding       Recommendations:     Discharge Recommendations: Moderate Intensity Therapy  Discharge Equipment Recommendations:  wheelchair, hospital bed  Barriers to discharge:  Other (Comment), Decreased caregiver support (Increased level of assistance)    Assessment:     Marycruz Perez is a 89 y.o. female with a medical diagnosis of Diverticulitis of intestine with abscess without bleeding.   Performance deficits affecting function are weakness, impaired endurance, impaired self care skills, impaired functional mobility, gait instability, impaired balance, decreased upper extremity function, decreased lower extremity function, decreased safety awareness, pain, impaired skin, edema, impaired cardiopulmonary response to activity, decreased ROM, impaired coordination.    Pt found in bed, agreeable to therapy.  Pt with fair tolerance of therapy. She is progressing towards goals. Continue OT services to address functional goals, progressing as able.      Rehab Prognosis:  Good; patient would benefit from acute skilled OT services to address these deficits and reach maximum level of function.       Plan:     Patient to be seen 3 x/week to address the above listed problems via self-care/home management, therapeutic activities, therapeutic exercises  Plan of Care Expires: 10/18/24  Plan of Care Reviewed with: patient    Subjective     Chief Complaint: weakness   Patient/Family Comments/goals: to increase independence   Pain/Comfort:  Pain Rating 1: 0/10  Pain Rating Post-Intervention 1: 0/10    Objective:     Communicated with: nurse prior to session.  Patient found HOB elevated with bed alarm, peripheral IV, telemetry, PureWick, oxygen upon OT entry to room.    General Precautions: Standard, fall    Orthopedic Precautions:N/A  Braces: N/A  Respiratory Status: Nasal cannula      Occupational Performance:     Bed Mobility:    Patient completed Rolling/Turning to Left with moderate assistance and with side rail  Patient completed Scooting/Bridging with dependent and 2 persons, supine to HOB   Patient completed Supine to Sit with moderate assistance, HOB elevated, increased time and effort, vc's for effective technique  Patient completed Sit to Supine with maximal assistance and 2 persons     Functional Mobility/Transfers:  Patient completed Sit <> Stand Transfer with maximal assistance and of 2 persons  with  rolling walker x 2 trials.  Pt flexed at hips, unable to achieve upright posture.    Functional Mobility: Unable     Activities of Daily Living:  Lower Body Dressing: dependence don/dof socks   Toileting: dependence. Pt reports soiled brief in standing.  PCT notified.       [facility] 6 Click ADL: 11    Treatment & Education:  Pt requires Max A to scoot seated to EOB.   Pt sat EOB ~15 min with SBA.   Pt required AAROM for BUE shld full flex, hori abd/add, chest press x 10 reps.   Pt unable to scoot seated laterally at EOB.      Patient left HOB elevated with all lines intact, call button in reach, bed alarm on, and PCT notified    GOALS:   Multidisciplinary Problems       Occupational Therapy Goals          Problem: Occupational Therapy    Goal Priority Disciplines Outcome Interventions   Occupational Therapy Goal     OT, PT/OT Progressing    Description: Goals to be met by: 10/18/2024     Patient will increase functional independence with ADLs by performing:    Feeding with Supervision with upright sitting posture to reduce the risk of aspiration.  UE Dressing with Supervision.  Grooming while seated at sink with Supervision.  Toileting from bedside commode with Moderate Assistance for hygiene and clothing management.   Sitting EOB x5 minutes with supervision without adverse response.   Toilet transfer to bedside commode with Minimal Assistance.  Pt and family 100% reciprocation of DME  recommendations and energy conservation techniques.                          Time Tracking:     OT Date of Treatment: 09/23/24  OT Start Time: 1324  OT Stop Time: 1345  OT Total Time (min): 21 min    Billable Minutes:Therapeutic Activity 13  Therapeutic Exercise 8               9/23/2024

## 2024-09-23 NOTE — PROGRESS NOTES
Pharmacist Renal Dose Adjustment Note    Marycruz Perez is a 89 y.o. female being treated with the medication enoxaparin     Patient Data:    Vital Signs (Most Recent):  Temp: 98.7 °F (37.1 °C) (09/23/24 0035)  Pulse: 74 (09/23/24 0035)  Resp: 18 (09/23/24 0035)  BP: (!) 124/57 (09/23/24 0035)  SpO2: 96 % (09/23/24 0035) Vital Signs (72h Range):  Temp:  [97.9 °F (36.6 °C)-99.2 °F (37.3 °C)]   Pulse:  [62-82]   Resp:  [16-19]   BP: (106-140)/(53-79)   SpO2:  [92 %-98 %]      Recent Labs   Lab 09/19/24  0318 09/21/24  0254 09/22/24  0300   CREATININE 1.2 0.9 0.9     Serum creatinine: 0.9 mg/dL 09/22/24 0300  Estimated creatinine clearance: 37.9 mL/min    Medication:enoxaparin  dose: 30 mg frequency q24h will be changed to medication:enoxaparin  dose:40 mg frequency:q24h    Pharmacist's Name: Nena Frausto  Pharmacist's Extension: [phone]

## 2024-09-23 NOTE — PROGRESS NOTES
[facility] Medicine  Progress Note    Patient Name: Marycruz Perez  MRN: [id number]  Patient Class: IP- Inpatient   Admission Date: 9/16/2024  Length of Stay: 7 days  Attending Physician: Rudy Blake MD  Primary Care Provider: Melchor Menchaca -        Subjective:     Principal Problem:Diverticulitis of intestine with abscess without bleeding        HPI:  Ms. Perez is a 89yo woman with HFpEF, CHB s/p PPM, HTN, and CAD who presents with left flank pain and lower back pain x4 hours. She reports that she has developed left-sided flank pain.  Has had a few days of constipation where she was passing small pebble like bowel movements prior to this.  Denies any fevers or chills.  No bleeding symptoms.  Does have history of diverticulitis in the past.    Overview/Hospital Course:  No notes on file    Interval History:   No acute events  Pt tolerating some liquid diet  Pt is eager to go home  Reports lower abdominal pain well controlled  No other complaints    Review of Systems   Constitutional:  Negative for chills, fatigue and fever.   Respiratory:  Negative for cough and shortness of breath.    Cardiovascular:  Negative for chest pain and palpitations.   Gastrointestinal:  Negative for abdominal distention, abdominal pain, nausea and vomiting.   Neurological:  Negative for dizziness and headaches.   Psychiatric/Behavioral:  Negative for agitation and confusion.      Objective:     Vital Signs (Most Recent):  Temp: 98.7 °F (37.1 °C) (09/23/24 1141)  Pulse: 80 (09/23/24 1141)  Resp: 17 (09/23/24 1141)  BP: (!) 149/68 (09/23/24 1141)  SpO2: 97 % (09/23/24 1141) Vital Signs (24h Range):  Temp:  [98 °F (36.7 °C)-98.7 °F (37.1 °C)] 98.7 °F (37.1 °C)  Pulse:  [74-81] 80  Resp:  [17-18] 17  SpO2:  [95 %-98 %] 97 %  BP: (119-150)/(57-71) 149/68     Weight: 83.9 kg (184 lb 15.5 oz)  Body mass index is 37.36 kg/m².    Intake/Output Summary (Last 24 hours) at 9/23/2024 1312  Last data filed at 9/23/2024  0336  Gross per 24 hour   Intake 360 ml   Output 1300 ml   Net -940 ml         Physical Exam  Constitutional:       General: She is not in acute distress.     Appearance: Normal appearance. She is not toxic-appearing.   HENT:      Mouth/Throat:      Mouth: Mucous membranes are moist.      Pharynx: Oropharynx is clear.   Eyes:      Extraocular Movements: Extraocular movements intact.      Conjunctiva/sclera: Conjunctivae normal.   Cardiovascular:      Rate and Rhythm: Normal rate.   Pulmonary:      Effort: Pulmonary effort is normal. No respiratory distress.   Abdominal:      General: There is no distension.      Tenderness: There is no abdominal tenderness. There is no guarding.   Musculoskeletal:         General: Normal range of motion.      Right lower leg: Edema present.      Left lower leg: Edema present.   Skin:     General: Skin is warm and dry.   Neurological:      General: No focal deficit present.      Mental Status: She is alert and oriented to person, place, and time.   Psychiatric:         Mood and Affect: Mood normal.         Behavior: Behavior normal.             Significant Labs: All pertinent labs within the past 24 hours have been reviewed.    Significant Imaging: I have reviewed all pertinent imaging results/findings within the past 24 hours.    Assessment/Plan:      * Diverticulitis of intestine with abscess without bleeding  -presents with left flank pain, found to have diverticulitis with abscess on CT imaging   -IV ciprofloxacin and metronidazole; penicillin allergy  -general surgery consulted but do not suspect the patient will be good operative candidate  -clear liquid diet    Repeat CT scan shows abscess has enlarged.  IR consult placed.  Does not appear to have a good window.      F/u repeat CT.    Abdominal pain resolved, leukocytosis improving.  Continue IV antibiotics  Appreciate general surgery input.        Goals of care, counseling/discussion  Advance Care Planning    Briefly  discussed code status with patient at bedside.  At this time she would like to remain a full code.  She has 4 living children.  No healthcare power of .  Patient would like daughter yvrose Bautista to help her make medical decisions if necessary.  Spent 5-8 minutes for ACP discussion.      Again discussed goals of care on 09/21.  Spent 8-10 minutes on ACP discussion.  Daughter agreeable to palliative care consult.  Understands they are not present here over the weekend, tentatively will be seen on Monday.  Palliative Care consult placed.        CKD (chronic kidney disease)  Creatine stable for now. BMP reviewed- noted Estimated Creatinine Clearance: 37.9 mL/min (based on SCr of 0.9 mg/dL). according to latest data. Based on current GFR, CKD stage is stage 2 - GFR 60-89.  Monitor UOP and serial BMP and adjust therapy as needed. Renally dose meds. Avoid nephrotoxic medications and procedures.    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 Morbid obesity complicates all aspects of disease management from diagnostic modalities to treatment. Weight loss encouraged and health benefits explained to patient.         PAD (peripheral artery disease)  -continue aspirin and ezetimibe      Chronic heart failure with preserved ejection fraction (HFpEF)  - stable diastolic CHF  - continue home carvedilol, valsartan, Bumex  - daily weights, strict I/Os  - tele  - keep K >4, Mg >2  - 2D echo noted; deferring repeat at this hospitalization as she currently appears compensated        Pacemaker-dependent  -in place      Complete heart block  -status post pacemaker      Essential hypertension  Chronic, controlled. Latest blood pressure and vitals reviewed-     Temp:  [98 °F (36.7 °C)-98.7 °F (37.1 °C)]   Pulse:  [74-81]   Resp:  [17-18]   BP: (119-150)/(57-71)   SpO2:  [95 %-98 %] .   Home meds for hypertension were reviewed and noted below.   Hypertension Medications                bumetanide (BUMEX) 0.5 MG Tab Take 2 tablets (1 mg total) by mouth once daily. May also take 2 tablets (1 mg total) daily as needed (> 2 pound weight gain in 24 hours, > 5 pound weight gain in a week, worsening leg swelling, shortness of breath).    carvediloL (COREG) 12.5 MG tablet Take 1 tablet (12.5 mg total) by mouth 2 (two) times daily with meals.    isosorbide mononitrate (IMDUR) 30 MG 24 hr tablet Take 1 tablet (30 mg total) by mouth once daily.    nitroGLYCERIN (NITROSTAT) 0.4 MG SL tablet Place 1 tablet (0.4 mg total) under the tongue every 5 (five) minutes as needed for Chest pain (angina).    valsartan (DIOVAN) 80 MG tablet Take 1 tablet (80 mg total) by mouth once daily.            While in the hospital, will manage blood pressure as follows; Continue home antihypertensive regimen   Will utilize p.r.n. blood pressure medication only if patient's blood pressure greater than 180/110 and she develops symptoms such as worsening chest pain or shortness of breath.      VTE Risk Mitigation (From admission, onward)           Ordered     enoxaparin injection 40 mg  Daily         09/23/24 0439     IP VTE HIGH RISK PATIENT  Once         09/16/24 1137     Place sequential compression device  Until discontinued         09/16/24 1137                    Discharge Planning   ANA: 9/23/2024     Code Status: Full Code   Is the patient medically ready for discharge?:     Reason for patient still in hospital (select all that apply): Patient trending condition, Laboratory test, and Treatment  Discharge Plan A: Home Health   Discharge Delays: None known at this time              Rudy Blake MD  Department of Hospital Medicine   [facility]

## 2024-09-23 NOTE — PLAN OF CARE
Problem: Physical Therapy  Goal: Physical Therapy Goal  Description: Goals to be met by: 10/20/24     Patient will increase functional independence with mobility by performin. Supine to sit with Moderate Assistance  2. Sit to supine with Moderate Assistance  3. Sit to stand transfer with Moderate Assistance with use of RW.   4. Bed to chair transfer with Maximum Assistance using Rolling Walker  5. Gait  x 10 feet with Maximum Assistance using Rolling Walker.     Outcome: Progressing     PT/OT co-session performed to safely progress pt's functional mobility. Pt participates in bed mobility and transfers to standing. Therapy will continue to progress pt as able.

## 2024-09-23 NOTE — PLAN OF CARE
Problem: Adult Inpatient Plan of Care  Goal: Plan of Care Review  Outcome: Progressing  Goal: Patient-Specific Goal (Individualized)  Outcome: Progressing  Goal: Absence of Hospital-Acquired Illness or Injury  Outcome: Progressing  Goal: Optimal Comfort and Wellbeing  Outcome: Progressing  Goal: Readiness for Transition of Care  Outcome: Progressing     Problem: Infection  Goal: Absence of Infection Signs and Symptoms  Outcome: Progressing     Problem: Skin Injury Risk Increased  Goal: Skin Health and Integrity  Outcome: Progressing     Problem: Intestinal Obstruction  Goal: Optimal Bowel Function  Outcome: Progressing  Goal: Fluid and Electrolyte Balance  Outcome: Progressing  Goal: Absence of Infection Signs and Symptoms  Outcome: Progressing  Goal: Optimize Nutrition Status  Outcome: Progressing  Goal: Optimal Pain Control and Function  Outcome: Progressing     Problem: Fall Injury Risk  Goal: Absence of Fall and Fall-Related Injury  Outcome: Progressing     Problem: Comorbidity Management  Goal: Maintenance of Heart Failure Symptom Control  Outcome: Progressing  Goal: Blood Pressure in Desired Range  Outcome: Progressing     Problem: Coping Ineffective  Goal: Effective Coping  Outcome: Progressing

## 2024-09-23 NOTE — ASSESSMENT & PLAN NOTE
Ms. Perez is a 89 y.o. female  with evidence of diverticulitis    - Patient seen and examined  - Labs and imaging reviewed  -CT performed 9/19 with increase in size of abscess, no safe window per IR   -Repeat CT abdomen, pelvis today to eval abscess  - No plans for surgical intervention, patient with complicated medical history and high risk for complications if she were to undergo general anesthesia  - Agree with antibiotics  - Diet:  Okay for clears, would hold off on advancement at this time  - general surgery will follow along

## 2024-09-24 PROBLEM — K57.92 DIVERTICULITIS: Status: ACTIVE | Noted: 2024-09-24

## 2024-09-24 PROBLEM — K65.1 INTRA-ABDOMINAL ABSCESS: Status: ACTIVE | Noted: 2024-09-24

## 2024-09-24 LAB
BASOPHILS # BLD AUTO: 0.04 K/UL (ref 0–0.2)
BASOPHILS NFR BLD: 0.4 % (ref 0–1.9)
DIFFERENTIAL METHOD BLD: ABNORMAL
EOSINOPHIL # BLD AUTO: 0.1 K/UL (ref 0–0.5)
EOSINOPHIL NFR BLD: 0.7 % (ref 0–8)
ERYTHROCYTE [DISTWIDTH] IN BLOOD BY AUTOMATED COUNT: 13.4 % (ref 11.5–14.5)
HCT VFR BLD AUTO: 29.6 % (ref 37–48.5)
HGB BLD-MCNC: 9.5 G/DL (ref 12–16)
IMM GRANULOCYTES # BLD AUTO: 0.12 K/UL (ref 0–0.04)
IMM GRANULOCYTES NFR BLD AUTO: 1.2 % (ref 0–0.5)
LYMPHOCYTES # BLD AUTO: 1.4 K/UL (ref 1–4.8)
LYMPHOCYTES NFR BLD: 13.6 % (ref 18–48)
MCH RBC QN AUTO: 28.6 PG (ref 27–31)
MCHC RBC AUTO-ENTMCNC: 32.1 G/DL (ref 32–36)
MCV RBC AUTO: 89 FL (ref 82–98)
MONOCYTES # BLD AUTO: 0.9 K/UL (ref 0.3–1)
MONOCYTES NFR BLD: 8.7 % (ref 4–15)
NEUTROPHILS # BLD AUTO: 7.8 K/UL (ref 1.8–7.7)
NEUTROPHILS NFR BLD: 75.4 % (ref 38–73)
NRBC BLD-RTO: 0 /100 WBC
PLATELET # BLD AUTO: 231 K/UL (ref 150–450)
PMV BLD AUTO: 10.2 FL (ref 9.2–12.9)
RBC # BLD AUTO: 3.32 M/UL (ref 4–5.4)
WBC # BLD AUTO: 10.33 K/UL (ref 3.9–12.7)

## 2024-09-24 PROCEDURE — 85025 COMPLETE CBC W/AUTO DIFF WBC: CPT | Performed by: STUDENT IN AN ORGANIZED HEALTH CARE EDUCATION/TRAINING PROGRAM

## 2024-09-24 PROCEDURE — A4216 STERILE WATER/SALINE, 10 ML: HCPCS | Performed by: STUDENT IN AN ORGANIZED HEALTH CARE EDUCATION/TRAINING PROGRAM

## 2024-09-24 PROCEDURE — 36415 COLL VENOUS BLD VENIPUNCTURE: CPT | Performed by: STUDENT IN AN ORGANIZED HEALTH CARE EDUCATION/TRAINING PROGRAM

## 2024-09-24 PROCEDURE — 99223 1ST HOSP IP/OBS HIGH 75: CPT | Mod: ,,, | Performed by: STUDENT IN AN ORGANIZED HEALTH CARE EDUCATION/TRAINING PROGRAM

## 2024-09-24 PROCEDURE — 99232 SBSQ HOSP IP/OBS MODERATE 35: CPT | Mod: NSCH,GC,, | Performed by: PHYSICIAN ASSISTANT

## 2024-09-24 PROCEDURE — 99498 ADVNCD CARE PLAN ADDL 30 MIN: CPT | Mod: ,,, | Performed by: STUDENT IN AN ORGANIZED HEALTH CARE EDUCATION/TRAINING PROGRAM

## 2024-09-24 PROCEDURE — 11000001 HC ACUTE MED/SURG PRIVATE ROOM

## 2024-09-24 PROCEDURE — 63600175 PHARM REV CODE 636 W HCPCS: Mod: JZ,JG

## 2024-09-24 PROCEDURE — 97110 THERAPEUTIC EXERCISES: CPT

## 2024-09-24 PROCEDURE — 1153F DOC ADVNCD DIS CMFRT NOT 1ST: CPT | Mod: CPTII,,, | Performed by: STUDENT IN AN ORGANIZED HEALTH CARE EDUCATION/TRAINING PROGRAM

## 2024-09-24 PROCEDURE — 25000003 PHARM REV CODE 250: Performed by: STUDENT IN AN ORGANIZED HEALTH CARE EDUCATION/TRAINING PROGRAM

## 2024-09-24 PROCEDURE — 63600175 PHARM REV CODE 636 W HCPCS: Performed by: STUDENT IN AN ORGANIZED HEALTH CARE EDUCATION/TRAINING PROGRAM

## 2024-09-24 PROCEDURE — 94799 UNLISTED PULMONARY SVC/PX: CPT

## 2024-09-24 PROCEDURE — 1158F ADVNC CARE PLAN TLK DOCD: CPT | Mod: CPTII,,, | Performed by: STUDENT IN AN ORGANIZED HEALTH CARE EDUCATION/TRAINING PROGRAM

## 2024-09-24 PROCEDURE — 99497 ADVNCD CARE PLAN 30 MIN: CPT | Mod: 25,,, | Performed by: STUDENT IN AN ORGANIZED HEALTH CARE EDUCATION/TRAINING PROGRAM

## 2024-09-24 PROCEDURE — 25000003 PHARM REV CODE 250: Performed by: HOSPITALIST

## 2024-09-24 RX ADMIN — ISOSORBIDE MONONITRATE 30 MG: 30 TABLET, EXTENDED RELEASE ORAL at 08:09

## 2024-09-24 RX ADMIN — BUMETANIDE 2 MG: 1 TABLET ORAL at 08:09

## 2024-09-24 RX ADMIN — METRONIDAZOLE 500 MG: 5 INJECTION, SOLUTION INTRAVENOUS at 02:09

## 2024-09-24 RX ADMIN — CARVEDILOL 6.25 MG: 6.25 TABLET, FILM COATED ORAL at 08:09

## 2024-09-24 RX ADMIN — EZETIMIBE 10 MG: 10 TABLET ORAL at 08:09

## 2024-09-24 RX ADMIN — METRONIDAZOLE 500 MG: 5 INJECTION, SOLUTION INTRAVENOUS at 05:09

## 2024-09-24 RX ADMIN — METRONIDAZOLE 500 MG: 5 INJECTION, SOLUTION INTRAVENOUS at 10:09

## 2024-09-24 RX ADMIN — CIPROFLOXACIN 400 MG: 2 INJECTION, SOLUTION INTRAVENOUS at 08:09

## 2024-09-24 RX ADMIN — VALSARTAN 40 MG: 40 TABLET, FILM COATED ORAL at 08:09

## 2024-09-24 RX ADMIN — CARVEDILOL 6.25 MG: 6.25 TABLET, FILM COATED ORAL at 04:09

## 2024-09-24 RX ADMIN — SODIUM CHLORIDE, PRESERVATIVE FREE 10 ML: 5 INJECTION INTRAVENOUS at 05:09

## 2024-09-24 NOTE — ASSESSMENT & PLAN NOTE
Ms. Perez is a 89 y.o. female  with evidence of diverticulitis    - Patient seen and examined  - Labs and imaging reviewed  -CT performed 9/23 with increase in size of abscess   -Repeat CT abdomen, pelvis yesterday with enlarging abscess, will  reengage IR to discuss possible drain placement   - No plans for surgical intervention, patient with complicated medical history and high risk for complications if she were to undergo general anesthesia  - Agree with antibiotics  - Diet:  Okay for diet, NPO for now pending possible IR  drain placement today  - general surgery will follow along

## 2024-09-24 NOTE — CONSULTS
Interventional Radiology  Consult/History & Physical Note      Reason for Consult: diverticulitis with enlarging abscess    SUBJECTIVE:     Chief Complaint:  diverticulitis    History of Present Illness:  Pt's daughter at bedside, declined   Marycruz Perez is a 89 y.o. female with a PMHx of CHF, HTN, CAD who has been referred to IR for image guided percutaneous drain placement for management of intraabdominal fluid collection. Pt admitted on 9/16 and being treated for diverticulitis.  9/19 Leukocytosis trending up, surgery recommended repeat CT scan which showed enlarging collection in comparison to 9/16.  Imaging reviewed by Dr. Murray noting no safe window for aspiration/drain placement.  Surgery is following with no plans for surgical intervention, patient with complicated medical history and high risk for complications if she were to undergo general anesthesia.  9/23 CT shows enlarging collection, WBC is normal trending back down since 9/22.  Pain has improved.  Pt is afebrile and hemodynamically stable.  Pt on DAPT outpatient.  Inpatient she  received asa 9/23 and lovenox prophylaxis dosing 9/23.    Review of Systems   Constitutional:  Negative for chills, fever, malaise/fatigue and weight loss.   Respiratory:  Negative for cough and shortness of breath.    Cardiovascular:  Negative for chest pain, palpitations and leg swelling.   Gastrointestinal:  Positive for abdominal pain (LLQ improved). Negative for diarrhea, nausea and vomiting.   Genitourinary:  Negative for dysuria and flank pain.   Musculoskeletal:  Negative for back pain and myalgias.   Neurological:  Negative for weakness and headaches.       Scheduled Meds:   bumetanide  2 mg Oral Daily    carvediloL  6.25 mg Oral BID WM    ciprofloxacin  400 mg Intravenous Q24H    ezetimibe  10 mg Oral Daily    isosorbide mononitrate  30 mg Oral Daily    metroNIDAZOLE IV (PEDS and ADULTS)  500 mg Intravenous Q8H    sodium chloride 0.9%  10 mL Intravenous  Q8H    sodium chloride 0.9%  10 mL Intravenous Q6H    valsartan  40 mg Oral Daily     Continuous Infusions:  PRN Meds:  Current Facility-Administered Medications:     acetaminophen, 1,000 mg, Oral, Q8H PRN    acetaminophen, 650 mg, Oral, Q4H PRN    dextrose 10%, 12.5 g, Intravenous, PRN    dextrose 10%, 25 g, Intravenous, PRN    glucagon (human recombinant), 1 mg, Intramuscular, PRN    glucose, 16 g, Oral, PRN    glucose, 24 g, Oral, PRN    melatonin, 6 mg, Oral, Nightly PRN    morphine, 1 mg, Intravenous, Q6H PRN    naloxone, 0.02 mg, Intravenous, PRN    ondansetron, 8 mg, Oral, Q8H PRN    ondansetron, 4 mg, Intravenous, Q8H PRN    pneumoc 20-paul conj-dip cr(PF), 0.5 mL, Intramuscular, vaccine x 1 dose    Flushing PICC/Midline Protocol, , , Until Discontinued **AND** sodium chloride 0.9%, 10 mL, Intravenous, Q6H **AND** sodium chloride 0.9%, 10 mL, Intravenous, PRN    Review of patient's allergies indicates:   Allergen Reactions    Codeine     Latex, natural rubber     Penicillins     Penicillin Rash       Past Medical History:   Diagnosis Date    Acute exacerbation of CHF (congestive heart failure) 10/27/2020    NATHEN (acute kidney injury) 11/30/2023    Hypertension     Syncope and collapse      Past Surgical History:   Procedure Laterality Date    A-V CARDIAC PACEMAKER INSERTION N/A 2/22/2021    Procedure: INSERTION, CARDIAC PACEMAKER, DUAL CHAMBER;  Surgeon: Norman Foote MD;  Location: [facility] EP LAB;  Service: Cardiology;  Laterality: N/A;  AVB, PPM upgrade to Dual PPM (right sided), Bio, Venogram prior to draping, MAC, [place], 3 Prep    CARDIAC CATHETERIZATION      CARDIAC PACEMAKER PLACEMENT      CHOLECYSTECTOMY      CORONARY ANGIOPLASTY      HYSTERECTOMY      REVISION OF SKIN POCKET FOR PACEMAKER  2/22/2021    Procedure: Revision, Skin Pocket, For Cardiac Pacemaker;  Surgeon: Norman Foote MD;  Location: [facility] EP LAB;  Service: Cardiology;;     Family History   Problem Relation Name Age of Onset    Liver disease  Mother       Social History     Tobacco Use    Smoking status: Never    Smokeless tobacco: Never   Substance Use Topics    Alcohol use: No    Drug use: No       OBJECTIVE:     Vital Signs (Most Recent)  Temp: 98.4 °F (36.9 °C) (09/24/24 0730)  Pulse: 78 (09/24/24 0730)  Resp: 20 (09/24/24 0730)  BP: (!) 126/59 (09/24/24 0730)  SpO2: 97 % (09/24/24 0730)    Physical Exam:  Physical Exam  Vitals and nursing note reviewed.   Constitutional:       Appearance: She is obese.   HENT:      Head: Normocephalic and atraumatic.      Right Ear: External ear normal.      Left Ear: External ear normal.      Nose: Nose normal.   Eyes:      Extraocular Movements: Extraocular movements intact.   Cardiovascular:      Rate and Rhythm: Normal rate.      Pulses: Normal pulses.   Pulmonary:      Effort: Pulmonary effort is normal.      Comments: 1L NC  Abdominal:      General: Abdomen is flat.      Tenderness: There is no abdominal tenderness.   Musculoskeletal:      Cervical back: Normal range of motion and neck supple.   Skin:     General: Skin is warm and dry.   Neurological:      Mental Status: She is alert.      Comments: Disoriented to year   Psychiatric:         Mood and Affect: Mood normal.         Behavior: Behavior normal.         Laboratory  I have reviewed all pertinent lab results within the past 24 hours.  CBC:   Recent Labs   Lab 09/24/24  0258   WBC 10.33   RBC 3.32*   HGB 9.5*   HCT 29.6*      MCV 89   MCH 28.6   MCHC 32.1     CMP:   Recent Labs   Lab 09/22/24  0300   GLU 84   CALCIUM 8.8      K 3.8   CO2 24      BUN 21   CREATININE 0.9     Coagulation:   Recent Labs   Lab 09/20/24  0450   LABPROT 13.0*   INR 1.2     Microbiology Results (last 7 days)       Procedure Component Value Units Date/Time    Blood culture [[id number]] Collected: 09/16/24 1154    Order Status: Completed Specimen: Blood Updated: 09/21/24 2012     Blood Culture, Routine No growth after 5 days.    Narrative:      Collection has  been rescheduled by [email or web address] at 09/16/2024 11:47 Reason:   Patient unavailable not along wall  Collection has been rescheduled by [email or web address] at 09/16/2024 11:47 Reason:   Patient unavailable not along wall    Blood culture [[id number]] Collected: 09/16/24 1153    Order Status: Completed Specimen: Blood Updated: 09/21/24 2012     Blood Culture, Routine No growth after 5 days.    Narrative:      Collection has been rescheduled by [email or web address] at 09/16/2024 11:47 Reason:   Patient unavailable not along wall  Collection has been rescheduled by [email or web address] at 09/16/2024 11:47 Reason:   Patient unavailable not along wall            ASA/Mallampati  ASA: 3  Mallampati: 3    Imaging:  Recent imaging studies including CT a/p on 9/23 which was independently reviewed by Leigh Bone PA-C and Giovanni Yanez MD.     ASSESSMENT/PLAN:     Assessment:  89 y.o. female with a PMHx of CHF, HTN, CAD who has been referred to IR for image guided percutaneous drain placement for management of intraabdominal fluid collection. Pt being treated for diverticulitis.  Leukocytosis trending back down however collection increasing in size on CT.    Images reviewed independently with Dr. Yanez.    Pt's daughter Lily prefers English, declined .  The procedure was discussed in great detail with the patient's daughter at the bedside and over the phone including thorough explanations of the potential risks and benefits of percutaneous drain placement/aspiration. Risks include but are not limited to sepsis, severe infection, hemorrhage, damage to surrounding structures, catheter malfunction, inability to remove catheter, catheter dislodgment and need for additional procedures. The patient is a candidate for CT guided percutaneous drain placement/aspiration under moderate sedation. Plan discussed with ordering physician and pt who verbalized understanding of the plan and would like to proceed.    Hospital medicine, surgery and ID notes reviewed.    Plan:  Will  tentatively plan for CT guided percutaneous drain placement/aspiration under moderate sedation on 9/25.  Please keep pt NPO starting at midnight on 9/25.   Primary team is responsible for ordering any labs/cultures to be drawn on fluid sample collected during the procedure.  Anticoagulation history reviewed. Please hold asa and plavix starting on 9/24.   For prophylactic AC following procedure, ok to start lovenox 12 hours following procedure and ok to start SQ heparin 6-8 hours following procedure per SIR guidelines  Coagulation labs reviewed. INR 1.2 and plt count 231.  Phone consent obtained with Dr. Yanez.  On admission pt full code, pt now DNR.  Pt's daughter aware pt will be full code during the procedure.  Thank you for the consult. Please contact with questions via [employer] secure chat or [employer]      Leigh Bone PA-C  Interventional Radiology

## 2024-09-24 NOTE — CONSULTS
Palliative Medicine  Consult Note       Patient Name: Marycruz Perez   MRN: [id number]   Admission Date: 9/16/2024   Hospital Length of Stay: 8   Attending Provider: Rudy Blake MD   Consulting Provider: Eulalio Parikh Jr, MD  Primary Care Physician: Melchor Menchaca -   Principal Problem: Diverticulitis of intestine with abscess without bleeding     Patient information was obtained from patient, relative(s), and primary team.        Consults       Assessment/Plan:      Palliative Care Encounter:  Impression:    89F with PMH of diverticulitis, HTN, CAD with heart block s/p PPM and HFpEF who was admitted to the hospital medicine service for sigmoid diverticulitis c/b pericolonic abscess enlarging on serial imaging despite broad abx and improving indicators of infection. Deemed unsafe for IR drainage on review of initial CT abdomen however following serial imaging of the evolving abscess and and review by other interventionalists, pt may now be a candidate for percutaneous drainage.     Palliative care consulted for goals of care and ACP.    Advance Care Planning   Advance Directives:   Living Will: No    LaPOST: No    Do Not Resuscitate Status: Yes    Medical Power of : No    Agent's Name:  Lily Ventura (daughter)   Agent's Contact Number:  [phone]    Decision Making:  Patient answered questions and Family answered questions  Goals of Care: What is most important right now is to focus on spending time at home, avoiding the hospital, remaining as independent as possible, symptom/pain control, improvement in condition but with limits to invasive therapies. Accordingly, we have decided that the best plan to meet the patient's goals includes continuing with treatment.       - Prior experience with serious illness: yes  -The patient has not previously engaged in advance care planning or GOC discussions  - Insight/Understanding of illness: good    Life Limiting Diagnosis:  CHF with preserved EF; CAD; PAD;  diverticular abscess    -Prognosis-Time and potential for recovery:   CHF is not end stage and prognosis in isolation appears >6 months, however given the proponderance of her multiple medical conditions and recent poor clinical trajectory pt is felt hospice eligible in my assessment    -Functional status: poor.  Pt was not able to manage ADLs  -Dementia diagnosis no    Symptom Management:  -Pain: no      -Dyspnea no      -Anxiety/Depression no      -Constipation: no      -Anorexia:no      Summary of recommendations and follow up plan:  -Most important goals at this time: IR reassessment of potential abscess drainage this admission   -Code status: DNR/DNI  -Disposition: medically active; potentially home with infusion service to complete IV abx course; did not invoke hospice as yet but will visit at followup    The above recommendations communicated directly to primary team on 9/24/24.    Thank you for your consult. I will follow-up with patient. Please contact us if you have any additional questions.       Subjective:     Chief Complaint:   Chief Complaint   Patient presents with    Flank Pain     PT with 10/10 left flank pain and lower back pain x 4 hours.     HPI:   89F with PMH of diverticulitis, HTN, CAD with heart block s/p PPM and HFpEF with 2 admits for decompensated CHF in the past year brought to ED by family on 9/16/24 for several days of severe constipated with hard inspissated stools followed by acute onset of 10/10 left flank pain and low back pain.     Arrived afebrile with minimally elevated WBC, bland UA, no concern for stone. CT abdomen pelvis revealed diverticulitis of the sigmoid colon with a small adjacent abscess.     Started on ceftriaxone and metronidazole, kept NPO, admitted to HM service.    Hospital Course:  9/16-9/19: General surgery reported pt was not an op candidate due to anesthesia risk and IR assessed the location of the abscess as without a safe approach for drainage. Leukocytosis  resolved.    9/20: Abscess enlarging on CT. No window for drain placement. Clear liquid diet ordered and tolerated well.    9/23: Repeat CT showed further enlargement of abscess despite otherwise improving clinical condition. Advanced to soft diet. Palliative consult placed, deferred as daughter could not be present.    Palliative has been consulted for goals of care.    At time of interview pt is AAOx3 in NAD with preserved vocabulary in her native [place], age appropriate health literacy, unquestioned decisional capacity. Pt's daughter Lily joined at bedside.    We discussed pt's course to date which is overall reassuring and slightly discordant with imaging. Both agree pt would not wish to attempt high risk surgery but have been in contact with IR recently and understand that pt may prove a candidate for drainage.    Pt's primary caretaker is her daughter and she feels secure at home. We discussed that if IR intervention is ultimately ruled out, given her otherwise reassuring infectious picture, she could complete a course of empiric broad spectrum abx at home with an infusion service.    Per discussion with IR, unclear potential timing of procedure depending on 's requirements for holding antiplatelets.    Discussed pt's poor post-ACLS prognosis and high risk of permanent ventilator dependence if intubated. Pt agrees to new DNR/DNI status. Daughter witnessed and participated in this discussion and is grateful.    Review of Symptoms      Symptom Assessment (ESAS 0-10 Scale)  Pain:  0  Dyspnea:  0  Anxiety:  3  Nausea:  0  Depression:  0  Anorexia:  4  Fatigue:  5  Insomnia:  0  Restlessness:  0  Agitation:  0     CAM / Delirium:  Negative  Constipation:  Negative  Diarrhea:  Negative      ECOG Performance Status thGthrthathdtheth:th th4th Living Arrangements:  Lives in home and Lives with family    Psychosocial/Cultural:   See Palliative Psychosocial Note: No  Social Issues Identified: New  Diagnosis/Trauma  Bereavement Risk: Yes: Close or dependent relationship to the  person  Caregiver Needs Discussed. Caregiver Distress: Yes: Intensity of family caregiving and Caregiver Burnout Risk  Cultural: No specific concerns.  **Primary  to Follow**  Palliative Care  Consult: No    Spiritual:  F - Nalini and Belief:  Mandaen  I - Importance:  Low  C - Community:  Home Hoahaoism  A - Address in Care:  Sacramental visits PRN     Time-Based Charting:  Yes  Chart Review: 30 minutes  Face to Face: 16 minutes  Symptom Assessment: 12 minutes  Coordination of Care: 8 minutes  Discharge Plannin minutes  Advance Care Plannin minutes  Goals of Care: 38 minutes    Total Time Spent: 120 minutes      ROS:  Review of Systems   Constitutional:  Negative for chills, fever and night sweats.   Respiratory:  Negative for shortness of breath.    Gastrointestinal:  Positive for abdominal pain. Negative for nausea and vomiting.   Musculoskeletal:  Positive for arthralgias (shoulders) and back pain (low).   Psychiatric/Behavioral:  Negative for behavioral problems, confusion, decreased concentration and sleep disturbance.        Past Medical History:   Diagnosis Date    Acute exacerbation of CHF (congestive heart failure) 10/27/2020    NATHEN (acute kidney injury) 2023    Hypertension     Syncope and collapse      Past Surgical History:   Procedure Laterality Date    A-V CARDIAC PACEMAKER INSERTION N/A 2021    Procedure: INSERTION, CARDIAC PACEMAKER, DUAL CHAMBER;  Surgeon: Norman Foote MD;  Location: [facility];  Service: Cardiology;  Laterality: N/A;  AVB, PPM upgrade to Dual PPM (right sided), Bio, Venogram prior to draping, MAC, [place], 3 Prep    CARDIAC CATHETERIZATION      CARDIAC PACEMAKER PLACEMENT      CHOLECYSTECTOMY      CORONARY ANGIOPLASTY      HYSTERECTOMY      REVISION OF SKIN POCKET FOR PACEMAKER  2021    Procedure: Revision, Skin Pocket, For Cardiac Pacemaker;   Surgeon: Norman Foote MD;  Location: [facility];  Service: Cardiology;;     Family History   Problem Relation Name Age of Onset    Liver disease Mother           Review of patient's allergies indicates:   Allergen Reactions    Codeine     Latex, natural rubber     Penicillins     Penicillin Rash       Medications:    Current Facility-Administered Medications:     acetaminophen tablet 1,000 mg, 1,000 mg, Oral, Q8H PRN, Woody Zuñiga MD, 1,000 mg at 09/23/24 2151    acetaminophen tablet 650 mg, 650 mg, Oral, Q4H PRN, Woody Zuñiga MD    bumetanide tablet 2 mg, 2 mg, Oral, Daily, Kusum Keenan MD, 2 mg at 09/24/24 0822    carvediloL tablet 6.25 mg, 6.25 mg, Oral, BID WM, Kusum Keenan MD, 6.25 mg at 09/24/24 0822    ciprofloxacin (CIPRO)400mg/200ml D5W IVPB 400 mg, 400 mg, Intravenous, Q24H, Kusum Keenan MD, Stopped at 09/23/24 2144    dextrose 10% bolus 125 mL 125 mL, 12.5 g, Intravenous, PRN, Woody Zuñiga MD    dextrose 10% bolus 250 mL 250 mL, 25 g, Intravenous, PRN, Woody Zuñiga MD    ezetimibe tablet 10 mg, 10 mg, Oral, Daily, Woody Zuñiga MD, 10 mg at 09/24/24 0822    glucagon (human recombinant) injection 1 mg, 1 mg, Intramuscular, PRN, Woody Zuñiga MD    glucose chewable tablet 16 g, 16 g, Oral, PRN, Woody Zuñiga MD    glucose chewable tablet 24 g, 24 g, Oral, PRN, Woody Zuñiga MD    isosorbide mononitrate 24 hr tablet 30 mg, 30 mg, Oral, Daily, Woody Zuñiga MD, 30 mg at 09/24/24 0822    melatonin tablet 6 mg, 6 mg, Oral, Nightly PRN, Woody Zuñiga MD    metronidazole IVPB 500 mg, 500 mg, Intravenous, Q8H, Dez Haro MD, Stopped at 09/24/24 0640    morphine injection 1 mg, 1 mg, Intravenous, Q6H PRN, Woody Zuñiga MD, 1 mg at 09/20/24 1408    naloxone 0.4 mg/mL injection 0.02 mg, 0.02 mg, Intravenous, PRN, Woody Zuñiga MD    ondansetron disintegrating  tablet 8 mg, 8 mg, Oral, Q8H PRN, Woody Zuñiga MD    ondansetron injection 4 mg, 4 mg, Intravenous, Q8H PRN, Woody Zuñiga MD    pneumoc 20-paul conj-dip cr(PF) (PREVNAR-20 (PF)) injection Syrg 0.5 mL, 0.5 mL, Intramuscular, vaccine x 1 dose, Woody Zuñiga MD    sodium chloride 0.9% flush 10 mL, 10 mL, Intravenous, Q8H, Woody Zuñiga MD, 10 mL at 09/23/24 2300    Flushing PICC/Midline Protocol, , , Until Discontinued **AND** sodium chloride 0.9% flush 10 mL, 10 mL, Intravenous, Q6H, 10 mL at 09/24/24 0540 **AND** sodium chloride 0.9% flush 10 mL, 10 mL, Intravenous, PRN, Kusum Keenan MD    valsartan tablet 40 mg, 40 mg, Oral, Daily, Kusum Keenan MD, 40 mg at 09/24/24 0822    Facility-Administered Medications Ordered in Other Encounters:     vancomycin in dextrose 5 % 1 gram/250 mL IVPB 1,000 mg, 1,000 mg, Intravenous, On Call Procedure, Rocío Blevins NP, 1,000 mg at 02/22/21 0947         Objective:      Physical Exam:  Vitals: Temp: 98.7 °F (37.1 °C) (09/24/24 1125)  Pulse: 88 (09/24/24 1125)  Resp: 18 (09/24/24 1125)  BP: 112/62 (09/24/24 1125)  SpO2: (!) 94 % (09/24/24 1125)    Physical Exam  Constitutional:       General: She is not in acute distress.     Appearance: She is obese. She is not ill-appearing or toxic-appearing.   HENT:      Head: Normocephalic and atraumatic.      Mouth/Throat:      Mouth: Mucous membranes are moist.      Pharynx: Oropharynx is clear.   Eyes:      Extraocular Movements: Extraocular movements intact.      Pupils: Pupils are equal, round, and reactive to light.   Cardiovascular:      Rate and Rhythm: Normal rate and regular rhythm.      Pulses: Normal pulses.      Heart sounds: Normal heart sounds. No murmur heard.  Pulmonary:      Effort: Pulmonary effort is normal.      Breath sounds: Normal breath sounds. No wheezing or rales.   Abdominal:      General: Abdomen is flat. There is distension.      Palpations: Abdomen is soft.       Tenderness: There is abdominal tenderness (hypogastric).   Skin:     Coloration: Skin is pale.   Neurological:      General: No focal deficit present.      Mental Status: She is alert and oriented to person, place, and time.      Cranial Nerves: No cranial nerve deficit.   Psychiatric:         Attention and Perception: Attention and perception normal.         Mood and Affect: Mood and affect normal.         Speech: Speech normal.         Behavior: Behavior normal. Behavior is cooperative.         Thought Content: Thought content normal.         Cognition and Memory: Cognition and memory normal.         Judgment: Judgment normal.            Labs:   Creatinine   Date Value Ref Range Status   09/22/2024 0.9 0.5 - 1.4 mg/dL Final      Hemoglobin   Date Value Ref Range Status   09/24/2024 9.5 (L) 12.0 - 16.0 g/dL Final      Albumin   Date Value Ref Range Status   09/16/2024 3.1 (L) 3.5 - 5.2 g/dL Final   09/16/2024 3.2 (L) 3.5 - 5.2 g/dL Final   07/10/2024 3.0 (L) 3.5 - 5.2 g/dL Final          Imaging:   Imaging Results              CT Renal Stone Study ABD Pelvis WO (Final result)  Result time 09/16/24 11:01:44      Final result by Niki Witt MD (09/16/24 11:01:44)                   Impression:      Significant sigmoid diverticulosis with suspicion of diverticulitis.  There is a low attenuating mass left hemipelvis 4.1 x 2.5 cm containing a punctate focus of air within, adjacent to the sigmoid colon where there are numerous diverticula and mild adjacent inflammatory changes concerning for a peridiverticular abscess or communication of the inflammatory process to the adjacent left ovary, there is associated trace of fluid in the left hemipelvis.    No evidence of obstructive or forming renal stone.    Bladder stones.      Electronically signed by: Niki Witt MD  Date:    09/16/2024  Time:    11:01               Narrative:    EXAMINATION:  CT RENAL STONE STUDY ABD PELVIS WO    CLINICAL  HISTORY:  Flank pain, kidney stone suspected;    TECHNIQUE:  Low dose axial images, sagittal and coronal reformations were obtained from the lung bases to the pubic symphysis.  Contrast was not administered.    COMPARISON:  12/29/2022 CT abdomen and pelvis    FINDINGS:  The lung basis are clear, no pleural effusion.  No pericardial effusion.  Mitral valve calcifications.    The noncontrast appearance of the liver appears normal.  The intrahepatic biliary ducts are slightly prominent.  The common bile duct measures 15 mm, similar to 12/29/2022 exam, no definite obstructive process seen.  The gallbladder is removed.    The distal esophagus and stomach appear normal.  The pancreas, spleen and bilateral adrenal glands appear normal.    The abdominal aorta tapers normally, there is moderate circumferential atherosclerotic plaque.    The right kidney and right ureter appear normal.  The left kidney and left ureter appear normal.  No obstructive or forming renal stones identified.  A round stone is seen within the bladder 0.8 cm.  The uterus is removed.    Mild stool retention.  Significant diverticulosis of the sigmoid colon.  There is a low attenuating mass left hemipelvis region could represent the left ovary, it is larger compared to 12/29/2022, it contains a punctate focus of air, it measures 2.5 x 4.1 cm (2:111).  A small diverticular abscess cannot be excluded, there is a trace of fluid in the left hemipelvis, new from the prior study.    The abdominal wall is intact, the inguinal regions appear normal.    The osseous structures demonstrate significant facet joint osseous hypertrophy at the lower lumbar spine, significant degenerative disc disease at the lumbar spine, no osseous lesions, no fracture.                                            I spent a total of 71 minutes on the day of the visit. This includes face to face time in discussion of goals of care, symptom assessment, coordination of care and emotional  support.  This also includes non-face to face time preparing to see the patient (eg, review of tests/imaging), obtaining and/or reviewing separately obtained history, documenting clinical information in the electronic or other health record, independently interpreting results and communicating results to the patient/family/caregiver, or care coordinator.     Additional 49 min time spent on a voluntary advance care planning and /or goals of care discussion, providing emotional support, formulating and communicating prognosis, exploring burden vs benefit of various approaches to treatment.       Thank you for the opportunity to care for this patient and family.       Eulalio Parikh Jr, MD

## 2024-09-24 NOTE — ASSESSMENT & PLAN NOTE
Presents with left flank pain, found to have diverticulitis with abscess on CT imaging   Initial CT scan does not appear to have a good window for IR drainage  Repeat CT 9/23 - Persistent sigmoid diverticulitis, with an enlarging diverticular abscess.     Plan:  -IV ciprofloxacin and metronidazole; penicillin allergy  -general surgery consulted but do not suspect the patient will be good operative candidate  Reached out to IR again who will reevaluate  Continue IV antibiotics

## 2024-09-24 NOTE — PT/OT/SLP PROGRESS
"Physical Therapy Treatment    Patient Name:  Marycruz Perez   MRN:  [id number]    Recommendations:     Discharge Recommendations: Moderate Intensity Therapy  Discharge Equipment Recommendations: wheelchair, hospital bed, lift device  Barriers to discharge:  requires 24/7 care/assistance with mobility; limited functional endurance    Assessment:     Marycruz Perez is a 89 y.o. female admitted with a medical diagnosis of Diverticulitis of intestine with abscess without bleeding.  She presents with the following impairments/functional limitations: weakness, impaired endurance, impaired self care skills, impaired functional mobility, gait instability, impaired balance, decreased lower extremity function, pain, decreased ROM, edema, impaired cardiopulmonary response to activity.      Pt stating no therapy today; but with further encouragement agreeable to perform supine BLE there-ex. Therapy will continue to progress pt as able.     Rehab Prognosis: Fair; patient would benefit from acute skilled PT services to address these deficits and reach maximum level of function.    Recent Surgery: * No surgery found *      Plan:     During this hospitalization, patient to be seen 3 x/week to address the identified rehab impairments via gait training, therapeutic activities, therapeutic exercises, neuromuscular re-education, wheelchair management/training and progress toward the following goals:    Plan of Care Expires:  10/20/24    Subjective     Chief Complaint: tired; "I'm too old for therapy"  Patient/Family Comments/goals: to rest  Pain/Comfort:  Pain Rating 1:  (back pain; not rated)  Pain Addressed 1: Cessation of Activity, Reposition, Nurse notified  Pain Rating Post-Intervention 1:  (not rated)      Objective:     Communicated with Nurse prior to session.  Patient found HOB elevated with bed alarm, telemetry, PureWick, oxygen upon PT entry to room.     General Precautions: Standard, fall  Orthopedic Precautions: N/A  Braces: " "N/A  Respiratory Status: Nasal cannula     Functional Mobility:  Deferred; due to pt only agreeable to supine BLE there-ex.       AM-PAC 6 CLICK MOBILITY  Turning over in bed (including adjusting bedclothes, sheets and blankets)?: 2  Sitting down on and standing up from a chair with arms (e.g., wheelchair, bedside commode, etc.): 2  Moving from lying on back to sitting on the side of the bed?: 2  Moving to and from a bed to a chair (including a wheelchair)?: 1  Need to walk in hospital room?: 1  Climbing 3-5 steps with a railing?: 1  Basic Mobility Total Score: 9       Treatment & Education:  Pt initially not agreeable to participate with therapy; however, with increased encouragement pt agreeable to participate in supine BLE there-ex including: BLE ankle pumps, knee flexion/extension heel slides and miniature SLRs with varying levels of PT assistance from SBA to MOD A.   Pt does not wish to sit EOB today; reporting she is tired and "too old for therapy"; stating she needs to rest.       Patient left HOB elevated with all lines intact, call button in reach, bed alarm on, Nurse notified, and daughter present.    GOALS:   Multidisciplinary Problems       Physical Therapy Goals          Problem: Physical Therapy    Goal Priority Disciplines Outcome Goal Variances Interventions   Physical Therapy Goal     PT, PT/OT Progressing     Description: Goals to be met by: 10/20/24     Patient will increase functional independence with mobility by performin. Supine to sit with Moderate Assistance  2. Sit to supine with Moderate Assistance  3. Sit to stand transfer with Moderate Assistance with use of RW.   4. Bed to chair transfer with Maximum Assistance using Rolling Walker  5. Gait  x 10 feet with Maximum Assistance using Rolling Walker.                          Time Tracking:     PT Received On: 24  PT Start Time:      PT Stop Time: 1421  PT Total Time (min): 10 min     Billable Minutes: Therapeutic Exercise " 10    Treatment Type: Treatment  PT/PTA: PT     Number of PTA visits since last PT visit: 0     09/24/2024

## 2024-09-24 NOTE — PROGRESS NOTES
[facility]  Progress Note    Patient Name: Marycruz Perez  MRN: [id number]  Patient Class: IP- Inpatient   Admission Date: 9/16/2024  Length of Stay: 8 days  Attending Physician: Rudy Blake MD  Primary Care Provider: Melchor Menchaca -        Subjective:     Principal Problem:Diverticulitis of intestine with abscess without bleeding        HPI:  Ms. Perez is a 89yo woman with HFpEF, CHB s/p PPM, HTN, and CAD who presents with left flank pain and lower back pain x4 hours. She reports that she has developed left-sided flank pain.  Has had a few days of constipation where she was passing small pebble like bowel movements prior to this.  Denies any fevers or chills.  No bleeding symptoms.  Does have history of diverticulitis in the past.    Overview/Hospital Course:  No notes on file    Interval History:   No acute events  Tolerating liquid diet  Pt is eager to go home  Reports lower abdominal pain well controlled  No other complaints    Review of Systems   Constitutional:  Negative for chills, fatigue and fever.   Respiratory:  Negative for cough and shortness of breath.    Cardiovascular:  Negative for chest pain and palpitations.   Gastrointestinal:  Negative for abdominal distention, abdominal pain, nausea and vomiting.   Neurological:  Negative for dizziness and headaches.   Psychiatric/Behavioral:  Negative for agitation and confusion.      Objective:     Vital Signs (Most Recent):  Temp: 98.7 °F (37.1 °C) (09/24/24 1125)  Pulse: 88 (09/24/24 1125)  Resp: 18 (09/24/24 1125)  BP: 112/62 (09/24/24 1125)  SpO2: (!) 94 % (09/24/24 1125) Vital Signs (24h Range):  Temp:  [98.3 °F (36.8 °C)-98.8 °F (37.1 °C)] 98.7 °F (37.1 °C)  Pulse:  [73-88] 88  Resp:  [16-20] 18  SpO2:  [94 %-97 %] 94 %  BP: (112-138)/(56-63) 112/62     Weight: 83.9 kg (184 lb 15.5 oz)  Body mass index is 37.36 kg/m².    Intake/Output Summary (Last 24 hours) at 9/24/2024 1512  Last data filed at 9/24/2024 1240  Gross  per 24 hour   Intake --   Output 1600 ml   Net -1600 ml         Physical Exam  Constitutional:       General: She is not in acute distress.     Appearance: Normal appearance. She is not toxic-appearing.   HENT:      Mouth/Throat:      Mouth: Mucous membranes are moist.      Pharynx: Oropharynx is clear.   Eyes:      Extraocular Movements: Extraocular movements intact.      Conjunctiva/sclera: Conjunctivae normal.   Cardiovascular:      Rate and Rhythm: Normal rate.   Pulmonary:      Effort: Pulmonary effort is normal. No respiratory distress.   Abdominal:      General: There is no distension.      Tenderness: There is no abdominal tenderness. There is no guarding.   Musculoskeletal:         General: Normal range of motion.      Right lower leg: Edema present.      Left lower leg: Edema present.   Skin:     General: Skin is warm and dry.   Neurological:      General: No focal deficit present.      Mental Status: She is alert and oriented to person, place, and time.   Psychiatric:         Mood and Affect: Mood normal.         Behavior: Behavior normal.             Significant Labs: All pertinent labs within the past 24 hours have been reviewed.    Significant Imaging: I have reviewed all pertinent imaging results/findings within the past 24 hours.    Assessment/Plan:      * Diverticulitis of intestine with abscess without bleeding  Presents with left flank pain, found to have diverticulitis with abscess on CT imaging   Initial CT scan does not appear to have a good window for IR drainage  Repeat CT 9/23 - Persistent sigmoid diverticulitis, with an enlarging diverticular abscess.     Plan:  -IV ciprofloxacin and metronidazole; penicillin allergy  -general surgery consulted but do not suspect the patient will be good operative candidate  Reached out to IR again who will reevaluate  Continue IV antibiotics    Goals of care, counseling/discussion  Advance Care Planning    Briefly discussed code status with patient at  bedside.  At this time she would like to remain a full code.  She has 4 living children.  No healthcare power of .  Patient would like daughter yvrose Bautista to help her make medical decisions if necessary.  Spent 5-8 minutes for ACP discussion.      Again discussed goals of care on 09/21.  Spent 8-10 minutes on ACP discussion.  Daughter agreeable to palliative care consult.  Understands they are not present here over the weekend, tentatively will be seen on Monday.  Palliative Care consult placed.        CKD (chronic kidney disease)  Creatine stable for now. BMP reviewed- noted Estimated Creatinine Clearance: 37.9 mL/min (based on SCr of 0.9 mg/dL). according to latest data. Based on current GFR, CKD stage is stage 2 - GFR 60-89.  Monitor UOP and serial BMP and adjust therapy as needed. Renally dose meds. Avoid nephrotoxic medications and procedures.    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 Morbid obesity complicates all aspects of disease management from diagnostic modalities to treatment. Weight loss encouraged and health benefits explained to patient.         PAD (peripheral artery disease)  -continue aspirin and ezetimibe      Chronic heart failure with preserved ejection fraction (HFpEF)  - stable diastolic CHF  - continue home carvedilol, valsartan, Bumex  - daily weights, strict I/Os  - tele  - keep K >4, Mg >2  - 2D echo noted; deferring repeat at this hospitalization as she currently appears compensated        Pacemaker-dependent  -in place      Complete heart block  -status post pacemaker      Essential hypertension  Chronic, controlled. Latest blood pressure and vitals reviewed-     Temp:  [98.3 °F (36.8 °C)-98.8 °F (37.1 °C)]   Pulse:  [73-88]   Resp:  [16-20]   BP: (112-138)/(56-63)   SpO2:  [94 %-97 %] .   Home meds for hypertension were reviewed and noted below.   Hypertension Medications               bumetanide (BUMEX) 0.5  MG Tab Take 2 tablets (1 mg total) by mouth once daily. May also take 2 tablets (1 mg total) daily as needed (> 2 pound weight gain in 24 hours, > 5 pound weight gain in a week, worsening leg swelling, shortness of breath).    carvediloL (COREG) 12.5 MG tablet Take 1 tablet (12.5 mg total) by mouth 2 (two) times daily with meals.    isosorbide mononitrate (IMDUR) 30 MG 24 hr tablet Take 1 tablet (30 mg total) by mouth once daily.    nitroGLYCERIN (NITROSTAT) 0.4 MG SL tablet Place 1 tablet (0.4 mg total) under the tongue every 5 (five) minutes as needed for Chest pain (angina).    valsartan (DIOVAN) 80 MG tablet Take 1 tablet (80 mg total) by mouth once daily.            While in the hospital, will manage blood pressure as follows; Continue home antihypertensive regimen   Will utilize p.r.n. blood pressure medication only if patient's blood pressure greater than 180/110 and she develops symptoms such as worsening chest pain or shortness of breath.      VTE Risk Mitigation (From admission, onward)           Ordered     IP VTE HIGH RISK PATIENT  Once         09/16/24 1137     Place sequential compression device  Until discontinued         09/16/24 1137                    Discharge Planning   ANA: 9/23/2024     Code Status: DNR   Is the patient medically ready for discharge?:     Reason for patient still in hospital (select all that apply): Patient trending condition, Laboratory test, and Treatment  Discharge Plan A: Home Health   Discharge Delays: None known at this time              Rudy Blake MD  Department of Hospital Medicine   [facility]

## 2024-09-24 NOTE — PLAN OF CARE
Problem: Physical Therapy  Goal: Physical Therapy Goal  Description: Goals to be met by: 10/20/24     Patient will increase functional independence with mobility by performin. Supine to sit with Moderate Assistance  2. Sit to supine with Moderate Assistance  3. Sit to stand transfer with Moderate Assistance with use of RW.   4. Bed to chair transfer with Maximum Assistance using Rolling Walker  5. Gait  x 10 feet with Maximum Assistance using Rolling Walker.     Outcome: Progressing     Pt stating no therapy today; but with further encouragement agreeable to perform supine BLE there-ex. Therapy will continue to progress pt as able.

## 2024-09-24 NOTE — ACP (ADVANCE CARE PLANNING)
Advance Care Planning     Date: 09/24/2024    Code Status  In light of the patients advanced and life limiting illness,I engaged the the patient and family in a voluntary conversation about the patient's preferences for care  at the very end of life. The patient wishes to have a natural, peaceful death.  Along those lines, the patient does not wish to have CPR or other invasive treatments performed when her heart and/or breathing stops. I communicated to the patient and family that a DNR order would be placed in her medical record to reflect this preference.    A total of 49 min was spent on advance care planning, goals of care discussion, emotional support, formulating and communicating prognosis and exploring burden/benefit of various approaches of treatment. This discussion occurred on a fully voluntary basis with the verbal consent of the patient and/or family.     [facility]  I engaged the patient and family in a voluntary conversation about advance care planning and we specifically addressed what the goals of care would be moving forward, in light of the patient's change in clinical status, specifically sigmoid diverticular abscess. We did specifically address the patient's likely prognosis, which is fair .  We explored the patient's values and preferences for future care.  The patient and family endorses that what is most important right now is to focus on spending time at home, avoiding the hospital, remaining as independent as possible, symptom/pain control, and improvement in condition but with limits to invasive therapies    Accordingly, we have decided that the best plan to meet the patient's goals includes continuing with treatment    A total of 49 min was spent on advance care planning, goals of care discussion, emotional support, formulating and communicating prognosis and exploring burden/benefit of various approaches of treatment. This discussion occurred on a fully voluntary basis with the verbal consent  of the patient and/or family.        Eullaio Parikh MD  Hospice and Palliative Medicine  Palliative Care Pager: [phone]

## 2024-09-24 NOTE — PROGRESS NOTES
[facility]  General Surgery  Progress Note    Subjective:     History of Present Illness:  Ms. Perez is a 89 y.o. female with PMH of HFpEF, HTN, and CAD who presented with left flank pain and lower back pain that started earlier today. CT scan obtained showed evidence of diverticulitis. General surgery was consulted for evaluation. Patient takes DAPT at home. PSH includes hysterectomy and cholecystectomy. Patient states that she has not had this happen before. She reports improvement in her pain since presentation to ED    Post-Op Info:  * No surgery found *         Interval History:   NAEO. Tolerating diet. WBC continues to downtrend. Scan yesterday with enlarging abscess.     Medications:  Continuous Infusions:  Scheduled Meds:   aspirin  81 mg Oral Daily    bumetanide  2 mg Oral Daily    carvediloL  6.25 mg Oral BID WM    ciprofloxacin  400 mg Intravenous Q24H    enoxparin  40 mg Subcutaneous Daily    ezetimibe  10 mg Oral Daily    isosorbide mononitrate  30 mg Oral Daily    metroNIDAZOLE IV (PEDS and ADULTS)  500 mg Intravenous Q8H    sodium chloride 0.9%  10 mL Intravenous Q8H    sodium chloride 0.9%  10 mL Intravenous Q6H    valsartan  40 mg Oral Daily     PRN Meds:  Current Facility-Administered Medications:     acetaminophen, 1,000 mg, Oral, Q8H PRN    acetaminophen, 650 mg, Oral, Q4H PRN    dextrose 10%, 12.5 g, Intravenous, PRN    dextrose 10%, 25 g, Intravenous, PRN    glucagon (human recombinant), 1 mg, Intramuscular, PRN    glucose, 16 g, Oral, PRN    glucose, 24 g, Oral, PRN    melatonin, 6 mg, Oral, Nightly PRN    morphine, 1 mg, Intravenous, Q6H PRN    naloxone, 0.02 mg, Intravenous, PRN    ondansetron, 8 mg, Oral, Q8H PRN    ondansetron, 4 mg, Intravenous, Q8H PRN    pneumoc 20-paul conj-dip cr(PF), 0.5 mL, Intramuscular, vaccine x 1 dose    Flushing PICC/Midline Protocol, , , Until Discontinued **AND** sodium chloride 0.9%, 10 mL, Intravenous, Q6H **AND** sodium chloride 0.9%, 10 mL,  Intravenous, PRN     Review of patient's allergies indicates:   Allergen Reactions    Codeine     Latex, natural rubber     Penicillins     Penicillin Rash     Objective:     Vital Signs (Most Recent):  Temp: 98.7 °F (37.1 °C) (09/24/24 0352)  Pulse: 75 (09/24/24 0352)  Resp: 17 (09/24/24 0352)  BP: (!) 115/56 (09/24/24 0352)  SpO2: (!) 94 % (09/24/24 0352) Vital Signs (24h Range):  Temp:  [98.3 °F (36.8 °C)-98.8 °F (37.1 °C)] 98.7 °F (37.1 °C)  Pulse:  [73-83] 75  Resp:  [16-17] 17  SpO2:  [94 %-98 %] 94 %  BP: (115-149)/(56-71) 115/56     Weight: 83.9 kg (184 lb 15.5 oz)  Body mass index is 37.36 kg/m².    Intake/Output - Last 3 Shifts         09/22 0700 09/23 0659 09/23 0700  09/24 0659 09/24 0700  09/25 0659    P.O. 1080      Total Intake(mL/kg) 1080 (12.9)      Urine (mL/kg/hr) 1600 (0.8) 1500 (0.7)     Stool 0 0     Total Output 1600 1500     Net -520 -1500            Stool Occurrence 3 x 2 x              Physical Exam  Vitals and nursing note reviewed.   HENT:      Head: Normocephalic and atraumatic.   Cardiovascular:      Rate and Rhythm: Normal rate.   Pulmonary:      Effort: Pulmonary effort is normal.   Abdominal:      General: Abdomen is flat.      Palpations: Abdomen is soft.      Comments: Abdomen soft, tender to palpation in the left lower quadrant   Skin:     General: Skin is warm and dry.   Neurological:      General: No focal deficit present.      Mental Status: She is alert and oriented to person, place, and time.   Psychiatric:         Mood and Affect: Mood normal.         Behavior: Behavior normal.          Significant Labs:  I have reviewed all pertinent lab results within the past 24 hours.  CBC:   Recent Labs   Lab 09/24/24  0258   WBC 10.33   RBC 3.32*   HGB 9.5*   HCT 29.6*      MCV 89   MCH 28.6   MCHC 32.1     CMP:   Recent Labs   Lab 09/22/24  0300   GLU 84   CALCIUM 8.8      K 3.8   CO2 24      BUN 21   CREATININE 0.9       Significant Diagnostics:  I have reviewed  all pertinent imaging results/findings within the past 24 hours.  Assessment/Plan:     * Diverticulitis of intestine with abscess without bleeding  Ms. Perez is a 89 y.o. female  with evidence of diverticulitis    - Patient seen and examined  - Labs and imaging reviewed  -CT performed 9/23 with increase in size of abscess   -Repeat CT abdomen, pelvis yesterday with enlarging abscess, will  reengage IR to discuss possible drain placement   - No plans for surgical intervention, patient with complicated medical history and high risk for complications if she were to undergo general anesthesia  - Agree with antibiotics  - Diet:  Okay for diet, NPO for now pending possible IR  drain placement today  - general surgery will follow along        Dez Haro MD  General Surgery  [place] [facility]

## 2024-09-24 NOTE — PLAN OF CARE
Problem: Adult Inpatient Plan of Care  Goal: Plan of Care Review  Outcome: Progressing  Goal: Optimal Comfort and Wellbeing  Outcome: Progressing     Problem: Skin Injury Risk Increased  Goal: Skin Health and Integrity  Outcome: Progressing     Problem: Fall Injury Risk  Goal: Absence of Fall and Fall-Related Injury  Outcome: Progressing     Problem: Comorbidity Management  Goal: Blood Pressure in Desired Range  Outcome: Progressing     Problem: Coping Ineffective  Goal: Effective Coping  Outcome: Progressing

## 2024-09-24 NOTE — SUBJECTIVE & OBJECTIVE
Interval History:   No acute events  Tolerating liquid diet  Pt is eager to go home  Reports lower abdominal pain well controlled  No other complaints    Review of Systems   Constitutional:  Negative for chills, fatigue and fever.   Respiratory:  Negative for cough and shortness of breath.    Cardiovascular:  Negative for chest pain and palpitations.   Gastrointestinal:  Negative for abdominal distention, abdominal pain, nausea and vomiting.   Neurological:  Negative for dizziness and headaches.   Psychiatric/Behavioral:  Negative for agitation and confusion.      Objective:     Vital Signs (Most Recent):  Temp: 98.7 °F (37.1 °C) (09/24/24 1125)  Pulse: 88 (09/24/24 1125)  Resp: 18 (09/24/24 1125)  BP: 112/62 (09/24/24 1125)  SpO2: (!) 94 % (09/24/24 1125) Vital Signs (24h Range):  Temp:  [98.3 °F (36.8 °C)-98.8 °F (37.1 °C)] 98.7 °F (37.1 °C)  Pulse:  [73-88] 88  Resp:  [16-20] 18  SpO2:  [94 %-97 %] 94 %  BP: (112-138)/(56-63) 112/62     Weight: 83.9 kg (184 lb 15.5 oz)  Body mass index is 37.36 kg/m².    Intake/Output Summary (Last 24 hours) at 9/24/2024 1512  Last data filed at 9/24/2024 1240  Gross per 24 hour   Intake --   Output 1600 ml   Net -1600 ml         Physical Exam  Constitutional:       General: She is not in acute distress.     Appearance: Normal appearance. She is not toxic-appearing.   HENT:      Mouth/Throat:      Mouth: Mucous membranes are moist.      Pharynx: Oropharynx is clear.   Eyes:      Extraocular Movements: Extraocular movements intact.      Conjunctiva/sclera: Conjunctivae normal.   Cardiovascular:      Rate and Rhythm: Normal rate.   Pulmonary:      Effort: Pulmonary effort is normal. No respiratory distress.   Abdominal:      General: There is no distension.      Tenderness: There is no abdominal tenderness. There is no guarding.   Musculoskeletal:         General: Normal range of motion.      Right lower leg: Edema present.      Left lower leg: Edema present.   Skin:     General:  Skin is warm and dry.   Neurological:      General: No focal deficit present.      Mental Status: She is alert and oriented to person, place, and time.   Psychiatric:         Mood and Affect: Mood normal.         Behavior: Behavior normal.             Significant Labs: All pertinent labs within the past 24 hours have been reviewed.    Significant Imaging: I have reviewed all pertinent imaging results/findings within the past 24 hours.

## 2024-09-24 NOTE — ASSESSMENT & PLAN NOTE
Chronic, controlled. Latest blood pressure and vitals reviewed-     Temp:  [98.3 °F (36.8 °C)-98.8 °F (37.1 °C)]   Pulse:  [73-88]   Resp:  [16-20]   BP: (112-138)/(56-63)   SpO2:  [94 %-97 %] .   Home meds for hypertension were reviewed and noted below.   Hypertension Medications               bumetanide (BUMEX) 0.5 MG Tab Take 2 tablets (1 mg total) by mouth once daily. May also take 2 tablets (1 mg total) daily as needed (> 2 pound weight gain in 24 hours, > 5 pound weight gain in a week, worsening leg swelling, shortness of breath).    carvediloL (COREG) 12.5 MG tablet Take 1 tablet (12.5 mg total) by mouth 2 (two) times daily with meals.    isosorbide mononitrate (IMDUR) 30 MG 24 hr tablet Take 1 tablet (30 mg total) by mouth once daily.    nitroGLYCERIN (NITROSTAT) 0.4 MG SL tablet Place 1 tablet (0.4 mg total) under the tongue every 5 (five) minutes as needed for Chest pain (angina).    valsartan (DIOVAN) 80 MG tablet Take 1 tablet (80 mg total) by mouth once daily.            While in the hospital, will manage blood pressure as follows; Continue home antihypertensive regimen   Will utilize p.r.n. blood pressure medication only if patient's blood pressure greater than 180/110 and she develops symptoms such as worsening chest pain or shortness of breath.

## 2024-09-24 NOTE — SUBJECTIVE & OBJECTIVE
Interval History:   NAEO. Tolerating diet. WBC continues to downtrend. Scan yesterday with enlarging abscess.     Medications:  Continuous Infusions:  Scheduled Meds:   aspirin  81 mg Oral Daily    bumetanide  2 mg Oral Daily    carvediloL  6.25 mg Oral BID WM    ciprofloxacin  400 mg Intravenous Q24H    enoxparin  40 mg Subcutaneous Daily    ezetimibe  10 mg Oral Daily    isosorbide mononitrate  30 mg Oral Daily    metroNIDAZOLE IV (PEDS and ADULTS)  500 mg Intravenous Q8H    sodium chloride 0.9%  10 mL Intravenous Q8H    sodium chloride 0.9%  10 mL Intravenous Q6H    valsartan  40 mg Oral Daily     PRN Meds:  Current Facility-Administered Medications:     acetaminophen, 1,000 mg, Oral, Q8H PRN    acetaminophen, 650 mg, Oral, Q4H PRN    dextrose 10%, 12.5 g, Intravenous, PRN    dextrose 10%, 25 g, Intravenous, PRN    glucagon (human recombinant), 1 mg, Intramuscular, PRN    glucose, 16 g, Oral, PRN    glucose, 24 g, Oral, PRN    melatonin, 6 mg, Oral, Nightly PRN    morphine, 1 mg, Intravenous, Q6H PRN    naloxone, 0.02 mg, Intravenous, PRN    ondansetron, 8 mg, Oral, Q8H PRN    ondansetron, 4 mg, Intravenous, Q8H PRN    pneumoc 20-paul conj-dip cr(PF), 0.5 mL, Intramuscular, vaccine x 1 dose    Flushing PICC/Midline Protocol, , , Until Discontinued **AND** sodium chloride 0.9%, 10 mL, Intravenous, Q6H **AND** sodium chloride 0.9%, 10 mL, Intravenous, PRN     Review of patient's allergies indicates:   Allergen Reactions    Codeine     Latex, natural rubber     Penicillins     Penicillin Rash     Objective:     Vital Signs (Most Recent):  Temp: 98.7 °F (37.1 °C) (09/24/24 0352)  Pulse: 75 (09/24/24 0352)  Resp: 17 (09/24/24 0352)  BP: (!) 115/56 (09/24/24 0352)  SpO2: (!) 94 % (09/24/24 0352) Vital Signs (24h Range):  Temp:  [98.3 °F (36.8 °C)-98.8 °F (37.1 °C)] 98.7 °F (37.1 °C)  Pulse:  [73-83] 75  Resp:  [16-17] 17  SpO2:  [94 %-98 %] 94 %  BP: (115-149)/(56-71) 115/56     Weight: 83.9 kg (184 lb 15.5 oz)  Body  mass index is 37.36 kg/m².    Intake/Output - Last 3 Shifts         09/22 0700  09/23 0659 09/23 0700 09/24 0659 09/24 0700 09/25 0659    P.O. 1080      Total Intake(mL/kg) 1080 (12.9)      Urine (mL/kg/hr) 1600 (0.8) 1500 (0.7)     Stool 0 0     Total Output 1600 1500     Net -520 -1500            Stool Occurrence 3 x 2 x              Physical Exam  Vitals and nursing note reviewed.   HENT:      Head: Normocephalic and atraumatic.   Cardiovascular:      Rate and Rhythm: Normal rate.   Pulmonary:      Effort: Pulmonary effort is normal.   Abdominal:      General: Abdomen is flat.      Palpations: Abdomen is soft.      Comments: Abdomen soft, tender to palpation in the left lower quadrant   Skin:     General: Skin is warm and dry.   Neurological:      General: No focal deficit present.      Mental Status: She is alert and oriented to person, place, and time.   Psychiatric:         Mood and Affect: Mood normal.         Behavior: Behavior normal.          Significant Labs:  I have reviewed all pertinent lab results within the past 24 hours.  CBC:   Recent Labs   Lab 09/24/24  0258   WBC 10.33   RBC 3.32*   HGB 9.5*   HCT 29.6*      MCV 89   MCH 28.6   MCHC 32.1     CMP:   Recent Labs   Lab 09/22/24  0300   GLU 84   CALCIUM 8.8      K 3.8   CO2 24      BUN 21   CREATININE 0.9       Significant Diagnostics:  I have reviewed all pertinent imaging results/findings within the past 24 hours.

## 2024-09-25 LAB
INR PPP: 1.2 (ref 0.8–1.2)
PROTHROMBIN TIME: 13 SEC (ref 9–12.5)

## 2024-09-25 PROCEDURE — 99232 SBSQ HOSP IP/OBS MODERATE 35: CPT | Mod: ,,, | Performed by: STUDENT IN AN ORGANIZED HEALTH CARE EDUCATION/TRAINING PROGRAM

## 2024-09-25 PROCEDURE — 87075 CULTR BACTERIA EXCEPT BLOOD: CPT | Performed by: HOSPITALIST

## 2024-09-25 PROCEDURE — 63600175 PHARM REV CODE 636 W HCPCS: Mod: JZ,JG

## 2024-09-25 PROCEDURE — 25000003 PHARM REV CODE 250: Performed by: HOSPITALIST

## 2024-09-25 PROCEDURE — A4216 STERILE WATER/SALINE, 10 ML: HCPCS | Performed by: STUDENT IN AN ORGANIZED HEALTH CARE EDUCATION/TRAINING PROGRAM

## 2024-09-25 PROCEDURE — 85610 PROTHROMBIN TIME: CPT | Performed by: PHYSICIAN ASSISTANT

## 2024-09-25 PROCEDURE — A4216 STERILE WATER/SALINE, 10 ML: HCPCS | Performed by: HOSPITALIST

## 2024-09-25 PROCEDURE — 0W9J30Z DRAINAGE OF PELVIC CAVITY WITH DRAINAGE DEVICE, PERCUTANEOUS APPROACH: ICD-10-PCS | Performed by: RADIOLOGY

## 2024-09-25 PROCEDURE — 11000001 HC ACUTE MED/SURG PRIVATE ROOM

## 2024-09-25 PROCEDURE — 25000003 PHARM REV CODE 250: Performed by: STUDENT IN AN ORGANIZED HEALTH CARE EDUCATION/TRAINING PROGRAM

## 2024-09-25 PROCEDURE — 87186 SC STD MICRODIL/AGAR DIL: CPT | Performed by: HOSPITALIST

## 2024-09-25 PROCEDURE — 36415 COLL VENOUS BLD VENIPUNCTURE: CPT | Performed by: PHYSICIAN ASSISTANT

## 2024-09-25 PROCEDURE — 63600175 PHARM REV CODE 636 W HCPCS: Performed by: STUDENT IN AN ORGANIZED HEALTH CARE EDUCATION/TRAINING PROGRAM

## 2024-09-25 PROCEDURE — 99900035 HC TECH TIME PER 15 MIN (STAT)

## 2024-09-25 PROCEDURE — 87070 CULTURE OTHR SPECIMN AEROBIC: CPT | Performed by: HOSPITALIST

## 2024-09-25 PROCEDURE — 25000003 PHARM REV CODE 250: Performed by: RADIOLOGY

## 2024-09-25 PROCEDURE — 94761 N-INVAS EAR/PLS OXIMETRY MLT: CPT

## 2024-09-25 PROCEDURE — 27000221 HC OXYGEN, UP TO 24 HOURS

## 2024-09-25 PROCEDURE — 63600175 PHARM REV CODE 636 W HCPCS: Performed by: RADIOLOGY

## 2024-09-25 RX ORDER — MIDAZOLAM HYDROCHLORIDE 1 MG/ML
INJECTION, SOLUTION INTRAMUSCULAR; INTRAVENOUS
Status: COMPLETED | OUTPATIENT
Start: 2024-09-25 | End: 2024-09-25

## 2024-09-25 RX ORDER — OXYCODONE HYDROCHLORIDE 5 MG/1
10 TABLET ORAL EVERY 4 HOURS PRN
Status: DISCONTINUED | OUTPATIENT
Start: 2024-09-25 | End: 2024-09-27 | Stop reason: HOSPADM

## 2024-09-25 RX ORDER — LIDOCAINE HYDROCHLORIDE 10 MG/ML
INJECTION, SOLUTION INFILTRATION; PERINEURAL
Status: COMPLETED | OUTPATIENT
Start: 2024-09-25 | End: 2024-09-25

## 2024-09-25 RX ORDER — FENTANYL CITRATE 50 UG/ML
INJECTION, SOLUTION INTRAMUSCULAR; INTRAVENOUS
Status: COMPLETED | OUTPATIENT
Start: 2024-09-25 | End: 2024-09-25

## 2024-09-25 RX ORDER — HYDROCODONE BITARTRATE AND ACETAMINOPHEN 5; 325 MG/1; MG/1
1 TABLET ORAL EVERY 4 HOURS PRN
Status: DISCONTINUED | OUTPATIENT
Start: 2024-09-25 | End: 2024-09-27 | Stop reason: HOSPADM

## 2024-09-25 RX ADMIN — SODIUM CHLORIDE, PRESERVATIVE FREE 10 ML: 5 INJECTION INTRAVENOUS at 11:09

## 2024-09-25 RX ADMIN — FENTANYL CITRATE 25 MCG: 50 INJECTION INTRAMUSCULAR; INTRAVENOUS at 10:09

## 2024-09-25 RX ADMIN — BUMETANIDE 2 MG: 1 TABLET ORAL at 09:09

## 2024-09-25 RX ADMIN — VALSARTAN 40 MG: 40 TABLET, FILM COATED ORAL at 09:09

## 2024-09-25 RX ADMIN — FENTANYL CITRATE 50 MCG: 50 INJECTION INTRAMUSCULAR; INTRAVENOUS at 10:09

## 2024-09-25 RX ADMIN — MIDAZOLAM 0.5 MG: 1 INJECTION INTRAMUSCULAR; INTRAVENOUS at 10:09

## 2024-09-25 RX ADMIN — Medication 10 ML: at 02:09

## 2024-09-25 RX ADMIN — SODIUM CHLORIDE, PRESERVATIVE FREE 10 ML: 5 INJECTION INTRAVENOUS at 05:09

## 2024-09-25 RX ADMIN — METRONIDAZOLE 500 MG: 5 INJECTION, SOLUTION INTRAVENOUS at 09:09

## 2024-09-25 RX ADMIN — LIDOCAINE HYDROCHLORIDE 5 ML: 10 INJECTION, SOLUTION INFILTRATION; PERINEURAL at 10:09

## 2024-09-25 RX ADMIN — ISOSORBIDE MONONITRATE 30 MG: 30 TABLET, EXTENDED RELEASE ORAL at 09:09

## 2024-09-25 RX ADMIN — CARVEDILOL 6.25 MG: 6.25 TABLET, FILM COATED ORAL at 09:09

## 2024-09-25 RX ADMIN — CIPROFLOXACIN 400 MG: 2 INJECTION, SOLUTION INTRAVENOUS at 08:09

## 2024-09-25 RX ADMIN — EZETIMIBE 10 MG: 10 TABLET ORAL at 09:09

## 2024-09-25 RX ADMIN — METRONIDAZOLE 500 MG: 5 INJECTION, SOLUTION INTRAVENOUS at 02:09

## 2024-09-25 RX ADMIN — METRONIDAZOLE 500 MG: 5 INJECTION, SOLUTION INTRAVENOUS at 05:09

## 2024-09-25 RX ADMIN — CARVEDILOL 6.25 MG: 6.25 TABLET, FILM COATED ORAL at 05:09

## 2024-09-25 NOTE — SUBJECTIVE & OBJECTIVE
Interval History:  Interval CT scan showing increased abscess.  IR consulted with the plan to perform drain placement today.    Medications:  Continuous Infusions:  Scheduled Meds:   bumetanide  2 mg Oral Daily    carvediloL  6.25 mg Oral BID WM    ciprofloxacin  400 mg Intravenous Q24H    ezetimibe  10 mg Oral Daily    isosorbide mononitrate  30 mg Oral Daily    metroNIDAZOLE IV (PEDS and ADULTS)  500 mg Intravenous Q8H    sodium chloride 0.9%  10 mL Intravenous Q8H    sodium chloride 0.9%  10 mL Intravenous Q6H    valsartan  40 mg Oral Daily     PRN Meds:  Current Facility-Administered Medications:     acetaminophen, 1,000 mg, Oral, Q8H PRN    acetaminophen, 650 mg, Oral, Q4H PRN    dextrose 10%, 12.5 g, Intravenous, PRN    dextrose 10%, 25 g, Intravenous, PRN    glucagon (human recombinant), 1 mg, Intramuscular, PRN    glucose, 16 g, Oral, PRN    glucose, 24 g, Oral, PRN    melatonin, 6 mg, Oral, Nightly PRN    morphine, 1 mg, Intravenous, Q6H PRN    naloxone, 0.02 mg, Intravenous, PRN    ondansetron, 8 mg, Oral, Q8H PRN    ondansetron, 4 mg, Intravenous, Q8H PRN    pneumoc 20-paul conj-dip cr(PF), 0.5 mL, Intramuscular, vaccine x 1 dose    Flushing PICC/Midline Protocol, , , Until Discontinued **AND** sodium chloride 0.9%, 10 mL, Intravenous, Q6H **AND** sodium chloride 0.9%, 10 mL, Intravenous, PRN     Review of patient's allergies indicates:   Allergen Reactions    Codeine     Latex, natural rubber     Penicillins     Penicillin Rash     Objective:     Vital Signs (Most Recent):  Temp: 98.1 °F (36.7 °C) (09/25/24 0741)  Pulse: 88 (09/25/24 0741)  Resp: 18 (09/25/24 0741)  BP: (!) 151/68 (09/25/24 0741)  SpO2: 96 % (09/25/24 0741) Vital Signs (24h Range):  Temp:  [98.1 °F (36.7 °C)-99.8 °F (37.7 °C)] 98.1 °F (36.7 °C)  Pulse:  [77-88] 88  Resp:  [17-20] 18  SpO2:  [94 %-97 %] 96 %  BP: (112-151)/(56-68) 151/68     Weight: 83.9 kg (184 lb 15.5 oz)  Body mass index is 37.36 kg/m².    Intake/Output - Last 3 Shifts          09/23 0700  09/24 0659 09/24 0700 09/25 0659 09/25 0700 09/26 0659    P.O.       Total Intake(mL/kg)       Urine (mL/kg/hr) 1500 (0.7) 1500 (0.7)     Stool 0 0     Total Output 1500 1500     Net -1500 -1500            Stool Occurrence 2 x 1 x              Physical Exam  Vitals and nursing note reviewed.   HENT:      Head: Normocephalic and atraumatic.   Cardiovascular:      Rate and Rhythm: Normal rate.   Pulmonary:      Effort: Pulmonary effort is normal.   Abdominal:      General: Abdomen is flat.      Palpations: Abdomen is soft.      Comments: Abdomen soft, tender to palpation in the left lower quadrant   Skin:     General: Skin is warm and dry.   Neurological:      General: No focal deficit present.      Mental Status: She is alert and oriented to person, place, and time.   Psychiatric:         Mood and Affect: Mood normal.         Behavior: Behavior normal.          Significant Labs:  I have reviewed all pertinent lab results within the past 24 hours.  CBC:   Recent Labs   Lab 09/24/24  0258   WBC 10.33   RBC 3.32*   HGB 9.5*   HCT 29.6*      MCV 89   MCH 28.6   MCHC 32.1     CMP:   Recent Labs   Lab 09/22/24  0300   GLU 84   CALCIUM 8.8      K 3.8   CO2 24      BUN 21   CREATININE 0.9       Significant Diagnostics:  I have reviewed all pertinent imaging results/findings within the past 24 hours.

## 2024-09-25 NOTE — SEDATION DOCUMENTATION
Procedure completed. Patient tolerated well; VSS. Site CDI. Patient to be transported back to room on floor for recovery; report to be given at the bedside.

## 2024-09-25 NOTE — SUBJECTIVE & OBJECTIVE
Interval History:   No acute events  Tolerating liquid diet. No n/v  Pt is eager to go home  Reports lower abdominal pain well controlled  No other complaints    Review of Systems   Constitutional:  Negative for chills, fatigue and fever.   Respiratory:  Negative for cough and shortness of breath.    Cardiovascular:  Negative for chest pain and palpitations.   Gastrointestinal:  Negative for abdominal distention, abdominal pain, nausea and vomiting.   Neurological:  Negative for dizziness and headaches.   Psychiatric/Behavioral:  Negative for agitation and confusion.      Objective:     Vital Signs (Most Recent):  Temp: 98.1 °F (36.7 °C) (09/25/24 0741)  Pulse: 90 (09/25/24 1050)  Resp: 11 (09/25/24 1050)  BP: (!) 174/75 (09/25/24 1050)  SpO2: 98 % (09/25/24 1050) Vital Signs (24h Range):  Temp:  [98.1 °F (36.7 °C)-99.8 °F (37.7 °C)] 98.1 °F (36.7 °C)  Pulse:  [77-94] 90  Resp:  [11-25] 11  SpO2:  [95 %-100 %] 98 %  BP: (119-198)/(56-91) 174/75     Weight: 83.9 kg (184 lb 15.5 oz)  Body mass index is 37.36 kg/m².    Intake/Output Summary (Last 24 hours) at 9/25/2024 1318  Last data filed at 9/24/2024 1640  Gross per 24 hour   Intake --   Output 600 ml   Net -600 ml         Physical Exam  Constitutional:       General: She is not in acute distress.     Appearance: Normal appearance. She is not toxic-appearing.   HENT:      Mouth/Throat:      Mouth: Mucous membranes are moist.      Pharynx: Oropharynx is clear.   Eyes:      Extraocular Movements: Extraocular movements intact.      Conjunctiva/sclera: Conjunctivae normal.   Cardiovascular:      Rate and Rhythm: Normal rate.   Pulmonary:      Effort: Pulmonary effort is normal. No respiratory distress.   Abdominal:      General: There is no distension.      Tenderness: There is no abdominal tenderness. There is no guarding.      Comments: Drain in place with serosanguinous output   Musculoskeletal:         General: Normal range of motion.      Right lower leg: Edema  present.      Left lower leg: Edema present.   Skin:     General: Skin is warm and dry.   Neurological:      General: No focal deficit present.      Mental Status: She is alert and oriented to person, place, and time.   Psychiatric:         Mood and Affect: Mood normal.         Behavior: Behavior normal.             Significant Labs: All pertinent labs within the past 24 hours have been reviewed.    Significant Imaging: I have reviewed all pertinent imaging results/findings within the past 24 hours.

## 2024-09-25 NOTE — PROGRESS NOTES
[facility]  General Surgery  Progress Note    Subjective:     History of Present Illness:  Ms. Perez is a 89 y.o. female with PMH of HFpEF, HTN, and CAD who presented with left flank pain and lower back pain that started earlier today. CT scan obtained showed evidence of diverticulitis. General surgery was consulted for evaluation. Patient takes DAPT at home. PSH includes hysterectomy and cholecystectomy. Patient states that she has not had this happen before. She reports improvement in her pain since presentation to ED    Post-Op Info:  * No surgery found *         Interval History:  Interval CT scan showing increased abscess.  IR consulted with the plan to perform drain placement today.    Medications:  Continuous Infusions:  Scheduled Meds:   bumetanide  2 mg Oral Daily    carvediloL  6.25 mg Oral BID WM    ciprofloxacin  400 mg Intravenous Q24H    ezetimibe  10 mg Oral Daily    isosorbide mononitrate  30 mg Oral Daily    metroNIDAZOLE IV (PEDS and ADULTS)  500 mg Intravenous Q8H    sodium chloride 0.9%  10 mL Intravenous Q8H    sodium chloride 0.9%  10 mL Intravenous Q6H    valsartan  40 mg Oral Daily     PRN Meds:  Current Facility-Administered Medications:     acetaminophen, 1,000 mg, Oral, Q8H PRN    acetaminophen, 650 mg, Oral, Q4H PRN    dextrose 10%, 12.5 g, Intravenous, PRN    dextrose 10%, 25 g, Intravenous, PRN    glucagon (human recombinant), 1 mg, Intramuscular, PRN    glucose, 16 g, Oral, PRN    glucose, 24 g, Oral, PRN    melatonin, 6 mg, Oral, Nightly PRN    morphine, 1 mg, Intravenous, Q6H PRN    naloxone, 0.02 mg, Intravenous, PRN    ondansetron, 8 mg, Oral, Q8H PRN    ondansetron, 4 mg, Intravenous, Q8H PRN    pneumoc 20-paul conj-dip cr(PF), 0.5 mL, Intramuscular, vaccine x 1 dose    Flushing PICC/Midline Protocol, , , Until Discontinued **AND** sodium chloride 0.9%, 10 mL, Intravenous, Q6H **AND** sodium chloride 0.9%, 10 mL, Intravenous, PRN     Review of patient's allergies  indicates:   Allergen Reactions    Codeine     Latex, natural rubber     Penicillins     Penicillin Rash     Objective:     Vital Signs (Most Recent):  Temp: 98.1 °F (36.7 °C) (09/25/24 0741)  Pulse: 88 (09/25/24 0741)  Resp: 18 (09/25/24 0741)  BP: (!) 151/68 (09/25/24 0741)  SpO2: 96 % (09/25/24 0741) Vital Signs (24h Range):  Temp:  [98.1 °F (36.7 °C)-99.8 °F (37.7 °C)] 98.1 °F (36.7 °C)  Pulse:  [77-88] 88  Resp:  [17-20] 18  SpO2:  [94 %-97 %] 96 %  BP: (112-151)/(56-68) 151/68     Weight: 83.9 kg (184 lb 15.5 oz)  Body mass index is 37.36 kg/m².    Intake/Output - Last 3 Shifts         09/23 0700 09/24 0659 09/24 0700 09/25 0659 09/25 0700 09/26 0659    P.O.       Total Intake(mL/kg)       Urine (mL/kg/hr) 1500 (0.7) 1500 (0.7)     Stool 0 0     Total Output 1500 1500     Net -1500 -1500            Stool Occurrence 2 x 1 x              Physical Exam  Vitals and nursing note reviewed.   HENT:      Head: Normocephalic and atraumatic.   Cardiovascular:      Rate and Rhythm: Normal rate.   Pulmonary:      Effort: Pulmonary effort is normal.   Abdominal:      General: Abdomen is flat.      Palpations: Abdomen is soft.      Comments: Abdomen soft, tender to palpation in the left lower quadrant   Skin:     General: Skin is warm and dry.   Neurological:      General: No focal deficit present.      Mental Status: She is alert and oriented to person, place, and time.   Psychiatric:         Mood and Affect: Mood normal.         Behavior: Behavior normal.          Significant Labs:  I have reviewed all pertinent lab results within the past 24 hours.  CBC:   Recent Labs   Lab 09/24/24  0258   WBC 10.33   RBC 3.32*   HGB 9.5*   HCT 29.6*      MCV 89   MCH 28.6   MCHC 32.1     CMP:   Recent Labs   Lab 09/22/24  0300   GLU 84   CALCIUM 8.8      K 3.8   CO2 24      BUN 21   CREATININE 0.9       Significant Diagnostics:  I have reviewed all pertinent imaging results/findings within the past 24  hours.  Assessment/Plan:     * Diverticulitis of intestine with abscess without bleeding  Ms. Perez is a 89 y.o. female  with evidence of diverticulitis    - Patient seen and examined  - Labs and imaging reviewed  -CT performed 9/23 with increase in size of abscess   -Repeat CT abdomen, pelvis yesterday with enlarging abscess, are planning for drain placement today  - No plans for surgical intervention, patient with complicated medical history and high risk for complications if she were to undergo general anesthesia  - Agree with antibiotics  - Diet:  Okay for diet, NPO for now pending possible IR  drain placement today  - general surgery will follow along        Dez Haro MD  General Surgery  [place] - Telemetry

## 2024-09-25 NOTE — PROCEDURES
Radiology Post-Procedure Note    Pre Op Diagnosis: Diverticular abscess    Post Op Diagnosis: Same     Procedure: Left  pelvic drainage    Procedure performed by: Jakub Gomez MD    Written Informed Consent Obtained: Yes    Specimen Removed: YES 40 cc pus    Estimated Blood Loss: Minimal    Findings: CT was used for localization of abnormal fluid collection. A needle was inserted into the fluid collection and purulent fluid was aspirated.  A wire was inserted into the collection and the tract was dilated.  A 14.0 [place] all-purpose drainage catheter was inserted and a pigtail loop of the distal end was formed.  The catheter was sutured into place and approximately  40 mL fluid was removed.     A specimen was sent to the lab for further analysis and culture.    The patient tolerated procedure well and there were no complications. Please see procedure report under Imaging for further details.    Jakub Gomez M.D.  Interventional Radiology  Department of Radiology

## 2024-09-25 NOTE — ASSESSMENT & PLAN NOTE
Presents with left flank pain, found to have diverticulitis with abscess on CT imaging   Initial CT scan does not appear to have a good window for IR drainage  Repeat CT 9/23 - Persistent sigmoid diverticulitis, with an enlarging diverticular abscess.   9/25- S/p CT guided percutaneous drain placement/aspiration    Afebrile, not septic    Plan:  -IV ciprofloxacin and metronidazole; penicillin allergy  -f/u abscess culture

## 2024-09-25 NOTE — PT/OT/SLP PROGRESS
Physical Therapy      Patient Name:  Marycruz Perez   MRN:  [id number]    Patient not seen today secondary to Pain; declined therapy session due to 8/10 pain at drain site. Will follow-up as able.    9/25/24- 14:16

## 2024-09-25 NOTE — PT/OT/SLP PROGRESS
Occupational Therapy      Patient Name:  Marycruz Perez   MRN:  [id number]    Patient not seen today secondary to AM: JUDITH in procedure, PM: Patient unwilling to participate 2/2 8/10 pain at drain site. Pt requested therapy to return tomorrow. Nurse notified.     9/25/2024

## 2024-09-25 NOTE — SEDATION DOCUMENTATION
Pt arrived to CT for abscess drain placement. Pt oriented to unit and staff, Pt safely transferred from stretcher to procedural table. Fall risk reviewed and comfort measures utilized with interventions. Safety strap applied, position pillows to minimize pressure points. Blankets applied. Pt prepped and draped utilizing standard sterile technique. Patient placed on continuous monitoring, as required by sedation policy. Timeouts implemented utilizing standard universal time-out per department and facility policy. RN to remain at bedside with continuous monitoring. Pt resting comfortably. Denies pain/discomfort. Will continue to monitor. See flow sheets for monitoring, medication administration, and updates. patient verbalizes understanding.

## 2024-09-25 NOTE — PT/OT/SLP PROGRESS
Physical Therapy      Patient Name:  Marycruz Perez   MRN:  [id number]    Patient not seen today secondary to JUDITH/CT scan. Will follow- up as able.     9/25/24- 09:06

## 2024-09-25 NOTE — PLAN OF CARE
Pt VSS and in NAD. CRM equipment removed. Pt questions and concerns addressed. Report given to JADYN Fiore. No further needs at this time. Pt discharged from recovery area at 1106. IR care complete.

## 2024-09-25 NOTE — PLAN OF CARE
Problem: Adult Inpatient Plan of Care  Goal: Plan of Care Review  Outcome: Progressing     Problem: Skin Injury Risk Increased  Goal: Skin Health and Integrity  Outcome: Progressing     Problem: Fall Injury Risk  Goal: Absence of Fall and Fall-Related Injury  Outcome: Progressing

## 2024-09-25 NOTE — PROGRESS NOTES
[facility] Medicine  Progress Note    Patient Name: Marycruz Perez  MRN: [id number]  Patient Class: IP- Inpatient   Admission Date: 9/16/2024  Length of Stay: 9 days  Attending Physician: Rudy Blake MD  Primary Care Provider: Melchor Menchaca -        Subjective:     Principal Problem:Diverticulitis of intestine with abscess without bleeding        HPI:  Ms. Perez is a 89yo woman with HFpEF, CHB s/p PPM, HTN, and CAD who presents with left flank pain and lower back pain x4 hours. She reports that she has developed left-sided flank pain.  Has had a few days of constipation where she was passing small pebble like bowel movements prior to this.  Denies any fevers or chills.  No bleeding symptoms.  Does have history of diverticulitis in the past.    Overview/Hospital Course:  No notes on file    Interval History:   No acute events  Tolerating liquid diet. No n/v  Pt is eager to go home  Reports lower abdominal pain well controlled  No other complaints    Review of Systems   Constitutional:  Negative for chills, fatigue and fever.   Respiratory:  Negative for cough and shortness of breath.    Cardiovascular:  Negative for chest pain and palpitations.   Gastrointestinal:  Negative for abdominal distention, abdominal pain, nausea and vomiting.   Neurological:  Negative for dizziness and headaches.   Psychiatric/Behavioral:  Negative for agitation and confusion.      Objective:     Vital Signs (Most Recent):  Temp: 98.1 °F (36.7 °C) (09/25/24 0741)  Pulse: 90 (09/25/24 1050)  Resp: 11 (09/25/24 1050)  BP: (!) 174/75 (09/25/24 1050)  SpO2: 98 % (09/25/24 1050) Vital Signs (24h Range):  Temp:  [98.1 °F (36.7 °C)-99.8 °F (37.7 °C)] 98.1 °F (36.7 °C)  Pulse:  [77-94] 90  Resp:  [11-25] 11  SpO2:  [95 %-100 %] 98 %  BP: (119-198)/(56-91) 174/75     Weight: 83.9 kg (184 lb 15.5 oz)  Body mass index is 37.36 kg/m².    Intake/Output Summary (Last 24 hours) at 9/25/2024 1318  Last data filed at 9/24/2024  1640  Gross per 24 hour   Intake --   Output 600 ml   Net -600 ml         Physical Exam  Constitutional:       General: She is not in acute distress.     Appearance: Normal appearance. She is not toxic-appearing.   HENT:      Mouth/Throat:      Mouth: Mucous membranes are moist.      Pharynx: Oropharynx is clear.   Eyes:      Extraocular Movements: Extraocular movements intact.      Conjunctiva/sclera: Conjunctivae normal.   Cardiovascular:      Rate and Rhythm: Normal rate.   Pulmonary:      Effort: Pulmonary effort is normal. No respiratory distress.   Abdominal:      General: There is no distension.      Tenderness: There is no abdominal tenderness. There is no guarding.      Comments: Drain in place with serosanguinous output   Musculoskeletal:         General: Normal range of motion.      Right lower leg: Edema present.      Left lower leg: Edema present.   Skin:     General: Skin is warm and dry.   Neurological:      General: No focal deficit present.      Mental Status: She is alert and oriented to person, place, and time.   Psychiatric:         Mood and Affect: Mood normal.         Behavior: Behavior normal.             Significant Labs: All pertinent labs within the past 24 hours have been reviewed.    Significant Imaging: I have reviewed all pertinent imaging results/findings within the past 24 hours.    Assessment/Plan:      * Diverticulitis of intestine with abscess without bleeding  Presents with left flank pain, found to have diverticulitis with abscess on CT imaging   Initial CT scan does not appear to have a good window for IR drainage  Repeat CT 9/23 - Persistent sigmoid diverticulitis, with an enlarging diverticular abscess.   9/25- S/p CT guided percutaneous drain placement/aspiration    Afebrile, not septic    Plan:  -IV ciprofloxacin and metronidazole; penicillin allergy  -f/u abscess culture    Diverticulitis        Intra-abdominal abscess        Goals of care, counseling/discussion  Advance  Care Planning    Briefly discussed code status with patient at bedside.  At this time she would like to remain a full code.  She has 4 living children.  No healthcare power of .  Patient would like daughter yvrose Bautista to help her make medical decisions if necessary.  Spent 5-8 minutes for ACP discussion.      Again discussed goals of care on 09/21.  Spent 8-10 minutes on ACP discussion.  Daughter agreeable to palliative care consult.  Understands they are not present here over the weekend, tentatively will be seen on Monday.  Palliative Care consult placed.        CKD (chronic kidney disease)  Creatine stable for now. BMP reviewed- noted Estimated Creatinine Clearance: 37.9 mL/min (based on SCr of 0.9 mg/dL). according to latest data. Based on current GFR, CKD stage is stage 2 - GFR 60-89.  Monitor UOP and serial BMP and adjust therapy as needed. Renally dose meds. Avoid nephrotoxic medications and procedures.    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 Morbid obesity complicates all aspects of disease management from diagnostic modalities to treatment. Weight loss encouraged and health benefits explained to patient.         PAD (peripheral artery disease)  -continue aspirin and ezetimibe      Chronic heart failure with preserved ejection fraction (HFpEF)  - stable diastolic CHF  - continue home carvedilol, valsartan, Bumex  - daily weights, strict I/Os  - tele  - keep K >4, Mg >2  - 2D echo noted; deferring repeat at this hospitalization as she currently appears compensated        Pacemaker-dependent  -in place      Complete heart block  -status post pacemaker      Essential hypertension  Chronic, controlled. Latest blood pressure and vitals reviewed-     Temp:  [98.1 °F (36.7 °C)-99.8 °F (37.7 °C)]   Pulse:  [77-94]   Resp:  [11-25]   BP: (119-198)/(56-91)   SpO2:  [95 %-100 %] .   Home meds for hypertension were reviewed and noted below.    Hypertension Medications               bumetanide (BUMEX) 0.5 MG Tab Take 2 tablets (1 mg total) by mouth once daily. May also take 2 tablets (1 mg total) daily as needed (> 2 pound weight gain in 24 hours, > 5 pound weight gain in a week, worsening leg swelling, shortness of breath).    carvediloL (COREG) 12.5 MG tablet Take 1 tablet (12.5 mg total) by mouth 2 (two) times daily with meals.    isosorbide mononitrate (IMDUR) 30 MG 24 hr tablet Take 1 tablet (30 mg total) by mouth once daily.    nitroGLYCERIN (NITROSTAT) 0.4 MG SL tablet Place 1 tablet (0.4 mg total) under the tongue every 5 (five) minutes as needed for Chest pain (angina).    valsartan (DIOVAN) 80 MG tablet Take 1 tablet (80 mg total) by mouth once daily.            While in the hospital, will manage blood pressure as follows; Continue home antihypertensive regimen   Will utilize p.r.n. blood pressure medication only if patient's blood pressure greater than 180/110 and she develops symptoms such as worsening chest pain or shortness of breath.      VTE Risk Mitigation (From admission, onward)           Ordered     IP VTE HIGH RISK PATIENT  Once         09/16/24 1137     Place sequential compression device  Until discontinued         09/16/24 1137                    Discharge Planning   ANA: 9/23/2024     Code Status: DNR   Is the patient medically ready for discharge?:     Reason for patient still in hospital (select all that apply): Patient trending condition, Laboratory test, and Treatment  Discharge Plan A: Home Health   Discharge Delays: None known at this time              Rudy Blake MD  Department of Hospital Medicine   [facility]

## 2024-09-25 NOTE — PLAN OF CARE
SALAZAR made aware of no dc today by MD. SALAZAR extended search and sent more HH referrals. SALAZAR will continue to follow pt throughout her transitions of care and assist with any dc needs.        09/25/24 1531   Post-Acute Status   Post-Acute Authorization Home Health   Post-Acute Placement Status Referrals Sent   Hospital Resources/Appts/Education Provided Appointments scheduled and added to AVS   Discharge Delays None known at this time   Discharge Plan   Discharge Plan A Home with family;Home Health

## 2024-09-25 NOTE — CHAPLAIN
09/25/24 0838   Clinical Encounter Type   Visit Type Initial Visit   Visit Category Pre-Op   Visited With Patient;Family;Health care provider   Number of Family Visited 1   Length of Visit 30 Minutes   Continue Visiting Yes   Referral From Physician   Patient Spiritual Encounters   Care Provided Prayer support;Compassionate presence   Patient Coping Accepting   Comments - Patient CH received message from provider stating pt's daughter was requesting a blessing for pt prior to procedure this morning. CH visited patient and prayed with her, using  Jyoti ([phone]). CH then reached out to Pt's daughter, as requested, and updated her of her mother receiving a blessing prior to procedure. CH will continue following patient.

## 2024-09-25 NOTE — ASSESSMENT & PLAN NOTE
Chronic, controlled. Latest blood pressure and vitals reviewed-     Temp:  [98.1 °F (36.7 °C)-99.8 °F (37.7 °C)]   Pulse:  [77-94]   Resp:  [11-25]   BP: (119-198)/(56-91)   SpO2:  [95 %-100 %] .   Home meds for hypertension were reviewed and noted below.   Hypertension Medications               bumetanide (BUMEX) 0.5 MG Tab Take 2 tablets (1 mg total) by mouth once daily. May also take 2 tablets (1 mg total) daily as needed (> 2 pound weight gain in 24 hours, > 5 pound weight gain in a week, worsening leg swelling, shortness of breath).    carvediloL (COREG) 12.5 MG tablet Take 1 tablet (12.5 mg total) by mouth 2 (two) times daily with meals.    isosorbide mononitrate (IMDUR) 30 MG 24 hr tablet Take 1 tablet (30 mg total) by mouth once daily.    nitroGLYCERIN (NITROSTAT) 0.4 MG SL tablet Place 1 tablet (0.4 mg total) under the tongue every 5 (five) minutes as needed for Chest pain (angina).    valsartan (DIOVAN) 80 MG tablet Take 1 tablet (80 mg total) by mouth once daily.            While in the hospital, will manage blood pressure as follows; Continue home antihypertensive regimen   Will utilize p.r.n. blood pressure medication only if patient's blood pressure greater than 180/110 and she develops symptoms such as worsening chest pain or shortness of breath.

## 2024-09-25 NOTE — PLAN OF CARE
AAOx4; POC reviewed & verbalizes understanding.  Admit DX: diverticulitis w/ abscess  Afebrile. No acute events on shift. No deficits noted  IV access & IVF: L arm midline, saline flushed & locked  BM: 9/25/24  : external catheter in place  Appetite: clear liquid diet, tolerating well.  14  Fr drain placed by IR to L upper buttock. Serosanguineous drainage, dressing dry, clean & intact. 10 cc NS flush q shift  Bed alarm set; fall precautions maintained.   Bed locked in lowest position, side rails up x2, call light within reach, environment clear. Encouraged pt to call nurse with any concerns.  Safety measures maintained

## 2024-09-25 NOTE — ASSESSMENT & PLAN NOTE
Ms. Perez is a 89 y.o. female  with evidence of diverticulitis    - Patient seen and examined  - Labs and imaging reviewed  -CT performed 9/23 with increase in size of abscess   -Repeat CT abdomen, pelvis yesterday with enlarging abscess, are planning for drain placement today  - No plans for surgical intervention, patient with complicated medical history and high risk for complications if she were to undergo general anesthesia  - Agree with antibiotics  - Diet:  Okay for diet, NPO for now pending possible IR  drain placement today  - general surgery will follow along

## 2024-09-26 PROCEDURE — 63600175 PHARM REV CODE 636 W HCPCS: Performed by: HOSPITALIST

## 2024-09-26 PROCEDURE — 97535 SELF CARE MNGMENT TRAINING: CPT | Mod: CO

## 2024-09-26 PROCEDURE — 25000003 PHARM REV CODE 250: Performed by: STUDENT IN AN ORGANIZED HEALTH CARE EDUCATION/TRAINING PROGRAM

## 2024-09-26 PROCEDURE — 63600175 PHARM REV CODE 636 W HCPCS: Mod: JZ,JG

## 2024-09-26 PROCEDURE — 97530 THERAPEUTIC ACTIVITIES: CPT

## 2024-09-26 PROCEDURE — A4216 STERILE WATER/SALINE, 10 ML: HCPCS | Performed by: PHYSICIAN ASSISTANT

## 2024-09-26 PROCEDURE — 36410 VNPNXR 3YR/> PHY/QHP DX/THER: CPT

## 2024-09-26 PROCEDURE — C1751 CATH, INF, PER/CENT/MIDLINE: HCPCS

## 2024-09-26 PROCEDURE — 99232 SBSQ HOSP IP/OBS MODERATE 35: CPT | Mod: ,,, | Performed by: STUDENT IN AN ORGANIZED HEALTH CARE EDUCATION/TRAINING PROGRAM

## 2024-09-26 PROCEDURE — 99232 SBSQ HOSP IP/OBS MODERATE 35: CPT | Mod: NSCH,GC,, | Performed by: PHYSICIAN ASSISTANT

## 2024-09-26 PROCEDURE — 97530 THERAPEUTIC ACTIVITIES: CPT | Mod: CO

## 2024-09-26 PROCEDURE — 27000221 HC OXYGEN, UP TO 24 HOURS

## 2024-09-26 PROCEDURE — 94761 N-INVAS EAR/PLS OXIMETRY MLT: CPT

## 2024-09-26 PROCEDURE — 25000003 PHARM REV CODE 250: Performed by: HOSPITALIST

## 2024-09-26 PROCEDURE — 97110 THERAPEUTIC EXERCISES: CPT

## 2024-09-26 PROCEDURE — 94799 UNLISTED PULMONARY SVC/PX: CPT

## 2024-09-26 PROCEDURE — 11000001 HC ACUTE MED/SURG PRIVATE ROOM

## 2024-09-26 PROCEDURE — 99900035 HC TECH TIME PER 15 MIN (STAT)

## 2024-09-26 PROCEDURE — A4216 STERILE WATER/SALINE, 10 ML: HCPCS | Performed by: STUDENT IN AN ORGANIZED HEALTH CARE EDUCATION/TRAINING PROGRAM

## 2024-09-26 PROCEDURE — A4216 STERILE WATER/SALINE, 10 ML: HCPCS | Performed by: HOSPITALIST

## 2024-09-26 PROCEDURE — 25000003 PHARM REV CODE 250: Performed by: PHYSICIAN ASSISTANT

## 2024-09-26 RX ORDER — SODIUM CHLORIDE 0.9 % (FLUSH) 0.9 %
10 SYRINGE (ML) INJECTION 2 TIMES DAILY
Status: DISCONTINUED | OUTPATIENT
Start: 2024-09-26 | End: 2024-09-27 | Stop reason: HOSPADM

## 2024-09-26 RX ORDER — CIPROFLOXACIN 2 MG/ML
400 INJECTION, SOLUTION INTRAVENOUS
Status: DISCONTINUED | OUTPATIENT
Start: 2024-09-26 | End: 2024-09-27 | Stop reason: HOSPADM

## 2024-09-26 RX ADMIN — CARVEDILOL 6.25 MG: 6.25 TABLET, FILM COATED ORAL at 09:09

## 2024-09-26 RX ADMIN — Medication 10 ML: at 10:09

## 2024-09-26 RX ADMIN — METRONIDAZOLE 500 MG: 5 INJECTION, SOLUTION INTRAVENOUS at 03:09

## 2024-09-26 RX ADMIN — VALSARTAN 40 MG: 40 TABLET, FILM COATED ORAL at 09:09

## 2024-09-26 RX ADMIN — EZETIMIBE 10 MG: 10 TABLET ORAL at 09:09

## 2024-09-26 RX ADMIN — CARVEDILOL 6.25 MG: 6.25 TABLET, FILM COATED ORAL at 05:09

## 2024-09-26 RX ADMIN — SODIUM CHLORIDE, PRESERVATIVE FREE 10 ML: 5 INJECTION INTRAVENOUS at 05:09

## 2024-09-26 RX ADMIN — ACETAMINOPHEN 650 MG: 325 TABLET ORAL at 12:09

## 2024-09-26 RX ADMIN — METRONIDAZOLE 500 MG: 5 INJECTION, SOLUTION INTRAVENOUS at 05:09

## 2024-09-26 RX ADMIN — Medication 10 ML: at 02:09

## 2024-09-26 RX ADMIN — CIPROFLOXACIN 400 MG: 2 INJECTION, SOLUTION INTRAVENOUS at 03:09

## 2024-09-26 RX ADMIN — ISOSORBIDE MONONITRATE 30 MG: 30 TABLET, EXTENDED RELEASE ORAL at 09:09

## 2024-09-26 RX ADMIN — METRONIDAZOLE 500 MG: 5 INJECTION, SOLUTION INTRAVENOUS at 10:09

## 2024-09-26 RX ADMIN — BUMETANIDE 2 MG: 1 TABLET ORAL at 09:09

## 2024-09-26 NOTE — ASSESSMENT & PLAN NOTE
15-Sep-2023 16:13 Chronic, controlled. Latest blood pressure and vitals reviewed-     Temp:  [98 °F (36.7 °C)-99.3 °F (37.4 °C)]   Pulse:  [75-88]   Resp:  [18-20]   BP: ()/(49-62)   SpO2:  [95 %-96 %] .   Home meds for hypertension were reviewed and noted below.   Hypertension Medications               bumetanide (BUMEX) 0.5 MG Tab Take 2 tablets (1 mg total) by mouth once daily. May also take 2 tablets (1 mg total) daily as needed (> 2 pound weight gain in 24 hours, > 5 pound weight gain in a week, worsening leg swelling, shortness of breath).    carvediloL (COREG) 12.5 MG tablet Take 1 tablet (12.5 mg total) by mouth 2 (two) times daily with meals.    isosorbide mononitrate (IMDUR) 30 MG 24 hr tablet Take 1 tablet (30 mg total) by mouth once daily.    nitroGLYCERIN (NITROSTAT) 0.4 MG SL tablet Place 1 tablet (0.4 mg total) under the tongue every 5 (five) minutes as needed for Chest pain (angina).    valsartan (DIOVAN) 80 MG tablet Take 1 tablet (80 mg total) by mouth once daily.            While in the hospital, will manage blood pressure as follows; Continue home antihypertensive regimen   Will utilize p.r.n. blood pressure medication only if patient's blood pressure greater than 180/110 and she develops symptoms such as worsening chest pain or shortness of breath.

## 2024-09-26 NOTE — PROGRESS NOTES
[facility]  General Surgery  Progress Note    Subjective:     History of Present Illness:  Ms. Perez is a 89 y.o. female with PMH of HFpEF, HTN, and CAD who presented with left flank pain and lower back pain that started earlier today. CT scan obtained showed evidence of diverticulitis. General surgery was consulted for evaluation. Patient takes DAPT at home. PSH includes hysterectomy and cholecystectomy. Patient states that she has not had this happen before. She reports improvement in her pain since presentation to ED    Post-Op Info:  * No surgery found *         Interval History:   S/p IR drain placement yesterday. Doing well. Pain controlled.    Medications:  Continuous Infusions:  Scheduled Meds:   bumetanide  2 mg Oral Daily    carvediloL  6.25 mg Oral BID WM    ciprofloxacin  400 mg Intravenous Q24H    ezetimibe  10 mg Oral Daily    isosorbide mononitrate  30 mg Oral Daily    metroNIDAZOLE IV (PEDS and ADULTS)  500 mg Intravenous Q8H    sodium chloride 0.9%  10 mL Intravenous Q8H    sodium chloride 0.9%  10 mL Intravenous Q6H    valsartan  40 mg Oral Daily     PRN Meds:  Current Facility-Administered Medications:     acetaminophen, 650 mg, Oral, Q4H PRN    dextrose 10%, 12.5 g, Intravenous, PRN    dextrose 10%, 25 g, Intravenous, PRN    glucagon (human recombinant), 1 mg, Intramuscular, PRN    glucose, 16 g, Oral, PRN    glucose, 24 g, Oral, PRN    HYDROcodone-acetaminophen, 1 tablet, Oral, Q4H PRN    melatonin, 6 mg, Oral, Nightly PRN    naloxone, 0.02 mg, Intravenous, PRN    ondansetron, 8 mg, Oral, Q8H PRN    ondansetron, 4 mg, Intravenous, Q8H PRN    oxyCODONE, 10 mg, Oral, Q4H PRN    pneumoc 20-paul conj-dip cr(PF), 0.5 mL, Intramuscular, vaccine x 1 dose    Flushing PICC/Midline Protocol, , , Until Discontinued **AND** sodium chloride 0.9%, 10 mL, Intravenous, Q6H **AND** sodium chloride 0.9%, 10 mL, Intravenous, PRN     Review of patient's allergies indicates:   Allergen Reactions    Codeine      Latex, natural rubber     Penicillins     Penicillin Rash     Objective:     Vital Signs (Most Recent):  Temp: 98 °F (36.7 °C) (09/26/24 0732)  Pulse: 81 (09/26/24 0732)  Resp: 20 (09/26/24 0732)  BP: (!) 127/58 (09/26/24 0732)  SpO2: 95 % (09/26/24 0732) Vital Signs (24h Range):  Temp:  [98 °F (36.7 °C)-99.3 °F (37.4 °C)] 98 °F (36.7 °C)  Pulse:  [80-94] 81  Resp:  [11-25] 20  SpO2:  [95 %-100 %] 95 %  BP: ()/(49-91) 127/58     Weight: 83.9 kg (184 lb 15.5 oz)  Body mass index is 37.36 kg/m².    Intake/Output - Last 3 Shifts         09/24 0700 09/25 0659 09/25 0700 09/26 0659 09/26 0700 09/27 0659    Urine (mL/kg/hr) 1500 (0.7) 1590 (0.8)     Stool 0      Total Output 1500 1590     Net -1500 -1590            Stool Occurrence 1 x               Physical Exam  Vitals and nursing note reviewed.   HENT:      Head: Normocephalic and atraumatic.   Cardiovascular:      Rate and Rhythm: Normal rate.   Pulmonary:      Effort: Pulmonary effort is normal.   Abdominal:      General: Abdomen is flat.      Palpations: Abdomen is soft.      Comments: Abdomen soft, tender to palpation in the left lower quadrant  Drain in place LLQ with ss output   Skin:     General: Skin is warm and dry.   Neurological:      General: No focal deficit present.      Mental Status: She is alert and oriented to person, place, and time.   Psychiatric:         Mood and Affect: Mood normal.         Behavior: Behavior normal.          Significant Labs:  I have reviewed all pertinent lab results within the past 24 hours.  CBC:   Recent Labs   Lab 09/24/24  0258   WBC 10.33   RBC 3.32*   HGB 9.5*   HCT 29.6*      MCV 89   MCH 28.6   MCHC 32.1     CMP:   Recent Labs   Lab 09/22/24  0300   GLU 84   CALCIUM 8.8      K 3.8   CO2 24      BUN 21   CREATININE 0.9       Significant Diagnostics:  I have reviewed all pertinent imaging results/findings within the past 24 hours.  Assessment/Plan:     * Diverticulitis of intestine with  abscess without bleeding  Ms. Perez is a 89 y.o. female  with evidence of diverticulitis    - Patient seen and examined  - Labs and imaging reviewed  -s/p IR drain placement, drain care  - Diet: regular  - okay for discharge from general surgery perspective, will go home with drain and follow up in clinic in 1 week for drain removal   - Abx per primary         Dez Haro MD  General Surgery  [place] - Telemetry

## 2024-09-26 NOTE — SUBJECTIVE & OBJECTIVE
Interval History:   S/p IR drain placement yesterday. Doing well. Pain controlled.    Medications:  Continuous Infusions:  Scheduled Meds:   bumetanide  2 mg Oral Daily    carvediloL  6.25 mg Oral BID WM    ciprofloxacin  400 mg Intravenous Q24H    ezetimibe  10 mg Oral Daily    isosorbide mononitrate  30 mg Oral Daily    metroNIDAZOLE IV (PEDS and ADULTS)  500 mg Intravenous Q8H    sodium chloride 0.9%  10 mL Intravenous Q8H    sodium chloride 0.9%  10 mL Intravenous Q6H    valsartan  40 mg Oral Daily     PRN Meds:  Current Facility-Administered Medications:     acetaminophen, 650 mg, Oral, Q4H PRN    dextrose 10%, 12.5 g, Intravenous, PRN    dextrose 10%, 25 g, Intravenous, PRN    glucagon (human recombinant), 1 mg, Intramuscular, PRN    glucose, 16 g, Oral, PRN    glucose, 24 g, Oral, PRN    HYDROcodone-acetaminophen, 1 tablet, Oral, Q4H PRN    melatonin, 6 mg, Oral, Nightly PRN    naloxone, 0.02 mg, Intravenous, PRN    ondansetron, 8 mg, Oral, Q8H PRN    ondansetron, 4 mg, Intravenous, Q8H PRN    oxyCODONE, 10 mg, Oral, Q4H PRN    pneumoc 20-paul conj-dip cr(PF), 0.5 mL, Intramuscular, vaccine x 1 dose    Flushing PICC/Midline Protocol, , , Until Discontinued **AND** sodium chloride 0.9%, 10 mL, Intravenous, Q6H **AND** sodium chloride 0.9%, 10 mL, Intravenous, PRN     Review of patient's allergies indicates:   Allergen Reactions    Codeine     Latex, natural rubber     Penicillins     Penicillin Rash     Objective:     Vital Signs (Most Recent):  Temp: 98 °F (36.7 °C) (09/26/24 0732)  Pulse: 81 (09/26/24 0732)  Resp: 20 (09/26/24 0732)  BP: (!) 127/58 (09/26/24 0732)  SpO2: 95 % (09/26/24 0732) Vital Signs (24h Range):  Temp:  [98 °F (36.7 °C)-99.3 °F (37.4 °C)] 98 °F (36.7 °C)  Pulse:  [80-94] 81  Resp:  [11-25] 20  SpO2:  [95 %-100 %] 95 %  BP: ()/(49-91) 127/58     Weight: 83.9 kg (184 lb 15.5 oz)  Body mass index is 37.36 kg/m².    Intake/Output - Last 3 Shifts         09/24 0700  09/25 0659 09/25  0700  09/26 0659 09/26 0700  09/27 0659    Urine (mL/kg/hr) 1500 (0.7) 1590 (0.8)     Stool 0      Total Output 1500 1590     Net -1500 -1590            Stool Occurrence 1 x               Physical Exam  Vitals and nursing note reviewed.   HENT:      Head: Normocephalic and atraumatic.   Cardiovascular:      Rate and Rhythm: Normal rate.   Pulmonary:      Effort: Pulmonary effort is normal.   Abdominal:      General: Abdomen is flat.      Palpations: Abdomen is soft.      Comments: Abdomen soft, tender to palpation in the left lower quadrant  Drain in place LLQ with ss output   Skin:     General: Skin is warm and dry.   Neurological:      General: No focal deficit present.      Mental Status: She is alert and oriented to person, place, and time.   Psychiatric:         Mood and Affect: Mood normal.         Behavior: Behavior normal.          Significant Labs:  I have reviewed all pertinent lab results within the past 24 hours.  CBC:   Recent Labs   Lab 09/24/24  0258   WBC 10.33   RBC 3.32*   HGB 9.5*   HCT 29.6*      MCV 89   MCH 28.6   MCHC 32.1     CMP:   Recent Labs   Lab 09/22/24  0300   GLU 84   CALCIUM 8.8      K 3.8   CO2 24      BUN 21   CREATININE 0.9       Significant Diagnostics:  I have reviewed all pertinent imaging results/findings within the past 24 hours.

## 2024-09-26 NOTE — ASSESSMENT & PLAN NOTE
Ms. Perez is a 89 y.o. female  with evidence of diverticulitis    - Patient seen and examined  - Labs and imaging reviewed  -s/p IR drain placement, drain care  - Diet: regular  - okay for discharge from general surgery perspective, will go home with drain and follow up in clinic in 1 week for drain removal   - Abx per primary

## 2024-09-26 NOTE — PT/OT/SLP PROGRESS
Occupational Therapy   Treatment    Name: Marycruz Perez  MRN: [id number]  Admitting Diagnosis:  Diverticulitis of intestine with abscess without bleeding       Recommendations:     Discharge Recommendations: Moderate Intensity Therapy  Discharge Equipment Recommendations:  wheelchair, hospital bed, lift device  Barriers to discharge:  Decreased caregiver support, Other (Comment) (Increased level of assistance)    Assessment:     Marycruz Perez is a 89 y.o. female with a medical diagnosis of Diverticulitis of intestine with abscess without bleeding.  Performance deficits affecting function are weakness, impaired endurance, impaired self care skills, impaired functional mobility, gait instability, impaired balance, decreased upper extremity function, decreased lower extremity function, pain, decreased ROM, impaired skin, edema.    Pt found in bed, agreeable to therapy.  Pt with fair tolerance of therapy. Continue OT services to address functional goals, progressing as able.      Rehab Prognosis:  Good; patient would benefit from acute skilled OT services to address these deficits and reach maximum level of function.       Plan:     Patient to be seen 3 x/week to address the above listed problems via self-care/home management, therapeutic activities, therapeutic exercises  Plan of Care Expires: 10/18/24  Plan of Care Reviewed with: patient, daughter    Subjective     Chief Complaint: weakness   Pain/Comfort:  Pain Rating 1:  (discomfort at drain site-reposition)    Objective:     Communicated with: nurse prior to session.  Patient found HOB elevated with bed alarm, peripheral IV, telemetry, PureWick, oxygen upon OT entry to room.    General Precautions: Standard, fall    Orthopedic Precautions:N/A  Braces: N/A  Respiratory Status: Nasal cannula, flow 2 L/min     Occupational Performance:     Bed Mobility:    Patient completed Rolling/Turning to Right with maximal assistance and 2 persons  Patient completed  Scooting/Bridging with dependent and 2 persons, supine to HOB   Patient completed Supine to Sit with maximal assistance and 2 persons  Patient completed Sit to Supine with maximal assistance and 2 persons     Functional Mobility/Transfers:  Deferred   Functional Mobility: deferred     Activities of Daily Living:  Grooming: moderate assistance to comb hair and clean dentures while seated EOB with SBA.   Lower Body Dressing: dependence don/dof socks       AMPAC 6 Click ADL: 11    Treatment & Education:  Pt sat EOB with SBA for ~15 min.   Pt requires Max A x 2 to scoot seated to EOB and laterally to HOB.   Pt positioned on R side, off of drain, for comfort.     Patient left right sidelying with all lines intact, call button in reach, bed alarm on, nurse notified, and family present    GOALS:   Multidisciplinary Problems       Occupational Therapy Goals          Problem: Occupational Therapy    Goal Priority Disciplines Outcome Interventions   Occupational Therapy Goal     OT, PT/OT Progressing    Description: Goals to be met by: 10/18/2024     Patient will increase functional independence with ADLs by performing:    Feeding with Supervision with upright sitting posture to reduce the risk of aspiration.  UE Dressing with Supervision.  Grooming while seated at sink with Supervision.  Toileting from bedside commode with Moderate Assistance for hygiene and clothing management.   Sitting EOB x5 minutes with supervision without adverse response.   Toilet transfer to bedside commode with Minimal Assistance.  Pt and family 100% reciprocation of DME recommendations and energy conservation techniques.                          Time Tracking:     OT Date of Treatment: 09/26/24  OT Start Time: 0906  OT Stop Time: 0926  OT Total Time (min): 20 min    Billable Minutes:Self Care/Home Management 8  Therapeutic Activity 12               9/26/2024

## 2024-09-26 NOTE — SUBJECTIVE & OBJECTIVE
Interval History:   No acute events  Family at bedside  Tolerating liquid diet. No n/v  Pt is eager to go home  Reports lower abdominal pain well controlled  No other complaints    Review of Systems   Constitutional:  Negative for chills, fatigue and fever.   Respiratory:  Negative for cough and shortness of breath.    Cardiovascular:  Negative for chest pain and palpitations.   Gastrointestinal:  Negative for abdominal distention, abdominal pain, nausea and vomiting.   Neurological:  Negative for dizziness and headaches.   Psychiatric/Behavioral:  Negative for agitation and confusion.      Objective:     Vital Signs (Most Recent):  Temp: 98.1 °F (36.7 °C) (09/26/24 1131)  Pulse: 75 (09/26/24 1221)  Resp: 20 (09/26/24 1131)  BP: 129/62 (09/26/24 1131)  SpO2: 96 % (09/26/24 1131) Vital Signs (24h Range):  Temp:  [98 °F (36.7 °C)-99.3 °F (37.4 °C)] 98.1 °F (36.7 °C)  Pulse:  [75-88] 75  Resp:  [18-20] 20  SpO2:  [95 %-96 %] 96 %  BP: ()/(49-62) 129/62     Weight: 83.9 kg (184 lb 15.5 oz)  Body mass index is 37.36 kg/m².    Intake/Output Summary (Last 24 hours) at 9/26/2024 1259  Last data filed at 9/26/2024 0028  Gross per 24 hour   Intake --   Output 1590 ml   Net -1590 ml         Physical Exam  Constitutional:       General: She is not in acute distress.     Appearance: Normal appearance. She is not toxic-appearing.   HENT:      Mouth/Throat:      Mouth: Mucous membranes are moist.      Pharynx: Oropharynx is clear.   Eyes:      Extraocular Movements: Extraocular movements intact.      Conjunctiva/sclera: Conjunctivae normal.   Cardiovascular:      Rate and Rhythm: Normal rate.   Pulmonary:      Effort: Pulmonary effort is normal. No respiratory distress.   Abdominal:      General: There is no distension.      Tenderness: There is no abdominal tenderness. There is no guarding.      Comments: Drain in place with serosanguinous output   Musculoskeletal:         General: Normal range of motion.      Right lower  leg: Edema present.      Left lower leg: Edema present.   Skin:     General: Skin is warm and dry.   Neurological:      General: No focal deficit present.      Mental Status: She is alert and oriented to person, place, and time.   Psychiatric:         Mood and Affect: Mood normal.         Behavior: Behavior normal.             Significant Labs: All pertinent labs within the past 24 hours have been reviewed.    Significant Imaging: I have reviewed all pertinent imaging results/findings within the past 24 hours.

## 2024-09-26 NOTE — PLAN OF CARE
Problem: Physical Therapy  Goal: Physical Therapy Goal  Description: Goals to be met by: 10/20/24     Patient will increase functional independence with mobility by performin. Supine to sit with Moderate Assistance  2. Sit to supine with Moderate Assistance  3. Sit to stand transfer with Moderate Assistance with use of RW.   4. Bed to chair transfer with Maximum Assistance using Rolling Walker  5. Gait  x 10 feet with Maximum Assistance using Rolling Walker.     Outcome: Progressing     PT/OT co-session to safely progress pt's functional mobility. Pt participates in bed mobility and seated balance with BLE there-ex performed. Therapy will continue to progress pt as able.

## 2024-09-26 NOTE — PROGRESS NOTES
Nurse reporting possible risk for pressure related device injury due to location of IR drain to L buttock- applied mepilex foam dressings under stop cock of drain and used foam cut out pillow to offload the drain location.  Applied Triad ointment to blanchable redness areas over L buttock and wedged pt to R side laying position.  Notified nurse to reposition pt and to apply waffle overlay to assisted with pressure injury prevention interventions.  At this time pt has no pressure or moisture related pressure injuries.

## 2024-09-26 NOTE — PROGRESS NOTES
[facility] Medicine  Progress Note    Patient Name: Marycruz Perez  MRN: [id number]  Patient Class: IP- Inpatient   Admission Date: 9/16/2024  Length of Stay: 10 days  Attending Physician: Rudy Blake MD  Primary Care Provider: Melchor Menchaca -        Subjective:     Principal Problem:Diverticulitis of intestine with abscess without bleeding        HPI:  Ms. Perez is a 89yo woman with HFpEF, CHB s/p PPM, HTN, and CAD who presents with left flank pain and lower back pain x4 hours. She reports that she has developed left-sided flank pain.  Has had a few days of constipation where she was passing small pebble like bowel movements prior to this.  Denies any fevers or chills.  No bleeding symptoms.  Does have history of diverticulitis in the past.    Overview/Hospital Course:  No notes on file    Interval History:   No acute events  Family at bedside  Tolerating liquid diet. No n/v  Pt is eager to go home  Reports lower abdominal pain well controlled  No other complaints    Review of Systems   Constitutional:  Negative for chills, fatigue and fever.   Respiratory:  Negative for cough and shortness of breath.    Cardiovascular:  Negative for chest pain and palpitations.   Gastrointestinal:  Negative for abdominal distention, abdominal pain, nausea and vomiting.   Neurological:  Negative for dizziness and headaches.   Psychiatric/Behavioral:  Negative for agitation and confusion.      Objective:     Vital Signs (Most Recent):  Temp: 98.1 °F (36.7 °C) (09/26/24 1131)  Pulse: 75 (09/26/24 1221)  Resp: 20 (09/26/24 1131)  BP: 129/62 (09/26/24 1131)  SpO2: 96 % (09/26/24 1131) Vital Signs (24h Range):  Temp:  [98 °F (36.7 °C)-99.3 °F (37.4 °C)] 98.1 °F (36.7 °C)  Pulse:  [75-88] 75  Resp:  [18-20] 20  SpO2:  [95 %-96 %] 96 %  BP: ()/(49-62) 129/62     Weight: 83.9 kg (184 lb 15.5 oz)  Body mass index is 37.36 kg/m².    Intake/Output Summary (Last 24 hours) at 9/26/2024 1259  Last data filed at  9/26/2024 0028  Gross per 24 hour   Intake --   Output 1590 ml   Net -1590 ml         Physical Exam  Constitutional:       General: She is not in acute distress.     Appearance: Normal appearance. She is not toxic-appearing.   HENT:      Mouth/Throat:      Mouth: Mucous membranes are moist.      Pharynx: Oropharynx is clear.   Eyes:      Extraocular Movements: Extraocular movements intact.      Conjunctiva/sclera: Conjunctivae normal.   Cardiovascular:      Rate and Rhythm: Normal rate.   Pulmonary:      Effort: Pulmonary effort is normal. No respiratory distress.   Abdominal:      General: There is no distension.      Tenderness: There is no abdominal tenderness. There is no guarding.      Comments: Drain in place with serosanguinous output   Musculoskeletal:         General: Normal range of motion.      Right lower leg: Edema present.      Left lower leg: Edema present.   Skin:     General: Skin is warm and dry.   Neurological:      General: No focal deficit present.      Mental Status: She is alert and oriented to person, place, and time.   Psychiatric:         Mood and Affect: Mood normal.         Behavior: Behavior normal.             Significant Labs: All pertinent labs within the past 24 hours have been reviewed.    Significant Imaging: I have reviewed all pertinent imaging results/findings within the past 24 hours.    Assessment/Plan:      * Diverticulitis of intestine with abscess without bleeding  Presents with left flank pain, found to have diverticulitis with abscess on CT imaging   Initial CT scan does not appear to have a good window for IR drainage  Repeat CT 9/23 - Persistent sigmoid diverticulitis, with an enlarging diverticular abscess.   9/25- S/p CT guided percutaneous drain placement/aspiration    Afebrile, not septic    Plan:  -IV ciprofloxacin and metronidazole; penicillin allergy  -f/u abscess culture  -GRAM NEGATIVE CHAITANYA , f/u finalized reults    Diverticulitis        Intra-abdominal  abscess        Goals of care, counseling/discussion  Advance Care Planning    Briefly discussed code status with patient at bedside.  At this time she would like to remain a full code.  She has 4 living children.  No healthcare power of .  Patient would like daughter yvrose Bautista to help her make medical decisions if necessary.  Spent 5-8 minutes for ACP discussion.      Again discussed goals of care on 09/21.  Spent 8-10 minutes on ACP discussion.  Daughter agreeable to palliative care consult.  Understands they are not present here over the weekend, tentatively will be seen on Monday.  Palliative Care consult placed.        CKD (chronic kidney disease)  Creatine stable for now. BMP reviewed- noted Estimated Creatinine Clearance: 37.9 mL/min (based on SCr of 0.9 mg/dL). according to latest data. Based on current GFR, CKD stage is stage 2 - GFR 60-89.  Monitor UOP and serial BMP and adjust therapy as needed. Renally dose meds. Avoid nephrotoxic medications and procedures.    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 Morbid obesity complicates all aspects of disease management from diagnostic modalities to treatment. Weight loss encouraged and health benefits explained to patient.         PAD (peripheral artery disease)  -continue aspirin and ezetimibe      Chronic heart failure with preserved ejection fraction (HFpEF)  - stable diastolic CHF  - continue home carvedilol, valsartan, Bumex  - daily weights, strict I/Os  - tele  - keep K >4, Mg >2  - 2D echo noted; deferring repeat at this hospitalization as she currently appears compensated        Pacemaker-dependent  -in place      Complete heart block  -status post pacemaker      Essential hypertension  Chronic, controlled. Latest blood pressure and vitals reviewed-     Temp:  [98 °F (36.7 °C)-99.3 °F (37.4 °C)]   Pulse:  [75-88]   Resp:  [18-20]   BP: ()/(49-62)   SpO2:  [95 %-96 %] .    Home meds for hypertension were reviewed and noted below.   Hypertension Medications               bumetanide (BUMEX) 0.5 MG Tab Take 2 tablets (1 mg total) by mouth once daily. May also take 2 tablets (1 mg total) daily as needed (> 2 pound weight gain in 24 hours, > 5 pound weight gain in a week, worsening leg swelling, shortness of breath).    carvediloL (COREG) 12.5 MG tablet Take 1 tablet (12.5 mg total) by mouth 2 (two) times daily with meals.    isosorbide mononitrate (IMDUR) 30 MG 24 hr tablet Take 1 tablet (30 mg total) by mouth once daily.    nitroGLYCERIN (NITROSTAT) 0.4 MG SL tablet Place 1 tablet (0.4 mg total) under the tongue every 5 (five) minutes as needed for Chest pain (angina).    valsartan (DIOVAN) 80 MG tablet Take 1 tablet (80 mg total) by mouth once daily.            While in the hospital, will manage blood pressure as follows; Continue home antihypertensive regimen   Will utilize p.r.n. blood pressure medication only if patient's blood pressure greater than 180/110 and she develops symptoms such as worsening chest pain or shortness of breath.      VTE Risk Mitigation (From admission, onward)           Ordered     IP VTE HIGH RISK PATIENT  Once         09/16/24 1137     Place sequential compression device  Until discontinued         09/16/24 1137                    Discharge Planning   ANA: 9/23/2024     Code Status: DNR   Is the patient medically ready for discharge?:     Reason for patient still in hospital (select all that apply): Patient trending condition, Laboratory test, and Treatment  Discharge Plan A: Home Health, Home with family   Discharge Delays: None known at this time              Rudy Blake MD  Department of Hospital Medicine   [facility]

## 2024-09-26 NOTE — PLAN OF CARE
Problem: Adult Inpatient Plan of Care  Goal: Plan of Care Review  Outcome: Progressing  Goal: Patient-Specific Goal (Individualized)  Outcome: Progressing  Goal: Absence of Hospital-Acquired Illness or Injury  Outcome: Progressing  Goal: Optimal Comfort and Wellbeing  Outcome: Progressing   POC reviewed with pt and family at bedside. A&Ox4. 2L NC. No acute changes. PRN medication administered for headache. Pt had difficulty with comfort throughout shift due to drain placement. Drain CDI - serosanguinous output. Safety checks performed.

## 2024-09-26 NOTE — PT/OT/SLP PROGRESS
Physical Therapy Treatment    Patient Name:  Marycruz Perez   MRN:  [id number]    Recommendations:     Discharge Recommendations: Moderate Intensity Therapy  Discharge Equipment Recommendations: wheelchair, hospital bed, lift device  Barriers to discharge:  requires 24/7 care/assistance    Assessment:     Marycruz Perez is a 89 y.o. female admitted with a medical diagnosis of Diverticulitis of intestine with abscess without bleeding.  She presents with the following impairments/functional limitations: weakness, impaired endurance, impaired self care skills, impaired functional mobility, gait instability, impaired balance, pain, decreased lower extremity function, decreased upper extremity function, decreased ROM, edema, impaired cardiopulmonary response to activity.    PT/OT co-session to safely progress pt's functional mobility. Pt participates in bed mobility and seated balance with BLE there-ex performed. Therapy will continue to progress pt as able.     Rehab Prognosis: Good; patient would benefit from acute skilled PT services to address these deficits and reach maximum level of function.    Recent Surgery: * No surgery found *      Plan:     During this hospitalization, patient to be seen 3 x/week to address the identified rehab impairments via gait training, therapeutic activities, therapeutic exercises, neuromuscular re-education, wheelchair management/training and progress toward the following goals:    Plan of Care Expires:  10/20/24    Subjective     Chief Complaint: discomfort to drain site with bed mobility  Patient/Family Comments/goals: not stated  Pain/Comfort:  Pain Rating 1:  (no pain at rest; discomfort at drain site with mobility)  Pain Addressed 1: Reposition, Cessation of Activity, Nurse notified  Pain Rating Post-Intervention 1:  (not rated)      Objective:     Communicated with Nurse prior to session.  Patient found HOB elevated with bed alarm, telemetry, PureWick, oxygen (drain) upon PT entry  to room.     General Precautions: Standard, fall  Orthopedic Precautions: N/A  Braces: N/A  Respiratory Status: Nasal cannula, flow 2 L/min     Functional Mobility:  Bed Mobility:     Rolling Right: maximal assistance and of 2 persons; with use of bed rails  Scooting: maximal assistance and of 2 persons; to scoot forward to EOB and laterally along bedside  Supine to Sit: maximal assistance and of 2 persons  Sit to Supine: maximal assistance and of 2 persons      AM-PAC 6 CLICK MOBILITY  Turning over in bed (including adjusting bedclothes, sheets and blankets)?: 2  Sitting down on and standing up from a chair with arms (e.g., wheelchair, bedside commode, etc.): 2  Moving from lying on back to sitting on the side of the bed?: 2  Moving to and from a bed to a chair (including a wheelchair)?: 1  Need to walk in hospital room?: 1  Climbing 3-5 steps with a railing?: 1  Basic Mobility Total Score: 9       Treatment & Education:  Pt requires verbal cues and assistance to improve body positioning/posture with mobility.  Pt able to maintain static sitting balance EOB with SBA ~15 minutes; performing BLE ankle pumps, knee extension kicks (CGA provided per PT) and hip flexion marches (MIN/MOD A per PT to increase ROM); x8-10 reps each with rest breaks as needed.     Patient left right sidelying with all lines intact, call button in reach, bed alarm on, Nurse notified, and daughter present.    GOALS:   Multidisciplinary Problems       Physical Therapy Goals          Problem: Physical Therapy    Goal Priority Disciplines Outcome Goal Variances Interventions   Physical Therapy Goal     PT, PT/OT Progressing     Description: Goals to be met by: 10/20/24     Patient will increase functional independence with mobility by performin. Supine to sit with Moderate Assistance  2. Sit to supine with Moderate Assistance  3. Sit to stand transfer with Moderate Assistance with use of RW.   4. Bed to chair transfer with Maximum  Assistance using Rolling Walker  5. Gait  x 10 feet with Maximum Assistance using Rolling Walker.                          Time Tracking:     PT Received On: 09/26/24  PT Start Time: 0906     PT Stop Time: 0926  PT Total Time (min): 20 min With OT    Billable Minutes: Therapeutic Activity 10 and Therapeutic Exercise 8    Treatment Type: Treatment  PT/PTA: PT     Number of PTA visits since last PT visit: 0     09/26/2024

## 2024-09-26 NOTE — ASSESSMENT & PLAN NOTE
Presents with left flank pain, found to have diverticulitis with abscess on CT imaging   Initial CT scan does not appear to have a good window for IR drainage  Repeat CT 9/23 - Persistent sigmoid diverticulitis, with an enlarging diverticular abscess.   9/25- S/p CT guided percutaneous drain placement/aspiration    Afebrile, not septic    Plan:  -IV ciprofloxacin and metronidazole; penicillin allergy  -f/u abscess culture  -GRAM NEGATIVE CHAITANYA , f/u finalized reults

## 2024-09-26 NOTE — PLAN OF CARE
SW met with pt and pts daughter at bedside. Pt daughter declined NH placement for pt. Pts daughter agreeable to HH upon dc. Pts daughter did not have HH preference. Pts daughter agreeable to dc with HH agency that has aid. [facility] willing to accept and has aid available. SW will continue to follow pt throughout her transitions of care and assist with any dc needs. No dc today per MD.       [facility] Phone: [phone] [address] [place] [phone] 9/25/2024 7:30 (CT)  9/25/2024 10:00 (CT)  9/25/2024 0:00 (CT)  9/25/2024 13:05 (CT)  9/25/2024 13:05 (CT)  Response: Yes, willing to accept patient  Waiting for you      09/26/24 1014   Post-Acute Status   Post-Acute Authorization Other   Hospital Resources/Appts/Education Provided Appointments scheduled and added to AVS   Discharge Delays None known at this time   Discharge Plan   Discharge Plan A Home Health;Home with family

## 2024-09-26 NOTE — PLAN OF CARE
Problem: Occupational Therapy  Goal: Occupational Therapy Goal  Description: Goals to be met by: 10/18/2024     Patient will increase functional independence with ADLs by performing:    Feeding with Supervision with upright sitting posture to reduce the risk of aspiration.  UE Dressing with Supervision.  Grooming while seated at sink with Supervision.  Toileting from bedside commode with Moderate Assistance for hygiene and clothing management.   Sitting EOB x5 minutes with supervision without adverse response.   Toilet transfer to bedside commode with Minimal Assistance.  Pt and family 100% reciprocation of DME recommendations and energy conservation techniques.     Outcome: Nyasia Perez is a 89 y.o. female with a medical diagnosis of Diverticulitis of intestine with abscess without bleeding.  Performance deficits affecting function are weakness, impaired endurance, impaired self care skills, impaired functional mobility, gait instability, impaired balance, decreased upper extremity function, decreased lower extremity function, pain, decreased ROM, impaired skin, edema.    Pt found in bed, agreeable to therapy.  Pt with fair tolerance of therapy. Continue OT services to address functional goals, progressing as able.

## 2024-09-26 NOTE — PROGRESS NOTES
Interventional Radiology   Progress Note      SUBJECTIVE:     History of Present Illness:  Marycruz Perez is a 89 y.o. female s/p Percutaneous placement of a 14 [place] of [place] drainage catheter into diverticular abscess, yielding 40 mL of purulent fluid on 9/25.   Pt tolerated the procedure well.     Interval History:   no acute events overnight, no new complaints.  Daughter at bedside, pt tolerating drain    Review of Systems   Constitutional:  Negative for chills, fever, malaise/fatigue and weight loss.   Respiratory:  Negative for cough and shortness of breath.    Cardiovascular:  Negative for chest pain, palpitations and leg swelling.   Gastrointestinal:  Positive for abdominal pain (LLQ improved). Negative for diarrhea, nausea and vomiting.   Genitourinary:  Negative for dysuria and flank pain.   Musculoskeletal:  Negative for back pain and myalgias.   Neurological:  Negative for weakness and headaches.       Scheduled Meds:   bumetanide  2 mg Oral Daily    carvediloL  6.25 mg Oral BID WM    ciprofloxacin  400 mg Intravenous Q12H    ezetimibe  10 mg Oral Daily    isosorbide mononitrate  30 mg Oral Daily    metroNIDAZOLE IV (PEDS and ADULTS)  500 mg Intravenous Q8H    sodium chloride 0.9%  10 mL Intravenous Q8H    sodium chloride 0.9%  10 mL Intravenous Q6H    sodium chloride 0.9%  10 mL Other BID    valsartan  40 mg Oral Daily     Continuous Infusions:  PRN Meds:  Current Facility-Administered Medications:     acetaminophen, 650 mg, Oral, Q4H PRN    dextrose 10%, 12.5 g, Intravenous, PRN    dextrose 10%, 25 g, Intravenous, PRN    glucagon (human recombinant), 1 mg, Intramuscular, PRN    glucose, 16 g, Oral, PRN    glucose, 24 g, Oral, PRN    HYDROcodone-acetaminophen, 1 tablet, Oral, Q4H PRN    melatonin, 6 mg, Oral, Nightly PRN    naloxone, 0.02 mg, Intravenous, PRN    ondansetron, 8 mg, Oral, Q8H PRN    ondansetron, 4 mg, Intravenous, Q8H PRN    oxyCODONE, 10 mg, Oral, Q4H PRN    pneumoc 20-paul conj-dip cr(PF), 0.5  mL, Intramuscular, vaccine x 1 dose    Flushing PICC/Midline Protocol, , , Until Discontinued **AND** sodium chloride 0.9%, 10 mL, Intravenous, Q6H **AND** sodium chloride 0.9%, 10 mL, Intravenous, PRN    Review of patient's allergies indicates:   Allergen Reactions    Codeine     Latex, natural rubber     Penicillins     Penicillin Rash       Past Medical History:   Diagnosis Date    Acute exacerbation of CHF (congestive heart failure) 10/27/2020    NATHEN (acute kidney injury) 11/30/2023    Hypertension     Syncope and collapse      Past Surgical History:   Procedure Laterality Date    A-V CARDIAC PACEMAKER INSERTION N/A 2/22/2021    Procedure: INSERTION, CARDIAC PACEMAKER, DUAL CHAMBER;  Surgeon: Norman Foote MD;  Location: [facility] EP LAB;  Service: Cardiology;  Laterality: N/A;  AVB, PPM upgrade to Dual PPM (right sided), Bio, Venogram prior to draping, MAC, NC, 3 Prep    CARDIAC CATHETERIZATION      CARDIAC PACEMAKER PLACEMENT      CHOLECYSTECTOMY      CORONARY ANGIOPLASTY      HYSTERECTOMY      REVISION OF SKIN POCKET FOR PACEMAKER  2/22/2021    Procedure: Revision, Skin Pocket, For Cardiac Pacemaker;  Surgeon: Norman Foote MD;  Location: [facility] EP LAB;  Service: Cardiology;;     Family History   Problem Relation Name Age of Onset    Liver disease Mother       Social History     Tobacco Use    Smoking status: Never    Smokeless tobacco: Never   Substance Use Topics    Alcohol use: No    Drug use: No       OBJECTIVE:     Vital Signs (Most Recent)  Temp: 98.2 °F (36.8 °C) (09/26/24 1650)  Pulse: 81 (09/26/24 1708)  Resp: 18 (09/26/24 1650)  BP: (!) 121/58 (09/26/24 1650)  SpO2: 95 % (09/26/24 1650)    Physical Exam:  Physical Exam  Vitals and nursing note reviewed.   Constitutional:       Appearance: Normal appearance. She is obese.   HENT:      Head: Normocephalic and atraumatic.      Right Ear: External ear normal.      Left Ear: External ear normal.      Nose: Nose normal.   Eyes:      Extraocular Movements:  Extraocular movements intact.   Cardiovascular:      Rate and Rhythm: Normal rate.      Pulses: Normal pulses.   Pulmonary:      Effort: Pulmonary effort is normal.   Abdominal:      General: Abdomen is flat.      Comments: Drain with 30 cc of serosanguineous output   Musculoskeletal:      Cervical back: Normal range of motion and neck supple.   Skin:     General: Skin is warm and dry.   Neurological:      Mental Status: She is alert.   Psychiatric:         Mood and Affect: Mood normal.         Behavior: Behavior normal.         Laboratory  I have reviewed all pertinent lab results within the past 24 hours.  CBC:   Recent Labs   Lab 09/24/24  0258   WBC 10.33   RBC 3.32*   HGB 9.5*   HCT 29.6*      MCV 89   MCH 28.6   MCHC 32.1     CMP:   Recent Labs   Lab 09/22/24  0300   GLU 84   CALCIUM 8.8      K 3.8   CO2 24      BUN 21   CREATININE 0.9     Coagulation:   Recent Labs   Lab 09/25/24  0300   LABPROT 13.0*   INR 1.2     Microbiology Results (last 7 days)       Procedure Component Value Units Date/Time    Aerobic culture [[id number]]  (Abnormal) Collected: 09/25/24 1037    Order Status: Completed Specimen: Abscess Updated: 09/26/24 0930     Aerobic Bacterial Culture GRAM NEGATIVE CHAITANYA  Many  Identification and susceptibility pending      Culture, Anaerobe [[id number]] Collected: 09/25/24 1037    Order Status: Completed Specimen: Abscess from Abdominal Fluid Updated: 09/26/24 0752     Anaerobic Culture Culture in progress    Blood culture [[id number]] Collected: 09/16/24 1154    Order Status: Completed Specimen: Blood Updated: 09/21/24 2012     Blood Culture, Routine No growth after 5 days.    Narrative:      Collection has been rescheduled by [email or web address] at 09/16/2024 11:47 Reason:   Patient unavailable not along wall  Collection has been rescheduled by [email or web address] at 09/16/2024 11:47 Reason:   Patient unavailable not along wall    Blood culture [[id number]] Collected: 09/16/24 1153    Order Status: Completed  Specimen: Blood Updated: 09/21/24 2012     Blood Culture, Routine No growth after 5 days.    Narrative:      Collection has been rescheduled by [email or web address] at 09/16/2024 11:47 Reason:   Patient unavailable not along wall  Collection has been rescheduled by [email or web address] at 09/16/2024 11:47 Reason:   Patient unavailable not along wall            ASSESSMENT/PLAN:     Assessment:  89 y.o. female s/p Percutaneous placement of a 14 [place] drainage catheter into diverticular abscess, yielding 40 mL of purulent fluid on 9/25.       Plan:  Monitor drain output. Follow-up cultures. Flush catheter with 10 cc of normal saline twice a day. When output decreases to less than 10 cc in 24 hours, consider repeat imaging to evaluate for possible drainage catheter removal.Please contact with questions via [employer] secure chat or spectra link.    Leigh Bone PA-C  Interventional Radiology

## 2024-09-26 NOTE — PLAN OF CARE
Pt on documented O2. Pt getting 500mL on incentive spirometry. Pt encouraged to continue deep breathing and coughing. No apparent distress noted. Will continue to monitor.

## 2024-09-26 NOTE — PLAN OF CARE
AAOx4; POC reviewed & verbalizes understanding.  Admit DX: Diverticulitis w/ abscess  Afebrile. No acute events on shift. No deficits noted  IV access & IVF: MARTINE midline leaking, d/c'd...GREG midline placed  : external catheter in place, dark colored urine.   Appetite: adequate  Drain to L upper buttock, dressing dry & intact, draining serosanguineous very small amt. Flushed 10 cc NS  Bed alarm set; fall precautions maintained.   Bed locked in lowest position, side rails up x2, call light within reach, environment clear. Encouraged pt to call nurse with any concerns.  Safety measures maintained

## 2024-09-27 VITALS
OXYGEN SATURATION: 90 % | RESPIRATION RATE: 20 BRPM | DIASTOLIC BLOOD PRESSURE: 52 MMHG | HEIGHT: 59 IN | SYSTOLIC BLOOD PRESSURE: 114 MMHG | HEART RATE: 86 BPM | WEIGHT: 184.94 LBS | TEMPERATURE: 98 F | BODY MASS INDEX: 37.28 KG/M2

## 2024-09-27 LAB — BACTERIA SPEC AEROBE CULT: ABNORMAL

## 2024-09-27 PROCEDURE — 94761 N-INVAS EAR/PLS OXIMETRY MLT: CPT

## 2024-09-27 PROCEDURE — A4216 STERILE WATER/SALINE, 10 ML: HCPCS | Performed by: STUDENT IN AN ORGANIZED HEALTH CARE EDUCATION/TRAINING PROGRAM

## 2024-09-27 PROCEDURE — A4216 STERILE WATER/SALINE, 10 ML: HCPCS | Performed by: PHYSICIAN ASSISTANT

## 2024-09-27 PROCEDURE — 99900035 HC TECH TIME PER 15 MIN (STAT)

## 2024-09-27 PROCEDURE — 63600175 PHARM REV CODE 636 W HCPCS: Mod: JZ,JG

## 2024-09-27 PROCEDURE — 25000003 PHARM REV CODE 250: Performed by: HOSPITALIST

## 2024-09-27 PROCEDURE — 36410 VNPNXR 3YR/> PHY/QHP DX/THER: CPT

## 2024-09-27 PROCEDURE — 25000003 PHARM REV CODE 250: Performed by: PHYSICIAN ASSISTANT

## 2024-09-27 PROCEDURE — 99232 SBSQ HOSP IP/OBS MODERATE 35: CPT | Mod: ,,, | Performed by: STUDENT IN AN ORGANIZED HEALTH CARE EDUCATION/TRAINING PROGRAM

## 2024-09-27 PROCEDURE — A4216 STERILE WATER/SALINE, 10 ML: HCPCS | Performed by: HOSPITALIST

## 2024-09-27 PROCEDURE — 94799 UNLISTED PULMONARY SVC/PX: CPT

## 2024-09-27 PROCEDURE — 25000003 PHARM REV CODE 250: Performed by: STUDENT IN AN ORGANIZED HEALTH CARE EDUCATION/TRAINING PROGRAM

## 2024-09-27 PROCEDURE — 63600175 PHARM REV CODE 636 W HCPCS: Performed by: HOSPITALIST

## 2024-09-27 PROCEDURE — 27000221 HC OXYGEN, UP TO 24 HOURS

## 2024-09-27 RX ORDER — VALSARTAN 40 MG/1
40 TABLET ORAL DAILY
Qty: 90 TABLET | Refills: 3 | Status: SHIPPED | OUTPATIENT
Start: 2024-09-28 | End: 2025-09-28

## 2024-09-27 RX ORDER — METRONIDAZOLE 500 MG/1
500 TABLET ORAL 3 TIMES DAILY
Qty: 42 TABLET | Refills: 0 | Status: SHIPPED | OUTPATIENT
Start: 2024-09-27 | End: 2024-10-11

## 2024-09-27 RX ORDER — CIPROFLOXACIN 500 MG/1
500 TABLET ORAL 2 TIMES DAILY
Qty: 28 TABLET | Refills: 0 | Status: SHIPPED | OUTPATIENT
Start: 2024-09-27 | End: 2024-10-11

## 2024-09-27 RX ORDER — CARVEDILOL 6.25 MG/1
6.25 TABLET ORAL 2 TIMES DAILY WITH MEALS
Qty: 180 TABLET | Refills: 3 | Status: SHIPPED | OUTPATIENT
Start: 2024-09-27 | End: 2025-09-27

## 2024-09-27 RX ADMIN — CIPROFLOXACIN 400 MG: 2 INJECTION, SOLUTION INTRAVENOUS at 02:09

## 2024-09-27 RX ADMIN — BUMETANIDE 2 MG: 1 TABLET ORAL at 09:09

## 2024-09-27 RX ADMIN — SODIUM CHLORIDE, PRESERVATIVE FREE 10 ML: 5 INJECTION INTRAVENOUS at 12:09

## 2024-09-27 RX ADMIN — EZETIMIBE 10 MG: 10 TABLET ORAL at 09:09

## 2024-09-27 RX ADMIN — VALSARTAN 40 MG: 40 TABLET, FILM COATED ORAL at 09:09

## 2024-09-27 RX ADMIN — Medication 10 ML: at 06:09

## 2024-09-27 RX ADMIN — Medication 10 ML: at 09:09

## 2024-09-27 RX ADMIN — METRONIDAZOLE 500 MG: 5 INJECTION, SOLUTION INTRAVENOUS at 06:09

## 2024-09-27 RX ADMIN — ISOSORBIDE MONONITRATE 30 MG: 30 TABLET, EXTENDED RELEASE ORAL at 09:09

## 2024-09-27 RX ADMIN — CARVEDILOL 6.25 MG: 6.25 TABLET, FILM COATED ORAL at 09:09

## 2024-09-27 RX ADMIN — SODIUM CHLORIDE, PRESERVATIVE FREE 10 ML: 5 INJECTION INTRAVENOUS at 06:09

## 2024-09-27 NOTE — NURSING
Home Oxygen Evaluation    Date Performed: 2024    1) Patient's Home O2 Sat on room air, while at rest:93%        If O2 sats on room air at rest are 88% or below, patient qualifies. No additional testing needed. Document N/A in steps 2 and 3. If 89% or above, complete steps 2.      2) Patient's O2 Sat on room air while exercisin%        If O2 sats on room air while exercising remain 89% or above patient does not qualify, no further testing needed Document N/A in step 3. If O2 sats on room air while exercising are 88% or below, continue to step 3.      3) Patient's O2 Sat while exercising on O2: N/A         (Must show improvement from #2 for patients to qualify)    If O2 sats improve on oxygen, patient qualifies for portable oxygen. If not, the patient does not qualify.

## 2024-09-27 NOTE — PROGRESS NOTES
[facility] - [facility], [facility]  General Surgery  Progress Note    Subjective:     History of Present Illness:  Ms. Perez is a 89 y.o. female with PMH of HFpEF, HTN, and CAD who presented with left flank pain and lower back pain that started earlier today. CT scan obtained showed evidence of diverticulitis. General surgery was consulted for evaluation. Patient takes DAPT at home. PSH includes hysterectomy and cholecystectomy. Patient states that she has not had this happen before. She reports improvement in her pain since presentation to ED    Post-Op Info:  * No surgery found *         Interval History: No acute events overnight. Uncomfortable at the site of drain but otherwise doing well. Drain with serosanguinous output.     Medications:  Continuous Infusions:  Scheduled Meds:   bumetanide  2 mg Oral Daily    carvediloL  6.25 mg Oral BID WM    ciprofloxacin  400 mg Intravenous Q12H    ezetimibe  10 mg Oral Daily    isosorbide mononitrate  30 mg Oral Daily    metroNIDAZOLE IV (PEDS and ADULTS)  500 mg Intravenous Q8H    sodium chloride 0.9%  10 mL Intravenous Q8H    sodium chloride 0.9%  10 mL Intravenous Q6H    sodium chloride 0.9%  10 mL Other BID    valsartan  40 mg Oral Daily     PRN Meds:  Current Facility-Administered Medications:     acetaminophen, 650 mg, Oral, Q4H PRN    dextrose 10%, 12.5 g, Intravenous, PRN    dextrose 10%, 25 g, Intravenous, PRN    glucagon (human recombinant), 1 mg, Intramuscular, PRN    glucose, 16 g, Oral, PRN    glucose, 24 g, Oral, PRN    HYDROcodone-acetaminophen, 1 tablet, Oral, Q4H PRN    melatonin, 6 mg, Oral, Nightly PRN    naloxone, 0.02 mg, Intravenous, PRN    ondansetron, 8 mg, Oral, Q8H PRN    ondansetron, 4 mg, Intravenous, Q8H PRN    oxyCODONE, 10 mg, Oral, Q4H PRN    pneumoc 20-paul conj-dip cr(PF), 0.5 mL, Intramuscular, vaccine x 1 dose    Flushing PICC/Midline Protocol, , , Until Discontinued **AND** sodium chloride 0.9%, 10 mL, Intravenous, Q6H **AND** sodium chloride 0.9%, 10  mL, Intravenous, PRN     Review of patient's allergies indicates:   Allergen Reactions    Codeine     Latex, natural rubber     Penicillins     Penicillin Rash     Objective:     Vital Signs (Most Recent):  Temp: 98.2 °F (36.8 °C) (09/27/24 0438)  Pulse: 76 (09/27/24 0438)  Resp: 18 (09/27/24 0438)  BP: 130/60 (09/27/24 0438)  SpO2: 96 % (09/27/24 0438) Vital Signs (24h Range):  Temp:  [98 °F (36.7 °C)-98.5 °F (36.9 °C)] 98.2 °F (36.8 °C)  Pulse:  [75-81] 76  Resp:  [18-20] 18  SpO2:  [95 %-97 %] 96 %  BP: (111-135)/(53-62) 130/60     Weight: 83.9 kg (184 lb 15.5 oz)  Body mass index is 37.36 kg/m².    Intake/Output - Last 3 Shifts         09/25 0700  09/26 0659 09/26 0700  09/27 0659    Urine (mL/kg/hr) 1590 (0.8) 400 (0.2)    Drains  30    Total Output 1590 430    Net -1590 -430                   Physical Exam  Vitals and nursing note reviewed.   HENT:      Head: Normocephalic and atraumatic.   Cardiovascular:      Rate and Rhythm: Normal rate.   Pulmonary:      Effort: Pulmonary effort is normal.   Abdominal:      General: Abdomen is flat.      Palpations: Abdomen is soft.      Comments: Abdomen soft, tender to palpation in the left lower quadrant  Drain in place LLQ with ss output   Skin:     General: Skin is warm and dry.   Neurological:      General: No focal deficit present.      Mental Status: She is alert and oriented to person, place, and time.   Psychiatric:         Mood and Affect: Mood normal.         Behavior: Behavior normal.          Significant Labs:  I have reviewed all pertinent lab results within the past 24 hours.    Significant Diagnostics:  I have reviewed all pertinent imaging results/findings within the past 24 hours.  Assessment/Plan:     * Diverticulitis of intestine with abscess without bleeding  Ms. Perez is a 89 y.o. female  with evidence of diverticulitis    - Patient seen and examined  - Labs and imaging reviewed  -s/p IR drain placement, drain care  - Diet: regular  - okay for  discharge from general surgery perspective, will go home with drain and follow up in clinic in 1 week for drain removal   - Abx per primary         Liss Winn MD  General Surgery  [facility] - Telemetry

## 2024-09-27 NOTE — PLAN OF CARE
Problem: Adult Inpatient Plan of Care  Goal: Plan of Care Review  Outcome: Progressing  Goal: Patient-Specific Goal (Individualized)  Outcome: Progressing  Goal: Readiness for Transition of Care  Outcome: Progressing     Problem: Infection  Goal: Absence of Infection Signs and Symptoms  Outcome: Progressing     Problem: Skin Injury Risk Increased  Goal: Skin Health and Integrity  Outcome: Progressing     Problem: Intestinal Obstruction  Goal: Absence of Infection Signs and Symptoms  Outcome: Progressing  Goal: Optimize Nutrition Status  Outcome: Progressing  Goal: Optimal Pain Control and Function  Outcome: Progressing     Problem: Fall Injury Risk  Goal: Absence of Fall and Fall-Related Injury  Outcome: Progressing     Problem: Comorbidity Management  Goal: Blood Pressure in Desired Range  Outcome: Progressing     Problem: Coping Ineffective  Goal: Effective Coping  Outcome: Progressing

## 2024-09-27 NOTE — DISCHARGE SUMMARY
[facility] Medicine  Discharge Summary      Patient Name: Marycruz Perez  MRN: [id number]  RUSLAN: [id number]  Patient Class: IP- Inpatient  Admission Date: 9/16/2024  Hospital Length of Stay: 11 days  Discharge Date and Time: No discharge date for patient encounter.  Attending Physician: Rudy Blake MD   Discharging Provider: Rudy Blake MD  Primary Care Provider: Melchor Menchaca -    Primary Care Team: Networked reference to record PCT     HPI:   Ms. Perez is a 89yo woman with HFpEF, CHB s/p PPM, HTN, and CAD who presents with left flank pain and lower back pain x4 hours. She reports that she has developed left-sided flank pain.  Has had a few days of constipation where she was passing small pebble like bowel movements prior to this.  Denies any fevers or chills.  No bleeding symptoms.  Does have history of diverticulitis in the past.    * No surgery found *      Hospital Course:   The patient was admitted for management of diverticulitis with abscess. She underwent successful CT-guided percutaneous drainage for the abscess and was treated with IV ciprofloxacin and metronidazole, followed by a 14-day oral course after source control. Goals of care were discussed with the patient and her daughter, and a palliative care consult was placed. She was discharged in stable condition with plans for follow-up. Additionally, a manual wheelchair was prescribed to aid with mobility limitations.     Goals of Care Treatment Preferences:  Code Status: DNR    Health care agent: Lily Ventura (daughter)  Health care agent number: [phone]          What is most important right now is to focus on spending time at home, avoiding the hospital, remaining as independent as possible, symptom/pain control, improvement in condition but with limits to invasive therapies.  Accordingly, we have decided that the best plan to meet the patient's goals includes continuing with treatment.      SDOH Screening:  The  patient was screened for food insecurity, housing instability, transportation needs, utility difficulties, and interpersonal safety. The social determinant(s) of health identified as a concern this admission are:  Food insecurity        Social Determinants of Health with Concerns     Food Insecurity: Food Insecurity Present (9/17/2024)        Consults:   Consults (From admission, onward)          Status Ordering Provider     Inpatient consult to Midline team  Once        Provider:  (Not yet assigned)    Acknowledged SHASHI FIELD     Inpatient consult to Interventional Radiology  Once        Provider:  (Not yet assigned)    Completed PEPPER BISWAS     Inpatient consult to Midline team  Once        Provider:  (Not yet assigned)    Completed NOREEN COOPER     Inpatient consult to Palliative Care  Once        Provider:  (Not yet assigned)    Completed NOREEN COOPER     Inpatient consult to Interventional Radiology  Once        Provider:  (Not yet assigned)    Completed CY HSU     Inpatient consult to General Surgery  Once        Provider:  Dutch Crow MD    Completed ARRON CARRERA            Cardiac/Vascular  PAD (peripheral artery disease)  -continue aspirin and ezetimibe      Chronic heart failure with preserved ejection fraction (HFpEF)  - stable diastolic CHF  - continue home carvedilol, valsartan, Bumex  - daily weights, strict I/Os  - tele  - keep K >4, Mg >2  - 2D echo noted; deferring repeat at this hospitalization as she currently appears compensated        Pacemaker-dependent  -in place      Complete heart block  -status post pacemaker      Essential hypertension  Chronic, controlled. Latest blood pressure and vitals reviewed-     Temp:  [98.1 °F (36.7 °C)-98.5 °F (36.9 °C)]   Pulse:  [74-81]   Resp:  [18-20]   BP: (111-135)/(53-62)   SpO2:  [95 %-97 %] .   Home meds for hypertension were reviewed and noted below.   Hypertension Medications               bumetanide (BUMEX)  0.5 MG Tab Take 2 tablets (1 mg total) by mouth once daily. May also take 2 tablets (1 mg total) daily as needed (> 2 pound weight gain in 24 hours, > 5 pound weight gain in a week, worsening leg swelling, shortness of breath).    carvediloL (COREG) 12.5 MG tablet Take 1 tablet (12.5 mg total) by mouth 2 (two) times daily with meals.    isosorbide mononitrate (IMDUR) 30 MG 24 hr tablet Take 1 tablet (30 mg total) by mouth once daily.    nitroGLYCERIN (NITROSTAT) 0.4 MG SL tablet Place 1 tablet (0.4 mg total) under the tongue every 5 (five) minutes as needed for Chest pain (angina).    valsartan (DIOVAN) 80 MG tablet Take 1 tablet (80 mg total) by mouth once daily.            While in the hospital, will manage blood pressure as follows; Continue home antihypertensive regimen   Will utilize p.r.n. blood pressure medication only if patient's blood pressure greater than 180/110 and she develops symptoms such as worsening chest pain or shortness of breath.    Renal/  CKD (chronic kidney disease)  Creatine stable for now. BMP reviewed- noted Estimated Creatinine Clearance: 37.9 mL/min (based on SCr of 0.9 mg/dL). according to latest data. Based on current GFR, CKD stage is stage 2 - GFR 60-89.  Monitor UOP and serial BMP and adjust therapy as needed. Renally dose meds. Avoid nephrotoxic medications and procedures.    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 Morbid obesity complicates all aspects of disease management from diagnostic modalities to treatment. Weight loss encouraged and health benefits explained to patient.         GI  * Diverticulitis of intestine with abscess without bleeding  Presents with left flank pain, found to have diverticulitis with abscess on CT imaging   Initial CT scan does not appear to have a good window for IR drainage  Repeat CT 9/23 - Persistent sigmoid diverticulitis, with an enlarging diverticular abscess.    9/25- S/p CT guided percutaneous drain placement/aspiration    Afebrile, not septic    Plan:  -IV ciprofloxacin and metronidazole; penicillin allergy  -f/u abscess culture  -GRAM NEGATIVE CHAITANYA , pending finalized reults    She was discharged in stable conditon with 14 days of PO cipro and Flagyl and will follow up with General Surgery in 1 week    Diverticulitis        Intra-abdominal abscess        Other  Goals of care, counseling/discussion  Advance Care Planning    Briefly discussed code status with patient at bedside.  At this time she would like to remain a full code.  She has 4 living children.  No healthcare power of .  Patient would like daughter yvrose Bautista to help her make medical decisions if necessary.  Spent 5-8 minutes for ACP discussion.      Again discussed goals of care on 09/21.  Spent 8-10 minutes on ACP discussion.  Daughter agreeable to palliative care consult.  Understands they are not present here over the weekend, tentatively will be seen on Monday.  Palliative Care consult placed.    Wheelchair     __Ana Joel_________ has a mobility limitation that significantly impairs her ability to participate in one or more mobility related activities of daily living (MRADL's) such as toileting, feeding, dressing, grooming, and bathing in customary locations in the home. The mobility limitation cannot be sufficiently resolved by the use of a cane or walker.   The use of a manual wheelchair will significantly improve the patient's ability to participate in MRADLS and the patient will use it on regular basis in the home.  __Ana Joel_________ has expressed her willingness to use a manual wheelchair in the home.   She also has a caregiver who is available, willing, and able to provide assistance with the wheelchair when needed.             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    Why: A telehealth visit has been scheduled on 9/30/24 at  3:50pm with Dr. Kelly Dunn at the [facility].  Contact information:  [address] [address]  [phone]             [facility] Follow up.    Why: Scheduling Navigators will contact patient of future follow up appointments.             [facility] Follow up.    Why: Please review [facility] for future follow up appointments.                         Patient Instructions:      WHEELCHAIR FOR HOME USE     Order Specific Question Answer Comments   Hours in W/C per day: 4    Type of Wheelchair: Standard    Size(Width): 18"(STD adult)    Leg Support: STD footrests    Lap Belt: Velcro    Accessories: Anti-tippers    Cushion: Basic    Reclining Back No    Height: 4' 11" (1.499 m)    Weight: 83.9 kg (184 lb 15.5 oz)    Does patient have medical equipment at home? walker, rolling transport chair / transport chair / transport chair / transport chair   Does patient have medical equipment at home? rollator    Does patient have medical equipment at home? bedside commode    Does patient have medical equipment at home? " "shower chair    Length of need (1-99 months): 99    Please check all that apply: Caregiver is capable and willing to operate wheelchair safely.      PATIENT (BLANCA) LIFT FOR HOME USE     Order Specific Question Answer Comments   Height: 4' 11" (1.499 m)    Weight: 83.9 kg (184 lb 15.5 oz)    Does patient have medical equipment at home? walker, rolling transport chair / transport chair / transport chair / transport chair   Does patient have medical equipment at home? rollator    Does patient have medical equipment at home? bedside commode    Does patient have medical equipment at home? shower chair    Length of need (1-99 months): 99      Ambulatory referral/consult to [facility] Palliative Care   Standing Status: Future   Referral Priority: Routine Referral Type: Consultation   Requested Specialty: Palliative Medicine   Number of Visits Requested: 1       Significant Diagnostic Studies: Labs: All labs within the past 24 hours have been reviewed    Pending Diagnostic Studies:       None           Medications:  Reconciled Home Medications:      Medication List        START taking these medications      ciprofloxacin HCl 500 MG tablet  Commonly known as: CIPRO  Take 1 tablet (500 mg total) by mouth 2 (two) times daily. for 14 days     metroNIDAZOLE 500 MG tablet  Commonly known as: FLAGYL  Thomson 1 tableta (500 mg en total) por vía oral 3 (ana) veces al día por 14 días  (Take 1 tablet (500 mg total) by mouth 3 (three) times daily. for 14 days)            CHANGE how you take these medications      carvediloL 6.25 MG tablet  Commonly known as: COREG  Thomson 1 tableta (6.25 mg en total) por vía oral 2 (dos) veces al día con las comidas.  (Take 1 tablet (6.25 mg total) by mouth 2 (two) times daily with meals.)  What changed:   medication strength  how much to take     valsartan 40 MG tablet  Commonly known as: DIOVAN  Thomson bryson tableta (40 mg en total) por vía oral diariamente.  (Take 1 tablet (40 mg total) by mouth once " daily.)  Start taking on: September 28, 2024  What changed:   medication strength  how much to take  Another medication with the same name was removed. Continue taking this medication, and follow the directions you see here.            CONTINUE taking these medications      albuterol 90 mcg/actuation inhaler  Commonly known as: PROVENTIL/VENTOLIN HFA  Inhale 2 puffs into the lungs every 4 (four) hours as needed for Wheezing (cough or wheezing). Rescue     albuterol-ipratropium 2.5 mg-0.5 mg/3 mL nebulizer solution  Commonly known as: DUO-NEB  every 6 (six) hours as needed for Wheezing.     aspirin 81 MG Chew  Take 1 tablet (81 mg total) by mouth once daily.     bumetanide 0.5 MG Tab  Commonly known as: BUMEX  Take 2 tablets (1 mg total) by mouth once daily. May also take 2 tablets (1 mg total) daily as needed (> 2 pound weight gain in 24 hours, > 5 pound weight gain in a week, worsening leg swelling, shortness of breath).     diclofenac sodium 1 % Gel  Commonly known as: VOLTAREN  Apply 4 g topically 3 (three) times daily as needed.     ezetimibe 10 mg tablet  Commonly known as: ZETIA  Take 1 tablet (10 mg total) by mouth once daily.     famotidine 20 MG tablet  Commonly known as: PEPCID  Take 1 tablet (20 mg total) by mouth nightly as needed for Heartburn (heartburn).     isosorbide mononitrate 30 MG 24 hr tablet  Commonly known as: IMDUR  Bessie bryson tableta (30 mg en total) por vía oral diariamente.  (Take 1 tablet (30 mg total) by mouth once daily.)     meclizine 25 mg tablet  Commonly known as: ANTIVERT  Take 25 mg by mouth 3 (three) times daily as needed (vertigo).     nitroGLYCERIN 0.4 MG SL tablet  Commonly known as: NITROSTAT  Place 1 tablet (0.4 mg total) under the tongue every 5 (five) minutes as needed for Chest pain (angina).            STOP taking these medications      calcium carbonate 600 mg calcium (1,500 mg) Tab  Commonly known as: OS-BERE     clopidogreL 75 mg tablet  Commonly known as: PLAVIX      donepeziL 5 MG tablet  Commonly known as: ARICEPT     methocarbamoL 500 MG Tab  Commonly known as: ROBAXIN     predniSONE 10 MG tablet  Commonly known as: DELTASONE              Indwelling Lines/Drains at time of discharge:   Lines/Drains/Airways       Drain  Duration             Female External Urinary Catheter w/ Suction 09/18/24 1011 9 days         Closed/Suction Drain 09/25/24 1036 Inferior;Medial Back Accordion 14 Fr. 2 days                    Time spent on the discharge of patient: 30 minutes         Rudy Blake MD  Department of Hospital Medicine  [facility]

## 2024-09-27 NOTE — ASSESSMENT & PLAN NOTE
Presents with left flank pain, found to have diverticulitis with abscess on CT imaging   Initial CT scan does not appear to have a good window for IR drainage  Repeat CT 9/23 - Persistent sigmoid diverticulitis, with an enlarging diverticular abscess.   9/25- S/p CT guided percutaneous drain placement/aspiration    Afebrile, not septic    Plan:  -IV ciprofloxacin and metronidazole; penicillin allergy  -f/u abscess culture  -GRAM NEGATIVE CHAITANYA , pending finalized reults    She was discharged in stable conditon with 14 days of PO cipro and Flagyl and will follow up with General Surgery in 1 week

## 2024-09-27 NOTE — PLAN OF CARE
VN note: VN completed AVS and attachments and notified bedside nurseRocío. Will cont to be available and intervene prn.

## 2024-09-27 NOTE — ASSESSMENT & PLAN NOTE
Chronic, controlled. Latest blood pressure and vitals reviewed-     Temp:  [98.1 °F (36.7 °C)-98.5 °F (36.9 °C)]   Pulse:  [74-81]   Resp:  [18-20]   BP: (111-135)/(53-62)   SpO2:  [95 %-97 %] .   Home meds for hypertension were reviewed and noted below.   Hypertension Medications               bumetanide (BUMEX) 0.5 MG Tab Take 2 tablets (1 mg total) by mouth once daily. May also take 2 tablets (1 mg total) daily as needed (> 2 pound weight gain in 24 hours, > 5 pound weight gain in a week, worsening leg swelling, shortness of breath).    carvediloL (COREG) 12.5 MG tablet Take 1 tablet (12.5 mg total) by mouth 2 (two) times daily with meals.    isosorbide mononitrate (IMDUR) 30 MG 24 hr tablet Take 1 tablet (30 mg total) by mouth once daily.    nitroGLYCERIN (NITROSTAT) 0.4 MG SL tablet Place 1 tablet (0.4 mg total) under the tongue every 5 (five) minutes as needed for Chest pain (angina).    valsartan (DIOVAN) 80 MG tablet Take 1 tablet (80 mg total) by mouth once daily.            While in the hospital, will manage blood pressure as follows; Continue home antihypertensive regimen   Will utilize p.r.n. blood pressure medication only if patient's blood pressure greater than 180/110 and she develops symptoms such as worsening chest pain or shortness of breath.

## 2024-09-27 NOTE — ASSESSMENT & PLAN NOTE
Advance Care Planning     Briefly discussed code status with patient at bedside.  At this time she would like to remain a full code.  She has 4 living children.  No healthcare power of .  Patient would like libby Bautista to help her make medical decisions if necessary.  Spent 5-8 minutes for ACP discussion.      Again discussed goals of care on 09/21.  Spent 8-10 minutes on ACP discussion.  Daughter agreeable to palliative care consult.  Understands they are not present here over the weekend, tentatively will be seen on Monday.  Palliative Care consult placed.    Wheelchair     __Marycruz Ana_________ has a mobility limitation that significantly impairs her ability to participate in one or more mobility related activities of daily living (MRADL's) such as toileting, feeding, dressing, grooming, and bathing in customary locations in the home. The mobility limitation cannot be sufficiently resolved by the use of a cane or walker.   The use of a manual wheelchair will significantly improve the patient's ability to participate in MRADLS and the patient will use it on regular basis in the home.  __Ana Joel_________ has expressed her willingness to use a manual wheelchair in the home.   She also has a caregiver who is available, willing, and able to provide assistance with the wheelchair when needed.

## 2024-09-27 NOTE — PLAN OF CARE
Pt will dc with  services. Pts daughter will be contacted to schedule first appointment time/date. Pts daughter declined post acute placement for pt. Pts daughter declined hospital bed. Pts daughter agreeable to lift device and wheelchair. Per daughter she has a wheelchair at home but will like a new one. Pts daughter expressed that she is okay with wheelchair and lift device being delivered to home if approved by insurance. Pts daughter will provide transport home. Family at home to assist with getting pt in home. SW will continue to follow pt throughout her transitions of care and assist with any dc needs.       SALAZAR sent wheelchair and lift device request to Paulina with [facility] for review. If approved dme will be delivered to pts home.     Per Paulina pt approved for wheelchair and jv lift. Dme should be delivered to home sometime today.     SALAZAR requested Palliative Care follow up  SALAZAR requested Cardi follow up  Sw Requested PCP follow up.   SALAZAR requested IR follow up    A telehealth visit has been scheduled on 9/30/24 @ 350pm with Dr Kelly Dunn at the [facility].    Cleared from CM . Bedside Nurse and VN notified.  -----  Nursing staff unable to get pt up into wheelchair. Pt could not assist nursing or family get her into wheelchair. SALAZAR requested ambulance transportation for 3:00 pm. ETA pending. Pts daughter confirmed home address. Pt daughter will stay at hospital and follow ambulance transport home.     Future Appointments   Date Time Provider Department Center   10/8/2024  8:00 AM Eulalio Parikh Jr., MD [facility] PALLMED [facility]   10/11/2024 10:00 AM Thomas Soto DNP [facility] CARDIO [facility]        09/27/24 0829   Final Note   Assessment Type Final Discharge Note   Anticipated Discharge Disposition [facility]   Hospital Resources/Appts/Education Provided Appointments scheduled and added to AVS   Post-Acute Status   Discharge Delays None known at this time

## 2024-09-27 NOTE — SUBJECTIVE & OBJECTIVE
Interval History: No acute events overnight. Uncomfortable at the site of drain but otherwise doing well. Drain with serosanguinous output.     Medications:  Continuous Infusions:  Scheduled Meds:   bumetanide  2 mg Oral Daily    carvediloL  6.25 mg Oral BID WM    ciprofloxacin  400 mg Intravenous Q12H    ezetimibe  10 mg Oral Daily    isosorbide mononitrate  30 mg Oral Daily    metroNIDAZOLE IV (PEDS and ADULTS)  500 mg Intravenous Q8H    sodium chloride 0.9%  10 mL Intravenous Q8H    sodium chloride 0.9%  10 mL Intravenous Q6H    sodium chloride 0.9%  10 mL Other BID    valsartan  40 mg Oral Daily     PRN Meds:  Current Facility-Administered Medications:     acetaminophen, 650 mg, Oral, Q4H PRN    dextrose 10%, 12.5 g, Intravenous, PRN    dextrose 10%, 25 g, Intravenous, PRN    glucagon (human recombinant), 1 mg, Intramuscular, PRN    glucose, 16 g, Oral, PRN    glucose, 24 g, Oral, PRN    HYDROcodone-acetaminophen, 1 tablet, Oral, Q4H PRN    melatonin, 6 mg, Oral, Nightly PRN    naloxone, 0.02 mg, Intravenous, PRN    ondansetron, 8 mg, Oral, Q8H PRN    ondansetron, 4 mg, Intravenous, Q8H PRN    oxyCODONE, 10 mg, Oral, Q4H PRN    pneumoc 20-paul conj-dip cr(PF), 0.5 mL, Intramuscular, vaccine x 1 dose    Flushing PICC/Midline Protocol, , , Until Discontinued **AND** sodium chloride 0.9%, 10 mL, Intravenous, Q6H **AND** sodium chloride 0.9%, 10 mL, Intravenous, PRN     Review of patient's allergies indicates:   Allergen Reactions    Codeine     Latex, natural rubber     Penicillins     Penicillin Rash     Objective:     Vital Signs (Most Recent):  Temp: 98.2 °F (36.8 °C) (09/27/24 0438)  Pulse: 76 (09/27/24 0438)  Resp: 18 (09/27/24 0438)  BP: 130/60 (09/27/24 0438)  SpO2: 96 % (09/27/24 0438) Vital Signs (24h Range):  Temp:  [98 °F (36.7 °C)-98.5 °F (36.9 °C)] 98.2 °F (36.8 °C)  Pulse:  [75-81] 76  Resp:  [18-20] 18  SpO2:  [95 %-97 %] 96 %  BP: (111-135)/(53-62) 130/60     Weight: 83.9 kg (184 lb 15.5 oz)  Body  mass index is 37.36 kg/m².    Intake/Output - Last 3 Shifts         09/25 0700  09/26 0659 09/26 0700 09/27 0659    Urine (mL/kg/hr) 1590 (0.8) 400 (0.2)    Drains  30    Total Output 1590 430    Net -1590 -430                   Physical Exam  Vitals and nursing note reviewed.   HENT:      Head: Normocephalic and atraumatic.   Cardiovascular:      Rate and Rhythm: Normal rate.   Pulmonary:      Effort: Pulmonary effort is normal.   Abdominal:      General: Abdomen is flat.      Palpations: Abdomen is soft.      Comments: Abdomen soft, tender to palpation in the left lower quadrant  Drain in place LLQ with ss output   Skin:     General: Skin is warm and dry.   Neurological:      General: No focal deficit present.      Mental Status: She is alert and oriented to person, place, and time.   Psychiatric:         Mood and Affect: Mood normal.         Behavior: Behavior normal.          Significant Labs:  I have reviewed all pertinent lab results within the past 24 hours.    Significant Diagnostics:  I have reviewed all pertinent imaging results/findings within the past 24 hours.

## 2024-09-27 NOTE — HOSPITAL COURSE
----- Message from Shara Martines sent at 2/27/2019 10:44 AM CST -----  Patient needs call back, will elaborate(i could not understand what patient was saying)..[phone] (home)      The patient was admitted for management of diverticulitis with abscess. She underwent successful CT-guided percutaneous drainage for the abscess and was treated with IV ciprofloxacin and metronidazole, followed by a 14-day oral course after source control. Goals of care were discussed with the patient and her daughter, and a palliative care consult was placed. She was discharged in stable condition with plans for follow-up. Additionally, a manual wheelchair was prescribed to aid with mobility limitations.

## 2024-09-27 NOTE — PLAN OF CARE
AVS and educational attachments shared with patient and daughter via [employer]. Discussed thoroughly primarily with daughter who speaks fluent English. Patient and daighter verbalized clear understanding using teach back method. Notified bedside nurse of completion of education. At present no distress noted. Patient to be discharged via w/c with escort service and family with all of patient's belonings. Will cont to be available to patient and family and intervene prn.

## 2024-09-27 NOTE — PLAN OF CARE
[facility] - Telemetry      HOME HEALTH ORDERS  FACE TO FACE ENCOUNTER    Patient Name: Marycruz Perez  YOB: 1930    PCP: Melchor Menchaca -   PCP Address: [address] / [place] [address]  PCP Phone Number: [phone]  PCP Fax: [phone]    Encounter Date: 9/16/24    Admit to Home Health    Diagnoses:  Active Hospital Problems    Diagnosis  POA    *Diverticulitis of intestine with abscess without bleeding [K57.80]  Yes    Intra-abdominal abscess [K65.1]  Yes    Diverticulitis [K57.92]  Yes    Goals of care, counseling/discussion [Z71.89]  Not Applicable    CKD (chronic kidney disease) [N18.9]  Yes     Chronic    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       Follow Up Appointments:  Future Appointments   Date Time Provider Department Center   10/8/2024  8:00 AM Eulalio Parikh Jr., MD [facility]MED [facility]   10/11/2024 10:00 AM Thomas Soto DNP [facility] CARDIO [facility]       Allergies:  Review of patient's allergies indicates:   Allergen Reactions    Codeine     Latex, natural rubber     Penicillins     Penicillin Rash       Medications: Review discharge medications with patient and family and provide education.    Current Facility-Administered Medications   Medication Dose Route Frequency Provider Last Rate Last Admin    acetaminophen tablet 650 mg  650 mg Oral Q4H PRN Woody Zuñiga MD   650 mg at 09/26/24 0019    bumetanide tablet 2 mg  2 mg Oral Daily Kusum Keenan MD   2 mg at 09/27/24 0930    carvediloL tablet 6.25 mg  6.25 mg Oral BID WM Kuusm Keenan MD   6.25 mg at 09/27/24 0931    ciprofloxacin  (CIPRO)400mg/200ml D5W IVPB 400 mg  400 mg Intravenous Q12H Rudy Blake MD   Stopped at 09/27/24 0307    dextrose 10% bolus 125 mL 125 mL  12.5 g Intravenous PRN Woody Zuñiga MD        dextrose 10% bolus 250 mL 250 mL  25 g Intravenous PRN Woody Zuñiga MD        ezetimibe tablet 10 mg  10 mg Oral Daily Woody Zuñiga MD   10 mg at 09/27/24 0933    glucagon (human recombinant) injection 1 mg  1 mg Intramuscular PRN Woody Zuñiga MD        glucose chewable tablet 16 g  16 g Oral PRN Woody Zuñiga MD        glucose chewable tablet 24 g  24 g Oral PRN Woody Zuñiga MD        HYDROcodone-acetaminophen 5-325 mg per tablet 1 tablet  1 tablet Oral Q4H PRN Rudy Blake MD        isosorbide mononitrate 24 hr tablet 30 mg  30 mg Oral Daily Woody Zuñiga MD   30 mg at 09/27/24 0930    melatonin tablet 6 mg  6 mg Oral Nightly PRN Woody Zuñiga MD        metronidazole IVPB 500 mg  500 mg Intravenous Q8H Dez Haro MD   Stopped at 09/27/24 0700    naloxone 0.4 mg/mL injection 0.02 mg  0.02 mg Intravenous PRN Woody Zuñiga MD        ondansetron disintegrating tablet 8 mg  8 mg Oral Q8H PRN Woody Zuñiga MD        ondansetron injection 4 mg  4 mg Intravenous Q8H PRN Woody Zuñiga MD        oxyCODONE immediate release tablet 10 mg  10 mg Oral Q4H PRN Rudy Blake MD        pneumoc 20-paul conj-dip cr(PF) (PREVNAR-20 (PF)) injection Syrg 0.5 mL  0.5 mL Intramuscular vaccine x 1 dose Woody Zuñiga MD        sodium chloride 0.9% flush 10 mL  10 mL Intravenous Q8H Woody Zuñiga MD   10 mL at 09/27/24 0640    sodium chloride 0.9% flush 10 mL  10 mL Intravenous Q6H Kusum Keenan MD   10 mL at 09/27/24 0640    And    sodium chloride 0.9% flush 10 mL  10 mL Intravenous PRN Kusum Keenan MD        sodium chloride 0.9% flush 10 mL  10 mL Other BID Leigh Bone, KIRA   10  mL at 09/27/24 0933    valsartan tablet 40 mg  40 mg Oral Daily Kusum Keenan MD   40 mg at 09/27/24 0930     Facility-Administered Medications Ordered in Other Encounters   Medication Dose Route Frequency Provider Last Rate Last Admin    vancomycin in dextrose 5 % 1 gram/250 mL IVPB 1,000 mg  1,000 mg Intravenous On Call Procedure Rocío Blevins NP   1,000 mg at 02/22/21 0947        Medication List        START taking these medications      ciprofloxacin HCl 500 MG tablet  Commonly known as: CIPRO  Take 1 tablet (500 mg total) by mouth 2 (two) times daily. for 14 days     metroNIDAZOLE 500 MG tablet  Commonly known as: FLAGYL  [place] 1 tableta (500 mg en total) por vía oral 3 (ana) veces al día por 14 días  (Take 1 tablet (500 mg total) by mouth 3 (three) times daily. for 14 days)            CHANGE how you take these medications      carvediloL 6.25 MG tablet  Commonly known as: COREG  [place] 1 tableta (6.25 mg en total) por vía oral 2 (dos) veces al día con las comidas.  (Take 1 tablet (6.25 mg total) by mouth 2 (two) times daily with meals.)  What changed:   medication strength  how much to take     valsartan 40 MG tablet  Commonly known as: DIOVAN  [place]son tableta (40 mg en total) por vía oral diariamente.  (Take 1 tablet (40 mg total) by mouth once daily.)  Start taking on: September 28, 2024  What changed:   medication strength  how much to take  Another medication with the same name was removed. Continue taking this medication, and follow the directions you see here.            CONTINUE taking these medications      albuterol 90 mcg/actuation inhaler  Commonly known as: PROVENTIL/VENTOLIN HFA  Inhale 2 puffs into the lungs every 4 (four) hours as needed for Wheezing (cough or wheezing). Rescue     albuterol-ipratropium 2.5 mg-0.5 mg/3 mL nebulizer solution  Commonly known as: DUO-NEB  every 6 (six) hours as needed for Wheezing.     aspirin 81 MG Chew  Take 1 tablet (81 mg total) by mouth once daily.      bumetanide 0.5 MG Tab  Commonly known as: BUMEX  Take 2 tablets (1 mg total) by mouth once daily. May also take 2 tablets (1 mg total) daily as needed (> 2 pound weight gain in 24 hours, > 5 pound weight gain in a week, worsening leg swelling, shortness of breath).     diclofenac sodium 1 % Gel  Commonly known as: VOLTAREN  Apply 4 g topically 3 (three) times daily as needed.     ezetimibe 10 mg tablet  Commonly known as: ZETIA  Take 1 tablet (10 mg total) by mouth once daily.     famotidine 20 MG tablet  Commonly known as: PEPCID  Take 1 tablet (20 mg total) by mouth nightly as needed for Heartburn (heartburn).     isosorbide mononitrate 30 MG 24 hr tablet  Commonly known as: IMDUR  [place] tableta (30 mg en total) por vía oral diariamente.  (Take 1 tablet (30 mg total) by mouth once daily.)     meclizine 25 mg tablet  Commonly known as: ANTIVERT  Take 25 mg by mouth 3 (three) times daily as needed (vertigo).     nitroGLYCERIN 0.4 MG SL tablet  Commonly known as: NITROSTAT  Place 1 tablet (0.4 mg total) under the tongue every 5 (five) minutes as needed for Chest pain (angina).            STOP taking these medications      calcium carbonate 600 mg calcium (1,500 mg) Tab  Commonly known as: OS-BERE     clopidogreL 75 mg tablet  Commonly known as: PLAVIX     donepeziL 5 MG tablet  Commonly known as: ARICEPT     methocarbamoL 500 MG Tab  Commonly known as: ROBAXIN     predniSONE 10 MG tablet  Commonly known as: DELTASONE                I have seen and examined this patient within the last 30 days. My clinical findings that support the need for the home health skilled services and home bound status are the following:no   Weakness/numbness causing balance and gait disturbance due to Weakness/Debility making it taxing to leave home.     Diet:   regular diet    Labs:  Report Lab results to PCP.    Referrals/ Consults  Physical Therapy to evaluate and treat. Evaluate for home safety and equipment needs; Establish/upgrade  home exercise program. Perform / instruct on therapeutic exercises, gait training, transfer training, and Range of Motion.  Occupational Therapy to evaluate and treat. Evaluate home environment for safety and equipment needs. Perform/Instruct on transfers, ADL training, ROM, and therapeutic exercises.   to evaluate for community resources/long-range planning.  Aide to provide assistance with personal care, ADLs, and vital signs.    Activities:   activity as tolerated    Nursing:   Agency to admit patient within 24 hours of hospital discharge unless specified on physician order or at patient request    SN to complete comprehensive assessment including routine vital signs. Instruct on disease process and s/s of complications to report to MD. Review/verify medication list sent home with the patient at time of discharge  and instruct patient/caregiver as needed. Frequency may be adjusted depending on start of care date.     Skilled nurse to perform up to 3 visits PRN for symptoms related to diagnosis    Notify MD if SBP > 160 or < 90; DBP > 90 or < 50; HR > 120 or < 50; Temp > 101; O2 < 88%; Other:       Ok to schedule additional visits based on staff availability and patient request on consecutive days within the home health episode.    When multiple disciplines ordered:    Start of Care occurs on Sunday - Wednesday schedule remaining discipline evaluations as ordered on separate consecutive days following the start of care.    Thursday SOC -schedule subsequent evaluations Friday and Monday the following week.     Friday - Saturday SOC - schedule subsequent discipline evaluations on consecutive days starting Monday of the following week.    For all post-discharge communication and subsequent orders please contact patient's primary care physician.     Miscellaneous   Routine Skin for Bedridden Patients: Instruct patient/caregiver to apply moisture barrier cream to all skin folds and wet areas in perineal  area daily and after baths and all bowel movements.  Heart Failure:      SN to instruct on the following:    Instruct on the definition of CHF.   Instruct on the signs/sympoms of CHF to be reported.   Instruct on and monitor daily weights.   Instruct on factors that cause exacerbation.   Instruct on action, dose, schedule, and side effects of medications.   Instruct on diet as prescribed.   Instruct on activity allowed.   Instruct on life-style modifications for life long management of CHF   SN to assess compliance with daily weights, diet, medications, fluid retention,    safety precautions, activities permitted and life-style modifications.   Additional 1-2 SN visits per week as needed for signs and symptoms     of CHF exacerbation.      For Weight Gain > 2-3 lbs in 1 day or 4-6 lbs over 1 week notify PCP:    Closed/suction drain placed on 09/25/24 the inferior/medial back:  Monitoring and managing the drain site. Nurses should assess and record drainage output (amount, color, and consistency) daily or as ordered, reporting any significant changes such as increased output, bleeding, or signs of infection (e.g., redness, warmth, or foul-smelling drainage). The drain site should be cleaned and dressed according to protocol, with sterile technique used during dressing changes. The integrity of the suction system should be maintained, ensuring the drain remains patent and functioning properly.       Home Health Aide:  Nursing , Physical Therapy , Occupational Therapy , Medical Social Work , and Home Health Aide     Wound Care Orders  Nursing to cleanse sacrum/buttocks with cleansing cloths and apply thin layer of Triad ointment BID and prn cleansing of incontinent episode. Do not apply cover dressing.     I certify that this patient is confined to her home and needs physical therapy and occupational therapy.

## 2024-09-27 NOTE — NURSING
Pt to be discharged home, AVS given to pt. VN to go over AVS w/ daughter and pt. Drain care and education done with daughter. IV removed along w/ heart monitor. Call light in reach, safety measures in place.

## 2024-09-30 LAB — BACTERIA SPEC ANAEROBE CULT: NORMAL

## 2024-10-01 DIAGNOSIS — K57.80 DIVERTICULITIS OF INTESTINE WITH ABSCESS WITHOUT BLEEDING, UNSPECIFIED PART OF INTESTINAL TRACT: Primary | ICD-10-CM

## 2024-10-07 ENCOUNTER — TELEPHONE (OUTPATIENT)
Dept: INTERVENTIONAL RADIOLOGY/VASCULAR | Facility: CLINIC | Age: 89
End: 2024-10-07
Payer: MEDICARE

## 2024-10-07 ENCOUNTER — TELEPHONE (OUTPATIENT)
Dept: HOME HEALTH SERVICES | Facility: CLINIC | Age: 89
End: 2024-10-07
Payer: MEDICARE

## 2024-10-07 ENCOUNTER — HOSPITAL ENCOUNTER (INPATIENT)
Facility: HOSPITAL | Age: 89
LOS: 10 days | Discharge: NURSING, REHAB OR LONG-TERM CARE | DRG: 311 | End: 2024-10-18
Attending: EMERGENCY MEDICINE | Admitting: STUDENT IN AN ORGANIZED HEALTH CARE EDUCATION/TRAINING PROGRAM
Payer: MEDICARE

## 2024-10-07 ENCOUNTER — TELEPHONE (OUTPATIENT)
Dept: HEMATOLOGY/ONCOLOGY | Facility: CLINIC | Age: 89
End: 2024-10-07
Payer: MEDICARE

## 2024-10-07 DIAGNOSIS — I21.4 NSTEMI (NON-ST ELEVATED MYOCARDIAL INFARCTION): ICD-10-CM

## 2024-10-07 DIAGNOSIS — Z95.0 CARDIAC PACEMAKER: ICD-10-CM

## 2024-10-07 DIAGNOSIS — Z98.61 S/P PTCA (PERCUTANEOUS TRANSLUMINAL CORONARY ANGIOPLASTY): ICD-10-CM

## 2024-10-07 DIAGNOSIS — R06.02 SHORTNESS OF BREATH: ICD-10-CM

## 2024-10-07 DIAGNOSIS — I10 ESSENTIAL HYPERTENSION: ICD-10-CM

## 2024-10-07 DIAGNOSIS — K65.1 INTRA-ABDOMINAL ABSCESS: Primary | ICD-10-CM

## 2024-10-07 DIAGNOSIS — Z95.0 CARDIAC PACEMAKER: Primary | ICD-10-CM

## 2024-10-07 DIAGNOSIS — I50.32 CHRONIC HEART FAILURE WITH PRESERVED EJECTION FRACTION (HFPEF): ICD-10-CM

## 2024-10-07 DIAGNOSIS — R07.9 CHEST PAIN: ICD-10-CM

## 2024-10-07 LAB
ALBUMIN SERPL BCP-MCNC: 2.5 G/DL (ref 3.5–5.2)
ALP SERPL-CCNC: 87 U/L (ref 55–135)
ALT SERPL W/O P-5'-P-CCNC: 7 U/L (ref 10–44)
ANION GAP SERPL CALC-SCNC: 16 MMOL/L (ref 8–16)
AST SERPL-CCNC: 23 U/L (ref 10–40)
BASOPHILS # BLD AUTO: 0.05 K/UL (ref 0–0.2)
BASOPHILS NFR BLD: 0.6 % (ref 0–1.9)
BILIRUB SERPL-MCNC: 0.7 MG/DL (ref 0.1–1)
BNP SERPL-MCNC: 468 PG/ML (ref 0–99)
BUN SERPL-MCNC: 20 MG/DL (ref 10–30)
CALCIUM SERPL-MCNC: 9.2 MG/DL (ref 8.7–10.5)
CHLORIDE SERPL-SCNC: 92 MMOL/L (ref 95–110)
CO2 SERPL-SCNC: 27 MMOL/L (ref 23–29)
CREAT SERPL-MCNC: 1 MG/DL (ref 0.5–1.4)
CTP QC/QA: YES
CTP QC/QA: YES
DIFFERENTIAL METHOD BLD: ABNORMAL
EOSINOPHIL # BLD AUTO: 0.1 K/UL (ref 0–0.5)
EOSINOPHIL NFR BLD: 1.1 % (ref 0–8)
ERYTHROCYTE [DISTWIDTH] IN BLOOD BY AUTOMATED COUNT: 14.9 % (ref 11.5–14.5)
EST. GFR  (NO RACE VARIABLE): 52 ML/MIN/1.73 M^2
GLUCOSE SERPL-MCNC: 86 MG/DL (ref 70–110)
HCT VFR BLD AUTO: 40.8 % (ref 37–48.5)
HGB BLD-MCNC: 12.8 G/DL (ref 12–16)
IMM GRANULOCYTES # BLD AUTO: 0.04 K/UL (ref 0–0.04)
IMM GRANULOCYTES NFR BLD AUTO: 0.5 % (ref 0–0.5)
LYMPHOCYTES # BLD AUTO: 1.6 K/UL (ref 1–4.8)
LYMPHOCYTES NFR BLD: 19.7 % (ref 18–48)
MAGNESIUM SERPL-MCNC: 2 MG/DL (ref 1.6–2.6)
MCH RBC QN AUTO: 27.9 PG (ref 27–31)
MCHC RBC AUTO-ENTMCNC: 31.4 G/DL (ref 32–36)
MCV RBC AUTO: 89 FL (ref 82–98)
MONOCYTES # BLD AUTO: 0.6 K/UL (ref 0.3–1)
MONOCYTES NFR BLD: 7.8 % (ref 4–15)
NEUTROPHILS # BLD AUTO: 5.8 K/UL (ref 1.8–7.7)
NEUTROPHILS NFR BLD: 70.3 % (ref 38–73)
NRBC BLD-RTO: 0 /100 WBC
PLATELET # BLD AUTO: 259 K/UL (ref 150–450)
PMV BLD AUTO: 10.8 FL (ref 9.2–12.9)
POC MOLECULAR INFLUENZA A AGN: NEGATIVE
POC MOLECULAR INFLUENZA B AGN: NEGATIVE
POTASSIUM SERPL-SCNC: 4.2 MMOL/L (ref 3.5–5.1)
PROT SERPL-MCNC: 7.4 G/DL (ref 6–8.4)
RBC # BLD AUTO: 4.58 M/UL (ref 4–5.4)
SARS-COV-2 RDRP RESP QL NAA+PROBE: NEGATIVE
SODIUM SERPL-SCNC: 135 MMOL/L (ref 136–145)
TROPONIN I SERPL DL<=0.01 NG/ML-MCNC: 0.11 NG/ML (ref 0–0.03)
TROPONIN I SERPL DL<=0.01 NG/ML-MCNC: 0.29 NG/ML (ref 0–0.03)
WBC # BLD AUTO: 8.19 K/UL (ref 3.9–12.7)

## 2024-10-07 PROCEDURE — 93010 ELECTROCARDIOGRAM REPORT: CPT | Mod: ,,, | Performed by: INTERNAL MEDICINE

## 2024-10-07 PROCEDURE — 85730 THROMBOPLASTIN TIME PARTIAL: CPT | Performed by: EMERGENCY MEDICINE

## 2024-10-07 PROCEDURE — 94640 AIRWAY INHALATION TREATMENT: CPT

## 2024-10-07 PROCEDURE — 27000221 HC OXYGEN, UP TO 24 HOURS

## 2024-10-07 PROCEDURE — G0378 HOSPITAL OBSERVATION PER HR: HCPCS

## 2024-10-07 PROCEDURE — 94761 N-INVAS EAR/PLS OXIMETRY MLT: CPT

## 2024-10-07 PROCEDURE — 25000003 PHARM REV CODE 250: Performed by: EMERGENCY MEDICINE

## 2024-10-07 PROCEDURE — 99900035 HC TECH TIME PER 15 MIN (STAT)

## 2024-10-07 PROCEDURE — 87502 INFLUENZA DNA AMP PROBE: CPT

## 2024-10-07 PROCEDURE — 93005 ELECTROCARDIOGRAM TRACING: CPT

## 2024-10-07 PROCEDURE — 80053 COMPREHEN METABOLIC PANEL: CPT | Performed by: EMERGENCY MEDICINE

## 2024-10-07 PROCEDURE — 85610 PROTHROMBIN TIME: CPT | Performed by: EMERGENCY MEDICINE

## 2024-10-07 PROCEDURE — 96360 HYDRATION IV INFUSION INIT: CPT

## 2024-10-07 PROCEDURE — 83880 ASSAY OF NATRIURETIC PEPTIDE: CPT | Performed by: EMERGENCY MEDICINE

## 2024-10-07 PROCEDURE — 25000242 PHARM REV CODE 250 ALT 637 W/ HCPCS: Performed by: EMERGENCY MEDICINE

## 2024-10-07 PROCEDURE — 83735 ASSAY OF MAGNESIUM: CPT | Performed by: EMERGENCY MEDICINE

## 2024-10-07 PROCEDURE — 85025 COMPLETE CBC W/AUTO DIFF WBC: CPT | Performed by: EMERGENCY MEDICINE

## 2024-10-07 PROCEDURE — 63600175 PHARM REV CODE 636 W HCPCS: Performed by: EMERGENCY MEDICINE

## 2024-10-07 PROCEDURE — 84484 ASSAY OF TROPONIN QUANT: CPT | Performed by: EMERGENCY MEDICINE

## 2024-10-07 PROCEDURE — 99285 EMERGENCY DEPT VISIT HI MDM: CPT | Mod: 25

## 2024-10-07 PROCEDURE — 87635 SARS-COV-2 COVID-19 AMP PRB: CPT | Performed by: EMERGENCY MEDICINE

## 2024-10-07 RX ORDER — PREDNISONE 20 MG/1
40 TABLET ORAL
Status: COMPLETED | OUTPATIENT
Start: 2024-10-07 | End: 2024-10-07

## 2024-10-07 RX ORDER — ASPIRIN 325 MG
325 TABLET ORAL
Status: COMPLETED | OUTPATIENT
Start: 2024-10-07 | End: 2024-10-07

## 2024-10-07 RX ORDER — HEPARIN SODIUM,PORCINE/D5W 25000/250
0-40 INTRAVENOUS SOLUTION INTRAVENOUS CONTINUOUS
Status: DISCONTINUED | OUTPATIENT
Start: 2024-10-07 | End: 2024-10-08

## 2024-10-07 RX ORDER — IPRATROPIUM BROMIDE AND ALBUTEROL SULFATE 2.5; .5 MG/3ML; MG/3ML
3 SOLUTION RESPIRATORY (INHALATION)
Status: COMPLETED | OUTPATIENT
Start: 2024-10-07 | End: 2024-10-07

## 2024-10-07 RX ADMIN — SODIUM CHLORIDE 500 ML: 9 INJECTION, SOLUTION INTRAVENOUS at 09:10

## 2024-10-07 RX ADMIN — ASPIRIN 325 MG ORAL TABLET 325 MG: 325 PILL ORAL at 09:10

## 2024-10-07 RX ADMIN — IPRATROPIUM BROMIDE AND ALBUTEROL SULFATE 3 ML: .5; 3 SOLUTION RESPIRATORY (INHALATION) at 08:10

## 2024-10-07 RX ADMIN — HEPARIN SODIUM AND DEXTROSE 12 UNITS/KG/HR: 10000; 5 INJECTION INTRAVENOUS at 11:10

## 2024-10-07 RX ADMIN — PREDNISONE 40 MG: 20 TABLET ORAL at 09:10

## 2024-10-07 NOTE — Clinical Note
Diagnosis: NSTEMI (non-ST elevated myocardial infarction) [803235]   Future Attending Provider: AISHA RAMEY [[id number]]   Is the patient being sent to ED Observation?: No   Special Needs:: Fall Risk [15]

## 2024-10-07 NOTE — PLAN OF CARE
Problem: Adult Inpatient Plan of Care  Goal: Plan of Care Review  Outcome: Progressing     Problem: Adult Inpatient Plan of Care  Goal: Patient-Specific Goal (Individualized)  Outcome: Progressing     Problem: Adult Inpatient Plan of Care  Goal: Absence of Hospital-Acquired Illness or Injury  Outcome: Progressing     Problem: Adult Inpatient Plan of Care  Goal: Optimal Comfort and Wellbeing  Outcome: Progressing     Problem: Infection  Goal: Absence of Infection Signs and Symptoms  Outcome: Progressing     Problem: Skin Injury Risk Increased  Goal: Skin Health and Integrity  Outcome: Progressing      4 = No assist / stand by assistance

## 2024-10-07 NOTE — TELEPHONE ENCOUNTER
----- Message from Jacinto sent at 10/7/2024  4:23 PM CDT -----  Who called:   Pt daughter  What is the request in detail:   Pt daughter would like to know if she can get a call in regards to her appt on tomorrow morning. She said she would like virtual if possible.  Can the clinic reply by [employer]? No     Would the patient rather a call back or a response via [facility]? Call back     Best call back number:   Telephone Information:  Mobile          [phone]    Additional Information:     Thank you.

## 2024-10-07 NOTE — TELEPHONE ENCOUNTER
Spoke with patient daughter Lily to schedule CT and IR clinic visit. Daughter stated patient unable to move.  Patient has leg weakness and unable to get patient out the bed to bring her to any appointments.  She also stated home health came and was trying to help. Informed patient daughter will forward message to provider.

## 2024-10-08 PROBLEM — I21.4 NSTEMI (NON-ST ELEVATED MYOCARDIAL INFARCTION): Status: ACTIVE | Noted: 2024-10-08

## 2024-10-08 LAB
ALBUMIN SERPL BCP-MCNC: 2.1 G/DL (ref 3.5–5.2)
ALP SERPL-CCNC: 79 U/L (ref 55–135)
ALT SERPL W/O P-5'-P-CCNC: 6 U/L (ref 10–44)
ANION GAP SERPL CALC-SCNC: 16 MMOL/L (ref 8–16)
APICAL FOUR CHAMBER EJECTION FRACTION: 36 %
APICAL TWO CHAMBER EJECTION FRACTION: 48 %
APTT PPP: 28 SEC (ref 21–32)
APTT PPP: 47.9 SEC (ref 21–32)
ASCENDING AORTA: 2.84 CM
AST SERPL-CCNC: 24 U/L (ref 10–40)
AV INDEX (PROSTH): 0.28
AV MEAN GRADIENT: 11.4 MMHG
AV PEAK GRADIENT: 17.6 MMHG
AV VALVE AREA BY VELOCITY RATIO: 0.9 CM²
AV VALVE AREA: 0.9 CM²
AV VELOCITY RATIO: 0.29
BASOPHILS # BLD AUTO: 0.02 K/UL (ref 0–0.2)
BASOPHILS # BLD AUTO: 0.02 K/UL (ref 0–0.2)
BASOPHILS NFR BLD: 0.3 % (ref 0–1.9)
BASOPHILS NFR BLD: 0.3 % (ref 0–1.9)
BILIRUB SERPL-MCNC: 0.5 MG/DL (ref 0.1–1)
BSA FOR ECHO PROCEDURE: 1.83 M2
BUN SERPL-MCNC: 26 MG/DL (ref 10–30)
CALCIUM SERPL-MCNC: 8.7 MG/DL (ref 8.7–10.5)
CHLORIDE SERPL-SCNC: 94 MMOL/L (ref 95–110)
CO2 SERPL-SCNC: 27 MMOL/L (ref 23–29)
CREAT SERPL-MCNC: 1.2 MG/DL (ref 0.5–1.4)
CV ECHO LV RWT: 0.65 CM
DIFFERENTIAL METHOD BLD: ABNORMAL
DIFFERENTIAL METHOD BLD: ABNORMAL
DOP CALC AO PEAK VEL: 2.1 M/S
DOP CALC AO VTI: 49.1 CM
DOP CALC LVOT AREA: 3.1 CM2
DOP CALC LVOT DIAMETER: 2 CM
DOP CALC LVOT PEAK VEL: 0.6 M/S
DOP CALC LVOT STROKE VOLUME: 43 CM3
DOP CALC MV VTI: 39.1 CM
DOP CALCLVOT PEAK VEL VTI: 13.7 CM
E WAVE DECELERATION TIME: 254.19 MSEC
E/A RATIO: 2.35
E/E' RATIO: 32 M/S
ECHO LV POSTERIOR WALL: 1.2 CM (ref 0.6–1.1)
EOSINOPHIL # BLD AUTO: 0 K/UL (ref 0–0.5)
EOSINOPHIL # BLD AUTO: 0 K/UL (ref 0–0.5)
EOSINOPHIL NFR BLD: 0 % (ref 0–8)
EOSINOPHIL NFR BLD: 0 % (ref 0–8)
ERYTHROCYTE [DISTWIDTH] IN BLOOD BY AUTOMATED COUNT: 14.5 % (ref 11.5–14.5)
ERYTHROCYTE [DISTWIDTH] IN BLOOD BY AUTOMATED COUNT: 14.5 % (ref 11.5–14.5)
EST. GFR  (NO RACE VARIABLE): 42 ML/MIN/1.73 M^2
FRACTIONAL SHORTENING: 21.6 % (ref 28–44)
GLUCOSE SERPL-MCNC: 139 MG/DL (ref 70–110)
HCT VFR BLD AUTO: 35.2 % (ref 37–48.5)
HCT VFR BLD AUTO: 35.2 % (ref 37–48.5)
HGB BLD-MCNC: 11.3 G/DL (ref 12–16)
HGB BLD-MCNC: 11.3 G/DL (ref 12–16)
IMM GRANULOCYTES # BLD AUTO: 0.05 K/UL (ref 0–0.04)
IMM GRANULOCYTES # BLD AUTO: 0.05 K/UL (ref 0–0.04)
IMM GRANULOCYTES NFR BLD AUTO: 0.6 % (ref 0–0.5)
IMM GRANULOCYTES NFR BLD AUTO: 0.6 % (ref 0–0.5)
INR PPP: 1.2 (ref 0.8–1.2)
INTERVENTRICULAR SEPTUM: 1.2 CM (ref 0.6–1.1)
IVC DIAMETER: 1.87 CM
LEFT ATRIUM AREA SYSTOLIC (APICAL 2 CHAMBER): 28.26 CM2
LEFT ATRIUM AREA SYSTOLIC (APICAL 4 CHAMBER): 27.26 CM2
LEFT ATRIUM SIZE: 4.62 CM
LEFT ATRIUM VOLUME INDEX MOD: 56.3 ML/M2
LEFT ATRIUM VOLUME MOD: 98.45 ML
LEFT INTERNAL DIMENSION IN SYSTOLE: 2.9 CM (ref 2.1–4)
LEFT VENTRICLE DIASTOLIC VOLUME INDEX: 32.82 ML/M2
LEFT VENTRICLE DIASTOLIC VOLUME: 57.44 ML
LEFT VENTRICLE END DIASTOLIC VOLUME APICAL 2 CHAMBER: 48.53 ML
LEFT VENTRICLE END DIASTOLIC VOLUME APICAL 4 CHAMBER: 46.38 ML
LEFT VENTRICLE END SYSTOLIC VOLUME APICAL 2 CHAMBER: 102.58 ML
LEFT VENTRICLE END SYSTOLIC VOLUME APICAL 4 CHAMBER: 87.2 ML
LEFT VENTRICLE MASS INDEX: 84.2 G/M2
LEFT VENTRICLE SYSTOLIC VOLUME INDEX: 19 ML/M2
LEFT VENTRICLE SYSTOLIC VOLUME: 33.33 ML
LEFT VENTRICULAR INTERNAL DIMENSION IN DIASTOLE: 3.7 CM (ref 3.5–6)
LEFT VENTRICULAR MASS: 147.3 G
LV LATERAL E/E' RATIO: 26.67 M/S
LV SEPTAL E/E' RATIO: 40 M/S
LVED V (TEICH): 57.44 ML
LVES V (TEICH): 33.33 ML
LVOT MG: 1.12 MMHG
LVOT MV: 0.52 CM/S
LYMPHOCYTES # BLD AUTO: 1.2 K/UL (ref 1–4.8)
LYMPHOCYTES # BLD AUTO: 1.2 K/UL (ref 1–4.8)
LYMPHOCYTES NFR BLD: 14.7 % (ref 18–48)
LYMPHOCYTES NFR BLD: 14.7 % (ref 18–48)
MCH RBC QN AUTO: 28.3 PG (ref 27–31)
MCH RBC QN AUTO: 28.3 PG (ref 27–31)
MCHC RBC AUTO-ENTMCNC: 32.1 G/DL (ref 32–36)
MCHC RBC AUTO-ENTMCNC: 32.1 G/DL (ref 32–36)
MCV RBC AUTO: 88 FL (ref 82–98)
MCV RBC AUTO: 88 FL (ref 82–98)
MONOCYTES # BLD AUTO: 0.2 K/UL (ref 0.3–1)
MONOCYTES # BLD AUTO: 0.2 K/UL (ref 0.3–1)
MONOCYTES NFR BLD: 1.9 % (ref 4–15)
MONOCYTES NFR BLD: 1.9 % (ref 4–15)
MV PEAK A VEL: 0.68 M/S
MV PEAK E VEL: 1.6 M/S
MV PEAK GRADIENT: 10 MMHG
MV STENOSIS PRESSURE HALF TIME: 73.71 MS
MV VALVE AREA BY CONTINUITY EQUATION: 1.1 CM2
MV VALVE AREA P 1/2 METHOD: 2.98 CM2
NEUTROPHILS # BLD AUTO: 6.5 K/UL (ref 1.8–7.7)
NEUTROPHILS # BLD AUTO: 6.5 K/UL (ref 1.8–7.7)
NEUTROPHILS NFR BLD: 82.5 % (ref 38–73)
NEUTROPHILS NFR BLD: 82.5 % (ref 38–73)
NRBC BLD-RTO: 0 /100 WBC
NRBC BLD-RTO: 0 /100 WBC
OHS LV EJECTION FRACTION SIMPSONS BIPLANE MOD: 45 %
OHS QRS DURATION: 156 MS
OHS QTC CALCULATION: 529 MS
PISA MRMAX VEL: 4.22 M/S
PISA TR MAX VEL: 2.73 M/S
PLATELET # BLD AUTO: 219 K/UL (ref 150–450)
PLATELET # BLD AUTO: 219 K/UL (ref 150–450)
PMV BLD AUTO: 10.5 FL (ref 9.2–12.9)
PMV BLD AUTO: 10.5 FL (ref 9.2–12.9)
POTASSIUM SERPL-SCNC: 4 MMOL/L (ref 3.5–5.1)
PROT SERPL-MCNC: 6.3 G/DL (ref 6–8.4)
PROTHROMBIN TIME: 12.9 SEC (ref 9–12.5)
PV PEAK GRADIENT: 4 MMHG
PV PEAK VELOCITY: 0.99 M/S
RA PRESSURE ESTIMATED: 3 MMHG
RA VOL SYS: 25.16 ML
RBC # BLD AUTO: 3.99 M/UL (ref 4–5.4)
RBC # BLD AUTO: 3.99 M/UL (ref 4–5.4)
RIGHT ATRIAL AREA: 12.8 CM2
RIGHT ATRIUM VOLUME AREA LENGTH APICAL 4 CHAMBER: 24.87 ML
RV TB RVSP: 6 MMHG
RV TISSUE DOPPLER FREE WALL SYSTOLIC VELOCITY 1 (APICAL 4 CHAMBER VIEW): 10.53 CM/S
SINUS: 3.26 CM
SODIUM SERPL-SCNC: 137 MMOL/L (ref 136–145)
STJ: 2.8 CM
TDI LATERAL: 0.06 M/S
TDI SEPTAL: 0.04 M/S
TDI: 0.05 M/S
TR MAX PG: 30 MMHG
TRICUSPID ANNULAR PLANE SYSTOLIC EXCURSION: 1.59 CM
TROPONIN I SERPL DL<=0.01 NG/ML-MCNC: 0.18 NG/ML (ref 0–0.03)
TV REST PULMONARY ARTERY PRESSURE: 33 MMHG
WBC # BLD AUTO: 7.87 K/UL (ref 3.9–12.7)
WBC # BLD AUTO: 7.87 K/UL (ref 3.9–12.7)
Z-SCORE OF LEFT VENTRICULAR DIMENSION IN END DIASTOLE: -2.69
Z-SCORE OF LEFT VENTRICULAR DIMENSION IN END SYSTOLE: -0.26

## 2024-10-08 PROCEDURE — 25000003 PHARM REV CODE 250: Performed by: REGISTERED NURSE

## 2024-10-08 PROCEDURE — A9698 NON-RAD CONTRAST MATERIALNOC: HCPCS | Performed by: STUDENT IN AN ORGANIZED HEALTH CARE EDUCATION/TRAINING PROGRAM

## 2024-10-08 PROCEDURE — 25500020 PHARM REV CODE 255: Performed by: STUDENT IN AN ORGANIZED HEALTH CARE EDUCATION/TRAINING PROGRAM

## 2024-10-08 PROCEDURE — 25000003 PHARM REV CODE 250: Performed by: STUDENT IN AN ORGANIZED HEALTH CARE EDUCATION/TRAINING PROGRAM

## 2024-10-08 PROCEDURE — 27000207 HC ISOLATION

## 2024-10-08 PROCEDURE — 99900035 HC TECH TIME PER 15 MIN (STAT)

## 2024-10-08 PROCEDURE — 84484 ASSAY OF TROPONIN QUANT: CPT | Performed by: STUDENT IN AN ORGANIZED HEALTH CARE EDUCATION/TRAINING PROGRAM

## 2024-10-08 PROCEDURE — 85025 COMPLETE CBC W/AUTO DIFF WBC: CPT | Performed by: EMERGENCY MEDICINE

## 2024-10-08 PROCEDURE — 11000001 HC ACUTE MED/SURG PRIVATE ROOM

## 2024-10-08 PROCEDURE — 85730 THROMBOPLASTIN TIME PARTIAL: CPT | Performed by: FAMILY MEDICINE

## 2024-10-08 PROCEDURE — 80053 COMPREHEN METABOLIC PANEL: CPT | Performed by: REGISTERED NURSE

## 2024-10-08 PROCEDURE — 63600175 PHARM REV CODE 636 W HCPCS: Performed by: STUDENT IN AN ORGANIZED HEALTH CARE EDUCATION/TRAINING PROGRAM

## 2024-10-08 RX ORDER — IBUPROFEN 200 MG
24 TABLET ORAL
Status: DISCONTINUED | OUTPATIENT
Start: 2024-10-08 | End: 2024-10-18 | Stop reason: HOSPADM

## 2024-10-08 RX ORDER — NALOXONE HCL 0.4 MG/ML
0.02 VIAL (ML) INJECTION
Status: DISCONTINUED | OUTPATIENT
Start: 2024-10-08 | End: 2024-10-18 | Stop reason: HOSPADM

## 2024-10-08 RX ORDER — CIPROFLOXACIN 500 MG/1
500 TABLET ORAL EVERY 12 HOURS
Status: DISCONTINUED | OUTPATIENT
Start: 2024-10-08 | End: 2024-10-08

## 2024-10-08 RX ORDER — GLUCAGON 1 MG
1 KIT INJECTION
Status: DISCONTINUED | OUTPATIENT
Start: 2024-10-08 | End: 2024-10-18 | Stop reason: HOSPADM

## 2024-10-08 RX ORDER — METRONIDAZOLE 500 MG/1
500 TABLET ORAL EVERY 8 HOURS
Status: DISCONTINUED | OUTPATIENT
Start: 2024-10-08 | End: 2024-10-08

## 2024-10-08 RX ORDER — SODIUM CHLORIDE 0.9 % (FLUSH) 0.9 %
10 SYRINGE (ML) INJECTION EVERY 12 HOURS PRN
Status: DISCONTINUED | OUTPATIENT
Start: 2024-10-08 | End: 2024-10-18 | Stop reason: HOSPADM

## 2024-10-08 RX ORDER — IBUPROFEN 200 MG
16 TABLET ORAL
Status: DISCONTINUED | OUTPATIENT
Start: 2024-10-08 | End: 2024-10-18 | Stop reason: HOSPADM

## 2024-10-08 RX ORDER — ONDANSETRON HYDROCHLORIDE 2 MG/ML
4 INJECTION, SOLUTION INTRAVENOUS EVERY 8 HOURS PRN
Status: DISCONTINUED | OUTPATIENT
Start: 2024-10-08 | End: 2024-10-18 | Stop reason: HOSPADM

## 2024-10-08 RX ADMIN — IOHEXOL 75 ML: 350 INJECTION, SOLUTION INTRAVENOUS at 06:10

## 2024-10-08 RX ADMIN — METRONIDAZOLE 500 MG: 500 TABLET ORAL at 06:10

## 2024-10-08 RX ADMIN — IOHEXOL 1000 ML: 12 SOLUTION ORAL at 05:10

## 2024-10-08 RX ADMIN — ERTAPENEM 0.5 G: 1 INJECTION INTRAMUSCULAR; INTRAVENOUS at 02:10

## 2024-10-08 RX ADMIN — CIPROFLOXACIN 500 MG: 500 TABLET ORAL at 09:10

## 2024-10-08 NOTE — ED PROVIDER NOTES
Encounter Date: 10/7/2024       History     Chief Complaint   Patient presents with    Shortness of Breath     Pt presents to eD today from home c/o SOB onset today ~ 1 hour ago        89-year-old female brought to the emergency department by EMS for evaluation of shortness of breath.  Onset today.  No relief with home inhalers.  Symptoms have been constant.  No eliciting, exacerbating, or alleviating factors.  Denies any cough or congestion or fever.  Notes some chest tightness similar to prior asthma exacerbations she associates with her shortness of breath but denies any real chest pain.  No other symptoms reported at this time.      Review of patient's allergies indicates:   Allergen Reactions    Codeine     Latex, natural rubber     Penicillins     Penicillin Rash     Past Medical History:   Diagnosis Date    Acute exacerbation of CHF (congestive heart failure) 10/27/2020    NATHEN (acute kidney injury) 11/30/2023    Hypertension     Syncope and collapse      Past Surgical History:   Procedure Laterality Date    A-V CARDIAC PACEMAKER INSERTION N/A 2/22/2021    Procedure: INSERTION, CARDIAC PACEMAKER, DUAL CHAMBER;  Surgeon: Norman Foote MD;  Location: [facility] EP LAB;  Service: Cardiology;  Laterality: N/A;  AVB, PPM upgrade to Dual PPM (right sided), Bio, Venogram prior to draping, MAC, CO, 3 Prep    CARDIAC CATHETERIZATION      CARDIAC PACEMAKER PLACEMENT      CHOLECYSTECTOMY      CORONARY ANGIOPLASTY      HYSTERECTOMY      REVISION OF SKIN POCKET FOR PACEMAKER  2/22/2021    Procedure: Revision, Skin Pocket, For Cardiac Pacemaker;  Surgeon: Norman Foote MD;  Location: [facility] EP LAB;  Service: Cardiology;;     Family History   Problem Relation Name Age of Onset    Liver disease Mother       Social History     Tobacco Use    Smoking status: Never    Smokeless tobacco: Never   Substance Use Topics    Alcohol use: No    Drug use: No     Review of Systems   Constitutional:  Negative for chills and fever.   HENT:   Negative for congestion.    Respiratory:  Positive for shortness of breath. Negative for cough.    Cardiovascular:  Positive for chest pain.   Gastrointestinal:  Negative for abdominal pain.   Musculoskeletal:  Negative for back pain.   Neurological:  Negative for light-headedness and headaches.       Physical Exam     Initial Vitals   BP Pulse Resp Temp SpO2   10/07/24 1820 10/07/24 1820 10/07/24 1820 10/07/24 1851 10/07/24 1820   (!) 96/42 90 (!) 22 97.5 °F (36.4 °C) (!) 92 %      MAP       --                Physical Exam    Nursing note and vitals reviewed.  Constitutional: She appears well-developed and well-nourished. No distress.   HENT:   Head: Normocephalic and atraumatic.   Eyes: Conjunctivae and EOM are normal. Pupils are equal, round, and reactive to light.   Neck: Neck supple. No tracheal deviation present.   Normal range of motion.  Cardiovascular:  Normal rate and intact distal pulses.           Pulmonary/Chest: She is in respiratory distress (Mild increased work of breathing).   Abdominal: Abdomen is soft. She exhibits no distension.   Musculoskeletal:         General: No tenderness. Normal range of motion.      Cervical back: Normal range of motion and neck supple.     Neurological: She is alert and oriented to person, place, and time. She has normal strength. No cranial nerve deficit. GCS score is 15. GCS eye subscore is 4. GCS verbal subscore is 5. GCS motor subscore is 6.   Skin: Skin is warm and dry.         ED Course   Critical Care    Date/Time: 10/7/2024 9:01 PM    Performed by: Niko Bolaños MD  Authorized by: Niko Bolaños MD  Direct patient critical care time: 15 minutes  Additional history critical care time: 15 minutes  Ordering / reviewing critical care time: 15 minutes  Documentation critical care time: 15 minutes  Consulting other physicians critical care time: 15 minutes  Consult with family critical care time: 10 minutes  Total critical care time (exclusive of  procedural time) : 85 minutes  Critical care time was exclusive of separately billable procedures and treating other patients.  Critical care was necessary to treat or prevent imminent or life-threatening deterioration of the following conditions: respiratory failure.  Critical care was time spent personally by me on the following activities: development of treatment plan with patient or surrogate, interpretation of cardiac output measurements, evaluation of patient's response to treatment, examination of patient, obtaining history from patient or surrogate, ordering and performing treatments and interventions, ordering and review of laboratory studies, ordering and review of radiographic studies, pulse oximetry, re-evaluation of patient's condition and review of old charts.        Labs Reviewed   CBC W/ AUTO DIFFERENTIAL - Abnormal       Result Value    WBC 8.19      RBC 4.58      Hemoglobin 12.8      Hematocrit 40.8      MCV 89      MCH 27.9      MCHC 31.4 (*)     RDW 14.9 (*)     Platelets 259      MPV 10.8      Immature Granulocytes 0.5      Gran # (ANC) 5.8      Immature Grans (Abs) 0.04      Lymph # 1.6      Mono # 0.6      Eos # 0.1      Baso # 0.05      nRBC 0      Gran % 70.3      Lymph % 19.7      Mono % 7.8      Eosinophil % 1.1      Basophil % 0.6      Differential Method Automated     COMPREHENSIVE METABOLIC PANEL - Abnormal    Sodium 135 (*)     Potassium 4.2      Chloride 92 (*)     CO2 27      Glucose 86      BUN 20      Creatinine 1.0      Calcium 9.2      Total Protein 7.4      Albumin 2.5 (*)     Total Bilirubin 0.7      Alkaline Phosphatase 87      AST 23      ALT 7 (*)     eGFR 52 (*)     Anion Gap 16     TROPONIN I - Abnormal    Troponin I 0.113 (*)    B-TYPE NATRIURETIC PEPTIDE - Abnormal     (*)    TROPONIN I - Abnormal    Troponin I 0.288 (*)     Narrative:     Collection has been rescheduled by NANCIE at 10/07/2024 22:03 Reason:   Nurse changing patient   MAGNESIUM    Magnesium 2.0      APTT   PROTIME-INR   POCT INFLUENZA A/B MOLECULAR    POC Molecular Influenza A Ag Negative      POC Molecular Influenza B Ag Negative       Acceptable Yes     SARS-COV-2 RDRP GENE    POC Rapid COVID Negative       Acceptable Yes       EKG Readings: (Independently Interpreted)   Initial Reading: No STEMI. Previous EKG: Compared with most recent EKG Previous EKG Date: 7/10/2024 (Minimal change). Rhythm: Paced Rhythm. Heart Rate: 69. Ectopy: No Ectopy. ST Segments: Normal ST Segments. Axis: Normal.   EKG independently interpreted by me pending Cardiology review           X-Rays:   Independently Interpreted Readings:   Other Readings:  Chest x-ray independently interpreted by me pending radiology review: No acute infiltrate, no pneumothorax, no effusion    Imaging Results              X-Ray Chest AP Portable (Final result)  Result time 10/07/24 19:58:05      Final result by Bradley Alford DO (10/07/24 19:58:05)                   Impression:      No acute abnormality.      Electronically signed by: Bradley Alford  Date:    10/07/2024  Time:    19:58               Narrative:    EXAMINATION:  XR CHEST AP PORTABLE    CLINICAL HISTORY:  Shortness of breath;    TECHNIQUE:  Single frontal view of the chest was performed.    COMPARISON:  07/10/2024.    FINDINGS:  There is a cardiac pacer, unchanged.  The lungs are well expanded.  There is chronic coarse interstitial prominence, stable across multiple prior examinations.  No new focal consolidation.  The pleural spaces are clear. The cardiac silhouette is borderline enlarged.  There are mitral annular calcifications.  There are calcifications of the aortic arch.  The visualized osseous structures demonstrate degenerative changes.                                      Medications   heparin 25,000 units in dextrose 5% (100 units/ml) IV bolus from bag LOW INTENSITY nomogram - [facility] (has no administration in time range)   heparin 25,000 units in  dextrose 5% 250 mL (100 units/mL) infusion LOW INTENSITY nomogram - [facility] (has no administration in time range)   heparin 25,000 units in dextrose 5% (100 units/ml) IV bolus from bag LOW INTENSITY nomogram - [facility] (has no administration in time range)   heparin 25,000 units in dextrose 5% (100 units/ml) IV bolus from bag LOW INTENSITY nomogram - [facility] (has no administration in time range)   sodium chloride 0.9% bolus 500 mL 500 mL (0 mLs Intravenous Stopped 10/7/24 2243)   predniSONE tablet 40 mg (40 mg Oral Given 10/7/24 2134)   albuterol-ipratropium 2.5 mg-0.5 mg/3 mL nebulizer solution 3 mL (3 mLs Nebulization Given by Other 10/7/24 2018)   aspirin tablet 325 mg (325 mg Oral Given 10/7/24 2135)     Medical Decision Making  89-year-old female presents emergency department complaining of shortness of breath      Differential: ACS, dissection, pneumonia, pneumothorax, CHF, COPD, anemia      Patient given some prednisone and neb treatments as well as light IV hydration and an aspirin.  Unfortunately, her 3 hour troponin has doubled.  I have ordered for a heparin infusion.  Discussed with hospitalist who will see and admit the patient.  Family brought to my attention that her wound pump her wound VAC to her left low back does not seem to be functioning.  There is some scant purulent output that family reports is improved.  The area around it, from what I can see given her positioning, weight, and the bed she is in, looks like there is a surrounding dermatitis from the wound pumps location.  Recommended to hospitalist that Wound Care see the patient in the morning for re-evaluation.  Patient and family are comfortable with admission at this time.  Patient not complaining of any abdominal pain or flank pain.    Problems Addressed:  NSTEMI (non-ST elevated myocardial infarction): acute illness or injury  Shortness of breath: acute illness or injury    Amount and/or Complexity of Data Reviewed  External Data Reviewed: ECG and  notes.     Details: Reviewed most recent EKG for comparison     Reviewed most recent discharge summary documenting baseline medications and past medical history  Labs: ordered.     Details: CBC without leukocytosis, normal H&H; CMP with normal renal and liver function tests, normal electrolytes; somewhat elevated troponin; somewhat elevated BNP  Radiology: ordered and independent interpretation performed. Decision-making details documented in ED Course.  ECG/medicine tests: ordered and independent interpretation performed. Decision-making details documented in ED Course.  Discussion of management or test interpretation with external provider(s): Discussed case with the hospitalist, reviewed patient's past medical history, presentation, labs, imaging, interventions, plan to admit    Risk  OTC drugs.  Prescription drug management.  Decision regarding hospitalization.    Critical Care  Total time providing critical care: 85 minutes                                      Clinical Impression:  Final diagnoses:  [R06.02] Shortness of breath  [I21.4] NSTEMI (non-ST elevated myocardial infarction) (Primary)          ED Disposition Condition    Observation                 Niko Bolaños MD  10/07/24 2613

## 2024-10-08 NOTE — ED NOTES
Assumed patient care at this time. First encounter - patient laying on ED stretcher resting comfortably with eyes open. A&Ox4. Respirations easy, even, and unlabored. VSS at this time and updated in EMR. Continuous monitoring in place at this time. Family member at bedside. Updated on current care plan. VU at this time. Bed in lowest and locked position for patient safety at this time. Call light in reach at this time. Denies further needs at this time.     Second IV started on patient and labs collected to start Heparin infusion.     Explained to daughter due to patient being [place] speaking. Daughter is interpreting for patient per patient's request.

## 2024-10-08 NOTE — ASSESSMENT & PLAN NOTE
- SBP 90s-100s  - on Coreg, Imdur and Diovan as an outpatient- on hold currently given marginal BP  - goal BP less than 130/80  - BP well controlled; anticipate slow resumption of medication regimen as BP tolerates; recommend starting with Coreg and Imdur

## 2024-10-08 NOTE — PLAN OF CARE
Cued into room to ask admission questions. Patient appears asleep. No distress.  Called daughter for admission questions. Reviewed plan of care with daughter. She verbalized understanding

## 2024-10-08 NOTE — ED NOTES
Pt's daughter at bedside\  Pt's daughter asked for writer and MD to look at drainage on pt's buttocks  Pt has a wound and drainage on left buttocks  Pt's daughter claims that drainage has stopped working and is no longer draining  MD aware

## 2024-10-08 NOTE — ASSESSMENT & PLAN NOTE
Initial troponin 0.115, repeat 0.288.  Patient placed on heparin drip  EKG with paced rhythm.  Cardiology consulted

## 2024-10-08 NOTE — ASSESSMENT & PLAN NOTE
- history of PPM for CHB  - admit EKG with V paced rhythm  - monitored on telemetry; normal functioning pacemaker; monitor on telemetry while admitted  - follow up for ongoing PPM checks

## 2024-10-08 NOTE — H&P
[facility] - Emergency Dept  Hospital Medicine  History & Physical    Patient Name: Marycruz Perez  MRN: [id number]  Patient Class: OP- Observation  Admission Date: 10/7/2024  Attending Physician: Genesis Coley*   Primary Care Provider: Melchor Menchaca -         Patient information was obtained from patient and ER records.     Subjective:     Principal Problem:<principal problem not specified>    Chief Complaint:   Chief Complaint   Patient presents with    Shortness of Breath     Pt presents to eD today from home c/o SOB onset today ~ 1 hour ago           HPI: Is a 89-year-old female with history of CHF, hypertension, ppm brought to the emergency department by EMS for evaluation of shortness of breath with the chest pain.  Additionally patient has been very weak and unable to get up.  She reports she has had a asthma exacerbations in her chest pain was take during these episodes however she is unsure if it was asthma versus true chest pain.  Patient is accompanied by her daughter who is her  per patient request.  In ED , troponin 0.115, repeat 0.288.  Chest x-ray benign.  EKG shows paced rhythm  Patient was started on heparin drip in ED.  We will be admitted to Hospital Medicine will consult Cardiology for eval.    On review of chart patient also has recent drain placed for LR which is a percutaneous 14 [place]onese drain placed for intra-abdominal abscess secondary to diverticulitis.  During remains intact, draining minimal volume    Past Medical History:   Diagnosis Date    Acute exacerbation of CHF (congestive heart failure) 10/27/2020    NATHEN (acute kidney injury) 11/30/2023    Hypertension     Syncope and collapse        Past Surgical History:   Procedure Laterality Date    A-V CARDIAC PACEMAKER INSERTION N/A 2/22/2021    Procedure: INSERTION, CARDIAC PACEMAKER, DUAL CHAMBER;  Surgeon: Norman Foote MD;  Location: [facility] EP LAB;  Service: Cardiology;  Laterality: N/A;  AVB, PPM upgrade to Dual  PPM (right sided), Bio, Venogram prior to draping, MAC, NE, 3 Prep    CARDIAC CATHETERIZATION      CARDIAC PACEMAKER PLACEMENT      CHOLECYSTECTOMY      CORONARY ANGIOPLASTY      HYSTERECTOMY      REVISION OF SKIN POCKET FOR PACEMAKER  2/22/2021    Procedure: Revision, Skin Pocket, For Cardiac Pacemaker;  Surgeon: Norman Foote MD;  Location: [facility] EP LAB;  Service: Cardiology;;       Review of patient's allergies indicates:   Allergen Reactions    Codeine     Latex, natural rubber     Penicillins     Penicillin Rash       Current Facility-Administered Medications on File Prior to Encounter   Medication    vancomycin in dextrose 5 % 1 gram/250 mL IVPB 1,000 mg     Current Outpatient Medications on File Prior to Encounter   Medication Sig    albuterol (PROVENTIL/VENTOLIN HFA) 90 mcg/actuation inhaler Inhale 2 puffs into the lungs every 4 (four) hours as needed for Wheezing (cough or wheezing). Rescue    albuterol-ipratropium (DUO-NEB) 2.5 mg-0.5 mg/3 mL nebulizer solution every 6 (six) hours as needed for Wheezing.    aspirin 81 MG Chew Take 1 tablet (81 mg total) by mouth once daily.    bumetanide (BUMEX) 0.5 MG Tab Take 2 tablets (1 mg total) by mouth once daily. May also take 2 tablets (1 mg total) daily as needed (> 2 pound weight gain in 24 hours, > 5 pound weight gain in a week, worsening leg swelling, shortness of breath).    carvediloL (COREG) 6.25 MG tablet Take 1 tablet (6.25 mg total) by mouth 2 (two) times daily with meals.    ciprofloxacin HCl (CIPRO) 500 MG tablet Take 1 tablet (500 mg total) by mouth 2 (two) times daily. for 14 days    diclofenac sodium (VOLTAREN) 1 % Gel Apply 4 g topically 3 (three) times daily as needed.    ezetimibe (ZETIA) 10 mg tablet Take 1 tablet (10 mg total) by mouth once daily.    famotidine (PEPCID) 20 MG tablet Take 1 tablet (20 mg total) by mouth nightly as needed for Heartburn (heartburn).    isosorbide mononitrate (IMDUR) 30 MG 24 hr tablet Take 1 tablet (30 mg total)  by mouth once daily.    meclizine (ANTIVERT) 25 mg tablet Take 25 mg by mouth 3 (three) times daily as needed (vertigo).    metroNIDAZOLE (FLAGYL) 500 MG tablet Take 1 tablet (500 mg total) by mouth 3 (three) times daily. for 14 days    nitroGLYCERIN (NITROSTAT) 0.4 MG SL tablet Place 1 tablet (0.4 mg total) under the tongue every 5 (five) minutes as needed for Chest pain (angina).    valsartan (DIOVAN) 40 MG tablet Take 1 tablet (40 mg total) by mouth once daily.     Family History       Problem Relation (Age of Onset)    Liver disease Mother          Tobacco Use    Smoking status: Never    Smokeless tobacco: Never   Substance and Sexual Activity    Alcohol use: No    Drug use: No    Sexual activity: Not on file     Review of Systems   All other systems reviewed and are negative.    Objective:     Vital Signs (Most Recent):  Temp: 97.5 °F (36.4 °C) (10/07/24 1851)  Pulse: 70 (10/08/24 0527)  Resp: 19 (10/08/24 0527)  BP: (!) 105/50 (10/08/24 0527)  SpO2: (!) 92 % (10/08/24 0527) Vital Signs (24h Range):  Temp:  [97.5 °F (36.4 °C)] 97.5 °F (36.4 °C)  Pulse:  [69-90] 70  Resp:  [12-40] 19  SpO2:  [92 %-98 %] 92 %  BP: ()/(42-63) 105/50     Weight: 80.7 kg (178 lb)  Body mass index is 35.95 kg/m².     Physical Exam  Vitals reviewed.   HENT:      Head: Normocephalic.      Mouth/Throat:      Mouth: Mucous membranes are moist.      Pharynx: Oropharynx is clear.   Eyes:      Pupils: Pupils are equal, round, and reactive to light.   Cardiovascular:      Rate and Rhythm: Normal rate and regular rhythm.      Pulses: Normal pulses.      Heart sounds: Normal heart sounds.   Pulmonary:      Effort: Pulmonary effort is normal.      Breath sounds: Normal breath sounds.   Abdominal:      General: Bowel sounds are normal.      Palpations: Abdomen is soft.   Musculoskeletal:         General: Normal range of motion.      Cervical back: Normal range of motion.   Skin:     General: Skin is warm and dry.      Capillary Refill:  Capillary refill takes less than 2 seconds.   Neurological:      General: No focal deficit present.      Mental Status: She is alert and oriented to person, place, and time. Mental status is at baseline.   Psychiatric:         Mood and Affect: Mood normal.         Behavior: Behavior normal.              CRANIAL NERVES     CN III, IV, VI   Pupils are equal, round, and reactive to light.       Significant Labs: All pertinent labs within the past 24 hours have been reviewed.  Recent Lab Results  (Last 5 results in the past 24 hours)        10/08/24  0528   10/07/24  2326   10/07/24  2220   10/07/24  2206   10/07/24  2038        POC Molecular Influenza A Ag         Negative       POC Molecular Influenza B Ag         Negative       PTT   28.0  Comment: Refer to local heparin nomogram for intensity/dose specific   therapeutic   range.  LOT^050^APTT FSL^[id number]               Baso # 0.02                0.02               Basophil % 0.3                0.3               Differential Method Automated                Automated               Eos # 0.0                0.0               Eos % 0.0                0.0               Gran # (ANC) 6.5                6.5               Gran % 82.5                82.5               Hematocrit 35.2                35.2               Hemoglobin 11.3                11.3               Immature Grans (Abs) 0.05  Comment: Mild elevation in immature granulocytes is non specific and   can be seen in a variety of conditions including stress response,   acute inflammation, trauma and pregnancy. Correlation with other   laboratory and clinical findings is essential.                  0.05  Comment: Mild elevation in immature granulocytes is non specific and   can be seen in a variety of conditions including stress response,   acute inflammation, trauma and pregnancy. Correlation with other   laboratory and clinical findings is essential.                 Immature Granulocytes 0.6                0.6                INR   1.2  Comment: Coumadin Therapy:  2.0 - 3.0 for INR for all indicators except mechanical heart valves  and antiphospholipid syndromes which should use 2.5 - 3.5.  LOT^040^PT Inn^[id number]               Lymph # 1.2                1.2               Lymph % 14.7                14.7               MCH 28.3                28.3               MCHC 32.1                32.1               MCV 88                88               Mono # 0.2                0.2               Mono % 1.9                1.9               MPV 10.5                10.5               nRBC 0                0               Platelet Count 219                219               PT   12.9              Acceptable       Yes   Yes       RBC 3.99                3.99               RDW 14.5                14.5               SARS-CoV-2 RNA, Amplification, Qual       Negative         Troponin I     0.288  Comment: The reference interval for Troponin I represents the 99th percentile   cutoff   for our facility and is consistent with 3rd generation assay   performance.             WBC 7.87                7.87                                      Significant Imaging: I have reviewed all pertinent imaging results/findings within the past 24 hours.  I have reviewed and interpreted all pertinent imaging results/findings within the past 24 hours.  Assessment/Plan:     Diverticulitis of intestine with abscess without bleeding  Patient now with percutaneous 14 [place] during placed on previous admission.  Drain remains in place  Continue home Cipro and Flagyl for 4 days to complete 14 day course of antibiotics    Chest pain  Initial troponin 0.115, repeat 0.288.  Patient placed on heparin drip  EKG with paced rhythm.  Cardiology consulted    Essential hypertension  Patients blood pressure range in the last 24 hours was: BP  Min: 96/42  Max: 108/54.The patient's inpatient anti-hypertensive regimen is listed below:  Current Antihypertensives       Plan  - BP  is controlled, no changes needed to their regimen  -     Cardiac pacemaker  In place   EKG paced rhythm        VTE Risk Mitigation (From admission, onward)           Ordered     IP VTE HIGH RISK PATIENT  Once         10/08/24 0222     Place sequential compression device  Until discontinued         10/08/24 0222     heparin 25,000 units in dextrose 5% (100 units/ml) IV bolus from bag LOW INTENSITY nomogram - [facility]  As needed (PRN)        Question:  Heparin Infusion Adjustment (DO NOT MODIFY ANSWER)  Answer:  [email or web address]\[email or web address]\Images\Pharmacy\HeparinInfusions\heparin LOW INTENSITY nomogram for [facility] [id number]    10/07/24 2306     heparin 25,000 units in dextrose 5% (100 units/ml) IV bolus from bag LOW INTENSITY nomogram - [facility]  As needed (PRN)        Question:  Heparin Infusion Adjustment (DO NOT MODIFY ANSWER)  Answer:  \\[email or web address]\epic\Images\Pharmacy\HeparinInfusions\heparin LOW INTENSITY nomogram for [facility] [id number]    10/07/24 2306     heparin 25,000 units in dextrose 5% 250 mL (100 units/mL) infusion LOW INTENSITY nomogram - [facility]  Continuous        Question:  Begin at (units/kg/hr)  Answer:  12    10/07/24 2306                         On 10/08/2024, patient should be placed in hospital observation services under my care in collaboration with Dr. Loomis.           Tonny Palafox NP  Department of Hospital Medicine  [facility] - Emergency Dept

## 2024-10-08 NOTE — HOSPITAL COURSE
10/8/2024 per HPI   10/9/2024 Echo yesterday with EF 45% mild global hypokinesis mild to moderate AS mild MS. CBC WNL. BMP WNL as well. Plan for exchange of drain in IR today. SBP 90s-120s HR stable

## 2024-10-08 NOTE — ASSESSMENT & PLAN NOTE
- last echo 7/2024 with normal LVEF mild to moderate AS; repeat echo today  -  on admission; lying flat with no acute distress; sats 94-96% on 2LPM; CXR reviewed  - on Coreg Bumex, Imdur and Diovan as an outpatient- antihypertensive regimen resumption as detailed under HTN  - no ADHF noted on exam

## 2024-10-08 NOTE — CONSULTS
[facility] - Telemetry  Cardiology  Consult Note    Patient Name: Marycruz Perez  MRN: [id number]  Admission Date: 10/7/2024  Hospital Length of Stay: 0 days  Code Status: DNR   Attending Provider: Verónica Scott MD   Consulting Provider: HUMBLE Garcia ANP  Primary Care Physician: Melchor Menchaca -  Principal Problem:<principal problem not specified>    Patient information was obtained from relative(s), past medical records, and ER records.     Cardiology-[facility]  Consult performed by: Shabnam Medellin APRN, GEOFF  Consult ordered by: Tonny Palafox NP  Reason for consult: elevated troponin; chest pain        Subjective:     Chief Complaint:  SOB and chest pain      HPI:   89yo female with CHB s/p PPM, chest pain, diverticulitis s/p IR guided drainage, HTN, CAD s/p PCI, rheumatic AS/MS, vasovagal syncope, HFpEF who presented to the ER with complaints of SOB for one hour accompanied by chest pain. She is primarily Nepali speaking therefore Dr. Romo provided interpretation. Daughter at bedside-English speaking. Her daughter provides her history she reports poor appetite that started prior to previous admission She also has noted decreased output from her drain for the past 4 days. She denies any n/v. She reports she complained of chest pain described as a punching sensation. Presented to the ER for evaluation. Admitted to [facility]. Cardiology consulted for evaluation of chest pain and elevated troponin. Labs CBC WNL. BMP WNL.  Troponin 0.113-0.288 down to 0.178 this AM. EKG V paced rhythm. Initial BP 96/42    Past Medical History:   Diagnosis Date    Acute exacerbation of CHF (congestive heart failure) 10/27/2020    NATHEN (acute kidney injury) 11/30/2023    Hypertension     Syncope and collapse        Past Surgical History:   Procedure Laterality Date    A-V CARDIAC PACEMAKER INSERTION N/A 2/22/2021    Procedure: INSERTION, CARDIAC PACEMAKER, DUAL CHAMBER;  Surgeon: Norman SCHERER  MD Qamar;  Location: [facility] EP [facility];  Service: Cardiology;  Laterality: N/A;  AVB, PPM upgrade to Dual PPM (right sided), Bio, Venogram prior to draping, MAC, [place], 3 Prep    CARDIAC CATHETERIZATION      CARDIAC PACEMAKER PLACEMENT      CHOLECYSTECTOMY      CORONARY ANGIOPLASTY      HYSTERECTOMY      REVISION OF SKIN POCKET FOR PACEMAKER  2/22/2021    Procedure: Revision, Skin Pocket, For Cardiac Pacemaker;  Surgeon: Norman Foote MD;  Location: [facility] EP [facility];  Service: Cardiology;;       Review of patient's allergies indicates:   Allergen Reactions    Codeine     Latex, natural rubber     Penicillins     Penicillin Rash       Current Facility-Administered Medications on File Prior to Encounter   Medication    vancomycin in dextrose 5 % 1 gram/250 mL IVPB 1,000 mg     Current Outpatient Medications on File Prior to Encounter   Medication Sig    albuterol (PROVENTIL/VENTOLIN HFA) 90 mcg/actuation inhaler Inhale 2 puffs into the lungs every 4 (four) hours as needed for Wheezing (cough or wheezing). Rescue    albuterol-ipratropium (DUO-NEB) 2.5 mg-0.5 mg/3 mL nebulizer solution every 6 (six) hours as needed for Wheezing.    aspirin 81 MG Chew Take 1 tablet (81 mg total) by mouth once daily.    bumetanide (BUMEX) 0.5 MG Tab Take 2 tablets (1 mg total) by mouth once daily. May also take 2 tablets (1 mg total) daily as needed (> 2 pound weight gain in 24 hours, > 5 pound weight gain in a week, worsening leg swelling, shortness of breath).    carvediloL (COREG) 6.25 MG tablet Take 1 tablet (6.25 mg total) by mouth 2 (two) times daily with meals.    ciprofloxacin HCl (CIPRO) 500 MG tablet Take 1 tablet (500 mg total) by mouth 2 (two) times daily. for 14 days    diclofenac sodium (VOLTAREN) 1 % Gel Apply 4 g topically 3 (three) times daily as needed.    ezetimibe (ZETIA) 10 mg tablet Take 1 tablet (10 mg total) by mouth once daily.    famotidine (PEPCID) 20 MG tablet Take 1 tablet (20 mg total) by mouth nightly as needed for  Heartburn (heartburn).    isosorbide mononitrate (IMDUR) 30 MG 24 hr tablet Take 1 tablet (30 mg total) by mouth once daily.    meclizine (ANTIVERT) 25 mg tablet Take 25 mg by mouth 3 (three) times daily as needed (vertigo).    metroNIDAZOLE (FLAGYL) 500 MG tablet Take 1 tablet (500 mg total) by mouth 3 (three) times daily. for 14 days    nitroGLYCERIN (NITROSTAT) 0.4 MG SL tablet Place 1 tablet (0.4 mg total) under the tongue every 5 (five) minutes as needed for Chest pain (angina).    valsartan (DIOVAN) 40 MG tablet Take 1 tablet (40 mg total) by mouth once daily.     Family History       Problem Relation (Age of Onset)    Liver disease Mother          Tobacco Use    Smoking status: Never    Smokeless tobacco: Never   Substance and Sexual Activity    Alcohol use: No    Drug use: No    Sexual activity: Not on file     Review of Systems   Constitutional: Negative for chills, decreased appetite, diaphoresis, fever, malaise/fatigue, weight gain and weight loss.   Cardiovascular:  Positive for chest pain. Negative for claudication, dyspnea on exertion, irregular heartbeat, leg swelling, near-syncope, orthopnea, palpitations and paroxysmal nocturnal dyspnea.   Respiratory:  Negative for cough, shortness of breath, snoring, sputum production and wheezing.    Endocrine: Negative for cold intolerance, heat intolerance, polydipsia, polyphagia and polyuria.   Skin:  Negative for color change, dry skin, itching, nail changes and poor wound healing.   Musculoskeletal:  Negative for back pain, gout, joint pain and joint swelling.   Gastrointestinal:  Negative for bloating, abdominal pain, constipation, diarrhea, hematemesis, hematochezia, melena, nausea and vomiting.   Genitourinary:  Negative for dysuria, hematuria and nocturia.   Neurological:  Negative for dizziness, headaches, light-headedness, numbness, paresthesias and weakness.   Psychiatric/Behavioral:  Negative for altered mental status, depression and memory loss.       Objective:     Vital Signs (Most Recent):  Temp: 97.5 °F (36.4 °C) (10/07/24 1851)  Pulse: 70 (10/08/24 0527)  Resp: 19 (10/08/24 0527)  BP: (!) 90/53 (10/08/24 0830)  SpO2: (!) 92 % (10/08/24 0527) Vital Signs (24h Range):  Temp:  [97.5 °F (36.4 °C)] 97.5 °F (36.4 °C)  Pulse:  [69-90] 70  Resp:  [12-40] 19  SpO2:  [92 %-98 %] 92 %  BP: ()/(42-63) 90/53     Weight: 80.7 kg (178 lb)  Body mass index is 35.95 kg/m².    SpO2: (!) 92 %       No intake or output data in the 24 hours ending 10/08/24 1102    Lines/Drains/Airways       Drain  Duration                  Closed/Suction Drain 09/25/24 1036 Inferior;Medial Back Accordion 14 Fr. 13 days              Peripheral Intravenous Line  Duration                  Peripheral IV - Single Lumen 10/07/24 1920 20 G Right Forearm <1 day         Peripheral IV - Single Lumen 10/07/24 2325 22 G Left Wrist <1 day                     Physical Exam  Constitutional:       General: She is not in acute distress.     Appearance: She is well-developed.   Cardiovascular:      Rate and Rhythm: Normal rate and regular rhythm.      Heart sounds: No murmur heard.     No gallop.   Pulmonary:      Effort: Pulmonary effort is normal. No respiratory distress.      Breath sounds: Normal breath sounds. No wheezing.   Abdominal:      General: Bowel sounds are normal. There is no distension.      Palpations: Abdomen is soft.      Tenderness: There is no abdominal tenderness.   Skin:     General: Skin is warm and dry.   Neurological:      Mental Status: She is alert and oriented to person, place, and time.          Significant Labs: BMP:   Recent Labs   Lab 10/07/24  1921 10/08/24  0528   GLU 86 139*   * 137   K 4.2 4.0   CL 92* 94*   CO2 27 27   BUN 20 26   CREATININE 1.0 1.2   CALCIUM 9.2 8.7   MG 2.0  --     and CBC   Recent Labs   Lab 10/07/24  1921 10/08/24  0528   WBC 8.19 7.87  7.87   HGB 12.8 11.3*  11.3*   HCT 40.8 35.2*  35.2*    219  219       Significant Imaging:  Echocardiogram: Transthoracic echo (TTE) complete (Cupid Only):   Results for orders placed or performed during the hospital encounter of 10/07/24   Echo   Result Value Ref Range    BSA 1.83 m2    Garcia's Biplane MOD Ejection Fraction 45 %    A2C EF 48 %    A4C EF 36 %    LVOT stroke volume 43.0 cm3    LVIDd 3.7 3.5 - 6.0 cm    LV Systolic Volume 33.33 mL    LV Systolic Volume Index 19.0 mL/m2    LVIDs 2.9 2.1 - 4.0 cm    LV ESV A2C 102.58 mL    LV Diastolic Volume 57.44 mL    LV ESV A4C 87.20 mL    LV Diastolic Volume Index 32.82 mL/m2    LV EDV A2C [id number] mL    LV EDV A4C 46.38 mL    Left Ventricular End Systolic Volume by Teichholz Method 33.33 mL    Left Ventricular End Diastolic Volume by Teichholz Method 57.44 mL    IVS 1.2 (A) 0.6 - 1.1 cm    LVOT diameter 2.0 cm    LVOT area 3.1 cm2    FS 21.6 (A) 28 - 44 %    Left Ventricle Relative Wall Thickness 0.65 cm    PW 1.2 (A) 0.6 - 1.1 cm    LV mass 147.3 g    LV Mass Index 84.2 g/m2    MV Peak E Juan M 1.60 m/s    TDI LATERAL 0.06 m/s    TDI SEPTAL 0.04 m/s    E/E' ratio 32.00 m/s    MV Peak A Juan M 0.68 m/s    TR Max Juan M 2.73 m/s    E/A ratio 2.35     E wave deceleration time 254.19 msec    LV SEPTAL E/E' RATIO 40.00 m/s    LV LATERAL E/E' RATIO 26.67 m/s    LVOT peak juan m 0.6 m/s    Left Ventricular Outflow Tract Mean Velocity 0.52 cm/s    Left Ventricular Outflow Tract Mean Gradient 1.12 mmHg    RV S' 10.53 cm/s    TAPSE 1.59 cm    LA size 4.62 cm    LA Vol (MOD) 98.45 mL    ZORAN (MOD) 56.3 mL/m2    RA area length vol 24.87 mL    RA Area 12.8 cm2    RA Vol 25.16 mL    AV mean gradient 11.4 mmHg    AV peak gradient 17.6 mmHg    Ao peak juan m 2.1 m/s    Ao VTI 49.1 cm    LVOT peak VTI 13.7 cm    AV valve area 0.9 cm²    AV Velocity Ratio 0.29     AV index (prosthetic) 0.28     ELMIRA by Velocity Ratio 0.9 cm²    Mr max juan m 4.22 m/s    MV peak gradient 10 mmHg    MV stenosis pressure 1/2 time 73.71 ms    MV valve area p 1/2 method 2.98 cm2    MV valve area by  continuity eq 1.10 cm2    MV VTI 39.1 cm    Triscuspid Valve Regurgitation Peak Gradient 30 mmHg    PV PEAK VELOCITY 0.99 m/s    PV peak gradient 4 mmHg    Sinus 3.26 cm    STJ 2.80 cm    Ascending aorta 2.84 cm    IVC diameter 1.87 cm    Mean e' 0.05 m/s    ZLVIDS -0.26     ZLVIDD -2.69     LA area A4C 27.26 cm2    LA area A2C 28.26 cm2     Assessment and Plan:     Chronic heart failure with preserved ejection fraction (HFpEF)  - last echo 7/2024 with normal LVEF mild to moderate AS; repeat echo today  -  on admission; lying flat with no acute distress; sats 94-96% on 2LPM; CXR reviewed  - on Coreg Bumex, Imdur and Diovan as an outpatient- antihypertensive regimen resumption as detailed under HTN  - no ADHF noted on exam     Chest pain  - isolated event with some atypical symptoms  - initial troponin 0.113 with peak at 0.288 down to 0.178 this AM; EKG with V paced rhythm  - currently chest pain free; history of PCI  - no concern for ACS currently; echo today and will await results; currently no plans for further workup other than echo     Essential hypertension  - SBP 90s-100s  - on Coreg, Imdur and Diovan as an outpatient- on hold currently given marginal BP  - goal BP less than 130/80  - BP well controlled; anticipate slow resumption of medication regimen as BP tolerates; recommend starting with Coreg and Imdur     Cardiac pacemaker  - history of PPM for CHB  - admit EKG with V paced rhythm  - monitored on telemetry; normal functioning pacemaker; monitor on telemetry while admitted  - follow up for ongoing PPM checks         VTE Risk Mitigation (From admission, onward)           Ordered     IP VTE HIGH RISK PATIENT  Once         10/08/24 0222     Place sequential compression device  Until discontinued         10/08/24 0222     heparin 25,000 units in dextrose 5% (100 units/ml) IV bolus from bag LOW INTENSITY nomogram - [facility]  As needed (PRN)        Question:  Heparin Infusion Adjustment (DO NOT MODIFY  ANSWER)  Answer:  [email or web address]\Images\Pharmacy\HeparinInfusions\heparin LOW INTENSITY nomogram for [facility] [id number]    10/07/24 2306     heparin 25,000 units in dextrose 5% (100 units/ml) IV bolus from bag LOW INTENSITY nomogram - [facility]  As needed (PRN)        Question:  Heparin Infusion Adjustment (DO NOT MODIFY ANSWER)  Answer:  [email or web address]\epic\Images\Pharmacy\HeparinInfusions\heparin LOW INTENSITY nomogram for [facility] [id number]    10/07/24 2306     heparin 25,000 units in dextrose 5% 250 mL (100 units/mL) infusion LOW INTENSITY nomogram - [facility]  Continuous        Question:  Begin at (units/kg/hr)  Answer:  12    10/07/24 2306                    Thank you for your consult. I will follow-up with patient. Please contact us if you have any additional questions.    HUMBLE Garcia, ANP  Cardiology   [place] - Telemetry

## 2024-10-08 NOTE — SUBJECTIVE & OBJECTIVE
Past Medical History:   Diagnosis Date    Acute exacerbation of CHF (congestive heart failure) 10/27/2020    NATHEN (acute kidney injury) 11/30/2023    Hypertension     Syncope and collapse        Past Surgical History:   Procedure Laterality Date    A-V CARDIAC PACEMAKER INSERTION N/A 2/22/2021    Procedure: INSERTION, CARDIAC PACEMAKER, DUAL CHAMBER;  Surgeon: Norman Foote MD;  Location: [facility] EP LAB;  Service: Cardiology;  Laterality: N/A;  AVB, PPM upgrade to Dual PPM (right sided), Bio, Venogram prior to draping, MAC, [place], 3 Prep    CARDIAC CATHETERIZATION      CARDIAC PACEMAKER PLACEMENT      CHOLECYSTECTOMY      CORONARY ANGIOPLASTY      HYSTERECTOMY      REVISION OF SKIN POCKET FOR PACEMAKER  2/22/2021    Procedure: Revision, Skin Pocket, For Cardiac Pacemaker;  Surgeon: Norman Foote MD;  Location: [facility] EP [facility];  Service: Cardiology;;       Review of patient's allergies indicates:   Allergen Reactions    Codeine     Latex, natural rubber     Penicillins     Penicillin Rash       Current Facility-Administered Medications on File Prior to Encounter   Medication    vancomycin in dextrose 5 % 1 gram/250 mL IVPB 1,000 mg     Current Outpatient Medications on File Prior to Encounter   Medication Sig    albuterol (PROVENTIL/VENTOLIN HFA) 90 mcg/actuation inhaler Inhale 2 puffs into the lungs every 4 (four) hours as needed for Wheezing (cough or wheezing). Rescue    albuterol-ipratropium (DUO-NEB) 2.5 mg-0.5 mg/3 mL nebulizer solution every 6 (six) hours as needed for Wheezing.    aspirin 81 MG Chew Take 1 tablet (81 mg total) by mouth once daily.    bumetanide (BUMEX) 0.5 MG Tab Take 2 tablets (1 mg total) by mouth once daily. May also take 2 tablets (1 mg total) daily as needed (> 2 pound weight gain in 24 hours, > 5 pound weight gain in a week, worsening leg swelling, shortness of breath).    carvediloL (COREG) 6.25 MG tablet Take 1 tablet (6.25 mg total) by mouth 2 (two) times daily with meals.    ciprofloxacin  HCl (CIPRO) 500 MG tablet Take 1 tablet (500 mg total) by mouth 2 (two) times daily. for 14 days    diclofenac sodium (VOLTAREN) 1 % Gel Apply 4 g topically 3 (three) times daily as needed.    ezetimibe (ZETIA) 10 mg tablet Take 1 tablet (10 mg total) by mouth once daily.    famotidine (PEPCID) 20 MG tablet Take 1 tablet (20 mg total) by mouth nightly as needed for Heartburn (heartburn).    isosorbide mononitrate (IMDUR) 30 MG 24 hr tablet Take 1 tablet (30 mg total) by mouth once daily.    meclizine (ANTIVERT) 25 mg tablet Take 25 mg by mouth 3 (three) times daily as needed (vertigo).    metroNIDAZOLE (FLAGYL) 500 MG tablet Take 1 tablet (500 mg total) by mouth 3 (three) times daily. for 14 days    nitroGLYCERIN (NITROSTAT) 0.4 MG SL tablet Place 1 tablet (0.4 mg total) under the tongue every 5 (five) minutes as needed for Chest pain (angina).    valsartan (DIOVAN) 40 MG tablet Take 1 tablet (40 mg total) by mouth once daily.     Family History       Problem Relation (Age of Onset)    Liver disease Mother          Tobacco Use    Smoking status: Never    Smokeless tobacco: Never   Substance and Sexual Activity    Alcohol use: No    Drug use: No    Sexual activity: Not on file     Review of Systems   All other systems reviewed and are negative.    Objective:     Vital Signs (Most Recent):  Temp: 97.5 °F (36.4 °C) (10/07/24 1851)  Pulse: 70 (10/08/24 0527)  Resp: 19 (10/08/24 0527)  BP: (!) 105/50 (10/08/24 0527)  SpO2: (!) 92 % (10/08/24 0527) Vital Signs (24h Range):  Temp:  [97.5 °F (36.4 °C)] 97.5 °F (36.4 °C)  Pulse:  [69-90] 70  Resp:  [12-40] 19  SpO2:  [92 %-98 %] 92 %  BP: ()/(42-63) 105/50     Weight: 80.7 kg (178 lb)  Body mass index is 35.95 kg/m².     Physical Exam  Vitals reviewed.   JOHNATHAN:      Head: Normocephalic.      Mouth/Throat:      Mouth: Mucous membranes are moist.      Pharynx: Oropharynx is clear.   Eyes:      Pupils: Pupils are equal, round, and reactive to light.   Cardiovascular:       Rate and Rhythm: Normal rate and regular rhythm.      Pulses: Normal pulses.      Heart sounds: Normal heart sounds.   Pulmonary:      Effort: Pulmonary effort is normal.      Breath sounds: Normal breath sounds.   Abdominal:      General: Bowel sounds are normal.      Palpations: Abdomen is soft.   Musculoskeletal:         General: Normal range of motion.      Cervical back: Normal range of motion.   Skin:     General: Skin is warm and dry.      Capillary Refill: Capillary refill takes less than 2 seconds.   Neurological:      General: No focal deficit present.      Mental Status: She is alert and oriented to person, place, and time. Mental status is at baseline.   Psychiatric:         Mood and Affect: Mood normal.         Behavior: Behavior normal.              CRANIAL NERVES     CN III, IV, VI   Pupils are equal, round, and reactive to light.       Significant Labs: All pertinent labs within the past 24 hours have been reviewed.  Recent Lab Results  (Last 5 results in the past 24 hours)        10/08/24  0528   10/07/24  2326   10/07/24  2220   10/07/24  2206   10/07/24  2038        POC Molecular Influenza A Ag         Negative       POC Molecular Influenza B Ag         Negative       PTT   28.0  Comment: Refer to local heparin nomogram for intensity/dose specific   therapeutic   range.  LOT^050^APTT FSL^[id number]               Baso # 0.02                0.02               Basophil % 0.3                0.3               Differential Method Automated                Automated               Eos # 0.0                0.0               Eos % 0.0                0.0               Gran # (ANC) 6.5                6.5               Gran % 82.5                82.5               Hematocrit 35.2                35.2               Hemoglobin 11.3                11.3               Immature Grans (Abs) 0.05  Comment: Mild elevation in immature granulocytes is non specific and   can be seen in a variety of conditions including stress  response,   acute inflammation, trauma and pregnancy. Correlation with other   laboratory and clinical findings is essential.                  0.05  Comment: Mild elevation in immature granulocytes is non specific and   can be seen in a variety of conditions including stress response,   acute inflammation, trauma and pregnancy. Correlation with other   laboratory and clinical findings is essential.                 Immature Granulocytes 0.6                0.6               INR   1.2  Comment: Coumadin Therapy:  2.0 - 3.0 for INR for all indicators except mechanical heart valves  and antiphospholipid syndromes which should use 2.5 - 3.5.  LOT^040^PT Inn^[id number]               Lymph # 1.2                1.2               Lymph % 14.7                14.7               MCH 28.3                28.3               MCHC 32.1                32.1               MCV 88                88               Mono # 0.2                0.2               Mono % 1.9                1.9               MPV 10.5                10.5               nRBC 0                0               Platelet Count 219                219               PT   12.9              Acceptable       Yes   Yes       RBC 3.99                3.99               RDW 14.5                14.5               SARS-CoV-2 RNA, Amplification, Qual       Negative         Troponin I     0.288  Comment: The reference interval for Troponin I represents the 99th percentile   cutoff   for our facility and is consistent with 3rd generation assay   performance.             WBC 7.87                7.87                                      Significant Imaging: I have reviewed all pertinent imaging results/findings within the past 24 hours.  I have reviewed and interpreted all pertinent imaging results/findings within the past 24 hours.

## 2024-10-08 NOTE — ASSESSMENT & PLAN NOTE
Patient now with percutaneous 14 Vincentian during placed on previous admission.  Drain remains in place  Continue home Cipro and Flagyl for 4 days to complete 14 day course of antibiotics

## 2024-10-08 NOTE — HPI
89yo female with CHB s/p PPM, chest pain, diverticulitis s/p IR guided drainage, HTN, CAD s/p PCI, rheumatic AS/MS, vasovagal syncope, HFpEF who presented to the ER with complaints of SOB for one hour accompanied by chest pain. She is primarily English speaking therefore Dr. Romo provided interpretation. Daughter at bedside-English speaking. Her daughter provides her history she reports poor appetite that started prior to previous admission She also has noted decreased output from her drain for the past 4 days. She denies any n/v. She reports she complained of chest pain described as a punching sensation. Presented to the ER for evaluation. Admitted to [facility]. Cardiology consulted for evaluation of chest pain and elevated troponin. Labs CBC WNL. BMP WNL.  Troponin 0.113-0.288 down to 0.178 this AM. EKG V paced rhythm. Initial BP 96/42

## 2024-10-08 NOTE — HPI
Is a 89-year-old female with history of CHF, hypertension, ppm brought to the emergency department by EMS for evaluation of shortness of breath with the chest pain.  Additionally patient has been very weak and unable to get up.  She reports she has had a asthma exacerbations in her chest pain was take during these episodes however she is unsure if it was asthma versus true chest pain.  Patient is accompanied by her daughter who is her  per patient request.  In ED , troponin 0.115, repeat 0.288.  Chest x-ray benign.  EKG shows paced rhythm  Patient was started on heparin drip in ED.  We will be admitted to Hospital Medicine will consult Cardiology for eval.    On review of chart patient also has recent drain placed for LR which is a percutaneous 14 Romansh drain placed for intra-abdominal abscess secondary to diverticulitis.  During remains intact, draining minimal volume   Patient : Alexandra Delgado Age: 68 year old Sex: female   MRN: [id number] Encounter Date: 3/15/2020      History     Chief Complaint   Patient presents with   • Leg Swelling       History of Present Illness  1712 examined myself.  Patient has a history of CHF and cellulitis in her legs.  She has been having increasing redness and itching to her lower legs and increasing swelling to her legs in the last several days.  No fevers or chills but feeling anxious and shaky at times.  No headache or visual disturbances a slight nonproductive cough today but no or sore throat.  No chest pain or shortness of breath.  No abdominal pain dysuria urgency frequency.  No other skin rashes.  No tongue or throat swelling but her face feels swollen.  No other skin rashes.  No bleeding or bruising problems.  No polydipsia polyuria or polyphagia.    Review of Systems  See above for constitutional, neurologic, eyes, ENT, respiratory, cardiovascular, GI, genitourinary, musculoskeletal, hematologic, skin, endocrine    Allergies   Allergen Reactions   • Chlorine SHORTNESS OF BREATH     Respiratory issues     • [place]um SHORTNESS OF BREATH     Sinus issues   • Adhesive   (Environmental) RASH   • Bee Sting Other (See Comments)   • Codeine SWELLING   • Dust Other (See Comments)   • Lanolin RASH   • Latex RASH       There are no discharge medications for this patient.      There are no discharge medications for this patient.      Past Medical History:   Diagnosis Date   • Essential (primary) hypertension    • High cholesterol    • Myocardial infarction (CMS/HCC)    • RAD (reactive airway disease)        Past Surgical History:   Procedure Laterality Date   • CARDIAC CATHERIZATION       Family history: Father with MI in his 60s  Social history: Tobacco none  Social History     Tobacco Use   • Smoking status: Not on file   Substance Use Topics   • Alcohol use: Not on file   • Drug use: Not on file           Physical Exam     ED Triage Vitals  [03/15/20 1715]   BP (!) 156/84   Heart Rate 66   Resp 17   Temp 98.1 °F (36.7 °C)   SpO2 100 %        Visit Vitals  BP (!) 156/84 (BP Location: LUE - Left upper extremity, Patient Position: Sitting)   Pulse 66   Temp 98.1 °F (36.7 °C) (Oral)   Resp 17   Ht 5' 5.35\" (1.66 m)   Wt 124.5 kg (274 lb 7.6 oz)   SpO2 100%   BMI 45.18 kg/m²      Temp:  [98.1 °F (36.7 °C)] 98.1 °F (36.7 °C)  Heart Rate:  [66] 66  Resp:  [17] 17  BP: (156)/(84) 156/84   Pulse oximetry 100 % on room air, consider normal    Physical Exam  Physical Exam  Vitals signs reviewed.   Constitutional:       General: She is not in acute distress.     Appearance: Normal appearance. She is not ill-appearing, toxic-appearing or diaphoretic.   HENT:      Head:      Comments: No tongue or uvular edema and no obvious swelling to the face     Right Ear: Tympanic membrane, ear canal and external ear normal.      Left Ear: Tympanic membrane, ear canal and external ear normal.      Mouth/Throat:      Mouth: Mucous membranes are moist.      Pharynx: Oropharynx is clear. No posterior oropharyngeal erythema.   Eyes:      Extraocular Movements: Extraocular movements intact.      Conjunctiva/sclera: Conjunctivae normal.      Pupils: Pupils are equal, round, and reactive to light.   Neck:      Musculoskeletal: Normal range of motion and neck supple. No muscular tenderness.      Comments: No JVD or hepatojugular reflux  Cardiovascular:      Rate and Rhythm: Normal rate and regular rhythm.      Pulses: Normal pulses.      Heart sounds: Normal heart sounds.   Pulmonary:      Effort: Pulmonary effort is normal. No respiratory distress.      Breath sounds: Normal breath sounds. No stridor. No wheezing, rhonchi or rales.   Chest:      Chest wall: No tenderness.   Abdominal:      General: Abdomen is flat. Bowel sounds are normal. There is no distension.      Palpations: Abdomen is soft. There is no mass.      Tenderness: There is no abdominal tenderness. There is no right  CVA tenderness, left CVA tenderness, guarding or rebound.   Musculoskeletal:      Comments: 1+ pitting pretibial edema.  Mild warmth erythema and slight tenderness diffusely to the lower legs bilaterally.  The skin is intact.     Lymphadenopathy:      Cervical: No cervical adenopathy.   Skin:     General: Skin is warm and dry.      Capillary Refill: Capillary refill takes less than 2 seconds.      Findings: No rash.   Neurological:      Mental Status: She is alert.              ED Course     Procedures  Medications   ceFAZolin (ANCEF) syringe 1,000 mg (has no administration in time range)     ED Course as of Mar 15 1941   Sun Mar 15, 2020   1936 Patient discussed with Dr. Coronado on-call for medicine and will be admitted for observation to his service.  We will give Ancef IV for cellulitis        Lab Results     Admission on 03/15/2020   Component Date Value Ref Range Status   • Sodium 03/15/2020 140  135 - 145 mmol/L Final   • Potassium 03/15/2020 4.0  3.4 - 5.1 mmol/L Final    Slight to moderate hemolysis, result may be falsely increased.   • Chloride 03/15/2020 105  98 - 107 mmol/L Final   • Carbon Dioxide 03/15/2020 30  21 - 32 mmol/L Final   • Anion Gap 03/15/2020 9* 10 - 20 mmol/L Final   • Glucose 03/15/2020 85  65 - 99 mg/dL Final   • BUN 03/15/2020 30* 6 - 20 mg/dL Final   • Creatinine 03/15/2020 0.72  0.51 - 0.95 mg/dL Final   • GFR Estimate,  03/15/2020 >90   Final    eGFR results = or >90 mL/min/1.73m2 = Normal kidney function.   • GFR Estimate, Non  03/15/2020 86   Final    eGFR 60 - 89 mL/min/1.73m2 = Mild decrease in kidney function.   • BUN/Creatinine Ratio 03/15/2020 42* 7 - 25 Final   • CALCIUM 03/15/2020 8.9  8.4 - 10.2 mg/dL Final   • WBC 03/15/2020 10.4  4.2 - 11.0 K/mcL Final   • RBC 03/15/2020 4.92  4.00 - 5.20 mil/mcL Final   • HGB 03/15/2020 13.3  12.0 - 15.5 g/dL Final   • HCT 03/15/2020 42.8  36.0 - 46.5 % Final   • MCV 03/15/2020 87.0  78.0 - 100.0 fl  Final   • MCH 03/15/2020 27.0  26.0 - 34.0 pg Final   • MCHC 03/15/2020 31.1* 32.0 - 36.5 g/dL Final   • RDW-CV 03/15/2020 12.9  11.0 - 15.0 % Final   • PLT 03/15/2020 231  140 - 450 K/mcL Final   • NRBC 03/15/2020 0  0 /100 WBC Final   • DIFF TYPE 03/15/2020 AUTOMATED DIFFERENTIAL   Final   • Neutrophil 03/15/2020 70  % Final   • LYMPH 03/15/2020 18  % Final   • MONO 03/15/2020 8  % Final   • EOSIN 03/15/2020 3  % Final   • BASO 03/15/2020 1  % Final   • Percent Immature Granuloctyes 03/15/2020 0  % Final   • Absolute Neutrophil 03/15/2020 7.3  1.8 - 7.7 K/mcL Final   • Absolute Lymph 03/15/2020 1.8  1.0 - 4.0 K/mcL Final   • Absolute Mono 03/15/2020 0.8  0.3 - 0.9 K/mcL Final   • Absolute Eos 03/15/2020 0.3  0.1 - 0.5 K/mcL Final   • Absolute Baso 03/15/2020 0.1  0.0 - 0.3 K/mcL Final   • Absolute Immature Granulocytes 03/15/2020 0.0  0 - 0.2 K/mcl Final   • D Dimer Quantitative 03/15/2020 0.27  <0.57 mg/L (FEU) Final    Values <0.50 mg/L (FEU) may be useful to help exclude DVT or PE in patients at low clinical risk for these disorders.   • Albumin 03/15/2020 3.8  3.6 - 5.1 g/dL Final   • TOTAL BILIRUBIN 03/15/2020 0.5  0.2 - 1.0 mg/dL Final   • DIRECT BILIRUBIN 03/15/2020 0.1  0.0 - 0.2 mg/dL Final    Direct bilirubin may be falsely decreased due to sample hemolysis.   • ALK PHOSPHATASE 03/15/2020 140* 45 - 117 Units/L Final   • ALT/SGPT 03/15/2020 33  <64 Units/L Final   • AST/SGOT 03/15/2020 33  <38 Units/L Final    Slight to moderate hemolysis, result may be falsely increased.   • TOTAL PROTEIN 03/15/2020 7.0  6.4 - 8.2 g/dL Final   • MAGNESIUM 03/15/2020 2.1  1.7 - 2.4 mg/dL Final   • NT proBNP 03/15/2020 39  <126 pg/mL Final   • PTT 03/15/2020 24  22 - 32 sec Final    PTT  Therapeutic Range:  45-70 seconds.   • PROCALCITONIN 03/15/2020 <0.05  <0.10 ng/mL Final    Comment:    Bacterial Sepsis:  < 0.5 ng/mL Sepsis not likely. Localized bacterial infection possible  = 0.5 - 2 ng/mL Sepsis or other conditions  possible  > 2 ng/mL High risk of sepsis/septic shock     NOTE: If bacterial sepsis is of high clinical suspicion but PCT<2 ng/mL, consider recheck PCT 6-24 hours after initial test.     Lower Respiratory Tract Infection (LRTI):  <0.1 ng/mL Bacterial infection highly unlikely  0.1 - 0.25 ng/mL Bacterial infection unlikely  0.26 - 0.5 ng/mL Bacterial infection likely  > 0.5 ng/mL Bacterial infection highly likely     NOTE: Patients with advanced CKD or medical/surgical trauma may have elevated baseline PCT (>0.5 ng/mL) in the absence of bacterial infection. If bacterial infection is suspected, consider repeat PCT in 2 to 4 days to guide de-escalation or   discontinuation of antibiotic therapy.     Higher reference range cutoffs may be appropriate for patients less than 3 days old.     • PROTIME 03/15/2020 10.4  9.7 - 11.8 sec Final   • INR 03/15/2020 1.0   Final    INR Therapeutic Range: 2.0 to 3.0 (2.5 to 3.5 recommended for recurrent thrombotic episodes and mechanical prosthetic heart valves.)    • TSH 03/15/2020 2.610  0.350 - 5.000 mcUnits/mL Final    Comment: Findings most consistent with euthyroid state, no additional testing suggested. TSH may be normal in patients with thyroid dysfunction and pituitary disease. Clinical correlation recommended.  (Reflex TSH algorithm is not recommended in hospitalized patients. A variety of drugs, as well as serious acute and chronic illnesses may alter thyroid function tests. Commonly implicated drugs include glucocorticoids, dopamine, carbamazepine, iodine,   amiodarone, lithium and heparin.)     • TROPONIN I 03/15/2020 <0.02  <0.05 ng/mL Final   • URIC ACID 03/15/2020 5.1  2.6 - 5.9 mg/dL Final        EKG Results     EKG Interpretation    Comment: Heart rate 63.  Sinus rhythm with PACs.  Moderate voltage for LVH, may be normal variant.  Borderline ECG.  ID interval, QRS duration and axes normal.  No significant change from 10/24/2019    EKG tracing interpreted by ED  physician    Radiology Results   Xr Chest Pa Or Ap 1 View    Result Date: 3/15/2020  Narrative EXAM: XR CHEST PA OR AP 1 VIEW CLINICAL INDICATION: Bilateral leg swelling.  Cough and chest pain. COMPARISON: Chest 02/20/2020, CT abdomen pelvis 03/21/2020 FINDINGS: AP portable upright chest. The heart is nonenlarged.  There is no pulmonary edema.  There is no focal lung consolidation or mass.  No pleural effusion.  There is no pneumothorax.     Impression 1.  No acute cardiopulmonary abnormality. Electronically Signed by: DANY POTTS M.D. Signed on: 3/15/2020 5:48 PM     Xr Chest Pa Or Ap 1 View    Result Date: 2/20/2020  Narrative EXAM: XR CHEST PA OR AP 1 VIEW CLINICAL INDICATION: Chest pain COMPARISON: 10/24/2019 FINDINGS: Cardiomegaly.   No acute airspace consolidation or edema.     Impression  As above. Electronically Signed by: ELIEZER JUNG M.D. Signed on: 2/20/2020 1:11 PM     Us Vasc Extremity Lower Venous Duplex    Result Date: 2/20/2020  Narrative EXAM: US VASC EXTREMITY LOWER VENOUS DUPLEX BILATERAL CLINICAL INDICATION: Edema.  Pain. COMPARISON: There are no prior examinations currently available for comparison. TECHNIQUE: Grayscale, color flow Doppler and spectral waveform analysis of both lower extremity veins was performed. FINDINGS: There is a spontaneous and phasic flow to the deep veins of the lower extremities. There is normal proximal and distal augmentation of the Doppler signal. All the visualized deep veins are compressible. The calf veins are fairly well-visualized.     Impression 1. There is no evidence of deep venous thrombosis in the lower extremities. Electronically Signed by: SCARLET ESCOBAR M.D. Signed on: 2/20/2020 1:50 PM       Medical Decision Making   MDM     Clinical Impression     ED Diagnosis        Final diagnosis    Cellulitis of lower leg                     Disposition      Admit      Admit 3/15/2020  7:41 PM  Telemetry Bed?: No  Patient Class: Observation [4]  Level of Care:  Acute [1]  Admission Diagnosis: Cellulitis of leg [738131]  Admitting Physician: FOZIA WELLINGTON [[id number]]  Is this a telephone or verbal order?: This is a telephone order from the admitting physician       Ariel Reina MD  03/15/20 1200

## 2024-10-08 NOTE — ASSESSMENT & PLAN NOTE
- isolated event with some atypical symptoms  - initial troponin 0.113 with peak at 0.288 down to 0.178 this AM; EKG with V paced rhythm  - currently chest pain free; history of PCI  - no concern for ACS currently; echo today and will await results; currently no plans for further workup other than echo

## 2024-10-08 NOTE — ASSESSMENT & PLAN NOTE
Patients blood pressure range in the last 24 hours was: BP  Min: 96/42  Max: 108/54.The patient's inpatient anti-hypertensive regimen is listed below:  Current Antihypertensives       Plan  - BP is controlled, no changes needed to their regimen  -

## 2024-10-09 PROBLEM — I21.4 NSTEMI (NON-ST ELEVATED MYOCARDIAL INFARCTION): Status: RESOLVED | Noted: 2024-10-08 | Resolved: 2024-10-09

## 2024-10-09 LAB
ALBUMIN SERPL BCP-MCNC: 2.2 G/DL (ref 3.5–5.2)
ALP SERPL-CCNC: 91 U/L (ref 55–135)
ALT SERPL W/O P-5'-P-CCNC: 6 U/L (ref 10–44)
ANION GAP SERPL CALC-SCNC: 14 MMOL/L (ref 8–16)
AST SERPL-CCNC: 15 U/L (ref 10–40)
BASOPHILS # BLD AUTO: 0.03 K/UL (ref 0–0.2)
BASOPHILS NFR BLD: 0.3 % (ref 0–1.9)
BILIRUB SERPL-MCNC: 0.3 MG/DL (ref 0.1–1)
BUN SERPL-MCNC: 40 MG/DL (ref 10–30)
CALCIUM SERPL-MCNC: 8.7 MG/DL (ref 8.7–10.5)
CHLORIDE SERPL-SCNC: 93 MMOL/L (ref 95–110)
CO2 SERPL-SCNC: 26 MMOL/L (ref 23–29)
CREAT SERPL-MCNC: 1.4 MG/DL (ref 0.5–1.4)
DIFFERENTIAL METHOD BLD: ABNORMAL
EOSINOPHIL # BLD AUTO: 0 K/UL (ref 0–0.5)
EOSINOPHIL NFR BLD: 0.1 % (ref 0–8)
ERYTHROCYTE [DISTWIDTH] IN BLOOD BY AUTOMATED COUNT: 14.8 % (ref 11.5–14.5)
EST. GFR  (NO RACE VARIABLE): 35 ML/MIN/1.73 M^2
GLUCOSE SERPL-MCNC: 118 MG/DL (ref 70–110)
HCT VFR BLD AUTO: 39.8 % (ref 37–48.5)
HGB BLD-MCNC: 12.4 G/DL (ref 12–16)
IMM GRANULOCYTES # BLD AUTO: 0.06 K/UL (ref 0–0.04)
IMM GRANULOCYTES NFR BLD AUTO: 0.6 % (ref 0–0.5)
LYMPHOCYTES # BLD AUTO: 1.6 K/UL (ref 1–4.8)
LYMPHOCYTES NFR BLD: 14.5 % (ref 18–48)
MCH RBC QN AUTO: 27.8 PG (ref 27–31)
MCHC RBC AUTO-ENTMCNC: 31.2 G/DL (ref 32–36)
MCV RBC AUTO: 89 FL (ref 82–98)
MONOCYTES # BLD AUTO: 1.1 K/UL (ref 0.3–1)
MONOCYTES NFR BLD: 10.4 % (ref 4–15)
NEUTROPHILS # BLD AUTO: 8.1 K/UL (ref 1.8–7.7)
NEUTROPHILS NFR BLD: 74.1 % (ref 38–73)
NRBC BLD-RTO: 0 /100 WBC
PLATELET # BLD AUTO: 222 K/UL (ref 150–450)
PMV BLD AUTO: 10.5 FL (ref 9.2–12.9)
POTASSIUM SERPL-SCNC: 3.7 MMOL/L (ref 3.5–5.1)
PROT SERPL-MCNC: 6.3 G/DL (ref 6–8.4)
RBC # BLD AUTO: 4.46 M/UL (ref 4–5.4)
SODIUM SERPL-SCNC: 133 MMOL/L (ref 136–145)
WBC # BLD AUTO: 10.86 K/UL (ref 3.9–12.7)

## 2024-10-09 PROCEDURE — 99900035 HC TECH TIME PER 15 MIN (STAT)

## 2024-10-09 PROCEDURE — A4216 STERILE WATER/SALINE, 10 ML: HCPCS | Performed by: PHYSICIAN ASSISTANT

## 2024-10-09 PROCEDURE — 27000221 HC OXYGEN, UP TO 24 HOURS

## 2024-10-09 PROCEDURE — 85025 COMPLETE CBC W/AUTO DIFF WBC: CPT | Performed by: REGISTERED NURSE

## 2024-10-09 PROCEDURE — 25000003 PHARM REV CODE 250: Performed by: INTERNAL MEDICINE

## 2024-10-09 PROCEDURE — 27000207 HC ISOLATION

## 2024-10-09 PROCEDURE — 0W2JX0Z CHANGE DRAINAGE DEVICE IN PELVIC CAVITY, EXTERNAL APPROACH: ICD-10-PCS | Performed by: RADIOLOGY

## 2024-10-09 PROCEDURE — 63600175 PHARM REV CODE 636 W HCPCS: Performed by: RADIOLOGY

## 2024-10-09 PROCEDURE — 80053 COMPREHEN METABOLIC PANEL: CPT | Performed by: REGISTERED NURSE

## 2024-10-09 PROCEDURE — 25000003 PHARM REV CODE 250: Performed by: PHYSICIAN ASSISTANT

## 2024-10-09 PROCEDURE — 36415 COLL VENOUS BLD VENIPUNCTURE: CPT | Performed by: REGISTERED NURSE

## 2024-10-09 PROCEDURE — 99233 SBSQ HOSP IP/OBS HIGH 50: CPT | Mod: ,,, | Performed by: NURSE PRACTITIONER

## 2024-10-09 PROCEDURE — 11000001 HC ACUTE MED/SURG PRIVATE ROOM

## 2024-10-09 PROCEDURE — 94761 N-INVAS EAR/PLS OXIMETRY MLT: CPT

## 2024-10-09 PROCEDURE — 99223 1ST HOSP IP/OBS HIGH 75: CPT | Mod: NSCH,GC,, | Performed by: PHYSICIAN ASSISTANT

## 2024-10-09 RX ORDER — SODIUM CHLORIDE 0.9 % (FLUSH) 0.9 %
10 SYRINGE (ML) INJECTION 2 TIMES DAILY
Status: DISCONTINUED | OUTPATIENT
Start: 2024-10-09 | End: 2024-10-15

## 2024-10-09 RX ORDER — LIDOCAINE HYDROCHLORIDE 10 MG/ML
INJECTION, SOLUTION INFILTRATION; PERINEURAL
Status: COMPLETED | OUTPATIENT
Start: 2024-10-09 | End: 2024-10-09

## 2024-10-09 RX ORDER — ACETAMINOPHEN 325 MG/1
650 TABLET ORAL EVERY 6 HOURS PRN
Status: DISCONTINUED | OUTPATIENT
Start: 2024-10-09 | End: 2024-10-18 | Stop reason: HOSPADM

## 2024-10-09 RX ORDER — FENTANYL CITRATE 50 UG/ML
INJECTION, SOLUTION INTRAMUSCULAR; INTRAVENOUS
Status: COMPLETED | OUTPATIENT
Start: 2024-10-09 | End: 2024-10-09

## 2024-10-09 RX ADMIN — LIDOCAINE HYDROCHLORIDE 5 ML: 10 INJECTION, SOLUTION INFILTRATION; PERINEURAL at 05:10

## 2024-10-09 RX ADMIN — Medication 10 ML: at 09:10

## 2024-10-09 RX ADMIN — ACETAMINOPHEN 650 MG: 325 TABLET ORAL at 09:10

## 2024-10-09 RX ADMIN — FENTANYL CITRATE 50 MCG: 50 INJECTION INTRAMUSCULAR; INTRAVENOUS at 05:10

## 2024-10-09 NOTE — PLAN OF CARE
AAOx4; POC reviewed & verbalizes understanding.  Admit DX:NSTEMI  Afebrile. No acute events on shift. No deficits noted  IV access & IVF: PIV saline flushed & locked  BM: 10/9/24 brown liquid stool; incontinent  : incontinent   Appetite: NPO for possible drain exchange today  L buttock Drain: site cleaned, dressing changed. Drainage bag exchanged. IR came to assess & flush w/ 10cc NS. Brown drainage noted prior to 10 cc flush.  IR: drain exchange today, tolerated well  Bed alarm set; fall precautions maintained.   Bed locked in lowest position, side rails up x2, call light within reach, environment clear. Encouraged pt to call nurse with any concerns.  Safety measures maintained

## 2024-10-09 NOTE — ASSESSMENT & PLAN NOTE
- last echo 7/2024 with normal LVEF mild to moderate AS; repeat echo yesterday with EF 45%  -  on admission; lying flat with no acute distress; sats 94-96% on 2LPM; CXR reviewed  - on Coreg Bumex, Imdur and Diovan as an outpatient- antihypertensive regimen resumption as detailed under HTN  - no ADHF noted on exam

## 2024-10-09 NOTE — CONSULTS
[facility] - Telemetry  Wound Care    Patient Name:  Marycruz Perez   MRN:  [id number]  Date: 10/9/2024  Diagnosis: NSTEMI (non-ST elevated myocardial infarction)    History:     Past Medical History:   Diagnosis Date    Acute exacerbation of CHF (congestive heart failure) 10/27/2020    NATHEN (acute kidney injury) 11/30/2023    Hypertension     Syncope and collapse        Social History     Socioeconomic History    Marital status: Single   Tobacco Use    Smoking status: Never    Smokeless tobacco: Never   Substance and Sexual Activity    Alcohol use: No    Drug use: No     Social Drivers of Health     Financial Resource Strain: Medium Risk (10/8/2024)    Overall Financial Resource Strain (CARDIA)     Difficulty of Paying Living Expenses: Somewhat hard   Food Insecurity: No Food Insecurity (10/8/2024)    Hunger Vital Sign     Worried About Running Out of Food in the Last Year: Never true     Ran Out of Food in the Last Year: Never true   Recent Concern: Food Insecurity - Food Insecurity Present (9/17/2024)    Hunger Vital Sign     Worried About Running Out of Food in the Last Year: Sometimes true     Ran Out of Food in the Last Year: Sometimes true   Transportation Needs: No Transportation Needs (10/8/2024)    TRANSPORTATION NEEDS     Transportation : No   Physical Activity: Inactive (9/17/2024)    Exercise Vital Sign     Days of Exercise per Week: 0 days     Minutes of Exercise per Session: 0 min   Stress: No Stress Concern Present (10/8/2024)    [employer] - Occupational Stress Questionnaire     Feeling of Stress : Not at all   Housing Stability: Low Risk  (10/8/2024)    Housing Stability Vital Sign     Unable to Pay for Housing in the Last Year: No     Homeless in the Last Year: No       Precautions:     Allergies as of 10/07/2024 - Reviewed 10/07/2024   Allergen Reaction Noted    Codeine  12/23/2016    Latex, natural rubber  08/04/2021    Penicillins  12/06/2016    Penicillin Rash        WOC  Assessment Details/Treatment     L buttock- drain dressing with dried drainage- nurse calling MD team for assessment      L heel- blanchable redness, R heel no redness      Recommendations discussed with pt, nurse and Dr. Scott:  - Pressure injury prevention interventions - waffle and heel boots  - Moisture barrier to buttocks BID for prevention   -     10/09/2024

## 2024-10-09 NOTE — PROGRESS NOTES
[facility] - Telemetry  Cardiology  Progress Note    Patient Name: Marycruz Perez  MRN: [id number]  Admission Date: 10/7/2024  Hospital Length of Stay: 1 days  Code Status: DNR   Attending Physician: Verónica Scott MD   Primary Care Physician: Melchor Menchaca -  Expected Discharge Date:   Principal Problem:NSTEMI (non-ST elevated myocardial infarction)    Subjective:     Hospital Course:   10/8/2024 per HPI   10/9/2024 Echo yesterday with EF 45% mild global hypokinesis mild to moderate AS mild MS. CBC WNL. BMP WNL as well. Plan for exchange of drain in IR today. SBP 90s-120s HR stable         Review of Systems   Constitutional: Negative for chills, decreased appetite, diaphoresis, fever, malaise/fatigue, weight gain and weight loss.   Cardiovascular:  Negative for chest pain, claudication, dyspnea on exertion, irregular heartbeat, leg swelling, near-syncope, orthopnea, palpitations and paroxysmal nocturnal dyspnea.   Respiratory:  Negative for cough, shortness of breath, snoring, sputum production and wheezing.    Endocrine: Negative for cold intolerance, heat intolerance, polydipsia, polyphagia and polyuria.   Skin:  Negative for color change, dry skin, itching, nail changes and poor wound healing.   Musculoskeletal:  Negative for back pain, gout, joint pain and joint swelling.   Gastrointestinal:  Negative for bloating, abdominal pain, constipation, diarrhea, hematemesis, hematochezia, melena, nausea and vomiting.   Genitourinary:  Negative for dysuria, hematuria and nocturia.   Neurological:  Negative for dizziness, headaches, light-headedness, numbness, paresthesias and weakness.   Psychiatric/Behavioral:  Negative for altered mental status, depression and memory loss.      Objective:     Vital Signs (Most Recent):  Temp: 97.8 °F (36.6 °C) (10/09/24 0456)  Pulse: 67 (10/09/24 0456)  Resp: 17 (10/09/24 0456)  BP: (!) 102/52 (10/09/24 0456)  SpO2: 99 % (10/09/24 0456) Vital Signs (24h Range):  Temp:  [97 °F (36.1  °C)-97.8 °F (36.6 °C)] 97.8 °F (36.6 °C)  Pulse:  [67-70] 67  Resp:  [16-17] 17  SpO2:  [94 %-100 %] 99 %  BP: ()/(50-61) 102/52     Weight: 80.7 kg (178 lb)  Body mass index is 35.95 kg/m².     SpO2: 99 %         Intake/Output Summary (Last 24 hours) at 10/9/2024 1126  Last data filed at 10/9/2024 0900  Gross per 24 hour   Intake 10 ml   Output 32 ml   Net -22 ml       Lines/Drains/Airways       Drain  Duration                  Closed/Suction Drain 09/25/24 1036 Inferior;Medial Back Accordion 14 Fr. 14 days              Peripheral Intravenous Line  Duration                  Peripheral IV - Single Lumen 10/07/24 1920 20 G Right Forearm 1 day         Peripheral IV - Single Lumen 10/07/24 2325 22 G Left Wrist 1 day                       Physical Exam  Constitutional:       General: She is not in acute distress.     Appearance: She is well-developed.   Cardiovascular:      Rate and Rhythm: Normal rate and regular rhythm.      Heart sounds: No murmur heard.     No gallop.   Pulmonary:      Effort: Pulmonary effort is normal. No respiratory distress.      Breath sounds: Normal breath sounds. No wheezing.   Abdominal:      General: Bowel sounds are normal. There is no distension.      Palpations: Abdomen is soft.      Tenderness: There is no abdominal tenderness.   Skin:     General: Skin is warm and dry.   Neurological:      Mental Status: She is alert and oriented to person, place, and time.            Significant Labs: BMP:   Recent Labs   Lab 10/07/24  1921 10/08/24  0528 10/09/24  0647   GLU 86 139* 118*   * 137 133*   K 4.2 4.0 3.7   CL 92* 94* 93*   CO2 27 27 26   BUN 20 26 40*   CREATININE 1.0 1.2 1.4   CALCIUM 9.2 8.7 8.7   MG 2.0  --   --     and CBC   Recent Labs   Lab 10/07/24  1921 10/08/24  0528 10/09/24  0647   WBC 8.19 7.87  7.87 10.86   HGB 12.8 11.3*  11.3* 12.4   HCT 40.8 35.2*  35.2* 39.8    219  219 222       Significant Imaging: Echocardiogram: Transthoracic echo (TTE) complete  (Cupid Only):   Results for orders placed or performed during the hospital encounter of 10/07/24   Echo   Result Value Ref Range    BSA 1.83 m2    Garcia's Biplane MOD Ejection Fraction 45 %    A2C EF 48 %    A4C EF 36 %    LVOT stroke volume 43.0 cm3    LVIDd 3.7 3.5 - 6.0 cm    LV Systolic Volume 33.33 mL    LV Systolic Volume Index 19.0 mL/m2    LVIDs 2.9 2.1 - 4.0 cm    LV ESV A2C 102.58 mL    LV Diastolic Volume 57.44 mL    LV ESV A4C 87.20 mL    LV Diastolic Volume Index 32.82 mL/m2    LV EDV A2C [id number] mL    LV EDV A4C 46.38 mL    Left Ventricular End Systolic Volume by Teichholz Method 33.33 mL    Left Ventricular End Diastolic Volume by Teichholz Method 57.44 mL    IVS 1.2 (A) 0.6 - 1.1 cm    LVOT diameter 2.0 cm    LVOT area 3.1 cm2    FS 21.6 (A) 28 - 44 %    Left Ventricle Relative Wall Thickness 0.65 cm    PW 1.2 (A) 0.6 - 1.1 cm    LV mass 147.3 g    LV Mass Index 84.2 g/m2    MV Peak E Juan M 1.60 m/s    TDI LATERAL 0.06 m/s    TDI SEPTAL 0.04 m/s    E/E' ratio 32.00 m/s    MV Peak A Juan M 0.68 m/s    TR Max Juan M 2.73 m/s    E/A ratio 2.35     E wave deceleration time 254.19 msec    LV SEPTAL E/E' RATIO 40.00 m/s    LV LATERAL E/E' RATIO 26.67 m/s    LVOT peak juan m 0.6 m/s    Left Ventricular Outflow Tract Mean Velocity 0.52 cm/s    Left Ventricular Outflow Tract Mean Gradient 1.12 mmHg    RV S' 10.53 cm/s    TAPSE 1.59 cm    LA size 4.62 cm    LA Vol (MOD) 98.45 mL    ZORAN (MOD) 56.3 mL/m2    RA area length vol 24.87 mL    RA Area 12.8 cm2    RA Vol 25.16 mL    AV mean gradient 11.4 mmHg    AV peak gradient 17.6 mmHg    Ao peak juan m 2.1 m/s    Ao VTI 49.1 cm    LVOT peak VTI 13.7 cm    AV valve area 0.9 cm²    AV Velocity Ratio 0.29     AV index (prosthetic) 0.28     ELMIRA by Velocity Ratio 0.9 cm²    Mr max juan m 4.22 m/s    MV peak gradient 10 mmHg    MV stenosis pressure 1/2 time 73.71 ms    MV valve area p 1/2 method 2.98 cm2    MV valve area by continuity eq 1.10 cm2    MV VTI 39.1 cm     Triscuspid Valve Regurgitation Peak Gradient 30 mmHg    PV PEAK VELOCITY 0.99 m/s    PV peak gradient 4 mmHg    Sinus 3.26 cm    STJ 2.80 cm    Ascending aorta 2.84 cm    IVC diameter 1.87 cm    Mean e' 0.05 m/s    ZLVIDS -0.26     ZLVIDD -2.69     LA area A4C 27.26 cm2    LA area A2C 28.26 cm2    TV resting pulmonary artery pressure 33 mmHg    RV TB RVSP 6 mmHg    Est. RA pres 3 mmHg    Narrative      Left Ventricle: The left ventricle is normal in size. Moderately   increased wall thickness. There is moderate concentric hypertrophy. Mild   global hypokinesis present. There is reduced systolic function. Biplane   (2D) method of discs ejection fraction is 45%. Diastolic function cannot   be reliably determined in the presence of mitral annular calcification.    Right Ventricle: Normal right ventricular cavity size. Systolic   function is normal.    Aortic Valve: The aortic valve is a trileaflet valve. Severely   calcified cusps. There is mild to moderate stenosis. Aortic valve area by   VTI is 0.9 cm². Aortic valve peak velocity is 2.1 m/s. Mean gradient is   11.4 mmHg. The dimensionless index is 0.28.    Mitral Valve: Moderately calcified leaflets. There is severe mitral   annular calcification present. There is mild stenosis. mean gradient og 5   mmHg at 69 bpm There is mild regurgitation.    Tricuspid Valve: There is mild regurgitation.    Pulmonary Artery: The estimated pulmonary artery systolic pressure is   33 mmHg.    IVC/SVC: Normal venous pressure at 3 mmHg.       Assessment and Plan:     Brief HPI: Seen on AM NP rounds with son and daughter at bedside. Patient resting with eyes closed. Daughter reports somewhat restful night for patient with no complaints of chest pain. Discussed POC as detailed below-verbalized understanding and agrees with POC      * NSTEMI (non-ST elevated myocardial infarction)  - felt to be demand related  - no concern for ACS     Chronic heart failure with preserved ejection fraction  (HFpEF)  - last echo 7/2024 with normal LVEF mild to moderate AS; repeat echo yesterday with EF 45%  -  on admission; lying flat with no acute distress; sats 94-96% on 2LPM; CXR reviewed  - on Coreg Bumex, Imdur and Diovan as an outpatient- antihypertensive regimen resumption as detailed under HTN  - no ADHF noted on exam     Chest pain  - isolated event with some atypical symptoms; no recurrent episodes noted since admission   - initial troponin 0.113 with peak at 0.288 down to 0.178; EKG with V paced rhythm  - currently chest pain free; history of PCI  - no concern for ACS currently; echo yesterday with EF 45% and mild global hypokinesis; will plan for medical management and no further cardiac testing planned; recommend follow up with Dr. Romo as an outpatient      Essential hypertension  - SBP 90s-100s  - on Coreg, Imdur and Diovan as an outpatient- on hold currently given marginal BP  - goal BP less than 130/80  - BP well controlled; anticipate slow resumption of medication regimen as BP tolerates; recommend starting with Coreg and Imdur     Cardiac pacemaker  - history of PPM for CHB  - admit EKG with V paced rhythm  - monitored on telemetry; normal functioning pacemaker; monitor on telemetry while admitted  - follow up for ongoing PPM checks         VTE Risk Mitigation (From admission, onward)           Ordered     IP VTE HIGH RISK PATIENT  Once         10/08/24 0222     Place sequential compression device  Until discontinued         10/08/24 0222                    HUMBLE Garcia, ANP  Cardiology  [place] - Telemetry

## 2024-10-09 NOTE — PLAN OF CARE
Problem: Adult Inpatient Plan of Care  Goal: Absence of Hospital-Acquired Illness or Injury  Outcome: Progressing     Problem: Fall Injury Risk  Goal: Absence of Fall and Fall-Related Injury  Outcome: Progressing     Problem: Acute Coronary Syndrome  Goal: Optimal Adaptation to Illness  Outcome: Progressing

## 2024-10-09 NOTE — CONSULTS
"Interventional Radiology  Consult/History & Physical Note      Reason for Consult: diverticular and abscess formation    SUBJECTIVE:     Chief Complaint:  NSTEMI    History of Present Illness:  Marycruz Perez is a 89 y.o. female with a PMHx of CHF s/p PPM, chest pain, diverticulitis s/p IR guided drainage 9/25, HTN, CAD s/p PCI, rheumatic AS/MS, HFpEF admitted for SOB/CP.  Daughter reports minimal output at home.  Drain was not flushed by [facility] per daughter and upon arrival dressing soaked.      Interventional Radiology has been consulted for image guided percutaneous drain placement/aspiration for management of intraabdominal abscess. Pt has had recent imaging including a CT a/p on 10/8 which revealed "Evolving diverticulitis about the sigmoid colon with interval placement of a pigtail drainage catheter about a diverticular abscess, which has overall decreased in size and conspicuity from comparison CT 09/23/2024.  Interval development of a separate collection of fluid and gas inferior to the drainage catheter tract involving the left perirectal fascia extending about the presacral soft tissues measuring up to 5.3 cm.  Fluid and gas track along the catheter extending about the left gluteal soft tissues."  The pt's WBC is 10.8 and is trending up. Pt is afebrile and hemodynamically stable.  She has been NPO since midnight.    Review of Systems   Constitutional:  Negative for chills, fever, malaise/fatigue and weight loss.   Respiratory:  Negative for cough and shortness of breath.    Cardiovascular:  Negative for chest pain, palpitations and leg swelling.   Gastrointestinal:  Negative for abdominal pain, diarrhea, nausea and vomiting.   Genitourinary:  Negative for dysuria and flank pain.   Musculoskeletal:  Negative for back pain and myalgias.        Pain at drain insertion site   Neurological:  Negative for weakness and headaches.       Scheduled Meds:   ertapenem (INVanz) IV (PEDS and ADULTS)  0.5 g Intravenous Q24H "    sodium chloride 0.9%  10 mL Other BID     Continuous Infusions:  PRN Meds:  Current Facility-Administered Medications:     dextrose 10%, 12.5 g, Intravenous, PRN    dextrose 10%, 25 g, Intravenous, PRN    glucagon (human recombinant), 1 mg, Intramuscular, PRN    glucose, 16 g, Oral, PRN    glucose, 24 g, Oral, PRN    naloxone, 0.02 mg, Intravenous, PRN    ondansetron, 4 mg, Intravenous, Q8H PRN    sodium chloride 0.9%, 10 mL, Intravenous, Q12H PRN    Review of patient's allergies indicates:   Allergen Reactions    Codeine     Latex, natural rubber     Penicillins     Penicillin Rash       Past Medical History:   Diagnosis Date    Acute exacerbation of CHF (congestive heart failure) 10/27/2020    NATHEN (acute kidney injury) 11/30/2023    Hypertension     Syncope and collapse      Past Surgical History:   Procedure Laterality Date    A-V CARDIAC PACEMAKER INSERTION N/A 2/22/2021    Procedure: INSERTION, CARDIAC PACEMAKER, DUAL CHAMBER;  Surgeon: Norman Foote MD;  Location: [facility] EP LAB;  Service: Cardiology;  Laterality: N/A;  AVB, PPM upgrade to Dual PPM (right sided), Bio, Venogram prior to draping, MAC, TN, 3 Prep    CARDIAC CATHETERIZATION      CARDIAC PACEMAKER PLACEMENT      CHOLECYSTECTOMY      CORONARY ANGIOPLASTY      HYSTERECTOMY      REVISION OF SKIN POCKET FOR PACEMAKER  2/22/2021    Procedure: Revision, Skin Pocket, For Cardiac Pacemaker;  Surgeon: Norman oFote MD;  Location: [facility] EP LAB;  Service: Cardiology;;     Family History   Problem Relation Name Age of Onset    Liver disease Mother       Social History     Tobacco Use    Smoking status: Never    Smokeless tobacco: Never   Substance Use Topics    Alcohol use: No    Drug use: No       OBJECTIVE:     Vital Signs (Most Recent)  Temp: 97.8 °F (36.6 °C) (10/09/24 0456)  Pulse: 67 (10/09/24 0456)  Resp: 17 (10/09/24 0456)  BP: (!) 102/52 (10/09/24 0456)  SpO2: 99 % (10/09/24 0456)    Physical Exam:  Physical Exam  Vitals and nursing note reviewed.    Constitutional:       Appearance: She is obese.   HENT:      Head: Normocephalic and atraumatic.      Right Ear: External ear normal.      Left Ear: External ear normal.      Nose: Nose normal.   Eyes:      Extraocular Movements: Extraocular movements intact.   Cardiovascular:      Rate and Rhythm: Normal rate.      Pulses: Normal pulses.   Pulmonary:      Effort: Pulmonary effort is normal.   Abdominal:      General: Abdomen is flat.   Musculoskeletal:      Cervical back: Normal range of motion and neck supple.   Skin:     General: Skin is warm and dry.      Findings: Erythema present.      Comments: Mild erythema around drain insertion site.  Dressing soaked and changed.  Tenderness at insertion site of drain.  Output 30 cc brown fluid in bag to suction.  Drain flushing however leakage of saline at insertion site.  Bag exchanged and placed to suction   Neurological:      Mental Status: She is alert. She is disoriented.         Laboratory  I have reviewed all pertinent lab results within the past 24 hours.  CBC:   Recent Labs   Lab 10/09/24  0647   WBC 10.86   RBC 4.46   HGB 12.4   HCT 39.8      MCV 89   MCH 27.8   MCHC 31.2*     CMP:   Recent Labs   Lab 10/09/24  0647   *   CALCIUM 8.7   ALBUMIN 2.2*   PROT 6.3   *   K 3.7   CO2 26   CL 93*   BUN 40*   CREATININE 1.4   ALKPHOS 91   ALT 6*   AST 15   BILITOT 0.3     Coagulation:   Recent Labs   Lab 10/07/24  2326 10/08/24  0528   LABPROT 12.9*  --    INR 1.2  --    APTT 28.0 47.9*     Microbiology Results (last 7 days)       ** No results found for the last 168 hours. **            ASA/Mallampati  ASA: 3  Mallampati: 2    Imaging:  Recent imaging studies including CT a/p on 10/8 which was independently reviewed by Carlos Samson MD and Leigh Bone PA-C.     ASSESSMENT/PLAN:     Assessment:  89 y.o. female with a PMHx of CHF s/p PPM, chest pain, diverticulitis s/p IR guided drainage 9/25, HTN, CAD s/p PCI, rheumatic AS/MS, HFpEFadmitted for  SOB/CP has been referred to IR for image guided percutaneous drain placement/aspiration for management of intraabdominal abscess.     Pt's daughter does not require , declined previous admission and current admission.  The procedure was discussed in great detail with the patient's daughter including thorough explanations of the potential risks and benefits of percutaneous drain exchange and aspiration. Risks include but are not limited to sepsis, severe infection, hemorrhage, damage to surrounding structures, catheter malfunction, inability to remove catheter, catheter dislodgment and need for additional procedures. The patient is a candidate for image guided percutaneous drain placement/aspiration under moderate sedation. Plan discussed with ordering physician and pt who verbalized understanding of the plan and would like to proceed.    Hospital medicine notes reviewed.  Case discussed with Dr. Samson.    Plan:  Will proceed with image guided percutaneous drain exchange and aspiration under moderate sedation on 10/9.   Please keep pt NPO.  Primary team is responsible for ordering any labs/cultures to be drawn on fluid sample collected during the procedure.  Anticoagulation history reviewed. Aspirin and heparin have been held, last dose 10/7.  Ok to reinitiate aspirin one day following procedure per SIR guidelines  If starting prophylactic AC following procedure, ok to start lovenox 12 hours following procedure and ok to start SQ heparin 6-8 hours following procedure per SIR guidelines  Coagulation labs reviewed. INR 1.2 on 10/7 and plt count 222 on 10/9.  Discussed code status to be full code during the procedure as previously done last admission.  Pt's daughter in agreement.  Thank you for the consult. Please contact with questions via [employer] secure chat    Time spent during patient care today was 75 minutes. This includes time spent before the visit reviewing the chart, discussing case with staff  physician and ordering provider, time spent during the face to face patient visit, and time spent after the visit on documentation. Time excludes procedure time.     Leigh Bone PA-C  Interventional Radiology

## 2024-10-09 NOTE — SUBJECTIVE & OBJECTIVE
Interval History:     I have seen and examined the patient today  She is awake and has mild pain in her back  CT noted and as discussed with IR, drain will be replaced and another drain will be placed for the new collection    I have discussed with her son about dispo planning, as we anticipate IV abcs for 2 weeks, he agreed for SNF    Review of Systems   Constitutional:  Positive for activity change.   Respiratory: Negative.     Gastrointestinal: Negative.    Genitourinary: Negative.    Musculoskeletal:  Positive for back pain.   Skin: Negative.    Psychiatric/Behavioral: Negative.       Objective:     Vital Signs (Most Recent):  Temp: 98 °F (36.7 °C) (10/09/24 1135)  Pulse: 70 (10/09/24 1707)  Resp: 16 (10/09/24 1707)  BP: (!) 100/57 (10/09/24 1707)  SpO2: 100 % (10/09/24 1707) Vital Signs (24h Range):  Temp:  [97 °F (36.1 °C)-98 °F (36.7 °C)] 98 °F (36.7 °C)  Pulse:  [67-70] 70  Resp:  [16-17] 16  SpO2:  [95 %-100 %] 100 %  BP: ()/(50-58) 100/57     Weight: 80.7 kg (178 lb)  Body mass index is 35.95 kg/m².    Intake/Output Summary (Last 24 hours) at 10/9/2024 1838  Last data filed at 10/9/2024 0900  Gross per 24 hour   Intake 10 ml   Output 62 ml   Net -52 ml         Physical Exam  Constitutional:       Appearance: She is obese. She is ill-appearing.   HENT:      Head: Normocephalic.   Cardiovascular:      Rate and Rhythm: Normal rate.   Pulmonary:      Effort: Pulmonary effort is normal.      Breath sounds: Normal breath sounds.   Skin:     General: Skin is warm.   Psychiatric:         Mood and Affect: Mood normal.             Significant Labs: All pertinent labs within the past 24 hours have been reviewed.  CBC:   Recent Labs   Lab 10/07/24  1921 10/08/24  0528 10/09/24  0647   WBC 8.19 7.87  7.87 10.86   HGB 12.8 11.3*  11.3* 12.4   HCT 40.8 35.2*  35.2* 39.8    219  219 222     CMP:   Recent Labs   Lab 10/07/24  1921 10/08/24  0528 10/09/24  0647   * 137 133*   K 4.2 4.0 3.7   CL 92*  94* 93*   CO2 27 27 26   GLU 86 139* 118*   BUN 20 26 40*   CREATININE 1.0 1.2 1.4   CALCIUM 9.2 8.7 8.7   PROT 7.4 6.3 6.3   ALBUMIN 2.5* 2.1* 2.2*   BILITOT 0.7 0.5 0.3   ALKPHOS 87 79 91   AST 23 24 15   ALT 7* 6* 6*   ANIONGAP 16 16 14       Significant Imaging: I have reviewed all pertinent imaging results/findings within the past 24 hours.

## 2024-10-09 NOTE — ASSESSMENT & PLAN NOTE
- isolated event with some atypical symptoms; no recurrent episodes noted since admission   - initial troponin 0.113 with peak at 0.288 down to 0.178; EKG with V paced rhythm  - currently chest pain free; history of PCI  - no concern for ACS currently; echo yesterday with EF 45% and mild global hypokinesis; will plan for medical management and no further cardiac testing planned; recommend follow up with Dr. Romo as an outpatient

## 2024-10-09 NOTE — ASSESSMENT & PLAN NOTE
Echo with EF 45%  Patient is euvolemic on exam  Cardio recommended GDMT but BP is low  Will try to slowly introduce medications when BP can tolerate

## 2024-10-09 NOTE — PROGRESS NOTES
[facility] Medicine  Progress Note    Patient Name: Marycruz Perez  MRN: [id number]  Patient Class: IP- Inpatient   Admission Date: 10/7/2024  Length of Stay: 1 days  Attending Physician: Verónica Scott MD  Primary Care Provider: Melchor Menchaca -        Subjective:     Principal Problem:NSTEMI (non-ST elevated myocardial infarction)        HPI:  Is a 89-year-old female with history of CHF, hypertension, ppm brought to the emergency department by EMS for evaluation of shortness of breath with the chest pain.  Additionally patient has been very weak and unable to get up.  She reports she has had a asthma exacerbations in her chest pain was take during these episodes however she is unsure if it was asthma versus true chest pain.  Patient is accompanied by her daughter who is her  per patient request.  In ED , troponin 0.115, repeat 0.288.  Chest x-ray benign.  EKG shows paced rhythm  Patient was started on heparin drip in ED.  We will be admitted to Hospital Medicine will consult Cardiology for eval.    On review of chart patient also has recent drain placed for LR which is a percutaneous 14 [place] Islander drain placed for intra-abdominal abscess secondary to diverticulitis.  During remains intact, draining minimal volume    Overview/Hospital Course:  No notes on file    Interval History:     I have seen and examined the patient today  She is awake and has mild pain in her back  CT noted and as discussed with IR, drain will be replaced and another drain will be placed for the new collection    I have discussed with her son about dispo planning, as we anticipate IV abcs for 2 weeks, he agreed for SNF    Review of Systems   Constitutional:  Positive for activity change.   Respiratory: Negative.     Gastrointestinal: Negative.    Genitourinary: Negative.    Musculoskeletal:  Positive for back pain.   Skin: Negative.    Psychiatric/Behavioral: Negative.       Objective:     Vital Signs (Most  Recent):  Temp: 98 °F (36.7 °C) (10/09/24 1135)  Pulse: 70 (10/09/24 1707)  Resp: 16 (10/09/24 1707)  BP: (!) 100/57 (10/09/24 1707)  SpO2: 100 % (10/09/24 1707) Vital Signs (24h Range):  Temp:  [97 °F (36.1 °C)-98 °F (36.7 °C)] 98 °F (36.7 °C)  Pulse:  [67-70] 70  Resp:  [16-17] 16  SpO2:  [95 %-100 %] 100 %  BP: ()/(50-58) 100/57     Weight: 80.7 kg (178 lb)  Body mass index is 35.95 kg/m².    Intake/Output Summary (Last 24 hours) at 10/9/2024 1838  Last data filed at 10/9/2024 0900  Gross per 24 hour   Intake 10 ml   Output 62 ml   Net -52 ml         Physical Exam  Constitutional:       Appearance: She is obese. She is ill-appearing.   HENT:      Head: Normocephalic.   Cardiovascular:      Rate and Rhythm: Normal rate.   Pulmonary:      Effort: Pulmonary effort is normal.      Breath sounds: Normal breath sounds.   Skin:     General: Skin is warm.   Psychiatric:         Mood and Affect: Mood normal.             Significant Labs: All pertinent labs within the past 24 hours have been reviewed.  CBC:   Recent Labs   Lab 10/07/24  1921 10/08/24  0528 10/09/24  0647   WBC 8.19 7.87  7.87 10.86   HGB 12.8 11.3*  11.3* 12.4   HCT 40.8 35.2*  35.2* 39.8    219  219 222     CMP:   Recent Labs   Lab 10/07/24  1921 10/08/24  0528 10/09/24  0647   * 137 133*   K 4.2 4.0 3.7   CL 92* 94* 93*   CO2 27 27 26   GLU 86 139* 118*   BUN 20 26 40*   CREATININE 1.0 1.2 1.4   CALCIUM 9.2 8.7 8.7   PROT 7.4 6.3 6.3   ALBUMIN 2.5* 2.1* 2.2*   BILITOT 0.7 0.5 0.3   ALKPHOS 87 79 91   AST 23 24 15   ALT 7* 6* 6*   ANIONGAP 16 16 14       Significant Imaging: I have reviewed all pertinent imaging results/findings within the past 24 hours.    Assessment/Plan:      Diverticulitis of intestine with abscess without bleeding  Patient now with percutaneous 14 Armenian during placed on previous admission.  Drain remains in place    CT showed new fluid collection    IR team drained the fluid and placed a new drain  today    Abcs was changed to Ertapenem according to the fluid culture sens result , anticipate at least 2 weeks of iv abcs    HFrEF (heart failure with reduced ejection fraction)  Echo with EF 45%  Patient is euvolemic on exam  Cardio recommended GDMT but BP is low  Will try to slowly introduce medications when BP can tolerate       Chest pain  Initial troponin 0.115, repeat 0.288. mostly demand ischemia   EKG with paced rhythm.  Cardiology consulted  Echo was done and showed HFrEF with global hypokinesis  Cardio recommended medical management     Essential hypertension  Patients blood pressure range in the last 24 hours was: BP  Min: 89/52  Max: 129/61.The patient's inpatient anti-hypertensive regimen is listed below:  Current Antihypertensives       Plan  - BP is controlled, no changes needed to their regimen      Cardiac pacemaker    EKG paced rhythm        VTE Risk Mitigation (From admission, onward)           Ordered     IP VTE HIGH RISK PATIENT  Once         10/08/24 0222     Place sequential compression device  Until discontinued         10/08/24 0222                    Discharge Planning   ANA: 10/14/2024     Code Status: DNR   Is the patient medically ready for discharge?:     Reason for patient still in hospital (select all that apply): Treatment  Discharge Plan A: Skilled Nursing Facility                  Verónica Scott MD  Department of Hospital Medicine   [facility] - Telemetry

## 2024-10-09 NOTE — PLAN OF CARE
0845  Leigh w/IR at the bedside when CM rounded. CM will return to complete the discharge planning assessment.        10/09/24 1015   Rounds   Attendance Provider;Nurse    Discharge Plan A Skilled Nursing Facility   Why the patient remains in the hospital Requires continued medical care   Transition of Care Barriers Transportation     1015  Patient resting quietly in bed with son, Syed Perez ([phone]), at the bedside when CM participated in SIBR with Dr Scott & nurse Macarena. MD informed Syed that the pt will have another drain placed in IR today, will need IV abx following discharge, & will likely need SNF placement when she is medically stable to discharge. Syed verbalized understanding & agreement.      1430  SNF referrals sent via [facility]. Awaiting response.     [facility] - Telemetry  Initial Discharge Assessment       Primary Care Provider: Melchor Sethi -    Admission Diagnosis: Shortness of breath [R06.02]  NSTEMI (non-ST elevated myocardial infarction) [I21.4]  Chest pain [R07.9]    Admission Date: 10/7/2024  Expected Discharge Date: 10/14/2024    Consult: cards & wound care    Transition of Care Barriers: Transportation    Payor: HUMANA MANAGED MEDICARE / Plan: [employer] HMO PPO SPECIAL NEEDS / Product Type: Medicare Advantage /     Extended Emergency Contact Information  Primary Emergency Contact: Lily Ventura  Address: [address] [place] [address] [place] [address] [facility]  Mobile Phone: [phone]  Relation: Daughter  Secondary Emergency Contact: Syed Perez  [place]  Mobile Phone: [phone]  Relation: Son    Discharge Plan A: (P) Skilled Nursing Facility  Discharge Plan B: (P) New Nursing Home placement - FDC care facility      [id number] - [place] - [address] [facility] [address]  [address] [place] [address]  Phone: [phone] Fax: [phone]    [facility]  [address] [facility] [address]  [place] [address]  Phone: [phone] Fax: [phone]      Initial Assessment (most recent)       Adult Discharge Assessment - 10/09/24 1535          Discharge Assessment    Assessment Type Discharge Planning Assessment (P)      Confirmed/corrected address, phone number and insurance Yes (P)      Source of Information family;patient (P)    son, Syed Dominguez ([phone])    Communicated ANA with patient/caregiver Date not available/Unable to determine (P)      People in Home child(chris), adult;grandchild(chris) (P)    daughter, Lily Ventura ([phone]), & GGC    Do you expect to return to your current living situation? No (P)      Do you have help at home or someone to help you manage your care at home? Yes (P)      Prior to hospitilization cognitive status: Alert/Oriented (P)      Current cognitive status: Alert/Oriented (P)      Equipment Currently Used at Home walker, rolling;wheelchair;grab bar;lift device;nebulizer;blood pressure machine (P)      Readmission within 30 days? Yes (P)      Patient currently being followed by outpatient case management? No (P)      Do you currently have service(s) that help you manage your care at home? No (P)      Do you take prescription medications? Yes (P)      Do you have prescription coverage? Yes (P)      Do you have any problems affording any of your prescribed medications? No (P)      Is the patient taking medications as prescribed? yes (P)      How do you get to doctors appointments? family or friend will provide (P)      Are you on dialysis? No (P)      Do you take coumadin? No (P)      Discharge Plan A Skilled Nursing Facility (P)      Discharge Plan B New Nursing Home placement - correction care facility (P)      DME Needed Upon Discharge  other (see comments) (P)    tbd                  2951  Patient resting quietly in bed with son, Syed Perez, at the bedside when CM rounded. Patient was admitted with diverticulitis with abscess & is  being followed by cards and wound care.    Patient lives with her daughter Lily Ventura & the pt's GGC, has equiopment to assist with ADLs, has been primarily bed bound since her previous hospitalization 9/16/2024-9/27/2024 for the same diagnosis, & will need SNF placement following discharge. Syed lives in [place], visits multiple times each month, did not have a SNF preference, & stated the pt would probably transition to alf care. CM informed son of SNF referrals previously sent. Syed verbalized understanding.     CM updated patient's whiteboard with CM name & contact information.     1630  Pt has been accepted by [facility], [facility], & [facility].      Will continue to follow.

## 2024-10-09 NOTE — SUBJECTIVE & OBJECTIVE
Review of Systems   Constitutional: Negative for chills, decreased appetite, diaphoresis, fever, malaise/fatigue, weight gain and weight loss.   Cardiovascular:  Negative for chest pain, claudication, dyspnea on exertion, irregular heartbeat, leg swelling, near-syncope, orthopnea, palpitations and paroxysmal nocturnal dyspnea.   Respiratory:  Negative for cough, shortness of breath, snoring, sputum production and wheezing.    Endocrine: Negative for cold intolerance, heat intolerance, polydipsia, polyphagia and polyuria.   Skin:  Negative for color change, dry skin, itching, nail changes and poor wound healing.   Musculoskeletal:  Negative for back pain, gout, joint pain and joint swelling.   Gastrointestinal:  Negative for bloating, abdominal pain, constipation, diarrhea, hematemesis, hematochezia, melena, nausea and vomiting.   Genitourinary:  Negative for dysuria, hematuria and nocturia.   Neurological:  Negative for dizziness, headaches, light-headedness, numbness, paresthesias and weakness.   Psychiatric/Behavioral:  Negative for altered mental status, depression and memory loss.      Objective:     Vital Signs (Most Recent):  Temp: 97.8 °F (36.6 °C) (10/09/24 0456)  Pulse: 67 (10/09/24 0456)  Resp: 17 (10/09/24 0456)  BP: (!) 102/52 (10/09/24 0456)  SpO2: 99 % (10/09/24 0456) Vital Signs (24h Range):  Temp:  [97 °F (36.1 °C)-97.8 °F (36.6 °C)] 97.8 °F (36.6 °C)  Pulse:  [67-70] 67  Resp:  [16-17] 17  SpO2:  [94 %-100 %] 99 %  BP: ()/(50-61) 102/52     Weight: 80.7 kg (178 lb)  Body mass index is 35.95 kg/m².     SpO2: 99 %         Intake/Output Summary (Last 24 hours) at 10/9/2024 1126  Last data filed at 10/9/2024 0900  Gross per 24 hour   Intake 10 ml   Output 32 ml   Net -22 ml       Lines/Drains/Airways       Drain  Duration                  Closed/Suction Drain 09/25/24 1036 Inferior;Medial Back Accordion 14 Fr. 14 days              Peripheral Intravenous Line  Duration                   Peripheral IV - Single Lumen 10/07/24 1920 20 G Right Forearm 1 day         Peripheral IV - Single Lumen 10/07/24 2325 22 G Left Wrist 1 day                       Physical Exam  Constitutional:       General: She is not in acute distress.     Appearance: She is well-developed.   Cardiovascular:      Rate and Rhythm: Normal rate and regular rhythm.      Heart sounds: No murmur heard.     No gallop.   Pulmonary:      Effort: Pulmonary effort is normal. No respiratory distress.      Breath sounds: Normal breath sounds. No wheezing.   Abdominal:      General: Bowel sounds are normal. There is no distension.      Palpations: Abdomen is soft.      Tenderness: There is no abdominal tenderness.   Skin:     General: Skin is warm and dry.   Neurological:      Mental Status: She is alert and oriented to person, place, and time.            Significant Labs: BMP:   Recent Labs   Lab 10/07/24  1921 10/08/24  0528 10/09/24  0647   GLU 86 139* 118*   * 137 133*   K 4.2 4.0 3.7   CL 92* 94* 93*   CO2 27 27 26   BUN 20 26 40*   CREATININE 1.0 1.2 1.4   CALCIUM 9.2 8.7 8.7   MG 2.0  --   --     and CBC   Recent Labs   Lab 10/07/24  1921 10/08/24  0528 10/09/24  0647   WBC 8.19 7.87  7.87 10.86   HGB 12.8 11.3*  11.3* 12.4   HCT 40.8 35.2*  35.2* 39.8    219  219 222       Significant Imaging: Echocardiogram: Transthoracic echo (TTE) complete (Cupid Only):   Results for orders placed or performed during the hospital encounter of 10/07/24   Echo   Result Value Ref Range    BSA 1.83 m2    Garcia's Biplane MOD Ejection Fraction 45 %    A2C EF 48 %    A4C EF 36 %    LVOT stroke volume 43.0 cm3    LVIDd 3.7 3.5 - 6.0 cm    LV Systolic Volume 33.33 mL    LV Systolic Volume Index 19.0 mL/m2    LVIDs 2.9 2.1 - 4.0 cm    LV ESV A2C 102.58 mL    LV Diastolic Volume 57.44 mL    LV ESV A4C 87.20 mL    LV Diastolic Volume Index 32.82 mL/m2    LV EDV A2C [id number] mL    LV EDV A4C 46.38 mL    Left Ventricular End Systolic  Volume by Teichholz Method 33.33 mL    Left Ventricular End Diastolic Volume by Teichholz Method 57.44 mL    IVS 1.2 (A) 0.6 - 1.1 cm    LVOT diameter 2.0 cm    LVOT area 3.1 cm2    FS 21.6 (A) 28 - 44 %    Left Ventricle Relative Wall Thickness 0.65 cm    PW 1.2 (A) 0.6 - 1.1 cm    LV mass 147.3 g    LV Mass Index 84.2 g/m2    MV Peak E Juan M 1.60 m/s    TDI LATERAL 0.06 m/s    TDI SEPTAL 0.04 m/s    E/E' ratio 32.00 m/s    MV Peak A Juan M 0.68 m/s    TR Max Juan M 2.73 m/s    E/A ratio 2.35     E wave deceleration time 254.19 msec    LV SEPTAL E/E' RATIO 40.00 m/s    LV LATERAL E/E' RATIO 26.67 m/s    LVOT peak juan m 0.6 m/s    Left Ventricular Outflow Tract Mean Velocity 0.52 cm/s    Left Ventricular Outflow Tract Mean Gradient 1.12 mmHg    RV S' 10.53 cm/s    TAPSE 1.59 cm    LA size 4.62 cm    LA Vol (MOD) 98.45 mL    ZORAN (MOD) 56.3 mL/m2    RA area length vol 24.87 mL    RA Area 12.8 cm2    RA Vol 25.16 mL    AV mean gradient 11.4 mmHg    AV peak gradient 17.6 mmHg    Ao peak juan m 2.1 m/s    Ao VTI 49.1 cm    LVOT peak VTI 13.7 cm    AV valve area 0.9 cm²    AV Velocity Ratio 0.29     AV index (prosthetic) 0.28     ELIMRA by Velocity Ratio 0.9 cm²    Mr max juan m 4.22 m/s    MV peak gradient 10 mmHg    MV stenosis pressure 1/2 time 73.71 ms    MV valve area p 1/2 method 2.98 cm2    MV valve area by continuity eq 1.10 cm2    MV VTI 39.1 cm    Triscuspid Valve Regurgitation Peak Gradient 30 mmHg    PV PEAK VELOCITY 0.99 m/s    PV peak gradient 4 mmHg    Sinus 3.26 cm    STJ 2.80 cm    Ascending aorta 2.84 cm    IVC diameter 1.87 cm    Mean e' 0.05 m/s    ZLVIDS -0.26     ZLVIDD -2.69     LA area A4C 27.26 cm2    LA area A2C 28.26 cm2    TV resting pulmonary artery pressure 33 mmHg    RV TB RVSP 6 mmHg    Est. RA pres 3 mmHg    Narrative      Left Ventricle: The left ventricle is normal in size. Moderately   increased wall thickness. There is moderate concentric hypertrophy. Mild   global hypokinesis present. There is reduced  systolic function. Biplane   (2D) method of discs ejection fraction is 45%. Diastolic function cannot   be reliably determined in the presence of mitral annular calcification.    Right Ventricle: Normal right ventricular cavity size. Systolic   function is normal.    Aortic Valve: The aortic valve is a trileaflet valve. Severely   calcified cusps. There is mild to moderate stenosis. Aortic valve area by   VTI is 0.9 cm². Aortic valve peak velocity is 2.1 m/s. Mean gradient is   11.4 mmHg. The dimensionless index is 0.28.    Mitral Valve: Moderately calcified leaflets. There is severe mitral   annular calcification present. There is mild stenosis. mean gradient og 5   mmHg at 69 bpm There is mild regurgitation.    Tricuspid Valve: There is mild regurgitation.    Pulmonary Artery: The estimated pulmonary artery systolic pressure is   33 mmHg.    IVC/SVC: Normal venous pressure at 3 mmHg.

## 2024-10-09 NOTE — PLAN OF CARE
Pt VSS and in NAD. CRM equipment removed. Discharge instructions provided. Pt verbalized understanding, questions and concerns addressed. Report given to JADYN Fiore. No further needs at this time. Pt discharged from recovery area at 1730. IR care complete.

## 2024-10-09 NOTE — H&P
Inpatient Radiology Pre-procedure Note    History of Present Illness:  Marycruz Perez is a 89 y.o. female with pelvic abscess who presents for abscessogram and drain exchange.    Admission H&P reviewed.  Past Medical History:   Diagnosis Date    Acute exacerbation of CHF (congestive heart failure) 10/27/2020    NATHEN (acute kidney injury) 11/30/2023    Hypertension     Syncope and collapse      Past Surgical History:   Procedure Laterality Date    A-V CARDIAC PACEMAKER INSERTION N/A 2/22/2021    Procedure: INSERTION, CARDIAC PACEMAKER, DUAL CHAMBER;  Surgeon: Norman Foote MD;  Location: [facility] EP LAB;  Service: Cardiology;  Laterality: N/A;  AVB, PPM upgrade to Dual PPM (right sided), Bio, Venogram prior to draping, MAC, DC, 3 Prep    CARDIAC CATHETERIZATION      CARDIAC PACEMAKER PLACEMENT      CHOLECYSTECTOMY      CORONARY ANGIOPLASTY      HYSTERECTOMY      REVISION OF SKIN POCKET FOR PACEMAKER  2/22/2021    Procedure: Revision, Skin Pocket, For Cardiac Pacemaker;  Surgeon: Norman Foote MD;  Location: [facility] EP LAB;  Service: Cardiology;;       Review of Systems:   As documented in primary team H&P    Home Meds:   Prior to Admission medications    Medication Sig Start Date End Date Taking? Authorizing Provider   aspirin 81 MG Chew Take 1 tablet (81 mg total) by mouth once daily. 12/3/23 12/2/24 Yes Genesis Coley MD   bumetanide (BUMEX) 0.5 MG Tab Take 2 tablets (1 mg total) by mouth once daily. May also take 2 tablets (1 mg total) daily as needed (> 2 pound weight gain in 24 hours, > 5 pound weight gain in a week, worsening leg swelling, shortness of breath). 8/6/24 8/3/25 Yes Thomas Soto, SUSIE   carvediloL (COREG) 6.25 MG tablet Take 1 tablet (6.25 mg total) by mouth 2 (two) times daily with meals. 9/27/24 9/27/25 Yes Rudy Blake MD   ciprofloxacin HCl (CIPRO) 500 MG tablet Take 1 tablet (500 mg total) by mouth 2 (two) times daily. for 14 days 9/27/24 10/11/24 Yes Rudy Blake MD    diclofenac sodium (VOLTAREN) 1 % Gel Apply 4 g topically 3 (three) times daily as needed. 7/15/24  Yes Provider, Historical   ezetimibe (ZETIA) 10 mg tablet Take 1 tablet (10 mg total) by mouth once daily. 7/23/24 7/23/25 Yes Thomas Soto DNP   isosorbide mononitrate (IMDUR) 30 MG 24 hr tablet Take 1 tablet (30 mg total) by mouth once daily. 7/12/24 7/12/25 Yes Addie Euceda MD   meclizine (ANTIVERT) 25 mg tablet Take 25 mg by mouth 3 (three) times daily as needed (vertigo). 11/20/23  Yes Provider, Historical   metroNIDAZOLE (FLAGYL) 500 MG tablet Take 1 tablet (500 mg total) by mouth 3 (three) times daily. for 14 days 9/27/24 10/11/24 Yes Rudy Blake MD   valsartan (DIOVAN) 40 MG tablet Take 1 tablet (40 mg total) by mouth once daily. 9/28/24 9/28/25 Yes Rudy Blake MD   albuterol (PROVENTIL/VENTOLIN HFA) 90 mcg/actuation inhaler Inhale 2 puffs into the lungs every 4 (four) hours as needed for Wheezing (cough or wheezing). Rescue 2/1/22   Yogesh Newman MD   albuterol-ipratropium (DUO-NEB) 2.5 mg-0.5 mg/3 mL nebulizer solution every 6 (six) hours as needed for Wheezing. 10/11/23   Provider, Historical   famotidine (PEPCID) 20 MG tablet Take 1 tablet (20 mg total) by mouth nightly as needed for Heartburn (heartburn). 7/12/24 7/12/25  Addie Eucdea MD   nitroGLYCERIN (NITROSTAT) 0.4 MG SL tablet Place 1 tablet (0.4 mg total) under the tongue every 5 (five) minutes as needed for Chest pain (angina). 12/2/23 12/1/24  Genesis Coley MD     Scheduled Meds:    ertapenem (INVanz) IV (PEDS and ADULTS)  0.5 g Intravenous Q24H    sodium chloride 0.9%  10 mL Other BID     Continuous Infusions:   PRN Meds:  Current Facility-Administered Medications:     dextrose 10%, 12.5 g, Intravenous, PRN    dextrose 10%, 25 g, Intravenous, PRN    glucagon (human recombinant), 1 mg, Intramuscular, PRN    glucose, 16 g, Oral, PRN    glucose, 24 g, Oral, PRN    naloxone, 0.02 mg, Intravenous,  PRN    ondansetron, 4 mg, Intravenous, Q8H PRN    sodium chloride 0.9%, 10 mL, Intravenous, Q12H PRN  Anticoagulants/Antiplatelets: no anticoagulation    Allergies:   Review of patient's allergies indicates:   Allergen Reactions    Codeine     Latex, natural rubber     Penicillins     Penicillin Rash     Sedation Hx: have not been any systemic reactions    Labs:  Recent Labs   Lab 10/07/24  2326   INR 1.2       Recent Labs   Lab 10/09/24  0647   WBC 10.86   HGB 12.4   HCT 39.8   MCV 89         Recent Labs   Lab 10/07/24  1921 10/08/24  0528 10/09/24  0647   GLU 86   < > 118*   *   < > 133*   K 4.2   < > 3.7   CL 92*   < > 93*   CO2 27   < > 26   BUN 20   < > 40*   CREATININE 1.0   < > 1.4   CALCIUM 9.2   < > 8.7   MG 2.0  --   --    ALT 7*   < > 6*   AST 23   < > 15   ALBUMIN 2.5*   < > 2.2*   BILITOT 0.7   < > 0.3    < > = values in this interval not displayed.         Vitals:  Temp: 98 °F (36.7 °C) (10/09/24 1135)  Pulse: 69 (10/09/24 1605)  Resp: 17 (10/09/24 1135)  BP: (!) 106/57 (10/09/24 1135)  SpO2: 98 % (10/09/24 1135)     Physical Exam:  ASA: 3  Mallampati: 2    General: no acute distress  Mental Status: alert and oriented to person, place and time  HEENT: normocephalic, atraumatic  Chest: unlabored breathing  Heart: regular heart rate  Abdomen: nondistended  Extremity: moves all extremities    Plan: proceed with abscessogram and drain exchange  Sedation Plan: moderate    Carlos Samson MD  Diagnostic and Interventional Radiologist  Department of Radiology  Pager: [phone]

## 2024-10-09 NOTE — ASSESSMENT & PLAN NOTE
Patients blood pressure range in the last 24 hours was: BP  Min: 89/52  Max: 129/61.The patient's inpatient anti-hypertensive regimen is listed below:  Current Antihypertensives       Plan  - BP is controlled, no changes needed to their regimen

## 2024-10-09 NOTE — SEDATION DOCUMENTATION
Pt arrived to suite for abscess drain replacement. Pt oriented to unit and staff, Pt safely transferred from stretcher to procedural table. Fall risk reviewed and comfort measures utilized with interventions. Safety strap applied, position pillows to minimize pressure points. Blankets applied. Pt prepped and draped utilizing standard sterile technique. Patient placed on continuous monitoring, as required by sedation policy. Timeouts implemented utilizing standard universal time-out per department and facility policy. RN to remain at bedside with continuous monitoring. Pt resting comfortably. Denies pain/discomfort. Will continue to monitor. See flow sheets for monitoring, medication administration, and updates. patient verbalizes understanding.

## 2024-10-09 NOTE — PROCEDURES
Radiology Post-Procedure Note    Pre Op Diagnosis: Pelvic abscess  Post Op Diagnosis: Same    Procedure: Abscessogram and drain exchange    Procedure performed by: Carlos Samson MD    Written Informed Consent Obtained: Yes  Specimen Removed: NO  Estimated Blood Loss: Minimal    Findings:   Abscessogram performed demonstrating persistent communication with the pelvic abscess.  New 14F drain placed.  No complications.    Patient tolerated procedure well.    Carlos Samson MD  Diagnostic and Interventional Radiologist  Department of Radiology  Pager: [phone]

## 2024-10-09 NOTE — ASSESSMENT & PLAN NOTE
Patient now with percutaneous 14 Danish during placed on previous admission.  Drain remains in place    CT showed new fluid collection    IR team drained the fluid and placed a new drain today    Abcs was changed to Ertapenem according to the fluid culture sens result , anticipate at least 2 weeks of iv abcs

## 2024-10-09 NOTE — NURSING
notified that lab has been unsuccessful in collecting CBC and CMP.  x2 attempted. Per MD lab to retry in a few hours. Lab notified.

## 2024-10-09 NOTE — ASSESSMENT & PLAN NOTE
Initial troponin 0.115, repeat 0.288. mostly demand ischemia   EKG with paced rhythm.  Cardiology consulted  Echo was done and showed HFrEF with global hypokinesis  Cardio recommended medical management

## 2024-10-10 LAB
ALBUMIN SERPL BCP-MCNC: 2.2 G/DL (ref 3.5–5.2)
ALP SERPL-CCNC: 78 U/L (ref 55–135)
ALT SERPL W/O P-5'-P-CCNC: 5 U/L (ref 10–44)
ANION GAP SERPL CALC-SCNC: 14 MMOL/L (ref 8–16)
AST SERPL-CCNC: 16 U/L (ref 10–40)
BASOPHILS # BLD AUTO: 0.03 K/UL (ref 0–0.2)
BASOPHILS NFR BLD: 0.4 % (ref 0–1.9)
BILIRUB SERPL-MCNC: 0.4 MG/DL (ref 0.1–1)
BUN SERPL-MCNC: 47 MG/DL (ref 10–30)
CALCIUM SERPL-MCNC: 8.8 MG/DL (ref 8.7–10.5)
CHLORIDE SERPL-SCNC: 93 MMOL/L (ref 95–110)
CO2 SERPL-SCNC: 28 MMOL/L (ref 23–29)
CREAT SERPL-MCNC: 1.4 MG/DL (ref 0.5–1.4)
DIFFERENTIAL METHOD BLD: ABNORMAL
EOSINOPHIL # BLD AUTO: 0.1 K/UL (ref 0–0.5)
EOSINOPHIL NFR BLD: 1 % (ref 0–8)
ERYTHROCYTE [DISTWIDTH] IN BLOOD BY AUTOMATED COUNT: 14.8 % (ref 11.5–14.5)
EST. GFR  (NO RACE VARIABLE): 35 ML/MIN/1.73 M^2
GLUCOSE SERPL-MCNC: 83 MG/DL (ref 70–110)
HCT VFR BLD AUTO: 36 % (ref 37–48.5)
HGB BLD-MCNC: 11.5 G/DL (ref 12–16)
IMM GRANULOCYTES # BLD AUTO: 0.04 K/UL (ref 0–0.04)
IMM GRANULOCYTES NFR BLD AUTO: 0.5 % (ref 0–0.5)
LYMPHOCYTES # BLD AUTO: 1.8 K/UL (ref 1–4.8)
LYMPHOCYTES NFR BLD: 22.9 % (ref 18–48)
MCH RBC QN AUTO: 28.4 PG (ref 27–31)
MCHC RBC AUTO-ENTMCNC: 31.9 G/DL (ref 32–36)
MCV RBC AUTO: 89 FL (ref 82–98)
MONOCYTES # BLD AUTO: 0.7 K/UL (ref 0.3–1)
MONOCYTES NFR BLD: 9.3 % (ref 4–15)
NEUTROPHILS # BLD AUTO: 5.1 K/UL (ref 1.8–7.7)
NEUTROPHILS NFR BLD: 65.9 % (ref 38–73)
NRBC BLD-RTO: 0 /100 WBC
PLATELET # BLD AUTO: 195 K/UL (ref 150–450)
PMV BLD AUTO: 10.9 FL (ref 9.2–12.9)
POTASSIUM SERPL-SCNC: 3.5 MMOL/L (ref 3.5–5.1)
PROT SERPL-MCNC: 6.1 G/DL (ref 6–8.4)
RBC # BLD AUTO: 4.05 M/UL (ref 4–5.4)
SODIUM SERPL-SCNC: 135 MMOL/L (ref 136–145)
WBC # BLD AUTO: 7.77 K/UL (ref 3.9–12.7)

## 2024-10-10 PROCEDURE — 27000221 HC OXYGEN, UP TO 24 HOURS

## 2024-10-10 PROCEDURE — C1751 CATH, INF, PER/CENT/MIDLINE: HCPCS

## 2024-10-10 PROCEDURE — 11000001 HC ACUTE MED/SURG PRIVATE ROOM

## 2024-10-10 PROCEDURE — A4216 STERILE WATER/SALINE, 10 ML: HCPCS | Performed by: PHYSICIAN ASSISTANT

## 2024-10-10 PROCEDURE — 36415 COLL VENOUS BLD VENIPUNCTURE: CPT | Performed by: REGISTERED NURSE

## 2024-10-10 PROCEDURE — 94761 N-INVAS EAR/PLS OXIMETRY MLT: CPT

## 2024-10-10 PROCEDURE — 25000003 PHARM REV CODE 250: Performed by: STUDENT IN AN ORGANIZED HEALTH CARE EDUCATION/TRAINING PROGRAM

## 2024-10-10 PROCEDURE — 99900035 HC TECH TIME PER 15 MIN (STAT)

## 2024-10-10 PROCEDURE — 99232 SBSQ HOSP IP/OBS MODERATE 35: CPT | Mod: NSCH,GC,, | Performed by: PHYSICIAN ASSISTANT

## 2024-10-10 PROCEDURE — 27000207 HC ISOLATION

## 2024-10-10 PROCEDURE — A4216 STERILE WATER/SALINE, 10 ML: HCPCS | Performed by: STUDENT IN AN ORGANIZED HEALTH CARE EDUCATION/TRAINING PROGRAM

## 2024-10-10 PROCEDURE — 85025 COMPLETE CBC W/AUTO DIFF WBC: CPT | Performed by: REGISTERED NURSE

## 2024-10-10 PROCEDURE — 36410 VNPNXR 3YR/> PHY/QHP DX/THER: CPT

## 2024-10-10 PROCEDURE — 80053 COMPREHEN METABOLIC PANEL: CPT | Performed by: REGISTERED NURSE

## 2024-10-10 PROCEDURE — 63600175 PHARM REV CODE 636 W HCPCS: Performed by: STUDENT IN AN ORGANIZED HEALTH CARE EDUCATION/TRAINING PROGRAM

## 2024-10-10 PROCEDURE — 25000003 PHARM REV CODE 250: Performed by: PHYSICIAN ASSISTANT

## 2024-10-10 RX ORDER — SODIUM CHLORIDE 0.9 % (FLUSH) 0.9 %
10 SYRINGE (ML) INJECTION EVERY 6 HOURS
Status: DISCONTINUED | OUTPATIENT
Start: 2024-10-10 | End: 2024-10-14

## 2024-10-10 RX ORDER — SODIUM CHLORIDE 9 MG/ML
INJECTION, SOLUTION INTRAVENOUS CONTINUOUS
Status: ACTIVE | OUTPATIENT
Start: 2024-10-10 | End: 2024-10-10

## 2024-10-10 RX ORDER — SODIUM CHLORIDE 0.9 % (FLUSH) 0.9 %
10 SYRINGE (ML) INJECTION
Status: DISCONTINUED | OUTPATIENT
Start: 2024-10-10 | End: 2024-10-14

## 2024-10-10 RX ADMIN — SODIUM CHLORIDE, PRESERVATIVE FREE 10 ML: 5 INJECTION INTRAVENOUS at 06:10

## 2024-10-10 RX ADMIN — SODIUM CHLORIDE: 9 INJECTION, SOLUTION INTRAVENOUS at 01:10

## 2024-10-10 RX ADMIN — Medication 10 ML: at 09:10

## 2024-10-10 RX ADMIN — ERTAPENEM 0.5 G: 1 INJECTION INTRAMUSCULAR; INTRAVENOUS at 01:10

## 2024-10-10 NOTE — ASSESSMENT & PLAN NOTE
Initial troponin 0.115, repeat 0.288. mostly demand ischemia   EKG with paced rhythm.  Cardiology consulted  Echo was done and showed HFrEF with global hypokinesis  Cardio recommended medical management but given low BP, she is unable to tolerate any for now

## 2024-10-10 NOTE — PLAN OF CARE
10/10/24 0915   Rounds   Attendance Provider;Nurse    Discharge Plan A Skilled Nursing Facility   Why the patient remains in the hospital Requires continued medical care   Transition of Care Barriers Transportation       0915  Patient resting quietly in bed with daughter, Lily Ventura ([phone]), at the bedside when MARILYN participated in SIBR with Dr Scott & nurse Macarena. Patient was admitted with diverticulitis with abscess, had her drain replaced in IR yesterday, & continues to be followed by cards and wound care. Pox 98% on 2L O2 via NC this AM.     MD informed pt & daughter that she is not medically stable to dc today, will need 2 weeks of IV invanz, & that a midline will be placed prior to dc. Pt & daughter verbalized understanding.     1250  Patient resting quietly in bed with daughter at the bedside when CM rounded. CM informed Lily of the 3 accepting SNFs. Daughter stated she would like the pt to be admitted to [facility].     1330   Updated notes sent to [facility] via [employer]. Message sent informing of daughter's SNF choice & providing daughter's contact info.    CM completed LOCET & faxed PASRR to the state. Awaiting 142.    Future Appointments   Date Time Provider Department Center   10/11/2024 10:00 AM Thomas Soto DNP [facility] CARDIO [facility]   10/15/2024 10:00 AM Eulalio Parikh Jr., MD [facility] PALLMED [facility]     1515  PASRR/142 sent to [facility] via [employer].       Will continue to follow.

## 2024-10-10 NOTE — SUBJECTIVE & OBJECTIVE
Interval History:     I have seen and examined the patient today  Patient is more alert today, feels better  Her daughter is at bedside  The drain is leaking brown fluid in the dressing, IR informed to check on it    Patient has not been drinking fluid , her urine output is low and concentrated, given her HFrEF, will be cautious with iv fluid , will give her slow infusion    Review of Systems   Constitutional:  Positive for activity change.   Respiratory: Negative.     Gastrointestinal: Negative.    Genitourinary: Negative.    Musculoskeletal:  Positive for back pain.   Skin: Negative.    Psychiatric/Behavioral: Negative.       Objective:     Vital Signs (Most Recent):  Temp: 97.5 °F (36.4 °C) (10/10/24 0738)  Pulse: 69 (10/10/24 0738)  Resp: 18 (10/10/24 0738)  BP: (!) 110/50 (10/10/24 0738)  SpO2: 98 % (10/10/24 0738) Vital Signs (24h Range):  Temp:  [97.5 °F (36.4 °C)-98.5 °F (36.9 °C)] 97.5 °F (36.4 °C)  Pulse:  [68-70] 69  Resp:  [16-18] 18  SpO2:  [95 %-100 %] 98 %  BP: ()/(50-62) 110/50     Weight: 80.7 kg (178 lb)  Body mass index is 35.95 kg/m².    Intake/Output Summary (Last 24 hours) at 10/10/2024 1113  Last data filed at 10/10/2024 0519  Gross per 24 hour   Intake --   Output 13 ml   Net -13 ml         Physical Exam  Constitutional:       Appearance: She is obese. She is ill-appearing.   HENT:      Head: Normocephalic.   Cardiovascular:      Rate and Rhythm: Normal rate.   Pulmonary:      Effort: Pulmonary effort is normal.      Breath sounds: Normal breath sounds.   Skin:     General: Skin is warm.   Neurological:      Mental Status: She is alert. Mental status is at baseline.   Psychiatric:         Mood and Affect: Mood normal.             Significant Labs: All pertinent labs within the past 24 hours have been reviewed.  CBC:   Recent Labs   Lab 10/09/24  0647 10/10/24  0704   WBC 10.86 7.77   HGB 12.4 11.5*   HCT 39.8 36.0*    195     CMP:   Recent Labs   Lab 10/09/24  0647 10/10/24  0704    * 135*   K 3.7 3.5   CL 93* 93*   CO2 26 28   * 83   BUN 40* 47*   CREATININE 1.4 1.4   CALCIUM 8.7 8.8   PROT 6.3 6.1   ALBUMIN 2.2* 2.2*   BILITOT 0.3 0.4   ALKPHOS 91 78   AST 15 16   ALT 6* 5*   ANIONGAP 14 14       Significant Imaging: I have reviewed all pertinent imaging results/findings within the past 24 hours.

## 2024-10-10 NOTE — PLAN OF CARE
AAOx4; POC reviewed & verbalizes understanding.  Admit DX: NSTEMI  Afebrile. No acute events on shift. No deficits noted  IV access & IVF: PIV removed, Midline placed GREG, infusing NS @ 50  BM: 10/09/24  : [place], dark concentrated urine. Little output, MD aware  Appetite: poor  SUSANA drain to left buttock, dressing changed today, leaking @ insertion site, IR aware  Bed alarm set; fall precautions maintained.   Bed locked in lowest position, side rails up x2, call light within reach, environment clear. Encouraged pt to call nurse with any concerns.  Safety measures maintained

## 2024-10-10 NOTE — PROGRESS NOTES
[facility] Medicine  Progress Note    Patient Name: Marycruz Perez  MRN: [id number]  Patient Class: IP- Inpatient   Admission Date: 10/7/2024  Length of Stay: 2 days  Attending Physician: Verónica Scott MD  Primary Care Provider: Melchor Menchaca -        Subjective:     Principal Problem:NSTEMI (non-ST elevated myocardial infarction)        HPI:  Is a 89-year-old female with history of CHF, hypertension, ppm brought to the emergency department by EMS for evaluation of shortness of breath with the chest pain.  Additionally patient has been very weak and unable to get up.  She reports she has had a asthma exacerbations in her chest pain was take during these episodes however she is unsure if it was asthma versus true chest pain.  Patient is accompanied by her daughter who is her  per patient request.  In ED , troponin 0.115, repeat 0.288.  Chest x-ray benign.  EKG shows paced rhythm  Patient was started on heparin drip in ED.  We will be admitted to Hospital Medicine will consult Cardiology for eval.    On review of chart patient also has recent drain placed for LR which is a percutaneous 14 Japanese drain placed for intra-abdominal abscess secondary to diverticulitis.  During remains intact, draining minimal volume    Overview/Hospital Course:  No notes on file    Interval History:     I have seen and examined the patient today  Patient is more alert today, feels better  Her daughter is at bedside  The drain is leaking brown fluid in the dressing, IR informed to check on it    Patient has not been drinking fluid , her urine output is low and concentrated, given her HFrEF, will be cautious with iv fluid , will give her slow infusion    Review of Systems   Constitutional:  Positive for activity change.   Respiratory: Negative.     Gastrointestinal: Negative.    Genitourinary: Negative.    Musculoskeletal:  Positive for back pain.   Skin: Negative.    Psychiatric/Behavioral: Negative.        Objective:     Vital Signs (Most Recent):  Temp: 97.5 °F (36.4 °C) (10/10/24 0738)  Pulse: 69 (10/10/24 0738)  Resp: 18 (10/10/24 0738)  BP: (!) 110/50 (10/10/24 0738)  SpO2: 98 % (10/10/24 0738) Vital Signs (24h Range):  Temp:  [97.5 °F (36.4 °C)-98.5 °F (36.9 °C)] 97.5 °F (36.4 °C)  Pulse:  [68-70] 69  Resp:  [16-18] 18  SpO2:  [95 %-100 %] 98 %  BP: ()/(50-62) 110/50     Weight: 80.7 kg (178 lb)  Body mass index is 35.95 kg/m².    Intake/Output Summary (Last 24 hours) at 10/10/2024 1113  Last data filed at 10/10/2024 0519  Gross per 24 hour   Intake --   Output 13 ml   Net -13 ml         Physical Exam  Constitutional:       Appearance: She is obese. She is ill-appearing.   HENT:      Head: Normocephalic.   Cardiovascular:      Rate and Rhythm: Normal rate.   Pulmonary:      Effort: Pulmonary effort is normal.      Breath sounds: Normal breath sounds.   Skin:     General: Skin is warm.   Neurological:      Mental Status: She is alert. Mental status is at baseline.   Psychiatric:         Mood and Affect: Mood normal.             Significant Labs: All pertinent labs within the past 24 hours have been reviewed.  CBC:   Recent Labs   Lab 10/09/24  0647 10/10/24  0704   WBC 10.86 7.77   HGB 12.4 11.5*   HCT 39.8 36.0*    195     CMP:   Recent Labs   Lab 10/09/24  0647 10/10/24  0704   * 135*   K 3.7 3.5   CL 93* 93*   CO2 26 28   * 83   BUN 40* 47*   CREATININE 1.4 1.4   CALCIUM 8.7 8.8   PROT 6.3 6.1   ALBUMIN 2.2* 2.2*   BILITOT 0.3 0.4   ALKPHOS 91 78   AST 15 16   ALT 6* 5*   ANIONGAP 14 14       Significant Imaging: I have reviewed all pertinent imaging results/findings within the past 24 hours.    Assessment/Plan:      Diverticulitis of intestine with abscess without bleeding  Patient now with percutaneous 14 Turkmen during placed on previous admission.  Drain remains in place    CT showed new fluid collection    IR team placed a new drain 10/09    Abcs was changed to Ertapenem  according to the fluid culture sens result , anticipate at least 2 weeks of iv abcs    HFrEF (heart failure with reduced ejection fraction)  Echo with EF 45%  Patient is euvolemic on exam  Cardio recommended GDMT but BP is low  Will try to slowly introduce medications when BP can tolerate       Chest pain  Initial troponin 0.115, repeat 0.288. mostly demand ischemia   EKG with paced rhythm.  Cardiology consulted  Echo was done and showed HFrEF with global hypokinesis  Cardio recommended medical management but given low BP, she is unable to tolerate any for now    Essential hypertension  Patients blood pressure range in the last 24 hours was: BP  Min: 89/52  Max: 129/61.The patient's inpatient anti-hypertensive regimen is listed below:  Current Antihypertensives       Plan  - BP is controlled, no changes needed to their regimen      Cardiac pacemaker    EKG paced rhythm        VTE Risk Mitigation (From admission, onward)           Ordered     IP VTE HIGH RISK PATIENT  Once         10/08/24 0222     Place sequential compression device  Until discontinued         10/08/24 0222                    Discharge Planning   ANA: 10/14/2024     Code Status: DNR   Is the patient medically ready for discharge?:     Reason for patient still in hospital (select all that apply): Treatment  Discharge Plan A: Skilled Nursing Facility                  Verónica Scott MD  Department of Hospital Medicine   [place] [facility]

## 2024-10-10 NOTE — PROGRESS NOTES
Interventional Radiology   Progress Note      SUBJECTIVE:     History of Present Illness:  Marycruz Perez is a 89 y.o. female with a PMHx of CHF s/p PPM, chest pain, diverticulitis s/p IR guided drainage 9/25, HTN, CAD s/p PCI, rheumatic AS/MS, HFpEF s/p drain exchange and aspiration 10/9 with persistent communication with pelvic abscess noted.    Interval History:  No acute events overnight.  This morning RN observed some leakage around drain insertion site with flushing.  Second attempt at flushing by PA, no leakage observed.      Review of Systems   Constitutional:  Negative for chills, fever, malaise/fatigue and weight loss.   Respiratory:  Negative for cough and shortness of breath.    Cardiovascular:  Negative for chest pain, palpitations and leg swelling.   Gastrointestinal:  Negative for abdominal pain, diarrhea, nausea and vomiting.   Genitourinary:  Negative for dysuria and flank pain.   Musculoskeletal:  Negative for back pain and myalgias.   Neurological:  Negative for weakness and headaches.       Scheduled Meds:   ertapenem (INVanz) IV (PEDS and ADULTS)  0.5 g Intravenous Q24H    sodium chloride 0.9%  10 mL Other BID     Continuous Infusions:   0.9% NaCl   Intravenous Continuous 50 mL/hr at 10/10/24 1318 New Bag at 10/10/24 1318     PRN Meds:  Current Facility-Administered Medications:     acetaminophen, 650 mg, Oral, Q6H PRN    dextrose 10%, 12.5 g, Intravenous, PRN    dextrose 10%, 25 g, Intravenous, PRN    glucagon (human recombinant), 1 mg, Intramuscular, PRN    glucose, 16 g, Oral, PRN    glucose, 24 g, Oral, PRN    naloxone, 0.02 mg, Intravenous, PRN    ondansetron, 4 mg, Intravenous, Q8H PRN    sodium chloride 0.9%, 10 mL, Intravenous, Q12H PRN    Review of patient's allergies indicates:   Allergen Reactions    Codeine     Latex, natural rubber     Penicillins     Penicillin Rash       Past Medical History:   Diagnosis Date    Acute exacerbation of CHF (congestive heart failure) 10/27/2020     NATHEN (acute kidney injury) 11/30/2023    Hypertension     Syncope and collapse      Past Surgical History:   Procedure Laterality Date    A-V CARDIAC PACEMAKER INSERTION N/A 2/22/2021    Procedure: INSERTION, CARDIAC PACEMAKER, DUAL CHAMBER;  Surgeon: Norman Foote MD;  Location: [facility] EP LAB;  Service: Cardiology;  Laterality: N/A;  AVB, PPM upgrade to Dual PPM (right sided), Bio, Venogram prior to draping, MAC, [place], 3 Prep    CARDIAC CATHETERIZATION      CARDIAC PACEMAKER PLACEMENT      CHOLECYSTECTOMY      CORONARY ANGIOPLASTY      HYSTERECTOMY      REVISION OF SKIN POCKET FOR PACEMAKER  2/22/2021    Procedure: Revision, Skin Pocket, For Cardiac Pacemaker;  Surgeon: Norman Foote MD;  Location: [facility] EP LAB;  Service: Cardiology;;     Family History   Problem Relation Name Age of Onset    Liver disease Mother       Social History     Tobacco Use    Smoking status: Never    Smokeless tobacco: Never   Substance Use Topics    Alcohol use: No    Drug use: No       OBJECTIVE:     Vital Signs (Most Recent)  Temp: 97.9 °F (36.6 °C) (10/10/24 1118)  Pulse: 69 (10/10/24 1226)  Resp: 18 (10/10/24 1118)  BP: (!) 102/58 (10/10/24 1118)  SpO2: 95 % (10/10/24 1118)    Physical Exam:  Physical Exam  Vitals and nursing note reviewed.   Constitutional:       Appearance: Normal appearance.   HENT:      Head: Normocephalic and atraumatic.      Right Ear: External ear normal.      Left Ear: External ear normal.      Nose: Nose normal.   Eyes:      Extraocular Movements: Extraocular movements intact.   Pulmonary:      Effort: Pulmonary effort is normal.   Abdominal:      General: Abdomen is flat.   Musculoskeletal:      Cervical back: Normal range of motion and neck supple.   Skin:     General: Skin is warm and dry.      Comments: Drain in place, flushing appropriately, no leakage observed   Neurological:      Mental Status: She is alert.   Psychiatric:         Mood and Affect: Mood normal.         Behavior: Behavior normal.          Laboratory  I have reviewed all pertinent lab results within the past 24 hours.  CBC:   Recent Labs   Lab 10/10/24  0704   WBC 7.77   RBC 4.05   HGB 11.5*   HCT 36.0*      MCV 89   MCH 28.4   MCHC 31.9*     CMP:   Recent Labs   Lab 10/10/24  0704   GLU 83   CALCIUM 8.8   ALBUMIN 2.2*   PROT 6.1   *   K 3.5   CO2 28   CL 93*   BUN 47*   CREATININE 1.4   ALKPHOS 78   ALT 5*   AST 16   BILITOT 0.4     Coagulation:   Recent Labs   Lab 10/07/24  2326 10/08/24  0528   LABPROT 12.9*  --    INR 1.2  --    APTT 28.0 47.9*     Microbiology Results (last 7 days)       ** No results found for the last 168 hours. **          ASSESSMENT/PLAN:     Assessment:  89 y.o. female with a PMHx of CHF s/p PPM, chest pain, diverticulitis s/p IR guided drainage 9/25, HTN, CAD s/p PCI, rheumatic AS/MS, HFpEF s/p drain exchange and aspiration 10/9 with persistent communication with pelvic abscess noted.    Plan:  Pt tolerated procedure well, no acute complications noted  Drain assessed by PA at bedside.  Drain flushing without resistance and no leakage around insertion site noted.  Continue flushing the drain with 10 cc NS twice daily.  When output decreases to less than 10 cc in 24hrs consider repeat imaging to evaluate for removal.  Please contact with questions via [employer] secure chat     Leigh Bone PA-C  Interventional Radiology

## 2024-10-11 PROBLEM — R33.9 URINARY RETENTION: Status: ACTIVE | Noted: 2024-10-11

## 2024-10-11 LAB
BACTERIA #/AREA URNS HPF: ABNORMAL /HPF
BILIRUB UR QL STRIP: NEGATIVE
CLARITY UR: ABNORMAL
COLOR UR: YELLOW
GLUCOSE UR QL STRIP: NEGATIVE
HGB UR QL STRIP: ABNORMAL
HYALINE CASTS #/AREA URNS LPF: 0 /LPF
KETONES UR QL STRIP: NEGATIVE
LEUKOCYTE ESTERASE UR QL STRIP: ABNORMAL
MICROSCOPIC COMMENT: ABNORMAL
NITRITE UR QL STRIP: NEGATIVE
PH UR STRIP: 6 [PH] (ref 5–8)
PROT UR QL STRIP: ABNORMAL
RBC #/AREA URNS HPF: 50 /HPF (ref 0–4)
SP GR UR STRIP: 1.02 (ref 1–1.03)
SQUAMOUS #/AREA URNS HPF: 3 /HPF
URN SPEC COLLECT METH UR: ABNORMAL
UROBILINOGEN UR STRIP-ACNC: NEGATIVE EU/DL
WBC #/AREA URNS HPF: >100 /HPF (ref 0–5)
WBC CLUMPS URNS QL MICRO: ABNORMAL

## 2024-10-11 PROCEDURE — 11000001 HC ACUTE MED/SURG PRIVATE ROOM

## 2024-10-11 PROCEDURE — 81000 URINALYSIS NONAUTO W/SCOPE: CPT | Performed by: INTERNAL MEDICINE

## 2024-10-11 PROCEDURE — 51701 INSERT BLADDER CATHETER: CPT

## 2024-10-11 PROCEDURE — 99900035 HC TECH TIME PER 15 MIN (STAT)

## 2024-10-11 PROCEDURE — 25000003 PHARM REV CODE 250: Performed by: INTERNAL MEDICINE

## 2024-10-11 PROCEDURE — 63600175 PHARM REV CODE 636 W HCPCS: Performed by: STUDENT IN AN ORGANIZED HEALTH CARE EDUCATION/TRAINING PROGRAM

## 2024-10-11 PROCEDURE — 87086 URINE CULTURE/COLONY COUNT: CPT | Performed by: INTERNAL MEDICINE

## 2024-10-11 PROCEDURE — 25000003 PHARM REV CODE 250: Performed by: STUDENT IN AN ORGANIZED HEALTH CARE EDUCATION/TRAINING PROGRAM

## 2024-10-11 PROCEDURE — 27000221 HC OXYGEN, UP TO 24 HOURS

## 2024-10-11 PROCEDURE — 94761 N-INVAS EAR/PLS OXIMETRY MLT: CPT

## 2024-10-11 PROCEDURE — 51798 US URINE CAPACITY MEASURE: CPT

## 2024-10-11 PROCEDURE — 27000207 HC ISOLATION

## 2024-10-11 PROCEDURE — A4216 STERILE WATER/SALINE, 10 ML: HCPCS | Performed by: PHYSICIAN ASSISTANT

## 2024-10-11 PROCEDURE — 25000003 PHARM REV CODE 250: Performed by: PHYSICIAN ASSISTANT

## 2024-10-11 RX ORDER — TAMSULOSIN HYDROCHLORIDE 0.4 MG/1
0.4 CAPSULE ORAL DAILY
Status: DISCONTINUED | OUTPATIENT
Start: 2024-10-11 | End: 2024-10-18 | Stop reason: HOSPADM

## 2024-10-11 RX ADMIN — Medication 10 ML: at 09:10

## 2024-10-11 RX ADMIN — ERTAPENEM 0.5 G: 1 INJECTION INTRAMUSCULAR; INTRAVENOUS at 02:10

## 2024-10-11 RX ADMIN — TAMSULOSIN HYDROCHLORIDE 0.4 MG: 0.4 CAPSULE ORAL at 09:10

## 2024-10-11 NOTE — PROGRESS NOTES
[facility] Medicine  Progress Note    Patient Name: Marycruz Perez  MRN: [id number]  Patient Class: IP- Inpatient   Admission Date: 10/7/2024  Length of Stay: 3 days  Attending Physician: Verónica Scott MD  Primary Care Provider: Melchor Menchaca -        Subjective:     Principal Problem:NSTEMI (non-ST elevated myocardial infarction)        HPI:  Is a 89-year-old female with history of CHF, hypertension, ppm brought to the emergency department by EMS for evaluation of shortness of breath with the chest pain.  Additionally patient has been very weak and unable to get up.  She reports she has had a asthma exacerbations in her chest pain was take during these episodes however she is unsure if it was asthma versus true chest pain.  Patient is accompanied by her daughter who is her  per patient request.  In ED , troponin 0.115, repeat 0.288.  Chest x-ray benign.  EKG shows paced rhythm  Patient was started on heparin drip in ED.  We will be admitted to Hospital Medicine will consult Cardiology for eval.    On review of chart patient also has recent drain placed for LR which is a percutaneous 14 Nepali drain placed for intra-abdominal abscess secondary to diverticulitis.  During remains intact, draining minimal volume    Overview/Hospital Course:  No notes on file    Interval History:     I have seen and examined the patient today   Overnight patient was retaining urine and straight cath was done, this morning she is retaining again, RN is following bladder scan protocol.    Her son Syed was at bedside and updated     Review of Systems   Constitutional:  Positive for activity change.   Respiratory: Negative.     Gastrointestinal: Negative.    Genitourinary:  Positive for difficulty urinating.   Musculoskeletal:  Positive for back pain.   Skin: Negative.    Psychiatric/Behavioral: Negative.       Objective:     Vital Signs (Most Recent):  Temp: 97.6 °F (36.4 °C) (10/11/24  0750)  Pulse: 70 (10/11/24 0750)  Resp: 20 (10/11/24 0750)  BP: (!) 121/56 (10/11/24 0750)  SpO2: 99 % (10/11/24 0750) Vital Signs (24h Range):  Temp:  [97.5 °F (36.4 °C)-98.3 °F (36.8 °C)] 97.6 °F (36.4 °C)  Pulse:  [69-74] 70  Resp:  [18-20] 20  SpO2:  [95 %-99 %] 99 %  BP: (100-127)/(51-58) 121/56     Weight: 82.2 kg (181 lb 3.5 oz)  Body mass index is 36.6 kg/m².    Intake/Output Summary (Last 24 hours) at 10/11/2024 1112  Last data filed at 10/11/2024 0900  Gross per 24 hour   Intake 128 ml   Output 985 ml   Net -857 ml         Physical Exam  Constitutional:       Appearance: She is obese. She is ill-appearing.   HENT:      Head: Normocephalic.   Cardiovascular:      Rate and Rhythm: Normal rate.   Pulmonary:      Effort: Pulmonary effort is normal.      Breath sounds: Normal breath sounds.   Skin:     General: Skin is warm.   Neurological:      Mental Status: She is alert. Mental status is at baseline.   Psychiatric:         Mood and Affect: Mood normal.             Significant Labs: All pertinent labs within the past 24 hours have been reviewed.  CBC:   Recent Labs   Lab 10/10/24  0704   WBC 7.77   HGB 11.5*   HCT 36.0*        CMP:   Recent Labs   Lab 10/10/24  0704   *   K 3.5   CL 93*   CO2 28   GLU 83   BUN 47*   CREATININE 1.4   CALCIUM 8.8   PROT 6.1   ALBUMIN 2.2*   BILITOT 0.4   ALKPHOS 78   AST 16   ALT 5*   ANIONGAP 14       Significant Imaging: I have reviewed all pertinent imaging results/findings within the past 24 hours.    Assessment/Plan:      Urinary retention    Bladder scan protocol     Diverticulitis of intestine with abscess without bleeding  Patient now with percutaneous 14 Romansh during placed on previous admission.  Drain remains in place    CT showed new fluid collection    IR team placed a new drain 10/09    Abcs was changed to Ertapenem according to the fluid culture sens result , anticipate at least 2 weeks of iv abcs    HFrEF (heart failure with reduced ejection  fraction)  Echo with EF 45%  Patient is euvolemic on exam  Cardio recommended GDMT but BP is low  Will try to slowly introduce medications when BP can tolerate       Chest pain  Initial troponin 0.115, repeat 0.288. mostly demand ischemia   EKG with paced rhythm.  Cardiology consulted  Echo was done and showed HFrEF with global hypokinesis  Cardio recommended medical management but given low BP, she is unable to tolerate any for now    Essential hypertension  Patients blood pressure range in the last 24 hours was: BP  Min: 89/52  Max: 129/61.The patient's inpatient anti-hypertensive regimen is listed below:  Current Antihypertensives       Plan  - BP is controlled, no changes needed to their regimen      Cardiac pacemaker    EKG paced rhythm

## 2024-10-11 NOTE — PHYSICIAN QUERY
Provider, Due to Conflicting Documentation Please Clarify the       Heart Failure       Diagnosis     Chronic systolic heart failure (HFrEF or HFmrEF)

## 2024-10-11 NOTE — PLAN OF CARE
10/11/24 0945   Rounds   Attendance Provider;Nurse ;Nurse   Discharge Plan A Skilled Nursing Facility   Why the patient remains in the hospital Requires continued medical care   Transition of Care Barriers Transportation       0945  Patient resting quietly in bed with son, Syed Perez ([phone]), outside the room when CM participated in SIBR with Dr Scott & nurse Reyna Patient was admitted with diverticulitis with abscess, had her drain replaced in IR on 10/9/2024, continues to receive IV invanz, & continues to be followed by cards and wound care. Pox 95% on 2L O2 via NC this AM. Pt will need IV invanz x2 weeks. Right midline was placed on 10/10/2024.    Straight cath done by nurse this AM due to urinary retention.     1135  CM informed Verito ([phone]) w/[facility] that the pt might be medically stable to discharge to SNF on Monday 10/14/2024 & requested that she contact the pt's daughter, Lily Ventura ([phone]), to complete admission paperwork.     1145  Updated notes sent to [facility] via [facility].       Will continue to follow.

## 2024-10-11 NOTE — PHYSICIAN QUERY
Question: Provider, Due to Conflicting Clinical Picture Please Clinically Validate the    NSTEMI (non-ST elevated myocardial infarction)    Diagnosis     If the      NSTEMI (non-ST elevated myocardial infarction)     Diagnosis is clinically valid provide any signs,symptoms and/or treatment to support it    Provider Query Response:  NSTEMI Is not Supported and has Been Ruled out, Demand Ischemia Only

## 2024-10-11 NOTE — SUBJECTIVE & OBJECTIVE
Interval History:     I have seen and examined the patient today   Overnight patient was retaining urine and straight cath was done, this morning she is retaining again, RN is following bladder scan protocol.    Her son Syed was at bedside and updated     Review of Systems   Constitutional:  Positive for activity change.   Respiratory: Negative.     Gastrointestinal: Negative.    Genitourinary:  Positive for difficulty urinating.   Musculoskeletal:  Positive for back pain.   Skin: Negative.    Psychiatric/Behavioral: Negative.       Objective:     Vital Signs (Most Recent):  Temp: 97.6 °F (36.4 °C) (10/11/24 0750)  Pulse: 70 (10/11/24 0750)  Resp: 20 (10/11/24 0750)  BP: (!) 121/56 (10/11/24 0750)  SpO2: 99 % (10/11/24 0750) Vital Signs (24h Range):  Temp:  [97.5 °F (36.4 °C)-98.3 °F (36.8 °C)] 97.6 °F (36.4 °C)  Pulse:  [69-74] 70  Resp:  [18-20] 20  SpO2:  [95 %-99 %] 99 %  BP: (100-127)/(51-58) 121/56     Weight: 82.2 kg (181 lb 3.5 oz)  Body mass index is 36.6 kg/m².    Intake/Output Summary (Last 24 hours) at 10/11/2024 1112  Last data filed at 10/11/2024 0900  Gross per 24 hour   Intake 128 ml   Output 985 ml   Net -857 ml         Physical Exam  Constitutional:       Appearance: She is obese. She is ill-appearing.   HENT:      Head: Normocephalic.   Cardiovascular:      Rate and Rhythm: Normal rate.   Pulmonary:      Effort: Pulmonary effort is normal.      Breath sounds: Normal breath sounds.   Skin:     General: Skin is warm.   Neurological:      Mental Status: She is alert. Mental status is at baseline.   Psychiatric:         Mood and Affect: Mood normal.             Significant Labs: All pertinent labs within the past 24 hours have been reviewed.  CBC:   Recent Labs   Lab 10/10/24  0704   WBC 7.77   HGB 11.5*   HCT 36.0*        CMP:   Recent Labs   Lab 10/10/24  0704   *   K 3.5   CL 93*   CO2 28   GLU 83   BUN 47*   CREATININE 1.4   CALCIUM 8.8   PROT 6.1   ALBUMIN 2.2*   BILITOT 0.4    ALKPHOS 78   AST 16   ALT 5*   ANIONGAP 14       Significant Imaging: I have reviewed all pertinent imaging results/findings within the past 24 hours.

## 2024-10-11 NOTE — ASSESSMENT & PLAN NOTE
Patient now with percutaneous 14 Somali during placed on previous admission.  Drain remains in place    CT showed new fluid collection    IR team placed a new drain 10/09    Abcs was changed to Ertapenem according to the fluid culture sens result , anticipate at least 2 weeks of iv abcs

## 2024-10-11 NOTE — PLAN OF CARE
Problem: Adult Inpatient Plan of Care  Goal: Plan of Care Review  Outcome: Progressing  Goal: Patient-Specific Goal (Individualized)  Outcome: Progressing  Goal: Absence of Hospital-Acquired Illness or Injury  Outcome: Progressing  Goal: Optimal Comfort and Wellbeing  Outcome: Progressing  Goal: Readiness for Transition of Care  Outcome: Progressing     Problem: Fall Injury Risk  Goal: Absence of Fall and Fall-Related Injury  Outcome: Progressing     Problem: Hospitalized Older Adult  Goal: Optimal Cognitive Function  Outcome: Progressing  Goal: Effective Bowel Elimination  Outcome: Progressing  Goal: Optimal Coping  Outcome: Progressing  Goal: Fluid and Electrolyte Balance  Outcome: Progressing  Goal: Optimal Functional Ability  Outcome: Progressing  Goal: Improved Oral Intake  Outcome: Progressing  Goal: Adequate Sleep/Rest  Outcome: Progressing  Goal: Effective Urinary Elimination  Outcome: Progressing     Problem: Gas Exchange Impaired  Goal: Optimal Gas Exchange  Outcome: Progressing     Problem: Heart Failure  Goal: Optimal Coping  Outcome: Progressing  Goal: Optimal Cardiac Output  Outcome: Progressing  Goal: Stable Heart Rate and Rhythm  Outcome: Progressing  Goal: Optimal Functional Ability  Outcome: Progressing  Goal: Fluid and Electrolyte Balance  Outcome: Progressing  Goal: Improved Oral Intake  Outcome: Progressing  Goal: Effective Oxygenation and Ventilation  Outcome: Progressing  Goal: Effective Breathing Pattern During Sleep  Outcome: Progressing     Problem: Infection  Goal: Absence of Infection Signs and Symptoms  Outcome: Progressing     Problem: Skin Injury Risk Increased  Goal: Skin Health and Integrity  Outcome: Progressing     Problem: Wound  Goal: Optimal Coping  Outcome: Progressing  Goal: Optimal Functional Ability  Outcome: Progressing  Goal: Absence of Infection Signs and Symptoms  Outcome: Progressing  Goal: Improved Oral Intake  Outcome: Progressing  Goal: Optimal Pain Control and  Function  Outcome: Progressing  Goal: Skin Health and Integrity  Outcome: Progressing  Goal: Optimal Wound Healing  Outcome: Progressing     Problem: Acute Coronary Syndrome  Goal: Optimal Adaptation to Illness  Outcome: Progressing  Goal: Absence of Cardiac-Related Pain  Outcome: Progressing  Goal: Normalized Cardiac Rhythm  Outcome: Progressing  Goal: Effective Cardiac Pump Function  Outcome: Progressing  Goal: Adequate Tissue Perfusion  Outcome: Progressing     Problem: Comorbidity Management  Goal: Maintenance of Asthma Control  Outcome: Progressing  Goal: Maintenance of Heart Failure Symptom Control  Outcome: Progressing  Goal: Blood Pressure in Desired Range  Outcome: Progressing

## 2024-10-12 LAB
ALBUMIN SERPL BCP-MCNC: 2.1 G/DL (ref 3.5–5.2)
ALP SERPL-CCNC: 68 U/L (ref 55–135)
ALT SERPL W/O P-5'-P-CCNC: <5 U/L (ref 10–44)
ANION GAP SERPL CALC-SCNC: 7 MMOL/L (ref 8–16)
AST SERPL-CCNC: 12 U/L (ref 10–40)
BASOPHILS # BLD AUTO: 0.06 K/UL (ref 0–0.2)
BASOPHILS NFR BLD: 0.8 % (ref 0–1.9)
BILIRUB SERPL-MCNC: 0.3 MG/DL (ref 0.1–1)
BUN SERPL-MCNC: 31 MG/DL (ref 10–30)
CALCIUM SERPL-MCNC: 8.6 MG/DL (ref 8.7–10.5)
CHLORIDE SERPL-SCNC: 100 MMOL/L (ref 95–110)
CO2 SERPL-SCNC: 32 MMOL/L (ref 23–29)
CREAT SERPL-MCNC: 0.9 MG/DL (ref 0.5–1.4)
DIFFERENTIAL METHOD BLD: ABNORMAL
EOSINOPHIL # BLD AUTO: 0.1 K/UL (ref 0–0.5)
EOSINOPHIL NFR BLD: 2 % (ref 0–8)
ERYTHROCYTE [DISTWIDTH] IN BLOOD BY AUTOMATED COUNT: 14.9 % (ref 11.5–14.5)
EST. GFR  (NO RACE VARIABLE): 59 ML/MIN/1.73 M^2
GLUCOSE SERPL-MCNC: 154 MG/DL (ref 70–110)
HCT VFR BLD AUTO: 33.8 % (ref 37–48.5)
HGB BLD-MCNC: 10.6 G/DL (ref 12–16)
IMM GRANULOCYTES # BLD AUTO: 0.04 K/UL (ref 0–0.04)
IMM GRANULOCYTES NFR BLD AUTO: 0.6 % (ref 0–0.5)
LYMPHOCYTES # BLD AUTO: 2.1 K/UL (ref 1–4.8)
LYMPHOCYTES NFR BLD: 28.8 % (ref 18–48)
MAGNESIUM SERPL-MCNC: 2.1 MG/DL (ref 1.6–2.6)
MCH RBC QN AUTO: 28.3 PG (ref 27–31)
MCHC RBC AUTO-ENTMCNC: 31.4 G/DL (ref 32–36)
MCV RBC AUTO: 90 FL (ref 82–98)
MONOCYTES # BLD AUTO: 0.6 K/UL (ref 0.3–1)
MONOCYTES NFR BLD: 8.8 % (ref 4–15)
NEUTROPHILS # BLD AUTO: 4.2 K/UL (ref 1.8–7.7)
NEUTROPHILS NFR BLD: 59 % (ref 38–73)
NRBC BLD-RTO: 0 /100 WBC
PLATELET # BLD AUTO: 164 K/UL (ref 150–450)
PMV BLD AUTO: 10.8 FL (ref 9.2–12.9)
POCT GLUCOSE: 120 MG/DL (ref 70–110)
POTASSIUM SERPL-SCNC: 3.5 MMOL/L (ref 3.5–5.1)
PROT SERPL-MCNC: 5.5 G/DL (ref 6–8.4)
RBC # BLD AUTO: 3.74 M/UL (ref 4–5.4)
SODIUM SERPL-SCNC: 139 MMOL/L (ref 136–145)
WBC # BLD AUTO: 7.12 K/UL (ref 3.9–12.7)

## 2024-10-12 PROCEDURE — 36415 COLL VENOUS BLD VENIPUNCTURE: CPT | Performed by: STUDENT IN AN ORGANIZED HEALTH CARE EDUCATION/TRAINING PROGRAM

## 2024-10-12 PROCEDURE — 80053 COMPREHEN METABOLIC PANEL: CPT | Performed by: STUDENT IN AN ORGANIZED HEALTH CARE EDUCATION/TRAINING PROGRAM

## 2024-10-12 PROCEDURE — 25000003 PHARM REV CODE 250: Performed by: STUDENT IN AN ORGANIZED HEALTH CARE EDUCATION/TRAINING PROGRAM

## 2024-10-12 PROCEDURE — 25000003 PHARM REV CODE 250: Performed by: INTERNAL MEDICINE

## 2024-10-12 PROCEDURE — 83735 ASSAY OF MAGNESIUM: CPT | Performed by: STUDENT IN AN ORGANIZED HEALTH CARE EDUCATION/TRAINING PROGRAM

## 2024-10-12 PROCEDURE — 94761 N-INVAS EAR/PLS OXIMETRY MLT: CPT

## 2024-10-12 PROCEDURE — 11000001 HC ACUTE MED/SURG PRIVATE ROOM

## 2024-10-12 PROCEDURE — 85025 COMPLETE CBC W/AUTO DIFF WBC: CPT | Performed by: STUDENT IN AN ORGANIZED HEALTH CARE EDUCATION/TRAINING PROGRAM

## 2024-10-12 PROCEDURE — 27000207 HC ISOLATION

## 2024-10-12 PROCEDURE — A4216 STERILE WATER/SALINE, 10 ML: HCPCS | Performed by: STUDENT IN AN ORGANIZED HEALTH CARE EDUCATION/TRAINING PROGRAM

## 2024-10-12 PROCEDURE — 51798 US URINE CAPACITY MEASURE: CPT

## 2024-10-12 PROCEDURE — 63600175 PHARM REV CODE 636 W HCPCS: Performed by: STUDENT IN AN ORGANIZED HEALTH CARE EDUCATION/TRAINING PROGRAM

## 2024-10-12 PROCEDURE — 99900035 HC TECH TIME PER 15 MIN (STAT)

## 2024-10-12 PROCEDURE — 27000221 HC OXYGEN, UP TO 24 HOURS

## 2024-10-12 RX ADMIN — SODIUM CHLORIDE, PRESERVATIVE FREE 10 ML: 5 INJECTION INTRAVENOUS at 11:10

## 2024-10-12 RX ADMIN — TAMSULOSIN HYDROCHLORIDE 0.4 MG: 0.4 CAPSULE ORAL at 11:10

## 2024-10-12 RX ADMIN — ERTAPENEM 0.5 G: 1 INJECTION INTRAMUSCULAR; INTRAVENOUS at 02:10

## 2024-10-12 NOTE — SUBJECTIVE & OBJECTIVE
"Interval History:     I have seen and examined the patient today  Patient is passing urine, no more rentention  Patient ate better today, her daughter is at bedside and I encouraged her to help her mother for oral hydration     Review of Systems   Constitutional:  Positive for activity change.   Respiratory: Negative.     Gastrointestinal: Negative.    Musculoskeletal:  Positive for back pain.   Skin: Negative.    Psychiatric/Behavioral: Negative.       Objective:     Vital Signs (Most Recent):  Temp: 98 °F (36.7 °C) (10/12/24 1130)  Pulse: 70 (10/12/24 1130)  Resp: 18 (10/12/24 1130)  BP: (!) 115/55 (10/12/24 1130)  SpO2: 99 % (10/12/24 1130) Vital Signs (24h Range):  Temp:  [97.6 °F (36.4 °C)-98.1 °F (36.7 °C)] 98 °F (36.7 °C)  Pulse:  [69-80] 70  Resp:  [16-20] 18  SpO2:  [95 %-100 %] 99 %  BP: (110-132)/(55-60) 115/55     Weight: 82.2 kg (181 lb 3.5 oz)  Body mass index is 36.6 kg/m².    Intake/Output Summary (Last 24 hours) at 10/12/2024 1219  Last data filed at 10/11/2024 1845  Gross per 24 hour   Intake 100 ml   Output 360 ml   Net -260 ml         Physical Exam  Constitutional:       Appearance: She is obese. She is ill-appearing.   HENT:      Head: Normocephalic.   Cardiovascular:      Rate and Rhythm: Normal rate.   Pulmonary:      Effort: Pulmonary effort is normal.      Breath sounds: Normal breath sounds.   Skin:     General: Skin is warm.   Neurological:      Mental Status: She is alert. Mental status is at baseline.   Psychiatric:         Mood and Affect: Mood normal.             Significant Labs: All pertinent labs within the past 24 hours have been reviewed.  CBC:   Recent Labs   Lab 10/12/24  0850   WBC 7.12   HGB 10.6*   HCT 33.8*        CMP:   No results for input(s): "NA", "K", "CL", "CO2", "GLU", "BUN", "CREATININE", "CALCIUM", "PROT", "ALBUMIN", "BILITOT", "ALKPHOS", "AST", "ALT", "ANIONGAP", "EGFRNONAA" in the last 48 hours.    Invalid input(s): "LOI"      Significant Imaging: I " have reviewed all pertinent imaging results/findings within the past 24 hours.

## 2024-10-12 NOTE — ASSESSMENT & PLAN NOTE
Patient now with percutaneous 14 Moroccan during placed on previous admission.  Drain remains in place    CT showed new fluid collection    IR team placed a new drain 10/09    Abcs was changed to Ertapenem according to the fluid culture sens result , anticipate at least 2 weeks of iv abcs    When drain output is less than 10 cc in 24hr then would repeat CT to evaluate for drainage removal

## 2024-10-12 NOTE — PROGRESS NOTES
[facility] Medicine  Progress Note    Patient Name: Marycruz Perez  MRN: [id number]  Patient Class: IP- Inpatient   Admission Date: 10/7/2024  Length of Stay: 4 days  Attending Physician: Verónica Scott MD  Primary Care Provider: Melchor Menchaca -        Subjective:     Principal Problem:NSTEMI (non-ST elevated myocardial infarction)        HPI:  Is a 89-year-old female with history of CHF, hypertension, ppm brought to the emergency department by EMS for evaluation of shortness of breath with the chest pain.  Additionally patient has been very weak and unable to get up.  She reports she has had a asthma exacerbations in her chest pain was take during these episodes however she is unsure if it was asthma versus true chest pain.  Patient is accompanied by her daughter who is her  per patient request.  In ED , troponin 0.115, repeat 0.288.  Chest x-ray benign.  EKG shows paced rhythm  Patient was started on heparin drip in ED.  We will be admitted to Hospital Medicine will consult Cardiology for eval.    On review of chart patient also has recent drain placed for LR which is a percutaneous 14 Armenian drain placed for intra-abdominal abscess secondary to diverticulitis.  During remains intact, draining minimal volume    Overview/Hospital Course:  No notes on file    Interval History:     I have seen and examined the patient today  Patient is passing urine, no more rentention  Patient ate better today, her daughter is at bedside and I encouraged her to help her mother for oral hydration     Review of Systems   Constitutional:  Positive for activity change.   Respiratory: Negative.     Gastrointestinal: Negative.    Musculoskeletal:  Positive for back pain.   Skin: Negative.    Psychiatric/Behavioral: Negative.       Objective:     Vital Signs (Most Recent):  Temp: 98 °F (36.7 °C) (10/12/24 1130)  Pulse: 70 (10/12/24 1130)  Resp: 18 (10/12/24 1130)  BP: (!) 115/55 (10/12/24  "1130)  SpO2: 99 % (10/12/24 1130) Vital Signs (24h Range):  Temp:  [97.6 °F (36.4 °C)-98.1 °F (36.7 °C)] 98 °F (36.7 °C)  Pulse:  [69-80] 70  Resp:  [16-20] 18  SpO2:  [95 %-100 %] 99 %  BP: (110-132)/(55-60) 115/55     Weight: 82.2 kg (181 lb 3.5 oz)  Body mass index is 36.6 kg/m².    Intake/Output Summary (Last 24 hours) at 10/12/2024 1219  Last data filed at 10/11/2024 1845  Gross per 24 hour   Intake 100 ml   Output 360 ml   Net -260 ml         Physical Exam  Constitutional:       Appearance: She is obese. She is ill-appearing.   HENT:      Head: Normocephalic.   Cardiovascular:      Rate and Rhythm: Normal rate.   Pulmonary:      Effort: Pulmonary effort is normal.      Breath sounds: Normal breath sounds.   Skin:     General: Skin is warm.   Neurological:      Mental Status: She is alert. Mental status is at baseline.   Psychiatric:         Mood and Affect: Mood normal.             Significant Labs: All pertinent labs within the past 24 hours have been reviewed.  CBC:   Recent Labs   Lab 10/12/24  0850   WBC 7.12   HGB 10.6*   HCT 33.8*        CMP:   No results for input(s): "NA", "K", "CL", "CO2", "GLU", "BUN", "CREATININE", "CALCIUM", "PROT", "ALBUMIN", "BILITOT", "ALKPHOS", "AST", "ALT", "ANIONGAP", "EGFRNONAA" in the last 48 hours.    Invalid input(s): "ESTGFAFR[place]"      Significant Imaging: I have reviewed all pertinent imaging results/findings within the past 24 hours.    Assessment/Plan:      Urinary retention  Resolved   Bladder scan protocol     Diverticulitis of intestine with abscess without bleeding  Patient now with percutaneous 14 Niuean during placed on previous admission.  Drain remains in place    CT showed new fluid collection    IR team placed a new drain 10/09    Abcs was changed to Ertapenem according to the fluid culture sens result , anticipate at least 2 weeks of iv abcs    When drain output is less than 10 cc in 24hr then would repeat CT to evaluate for drainage removal    HFrEF " (heart failure with reduced ejection fraction)  Echo with EF 45%  Patient is euvolemic on exam  Cardio recommended GDMT but BP is low  Will try to slowly introduce medications when BP can tolerate       Chest pain  Initial troponin 0.115, repeat 0.288. mostly demand ischemia   EKG with paced rhythm.  Cardiology consulted  Echo was done and showed HFrEF with global hypokinesis  Cardio recommended medical management but given low BP, she is unable to tolerate any for now    Essential hypertension  Patients blood pressure range in the last 24 hours was: BP  Min: 89/52  Max: 132/60.The patient's inpatient anti-hypertensive regimen is listed below:  Current Antihypertensives       Plan  - BP is controlled, no changes needed to their regimen      Cardiac pacemaker    EKG paced rhythm

## 2024-10-12 NOTE — ASSESSMENT & PLAN NOTE
Patients blood pressure range in the last 24 hours was: BP  Min: 89/52  Max: 132/60.The patient's inpatient anti-hypertensive regimen is listed below:  Current Antihypertensives       Plan  - BP is controlled, no changes needed to their regimen

## 2024-10-13 LAB
ALBUMIN SERPL BCP-MCNC: 2 G/DL (ref 3.5–5.2)
ALP SERPL-CCNC: 80 U/L (ref 55–135)
ALT SERPL W/O P-5'-P-CCNC: 5 U/L (ref 10–44)
ANION GAP SERPL CALC-SCNC: 8 MMOL/L (ref 8–16)
AST SERPL-CCNC: 16 U/L (ref 10–40)
BACTERIA UR CULT: NORMAL
BASOPHILS # BLD AUTO: 0.05 K/UL (ref 0–0.2)
BASOPHILS NFR BLD: 0.7 % (ref 0–1.9)
BILIRUB SERPL-MCNC: 0.3 MG/DL (ref 0.1–1)
BUN SERPL-MCNC: 27 MG/DL (ref 10–30)
CALCIUM SERPL-MCNC: 8.9 MG/DL (ref 8.7–10.5)
CHLORIDE SERPL-SCNC: 102 MMOL/L (ref 95–110)
CO2 SERPL-SCNC: 30 MMOL/L (ref 23–29)
CREAT SERPL-MCNC: 0.8 MG/DL (ref 0.5–1.4)
DIFFERENTIAL METHOD BLD: ABNORMAL
EOSINOPHIL # BLD AUTO: 0.2 K/UL (ref 0–0.5)
EOSINOPHIL NFR BLD: 2.1 % (ref 0–8)
ERYTHROCYTE [DISTWIDTH] IN BLOOD BY AUTOMATED COUNT: 15.1 % (ref 11.5–14.5)
EST. GFR  (NO RACE VARIABLE): >60 ML/MIN/1.73 M^2
GLUCOSE SERPL-MCNC: 115 MG/DL (ref 70–110)
HCT VFR BLD AUTO: 35.4 % (ref 37–48.5)
HGB BLD-MCNC: 11 G/DL (ref 12–16)
IMM GRANULOCYTES # BLD AUTO: 0.1 K/UL (ref 0–0.04)
IMM GRANULOCYTES NFR BLD AUTO: 1.3 % (ref 0–0.5)
LYMPHOCYTES # BLD AUTO: 1.8 K/UL (ref 1–4.8)
LYMPHOCYTES NFR BLD: 23.5 % (ref 18–48)
MCH RBC QN AUTO: 28 PG (ref 27–31)
MCHC RBC AUTO-ENTMCNC: 31.1 G/DL (ref 32–36)
MCV RBC AUTO: 90 FL (ref 82–98)
MONOCYTES # BLD AUTO: 0.8 K/UL (ref 0.3–1)
MONOCYTES NFR BLD: 10.1 % (ref 4–15)
NEUTROPHILS # BLD AUTO: 4.8 K/UL (ref 1.8–7.7)
NEUTROPHILS NFR BLD: 62.3 % (ref 38–73)
NRBC BLD-RTO: 0 /100 WBC
PLATELET # BLD AUTO: 169 K/UL (ref 150–450)
PMV BLD AUTO: 11.4 FL (ref 9.2–12.9)
POTASSIUM SERPL-SCNC: 3.7 MMOL/L (ref 3.5–5.1)
PROT SERPL-MCNC: 5.6 G/DL (ref 6–8.4)
RBC # BLD AUTO: 3.93 M/UL (ref 4–5.4)
SODIUM SERPL-SCNC: 140 MMOL/L (ref 136–145)
WBC # BLD AUTO: 7.63 K/UL (ref 3.9–12.7)

## 2024-10-13 PROCEDURE — 25000003 PHARM REV CODE 250: Performed by: INTERNAL MEDICINE

## 2024-10-13 PROCEDURE — 27000207 HC ISOLATION

## 2024-10-13 PROCEDURE — 94761 N-INVAS EAR/PLS OXIMETRY MLT: CPT

## 2024-10-13 PROCEDURE — 27000221 HC OXYGEN, UP TO 24 HOURS

## 2024-10-13 PROCEDURE — 51798 US URINE CAPACITY MEASURE: CPT

## 2024-10-13 PROCEDURE — 11000001 HC ACUTE MED/SURG PRIVATE ROOM

## 2024-10-13 PROCEDURE — 63600175 PHARM REV CODE 636 W HCPCS: Performed by: STUDENT IN AN ORGANIZED HEALTH CARE EDUCATION/TRAINING PROGRAM

## 2024-10-13 PROCEDURE — 25000003 PHARM REV CODE 250: Performed by: PHYSICIAN ASSISTANT

## 2024-10-13 PROCEDURE — A4216 STERILE WATER/SALINE, 10 ML: HCPCS | Performed by: STUDENT IN AN ORGANIZED HEALTH CARE EDUCATION/TRAINING PROGRAM

## 2024-10-13 PROCEDURE — 25000003 PHARM REV CODE 250: Performed by: STUDENT IN AN ORGANIZED HEALTH CARE EDUCATION/TRAINING PROGRAM

## 2024-10-13 PROCEDURE — 80053 COMPREHEN METABOLIC PANEL: CPT | Performed by: STUDENT IN AN ORGANIZED HEALTH CARE EDUCATION/TRAINING PROGRAM

## 2024-10-13 PROCEDURE — 99900035 HC TECH TIME PER 15 MIN (STAT)

## 2024-10-13 PROCEDURE — 85025 COMPLETE CBC W/AUTO DIFF WBC: CPT | Performed by: STUDENT IN AN ORGANIZED HEALTH CARE EDUCATION/TRAINING PROGRAM

## 2024-10-13 PROCEDURE — A4216 STERILE WATER/SALINE, 10 ML: HCPCS | Performed by: PHYSICIAN ASSISTANT

## 2024-10-13 PROCEDURE — 36415 COLL VENOUS BLD VENIPUNCTURE: CPT | Performed by: STUDENT IN AN ORGANIZED HEALTH CARE EDUCATION/TRAINING PROGRAM

## 2024-10-13 RX ORDER — POLYETHYLENE GLYCOL 3350 17 G/17G
17 POWDER, FOR SOLUTION ORAL ONCE
Status: COMPLETED | OUTPATIENT
Start: 2024-10-13 | End: 2024-10-13

## 2024-10-13 RX ADMIN — Medication 10 ML: at 09:10

## 2024-10-13 RX ADMIN — POLYETHYLENE GLYCOL (3350) 17 G: 17 POWDER, FOR SOLUTION ORAL at 04:10

## 2024-10-13 RX ADMIN — SODIUM CHLORIDE, PRESERVATIVE FREE 10 ML: 5 INJECTION INTRAVENOUS at 01:10

## 2024-10-13 RX ADMIN — TAMSULOSIN HYDROCHLORIDE 0.4 MG: 0.4 CAPSULE ORAL at 01:10

## 2024-10-13 RX ADMIN — ERTAPENEM 0.5 G: 1 INJECTION INTRAMUSCULAR; INTRAVENOUS at 01:10

## 2024-10-13 RX ADMIN — SODIUM CHLORIDE, PRESERVATIVE FREE 10 ML: 5 INJECTION INTRAVENOUS at 06:10

## 2024-10-13 NOTE — ASSESSMENT & PLAN NOTE
Patients blood pressure range in the last 24 hours was: BP  Min: 89/52  Max: 133/59.The patient's inpatient anti-hypertensive regimen is listed below:  Current Antihypertensives       Plan  - BP is controlled, no changes needed to their regimen

## 2024-10-13 NOTE — PROGRESS NOTES
[facility] Medicine  Progress Note    Patient Name: Marycruz Perez  MRN: [id number]  Patient Class: IP- Inpatient   Admission Date: 10/7/2024  Length of Stay: 5 days  Attending Physician: Verónica Scott MD  Primary Care Provider: Melchor Menchaca -        Subjective:     Principal Problem:NSTEMI (non-ST elevated myocardial infarction)        HPI:  Is a 89-year-old female with history of CHF, hypertension, ppm brought to the emergency department by EMS for evaluation of shortness of breath with the chest pain.  Additionally patient has been very weak and unable to get up.  She reports she has had a asthma exacerbations in her chest pain was take during these episodes however she is unsure if it was asthma versus true chest pain.  Patient is accompanied by her daughter who is her  per patient request.  In ED , troponin 0.115, repeat 0.288.  Chest x-ray benign.  EKG shows paced rhythm  Patient was started on heparin drip in ED.  We will be admitted to Hospital Medicine will consult Cardiology for eval.    On review of chart patient also has recent drain placed for LR which is a percutaneous 14 Croatian drain placed for intra-abdominal abscess secondary to diverticulitis.  During remains intact, draining minimal volume    Overview/Hospital Course:  No notes on file    Interval History:     I have seen and examined the patient today  Patient is passing urine, no more rentention  Patient daughter is at bedside  The drain is decreasing     Review of Systems   Constitutional:  Positive for activity change and appetite change.   Respiratory: Negative.     Gastrointestinal: Negative.    Musculoskeletal:  Positive for back pain.   Skin: Negative.    Psychiatric/Behavioral: Negative.       Objective:     Vital Signs (Most Recent):  Temp: 97.7 °F (36.5 °C) (10/13/24 1145)  Pulse: 69 (10/13/24 1153)  Resp: 18 (10/13/24 1145)  BP: (!) 124/58 (10/13/24 1145)  SpO2: 100 % (10/13/24 1145) Vital  Signs (24h Range):  Temp:  [97.7 °F (36.5 °C)-98.3 °F (36.8 °C)] 97.7 °F (36.5 °C)  Pulse:  [69-71] 69  Resp:  [18] 18  SpO2:  [95 %-100 %] 100 %  BP: (107-133)/(53-59) 124/58     Weight: 82.2 kg (181 lb 3.5 oz)  Body mass index is 36.6 kg/m².    Intake/Output Summary (Last 24 hours) at 10/13/2024 1404  Last data filed at 10/13/2024 0649  Gross per 24 hour   Intake 350 ml   Output 1320 ml   Net -970 ml         Physical Exam  Constitutional:       Appearance: She is obese. She is ill-appearing.   HENT:      Head: Normocephalic.   Cardiovascular:      Rate and Rhythm: Normal rate.   Pulmonary:      Effort: Pulmonary effort is normal.      Breath sounds: Normal breath sounds.   Abdominal:      Palpations: Abdomen is soft.   Skin:     General: Skin is warm.   Neurological:      Mental Status: She is alert. Mental status is at baseline.   Psychiatric:         Mood and Affect: Mood normal.             Significant Labs: All pertinent labs within the past 24 hours have been reviewed.  CBC:   Recent Labs   Lab 10/12/24  0850 10/13/24  0344   WBC 7.12 7.63   HGB 10.6* 11.0*   HCT 33.8* 35.4*    169     CMP:   Recent Labs   Lab 10/12/24  1121 10/13/24  0344    140   K 3.5 3.7    102   CO2 32* 30*   * 115*   BUN 31* 27   CREATININE 0.9 0.8   CALCIUM 8.6* 8.9   PROT 5.5* 5.6*   ALBUMIN 2.1* 2.0*   BILITOT 0.3 0.3   ALKPHOS 68 80   AST 12 16   ALT <5* 5*   ANIONGAP 7* 8         Significant Imaging: I have reviewed all pertinent imaging results/findings within the past 24 hours.    Assessment/Plan:      Urinary retention  Resolved   Bladder scan protocol     Diverticulitis of intestine with abscess without bleeding  Patient now with percutaneous 14 Sierra Leonean during placed on previous admission.  Drain remains in place    CT showed new fluid collection    IR team placed a new drain 10/09    Abcs was changed to Ertapenem according to the fluid culture sens result , anticipate at least 2 weeks of iv  abcs    When drain output is less than 10 cc in 24hr then would repeat CT to evaluate for drainage removal    HFrEF (heart failure with reduced ejection fraction)  Echo with EF 45%  Patient is euvolemic on exam  Cardio recommended GDMT but BP is low  Will try to slowly introduce medications when BP can tolerate       Chest pain  Initial troponin 0.115, repeat 0.288. mostly demand ischemia   EKG with paced rhythm.  Cardiology consulted  Echo was done and showed HFrEF with global hypokinesis  Cardio recommended medical management but given low BP, she is unable to tolerate any for now    Essential hypertension  Patients blood pressure range in the last 24 hours was: BP  Min: 89/52  Max: 133/59.The patient's inpatient anti-hypertensive regimen is listed below:  Current Antihypertensives       Plan  - BP is controlled, no changes needed to their regimen      Cardiac pacemaker    EKG paced rhythm

## 2024-10-13 NOTE — SUBJECTIVE & OBJECTIVE
Interval History:     I have seen and examined the patient today  Patient is passing urine, no more rentention  Patient daughter is at bedside  The drain is decreasing     Review of Systems   Constitutional:  Positive for activity change and appetite change.   Respiratory: Negative.     Gastrointestinal: Negative.    Musculoskeletal:  Positive for back pain.   Skin: Negative.    Psychiatric/Behavioral: Negative.       Objective:     Vital Signs (Most Recent):  Temp: 97.7 °F (36.5 °C) (10/13/24 1145)  Pulse: 69 (10/13/24 1153)  Resp: 18 (10/13/24 1145)  BP: (!) 124/58 (10/13/24 1145)  SpO2: 100 % (10/13/24 1145) Vital Signs (24h Range):  Temp:  [97.7 °F (36.5 °C)-98.3 °F (36.8 °C)] 97.7 °F (36.5 °C)  Pulse:  [69-71] 69  Resp:  [18] 18  SpO2:  [95 %-100 %] 100 %  BP: (107-133)/(53-59) 124/58     Weight: 82.2 kg (181 lb 3.5 oz)  Body mass index is 36.6 kg/m².    Intake/Output Summary (Last 24 hours) at 10/13/2024 1404  Last data filed at 10/13/2024 0649  Gross per 24 hour   Intake 350 ml   Output 1320 ml   Net -970 ml         Physical Exam  Constitutional:       Appearance: She is obese. She is ill-appearing.   HENT:      Head: Normocephalic.   Cardiovascular:      Rate and Rhythm: Normal rate.   Pulmonary:      Effort: Pulmonary effort is normal.      Breath sounds: Normal breath sounds.   Abdominal:      Palpations: Abdomen is soft.   Skin:     General: Skin is warm.   Neurological:      Mental Status: She is alert. Mental status is at baseline.   Psychiatric:         Mood and Affect: Mood normal.             Significant Labs: All pertinent labs within the past 24 hours have been reviewed.  CBC:   Recent Labs   Lab 10/12/24  0850 10/13/24  0344   WBC 7.12 7.63   HGB 10.6* 11.0*   HCT 33.8* 35.4*    169     CMP:   Recent Labs   Lab 10/12/24  1121 10/13/24  0344    140   K 3.5 3.7    102   CO2 32* 30*   * 115*   BUN 31* 27   CREATININE 0.9 0.8   CALCIUM 8.6* 8.9   PROT 5.5* 5.6*   ALBUMIN 2.1*  2.0*   BILITOT 0.3 0.3   ALKPHOS 68 80   AST 12 16   ALT <5* 5*   ANIONGAP 7* 8         Significant Imaging: I have reviewed all pertinent imaging results/findings within the past 24 hours.

## 2024-10-14 LAB
ALBUMIN SERPL BCP-MCNC: 2 G/DL (ref 3.5–5.2)
ALP SERPL-CCNC: 78 U/L (ref 55–135)
ALT SERPL W/O P-5'-P-CCNC: <5 U/L (ref 10–44)
ANION GAP SERPL CALC-SCNC: 9 MMOL/L (ref 8–16)
AST SERPL-CCNC: 17 U/L (ref 10–40)
BASOPHILS # BLD AUTO: 0.06 K/UL (ref 0–0.2)
BASOPHILS NFR BLD: 0.8 % (ref 0–1.9)
BILIRUB SERPL-MCNC: 0.4 MG/DL (ref 0.1–1)
BUN SERPL-MCNC: 22 MG/DL (ref 10–30)
CALCIUM SERPL-MCNC: 9.1 MG/DL (ref 8.7–10.5)
CHLORIDE SERPL-SCNC: 102 MMOL/L (ref 95–110)
CO2 SERPL-SCNC: 28 MMOL/L (ref 23–29)
CREAT SERPL-MCNC: 0.8 MG/DL (ref 0.5–1.4)
DIFFERENTIAL METHOD BLD: ABNORMAL
EOSINOPHIL # BLD AUTO: 0.1 K/UL (ref 0–0.5)
EOSINOPHIL NFR BLD: 1.7 % (ref 0–8)
ERYTHROCYTE [DISTWIDTH] IN BLOOD BY AUTOMATED COUNT: 14.9 % (ref 11.5–14.5)
EST. GFR  (NO RACE VARIABLE): >60 ML/MIN/1.73 M^2
GLUCOSE SERPL-MCNC: 180 MG/DL (ref 70–110)
HCT VFR BLD AUTO: 34.7 % (ref 37–48.5)
HGB BLD-MCNC: 10.6 G/DL (ref 12–16)
IMM GRANULOCYTES # BLD AUTO: 0.07 K/UL (ref 0–0.04)
IMM GRANULOCYTES NFR BLD AUTO: 0.9 % (ref 0–0.5)
LYMPHOCYTES # BLD AUTO: 1.8 K/UL (ref 1–4.8)
LYMPHOCYTES NFR BLD: 24.2 % (ref 18–48)
MCH RBC QN AUTO: 28 PG (ref 27–31)
MCHC RBC AUTO-ENTMCNC: 30.5 G/DL (ref 32–36)
MCV RBC AUTO: 92 FL (ref 82–98)
MONOCYTES # BLD AUTO: 0.6 K/UL (ref 0.3–1)
MONOCYTES NFR BLD: 8.4 % (ref 4–15)
NEUTROPHILS # BLD AUTO: 4.8 K/UL (ref 1.8–7.7)
NEUTROPHILS NFR BLD: 64 % (ref 38–73)
NRBC BLD-RTO: 0 /100 WBC
PLATELET # BLD AUTO: 153 K/UL (ref 150–450)
PMV BLD AUTO: 10.9 FL (ref 9.2–12.9)
POTASSIUM SERPL-SCNC: 3.8 MMOL/L (ref 3.5–5.1)
PROT SERPL-MCNC: 5.7 G/DL (ref 6–8.4)
RBC # BLD AUTO: 3.78 M/UL (ref 4–5.4)
SODIUM SERPL-SCNC: 139 MMOL/L (ref 136–145)
WBC # BLD AUTO: 7.52 K/UL (ref 3.9–12.7)

## 2024-10-14 PROCEDURE — 36415 COLL VENOUS BLD VENIPUNCTURE: CPT | Performed by: STUDENT IN AN ORGANIZED HEALTH CARE EDUCATION/TRAINING PROGRAM

## 2024-10-14 PROCEDURE — 85025 COMPLETE CBC W/AUTO DIFF WBC: CPT | Performed by: STUDENT IN AN ORGANIZED HEALTH CARE EDUCATION/TRAINING PROGRAM

## 2024-10-14 PROCEDURE — 94761 N-INVAS EAR/PLS OXIMETRY MLT: CPT

## 2024-10-14 PROCEDURE — 80053 COMPREHEN METABOLIC PANEL: CPT | Performed by: STUDENT IN AN ORGANIZED HEALTH CARE EDUCATION/TRAINING PROGRAM

## 2024-10-14 PROCEDURE — 25000003 PHARM REV CODE 250: Performed by: PHYSICIAN ASSISTANT

## 2024-10-14 PROCEDURE — 63600175 PHARM REV CODE 636 W HCPCS: Performed by: STUDENT IN AN ORGANIZED HEALTH CARE EDUCATION/TRAINING PROGRAM

## 2024-10-14 PROCEDURE — 27000207 HC ISOLATION

## 2024-10-14 PROCEDURE — 27000221 HC OXYGEN, UP TO 24 HOURS

## 2024-10-14 PROCEDURE — A4216 STERILE WATER/SALINE, 10 ML: HCPCS | Performed by: INTERNAL MEDICINE

## 2024-10-14 PROCEDURE — 25000003 PHARM REV CODE 250: Performed by: INTERNAL MEDICINE

## 2024-10-14 PROCEDURE — 11000001 HC ACUTE MED/SURG PRIVATE ROOM

## 2024-10-14 PROCEDURE — 99900035 HC TECH TIME PER 15 MIN (STAT)

## 2024-10-14 PROCEDURE — 25000003 PHARM REV CODE 250: Performed by: STUDENT IN AN ORGANIZED HEALTH CARE EDUCATION/TRAINING PROGRAM

## 2024-10-14 PROCEDURE — A4216 STERILE WATER/SALINE, 10 ML: HCPCS | Performed by: PHYSICIAN ASSISTANT

## 2024-10-14 RX ORDER — SODIUM CHLORIDE 0.9 % (FLUSH) 0.9 %
10 SYRINGE (ML) INJECTION EVERY 12 HOURS
Status: DISCONTINUED | OUTPATIENT
Start: 2024-10-14 | End: 2024-10-18 | Stop reason: HOSPADM

## 2024-10-14 RX ORDER — SODIUM CHLORIDE 0.9 % (FLUSH) 0.9 %
10 SYRINGE (ML) INJECTION
Status: DISCONTINUED | OUTPATIENT
Start: 2024-10-14 | End: 2024-10-18 | Stop reason: HOSPADM

## 2024-10-14 RX ADMIN — TAMSULOSIN HYDROCHLORIDE 0.4 MG: 0.4 CAPSULE ORAL at 09:10

## 2024-10-14 RX ADMIN — SODIUM CHLORIDE, PRESERVATIVE FREE 10 ML: 5 INJECTION INTRAVENOUS at 09:10

## 2024-10-14 RX ADMIN — Medication 10 ML: at 09:10

## 2024-10-14 RX ADMIN — ERTAPENEM 0.5 G: 1 INJECTION INTRAMUSCULAR; INTRAVENOUS at 03:10

## 2024-10-14 NOTE — ASSESSMENT & PLAN NOTE
Patient now with percutaneous 14 Vincentian during placed on previous admission.  Drain remains in place    CT showed new fluid collection    IR team placed a new drain 10/09    Abcs was changed to Ertapenem according to the fluid culture sens result , anticipate at least 2 weeks of iv abcs    When drain output is less than 10 cc in 24hr then would repeat CT to evaluate for drainage removal

## 2024-10-14 NOTE — PROGRESS NOTES
[facility] Medicine  Progress Note    Patient Name: Marycruz Perez  MRN: [id number]  Patient Class: IP- Inpatient   Admission Date: 10/7/2024  Length of Stay: 6 days  Attending Physician: Verónica Scott MD  Primary Care Provider: Melchor Menchaca -        Subjective:     Principal Problem:NSTEMI (non-ST elevated myocardial infarction)        HPI:  Is a 89-year-old female with history of CHF, hypertension, ppm brought to the emergency department by EMS for evaluation of shortness of breath with the chest pain.  Additionally patient has been very weak and unable to get up.  She reports she has had a asthma exacerbations in her chest pain was take during these episodes however she is unsure if it was asthma versus true chest pain.  Patient is accompanied by her daughter who is her  per patient request.  In ED , troponin 0.115, repeat 0.288.  Chest x-ray benign.  EKG shows paced rhythm  Patient was started on heparin drip in ED.  We will be admitted to Hospital Medicine will consult Cardiology for eval.    On review of chart patient also has recent drain placed for LR which is a percutaneous 14 Kyrgyz drain placed for intra-abdominal abscess secondary to diverticulitis.  During remains intact, draining minimal volume    Overview/Hospital Course:  No notes on file    Interval History:     I have seen and examined the patient today  The drain was flushed and still has ongoing output but less than before    Review of Systems   Constitutional:  Positive for activity change and appetite change.   Respiratory: Negative.     Gastrointestinal: Negative.    Musculoskeletal:  Positive for back pain.   Skin: Negative.    Psychiatric/Behavioral: Negative.       Objective:     Vital Signs (Most Recent):  Temp: 98.1 °F (36.7 °C) (10/14/24 1201)  Pulse: 69 (10/14/24 1207)  Resp: 18 (10/14/24 1201)  BP: (!) 119/56 (10/14/24 1201)  SpO2: 100 % (10/14/24 1201) Vital Signs (24h Range):  Temp:  [97.4 °F  (36.3 °C)-98.3 °F (36.8 °C)] 98.1 °F (36.7 °C)  Pulse:  [68-69] 69  Resp:  [17-18] 18  SpO2:  [95 %-100 %] 100 %  BP: (108-127)/(49-57) 119/56     Weight: 82.2 kg (181 lb 3.5 oz)  Body mass index is 36.6 kg/m².    Intake/Output Summary (Last 24 hours) at 10/14/2024 1301  Last data filed at 10/14/2024 0838  Gross per 24 hour   Intake 700 ml   Output 269 ml   Net 431 ml         Physical Exam  Constitutional:       Appearance: She is obese. She is ill-appearing.   HENT:      Head: Normocephalic.   Cardiovascular:      Rate and Rhythm: Normal rate.   Pulmonary:      Effort: Pulmonary effort is normal.      Breath sounds: Normal breath sounds.   Abdominal:      Palpations: Abdomen is soft.   Skin:     General: Skin is warm.   Neurological:      Mental Status: She is alert. Mental status is at baseline.   Psychiatric:         Mood and Affect: Mood normal.             Significant Labs: All pertinent labs within the past 24 hours have been reviewed.  CBC:   Recent Labs   Lab 10/13/24  0344 10/14/24  0332   WBC 7.63 7.52   HGB 11.0* 10.6*   HCT 35.4* 34.7*    153     CMP:   Recent Labs   Lab 10/13/24  0344 10/14/24  0332    139   K 3.7 3.8    102   CO2 30* 28   * 180*   BUN 27 22   CREATININE 0.8 0.8   CALCIUM 8.9 9.1   PROT 5.6* 5.7*   ALBUMIN 2.0* 2.0*   BILITOT 0.3 0.4   ALKPHOS 80 78   AST 16 17   ALT 5* <5*   ANIONGAP 8 9         Significant Imaging: I have reviewed all pertinent imaging results/findings within the past 24 hours.    Assessment/Plan:      Urinary retention  Resolved   Bladder scan protocol     Diverticulitis of intestine with abscess without bleeding  Patient now with percutaneous 14 Slovenian during placed on previous admission.  Drain remains in place    CT showed new fluid collection    IR team placed a new drain 10/09    Abcs was changed to Ertapenem according to the fluid culture sens result , anticipate at least 2 weeks of iv abcs    When drain output is less than 10 cc in  24hr then would repeat CT to evaluate for drainage removal    HFrEF (heart failure with reduced ejection fraction)  Echo with EF 45%  Patient is euvolemic on exam  Cardio recommended GDMT but BP is low  Will try to slowly introduce medications when BP can tolerate       Chest pain  Initial troponin 0.115, repeat 0.288. mostly demand ischemia   EKG with paced rhythm.  Cardiology consulted  Echo was done and showed HFrEF with global hypokinesis  Cardio recommended medical management but given low BP, she is unable to tolerate any for now    Essential hypertension  Patients blood pressure range in the last 24 hours was: BP  Min: 89/52  Max: 133/59.The patient's inpatient anti-hypertensive regimen is listed below:  Current Antihypertensives       Plan  - BP is controlled, no changes needed to their regimen      Cardiac pacemaker    EKG paced rhythm

## 2024-10-14 NOTE — SUBJECTIVE & OBJECTIVE
Interval History:     I have seen and examined the patient today  The drain was flushed and still has ongoing output but less than before    Review of Systems   Constitutional:  Positive for activity change and appetite change.   Respiratory: Negative.     Gastrointestinal: Negative.    Musculoskeletal:  Positive for back pain.   Skin: Negative.    Psychiatric/Behavioral: Negative.       Objective:     Vital Signs (Most Recent):  Temp: 98.1 °F (36.7 °C) (10/14/24 1201)  Pulse: 69 (10/14/24 1207)  Resp: 18 (10/14/24 1201)  BP: (!) 119/56 (10/14/24 1201)  SpO2: 100 % (10/14/24 1201) Vital Signs (24h Range):  Temp:  [97.4 °F (36.3 °C)-98.3 °F (36.8 °C)] 98.1 °F (36.7 °C)  Pulse:  [68-69] 69  Resp:  [17-18] 18  SpO2:  [95 %-100 %] 100 %  BP: (108-127)/(49-57) 119/56     Weight: 82.2 kg (181 lb 3.5 oz)  Body mass index is 36.6 kg/m².    Intake/Output Summary (Last 24 hours) at 10/14/2024 1301  Last data filed at 10/14/2024 0838  Gross per 24 hour   Intake 700 ml   Output 269 ml   Net 431 ml         Physical Exam  Constitutional:       Appearance: She is obese. She is ill-appearing.   HENT:      Head: Normocephalic.   Cardiovascular:      Rate and Rhythm: Normal rate.   Pulmonary:      Effort: Pulmonary effort is normal.      Breath sounds: Normal breath sounds.   Abdominal:      Palpations: Abdomen is soft.   Skin:     General: Skin is warm.   Neurological:      Mental Status: She is alert. Mental status is at baseline.   Psychiatric:         Mood and Affect: Mood normal.             Significant Labs: All pertinent labs within the past 24 hours have been reviewed.  CBC:   Recent Labs   Lab 10/13/24  0344 10/14/24  0332   WBC 7.63 7.52   HGB 11.0* 10.6*   HCT 35.4* 34.7*    153     CMP:   Recent Labs   Lab 10/13/24  0344 10/14/24  0332    139   K 3.7 3.8    102   CO2 30* 28   * 180*   BUN 27 22   CREATININE 0.8 0.8   CALCIUM 8.9 9.1   PROT 5.6* 5.7*   ALBUMIN 2.0* 2.0*   BILITOT 0.3 0.4   ALKPHOS  80 78   AST 16 17   ALT 5* <5*   ANIONGAP 8 9         Significant Imaging: I have reviewed all pertinent imaging results/findings within the past 24 hours.

## 2024-10-14 NOTE — PLAN OF CARE
10/14/24 0945   Rounds   Attendance Provider;Nurse ;Nurse   Discharge Plan A Skilled Nursing Facility   Why the patient remains in the hospital Requires continued medical care   Transition of Care Barriers Transportation       0945  Patient resting quietly in bed with daughter, Lily Ventura ([phone]), at the bedside when MARILYN participated in SIBR with Dr Scott & nurse Yara. MD stated that the pt is not medically stable to discharge to [facility] today & that IR will be consulted regarding the pt's drain output.     1300  Updated notes sent to [facility] via [facility]. Awaiting response.       Will continue to follow.

## 2024-10-15 PROBLEM — I50.32 CHRONIC DIASTOLIC CONGESTIVE HEART FAILURE: Status: ACTIVE | Noted: 2024-10-15

## 2024-10-15 PROBLEM — R33.9 URINARY RETENTION: Status: RESOLVED | Noted: 2024-10-11 | Resolved: 2024-10-15

## 2024-10-15 LAB — POCT GLUCOSE: 145 MG/DL (ref 70–110)

## 2024-10-15 PROCEDURE — 25000003 PHARM REV CODE 250: Performed by: STUDENT IN AN ORGANIZED HEALTH CARE EDUCATION/TRAINING PROGRAM

## 2024-10-15 PROCEDURE — 99900035 HC TECH TIME PER 15 MIN (STAT)

## 2024-10-15 PROCEDURE — 94761 N-INVAS EAR/PLS OXIMETRY MLT: CPT

## 2024-10-15 PROCEDURE — A9698 NON-RAD CONTRAST MATERIALNOC: HCPCS | Performed by: FAMILY MEDICINE

## 2024-10-15 PROCEDURE — 25000003 PHARM REV CODE 250: Performed by: PHYSICIAN ASSISTANT

## 2024-10-15 PROCEDURE — A4216 STERILE WATER/SALINE, 10 ML: HCPCS | Performed by: PHYSICIAN ASSISTANT

## 2024-10-15 PROCEDURE — 25000003 PHARM REV CODE 250: Performed by: INTERNAL MEDICINE

## 2024-10-15 PROCEDURE — A4216 STERILE WATER/SALINE, 10 ML: HCPCS | Performed by: INTERNAL MEDICINE

## 2024-10-15 PROCEDURE — 25500020 PHARM REV CODE 255: Performed by: FAMILY MEDICINE

## 2024-10-15 PROCEDURE — 27000221 HC OXYGEN, UP TO 24 HOURS

## 2024-10-15 PROCEDURE — 51798 US URINE CAPACITY MEASURE: CPT

## 2024-10-15 PROCEDURE — 99232 SBSQ HOSP IP/OBS MODERATE 35: CPT | Mod: NSCH,GC,, | Performed by: PHYSICIAN ASSISTANT

## 2024-10-15 PROCEDURE — 97165 OT EVAL LOW COMPLEX 30 MIN: CPT

## 2024-10-15 PROCEDURE — 11000001 HC ACUTE MED/SURG PRIVATE ROOM

## 2024-10-15 PROCEDURE — 51701 INSERT BLADDER CATHETER: CPT

## 2024-10-15 PROCEDURE — 63600175 PHARM REV CODE 636 W HCPCS: Performed by: STUDENT IN AN ORGANIZED HEALTH CARE EDUCATION/TRAINING PROGRAM

## 2024-10-15 PROCEDURE — 97161 PT EVAL LOW COMPLEX 20 MIN: CPT

## 2024-10-15 PROCEDURE — 27000207 HC ISOLATION

## 2024-10-15 RX ADMIN — Medication 10 ML: at 09:10

## 2024-10-15 RX ADMIN — SODIUM CHLORIDE, PRESERVATIVE FREE 10 ML: 5 INJECTION INTRAVENOUS at 09:10

## 2024-10-15 RX ADMIN — ERTAPENEM 0.5 G: 1 INJECTION INTRAMUSCULAR; INTRAVENOUS at 01:10

## 2024-10-15 RX ADMIN — TAMSULOSIN HYDROCHLORIDE 0.4 MG: 0.4 CAPSULE ORAL at 09:10

## 2024-10-15 RX ADMIN — IOHEXOL 1000 ML: 12 SOLUTION ORAL at 04:10

## 2024-10-15 RX ADMIN — IOHEXOL 75 ML: 350 INJECTION, SOLUTION INTRAVENOUS at 05:10

## 2024-10-15 NOTE — SUBJECTIVE & OBJECTIVE
Interval History: NAEON. No acute concerns at this time.     Review of Systems   Constitutional:  Negative for chills and fever.   Respiratory:  Negative for shortness of breath.    Cardiovascular:  Negative for chest pain.   Gastrointestinal:  Negative for abdominal pain.   Genitourinary:  Negative for dysuria.   Neurological:  Negative for headaches.   Psychiatric/Behavioral:  Negative for confusion.      Objective:     Vital Signs (Most Recent):  Temp: 98.5 °F (36.9 °C) (10/15/24 1056)  Pulse: 69 (10/15/24 1156)  Resp: 20 (10/15/24 1056)  BP: (!) 120/57 (10/15/24 1056)  SpO2: 97 % (10/15/24 1056) Vital Signs (24h Range):  Temp:  [97.8 °F (36.6 °C)-98.5 °F (36.9 °C)] 98.5 °F (36.9 °C)  Pulse:  [68-72] 69  Resp:  [18-20] 20  SpO2:  [96 %-99 %] 97 %  BP: (114-129)/(52-59) 120/57     Weight: 82.2 kg (181 lb 3.5 oz)  Body mass index is 36.6 kg/m².    Intake/Output Summary (Last 24 hours) at 10/15/2024 1437  Last data filed at 10/15/2024 0919  Gross per 24 hour   Intake 420 ml   Output 533 ml   Net -113 ml         Physical Exam  Vitals and nursing note reviewed.   Constitutional:       General: She is not in acute distress.     Appearance: She is well-developed. She is obese.   HENT:      Head: Normocephalic and atraumatic.   Eyes:      Conjunctiva/sclera: Conjunctivae normal.   Neck:      Vascular: No JVD.   Cardiovascular:      Rate and Rhythm: Normal rate and regular rhythm.      Heart sounds: Normal heart sounds.   Pulmonary:      Effort: Pulmonary effort is normal.      Breath sounds: Normal breath sounds.   Abdominal:      General: Bowel sounds are normal. There is no distension.      Palpations: Abdomen is soft.      Tenderness: There is no abdominal tenderness.      Comments: +SUSANA drain   Musculoskeletal:         General: Swelling (RUE) present.      Cervical back: Neck supple.      Right lower leg: No edema.      Left lower leg: No edema.   Neurological:      Mental Status: She is alert.   Psychiatric:          Behavior: Behavior normal.             Significant Labs: All pertinent labs within the past 24 hours have been reviewed.  CBC:   Recent Labs   Lab 10/14/24  0332   WBC 7.52   HGB 10.6*   HCT 34.7*        CMP:   Recent Labs   Lab 10/14/24  0332      K 3.8      CO2 28   *   BUN 22   CREATININE 0.8   CALCIUM 9.1   PROT 5.7*   ALBUMIN 2.0*   BILITOT 0.4   ALKPHOS 78   AST 17   ALT <5*   ANIONGAP 9       Significant Imaging:  no new

## 2024-10-15 NOTE — PROGRESS NOTES
Interventional Radiology   Progress Note      SUBJECTIVE:     History of Present Illness:  Marycruz Perez is a 89 y.o. female with a PMHx of CHF s/p PPM, chest pain, diverticulitis s/p IR guided drainage 9/25, HTN, CAD s/p PCI, rheumatic AS/MS, HFpEF s/p drain exchange and aspiration 10/9 with persistent communication with pelvic abscess noted.    Interval History:  Drain is being flushed, output has decreased    Review of Systems   Constitutional:  Negative for chills, fever, malaise/fatigue and weight loss.   Respiratory:  Negative for cough and shortness of breath.    Cardiovascular:  Negative for chest pain, palpitations and leg swelling.   Gastrointestinal:  Negative for abdominal pain, diarrhea, nausea and vomiting.   Genitourinary:  Negative for dysuria and flank pain.   Musculoskeletal:  Positive for back pain. Negative for myalgias.   Neurological:  Negative for weakness and headaches.       Scheduled Meds:   ertapenem (INVanz) IV (PEDS and ADULTS)  0.5 g Intravenous Q24H    sodium chloride 0.9%  10 mL Other BID    sodium chloride 0.9%  10 mL Intravenous Q12H    tamsulosin  0.4 mg Oral Daily     Continuous Infusions:  PRN Meds:  Current Facility-Administered Medications:     acetaminophen, 650 mg, Oral, Q6H PRN    dextrose 10%, 12.5 g, Intravenous, PRN    dextrose 10%, 25 g, Intravenous, PRN    glucagon (human recombinant), 1 mg, Intramuscular, PRN    glucose, 16 g, Oral, PRN    glucose, 24 g, Oral, PRN    naloxone, 0.02 mg, Intravenous, PRN    ondansetron, 4 mg, Intravenous, Q8H PRN    sodium chloride 0.9%, 10 mL, Intravenous, Q12H PRN    Flushing PICC/Midline Protocol, , , Until Discontinued **AND** sodium chloride 0.9%, 10 mL, Intravenous, Q12H **AND** sodium chloride 0.9%, 10 mL, Intravenous, PRN    Review of patient's allergies indicates:   Allergen Reactions    Codeine     Latex, natural rubber     Penicillins     Penicillin Rash       Past Medical History:   Diagnosis Date    Acute exacerbation of  CHF (congestive heart failure) 10/27/2020    NATHEN (acute kidney injury) 11/30/2023    Hypertension     Syncope and collapse      Past Surgical History:   Procedure Laterality Date    A-V CARDIAC PACEMAKER INSERTION N/A 2/22/2021    Procedure: INSERTION, CARDIAC PACEMAKER, DUAL CHAMBER;  Surgeon: Norman Foote MD;  Location: [facility] EP LAB;  Service: Cardiology;  Laterality: N/A;  AVB, PPM upgrade to Dual PPM (right sided), Bio, Venogram prior to draping, MAC, OK, 3 Prep    CARDIAC CATHETERIZATION      CARDIAC PACEMAKER PLACEMENT      CHOLECYSTECTOMY      CORONARY ANGIOPLASTY      HYSTERECTOMY      REVISION OF SKIN POCKET FOR PACEMAKER  2/22/2021    Procedure: Revision, Skin Pocket, For Cardiac Pacemaker;  Surgeon: Norman Foote MD;  Location: [facility] EP LAB;  Service: Cardiology;;     Family History   Problem Relation Name Age of Onset    Liver disease Mother       Social History     Tobacco Use    Smoking status: Never    Smokeless tobacco: Never   Substance Use Topics    Alcohol use: No    Drug use: No       OBJECTIVE:     Vital Signs (Most Recent)  Temp: 98.1 °F (36.7 °C) (10/15/24 0810)  Pulse: 72 (10/15/24 0810)  Resp: 18 (10/15/24 0810)  BP: (!) 129/58 (10/15/24 0810)  SpO2: 98 % (10/15/24 0810)    Physical Exam:  Physical Exam  Vitals and nursing note reviewed.   Constitutional:       Appearance: Normal appearance.   HENT:      Head: Normocephalic and atraumatic.      Right Ear: External ear normal.      Left Ear: External ear normal.      Nose: Nose normal.   Eyes:      Extraocular Movements: Extraocular movements intact.   Pulmonary:      Effort: Pulmonary effort is normal.   Abdominal:      General: Abdomen is flat.   Musculoskeletal:      Cervical back: Normal range of motion and neck supple.   Skin:     General: Skin is warm and dry.      Comments: Drain in place, flushing appropriately, no leakage observed   Neurological:      Mental Status: She is alert.   Psychiatric:         Mood and Affect: Mood normal.          Behavior: Behavior normal.         Laboratory  I have reviewed all pertinent lab results within the past 24 hours.  CBC:   Recent Labs   Lab 10/14/24  0332   WBC 7.52   RBC 3.78*   HGB 10.6*   HCT 34.7*      MCV 92   MCH 28.0   MCHC 30.5*     Microbiology Results (last 7 days)       Procedure Component Value Units Date/Time    Urine culture [[id number]] Collected: 10/11/24 0046    Order Status: Completed Specimen: Urine Updated: 10/13/24 0012     Urine Culture, Routine No significant growth    Narrative:      Specimen Source->Urine            Imaging:  No new imaging to review    ASSESSMENT/PLAN:     Assessment:  89 y.o. female with a PMHx of CHF s/p PPM, chest pain, diverticulitis s/p IR guided drainage 9/25, HTN, CAD s/p PCI, rheumatic AS/MS, HFpEF s/p drain exchange and aspiration 10/9 with persistent communication with pelvic abscess noted.    Output by Drain (mL) 10/13/24 0701 - 10/13/24 1900 10/13/24 1901 - 10/14/24 0700 10/14/24 0701 - 10/14/24 1900 10/14/24 1901 - 10/15/24 0700 10/15/24 0701 - 10/15/24 0924        Closed/Suction Drain 10/09/24 1713 Inferior;Medial Back Accordion 14 Fr. 10  9 18         Plan:  Continue flushing the drain with 10 mL normal saline twice daily. Once tube output is less than 20 mL per day for 2 consecutive days, consider reimaging prior to drain removal.  Message sent to primary team regarding decreased output.  IR will continue to follow. Please contact with questions via [employer] secure chat or spectra link.    Leigh Bone PA-C  Interventional Radiology

## 2024-10-15 NOTE — PROGRESS NOTES
[facility] Medicine  Progress Note    Patient Name: Marycruz Perez  MRN: [id number]  Patient Class: IP- Inpatient   Admission Date: 10/7/2024  Length of Stay: 7 days  Attending Physician: Annamarie Lacy MD  Primary Care Provider: Melchor Menchaca -      Subjective:     Principal Problem:Diverticulitis of intestine with abscess without bleeding      HPI:  Is a 89-year-old female with history of CHF, hypertension, ppm brought to the emergency department by EMS for evaluation of shortness of breath with the chest pain.  Additionally patient has been very weak and unable to get up.  She reports she has had a asthma exacerbations in her chest pain was take during these episodes however she is unsure if it was asthma versus true chest pain.  Patient is accompanied by her daughter who is her  per patient request.  In ED , troponin 0.115, repeat 0.288.  Chest x-ray benign.  EKG shows paced rhythm  Patient was started on heparin drip in ED.  We will be admitted to Hospital Medicine will consult Cardiology for eval.    On review of chart patient also has recent drain placed for LR which is a percutaneous 14 Russian drain placed for intra-abdominal abscess secondary to diverticulitis.  During remains intact, draining minimal volume    Overview/Hospital Course:  CT showed new fluid collection     IR team placed a new drain 10/09     Abx changed to Ertapenem according to the fluid culture sens result  NATHEN resolved    Interval History: NAEON. No acute concerns at this time.     Review of Systems   Constitutional:  Negative for chills and fever.   Respiratory:  Negative for shortness of breath.    Cardiovascular:  Negative for chest pain.   Gastrointestinal:  Negative for abdominal pain.   Genitourinary:  Negative for dysuria.   Neurological:  Negative for headaches.   Psychiatric/Behavioral:  Negative for confusion.      Objective:     Vital Signs (Most Recent):  Temp: 98.5 °F (36.9 °C)  (10/15/24 1056)  Pulse: 69 (10/15/24 1156)  Resp: 20 (10/15/24 1056)  BP: (!) 120/57 (10/15/24 1056)  SpO2: 97 % (10/15/24 1056) Vital Signs (24h Range):  Temp:  [97.8 °F (36.6 °C)-98.5 °F (36.9 °C)] 98.5 °F (36.9 °C)  Pulse:  [68-72] 69  Resp:  [18-20] 20  SpO2:  [96 %-99 %] 97 %  BP: (114-129)/(52-59) 120/57     Weight: 82.2 kg (181 lb 3.5 oz)  Body mass index is 36.6 kg/m².    Intake/Output Summary (Last 24 hours) at 10/15/2024 1437  Last data filed at 10/15/2024 0919  Gross per 24 hour   Intake 420 ml   Output 533 ml   Net -113 ml         Physical Exam  Vitals and nursing note reviewed.   Constitutional:       General: She is not in acute distress.     Appearance: She is well-developed. She is obese.   HENT:      Head: Normocephalic and atraumatic.   Eyes:      Conjunctiva/sclera: Conjunctivae normal.   Neck:      Vascular: No JVD.   Cardiovascular:      Rate and Rhythm: Normal rate and regular rhythm.      Heart sounds: Normal heart sounds.   Pulmonary:      Effort: Pulmonary effort is normal.      Breath sounds: Normal breath sounds.   Abdominal:      General: Bowel sounds are normal. There is no distension.      Palpations: Abdomen is soft.      Tenderness: There is no abdominal tenderness.      Comments: +SUSANA drain   Musculoskeletal:         General: Swelling (RUE) present.      Cervical back: Neck supple.      Right lower leg: No edema.      Left lower leg: No edema.   Neurological:      Mental Status: She is alert.   Psychiatric:         Behavior: Behavior normal.             Significant Labs: All pertinent labs within the past 24 hours have been reviewed.  CBC:   Recent Labs   Lab 10/14/24  0332   WBC 7.52   HGB 10.6*   HCT 34.7*        CMP:   Recent Labs   Lab 10/14/24  0332      K 3.8      CO2 28   *   BUN 22   CREATININE 0.8   CALCIUM 9.1   PROT 5.7*   ALBUMIN 2.0*   BILITOT 0.4   ALKPHOS 78   AST 17   ALT <5*   ANIONGAP 9       Significant Imaging:  no  new      Assessment/Plan:      * Diverticulitis of intestine with abscess without bleeding  Patient now with percutaneous 14 Mauritian during placed on previous admission.  Drain remains in place    CT showed new fluid collection    IR team placed a new drain 10/09    Abx were changed to Ertapenem according to the fluid culture sens result , anticipate at least 2 weeks of iv abx    Repeat CTAP today    Chronic diastolic congestive heart failure  Patient has Diastolic (HFpEF) heart failure that is Chronic. On presentation their CHF was well compensated.   Latest ECHO  Results for orders placed during the hospital encounter of 10/07/24    Echo    Interpretation Summary    Left Ventricle: The left ventricle is normal in size. Moderately increased wall thickness. There is moderate concentric hypertrophy. Mild global hypokinesis present. There is reduced systolic function. Biplane (2D) method of discs ejection fraction is 45%. Diastolic function cannot be reliably determined in the presence of mitral annular calcification.    Right Ventricle: Normal right ventricular cavity size. Systolic function is normal.    Aortic Valve: The aortic valve is a trileaflet valve. Severely calcified cusps. There is mild to moderate stenosis. Aortic valve area by VTI is 0.9 cm². Aortic valve peak velocity is 2.1 m/s. Mean gradient is 11.4 mmHg. The dimensionless index is 0.28.    Mitral Valve: Moderately calcified leaflets. There is severe mitral annular calcification present. There is mild stenosis. mean gradient og 5 mmHg at 69 bpm There is mild regurgitation.    Tricuspid Valve: There is mild regurgitation.    Pulmonary Artery: The estimated pulmonary artery systolic pressure is 33 mmHg.    IVC/SVC: Normal venous pressure at 3 mmHg.    Current Heart Failure Medications       Plan  - Monitor strict I&Os and daily weights.    - Place on telemetry  - Low sodium diet  - Place on fluid restriction of 2 L.   - Cardiology has been consulted  -  The patient's volume status is at their baseline  - cards recs:  -  on admission; lying flat with no acute distress; sats 94-96% on 2LPM; CXR reviewed  - on Coreg Bumex, Imdur and Diovan as an outpatient- antihypertensive regimen resumption as detailed under HTN  - no ADHF        Chest pain  demand ischemia     Troponin trend flat  EKG with paced rhythm.  Cardiology following     Essential hypertension  Patients blood pressure range in the last 24 hours was: BP  Min: 89/52  Max: 133/59.The patient's inpatient anti-hypertensive regimen is listed below:  Current Antihypertensives       Plan  - BP is controlled, no changes needed to their regimen  Off antihypertensive's at this time. Resume as tolerated.     Cardiac pacemaker  EKG paced rhythm        VTE Risk Mitigation (From admission, onward)           Ordered     IP VTE HIGH RISK PATIENT  Once         10/08/24 0222     Place sequential compression device  Until discontinued         10/08/24 0222                    Discharge Planning   ANA: 10/17/2024     Code Status: DNR   Is the patient medically ready for discharge?:     Reason for patient still in hospital (select all that apply): Patient trending condition, Treatment, and Imaging  Discharge Plan A: Skilled Nursing Facility ([facility])          Annamarie Lacy MD  Department of Hospital Medicine   [facility] [facility]

## 2024-10-15 NOTE — HOSPITAL COURSE
CT showed new fluid collection     IR team placed a new drain 10/09     Abx changed to Ertapenem according to the fluid culture sens result  NATHEN resolved  Echo with worsened EF (60s-- >40s), however, EF >45%. Cards consulted and recommended 2L fluid restriction. No acute decompensation at this time.   It was felt her SUSANA drain was putting out < 20 cc day, so repeat CTAP done with stable results. However, the next day she had increased drain OP. General surgery contacted to advise on the situation. They recommended keeping the drain in for discharge. ID consulted for final abx recs. PT/ OT rec SNF. CM was able to secure this. She was discharged in stable condition.

## 2024-10-15 NOTE — ASSESSMENT & PLAN NOTE
Patient has Diastolic (HFpEF) heart failure that is Chronic. On presentation their CHF was well compensated.   Latest ECHO  Results for orders placed during the hospital encounter of 10/07/24    Echo    Interpretation Summary    Left Ventricle: The left ventricle is normal in size. Moderately increased wall thickness. There is moderate concentric hypertrophy. Mild global hypokinesis present. There is reduced systolic function. Biplane (2D) method of discs ejection fraction is 45%. Diastolic function cannot be reliably determined in the presence of mitral annular calcification.    Right Ventricle: Normal right ventricular cavity size. Systolic function is normal.    Aortic Valve: The aortic valve is a trileaflet valve. Severely calcified cusps. There is mild to moderate stenosis. Aortic valve area by VTI is 0.9 cm². Aortic valve peak velocity is 2.1 m/s. Mean gradient is 11.4 mmHg. The dimensionless index is 0.28.    Mitral Valve: Moderately calcified leaflets. There is severe mitral annular calcification present. There is mild stenosis. mean gradient og 5 mmHg at 69 bpm There is mild regurgitation.    Tricuspid Valve: There is mild regurgitation.    Pulmonary Artery: The estimated pulmonary artery systolic pressure is 33 mmHg.    IVC/SVC: Normal venous pressure at 3 mmHg.    Current Heart Failure Medications       Plan  - Monitor strict I&Os and daily weights.    - Place on telemetry  - Low sodium diet  - Place on fluid restriction of 2 L.   - Cardiology has been consulted  - The patient's volume status is at their baseline  - cards recs:  -  on admission; lying flat with no acute distress; sats 94-96% on 2LPM; CXR reviewed  - on Coreg Bumex, Imdur and Diovan as an outpatient- antihypertensive regimen resumption as detailed under HTN  - no ADHF

## 2024-10-15 NOTE — ASSESSMENT & PLAN NOTE
Patient now with percutaneous 14 [place] during placed on previous admission.  Drain remains in place    CT showed new fluid collection    IR team placed a new drain 10/09    Abx were changed to Ertapenem according to the fluid culture sens result , anticipate at least 2 weeks of iv abx    Repeat CTAP today

## 2024-10-15 NOTE — PLAN OF CARE
0862  Updated notes sent to [facility] via [employer]. Awaiting response.     0900  Order for PT/OT consult for SNF placement entered.     1050  Message sent to Dr Lacy questioning pt's discharge status. Awaiting response.        10/15/24 1145   Rounds   Attendance Nurse ;Provider   Discharge Plan A Skilled Nursing Facility  ([facility])   Why the patient remains in the hospital Requires continued medical care   Transition of Care Barriers Transportation     1145  CM was informed by Dr Lacy that the pt is not medically stable to discharge to [facility] today.     Patient was admitted with diverticulitis with abscess, had her drain replaced in IR on 10/9/2024, continues to receive IV invanz, & continues to be followed by IR for possible drain removal prior to dc. Pox 95% on 1.5L O2 via NC this AM. Pt will need IV invanz x2 weeks. Right midline remains intact & patent.     CM was informed by Verito ([phone]) w/[facility] that she is trying to contact the pt's daughter to complete admission paperwork.     1215  Patient resting quietly in bed with daughter, Lily Ventura ([phone]), at the bedside when CM rounded. CM informed Lily that Verito is trying to contact her to complete the admission paperwork. CM assisted Lily to call Verito. Transportation packet left at the nurse's station.       Will continue to follow.

## 2024-10-15 NOTE — PT/OT/SLP EVAL
Occupational Therapy   Evaluation    Name: Marycruz Perez  MRN: [id number]  Admitting Diagnosis: Diverticulitis of intestine with abscess without bleeding  Recent Surgery: * No surgery found *      Recommendations:     Discharge Recommendations: Moderate Intensity Therapy  Discharge Equipment Recommendations:  other (see comments), to be determined by next level of care (TBD)  Barriers to discharge:  Other (Comment) (Pt requires increased level of assistance)    Assessment:     Marycruz Perez is a 89 y.o. female with a medical diagnosis of Diverticulitis of intestine with abscess without bleeding.  She presents with The primary encounter diagnosis was NSTEMI (non-ST elevated myocardial infarction). Diagnoses of Shortness of breath, Chest pain, Essential hypertension [I10], Cardiac pacemaker [Z95.0], and Chronic heart failure with preserved ejection fraction (HFpEF) [I50.32] were also pertinent to this visit. Performance deficits affecting function: weakness, impaired functional mobility, decreased coordination, gait instability, impaired endurance, impaired skin, decreased upper extremity function, decreased lower extremity function, decreased ROM, impaired self care skills, impaired balance, edema, impaired joint extensibility, impaired fine motor, decreased safety awareness, impaired coordination.      Rehab Prognosis: Fair; patient would benefit from acute skilled OT services to address these deficits and reach maximum level of function.       Plan:     Patient to be seen 3 x/week to address the above listed problems via self-care/home management, therapeutic activities, therapeutic exercises  Plan of Care Expires: 11/15/24  Plan of Care Reviewed with: patient, daughter    Subjective     Chief Complaint: tired  Patient/Family Comments/goals: return to OF    Occupational Profile:  Patient lives with dtr and grandson in [facility] with THE, WIS with shower chair and grab bar  Prior to admission, patients level of function  was assisted with limited amb and ADLs with use of RW; able to transfer to wheelchair but is unable to self propel.  Equipment used at home: wheelchair, walker, rolling, lift device.   Upon discharge, patient will have assistance from family.    Pain/Comfort:  Pain Rating 1: 0/10    Patients cultural, spiritual, Worship conflicts given the current situation: no    Objective:     Communicated with: nsg prior to session.  Patient found HOB elevated with bed alarm, PureWick (drain medial back, midline cath) upon OT entry to room.    General Precautions: Standard, fall, contact  Orthopedic Precautions: N/A  Braces: N/A  Respiratory Status: Room air    Occupational Performance:    Bed Mobility:    Pt declining bed mobility this date    Cognitive/Visual Perceptual:  Cognitive/Psychosocial Skills:     -       Oriented to: Person, Place, Time, and Situation   -       Follows Commands/attention:Follows two-step commands and Follows multistep  commands  -       Mood/Affect/Coping skills/emotional control: Cooperative and Guarded    Physical Exam:  Edema:  RUE  BUE ROM:  -        RUE ~90 degrees shoulder flex, LUE ~80   Upper Extremity Strength:    -       Right Upper Extremity:  3+/5 distally  -       Left Upper Extremity:  3+/5 distally   Strength:    -       Right Upper Extremity:  Fair-  -       Left Upper Extremity:  Fair-    AMPAC 6 Click ADL:  AMPAC Total Score: 10    Treatment & Education:  Pt would benefit from cont OT services in order to maximize functional independence.   Per daughter prior to hospital admission last month pt was Suki with use of RW for functional mobility.   Pt noted with UE edema this date R>L, educated on elevation with pillow.   Pt not agreeable to mobilization this date.   Will attempt next tx date.     Patient left HOB elevated with all lines intact, call button in reach, bed alarm on, nsg notified, and daughter present    GOALS:   Multidisciplinary Problems       Occupational Therapy  Goals          Problem: Occupational Therapy    Goal Priority Disciplines Outcome Interventions   Occupational Therapy Goal     OT, PT/OT Progressing    Description: Goals to be met by: 11/15/2024     Patient will increase functional independence with ADLs by performing:    Feeding with Set-up Assistance.  Grooming while seated with Set-up Assistance.  Sitting at edge of bed x5 minutes with Minimal Assistance.  Rolling to Bilateral with Moderate Assistance.   Supine to sit with Moderate Assistance.                         History:     Past Medical History:   Diagnosis Date    Acute exacerbation of CHF (congestive heart failure) 10/27/2020    NATHEN (acute kidney injury) 11/30/2023    Hypertension     Syncope and collapse          Past Surgical History:   Procedure Laterality Date    A-V CARDIAC PACEMAKER INSERTION N/A 2/22/2021    Procedure: INSERTION, CARDIAC PACEMAKER, DUAL CHAMBER;  Surgeon: Norman Foote MD;  Location: [facility] EP LAB;  Service: Cardiology;  Laterality: N/A;  AVB, PPM upgrade to Dual PPM (right sided), Bio, Venogram prior to draping, MAC, HI, 3 Prep    CARDIAC CATHETERIZATION      CARDIAC PACEMAKER PLACEMENT      CHOLECYSTECTOMY      CORONARY ANGIOPLASTY      HYSTERECTOMY      REVISION OF SKIN POCKET FOR PACEMAKER  2/22/2021    Procedure: Revision, Skin Pocket, For Cardiac Pacemaker;  Surgeon: Norman Foote MD;  Location: [facility] EP LAB;  Service: Cardiology;;       Time Tracking:     OT Date of Treatment: 10/15/24  OT Start Time: 1448  OT Stop Time: 1508  OT Total Time (min): 20 min    Billable Minutes:Evaluation 20 w/PT    10/15/2024

## 2024-10-15 NOTE — ASSESSMENT & PLAN NOTE
Patients blood pressure range in the last 24 hours was: BP  Min: 89/52  Max: 133/59.The patient's inpatient anti-hypertensive regimen is listed below:  Current Antihypertensives       Plan  - BP is controlled, no changes needed to their regimen  Off antihypertensive's at this time. Resume as tolerated.

## 2024-10-15 NOTE — PROGRESS NOTES
Pharmacist Renal Dose Adjustment Note    Marycruz Perez is a 89 y.o. female being treated with the medication ertapenem    Patient Data:    Vital Signs (Most Recent):  Temp: 98.5 °F (36.9 °C) (10/15/24 1056)  Pulse: 69 (10/15/24 1156)  Resp: 20 (10/15/24 1056)  BP: (!) 120/57 (10/15/24 1056)  SpO2: 97 % (10/15/24 1056) Vital Signs (72h Range):  Temp:  [97.4 °F (36.3 °C)-98.5 °F (36.9 °C)]   Pulse:  [68-72]   Resp:  [17-20]   BP: (107-133)/(49-59)   SpO2:  [95 %-100 %]      Recent Labs   Lab 10/12/24  1121 10/13/24  0344 10/14/24  0332   CREATININE 0.9 0.8 0.8     Serum creatinine: 0.8 mg/dL 10/14/24 0332  Estimated creatinine clearance: 42.2 mL/min    Medication:ertapenem dose: 0.5g frequency q24h will be changed to medication:ertapenem dose:1g frequency:q24h    Pharmacist's Name: Remi Loo  Pharmacist's Extension: [phone]

## 2024-10-15 NOTE — PLAN OF CARE
Pt would benefit from cont OT services in order to maximize functional independence. Recommending moderate intensity therapy upon d/c. Per daughter prior to hospital admission last month pt was Suki with use of RW for functional mobility. Pt noted with UE edema this date R>L, educated on elevation with pillow. Pt not agreeable to mobilization this date. Will attempt next tx date.     Problem: Occupational Therapy  Goal: Occupational Therapy Goal  Description: Goals to be met by: 11/15/2024     Patient will increase functional independence with ADLs by performing:    Feeding with Set-up Assistance.  Grooming while seated with Set-up Assistance.  Sitting at edge of bed x5 minutes with Minimal Assistance.  Rolling to Bilateral with Moderate Assistance.   Supine to sit with Moderate Assistance.    Outcome: Progressing

## 2024-10-16 PROCEDURE — 99900035 HC TECH TIME PER 15 MIN (STAT)

## 2024-10-16 PROCEDURE — A4216 STERILE WATER/SALINE, 10 ML: HCPCS | Performed by: INTERNAL MEDICINE

## 2024-10-16 PROCEDURE — 63600175 PHARM REV CODE 636 W HCPCS: Performed by: FAMILY MEDICINE

## 2024-10-16 PROCEDURE — 25000003 PHARM REV CODE 250: Performed by: INTERNAL MEDICINE

## 2024-10-16 PROCEDURE — 94761 N-INVAS EAR/PLS OXIMETRY MLT: CPT

## 2024-10-16 PROCEDURE — 27000207 HC ISOLATION

## 2024-10-16 PROCEDURE — 97530 THERAPEUTIC ACTIVITIES: CPT

## 2024-10-16 PROCEDURE — 11000001 HC ACUTE MED/SURG PRIVATE ROOM

## 2024-10-16 PROCEDURE — 25000003 PHARM REV CODE 250: Performed by: FAMILY MEDICINE

## 2024-10-16 PROCEDURE — 27000221 HC OXYGEN, UP TO 24 HOURS

## 2024-10-16 PROCEDURE — 51798 US URINE CAPACITY MEASURE: CPT

## 2024-10-16 PROCEDURE — 99232 SBSQ HOSP IP/OBS MODERATE 35: CPT | Mod: NSCH,GC,, | Performed by: PHYSICIAN ASSISTANT

## 2024-10-16 RX ADMIN — SODIUM CHLORIDE, PRESERVATIVE FREE 10 ML: 5 INJECTION INTRAVENOUS at 09:10

## 2024-10-16 RX ADMIN — ERTAPENEM 1 G: 1 INJECTION INTRAMUSCULAR; INTRAVENOUS at 09:10

## 2024-10-16 RX ADMIN — TAMSULOSIN HYDROCHLORIDE 0.4 MG: 0.4 CAPSULE ORAL at 09:10

## 2024-10-16 NOTE — SUBJECTIVE & OBJECTIVE
Interval History: NAEON. No acute concerns at this time.     Review of Systems   Constitutional:  Negative for chills and fever.   Respiratory:  Negative for shortness of breath.    Cardiovascular:  Negative for chest pain.   Gastrointestinal:  Negative for abdominal pain.   Genitourinary:  Negative for dysuria.   Neurological:  Negative for headaches.   Psychiatric/Behavioral:  Negative for confusion.      Objective:     Vital Signs (Most Recent):  Temp: 97.8 °F (36.6 °C) (10/16/24 1113)  Pulse: 72 (10/16/24 1113)  Resp: 18 (10/16/24 1113)  BP: (!) 116/55 (10/16/24 1113)  SpO2: 96 % (10/16/24 1113) Vital Signs (24h Range):  Temp:  [97.7 °F (36.5 °C)-98 °F (36.7 °C)] 97.8 °F (36.6 °C)  Pulse:  [69-74] 72  Resp:  [16-20] 18  SpO2:  [94 %-99 %] 96 %  BP: (107-126)/(52-59) 116/55     Weight: 82.2 kg (181 lb 3.5 oz)  Body mass index is 36.6 kg/m².    Intake/Output Summary (Last 24 hours) at 10/16/2024 1509  Last data filed at 10/16/2024 1336  Gross per 24 hour   Intake 300 ml   Output 135 ml   Net 165 ml         Physical Exam  Vitals and nursing note reviewed.   Constitutional:       General: She is not in acute distress.     Appearance: She is well-developed. She is obese.   HENT:      Head: Normocephalic and atraumatic.   Eyes:      Conjunctiva/sclera: Conjunctivae normal.   Neck:      Vascular: No JVD.   Cardiovascular:      Rate and Rhythm: Normal rate and regular rhythm.      Heart sounds: Normal heart sounds.   Pulmonary:      Effort: Pulmonary effort is normal.      Breath sounds: Normal breath sounds.   Abdominal:      General: Bowel sounds are normal. There is no distension.      Palpations: Abdomen is soft.      Tenderness: There is no abdominal tenderness.      Comments: +SUSANA drain   Musculoskeletal:         General: Swelling (RUE; improved) present.      Cervical back: Neck supple.      Right lower leg: No edema.      Left lower leg: No edema.   Neurological:      Mental Status: She is alert.   Psychiatric:          Behavior: Behavior normal.           Significant Labs: All pertinent labs within the past 24 hours have been reviewed.    Significant Imaging: I have reviewed all pertinent imaging results/findings within the past 24 hours.

## 2024-10-16 NOTE — PT/OT/SLP PROGRESS
"Physical Therapy Treatment    Patient Name:  Marycruz Perez   MRN:  [id number]    Recommendations:     Discharge Recommendations: Moderate Intensity Therapy  Discharge Equipment Recommendations: to be determined by next level of care  Barriers to discharge:  requires increased level of assistance    Assessment:     Marycruz Perez is a 89 y.o. female admitted with a medical diagnosis of Diverticulitis of intestine with abscess without bleeding.  She presents with the following impairments/functional limitations: weakness, impaired endurance, impaired self care skills, impaired functional mobility, gait instability, impaired balance, decreased coordination, decreased upper extremity function, decreased lower extremity function, decreased safety awareness, decreased ROM, impaired coordination, impaired fine motor, impaired skin, edema, impaired joint extensibility Patient required total A x 2 for sup <>sit; max A to maintain sitting at EOB; returned to supine after 6 min before returning to supine; depA x 2 to scoot pt to HOB via drawsheet; will cont with POC. .    Rehab Prognosis: Fair; patient would benefit from acute skilled PT services to address these deficits and reach maximum level of function.    Recent Surgery: * No surgery found *      Plan:     During this hospitalization, patient to be seen 3 x/week to address the identified rehab impairments via gait training, therapeutic activities, therapeutic exercises, neuromuscular re-education and progress toward the following goals:    Plan of Care Expires:  11/15/24    Subjective     Chief Complaint: "It's too early."  Patient/Family Comments/goals: to return to PLOF  Pain/Comfort:  Pain Rating 1: 0/10  Pain Rating Post-Intervention 1: 0/10      Objective:     Communicated with nurse prior to session.  Patient found HOB elevated with bed alarm, PureWick (drain medial back, midline cath) upon PT entry to room.     General Precautions: Standard, fall, " contact  Orthopedic Precautions: N/A  Braces: N/A  Respiratory Status: Nasal cannula, flow 1.5 L/min     Functional Mobility:  Bed Mobility:     Supine to Sit: total assistance, of 2 persons, and increased time/effort and VCs for technique; HOB max elevated  Sit to Supine: total assistance and of 2 persons   Scooting: to HOB via drawsheet with dependent assist x 2 persons with bed in trendelenburg    AM-PAC 6 CLICK MOBILITY  Turning over in bed (including adjusting bedclothes, sheets and blankets)?: 2  Sitting down on and standing up from a chair with arms (e.g., wheelchair, bedside commode, etc.): 1  Moving from lying on back to sitting on the side of the bed?: 2  Moving to and from a bed to a chair (including a wheelchair)?: 1  Need to walk in hospital room?: 1  Climbing 3-5 steps with a railing?: 1  Basic Mobility Total Score: 8       Treatment & Education:  -pt agreeable to therapy  -pt performed APs x 20, ankle circles x 10, TKEs x 10 each  -pt transitioned to EOB with increased time/effort and VCs for technique  -pt sat at EOB ~6 minutes with max A to maintain static sitting 2/2 heavy L lateral lean  -pt performed seated APs, FAQ x 10 reps each  -pt returned to supine and repositioned as above    Patient left HOB elevated with all lines intact, call button in reach, bed alarm on, and nurse notified..    GOALS:   Multidisciplinary Problems       Physical Therapy Goals          Problem: Physical Therapy    Goal Priority Disciplines Outcome Interventions   Physical Therapy Goal     PT, PT/OT Progressing    Description: Goals to be met by: 11/15/2024     Patient will increase functional independence with mobility by performin. Supine to sit with Moderate Assistance  2. Sit to supine with Moderate Assistance  3. Rolling with Moderate Assistance.  4. Sit to stand transfer with Moderate Assistance using Rolling Walker  5. Bed to chair transfer with Moderate Assistance using Rolling Walker  6. Gait  x 10 feet  with Moderate Assistance using Rolling Walker.   Amend goals as appropriate                       Time Tracking:     PT Received On: 10/16/24  PT Start Time: 1513     PT Stop Time: 1536  PT Total Time (min): 23 min   Cotx for efficacy of outcomes and safety  Billable Minutes: Therapeutic Activity 23       PT/PTA: PT     Number of PTA visits since last PT visit: 0     10/16/2024

## 2024-10-16 NOTE — PLAN OF CARE
SW sent updated information via [employer] to [facility] [place] Phone: [phone]. Pt will need IV abx for 2 weeks.  Cm will continue to follow pt through transitions of care and assist with any discharge needs.    JOHN Sarmiento  [phone]     10/16/24 1114   Post-Acute Status   Post-Acute Authorization Placement   Post-Acute Placement Status Referrals Sent   Discharge Plan   Discharge Plan A Skilled Nursing Facility

## 2024-10-16 NOTE — PROGRESS NOTES
Interventional Radiology Progress Note      SUBJECTIVE:     History of Present Illness:  Marycruz Perez is a 89 y.o. female with a PMHx of CHF s/p PPM, chest pain, diverticulitis s/p IR guided drainage 9/25, HTN, CAD s/p PCI, rheumatic AS/MS, HFpEF s/p drain exchange and aspiration 10/9 with persistent communication with pelvic abscess noted.  Repeat CT reviewed independently noting no fluid around the drain.    Spoke to RN and 75 ml output previous shift, mid morning 25 ml output.    Review of Systems   Constitutional:  Negative for chills, fever, malaise/fatigue and weight loss.   Respiratory:  Negative for cough and shortness of breath.    Cardiovascular:  Negative for chest pain, palpitations and leg swelling.   Gastrointestinal:  Negative for abdominal pain, diarrhea, nausea and vomiting.   Genitourinary:  Negative for dysuria and flank pain.   Musculoskeletal:  Positive for back pain. Negative for myalgias.   Neurological:  Negative for weakness and headaches.       Scheduled Meds:   ertapenem (INVanz) IV (PEDS and ADULTS)  1 g Intravenous Q24H    sodium chloride 0.9%  10 mL Intravenous Q12H    tamsulosin  0.4 mg Oral Daily     Continuous Infusions:  PRN Meds:  Current Facility-Administered Medications:     acetaminophen, 650 mg, Oral, Q6H PRN    dextrose 10%, 12.5 g, Intravenous, PRN    dextrose 10%, 25 g, Intravenous, PRN    glucagon (human recombinant), 1 mg, Intramuscular, PRN    glucose, 16 g, Oral, PRN    glucose, 24 g, Oral, PRN    naloxone, 0.02 mg, Intravenous, PRN    ondansetron, 4 mg, Intravenous, Q8H PRN    sodium chloride 0.9%, 10 mL, Intravenous, Q12H PRN    Flushing PICC/Midline Protocol, , , Until Discontinued **AND** sodium chloride 0.9%, 10 mL, Intravenous, Q12H **AND** sodium chloride 0.9%, 10 mL, Intravenous, PRN    Review of patient's allergies indicates:   Allergen Reactions    Codeine     Latex, natural rubber     Penicillins     Penicillin Rash       Past Medical History:   Diagnosis  Date    Acute exacerbation of CHF (congestive heart failure) 10/27/2020    NATHEN (acute kidney injury) 11/30/2023    Hypertension     Syncope and collapse      Past Surgical History:   Procedure Laterality Date    A-V CARDIAC PACEMAKER INSERTION N/A 2/22/2021    Procedure: INSERTION, CARDIAC PACEMAKER, DUAL CHAMBER;  Surgeon: Norman Foote MD;  Location: [facility] EP LAB;  Service: Cardiology;  Laterality: N/A;  AVB, PPM upgrade to Dual PPM (right sided), Bio, Venogram prior to draping, MAC, IA, 3 Prep    CARDIAC CATHETERIZATION      CARDIAC PACEMAKER PLACEMENT      CHOLECYSTECTOMY      CORONARY ANGIOPLASTY      HYSTERECTOMY      REVISION OF SKIN POCKET FOR PACEMAKER  2/22/2021    Procedure: Revision, Skin Pocket, For Cardiac Pacemaker;  Surgeon: Norman Foote MD;  Location: [facility] EP LAB;  Service: Cardiology;;     Family History   Problem Relation Name Age of Onset    Liver disease Mother       Social History     Tobacco Use    Smoking status: Never    Smokeless tobacco: Never   Substance Use Topics    Alcohol use: No    Drug use: No       OBJECTIVE:     Vital Signs (Most Recent)  Temp: 97.8 °F (36.6 °C) (10/16/24 1113)  Pulse: 72 (10/16/24 1113)  Resp: 18 (10/16/24 1113)  BP: (!) 116/55 (10/16/24 1113)  SpO2: 96 % (10/16/24 1113)    Physical Exam:  Physical Exam  Vitals and nursing note reviewed.   Constitutional:       Appearance: Normal appearance.   HENT:      Head: Normocephalic and atraumatic.      Right Ear: External ear normal.      Left Ear: External ear normal.      Nose: Nose normal.   Eyes:      Extraocular Movements: Extraocular movements intact.   Pulmonary:      Effort: Pulmonary effort is normal.   Abdominal:      General: Abdomen is flat.   Musculoskeletal:      Cervical back: Normal range of motion and neck supple.   Skin:     General: Skin is warm and dry.      Comments: Drain in place, flushing appropriately, no leakage observed.  Thick light brown fluid in the bulb   Neurological:      Mental Status:  She is alert.   Psychiatric:         Mood and Affect: Mood normal.         Behavior: Behavior normal.         Laboratory  I have reviewed all pertinent lab results within the past 24 hours.  CBC:   Recent Labs   Lab 10/14/24  0332   WBC 7.52   RBC 3.78*   HGB 10.6*   HCT 34.7*      MCV 92   MCH 28.0   MCHC 30.5*     Microbiology Results (last 7 days)       Procedure Component Value Units Date/Time    Urine culture [[id number]] Collected: 10/11/24 0046    Order Status: Completed Specimen: Urine Updated: 10/13/24 0012     Urine Culture, Routine No significant growth    Narrative:      Specimen Source->Urine            Imaging:  Recent imaging studies including CT a/p on 10/16 which was independently reviewed by Nahum Murray MD and Leigh Bone PA-C.     ASSESSMENT/PLAN:     Assessment:  89 y.o. female with a PMHx of CHF s/p PPM, chest pain, diverticulitis s/p IR guided drainage 9/25, HTN, CAD s/p PCI, rheumatic AS/MS, HFpEF s/p drain exchange and aspiration 10/9 with persistent communication with pelvic abscess noted.      CT reviewed independently no fluid around the drain.  If there is still fluid coming out of the drain, it is likely from a colonic fistula.     Plan:  Discussed with surgery and will keep drain in place given concern for fistula, pt will f/u with surgery outpatient in 2 wks.  Continue strict I/Os to monitor drain output.   Recommend continue flushing the drain with 10 mL normal saline twice daily.    IR will sign off at this time.  Please contact if questions or concerns.    Leigh Bone PA-C  Interventional Radiology

## 2024-10-16 NOTE — PLAN OF CARE
General Surgery Care Update:     Asked to eval drain in the setting of persistent brown drainage in diverticulitis. Recommend keep drain in given output still brown and is moderate in output. Vitals normal. No white count. Please continue to monitor drain output.     We can see her in clinic in 2 weeks    Please call with other questions   Discussed with staff.     Thomas Baker  PGY-4

## 2024-10-16 NOTE — PT/OT/SLP PROGRESS
Occupational Therapy   Treatment    Name: Marycruz Perez  MRN: [id number]  Admitting Diagnosis:  Diverticulitis of intestine with abscess without bleeding       Recommendations:     Discharge Recommendations: Moderate Intensity Therapy  Discharge Equipment Recommendations:  to be determined by next level of care  Barriers to discharge:  Other (Comment) (Pt requires increased level of assistance)    Assessment:     Marycruz Perez is a 89 y.o. female with a medical diagnosis of Diverticulitis of intestine with abscess without bleeding.  She presents with The primary encounter diagnosis was NSTEMI (non-ST elevated myocardial infarction). Diagnoses of Shortness of breath, Chest pain, Essential hypertension [I10], Cardiac pacemaker [Z95.0], and Chronic heart failure with preserved ejection fraction (HFpEF) [I50.32] were also pertinent to this visit. Performance deficits affecting function are weakness, impaired functional mobility, decreased safety awareness, pain, decreased coordination, impaired endurance, decreased ROM, edema, gait instability, impaired skin, decreased upper extremity function, decreased lower extremity function, impaired balance, impaired self care skills, impaired fine motor, impaired joint extensibility, impaired coordination.     Rehab Prognosis:  Good and Fair; patient would benefit from acute skilled OT services to address these deficits and reach maximum level of function.       Plan:     Patient to be seen 3 x/week to address the above listed problems via self-care/home management, therapeutic activities, therapeutic exercises  Plan of Care Expires: 11/15/24  Plan of Care Reviewed with: patient, daughter    Subjective     Chief Complaint: R knee pain w/movement  Patient/Family Comments/goals: Daughter encouraging pt to participate in therapy  Pain/Comfort:       Objective:     Communicated with: nsg prior to session.  Patient found HOB elevated with bed alarm, PureWick (drain medial back, midline  cath) upon OT entry to room.    General Precautions: Standard, fall, contact    Orthopedic Precautions:N/A  Braces: N/A  Respiratory Status: Room air     Occupational Performance:     Bed Mobility:    Patient completed Scooting/Bridging with total assistance and 2 persons  Patient completed Supine to Sit with total assistance and 2 persons  Patient completed Sit to Supine with total assistance and 2 persons     AMPAC 6 Click ADL: 10     Treatment & Education:  Pt agreeable to therapy this date with encouragement from daughter & increased education on importance of participation with therapy.  Pt requires increased time to transition to EOB in segments; c/o pain to R knee with movement.  Pt sitting EOB ~6 min with MaxA to maintain static sitting balance 2/2 L lateral lean.  Pt performing LE exs EOB with AAROM.  Returned to supine for safety.  Will progress as able.    Patient left HOB elevated with all lines intact, call button in reach, bed alarm on, and nsg notified    GOALS:   Multidisciplinary Problems       Occupational Therapy Goals          Problem: Occupational Therapy    Goal Priority Disciplines Outcome Interventions   Occupational Therapy Goal     OT, PT/OT Progressing    Description: Goals to be met by: 11/15/2024     Patient will increase functional independence with ADLs by performing:    Feeding with Set-up Assistance.  Grooming while seated with Set-up Assistance.  Sitting at edge of bed x5 minutes with Minimal Assistance.  Rolling to Bilateral with Moderate Assistance.   Supine to sit with Moderate Assistance.                         Time Tracking:     OT Date of Treatment: 10/16/24  OT Start Time: 1513  OT Stop Time: 1536  OT Total Time (min): 23 min    Billable Minutes:Therapeutic Activity 23 cotx w/PT    OT/PATRICIA: OT     Number of PATRICIA visits since last OT visit: 0    10/17/2024

## 2024-10-16 NOTE — PLAN OF CARE
Problem: Physical Therapy  Goal: Physical Therapy Goal  Description: Goals to be met by: 11/15/2024     Patient will increase functional independence with mobility by performin. Supine to sit with Moderate Assistance  2. Sit to supine with Moderate Assistance  3. Rolling with Moderate Assistance.  4. Sit to stand transfer with Moderate Assistance using Rolling Walker  5. Bed to chair transfer with Moderate Assistance using Rolling Walker  6. Gait  x 10 feet with Moderate Assistance using Rolling Walker.   Amend goals as appropriate  Outcome: Progressing   Co-eval performed by PT/OT for efficacy of outcomes and safety; pt will benefit from cont acute PT services in order to maximized functional independence; recommend moderate intensity therapy upon d/c; per daughter, last month prior to admit, pt was Fatimah with use of RW for amb; during today's session,pt with BUE edema R>L; BLE moderate pitting edema; pt not agreeable to EOB mobilization this date; will attempt at next visit.

## 2024-10-16 NOTE — PLAN OF CARE
Problem: Physical Therapy  Goal: Physical Therapy Goal  Description: Goals to be met by: 11/15/2024     Patient will increase functional independence with mobility by performin. Supine to sit with Moderate Assistance  2. Sit to supine with Moderate Assistance  3. Rolling with Moderate Assistance.  4. Sit to stand transfer with Moderate Assistance using Rolling Walker  5. Bed to chair transfer with Moderate Assistance using Rolling Walker  6. Gait  x 10 feet with Moderate Assistance using Rolling Walker.   Amend goals as appropriate  Outcome: Progressing   Patient required total A x 2 for sup <>sit; max A to maintain sitting at EOB; returned to supine after 6 min before returning to supine; depA x 2 to scoot pt to HOB via drawsheet; will cont with POC.

## 2024-10-16 NOTE — ASSESSMENT & PLAN NOTE
Patient now with percutaneous 14 Mongolian drain placed on previous admission.       IR team placed a new drain 10/09    Abx were changed to Ertapenem according to the fluid culture sens result , anticipate at least 2 weeks of iv abx    Repeat CTAP 10/15-- no new fluid collections.   Significantly decreased size of the fluid and air collections adjacent to the sigmoid colon and in the left presacral space, post pelvic drainage catheter placement.  2. Urinary bladder wall thickening and perivesicular fat stranding, suspicious for cystitis, recommend correlation with urinalysis.  A colovesical fistula is not excluded       ID recs:   -would continue ertapenem for until 10/29 and then repeat CT for abscess resolution  -would stop ertapenem if abscesses are gone at that time  -if still present would continue ertapenem x 2 more weeks   -needs weekly cbc and cmp while on outpatient antibiotics faxed to [phone]    Continue drain care

## 2024-10-16 NOTE — PT/OT/SLP EVAL
Physical Therapy Evaluation    Patient Name:  Marycruz Perez   MRN:  [id number]    Recommendations:     Discharge Recommendations: Moderate Intensity Therapy   Discharge Equipment Recommendations: to be determined by next level of care (TBD)   Barriers to discharge:  pt requires increased level of assistance    Assessment:     Marycruz Perez is a 89 y.o. female admitted with a medical diagnosis of Diverticulitis of intestine with abscess without bleeding.  She presents with the following impairments/functional limitations: weakness, impaired endurance, impaired self care skills, impaired functional mobility, gait instability, impaired balance, decreased coordination, decreased upper extremity function, decreased lower extremity function, decreased safety awareness, impaired skin, impaired fine motor, impaired joint extensibility, edema, decreased ROM, impaired coordination Co-eval performed by PT/OT for efficacy of outcomes and safety; pt will benefit from cont acute PT services in order to maximized functional independence; recommend moderate intensity therapy upon d/c; per daughter, last month prior to admit, pt was Fatimah with use of RW for amb; during today's session,pt with BUE edema R>L; BLE moderate pitting edema; pt not agreeable to EOB mobilization this date; will attempt at next visit. .    Rehab Prognosis: Fair; patient would benefit from acute skilled PT services to address these deficits and reach maximum level of function.    Recent Surgery: * No surgery found *      Plan:     During this hospitalization, patient to be seen 3 x/week to address the identified rehab impairments via gait training, therapeutic activities, therapeutic exercises, neuromuscular re-education and progress toward the following goals:    Plan of Care Expires:  11/15/24    Subjective     Chief Complaint: Patient c/o being tired  Patient/Family Comments/goals: to return to PLOF  Pain/Comfort:  Pain Rating 1: 0/10  Pain Rating  Post-Intervention 1: 010    Patients cultural, spiritual, Rastafarian conflicts given the current situation: no    Living Environment:  Patient lives with dtr and grandson in [facility] with THE, WIS with shower chair and grab bar  Prior to admission, patients level of function was assisted with limited amb and ADLs with use of RW; able to transfer to wheelchair but is unable to self propel.  Equipment used at home: wheelchair, walker, rolling, lift device. Upon discharge, patient will have assistance from family.    Objective:     Communicated with nurse prior to session.  Patient found HOB elevated with bed alarm, PureWick (drain medial back, midline cath)  upon PT entry to room.    General Precautions: Standard, contact, fall  Orthopedic Precautions:N/A   Braces: N/A  Respiratory Status: Nasal cannula, flow 1.5 L/min    Exams:  Cognitive Exam:  Patient is oriented to Person, Place, Time, Situation, and follows multistep commands  Gross Motor Coordination:  impaired 2/2 weakness  RLE ROM: WFL  RLE Strength: hip/knee fl~2+, hip abd/add~2-, knee ext~3, ankle~4-  LLE ROM: WFL  LLE Strength: hip/knee fl~2+, hip abd/add~2-, knee ext~3, ankle~4-    Functional Mobility:  N/A    AM-PAC 6 CLICK MOBILITY  Total Score:6     Treatment & Education:  Pt/dtr educated on role of PT/POC.    Patient left HOB elevated with all lines intact, call button in reach, bed alarm on, nurse notified, and daughter present.    GOALS:   Multidisciplinary Problems       Physical Therapy Goals          Problem: Physical Therapy    Goal Priority Disciplines Outcome Interventions   Physical Therapy Goal     PT, PT/OT Progressing    Description: Goals to be met by: 11/15/2024     Patient will increase functional independence with mobility by performin. Supine to sit with Moderate Assistance  2. Sit to supine with Moderate Assistance  3. Rolling with Moderate Assistance.  4. Sit to stand transfer with Moderate Assistance using Rolling Walker  5. Bed  to chair transfer with Moderate Assistance using Rolling Walker  6. Gait  x 10 feet with Moderate Assistance using Rolling Walker.   Amend goals as appropriate                       History:     Past Medical History:   Diagnosis Date    Acute exacerbation of CHF (congestive heart failure) 10/27/2020    NATHEN (acute kidney injury) 11/30/2023    Hypertension     Syncope and collapse        Past Surgical History:   Procedure Laterality Date    A-V CARDIAC PACEMAKER INSERTION N/A 2/22/2021    Procedure: INSERTION, CARDIAC PACEMAKER, DUAL CHAMBER;  Surgeon: Norman Foote MD;  Location: [facility] EP LAB;  Service: Cardiology;  Laterality: N/A;  AVB, PPM upgrade to Dual PPM (right sided), Bio, Venogram prior to draping, MAC, NV, 3 Prep    CARDIAC CATHETERIZATION      CARDIAC PACEMAKER PLACEMENT      CHOLECYSTECTOMY      CORONARY ANGIOPLASTY      HYSTERECTOMY      REVISION OF SKIN POCKET FOR PACEMAKER  2/22/2021    Procedure: Revision, Skin Pocket, For Cardiac Pacemaker;  Surgeon: Norman Foote MD;  Location: [facility] EP LAB;  Service: Cardiology;;       Time Tracking:     PT Received On: 10/15/24  PT Start Time: 1448     PT Stop Time: 1508  PT Total Time (min): 20 min     Billable Minutes: Evaluation 20      10/15/2024

## 2024-10-16 NOTE — PROGRESS NOTES
[facility] - Telemetry  Adult Nutrition  Progress Note    SUMMARY     Recommendations  Recommendation:   1. Continue cardiac diet   2. Consider adding diabetic diet restrictions 2000 kcals if glucose remains elevated   3. Monitor weight and labs    Goals: Pt will consume 50-75% of meals by RD follow up    Nutrition Goal Status: new  Communication of RD Recs:  (POC)    Assessment and Plan  Nutrition Problem  Altered gastrointestinal function     Related to (etiology):   Diverticulitis     Signs and Symptoms (as evidenced by):   Intraabdominal abscess   Possible malabsorption      Interventions:  Collaboration with other providers     Nutrition Diagnosis Status:   New    Malnutrition Assessment  Weight Loss (Malnutrition):  (Noted 6 lb weight loss in 2 months)     Reason for Assessment  Reason For Assessment: length of stay  Diagnosis: gastrointestinal disease (diverticulitis of intestine with abscess without bleeding)  General Information Comments: Pt is currently on a cardiac diet. PMG- diverticulitits, HTn, NATHEN, CHF. Bjorn-14/right upper lower medial buttocks, left upper lower medial buttocks, inferior medial back accordian drain. Noted 75% meal intake. Pt presented with SOB and chest pain. Pt has been very weak and unable to get up. Pt acompanied by her daughter who is her  per pt request. Noted intraabdominal abscess secondary to diverticulitis. S/p IR guided drainage 9/25. S/p drain exhange and aspiration 10/9. Noted drainage decreasing. Noted 6 lb weight loss in 2 months. Unable to conduct NFPE at this time, pt on contact precations, BMI indicates pt is nourished.  Nutrition Discharge Planning: d/c on cardiac diet    Nutrition Risk Screen  Nutrition Risk Screen: no indicators present    Nutrition/Diet History  Patient Reported Diet/Restrictions/Preferences: heart healthy  Food Preferences: PETER  Spiritual, Cultural Beliefs, Anglican Practices, Values that Affect Care: no  Factors Affecting Nutritional  "Intake: altered gastrointestinal function, decreased appetite    Nutrition Related Social Determinants of Health: SDOH: Unable to assess at this time.     Anthropometrics  Temp: 97.8 °F (36.6 °C)  Height Method: Stated  Height: 4' 11" (149.9 cm)  Height (inches): 59 in  Weight Method: Standard Scale  Weight: 82.2 kg (181 lb 3.5 oz)  Weight (lb): 181.22 lb  Ideal Body Weight (IBW), Female: 95 lb  % Ideal Body Weight, Female (lb): 190.76 %  BMI (Calculated): 36.6  BMI Grade: 35 - 39.9 - obesity - grade II  Usual Body Weight (UBW), k.9 kg (24)  % Usual Body Weight: 97.02  % Weight Change From Usual Weight: -3.18 %     Lab/Procedures/Meds  Pertinent Labs Reviewed: reviewed  Pertinent Labs Comments: Glucose 180  Pertinent Medications Reviewed: reviewed  Pertinent Medications Comments: sodium chloride, tamsulosin    Estimated/Assessed Needs  Weight Used For Calorie Calculations: 82.2 kg (181 lb 3.5 oz)  Energy Calorie Requirements (kcal): 2055 kcals (25 kcals/kg)  Energy Need Method: Kcal/kg  Protein Requirements: 82g (1g/kg)  Weight Used For Protein Calculations: 82.2 kg (181 lb 3.5 oz)     Estimated Fluid Requirement Method: RDA Method  RDA Method (mL):   CHO Requirement: 315g    Nutrition Prescription Ordered  Current Diet Order: Cardiac Diet    Evaluation of Received Nutrient/Fluid Intake  I/O: 24-/385  Energy Calories Required: meeting needs  Protein Required: meeting needs  Fluid Required: meeting needs  Comments: LBM-10/15/24  Tolerance: tolerating  % Intake of Estimated Energy Needs: 75 - 100 %  % Meal Intake: 75 - 100 %    Nutrition Risk  Level of Risk/Frequency of Follow-up:  (1x/weekly)     Monitor and Evaluation  Food and Nutrient Intake: energy intake, food and beverage intake  Food and Nutrient Adminstration: diet order  Anthropometric Measurements: weight, weight change, body mass index  Biochemical Data, Medical Tests and Procedures: electrolyte and renal panel, gastrointestinal profile, " glucose/endocrine profile, inflammatory profile, lipid profile     Nutrition Follow-Up  RD Follow-up?: Yes

## 2024-10-16 NOTE — PROGRESS NOTES
[facility]  Progress Note    Patient Name: Marycruz Perez  MRN: [id number]  Patient Class: IP- Inpatient   Admission Date: 10/7/2024  Length of Stay: 8 days  Attending Physician: Annamarie Lacy MD  Primary Care Provider: Melchor Menchaca -      Subjective:     Principal Problem:Diverticulitis of intestine with abscess without bleeding      HPI:  Is a 89-year-old female with history of CHF, hypertension, ppm brought to the emergency department by EMS for evaluation of shortness of breath with the chest pain.  Additionally patient has been very weak and unable to get up.  She reports she has had a asthma exacerbations in her chest pain was take during these episodes however she is unsure if it was asthma versus true chest pain.  Patient is accompanied by her daughter who is her  per patient request.  In ED , troponin 0.115, repeat 0.288.  Chest x-ray benign.  EKG shows paced rhythm  Patient was started on heparin drip in ED.  We will be admitted to Hospital Medicine will consult Cardiology for eval.    On review of chart patient also has recent drain placed for LR which is a percutaneous 14 [place] drain placed for intra-abdominal abscess secondary to diverticulitis.  During remains intact, draining minimal volume    Overview/Hospital Course:  CT showed new fluid collection     IR team placed a new drain 10/09     Abx changed to Ertapenem according to the fluid culture sens result  NATHEN resolved    Interval History: NAEON. No acute concerns at this time.     Review of Systems   Constitutional:  Negative for chills and fever.   Respiratory:  Negative for shortness of breath.    Cardiovascular:  Negative for chest pain.   Gastrointestinal:  Negative for abdominal pain.   Genitourinary:  Negative for dysuria.   Neurological:  Negative for headaches.   Psychiatric/Behavioral:  Negative for confusion.      Objective:     Vital Signs (Most Recent):  Temp: 97.8 °F (36.6 °C)  "(10/16/24 1113)  Pulse: 72 (10/16/24 1113)  Resp: 18 (10/16/24 1113)  BP: (!) 116/55 (10/16/24 1113)  SpO2: 96 % (10/16/24 1113) Vital Signs (24h Range):  Temp:  [97.7 °F (36.5 °C)-98 °F (36.7 °C)] 97.8 °F (36.6 °C)  Pulse:  [69-74] 72  Resp:  [16-20] 18  SpO2:  [94 %-99 %] 96 %  BP: (107-126)/(52-59) 116/55     Weight: 82.2 kg (181 lb 3.5 oz)  Body mass index is 36.6 kg/m².    Intake/Output Summary (Last 24 hours) at 10/16/2024 1509  Last data filed at 10/16/2024 1336  Gross per 24 hour   Intake 300 ml   Output 135 ml   Net 165 ml         Physical Exam  Vitals and nursing note reviewed.   Constitutional:       General: She is not in acute distress.     Appearance: She is well-developed. She is obese.   HENT:      Head: Normocephalic and atraumatic.   Eyes:      Conjunctiva/sclera: Conjunctivae normal.   Neck:      Vascular: No JVD.   Cardiovascular:      Rate and Rhythm: Normal rate and regular rhythm.      Heart sounds: Normal heart sounds.   Pulmonary:      Effort: Pulmonary effort is normal.      Breath sounds: Normal breath sounds.   Abdominal:      General: Bowel sounds are normal. There is no distension.      Palpations: Abdomen is soft.      Tenderness: There is no abdominal tenderness.      Comments: +SUSANA drain  Thick light brown fluid in the bulb   Musculoskeletal:         General: Swelling (RUE) present.      Cervical back: Neck supple.      Right lower leg: No edema.      Left lower leg: No edema.   Neurological:      Mental Status: She is alert.   Psychiatric:         Behavior: Behavior normal.             Significant Labs: All pertinent labs within the past 24 hours have been reviewed.  CBC:   No results for input(s): "WBC", "HGB", "HCT", "PLT" in the last 48 hours.    CMP:   No results for input(s): "NA", "K", "CL", "CO2", "GLU", "BUN", "CREATININE", "CALCIUM", "PROT", "ALBUMIN", "BILITOT", "ALKPHOS", "AST", "ALT", "ANIONGAP", "EGFRNONAA" in the last 48 hours.    Invalid input(s): " ""ESTGFAFRICA"      Significant Imaging: I have reviewed all pertinent imaging results/findings within the past 24 hours.      Assessment/Plan:      * Diverticulitis of intestine with abscess without bleeding  Patient now with percutaneous 14 Persian during placed on previous admission.  Drain remains in place    CT showed new fluid collection    IR team placed a new drain 10/09    Abx were changed to Ertapenem according to the fluid culture sens result , anticipate at least 2 weeks of iv abx    Repeat CTAP 10/15-- no new fluid collections.   Significantly decreased size of the fluid and air collections adjacent to the sigmoid colon and in the left presacral space, post pelvic drainage catheter placement.  2. Urinary bladder wall thickening and perivesicular fat stranding, suspicious for cystitis, recommend correlation with urinalysis.  A colovesical fistula is not excluded    General surgery to re evaluate  Continue drain care    Chronic diastolic congestive heart failure  Patient has Diastolic (HFpEF) heart failure that is Chronic. On presentation their CHF was well compensated.   Latest ECHO  Results for orders placed during the hospital encounter of 10/07/24    Echo    Interpretation Summary    Left Ventricle: The left ventricle is normal in size. Moderately increased wall thickness. There is moderate concentric hypertrophy. Mild global hypokinesis present. There is reduced systolic function. Biplane (2D) method of discs ejection fraction is 45%. Diastolic function cannot be reliably determined in the presence of mitral annular calcification.    Right Ventricle: Normal right ventricular cavity size. Systolic function is normal.    Aortic Valve: The aortic valve is a trileaflet valve. Severely calcified cusps. There is mild to moderate stenosis. Aortic valve area by VTI is 0.9 cm². Aortic valve peak velocity is 2.1 m/s. Mean gradient is 11.4 mmHg. The dimensionless index is 0.28.    Mitral Valve: Moderately " calcified leaflets. There is severe mitral annular calcification present. There is mild stenosis. mean gradient og 5 mmHg at 69 bpm There is mild regurgitation.    Tricuspid Valve: There is mild regurgitation.    Pulmonary Artery: The estimated pulmonary artery systolic pressure is 33 mmHg.    IVC/SVC: Normal venous pressure at 3 mmHg.    Current Heart Failure Medications   none    Plan  - Monitor strict I&Os and daily weights.    - Place on telemetry  - Low sodium diet  - Place on fluid restriction of 2 L.   - Cardiology has been consulted  - The patient's volume status is at their baseline  - cards recs:  -  on admission; lying flat with no acute distress; sats 94-96% on 2LPM; CXR reviewed  - on Coreg Bumex, Imdur and Diovan as an outpatient- antihypertensive regimen resumption as detailed under HTN  - no ADHF        Chest pain  demand ischemia     Troponin trend flat  EKG with paced rhythm.  Cardiology following     Essential hypertension  Patients blood pressure range in the last 24 hours was: BP  Min: 89/52  Max: 133/59.The patient's inpatient anti-hypertensive regimen is listed below:  Current Antihypertensives       Plan  - Off antihypertensive's at this time. Resume as tolerated.     Cardiac pacemaker  EKG paced rhythm        VTE Risk Mitigation (From admission, onward)           Ordered     IP VTE HIGH RISK PATIENT  Once         10/08/24 0222     Place sequential compression device  Until discontinued         10/08/24 0222                    Discharge Planning   ANA: 10/17/2024     Code Status: DNR   Is the patient medically ready for discharge?:     Reason for patient still in hospital (select all that apply): Patient trending condition, Treatment, and Consult recommendations  Discharge Plan A: Skilled Nursing Facility          Annamarie Lacy MD  Department of Hospital Medicine   [facility]

## 2024-10-16 NOTE — PLAN OF CARE
Problem: Adult Inpatient Plan of Care  Goal: Plan of Care Review  Outcome: Progressing     Problem: Adult Inpatient Plan of Care  Goal: Optimal Comfort and Wellbeing  Outcome: Progressing     Problem: Heart Failure  Goal: Optimal Coping  Outcome: Progressing     Problem: Gas Exchange Impaired  Goal: Optimal Gas Exchange  Outcome: Progressing     Problem: Wound  Goal: Optimal Coping  Outcome: Progressing     Problem: Infection  Goal: Absence of Infection Signs and Symptoms  Outcome: Progressing       .Plan of care reviewed with the patient. Scheduled medicines given and the patient tolerated well. Fall and safety precautions taken and the standard interventions are in place. No acute distress reported on the shift. On Oxygen 1.5 L and satting well. Advised the patient to call for assistance. Continued monitoring the patient.

## 2024-10-16 NOTE — PLAN OF CARE
Recommendations  Recommendation:   1. Continue cardiac diet   2. Consider adding diabetic diet restrictions 2000 kcals if glucose remains elevated   3. Monitor weight and labs    Goals: Pt will consume 50-75% of meals by RD follow up    Nutrition Goal Status: new  Communication of RD Recs:  (POC)

## 2024-10-17 PROCEDURE — 94761 N-INVAS EAR/PLS OXIMETRY MLT: CPT

## 2024-10-17 PROCEDURE — 51798 US URINE CAPACITY MEASURE: CPT

## 2024-10-17 PROCEDURE — A4216 STERILE WATER/SALINE, 10 ML: HCPCS | Performed by: INTERNAL MEDICINE

## 2024-10-17 PROCEDURE — 51701 INSERT BLADDER CATHETER: CPT

## 2024-10-17 PROCEDURE — 97530 THERAPEUTIC ACTIVITIES: CPT | Performed by: PHYSICAL THERAPIST

## 2024-10-17 PROCEDURE — 25000003 PHARM REV CODE 250: Performed by: FAMILY MEDICINE

## 2024-10-17 PROCEDURE — 99222 1ST HOSP IP/OBS MODERATE 55: CPT | Mod: ,,, | Performed by: INTERNAL MEDICINE

## 2024-10-17 PROCEDURE — 27000221 HC OXYGEN, UP TO 24 HOURS

## 2024-10-17 PROCEDURE — 25000003 PHARM REV CODE 250: Performed by: INTERNAL MEDICINE

## 2024-10-17 PROCEDURE — 63600175 PHARM REV CODE 636 W HCPCS: Performed by: FAMILY MEDICINE

## 2024-10-17 PROCEDURE — 97110 THERAPEUTIC EXERCISES: CPT | Performed by: PHYSICAL THERAPIST

## 2024-10-17 PROCEDURE — 99900035 HC TECH TIME PER 15 MIN (STAT)

## 2024-10-17 PROCEDURE — 27000207 HC ISOLATION

## 2024-10-17 PROCEDURE — 11000001 HC ACUTE MED/SURG PRIVATE ROOM

## 2024-10-17 RX ADMIN — TAMSULOSIN HYDROCHLORIDE 0.4 MG: 0.4 CAPSULE ORAL at 08:10

## 2024-10-17 RX ADMIN — ERTAPENEM 1 G: 1 INJECTION INTRAMUSCULAR; INTRAVENOUS at 08:10

## 2024-10-17 RX ADMIN — ACETAMINOPHEN 650 MG: 325 TABLET ORAL at 10:10

## 2024-10-17 RX ADMIN — SODIUM CHLORIDE, PRESERVATIVE FREE 10 ML: 5 INJECTION INTRAVENOUS at 09:10

## 2024-10-17 NOTE — SUBJECTIVE & OBJECTIVE
Past Medical History:   Diagnosis Date    Acute exacerbation of CHF (congestive heart failure) 10/27/2020    NATHEN (acute kidney injury) 2023    Hypertension     Syncope and collapse        Past Surgical History:   Procedure Laterality Date    A-V CARDIAC PACEMAKER INSERTION N/A 2021    Procedure: INSERTION, CARDIAC PACEMAKER, DUAL CHAMBER;  Surgeon: Norman Foote MD;  Location: [facility] EP LAB;  Service: Cardiology;  Laterality: N/A;  AVB, PPM upgrade to Dual PPM (right sided), Bio, Venogram prior to draping, MAC, LA, 3 Prep    CARDIAC CATHETERIZATION      CARDIAC PACEMAKER PLACEMENT      CHOLECYSTECTOMY      CORONARY ANGIOPLASTY      HYSTERECTOMY      REVISION OF SKIN POCKET FOR PACEMAKER  2021    Procedure: Revision, Skin Pocket, For Cardiac Pacemaker;  Surgeon: Norman Foote MD;  Location: [facility] EP LAB;  Service: Cardiology;;       Review of patient's allergies indicates:   Allergen Reactions    Codeine     Latex, natural rubber     Penicillins     Penicillin Rash       Medications:  Medications Prior to Admission   Medication Sig    aspirin 81 MG Chew Take 1 tablet (81 mg total) by mouth once daily.    bumetanide (BUMEX) 0.5 MG Tab Take 2 tablets (1 mg total) by mouth once daily. May also take 2 tablets (1 mg total) daily as needed (> 2 pound weight gain in 24 hours, > 5 pound weight gain in a week, worsening leg swelling, shortness of breath).    carvediloL (COREG) 6.25 MG tablet Take 1 tablet (6.25 mg total) by mouth 2 (two) times daily with meals.    [] ciprofloxacin HCl (CIPRO) 500 MG tablet Take 1 tablet (500 mg total) by mouth 2 (two) times daily. for 14 days    diclofenac sodium (VOLTAREN) 1 % Gel Apply 4 g topically 3 (three) times daily as needed.    ezetimibe (ZETIA) 10 mg tablet Take 1 tablet (10 mg total) by mouth once daily.    isosorbide mononitrate (IMDUR) 30 MG 24 hr tablet Take 1 tablet (30 mg total) by mouth once daily.    meclizine (ANTIVERT) 25 mg tablet Take 25 mg by mouth  3 (three) times daily as needed (vertigo).    [] metroNIDAZOLE (FLAGYL) 500 MG tablet Take 1 tablet (500 mg total) by mouth 3 (three) times daily. for 14 days    valsartan (DIOVAN) 40 MG tablet Take 1 tablet (40 mg total) by mouth once daily.    albuterol (PROVENTIL/VENTOLIN HFA) 90 mcg/actuation inhaler Inhale 2 puffs into the lungs every 4 (four) hours as needed for Wheezing (cough or wheezing). Rescue    albuterol-ipratropium (DUO-NEB) 2.5 mg-0.5 mg/3 mL nebulizer solution every 6 (six) hours as needed for Wheezing.    famotidine (PEPCID) 20 MG tablet Take 1 tablet (20 mg total) by mouth nightly as needed for Heartburn (heartburn).    nitroGLYCERIN (NITROSTAT) 0.4 MG SL tablet Place 1 tablet (0.4 mg total) under the tongue every 5 (five) minutes as needed for Chest pain (angina).     Antibiotics (From admission, onward)      Start     Stop Route Frequency Ordered    10/16/24 0900  ertapenem (INVANZ) 1 g in 0.9% NaCl 100 mL IVPB (MB+)         -- IV Every 24 hours (non-standard times) 10/15/24 1514          Antifungals (From admission, onward)      None          Antivirals (From admission, onward)      None             Immunization History   Administered Date(s) Administered    COVID-19, MRNA, LN-S, PF (Pfizer) (Purple Cap) 2021, 2021    Tdap 2019       Family History       Problem Relation (Age of Onset)    Liver disease Mother          Social History     Socioeconomic History    Marital status: Single   Tobacco Use    Smoking status: Never    Smokeless tobacco: Never   Substance and Sexual Activity    Alcohol use: No    Drug use: No     Social Drivers of Health     Financial Resource Strain: Medium Risk (10/10/2024)    Overall Financial Resource Strain (CARDIA)     Difficulty of Paying Living Expenses: Somewhat hard   Food Insecurity: No Food Insecurity (10/10/2024)    Hunger Vital Sign     Worried About Running Out of Food in the Last Year: Never true     Ran Out of Food in the Last  Year: Never true   Recent Concern: Food Insecurity - Food Insecurity Present (9/17/2024)    Hunger Vital Sign     Worried About Running Out of Food in the Last Year: Sometimes true     Ran Out of Food in the Last Year: Sometimes true   Transportation Needs: No Transportation Needs (10/10/2024)    TRANSPORTATION NEEDS     Transportation : No   Physical Activity: Inactive (9/17/2024)    Exercise Vital Sign     Days of Exercise per Week: 0 days     Minutes of Exercise per Session: 0 min   Stress: No Stress Concern Present (10/10/2024)    [employer] - Occupational Stress Questionnaire     Feeling of Stress : Not at all   Housing Stability: Low Risk  (10/10/2024)    Housing Stability Vital Sign     Unable to Pay for Housing in the Last Year: No     Homeless in the Last Year: No     Review of Systems   Constitutional:  Negative for chills and fever.   Respiratory:  Negative for shortness of breath.    Cardiovascular:  Negative for chest pain.   Gastrointestinal:  Negative for abdominal pain.   Genitourinary:  Negative for dysuria.   Neurological:  Negative for headaches.   Psychiatric/Behavioral:  Negative for confusion.      Objective:     Vital Signs (Most Recent):  Temp: 98.4 °F (36.9 °C) (10/17/24 1133)  Pulse: 70 (10/17/24 1133)  Resp: 18 (10/17/24 1133)  BP: (!) 120/56 (10/17/24 1133)  SpO2: 98 % (10/17/24 1133) Vital Signs (24h Range):  Temp:  [96.9 °F (36.1 °C)-98.8 °F (37.1 °C)] 98.4 °F (36.9 °C)  Pulse:  [69-70] 70  Resp:  [16-18] 18  SpO2:  [96 %-98 %] 98 %  BP: (119-132)/(56-60) 120/56     Weight: 82.2 kg (181 lb 3.5 oz)  Body mass index is 36.6 kg/m².    Estimated Creatinine Clearance: 42.2 mL/min (based on SCr of 0.8 mg/dL).     Physical Exam  Vitals and nursing note reviewed.   Constitutional:       General: She is not in acute distress.     Appearance: She is well-developed. She is obese.   HENT:      Head: Normocephalic and atraumatic.   Eyes:      Conjunctiva/sclera:  Conjunctivae normal.   Neck:      Vascular: No JVD.   Cardiovascular:      Rate and Rhythm: Normal rate and regular rhythm.      Heart sounds: Normal heart sounds.   Pulmonary:      Effort: Pulmonary effort is normal.      Breath sounds: Normal breath sounds.   Abdominal:      General: Bowel sounds are normal. There is no distension.      Palpations: Abdomen is soft.      Tenderness: There is no abdominal tenderness.      Comments: +SUSANA drain  Thick light brown fluid in the bulb   Musculoskeletal:         General: Swelling (RUE) present.      Cervical back: Neck supple.      Right lower leg: No edema.      Left lower leg: No edema.   Neurological:      Mental Status: She is alert.   Psychiatric:         Behavior: Behavior normal.          Significant Labs: All pertinent labs within the past 24 hours have been reviewed.    Significant Imaging: I have reviewed all pertinent imaging results/findings within the past 24 hours.

## 2024-10-17 NOTE — PT/OT/SLP PROGRESS
Physical Therapy Treatment    Patient Name:  Marycruz Perez   MRN:  [id number]    Recommendations:     Discharge Recommendations: Moderate Intensity Therapy  Discharge Equipment Recommendations:  (TBD by next level of care)  Barriers to discharge: None    Assessment:     Marycruz Perez is a 89 y.o. female admitted with a medical diagnosis of Diverticulitis of intestine with abscess without bleeding.  She presents with the following impairments/functional limitations: weakness, impaired balance, decreased safety awareness, impaired endurance, pain, edema, impaired cardiopulmonary response to activity, impaired self care skills, impaired functional mobility, gait instability, decreased lower extremity function . Pt seen at b/s with daughter present, assist from nursing staff.  Pt went supine to sit with Mod assist of 2.  Pt sat at EOB 12 min with supervision to min assist of 1.  Pt required Max assist of 2 for sit to supine and dependent assist of 2 to scoot to HOB.     Rehab Prognosis: Fair; patient would benefit from acute skilled PT services to address these deficits and reach maximum level of function.    Recent Surgery: * No surgery found *      Plan:     During this hospitalization, patient to be seen 3 x/week to address the identified rehab impairments via gait training, therapeutic activities, therapeutic exercises, neuromuscular re-education and progress toward the following goals:    Plan of Care Expires:  11/14/24    Subjective     Chief Complaint: pt groaned in pain with initial movement of legs  Patient/Family Comments/goals: I am tired  Pain/Comfort:  Pain Rating 1:  (pt groaned in pain when BLE's were initially moved.)  Pain Addressed 1: Reposition, Distraction, Cessation of Activity      Objective:     Communicated with nurse prior to session.  Patient found HOB elevated with bed alarm, telemetry, PureWick (drain, cath) upon PT entry to room.     General Precautions: Standard, fall, contact  Orthopedic  Precautions: N/A  Braces: N/A  Respiratory Status: Room air     Functional Mobility:  Pt seen at b/s with daughter present, assist from nursing staff.  Pt went supine to sit with Mod assist of 2.  Pt sat at EOB 12 min with supervision to min assist of 1.  Pt required Max assist of 2 for sit to supine and dependent assist of 2 to scoot to HOB.       AM-PAC 6 CLICK MOBILITY  Turning over in bed (including adjusting bedclothes, sheets and blankets)?: 2  Sitting down on and standing up from a chair with arms (e.g., wheelchair, bedside commode, etc.): 1  Moving from lying on back to sitting on the side of the bed?: 2  Moving to and from a bed to a chair (including a wheelchair)?: 1  Need to walk in hospital room?: 1  Climbing 3-5 steps with a railing?: 1  Basic Mobility Total Score: 8       Treatment & Education:  Pt received BLE AAROM DF/PF 10 x 3,  hip abd/add supine 10 x 2, knee flex/ext supine 10 x 2.  Pt performed seated LAQ 5 x 4 BLE's.      Patient left HOB elevated with all lines intact, call button in reach, bed alarm on, and nursing assistant and daugher present..    GOALS:   Multidisciplinary Problems       Physical Therapy Goals          Problem: Physical Therapy    Goal Priority Disciplines Outcome Interventions   Physical Therapy Goal     PT, PT/OT Progressing    Description: Goals to be met by: 11/15/2024     Patient will increase functional independence with mobility by performin. Supine to sit with Moderate Assistance  2. Sit to supine with Moderate Assistance  3. Rolling with Moderate Assistance.  4. Sit to stand transfer with Moderate Assistance using Rolling Walker  5. Bed to chair transfer with Moderate Assistance using Rolling Walker  6. Gait  x 10 feet with Moderate Assistance using Rolling Walker.   Amend goals as appropriate                       Time Tracking:     PT Received On: 10/17/24  PT Start Time: 1510     PT Stop Time: 1534  PT Total Time (min): 24 min     Billable Minutes:  Therapeutic Activity 12 and Therapeutic Exercise 12    Treatment Type: Treatment  PT/PTA: PT     Number of PTA visits since last PT visit: 0     10/17/2024

## 2024-10-17 NOTE — PT/OT/SLP PROGRESS
Occupational Therapy      Patient Name:  Marycruz Perez   MRN:  [id number]    Patient not seen today secondary to Other (Comment) (Pt declining 2/2 LE pain after PT session & fatigue, requesting to wait until next tx date.). Will follow-up next tx date.    10/17/2024

## 2024-10-17 NOTE — ASSESSMENT & PLAN NOTE
89-year-old female with history of CHF, hypertension, and ppm brought to the emergency department by EMS for evaluation of shortness of breath with the chest pain. Patient was started on heparin drip in ED. ID is consulted because patient has recent drain placed for intra-abdominal abscess secondary to diverticulitis.    Now with new abscess with drain placed    -would continue ertapenem for until 10/29 and then repeat CT for abscess resolution  -would stop ertapenem if abscesses are gone at that time  -if still present would continue ertapenem x 2 more weeks   -needs weekly cbc and cmp while on outpatient antibiotics faxed to [phone]

## 2024-10-17 NOTE — CONSULTS
[facility]  Infectious Disease  Consult Note    Patient Name: Marycruz Perez  MRN: [id number]  Admission Date: 10/7/2024  Hospital Length of Stay: 9 days  Attending Physician: Annamarie Lacy MD  Primary Care Provider: Melchor Menchaca -     Isolation Status: Contact    Patient information was obtained from patient, relative(s), and past medical records.      Inpatient consult to Infectious Diseases  Consult performed by: Timoteo Mae MD  Consult ordered by: Annamarie Lacy MD  Reason for consult: abd abscess        Assessment/Plan:     GI  * Diverticulitis of intestine with abscess without bleeding  89-year-old female with history of CHF, hypertension, and ppm brought to the emergency department by EMS for evaluation of shortness of breath with the chest pain. Patient was started on heparin drip in ED. ID is consulted because patient has recent drain placed for intra-abdominal abscess secondary to diverticulitis.    Now with new abscess with drain placed    -would continue ertapenem for until 10/29 and then repeat CT for abscess resolution  -would stop ertapenem if abscesses are gone at that time  -if still present would continue ertapenem x 2 more weeks   -needs weekly cbc and cmp while on outpatient antibiotics faxed to [phone]          Thank you for your consult. I will sign off. Please contact us if you have any additional questions.    Timoteo Mae MD  Infectious Disease  [facility]    Subjective:     Principal Problem: Diverticulitis of intestine with abscess without bleeding    HPI: 89-year-old female with history of CHF, hypertension, and ppm brought to the emergency department by EMS for evaluation of shortness of breath with the chest pain. Patient was started on heparin drip in ED. ID is consulted because patient has recent drain placed for intra-abdominal abscess secondary to diverticulitis. She was on cipro and flagyl for this. CT showed new fluid collection. IR team  placed a new drain 10/09 and antibiotics were changed to ertapenem due to ESBL e coli on cx. CT done on 10/15 showed improvement in abscesses. Drain remains         Past Medical History:   Diagnosis Date    Acute exacerbation of CHF (congestive heart failure) 10/27/2020    NATHEN (acute kidney injury) 2023    Hypertension     Syncope and collapse        Past Surgical History:   Procedure Laterality Date    A-V CARDIAC PACEMAKER INSERTION N/A 2021    Procedure: INSERTION, CARDIAC PACEMAKER, DUAL CHAMBER;  Surgeon: Norman Foote MD;  Location: [facility];  Service: Cardiology;  Laterality: N/A;  AVB, PPM upgrade to Dual PPM (right sided), Bio, Venogram prior to draping, MAC, FL, 3 Prep    CARDIAC CATHETERIZATION      CARDIAC PACEMAKER PLACEMENT      CHOLECYSTECTOMY      CORONARY ANGIOPLASTY      HYSTERECTOMY      REVISION OF SKIN POCKET FOR PACEMAKER  2021    Procedure: Revision, Skin Pocket, For Cardiac Pacemaker;  Surgeon: Norman Foote MD;  Location: [facility];  Service: Cardiology;;       Review of patient's allergies indicates:   Allergen Reactions    Codeine     Latex, natural rubber     Penicillins     Penicillin Rash       Medications:  Medications Prior to Admission   Medication Sig    aspirin 81 MG Chew Take 1 tablet (81 mg total) by mouth once daily.    bumetanide (BUMEX) 0.5 MG Tab Take 2 tablets (1 mg total) by mouth once daily. May also take 2 tablets (1 mg total) daily as needed (> 2 pound weight gain in 24 hours, > 5 pound weight gain in a week, worsening leg swelling, shortness of breath).    carvediloL (COREG) 6.25 MG tablet Take 1 tablet (6.25 mg total) by mouth 2 (two) times daily with meals.    [] ciprofloxacin HCl (CIPRO) 500 MG tablet Take 1 tablet (500 mg total) by mouth 2 (two) times daily. for 14 days    diclofenac sodium (VOLTAREN) 1 % Gel Apply 4 g topically 3 (three) times daily as needed.    ezetimibe (ZETIA) 10 mg tablet Take 1 tablet (10 mg total) by mouth once  daily.    isosorbide mononitrate (IMDUR) 30 MG 24 hr tablet Take 1 tablet (30 mg total) by mouth once daily.    meclizine (ANTIVERT) 25 mg tablet Take 25 mg by mouth 3 (three) times daily as needed (vertigo).    [] metroNIDAZOLE (FLAGYL) 500 MG tablet Take 1 tablet (500 mg total) by mouth 3 (three) times daily. for 14 days    valsartan (DIOVAN) 40 MG tablet Take 1 tablet (40 mg total) by mouth once daily.    albuterol (PROVENTIL/VENTOLIN HFA) 90 mcg/actuation inhaler Inhale 2 puffs into the lungs every 4 (four) hours as needed for Wheezing (cough or wheezing). Rescue    albuterol-ipratropium (DUO-NEB) 2.5 mg-0.5 mg/3 mL nebulizer solution every 6 (six) hours as needed for Wheezing.    famotidine (PEPCID) 20 MG tablet Take 1 tablet (20 mg total) by mouth nightly as needed for Heartburn (heartburn).    nitroGLYCERIN (NITROSTAT) 0.4 MG SL tablet Place 1 tablet (0.4 mg total) under the tongue every 5 (five) minutes as needed for Chest pain (angina).     Antibiotics (From admission, onward)      Start     Stop Route Frequency Ordered    10/16/24 0900  ertapenem (INVANZ) 1 g in 0.9% NaCl 100 mL IVPB (MB+)         -- IV Every 24 hours (non-standard times) 10/15/24 1514          Antifungals (From admission, onward)      None          Antivirals (From admission, onward)      None             Immunization History   Administered Date(s) Administered    COVID-19, MRNA, LN-S, PF (Pfizer) (Purple Cap) 2021, 2021    Tdap 2019       Family History       Problem Relation (Age of Onset)    Liver disease Mother          Social History     Socioeconomic History    Marital status: Single   Tobacco Use    Smoking status: Never    Smokeless tobacco: Never   Substance and Sexual Activity    Alcohol use: No    Drug use: No     Social Drivers of Health     Financial Resource Strain: Medium Risk (10/10/2024)    Overall Financial Resource Strain (CARDIA)     Difficulty of Paying Living Expenses: Somewhat hard   Food  Insecurity: No Food Insecurity (10/10/2024)    Hunger Vital Sign     Worried About Running Out of Food in the Last Year: Never true     Ran Out of Food in the Last Year: Never true   Recent Concern: Food Insecurity - Food Insecurity Present (9/17/2024)    Hunger Vital Sign     Worried About Running Out of Food in the Last Year: Sometimes true     Ran Out of Food in the Last Year: Sometimes true   Transportation Needs: No Transportation Needs (10/10/2024)    TRANSPORTATION NEEDS     Transportation : No   Physical Activity: Inactive (9/17/2024)    Exercise Vital Sign     Days of Exercise per Week: 0 days     Minutes of Exercise per Session: 0 min   Stress: No Stress Concern Present (10/10/2024)    [employer] [place] [employer] - Occupational Stress Questionnaire     Feeling of Stress : Not at all   Housing Stability: Low Risk  (10/10/2024)    Housing Stability Vital Sign     Unable to Pay for Housing in the Last Year: No     Homeless in the Last Year: No     Review of Systems   Constitutional:  Negative for chills and fever.   Respiratory:  Negative for shortness of breath.    Cardiovascular:  Negative for chest pain.   Gastrointestinal:  Negative for abdominal pain.   Genitourinary:  Negative for dysuria.   Neurological:  Negative for headaches.   Psychiatric/Behavioral:  Negative for confusion.      Objective:     Vital Signs (Most Recent):  Temp: 98.4 °F (36.9 °C) (10/17/24 1133)  Pulse: 70 (10/17/24 1133)  Resp: 18 (10/17/24 1133)  BP: (!) 120/56 (10/17/24 1133)  SpO2: 98 % (10/17/24 1133) Vital Signs (24h Range):  Temp:  [96.9 °F (36.1 °C)-98.8 °F (37.1 °C)] 98.4 °F (36.9 °C)  Pulse:  [69-70] 70  Resp:  [16-18] 18  SpO2:  [96 %-98 %] 98 %  BP: (119-132)/(56-60) 120/56     Weight: 82.2 kg (181 lb 3.5 oz)  Body mass index is 36.6 kg/m².    Estimated Creatinine Clearance: 42.2 mL/min (based on SCr of 0.8 mg/dL).     Physical Exam  Vitals and nursing note reviewed.   Constitutional:       General: She is  not in acute distress.     Appearance: She is well-developed. She is obese.   HENT:      Head: Normocephalic and atraumatic.   Eyes:      Conjunctiva/sclera: Conjunctivae normal.   Neck:      Vascular: No JVD.   Cardiovascular:      Rate and Rhythm: Normal rate and regular rhythm.      Heart sounds: Normal heart sounds.   Pulmonary:      Effort: Pulmonary effort is normal.      Breath sounds: Normal breath sounds.   Abdominal:      General: Bowel sounds are normal. There is no distension.      Palpations: Abdomen is soft.      Tenderness: There is no abdominal tenderness.      Comments: +SUSANA drain  Thick light brown fluid in the bulb   Musculoskeletal:         General: Swelling (RUE) present.      Cervical back: Neck supple.      Right lower leg: No edema.      Left lower leg: No edema.   Neurological:      Mental Status: She is alert.   Psychiatric:         Behavior: Behavior normal.          Significant Labs: All pertinent labs within the past 24 hours have been reviewed.    Significant Imaging: I have reviewed all pertinent imaging results/findings within the past 24 hours.

## 2024-10-17 NOTE — PROGRESS NOTES
[facility] Medicine  Progress Note    Patient Name: Marycruz Perez  MRN: [id number]  Patient Class: IP- Inpatient   Admission Date: 10/7/2024  Length of Stay: 9 days  Attending Physician: Annamarie Lacy MD  Primary Care Provider: Melchor Menchaca -      Subjective:     Principal Problem:Diverticulitis of intestine with abscess without bleeding      HPI:  Is a 89-year-old female with history of CHF, hypertension, ppm brought to the emergency department by EMS for evaluation of shortness of breath with the chest pain.  Additionally patient has been very weak and unable to get up.  She reports she has had a asthma exacerbations in her chest pain was take during these episodes however she is unsure if it was asthma versus true chest pain.  Patient is accompanied by her daughter who is her  per patient request.  In ED , troponin 0.115, repeat 0.288.  Chest x-ray benign.  EKG shows paced rhythm  Patient was started on heparin drip in ED.  We will be admitted to Hospital Medicine will consult Cardiology for eval.    On review of chart patient also has recent drain placed for LR which is a percutaneous 14 Portuguese drain placed for intra-abdominal abscess secondary to diverticulitis.  During remains intact, draining minimal volume    Overview/Hospital Course:  CT showed new fluid collection     IR team placed a new drain 10/09     Abx changed to Ertapenem according to the fluid culture sens result  NATHEN resolved    Interval History: NAEON. No acute concerns at this time.     Review of Systems   Constitutional:  Negative for chills and fever.   Respiratory:  Negative for shortness of breath.    Cardiovascular:  Negative for chest pain.   Gastrointestinal:  Negative for abdominal pain.   Genitourinary:  Negative for dysuria.   Neurological:  Negative for headaches.   Psychiatric/Behavioral:  Negative for confusion.      Objective:     Vital Signs (Most Recent):  Temp: 97.8 °F (36.6 °C)  (10/16/24 1113)  Pulse: 72 (10/16/24 1113)  Resp: 18 (10/16/24 1113)  BP: (!) 116/55 (10/16/24 1113)  SpO2: 96 % (10/16/24 1113) Vital Signs (24h Range):  Temp:  [97.7 °F (36.5 °C)-98 °F (36.7 °C)] 97.8 °F (36.6 °C)  Pulse:  [69-74] 72  Resp:  [16-20] 18  SpO2:  [94 %-99 %] 96 %  BP: (107-126)/(52-59) 116/55     Weight: 82.2 kg (181 lb 3.5 oz)  Body mass index is 36.6 kg/m².    Intake/Output Summary (Last 24 hours) at 10/16/2024 1509  Last data filed at 10/16/2024 1336  Gross per 24 hour   Intake 300 ml   Output 135 ml   Net 165 ml         Physical Exam  Vitals and nursing note reviewed.   Constitutional:       General: She is not in acute distress.     Appearance: She is well-developed. She is obese.   HENT:      Head: Normocephalic and atraumatic.   Eyes:      Conjunctiva/sclera: Conjunctivae normal.   Neck:      Vascular: No JVD.   Cardiovascular:      Rate and Rhythm: Normal rate and regular rhythm.      Heart sounds: Normal heart sounds.   Pulmonary:      Effort: Pulmonary effort is normal.      Breath sounds: Normal breath sounds.   Abdominal:      General: Bowel sounds are normal. There is no distension.      Palpations: Abdomen is soft.      Tenderness: There is no abdominal tenderness.      Comments: +SUSANA drain   Musculoskeletal:         General: Swelling (RUE; improved) present.      Cervical back: Neck supple.      Right lower leg: No edema.      Left lower leg: No edema.   Neurological:      Mental Status: She is alert.   Psychiatric:         Behavior: Behavior normal.           Significant Labs: All pertinent labs within the past 24 hours have been reviewed.    Significant Imaging: I have reviewed all pertinent imaging results/findings within the past 24 hours.      Assessment/Plan:      * Diverticulitis of intestine with abscess without bleeding  Patient now with percutaneous 14 Slovenian during placed on previous admission.  Drain remains in place    CT showed new fluid collection    IR team placed a new  drain 10/09    Abx were changed to Ertapenem according to the fluid culture sens result , anticipate at least 2 weeks of iv abx    Repeat CTAP 10/15-- no new fluid collections.   Significantly decreased size of the fluid and air collections adjacent to the sigmoid colon and in the left presacral space, post pelvic drainage catheter placement.  2. Urinary bladder wall thickening and perivesicular fat stranding, suspicious for cystitis, recommend correlation with urinalysis.  A colovesical fistula is not excluded    Minimal SUSANA drainage-- repeat CAP stable 10/15  Increased drain output 10/16. General surgery re evaluated her-- patient to DC with drain  ID consulted:   -would continue ertapenem for until 10/29 and then repeat CT for abscess resolution  -would stop ertapenem if abscesses are gone at that time  -if still present would continue ertapenem x 2 more weeks   -needs weekly cbc and cmp while on outpatient antibiotics faxed to [phone]    Continue drain care    Chronic diastolic congestive heart failure  Patient has Diastolic (HFpEF) heart failure that is Chronic. On presentation their CHF was well compensated.   Latest ECHO  Results for orders placed during the hospital encounter of 10/07/24    Echo    Interpretation Summary    Left Ventricle: The left ventricle is normal in size. Moderately increased wall thickness. There is moderate concentric hypertrophy. Mild global hypokinesis present. There is reduced systolic function. Biplane (2D) method of discs ejection fraction is 45%. Diastolic function cannot be reliably determined in the presence of mitral annular calcification.    Right Ventricle: Normal right ventricular cavity size. Systolic function is normal.    Aortic Valve: The aortic valve is a trileaflet valve. Severely calcified cusps. There is mild to moderate stenosis. Aortic valve area by VTI is 0.9 cm². Aortic valve peak velocity is 2.1 m/s. Mean gradient is 11.4 mmHg. The dimensionless index is  0.28.    Mitral Valve: Moderately calcified leaflets. There is severe mitral annular calcification present. There is mild stenosis. mean gradient og 5 mmHg at 69 bpm There is mild regurgitation.    Tricuspid Valve: There is mild regurgitation.    Pulmonary Artery: The estimated pulmonary artery systolic pressure is 33 mmHg.    IVC/SVC: Normal venous pressure at 3 mmHg.    Current Heart Failure Medications   none    Plan  - Monitor strict I&Os and daily weights.    - Place on telemetry  - Low sodium diet  - Place on fluid restriction of 2 L.   - Cardiology has been consulted  - The patient's volume status is at their baseline  - cards recs:  -  on admission; lying flat with no acute distress; sats 94-96% on 2LPM; CXR reviewed  - on Coreg Bumex, Imdur and Diovan as an outpatient- antihypertensive regimen resumption as detailed under HTN  - no ADHF        Chest pain  demand ischemia     Troponin trend flat  EKG with paced rhythm.  Cardiology signed off     Essential hypertension  Patients blood pressure range in the last 24 hours was: BP  Min: 89/52  Max: 133/59.The patient's inpatient anti-hypertensive regimen is listed below:  Current Antihypertensives       Plan  - Off antihypertensive's at this time. Resume as tolerated.     Cardiac pacemaker  EKG paced rhythm        VTE Risk Mitigation (From admission, onward)           Ordered     IP VTE HIGH RISK PATIENT  Once         10/08/24 0222     Place sequential compression device  Until discontinued         10/08/24 0222                    Discharge Planning   ANA: 10/17/2024     Code Status: DNR   Is the patient medically ready for discharge?:     Reason for patient still in hospital (select all that apply): Patient trending condition, Treatment, and Pending disposition  Discharge Plan A: Skilled Nursing Facility ([facility])          Annamarie Lacy MD  Department of Hospital Medicine   [place] [facility]

## 2024-10-17 NOTE — PLAN OF CARE
Problem: Adult Inpatient Plan of Care  Goal: Plan of Care Review  Outcome: Progressing     Problem: Adult Inpatient Plan of Care  Goal: Optimal Comfort and Wellbeing  Outcome: Progressing     Problem: Hospitalized Older Adult  Goal: Optimal Cognitive Function  Outcome: Progressing     Problem: Gas Exchange Impaired  Goal: Optimal Gas Exchange  Outcome: Progressing     Problem: Infection  Goal: Absence of Infection Signs and Symptoms  Outcome: Progressing       .Plan of care reviewed with the patient. Scheduled medicines given and the patient tolerated well. Fall and safety precautions taken and the standard interventions are in place. No acute distress reported on the shift. On Oxygen 1L and satting well. Advised the patient to call for  assistance. Continued monitoring the patient.

## 2024-10-17 NOTE — PLAN OF CARE
Problem: Physical Therapy  Goal: Physical Therapy Goal  Description: Goals to be met by: 11/15/2024     Patient will increase functional independence with mobility by performin. Supine to sit with Moderate Assistance  2. Sit to supine with Moderate Assistance  3. Rolling with Moderate Assistance.  4. Sit to stand transfer with Moderate Assistance using Rolling Walker  5. Bed to chair transfer with Moderate Assistance using Rolling Walker  6. Gait  x 10 feet with Moderate Assistance using Rolling Walker.   Amend goals as appropriate  Outcome: Progressing

## 2024-10-17 NOTE — HPI
89-year-old female with history of CHF, hypertension, and ppm brought to the emergency department by EMS for evaluation of shortness of breath with the chest pain. Patient was started on heparin drip in ED. ID is consulted because patient has recent drain placed for intra-abdominal abscess secondary to diverticulitis. She was on cipro and flagyl for this. CT showed new fluid collection. IR team placed a new drain 10/09 and antibiotics were changed to ertapenem due to ESBL e coli on cx. CT done on 10/15 showed improvement in abscesses. Drain remains

## 2024-10-17 NOTE — PLAN OF CARE
"0900  Updated notes & question if daughter signed admission paperwork sent to [facility] via [employer]. Awaiting response.     Message sent to the schedulers requesting a gen surg hospfu appt in 2 weeks. Awaiting response.     1040  CM received a call from Verito houser/[facility] questioning if the pt is medically stable to dc. Admission paperwork has not been completed & ins auth will need to be resubmitted. Message sent to Dr Lacy informing of above.        10/17/24 1100   Rounds   Attendance Provider;Nurse    Discharge Plan A Skilled Nursing Facility  ([facility])   Why the patient remains in the hospital Requires continued medical care   Transition of Care Barriers Transportation     1100  CM was informed by Dr Lacy that the pt is not medically stable to discharge today, that ID is being consulted regarding IV abx needed following discharge, & that the pt will likely be medically stable to dc tomorrow.     CM was informed by  Ami of a hospfu appt scheduled for the pt with Dr Washington (gen surg) on 10/31/2024 at 1100. Information added to the pt's discharge paperwork.     1325  CM informed Lynne of the pt's discharge status, requested that ins auth be submitted, & admission paperwork completed.     1340  Per ID note, "-would continue ertapenem for until 10/29 and then repeat CT for abscess resolution  -would stop ertapenem if abscesses are gone at that time  -if still present would continue ertapenem x 2 more weeks   -needs weekly cbc and cmp while on outpatient antibiotics faxed to [phone]".    Patient resting quietly in bed with daughter, Lily Ventura([phone]), at the bedside when CM rounded via [employer]. CM informed Lily of above & told her to expect a call from Verito to sign admission paperwork prior to the pt's dc tomorrow. Daughter verbalized understanding.         Will continue to follow.   "

## 2024-10-18 VITALS
TEMPERATURE: 98 F | HEART RATE: 70 BPM | BODY MASS INDEX: 36.53 KG/M2 | SYSTOLIC BLOOD PRESSURE: 139 MMHG | DIASTOLIC BLOOD PRESSURE: 60 MMHG | OXYGEN SATURATION: 99 % | WEIGHT: 181.19 LBS | RESPIRATION RATE: 18 BRPM | HEIGHT: 59 IN

## 2024-10-18 PROCEDURE — 99900035 HC TECH TIME PER 15 MIN (STAT)

## 2024-10-18 PROCEDURE — 27000221 HC OXYGEN, UP TO 24 HOURS

## 2024-10-18 PROCEDURE — 25000003 PHARM REV CODE 250: Performed by: FAMILY MEDICINE

## 2024-10-18 PROCEDURE — 63600175 PHARM REV CODE 636 W HCPCS: Performed by: FAMILY MEDICINE

## 2024-10-18 PROCEDURE — 97530 THERAPEUTIC ACTIVITIES: CPT

## 2024-10-18 PROCEDURE — 94761 N-INVAS EAR/PLS OXIMETRY MLT: CPT

## 2024-10-18 PROCEDURE — 97530 THERAPEUTIC ACTIVITIES: CPT | Mod: CO

## 2024-10-18 PROCEDURE — A4216 STERILE WATER/SALINE, 10 ML: HCPCS | Performed by: INTERNAL MEDICINE

## 2024-10-18 PROCEDURE — 25000003 PHARM REV CODE 250: Performed by: INTERNAL MEDICINE

## 2024-10-18 RX ORDER — ACETAMINOPHEN 325 MG/1
650 TABLET ORAL EVERY 6 HOURS PRN
Start: 2024-10-18

## 2024-10-18 RX ORDER — TAMSULOSIN HYDROCHLORIDE 0.4 MG/1
0.4 CAPSULE ORAL DAILY
Start: 2024-10-19 | End: 2025-10-19

## 2024-10-18 RX ADMIN — TAMSULOSIN HYDROCHLORIDE 0.4 MG: 0.4 CAPSULE ORAL at 09:10

## 2024-10-18 RX ADMIN — ERTAPENEM 1 G: 1 INJECTION INTRAMUSCULAR; INTRAVENOUS at 09:10

## 2024-10-18 RX ADMIN — SODIUM CHLORIDE, PRESERVATIVE FREE 10 ML: 5 INJECTION INTRAVENOUS at 09:10

## 2024-10-18 NOTE — NURSING
Patient to discharge to [facility].  VN to be available as needed    Update:     Request Accepted  Dez Sheets       Call from [facility]   [No callback number listed]   Entered By: Dez Sheets   3 hr delay - E[place] 1940        Transport Dispatched Marked complete for [facility]

## 2024-10-18 NOTE — PLAN OF CARE
"0815  Message sent to Verito houser/[facility] requesting to be notified when ins auth has been obtained & admission paperwork completed. Awaiting response.       10/18/24 1015   Rounds   Attendance Nurse ;Provider   Discharge Plan A Skilled Nursing Facility  ([facility])   Transition of Care Barriers Transportation     1015  CM was informed by Dr Lacy that the pt is medically stable to discharge to [facility] today. CM requested SNF orders.     1210  Pt resting quietly in bed when CM rounded. No family present. Pox 98% on 1L O2 via [place] this AM. RUE midline remains intact. Transportation packet left at the nurse's station.     1255  Message sent to Dr Lacy requesting SNF orders. Awaiting response.     1345  CM was informed by Verito that ins auth has been obtained & that the pt's daughter is en route to the SNF to sign admission paperwork. Message sent to Dr Lacy requesting SNF orders. Awaiting response.     1430  Preliminary SNF orders sent to [facility] via [employer]. Message sent to Dr Lacy informing of dxs that need to be added to each medication. Awaiting response.     1450  Signed "Pt Choice" form placed in the pt's chart.     1515  SNF orders sent to [facility] via [employer]. Awaiting room assignment & phone # to call report.    1520  Out-Pt CT scheduled for 11/1/2024 at 0945. Information added to the pt's discharge paperwork. Message sent to [facility] informing of above & information added to the pt's discharge paperwork.     1545  CM was informed by Verito that the SNF orders were not received. SNF orders resent via [employer]. Awaiting response.     1625  CM was informed by Verito that the pt will be accepted for admission to [facility] room 135A & requested that report be called to [phone] after 1700. Ambulance transportation scheduled for the pt with requested pickup at 1730.     1635  Message sent to nurse Lana, charge nurse Helen, & " virtual nurse Mica informing that the pt is cleared to discharge, of ambulance transportation scheduled, & requesting that Lana call report.       Will continue to follow.

## 2024-10-18 NOTE — PLAN OF CARE
Problem: Occupational Therapy  Goal: Occupational Therapy Goal  Description: Goals to be met by: 11/15/2024     Patient will increase functional independence with ADLs by performing:    Feeding with Set-up Assistance.  Grooming while seated with Set-up Assistance.  Sitting at edge of bed x5 minutes with Minimal Assistance.  Rolling to Bilateral with Moderate Assistance.   Supine to sit with Moderate Assistance.    Outcome: Progressing   Marycruz Perez is a 89 y.o. female with a medical diagnosis of Diverticulitis of intestine with abscess without bleeding.  Performance deficits affecting function are weakness, impaired endurance, impaired self care skills, impaired functional mobility, gait instability, impaired balance, decreased upper extremity function, decreased lower extremity function, pain, decreased ROM, impaired coordination, impaired fine motor, impaired skin, edema, impaired cardiopulmonary response to activity, decreased safety awareness.    Pt found in bed, agreeable to therapy. Pt with fair tolerance of therapy 2/2 impaired strength and endurance. Continue OT services to address functional goals, progressing as able.

## 2024-10-18 NOTE — PLAN OF CARE
[facility]  Facility Transfer Orders        Admit to: SNF    Diagnoses:   Active Hospital Problems    Diagnosis  POA    *Diverticulitis of intestine with abscess without bleeding [K57.80]  Yes    Chronic diastolic congestive heart failure [I50.32]  Yes    Chest pain [R07.9]  Yes    Cardiac pacemaker [Z95.0]  Yes    Essential hypertension [I10]  Yes      Resolved Hospital Problems    Diagnosis Date Resolved POA    Urinary retention [R33.9] 10/15/2024 No             NSTEMI (non-ST elevated myocardial infarction) [I21.4] 10/09/2024 Yes    HFrEF (heart failure with reduced ejection fraction) [I50.20] 10/15/2024 Yes     Allergies:   Review of patient's allergies indicates:   Allergen Reactions    Codeine     Latex, natural rubber     Penicillins     Penicillin Rash       Code Status: DNR    Vitals: Routine       Diet: cardiac diet    Activity: Activity as tolerated    Nursing Precautions: Fall and Pressure ulcer prevention    Bed/Surface: Pressure Redistribution    Consults: PT to evaluate and treat- 5 times a week and OT to evaluate and treat- 5 times a week    Oxygen: room air    Dialysis: Patient is not on dialysis.      Pending Diagnostic Studies:       None          Imaging: repeat CT for abscess resolution 10/30  -would stop ertapenem if abscesses are gone at that time  -if still present would continue ertapenem x 2 more weeks    Miscellaneous Care:   Wound Care:  drain care    -Sacrum:Change foam dressing twice weekly: Remove current foam dressing, cleanse area with normal saline, apply silicone bordered foam dressing    -continue flushing the drain with 10 mL normal saline twice daily.      PICC Line Care: PICC Line Use/Care Instructions:  Scrub the Hub: Prior to accessing the line, always perform a 30 second alcohol scrub  Each lumen of the central line is to be flushed at least daily with 10 mL Normal Saline and 3 mL Heparin flush (10 units/mL)  Skilled Nurse (SN) may draw blood from IV access  Blood  Draw Procedure:  - Aspirate at least 5 mL of blood  - Discard  - Obtain specimen  - Change injection cap  - Flush with 20 mL Normal Saline followed by a 3-5 mL Heparin flush (10 units/mL)    Central :  - Sterile dressing changes are done weekly and as needed.  - Use chlor-hexadine scrub to cleanse site, apply Biopatch to insertion site, apply securement device dressing  - Injection caps are changed weekly and after EVERY lab draw.  - If sterile gauze is under dressing to control oozing, dressing change must be performed every 24 hours until gauze is not needed.      IV Access: Mid-line     Medications: Discontinue all previous medication orders, if any. See new list below.  Current Discharge Medication List        START taking these medications    Details   0.9% NaCl PgBk 100 mL with ertapenem 1 gram SolR 1 g Inject 1 g into the vein once. Stop 10/30 for 1 dose      acetaminophen (TYLENOL) 325 MG tablet Take 2 tablets (650 mg total) by mouth every 6 (six) hours as needed for Pain.      tamsulosin (FLOMAX) 0.4 mg Cap Take 1 capsule (0.4 mg total) by mouth once daily.           CONTINUE these medications which have NOT CHANGED    Details   aspirin 81 MG Chew Take 1 tablet (81 mg total) by mouth once daily.  Qty: 30 tablet, Refills: 2      diclofenac sodium (VOLTAREN) 1 % Gel Apply 4 g topically 3 (three) times daily as needed.      ezetimibe (ZETIA) 10 mg tablet Take 1 tablet (10 mg total) by mouth once daily.  Qty: 90 tablet, Refills: 3    Associated Diagnoses: Hyperlipidemia, unspecified hyperlipidemia type      meclizine (ANTIVERT) 25 mg tablet Take 25 mg by mouth 3 (three) times daily as needed (vertigo).      albuterol (PROVENTIL/VENTOLIN HFA) 90 mcg/actuation inhaler Inhale 2 puffs into the lungs every 4 (four) hours as needed for Wheezing (cough or wheezing). Rescue  Qty: 18 g, Refills: 11      albuterol-ipratropium (DUO-NEB) 2.5 mg-0.5 mg/3 mL nebulizer solution every 6 (six) hours as needed  for Wheezing.      famotidine (PEPCID) 20 MG tablet Take 1 tablet (20 mg total) by mouth nightly as needed for Heartburn (heartburn).  Qty: 90 tablet, Refills: 3      nitroGLYCERIN (NITROSTAT) 0.4 MG SL tablet Place 1 tablet (0.4 mg total) under the tongue every 5 (five) minutes as needed for Chest pain (angina).  Qty: 30 tablet, Refills: 1           STOP taking these medications       bumetanide (BUMEX) 0.5 MG Tab Comments:   Reason for Stopping:         carvediloL (COREG) 6.25 MG tablet Comments:   Reason for Stopping:         ciprofloxacin HCl (CIPRO) 500 MG tablet Comments:   Reason for Stopping:         isosorbide mononitrate (IMDUR) 30 MG 24 hr tablet Comments:   Reason for Stopping:         metroNIDAZOLE (FLAGYL) 500 MG tablet Comments:   Reason for Stopping:         valsartan (DIOVAN) 40 MG tablet Comments:   Reason for Stopping:             Follow up:    Follow-up Information       Yony Washington MD Follow up on 10/31/2024.    Specialty: General Surgery  Why: at 11:00 AM; general surgery hospital follow up appointment  Contact information:  [address]  [address]  [facility] [address]  [phone]                             Immunizations Administered as of 10/18/2024       Name Date Dose VIS Date Route Exp Date    COVID-19, MRNA, LN-S, PF (Pfizer) (Purple Cap) 4/23/2021  9:31 AM 0.3 mL 12/12/2020 Intramuscular 7/31/2021    Site: Right deltoid     Given By: Chantal Alvarez     : [employer]     Lot: [id number]     COVID-19, MRNA, LN-S, PF (Pfizer) (Purple Cap) 4/2/2021 11:45 AM 0.3 mL 12/12/2020 Intramuscular 7/31/2021    Site: Left deltoid     Given By: Laura Mensah MA     : [employer]     Lot: [id number]             Annamarie Lacy MD

## 2024-10-18 NOTE — PT/OT/SLP PROGRESS
Occupational Therapy   Treatment    Name: Marycruz Perez  MRN: [id number]  Admitting Diagnosis:  Diverticulitis of intestine with abscess without bleeding       Recommendations:     Discharge Recommendations: Moderate Intensity Therapy  Discharge Equipment Recommendations:  to be determined by next level of care  Barriers to discharge:  Other (Comment) (Increased level of assistance)    Assessment:     Marycruz Perez is a 89 y.o. female with a medical diagnosis of Diverticulitis of intestine with abscess without bleeding.  Performance deficits affecting function are weakness, impaired endurance, impaired self care skills, impaired functional mobility, gait instability, impaired balance, decreased upper extremity function, decreased lower extremity function, pain, decreased ROM, impaired coordination, impaired fine motor, impaired skin, edema, impaired cardiopulmonary response to activity, decreased safety awareness.    Pt found in bed, agreeable to therapy. Pt with fair tolerance of therapy 2/2 impaired strength and endurance. Continue OT services to address functional goals, progressing as able.      Rehab Prognosis:  Fair; patient would benefit from acute skilled OT services to address these deficits and reach maximum level of function.       Plan:     Patient to be seen 3 x/week to address the above listed problems via self-care/home management, therapeutic activities, therapeutic exercises  Plan of Care Expires: 11/15/24  Plan of Care Reviewed with: patient, daughter    Subjective     Chief Complaint: weakness, dizzy   Patient/Family Comments/goals: to return to Prior level of functioning   Pain/Comfort:  Pain Rating 1:  (BLE pain during ROM with PT-did not rate)    Objective:     Communicated with: nurse prior to session.  Patient found HOB elevated with bed alarm, peripheral IV, telemetry, PureWick, oxygen upon OT entry to room.    General Precautions: Standard, fall, contact    Orthopedic  Precautions:N/A  Braces: N/A  Respiratory Status: Nasal cannula     Occupational Performance:     Bed Mobility:    Patient completed Rolling/Turning to Left with  maximal assistance w/ BR  Patient completed Rolling/Turning to Right with maximal assistance w/ BR  Patient completed Scooting/Bridging with dependent and 2 persons, supine to HOB, bed in trendelenburg   Patient completed Supine to Sit with maximal assistance and 2 persons, HOB elevated, increased time and effort, vc's for effective technique  Patient completed Sit to Supine with maximal assistance and 2 persons     Functional Mobility/Transfers:  Deferred 2/2 unable to safely attempt stand.      Activities of Daily Living:  Lower Body Dressing: dependence don/dof socks   Toileting: dependent assistance       [facility] 6 Click ADL: 10    Treatment & Education:  Pt requires Max A x 2 to scoot seated to EOB.   Pt sat EOB ~15 min with Max A x 2 2/2 forward lean and unable to maintain midline, upright posture, and head extension. Pt's feet unable to reach floor. Rec use of step placed on floor next session in order for pt to have B feet on floor for stability vs Violeta Stedy use.   Pt reports feeling dizzy while seated EOB.  BP assessed and stable.   Pt placed in chair position at end of session.  Dtr present and nurse notified.   Encouraged pt and pts dtr to allow pt to assist with self feeding and simple g/h tasks.  Instructed to prop dominate UE on pillow during task to compensate for shld weakness.   B ZFlex boots placed. Significant edema noted in BLEs and BUEs. RUE propped on pillow.     Patient left  bed in chair position   with  nurse notified and dtr present    GOALS:   Multidisciplinary Problems       Occupational Therapy Goals          Problem: Occupational Therapy    Goal Priority Disciplines Outcome Interventions   Occupational Therapy Goal     OT, PT/OT Progressing    Description: Goals to be met by: 11/15/2024     Patient will increase functional  independence with ADLs by performing:    Feeding with Set-up Assistance.  Grooming while seated with Set-up Assistance.  Sitting at edge of bed x5 minutes with Minimal Assistance.  Rolling to Bilateral with Moderate Assistance.   Supine to sit with Moderate Assistance.                         Time Tracking:     OT Date of Treatment: 10/18/24  OT Start Time: 0951  OT Stop Time: 1032  OT Total Time (min): 41 min    Billable Minutes:Therapeutic Activity 41            10/18/2024

## 2024-10-18 NOTE — PLAN OF CARE
Problem: Adult Inpatient Plan of Care  Goal: Plan of Care Review  Outcome: Progressing     Problem: Adult Inpatient Plan of Care  Goal: Optimal Comfort and Wellbeing  Outcome: Progressing     Problem: Gas Exchange Impaired  Goal: Optimal Gas Exchange  Outcome: Progressing     Problem: Heart Failure  Goal: Optimal Coping  Outcome: Progressing     .Plan of care reviewed with the patient. Given Tylenol 650 mg PO for generalized body ache. Fall and safety precautions taken and the standard interventions are in place.  On Oxygen 1L and satting well. Advised the patient to call for assistance. Continued monitoring the patient.

## 2024-10-18 NOTE — PLAN OF CARE
Problem: Physical Therapy  Goal: Physical Therapy Goal  Description: Goals to be met by: 11/15/2024     Patient will increase functional independence with mobility by performin. Supine to sit with Moderate Assistance  2. Sit to supine with Moderate Assistance  3. Rolling with Moderate Assistance.  4. Sit to stand transfer with Moderate Assistance using Rolling Walker  5. Bed to chair transfer with Moderate Assistance using Rolling Walker  6. Gait  x 10 feet with Moderate Assistance using Rolling Walker.   Amend goals as appropriate  Outcome: Progressing   Patient tolerated session with fair tolerance; maxA x 2 to maintain sitting EOB 2/2 forward lean; pt sat~15 min working on postural control, static posture; will progress as able to standing either with placing a step for patient to stand on or with use of Violeta Stedy standing device as tolerated; will cont with POC

## 2024-10-18 NOTE — PT/OT/SLP PROGRESS
Physical Therapy Treatment    Patient Name:  Marycruz Perez   MRN:  [id number]    Recommendations:     Discharge Recommendations: Moderate Intensity Therapy  Discharge Equipment Recommendations: to be determined by next level of care  Barriers to discharge:  increased level of assistance required    Assessment:     Marycruz Perez is a 89 y.o. female admitted with a medical diagnosis of Diverticulitis of intestine with abscess without bleeding.  She presents with the following impairments/functional limitations: weakness, impaired endurance, impaired self care skills, impaired functional mobility, gait instability, impaired balance, decreased ROM, pain, decreased safety awareness, decreased lower extremity function, decreased upper extremity function, edema, impaired skin, impaired fine motor, impaired cardiopulmonary response to activity Patient tolerated session with fair tolerance; maxA x 2 to maintain sitting EOB 2/2 forward lean; pt sat~15 min working on postural control, static posture; will progress as able to standing either with placing a step for patient to stand on or with use of Violeta Stedy standing device as tolerated; will cont with POC .    Rehab Prognosis: Fair; patient would benefit from acute skilled PT services to address these deficits and reach maximum level of function.    Recent Surgery: * No surgery found *      Plan:     During this hospitalization, patient to be seen 3 x/week to address the identified rehab impairments via gait training, therapeutic activities, therapeutic exercises, neuromuscular re-education and progress toward the following goals:    Plan of Care Expires:  11/14/24    Subjective     Chief Complaint: weakness/dizziness upon upright  Patient/Family Comments/goals: to return to PLOF  Pain/Comfort:  Pain Rating 1:  (R knee pain with ROM - did not rate)  Pain Addressed 1: Reposition, Distraction, Cessation of Activity  Pain Rating Post-Intervention 1: 0/10      Objective:      Communicated with nurse prior to session.  Patient found HOB elevated with bed alarm, peripheral IV, telemetry, PureWick, oxygen upon PT entry to room.     General Precautions: Standard, fall, contact  Orthopedic Precautions: N/A  Braces: N/A  Respiratory Status: Nasal cannula, flow 1 L/min     Functional Mobility:  Bed Mobility:     Rolling Left:  maximal assistance and use of side rail  Rolling Right: maximal assistance and use of side rail  Scooting: dependence, of 2 persons, supine to HOB with bed in trendelenburg  Supine to Sit: maximal assistance and of 2 persons  Sit to Supine: maximal assistance and of 2 persons      AM-PAC 6 CLICK MOBILITY  Turning over in bed (including adjusting bedclothes, sheets and blankets)?: 2  Sitting down on and standing up from a chair with arms (e.g., wheelchair, bedside commode, etc.): 1  Moving from lying on back to sitting on the side of the bed?: 2  Moving to and from a bed to a chair (including a wheelchair)?: 1  Need to walk in hospital room?: 1  Climbing 3-5 steps with a railing?: 1  Basic Mobility Total Score: 8       Treatment & Education:  -pt performed 2 x 15 reps APs, 2 x10 QS, 10 heel slides, 10 abd/add prior to sitting  -requires maxA x 2 to scoot to EOB  -pt reports some dizziness while seated EOB; BP taken and was stable  -pt sat at EOB ~15 min with maxA x 2 person assist 2/2 forward lean; pt unable to maintain upright midline seated posture  -will try to use step on floor for sitting stability or use of Violeta Stedy  -Bed placed in chair position at end of session  -pillow placed to prop RUE  -re-applied B Z flex boots    Patient left with bed in chair position with all lines intact, call button in reach, nurse notified, and dtr present..    GOALS:   Multidisciplinary Problems       Physical Therapy Goals          Problem: Physical Therapy    Goal Priority Disciplines Outcome Interventions   Physical Therapy Goal     PT, PT/OT Progressing    Description: Goals  to be met by: 11/15/2024     Patient will increase functional independence with mobility by performin. Supine to sit with Moderate Assistance  2. Sit to supine with Moderate Assistance  3. Rolling with Moderate Assistance.  4. Sit to stand transfer with Moderate Assistance using Rolling Walker  5. Bed to chair transfer with Moderate Assistance using Rolling Walker  6. Gait  x 10 feet with Moderate Assistance using Rolling Walker.   Amend goals as appropriate                       Time Tracking:     PT Received On: 10/18/24  PT Start Time: 951     PT Stop Time: 2  PT Total Time (min): 41 min     Billable Minutes: Therapeutic Activity 41       PT/PTA: PT     Number of PTA visits since last PT visit: 0     10/18/2024

## 2024-10-19 NOTE — PLAN OF CARE
[facility] - Telemetry  Discharge Final Note    Primary Care Provider: Melchor Menchaca -    Expected Discharge Date: 10/18/2024    Final Discharge Note (most recent)       Final Note - 10/19/24 0742          Final Note    Assessment Type Final Discharge Note (P)      Anticipated Discharge Disposition Skilled Nursing Facility (P)    [facility]    Hospital Resources/Appts/Education Provided Appointments scheduled and added to AVS (P)         Post-Acute Status    Post-Acute Authorization Placement (P)      Post-Acute Placement Status Set-up Complete/Auth obtained (P)    [facility]    Discharge Delays PFC Arranged Transportation (P)                      Contact Info       Yony Washington MD   Specialty: General Surgery    [address]  [address]  [place] [address]   Phone: [phone]       Next Steps: Follow up on 10/31/2024    Instructions: at 11:00 AM; general surgery hospital follow up appointment    [facility] [address] Limit 450 Lbs   Specialty: Radiology    [address]  [place] [address]       Next Steps: Follow up on 11/1/2024    Instructions: at 9:45AM; fast 4 hrs prior to test & arrive at 8:45 AM          10/21/2024 0748  Discharge summary faxed to [facility].

## 2024-10-19 NOTE — DISCHARGE SUMMARY
[facility]  [facility] Summary      Patient Name: Marycruz Perez  MRN: [id number]  RUSLAN: [id number]  Patient Class: IP- Inpatient  Admission Date: 10/7/2024  Hospital Length of Stay: 10 days  Discharge Date and Time: 10/18/2024  7:14 PM  Discharging Provider: Annamarie Layc MD  Primary Care Provider: Melchor Menchaca -      Primary Care Team: Networked reference to record PCT       HPI:   Is a 89-year-old female with history of CHF, hypertension, ppm brought to the emergency department by EMS for evaluation of shortness of breath with the chest pain.  Additionally patient has been very weak and unable to get up.  She reports she has had a asthma exacerbations in her chest pain was take during these episodes however she is unsure if it was asthma versus true chest pain.  Patient is accompanied by her daughter who is her  per patient request.  In ED , troponin 0.115, repeat 0.288.  Chest x-ray benign.  EKG shows paced rhythm  Patient was started on heparin drip in ED.  We will be admitted to Hospital Medicine will consult Cardiology for eval.    On review of chart patient also has recent drain placed for LR which is a percutaneous 14 Spanish drain placed for intra-abdominal abscess secondary to diverticulitis.  During remains intact, draining minimal volume      Hospital Course:   CT showed new fluid collection     IR team placed a new drain 10/09     Abx changed to Ertapenem according to the fluid culture sens result  NATHEN resolved  Echo with worsened EF (60s-- >40s), however, EF >45%. Cards consulted and recommended 2L fluid restriction. No acute decompensation at this time.   It was felt her SUSANA drain was putting out < 20 cc day, so repeat CTAP done with stable results. However, the next day she had increased drain OP. General surgery contacted to advise on the situation. They recommended keeping the drain in for discharge. ID consulted for final abx recs. PT/ OT rec SNF. CM  was able to secure this. She was discharged in stable condition.        Goals of Care Treatment Preferences:  Code Status: DNR      SDOH Screening:  The patient was screened for utility difficulties, food insecurity, transport difficulties, housing insecurity, and interpersonal safety and there were no concerns identified this admission.     Consults:   Consults (From admission, onward)          Status Ordering Provider     Inpatient consult to Infectious Diseases  Once        Provider:  (Not yet assigned)    Completed JOSE THOMPSON     Inpatient consult to Interventional Radiology  Once        Provider:  Leigh Bone PA-C    Completed JAVAD HEATH     Cardiology-[facility]  Once        Provider:  (Not yet assigned)    Completed KWAKU PABLO            Cardiac/Vascular  Chronic diastolic congestive heart failure  Stable     Chest pain  demand ischemia   Troponin trend flat  EKG with paced rhythm.    Essential hypertension  - Off antihypertensive's at this time. Resume as tolerated.     Cardiac pacemaker         GI  * Diverticulitis of intestine with abscess without bleeding  Patient now with percutaneous 14 Azeri drain placed on previous admission.       IR team placed a new drain 10/09    Abx were changed to Ertapenem according to the fluid culture sens result , anticipate at least 2 weeks of iv abx    Repeat CTAP 10/15-- no new fluid collections.   Significantly decreased size of the fluid and air collections adjacent to the sigmoid colon and in the left presacral space, post pelvic drainage catheter placement.  2. Urinary bladder wall thickening and perivesicular fat stranding, suspicious for cystitis, recommend correlation with urinalysis.  A colovesical fistula is not excluded       ID recs:   -would continue ertapenem for until 10/29 and then repeat CT for abscess resolution  -would stop ertapenem if abscesses are gone at that time  -if still present would continue ertapenem x 2 more  weeks   -needs weekly cbc and cmp while on outpatient antibiotics faxed to [phone]    Continue drain care      Final Active Diagnoses:    Diagnosis Date Noted POA    PRINCIPAL PROBLEM:  Diverticulitis of intestine with abscess without bleeding [K57.80] 09/16/2024 Yes    Chronic diastolic congestive heart failure [I50.32] 10/15/2024 Yes    Chest pain [R07.9] 10/27/2020 Yes    Cardiac pacemaker [Z95.0] 10/31/2019 Yes    Essential hypertension [I10] 10/31/2019 Yes      Problems Resolved During this Admission:    Diagnosis Date Noted Date Resolved POA    Urinary retention [R33.9] 10/11/2024 10/15/2024 No    NSTEMI (non-ST elevated myocardial infarction) [I21.4] 10/08/2024 10/09/2024 Yes    HFrEF (heart failure with reduced ejection fraction) [I50.20]  10/15/2024 Yes       Discharged Condition: stable    Disposition: Skilled Nursing Facility    Follow Up:   Follow-up Information       Yony Washington MD Follow up on 10/31/2024.    Specialty: General Surgery  Why: at 11:00 AM; general surgery hospital follow up appointment  Contact information:  [address] [facility]  [address] [place] [id number] [phone]               [facility] CT1 LIMIT 450 LBS Follow up on 11/1/2024.    Specialty: Radiology  Why: at 9:45AM; fast 4 hrs prior to test & arrive at 8:45 AM  Contact information:  [address] [facility] [address]                           Patient Instructions:      Diet Cardiac     Activity as tolerated       Significant Diagnostic Studies: Cardiac Graphics: Echocardiogram: Transthoracic echo (TTE) complete (Cupid Only):   Results for orders placed or performed during the hospital encounter of 10/07/24   Echo   Result Value Ref Range    BSA 1.83 m2    Garcia's Biplane MOD Ejection Fraction 45 %    A2C EF 48 %    A4C EF 36 %    LVOT stroke volume 43.0 cm3    LVIDd 3.7 3.5 - 6.0 cm    LV Systolic Volume 33.33 mL    LV Systolic Volume Index 19.0 mL/m2    LVIDs 2.9 2.1 - 4.0 cm    LV ESV A2C 102.58 mL    LV Diastolic  Volume 57.44 mL    LV ESV A4C 87.20 mL    LV Diastolic Volume Index 32.82 mL/m2    LV EDV A2C 48.841296088278118 mL    LV EDV A4C 46.38 mL    Left Ventricular End Systolic Volume by Teichholz Method 33.33 mL    Left Ventricular End Diastolic Volume by Teichholz Method 57.44 mL    IVS 1.2 (A) 0.6 - 1.1 cm    LVOT diameter 2.0 cm    LVOT area 3.1 cm2    FS 21.6 (A) 28 - 44 %    Left Ventricle Relative Wall Thickness 0.65 cm    PW 1.2 (A) 0.6 - 1.1 cm    LV mass 147.3 g    LV Mass Index 84.2 g/m2    MV Peak E Juan M 1.60 m/s    TDI LATERAL 0.06 m/s    TDI SEPTAL 0.04 m/s    E/E' ratio 32.00 m/s    MV Peak A Juan M 0.68 m/s    TR Max Juan M 2.73 m/s    E/A ratio 2.35     E wave deceleration time 254.19 msec    LV SEPTAL E/E' RATIO 40.00 m/s    LV LATERAL E/E' RATIO 26.67 m/s    LVOT peak juan m 0.6 m/s    Left Ventricular Outflow Tract Mean Velocity 0.52 cm/s    Left Ventricular Outflow Tract Mean Gradient 1.12 mmHg    RV S' 10.53 cm/s    TAPSE 1.59 cm    LA size 4.62 cm    LA Vol (MOD) 98.45 mL    ZORAN (MOD) 56.3 mL/m2    RA area length vol 24.87 mL    RA Area 12.8 cm2    RA Vol 25.16 mL    AV mean gradient 11.4 mmHg    AV peak gradient 17.6 mmHg    Ao peak juan m 2.1 m/s    Ao VTI 49.1 cm    LVOT peak VTI 13.7 cm    AV valve area 0.9 cm²    AV Velocity Ratio 0.29     AV index (prosthetic) 0.28     ELMIRA by Velocity Ratio 0.9 cm²    Mr max juan m 4.22 m/s    MV peak gradient 10 mmHg    MV stenosis pressure 1/2 time 73.71 ms    MV valve area p 1/2 method 2.98 cm2    MV valve area by continuity eq 1.10 cm2    MV VTI 39.1 cm    Triscuspid Valve Regurgitation Peak Gradient 30 mmHg    PV PEAK VELOCITY 0.99 m/s    PV peak gradient 4 mmHg    Sinus 3.26 cm    STJ 2.80 cm    Ascending aorta 2.84 cm    IVC diameter 1.87 cm    Mean e' 0.05 m/s    ZLVIDS -0.26     ZLVIDD -2.69     LA area A4C 27.26 cm2    LA area A2C 28.26 cm2    TV resting pulmonary artery pressure 33 mmHg    RV TB RVSP 6 mmHg    Est. RA pres 3 mmHg    Narrative      Left Ventricle:  The left ventricle is normal in size. Moderately   increased wall thickness. There is moderate concentric hypertrophy. Mild   global hypokinesis present. There is reduced systolic function. Biplane   (2D) method of discs ejection fraction is 45%. Diastolic function cannot   be reliably determined in the presence of mitral annular calcification.    Right Ventricle: Normal right ventricular cavity size. Systolic   function is normal.    Aortic Valve: The aortic valve is a trileaflet valve. Severely   calcified cusps. There is mild to moderate stenosis. Aortic valve area by   VTI is 0.9 cm². Aortic valve peak velocity is 2.1 m/s. Mean gradient is   11.4 mmHg. The dimensionless index is 0.28.    Mitral Valve: Moderately calcified leaflets. There is severe mitral   annular calcification present. There is mild stenosis. mean gradient og 5   mmHg at 69 bpm There is mild regurgitation.    Tricuspid Valve: There is mild regurgitation.    Pulmonary Artery: The estimated pulmonary artery systolic pressure is   33 mmHg.    IVC/SVC: Normal venous pressure at 3 mmHg.         Pending Diagnostic Studies:       None           Medications:  Reconciled Home Medications:      Medication List        START taking these medications      0.9% NaCl PgBk 100 mL with ertapenem 1 gram SolR 1 g  Inject 1 g into the vein Daily. Last dose 10/29     acetaminophen 325 MG tablet  Commonly known as: TYLENOL  Take 2 tablets (650 mg total) by mouth every 6 (six) hours as needed for Pain.     tamsulosin 0.4 mg Cap  Commonly known as: FLOMAX  Take 1 capsule (0.4 mg total) by mouth once daily.            CONTINUE taking these medications      albuterol 90 mcg/actuation inhaler  Commonly known as: PROVENTIL/VENTOLIN HFA  Inhale 2 puffs into the lungs every 4 (four) hours as needed for Wheezing (cough or wheezing). Rescue     albuterol-ipratropium 2.5 mg-0.5 mg/3 mL nebulizer solution  Commonly known as: DUO-NEB  every 6 (six) hours as needed for  Wheezing.     aspirin 81 MG Chew  Take 1 tablet (81 mg total) by mouth once daily.     diclofenac sodium 1 % Gel  Commonly known as: VOLTAREN  Apply 4 g topically 3 (three) times daily as needed.     ezetimibe 10 mg tablet  Commonly known as: ZETIA  Take 1 tablet (10 mg total) by mouth once daily.     famotidine 20 MG tablet  Commonly known as: PEPCID  Take 1 tablet (20 mg total) by mouth nightly as needed for Heartburn (heartburn).     meclizine 25 mg tablet  Commonly known as: ANTIVERT  Take 25 mg by mouth 3 (three) times daily as needed (vertigo).     nitroGLYCERIN 0.4 MG SL tablet  Commonly known as: NITROSTAT  Place 1 tablet (0.4 mg total) under the tongue every 5 (five) minutes as needed for Chest pain (angina).            STOP taking these medications      bumetanide 0.5 MG Tab  Commonly known as: BUMEX     carvediloL 6.25 MG tablet  Commonly known as: COREG     ciprofloxacin HCl 500 MG tablet  Commonly known as: CIPRO     isosorbide mononitrate 30 MG 24 hr tablet  Commonly known as: IMDUR     metroNIDAZOLE 500 MG tablet  Commonly known as: FLAGYL     valsartan 40 MG tablet  Commonly known as: DIOVAN              Indwelling Lines/Drains at time of discharge:   Lines/Drains/Airways       Drain  Duration                  Closed/Suction Drain 10/09/24 1713 Inferior;Medial Back Accordion 14 Fr. 9 days    Female External Urinary Catheter w/ Suction 10/10/24 0525 9 days                  Time spent on the discharge of patient: 33 minutes   Patient examined at bedside on day of discharge. Exam findings stable. She was deemed safe for discharge.        Annamarie Lacy MD  Department of Hospital Medicine  [facility]   yes by Dr López/yes

## 2024-10-22 LAB
OHS QRS DURATION: 166 MS
OHS QTC CALCULATION: 510 MS

## 2024-10-28 ENCOUNTER — TELEPHONE (OUTPATIENT)
Dept: HOME HEALTH SERVICES | Facility: CLINIC | Age: 89
End: 2024-10-28
Payer: MEDICARE

## 2024-10-31 ENCOUNTER — OFFICE VISIT (OUTPATIENT)
Dept: SURGERY | Facility: CLINIC | Age: 89
End: 2024-10-31
Payer: MEDICARE

## 2024-10-31 VITALS
TEMPERATURE: 98 F | SYSTOLIC BLOOD PRESSURE: 132 MMHG | RESPIRATION RATE: 20 BRPM | DIASTOLIC BLOOD PRESSURE: 82 MMHG | HEART RATE: 70 BPM

## 2024-10-31 DIAGNOSIS — K65.1 INTRA-ABDOMINAL ABSCESS: Primary | ICD-10-CM

## 2024-10-31 PROCEDURE — 1101F PT FALLS ASSESS-DOCD LE1/YR: CPT | Mod: CPTII,S$GLB,, | Performed by: SURGERY

## 2024-10-31 PROCEDURE — 1126F AMNT PAIN NOTED NONE PRSNT: CPT | Mod: CPTII,S$GLB,, | Performed by: SURGERY

## 2024-10-31 PROCEDURE — 1160F RVW MEDS BY RX/DR IN RCRD: CPT | Mod: CPTII,S$GLB,, | Performed by: SURGERY

## 2024-10-31 PROCEDURE — 1111F DSCHRG MED/CURRENT MED MERGE: CPT | Mod: CPTII,S$GLB,, | Performed by: SURGERY

## 2024-10-31 PROCEDURE — 99999 PR PBB SHADOW E&M-EST. PATIENT-LVL III: CPT | Mod: PBBFAC,,, | Performed by: SURGERY

## 2024-10-31 PROCEDURE — 1159F MED LIST DOCD IN RCRD: CPT | Mod: CPTII,S$GLB,, | Performed by: SURGERY

## 2024-10-31 PROCEDURE — 3288F FALL RISK ASSESSMENT DOCD: CPT | Mod: CPTII,S$GLB,, | Performed by: SURGERY

## 2024-10-31 PROCEDURE — 99214 OFFICE O/P EST MOD 30 MIN: CPT | Mod: S$GLB,,, | Performed by: SURGERY

## 2024-11-01 ENCOUNTER — HOSPITAL ENCOUNTER (OUTPATIENT)
Dept: RADIOLOGY | Facility: HOSPITAL | Age: 89
Discharge: HOME | End: 2024-11-01
Attending: PHYSICIAN ASSISTANT
Payer: MEDICARE

## 2024-11-01 DIAGNOSIS — K57.80 DIVERTICULITIS OF INTESTINE WITH ABSCESS WITHOUT BLEEDING, UNSPECIFIED PART OF INTESTINAL TRACT: ICD-10-CM

## 2024-11-01 PROCEDURE — 25500020 PHARM REV CODE 255: Performed by: PHYSICIAN ASSISTANT

## 2024-11-01 PROCEDURE — 74177 CT ABD & PELVIS W/CONTRAST: CPT | Mod: 26,,, | Performed by: RADIOLOGY

## 2024-11-01 PROCEDURE — 74177 CT ABD & PELVIS W/CONTRAST: CPT | Mod: TC

## 2024-11-01 RX ADMIN — IOHEXOL 30 ML: 350 INJECTION, SOLUTION INTRAVENOUS at 09:11

## 2024-11-01 RX ADMIN — IOHEXOL 75 ML: 350 INJECTION, SOLUTION INTRAVENOUS at 10:11

## 2024-11-13 ENCOUNTER — OFFICE VISIT (OUTPATIENT)
Dept: SURGERY | Facility: CLINIC | Age: 89
End: 2024-11-13
Payer: MEDICARE

## 2024-11-13 VITALS — HEART RATE: 84 BPM | DIASTOLIC BLOOD PRESSURE: 81 MMHG | SYSTOLIC BLOOD PRESSURE: 113 MMHG

## 2024-11-13 DIAGNOSIS — Z87.898 HX OF ABDOMINAL ABSCESS: Primary | ICD-10-CM

## 2024-11-13 PROCEDURE — 99999 PR PBB SHADOW E&M-EST. PATIENT-LVL III: CPT | Mod: PBBFAC,,, | Performed by: STUDENT IN AN ORGANIZED HEALTH CARE EDUCATION/TRAINING PROGRAM

## 2024-11-13 NOTE — PROGRESS NOTES
Patient ID: Marycruz Perez is a 89 y.o. female.    Chief Complaint: No chief complaint on file.      HPI:  HPI  89F recentl admitted for diverticular abscess s/p IR drain  Drain has been putting out only a small amount of clear fluid recently. Complains of back pain mostly around drain site. Eating well. No fevers. Denies ab pain  On ertapenem at SNF.    Review of Systems   Constitutional:  Negative for fever.   HENT:  Negative for trouble swallowing.    Respiratory:  Negative for shortness of breath.    Cardiovascular:  Negative for chest pain.   Gastrointestinal:  Negative for abdominal pain, blood in stool, nausea and vomiting.   Genitourinary:  Negative for dysuria.   Musculoskeletal:  Positive for gait problem.   Skin:  Negative for rash and wound.   Allergic/Immunologic: Negative for immunocompromised state.   Neurological:  Negative for weakness.   Hematological:  Does not bruise/bleed easily.   Psychiatric/Behavioral:  Negative for agitation.        Current Outpatient Medications   Medication Sig Dispense Refill    0.9% NaCl PgBk 100 mL with ertapenem 1 gram SolR 1 g Inject 1 g into the vein Daily. Last dose 10/29      acetaminophen (TYLENOL) 325 MG tablet Take 2 tablets (650 mg total) by mouth every 6 (six) hours as needed for Pain.      albuterol (PROVENTIL/VENTOLIN HFA) 90 mcg/actuation inhaler Inhale 2 puffs into the lungs every 4 (four) hours as needed for Wheezing (cough or wheezing). Rescue 18 g 11    albuterol-ipratropium (DUO-NEB) 2.5 mg-0.5 mg/3 mL nebulizer solution every 6 (six) hours as needed for Wheezing.      aspirin 81 MG Chew Take 1 tablet (81 mg total) by mouth once daily. 30 tablet 2    diclofenac sodium (VOLTAREN) 1 % Gel Apply 4 g topically 3 (three) times daily as needed.      ezetimibe (ZETIA) 10 mg tablet Take 1 tablet (10 mg total) by mouth once daily. 90 tablet 3    famotidine (PEPCID) 20 MG tablet Take 1 tablet (20 mg total) by mouth nightly as needed for Heartburn (heartburn). 90  tablet 3    meclizine (ANTIVERT) 25 mg tablet Take 25 mg by mouth 3 (three) times daily as needed (vertigo).      nitroGLYCERIN (NITROSTAT) 0.4 MG SL tablet Place 1 tablet (0.4 mg total) under the tongue every 5 (five) minutes as needed for Chest pain (angina). 30 tablet 1    tamsulosin (FLOMAX) 0.4 mg Cap Take 1 capsule (0.4 mg total) by mouth once daily.       No current facility-administered medications for this visit.     Facility-Administered Medications Ordered in Other Visits   Medication Dose Route Frequency Provider Last Rate Last Admin    vancomycin in dextrose 5 % 1 gram/250 mL IVPB 1,000 mg  1,000 mg Intravenous On Call Procedure Rocío Blevins, NP   1,000 mg at 02/22/21 0947       Review of patient's allergies indicates:   Allergen Reactions    Codeine     Latex, natural rubber     Penicillins     Penicillin Rash       Past Medical History:   Diagnosis Date    Acute exacerbation of CHF (congestive heart failure) 10/27/2020    NATHEN (acute kidney injury) 11/30/2023    Hypertension     Syncope and collapse        Past Surgical History:   Procedure Laterality Date    A-V CARDIAC PACEMAKER INSERTION N/A 2/22/2021    Procedure: INSERTION, CARDIAC PACEMAKER, DUAL CHAMBER;  Surgeon: Norman Foote MD;  Location: [facility] EP LAB;  Service: Cardiology;  Laterality: N/A;  AVB, PPM upgrade to Dual PPM (right sided), Bio, Venogram prior to draping, MAC, MN, 3 Prep    CARDIAC CATHETERIZATION      CARDIAC PACEMAKER PLACEMENT      CHOLECYSTECTOMY      CORONARY ANGIOPLASTY      HYSTERECTOMY      REVISION OF SKIN POCKET FOR PACEMAKER  2/22/2021    Procedure: Revision, Skin Pocket, For Cardiac Pacemaker;  Surgeon: Norman Foote MD;  Location: [facility] EP LAB;  Service: Cardiology;;       Social History     Socioeconomic History    Marital status: Single   Tobacco Use    Smoking status: Never    Smokeless tobacco: Never   Substance and Sexual Activity    Alcohol use: No    Drug use: No     Social Drivers of Health     Financial  Resource Strain: Medium Risk (10/10/2024)    Overall Financial Resource Strain (CARDIA)     Difficulty of Paying Living Expenses: Somewhat hard   Food Insecurity: No Food Insecurity (10/10/2024)    Hunger Vital Sign     Worried About Running Out of Food in the Last Year: Never true     Ran Out of Food in the Last Year: Never true   Recent Concern: Food Insecurity - Food Insecurity Present (9/17/2024)    Hunger Vital Sign     Worried About Running Out of Food in the Last Year: Sometimes true     Ran Out of Food in the Last Year: Sometimes true   Transportation Needs: No Transportation Needs (10/10/2024)    TRANSPORTATION NEEDS     Transportation : No   Physical Activity: Inactive (9/17/2024)    Exercise Vital Sign     Days of Exercise per Week: 0 days     Minutes of Exercise per Session: 0 min   Stress: No Stress Concern Present (10/10/2024)    [employer] [place] [employer] - Occupational Stress Questionnaire     Feeling of Stress : Not at all   Housing Stability: Low Risk  (10/10/2024)    Housing Stability Vital Sign     Unable to Pay for Housing in the Last Year: No     Homeless in the Last Year: No       Vitals:    11/13/24 1403   BP: 113/81   Pulse: 84       Physical Exam  Constitutional:       General: She is not in acute distress.     Appearance: She is well-developed.   HENT:      Head: Normocephalic and atraumatic.   Eyes:      General: No scleral icterus.  Cardiovascular:      Rate and Rhythm: Normal rate.   Pulmonary:      Effort: Pulmonary effort is normal.      Breath sounds: No stridor.   Abdominal:      General: There is no distension.      Palpations: Abdomen is soft.      Tenderness: There is no abdominal tenderness.   Lymphadenopathy:      Cervical: No cervical adenopathy.   Skin:     General: Skin is warm.      Findings: No erythema.   Neurological:      Mental Status: She is alert and oriented to person, place, and time.   Psychiatric:         Behavior: Behavior normal.     CT reviewed  from nov1. Small residual 2cm collection that is away from catheter tip.       Assessment & Plan:  89F with diverticular abscess s/p IR drain  Removed IR drain in office  Continue abx for now, residual collection should resolve with abx  Will repeat scan in a week, RTC in 2 weeks

## 2024-11-19 ENCOUNTER — TELEPHONE (OUTPATIENT)
Dept: GASTROENTEROLOGY | Facility: CLINIC | Age: 89
End: 2024-11-19
Payer: MEDICARE

## 2024-11-19 NOTE — TELEPHONE ENCOUNTER
Returned call. Scheduled clinic visit 1/27. V/U    ----- Message from Deepa sent at 11/19/2024  2:27 PM CST -----  .Type:  Needs Medical Advice    Who Called: Frances with [facility]    Would the patient rather a call back or a response via [facility]? Call back  Best Call Back Number:   Additional Information:     Frances stated she been calling since 11/13 to get pt scheduled and no one is calling back and would like a call back as soon as possible to get pt scheduled

## 2024-11-20 ENCOUNTER — HOSPITAL ENCOUNTER (OUTPATIENT)
Dept: RADIOLOGY | Facility: HOSPITAL | Age: 89
Discharge: HOME | End: 2024-11-20
Attending: STUDENT IN AN ORGANIZED HEALTH CARE EDUCATION/TRAINING PROGRAM
Payer: MEDICARE

## 2024-11-20 DIAGNOSIS — Z87.898 HX OF ABDOMINAL ABSCESS: ICD-10-CM

## 2024-11-20 PROCEDURE — 74177 CT ABD & PELVIS W/CONTRAST: CPT | Mod: 26,,, | Performed by: RADIOLOGY

## 2024-11-20 PROCEDURE — 74177 CT ABD & PELVIS W/CONTRAST: CPT | Mod: TC

## 2024-11-20 PROCEDURE — 25500020 PHARM REV CODE 255: Performed by: STUDENT IN AN ORGANIZED HEALTH CARE EDUCATION/TRAINING PROGRAM

## 2024-11-20 RX ADMIN — IOHEXOL 100 ML: 350 INJECTION, SOLUTION INTRAVENOUS at 03:11

## 2024-11-21 ENCOUNTER — TELEPHONE (OUTPATIENT)
Dept: SURGERY | Facility: CLINIC | Age: 89
End: 2024-11-21
Payer: MEDICARE

## 2024-11-21 NOTE — TELEPHONE ENCOUNTER
----- Message from Deepa sent at 11/21/2024  9:38 AM CST -----  .Type:  Needs Medical Advice    Who Called: Candice with [facility]    Would the patient rather a call back or a response via [facility]? Call back  Best Call Back Number:   Additional Information:     Candice stated pt had a follow up CT scan and daughter stated her antibiotics was extended and would like a call back for clarification            11/21/2024                   1540  Spoke to Candice regarding the above message. Let her know Dr. Crow did continue antibiotics for this patient. Candice stated she did not have an order for her to continue said medication. Faxed his progress note from 11/13/2024 to [phone]. Candice verbalized understanding.

## 2024-11-22 ENCOUNTER — TELEPHONE (OUTPATIENT)
Dept: SURGERY | Facility: CLINIC | Age: 89
End: 2024-11-22
Payer: MEDICARE

## 2024-11-22 NOTE — TELEPHONE ENCOUNTER
----- Message from Deepa sent at 11/22/2024 10:01 AM CST -----  .Type:  Needs Medical Advice    Who Called: Frances with [facility]     Would the patient rather a call back or a response via [facility]? Call back  Best Call Back Number:   Additional Information:     Frances would like a call back as soon as possible regarding the order for pt continued IV

## 2024-11-27 ENCOUNTER — OFFICE VISIT (OUTPATIENT)
Dept: SURGERY | Facility: CLINIC | Age: 89
End: 2024-11-27
Payer: MEDICARE

## 2024-11-27 VITALS — HEART RATE: 114 BPM | DIASTOLIC BLOOD PRESSURE: 74 MMHG | SYSTOLIC BLOOD PRESSURE: 109 MMHG

## 2024-11-27 DIAGNOSIS — K65.1 INTRA-ABDOMINAL ABSCESS: Primary | ICD-10-CM

## 2024-11-27 PROCEDURE — 1125F AMNT PAIN NOTED PAIN PRSNT: CPT | Mod: CPTII,S$GLB,, | Performed by: STUDENT IN AN ORGANIZED HEALTH CARE EDUCATION/TRAINING PROGRAM

## 2024-11-27 PROCEDURE — 1159F MED LIST DOCD IN RCRD: CPT | Mod: CPTII,S$GLB,, | Performed by: STUDENT IN AN ORGANIZED HEALTH CARE EDUCATION/TRAINING PROGRAM

## 2024-11-27 PROCEDURE — 1160F RVW MEDS BY RX/DR IN RCRD: CPT | Mod: CPTII,S$GLB,, | Performed by: STUDENT IN AN ORGANIZED HEALTH CARE EDUCATION/TRAINING PROGRAM

## 2024-11-27 PROCEDURE — 99999 PR PBB SHADOW E&M-EST. PATIENT-LVL III: CPT | Mod: PBBFAC,,, | Performed by: STUDENT IN AN ORGANIZED HEALTH CARE EDUCATION/TRAINING PROGRAM

## 2024-11-27 PROCEDURE — 1101F PT FALLS ASSESS-DOCD LE1/YR: CPT | Mod: CPTII,S$GLB,, | Performed by: STUDENT IN AN ORGANIZED HEALTH CARE EDUCATION/TRAINING PROGRAM

## 2024-11-27 PROCEDURE — 3288F FALL RISK ASSESSMENT DOCD: CPT | Mod: CPTII,S$GLB,, | Performed by: STUDENT IN AN ORGANIZED HEALTH CARE EDUCATION/TRAINING PROGRAM

## 2024-11-27 PROCEDURE — 99214 OFFICE O/P EST MOD 30 MIN: CPT | Mod: S$GLB,,, | Performed by: STUDENT IN AN ORGANIZED HEALTH CARE EDUCATION/TRAINING PROGRAM

## 2024-11-27 NOTE — PROGRESS NOTES
Patient ID: Marycruz Perez is a 89 y.o. female.    Chief Complaint: No chief complaint on file.      HPI:  HPI  89F recentl admitted for diverticular abscess s/p IR drain  Drain has been putting out only a small amount of clear fluid recently. Complains of back pain mostly around drain site. Eating well. No fevers. Denies ab pain  On ertapenem at [facility].    11/27/2024  Feeling ok. No fevers. Reg BM. Eating fine  Still with occasional LLQ pain  Unclear if has been getting abx. Family doesn't know if or when it was stopped  Still has PICC line.    Review of Systems   Constitutional:  Negative for fever.   HENT:  Negative for trouble swallowing.    Respiratory:  Negative for shortness of breath.    Cardiovascular:  Negative for chest pain.   Gastrointestinal:  Negative for abdominal pain, blood in stool, nausea and vomiting.   Genitourinary:  Negative for dysuria.   Musculoskeletal:  Positive for gait problem.   Skin:  Negative for rash and wound.   Allergic/Immunologic: Negative for immunocompromised state.   Neurological:  Negative for weakness.   Hematological:  Does not bruise/bleed easily.   Psychiatric/Behavioral:  Negative for agitation.        Current Outpatient Medications   Medication Sig Dispense Refill    0.9% NaCl PgBk 100 mL with ertapenem 1 gram SolR 1 g Inject 1 g into the vein Daily. Last dose 10/29      acetaminophen (TYLENOL) 325 MG tablet Take 2 tablets (650 mg total) by mouth every 6 (six) hours as needed for Pain.      albuterol (PROVENTIL/VENTOLIN HFA) 90 mcg/actuation inhaler Inhale 2 puffs into the lungs every 4 (four) hours as needed for Wheezing (cough or wheezing). Rescue 18 g 11    albuterol-ipratropium (DUO-NEB) 2.5 mg-0.5 mg/3 mL nebulizer solution every 6 (six) hours as needed for Wheezing.      aspirin 81 MG Chew Take 1 tablet (81 mg total) by mouth once daily. 30 tablet 2    diclofenac sodium (VOLTAREN) 1 % Gel Apply 4 g topically 3 (three) times daily as needed.      ezetimibe (ZETIA)  10 mg tablet Take 1 tablet (10 mg total) by mouth once daily. 90 tablet 3    famotidine (PEPCID) 20 MG tablet Take 1 tablet (20 mg total) by mouth nightly as needed for Heartburn (heartburn). 90 tablet 3    meclizine (ANTIVERT) 25 mg tablet Take 25 mg by mouth 3 (three) times daily as needed (vertigo).      nitroGLYCERIN (NITROSTAT) 0.4 MG SL tablet Place 1 tablet (0.4 mg total) under the tongue every 5 (five) minutes as needed for Chest pain (angina). 30 tablet 1    tamsulosin (FLOMAX) 0.4 mg Cap Take 1 capsule (0.4 mg total) by mouth once daily.       No current facility-administered medications for this visit.     Facility-Administered Medications Ordered in Other Visits   Medication Dose Route Frequency Provider Last Rate Last Admin    vancomycin in dextrose 5 % 1 gram/250 mL IVPB 1,000 mg  1,000 mg Intravenous On Call Procedure Rocío Blevins, NP   1,000 mg at 02/22/21 0947       Review of patient's allergies indicates:   Allergen Reactions    Codeine     Latex, natural rubber     Penicillins     Penicillin Rash       Past Medical History:   Diagnosis Date    Acute exacerbation of CHF (congestive heart failure) 10/27/2020    NATHEN (acute kidney injury) 11/30/2023    Hypertension     Syncope and collapse        Past Surgical History:   Procedure Laterality Date    A-V CARDIAC PACEMAKER INSERTION N/A 2/22/2021    Procedure: INSERTION, CARDIAC PACEMAKER, DUAL CHAMBER;  Surgeon: Norman Foote MD;  Location: [facility] EP LAB;  Service: Cardiology;  Laterality: N/A;  AVB, PPM upgrade to Dual PPM (right sided), Bio, Venogram prior to draping, MAC, [place], 3 Prep    CARDIAC CATHETERIZATION      CARDIAC PACEMAKER PLACEMENT      CHOLECYSTECTOMY      CORONARY ANGIOPLASTY      HYSTERECTOMY      REVISION OF SKIN POCKET FOR PACEMAKER  2/22/2021    Procedure: Revision, Skin Pocket, For Cardiac Pacemaker;  Surgeon: Norman Foote MD;  Location: [facility] EP LAB;  Service: Cardiology;;       Social History     Socioeconomic History     Marital status: Single   Tobacco Use    Smoking status: Never    Smokeless tobacco: Never   Substance and Sexual Activity    Alcohol use: No    Drug use: No     Social Drivers of Health     Financial Resource Strain: Medium Risk (10/10/2024)    Overall Financial Resource Strain (CARDIA)     Difficulty of Paying Living Expenses: Somewhat hard   Food Insecurity: No Food Insecurity (10/10/2024)    Hunger Vital Sign     Worried About Running Out of Food in the Last Year: Never true     Ran Out of Food in the Last Year: Never true   Recent Concern: Food Insecurity - Food Insecurity Present (9/17/2024)    Hunger Vital Sign     Worried About Running Out of Food in the Last Year: Sometimes true     Ran Out of Food in the Last Year: Sometimes true   Transportation Needs: No Transportation Needs (10/10/2024)    TRANSPORTATION NEEDS     Transportation : No   Physical Activity: Inactive (9/17/2024)    Exercise Vital Sign     Days of Exercise per Week: 0 days     Minutes of Exercise per Session: 0 min   Stress: No Stress Concern Present (10/10/2024)    [employer] - Occupational Stress Questionnaire     Feeling of Stress : Not at all   Housing Stability: Low Risk  (10/10/2024)    Housing Stability Vital Sign     Unable to Pay for Housing in the Last Year: No     Homeless in the Last Year: No       Vitals:    11/27/24 1322   BP: 109/74   Pulse: (!) 114       Physical Exam  Constitutional:       General: She is not in acute distress.     Appearance: She is well-developed.   HENT:      Head: Normocephalic and atraumatic.   Eyes:      General: No scleral icterus.  Cardiovascular:      Rate and Rhythm: Normal rate.   Pulmonary:      Effort: Pulmonary effort is normal.      Breath sounds: No stridor.   Abdominal:      General: There is no distension.      Palpations: Abdomen is soft.      Tenderness: There is no abdominal tenderness.   Lymphadenopathy:      Cervical: No cervical adenopathy.   Skin:      General: Skin is warm.      Findings: No erythema.   Neurological:      Mental Status: She is alert and oriented to person, place, and time.   Psychiatric:         Behavior: Behavior normal.     CT reviewed from nov1. Small residual 2cm collection that is away from catheter tip.   Repeat CT reviewed. Small abscess    Assessment & Plan:  89F with diverticular abscess s/p IR drain  Continue abx for now, residual collection should resolve with abx. Instructed her to make sure she is still receiving abx and let us know  Will repeat scan in 2 weeks, RTC after

## 2024-11-29 ENCOUNTER — TELEPHONE (OUTPATIENT)
Dept: SURGERY | Facility: CLINIC | Age: 89
End: 2024-11-29
Payer: MEDICARE

## 2024-11-29 NOTE — TELEPHONE ENCOUNTER
----- Message from Deepa sent at 11/29/2024 12:50 PM CST -----  .Type:  Needs Medical Advice    Who Called: Salomón at [facility]    Would the patient rather a call back or a response via [facility]? Call back  Best Call Back Number:   Additional Information:     Salomón would like a call back to ask when to stop pt antibiotics          11/29/2024                      1500  Spoke to Salomón regarding the above message. Informed her, according to Dr. Crow's note, Ms. Joel is to continue the abx for now. Salomón confirmed the abx was never stopped. She stated 12/09/2024 is when she has abx to be completed. Confirmed her CT scheduled on 12/18 and f/u appointment on 12/20. Salomón verbalized understanding.

## 2024-12-18 ENCOUNTER — HOSPITAL ENCOUNTER (OUTPATIENT)
Dept: RADIOLOGY | Facility: HOSPITAL | Age: 89
Discharge: HOME | End: 2024-12-18
Attending: STUDENT IN AN ORGANIZED HEALTH CARE EDUCATION/TRAINING PROGRAM
Payer: MEDICARE

## 2024-12-18 DIAGNOSIS — K65.1 INTRA-ABDOMINAL ABSCESS: ICD-10-CM

## 2024-12-18 PROCEDURE — 74177 CT ABD & PELVIS W/CONTRAST: CPT | Mod: 26,,, | Performed by: RADIOLOGY

## 2024-12-18 PROCEDURE — 25500020 PHARM REV CODE 255: Performed by: STUDENT IN AN ORGANIZED HEALTH CARE EDUCATION/TRAINING PROGRAM

## 2024-12-18 PROCEDURE — 74177 CT ABD & PELVIS W/CONTRAST: CPT | Mod: TC

## 2024-12-18 RX ADMIN — IOHEXOL 75 ML: 350 INJECTION, SOLUTION INTRAVENOUS at 03:12

## 2024-12-20 ENCOUNTER — TELEPHONE (OUTPATIENT)
Dept: SURGERY | Facility: CLINIC | Age: 89
End: 2024-12-20

## 2024-12-20 ENCOUNTER — OFFICE VISIT (OUTPATIENT)
Dept: SURGERY | Facility: CLINIC | Age: 89
End: 2024-12-20
Payer: MEDICARE

## 2024-12-20 VITALS
TEMPERATURE: 97 F | SYSTOLIC BLOOD PRESSURE: 105 MMHG | HEART RATE: 105 BPM | DIASTOLIC BLOOD PRESSURE: 66 MMHG | BODY MASS INDEX: 36.6 KG/M2 | HEIGHT: 59 IN

## 2024-12-20 DIAGNOSIS — K65.1 INTRA-ABDOMINAL ABSCESS: Primary | ICD-10-CM

## 2024-12-20 PROCEDURE — 99999 PR PBB SHADOW E&M-EST. PATIENT-LVL III: CPT | Mod: PBBFAC,,, | Performed by: STUDENT IN AN ORGANIZED HEALTH CARE EDUCATION/TRAINING PROGRAM

## 2024-12-20 NOTE — TELEPHONE ENCOUNTER
----- Message from Lolita sent at 12/20/2024  4:17 PM CST -----  Type:  Patient Medication  Call    Who Called:[facility]   Would the patient rather a call back or a response via [facility]? Call back  Best Call Back Number:[phone]  Additional Information: 2 medications were recommenced... want to know which medication provider wants her to take and will need a script fax   [phone]

## 2024-12-20 NOTE — PROGRESS NOTES
Patient ID: Marycruz Perez is a 89 y.o. female.    Chief Complaint: No chief complaint on file.      HPI:  HPI  89F recentl admitted for diverticular abscess s/p IR drain  Drain has been putting out only a small amount of clear fluid recently. Complains of back pain mostly around drain site. Eating well. No fevers. Denies ab pain  On ertapenem at [facility].    11/27/2024  Feeling ok. No fevers. Reg BM. Eating fine  Still with occasional LLQ pain  Unclear if has been getting abx. Family doesn't know if or when it was stopped  Still has PICC line.    12/20/2024  Still with some pain LLQ, intermittent. Seems overall to be better  Eating ok but decreased appetite  No fevers  Still unclear if she is on abx  PICC was removed    Review of Systems   Constitutional:  Negative for fever.   HENT:  Negative for trouble swallowing.    Respiratory:  Negative for shortness of breath.    Cardiovascular:  Negative for chest pain.   Gastrointestinal:  Negative for abdominal pain, blood in stool, nausea and vomiting.   Genitourinary:  Negative for dysuria.   Musculoskeletal:  Positive for gait problem.   Skin:  Negative for rash and wound.   Allergic/Immunologic: Negative for immunocompromised state.   Neurological:  Negative for weakness.   Hematological:  Does not bruise/bleed easily.   Psychiatric/Behavioral:  Negative for agitation.        Current Outpatient Medications   Medication Sig Dispense Refill    0.9% NaCl PgBk 100 mL with ertapenem 1 gram SolR 1 g Inject 1 g into the vein Daily. Last dose 10/29      acetaminophen (TYLENOL) 325 MG tablet Take 2 tablets (650 mg total) by mouth every 6 (six) hours as needed for Pain.      albuterol (PROVENTIL/VENTOLIN HFA) 90 mcg/actuation inhaler Inhale 2 puffs into the lungs every 4 (four) hours as needed for Wheezing (cough or wheezing). Rescue 18 g 11    albuterol-ipratropium (DUO-NEB) 2.5 mg-0.5 mg/3 mL nebulizer solution every 6 (six) hours as needed for Wheezing.      diclofenac sodium  (VOLTAREN) 1 % Gel Apply 4 g topically 3 (three) times daily as needed.      ezetimibe (ZETIA) 10 mg tablet Take 1 tablet (10 mg total) by mouth once daily. 90 tablet 3    famotidine (PEPCID) 20 MG tablet Take 1 tablet (20 mg total) by mouth nightly as needed for Heartburn (heartburn). 90 tablet 3    meclizine (ANTIVERT) 25 mg tablet Take 25 mg by mouth 3 (three) times daily as needed (vertigo).      tamsulosin (FLOMAX) 0.4 mg Cap Take 1 capsule (0.4 mg total) by mouth once daily.      aspirin 81 MG Chew Take 1 tablet (81 mg total) by mouth once daily. 30 tablet 2    nitroGLYCERIN (NITROSTAT) 0.4 MG SL tablet Place 1 tablet (0.4 mg total) under the tongue every 5 (five) minutes as needed for Chest pain (angina). 30 tablet 1     No current facility-administered medications for this visit.     Facility-Administered Medications Ordered in Other Visits   Medication Dose Route Frequency Provider Last Rate Last Admin    vancomycin in dextrose 5 % 1 gram/250 mL IVPB 1,000 mg  1,000 mg Intravenous On Call Procedure Rocío Blevins, NP   1,000 mg at 02/22/21 0947       Review of patient's allergies indicates:   Allergen Reactions    Codeine     Latex, natural rubber     Penicillins     Penicillin Rash       Past Medical History:   Diagnosis Date    Acute exacerbation of CHF (congestive heart failure) 10/27/2020    NATHEN (acute kidney injury) 11/30/2023    Hypertension     Syncope and collapse        Past Surgical History:   Procedure Laterality Date    A-V CARDIAC PACEMAKER INSERTION N/A 2/22/2021    Procedure: INSERTION, CARDIAC PACEMAKER, DUAL CHAMBER;  Surgeon: Norman Foote MD;  Location: [facility] EP LAB;  Service: Cardiology;  Laterality: N/A;  AVB, PPM upgrade to Dual PPM (right sided), Bio, Venogram prior to draping, MAC, [place], 3 Prep    CARDIAC CATHETERIZATION      CARDIAC PACEMAKER PLACEMENT      CHOLECYSTECTOMY      CORONARY ANGIOPLASTY      HYSTERECTOMY      REVISION OF SKIN POCKET FOR PACEMAKER  2/22/2021    Procedure:  Revision, Skin Pocket, For Cardiac Pacemaker;  Surgeon: Norman Foote MD;  Location: [facility] EP LAB;  Service: Cardiology;;       Social History     Socioeconomic History    Marital status: Single   Tobacco Use    Smoking status: Never    Smokeless tobacco: Never   Substance and Sexual Activity    Alcohol use: No    Drug use: No     Social Drivers of Health     Financial Resource Strain: Medium Risk (10/10/2024)    Overall Financial Resource Strain (CARDIA)     Difficulty of Paying Living Expenses: Somewhat hard   Food Insecurity: No Food Insecurity (10/10/2024)    Hunger Vital Sign     Worried About Running Out of Food in the Last Year: Never true     Ran Out of Food in the Last Year: Never true   Recent Concern: Food Insecurity - Food Insecurity Present (9/17/2024)    Hunger Vital Sign     Worried About Running Out of Food in the Last Year: Sometimes true     Ran Out of Food in the Last Year: Sometimes true   Transportation Needs: No Transportation Needs (10/10/2024)    TRANSPORTATION NEEDS     Transportation : No   Physical Activity: Inactive (9/17/2024)    Exercise Vital Sign     Days of Exercise per Week: 0 days     Minutes of Exercise per Session: 0 min   Stress: No Stress Concern Present (10/10/2024)    [employer] - Occupational Stress Questionnaire     Feeling of Stress : Not at all   Housing Stability: Low Risk  (10/10/2024)    Housing Stability Vital Sign     Unable to Pay for Housing in the Last Year: No     Homeless in the Last Year: No       Vitals:    12/20/24 1340   BP: 105/66   Pulse: 105   Temp: 96.8 °F (36 °C)       Physical Exam  Constitutional:       General: She is not in acute distress.     Appearance: She is well-developed.   HENT:      Head: Normocephalic and atraumatic.   Eyes:      General: No scleral icterus.  Cardiovascular:      Rate and Rhythm: Normal rate.   Pulmonary:      Effort: Pulmonary effort is normal.      Breath sounds: No stridor.   Abdominal:       General: There is no distension.      Palpations: Abdomen is soft.      Tenderness: There is no abdominal tenderness.   Lymphadenopathy:      Cervical: No cervical adenopathy.   Skin:     General: Skin is warm.      Findings: No erythema.   Neurological:      Mental Status: She is alert and oriented to person, place, and time.   Psychiatric:         Behavior: Behavior normal.     CT reviewed from nov1. Small residual 2cm collection that is away from catheter tip.   Repeat CT reviewed. Small abscess  Repeat CT shows smaller abscess, improving but present    Assessment & Plan:  89F with diverticular abscess s/p IR drain  Would still recommend continue abx, can do cipro flagyl since stable, improving  Will see her back in 2 weeks and decide if repeat CT

## 2024-12-23 RX ORDER — CIPROFLOXACIN 500 MG/1
500 TABLET ORAL 2 TIMES DAILY
Qty: 60 TABLET | Refills: 0 | Status: SHIPPED | OUTPATIENT
Start: 2024-12-23 | End: 2025-01-22

## 2024-12-23 RX ORDER — METRONIDAZOLE 500 MG/1
500 TABLET ORAL 3 TIMES DAILY
Qty: 90 TABLET | Refills: 0 | Status: SHIPPED | OUTPATIENT
Start: 2024-12-23 | End: 2025-01-22

## 2025-01-03 ENCOUNTER — OFFICE VISIT (OUTPATIENT)
Dept: SURGERY | Facility: CLINIC | Age: OVER 89
End: 2025-01-03
Payer: MEDICARE

## 2025-01-03 VITALS
SYSTOLIC BLOOD PRESSURE: 100 MMHG | TEMPERATURE: 98 F | HEART RATE: 105 BPM | BODY MASS INDEX: 37.87 KG/M2 | HEIGHT: 58 IN | DIASTOLIC BLOOD PRESSURE: 62 MMHG

## 2025-01-03 DIAGNOSIS — K57.80 DIVERTICULITIS OF INTESTINE WITH ABSCESS WITHOUT BLEEDING, UNSPECIFIED PART OF INTESTINAL TRACT: Primary | ICD-10-CM

## 2025-01-03 PROCEDURE — 99999 PR PBB SHADOW E&M-EST. PATIENT-LVL III: CPT | Mod: PBBFAC,,, | Performed by: STUDENT IN AN ORGANIZED HEALTH CARE EDUCATION/TRAINING PROGRAM

## 2025-01-03 RX ORDER — POTASSIUM CHLORIDE 750 MG/1
10 TABLET, EXTENDED RELEASE ORAL
COMMUNITY
Start: 2024-12-30

## 2025-01-03 RX ORDER — FUROSEMIDE 40 MG/1
40 TABLET ORAL
COMMUNITY

## 2025-01-03 RX ORDER — CIPROFLOXACIN 500 MG/1
500 TABLET ORAL 2 TIMES DAILY
Qty: 60 TABLET | Refills: 0 | Status: SHIPPED | OUTPATIENT
Start: 2025-01-03 | End: 2025-02-02

## 2025-01-03 RX ORDER — METRONIDAZOLE 500 MG/1
500 TABLET ORAL 3 TIMES DAILY
Qty: 90 TABLET | Refills: 0 | Status: SHIPPED | OUTPATIENT
Start: 2025-01-03 | End: 2025-02-02

## 2025-01-03 RX ORDER — APIXABAN 5 MG/1
5 TABLET, FILM COATED ORAL 2 TIMES DAILY
COMMUNITY
Start: 2025-01-02

## 2025-01-06 ENCOUNTER — TELEPHONE (OUTPATIENT)
Dept: SURGERY | Facility: CLINIC | Age: OVER 89
End: 2025-01-06
Payer: MEDICARE

## 2025-01-06 NOTE — TELEPHONE ENCOUNTER
----- Message from Carolina sent at 1/6/2025  1:34 PM CST -----  Type:  Rx Orders     Who Called: Nesha - [facility]   Does the patient know what this is regarding?: sent pt back with rx on 01/03 with two antibiotics. Need orders and instructions sent over   Would the patient rather a call back or a response via [facility]? Call   Best Call Back Number: [phone] fax#:[phone]    Additional Information:            01/06/2025                  1650  Spoke with Florencia regarding the above message. Provided her with the instructions for the medications. Florencia verbalized understanding.

## 2025-01-27 ENCOUNTER — OFFICE VISIT (OUTPATIENT)
Dept: SURGERY | Facility: CLINIC | Age: OVER 89
End: 2025-01-27
Payer: MEDICARE

## 2025-01-27 ENCOUNTER — OFFICE VISIT (OUTPATIENT)
Dept: INFECTIOUS DISEASES | Facility: CLINIC | Age: OVER 89
End: 2025-01-27
Payer: MEDICARE

## 2025-01-27 DIAGNOSIS — K65.1 INTRA-ABDOMINAL ABSCESS: Primary | ICD-10-CM

## 2025-01-27 DIAGNOSIS — K57.80 DIVERTICULITIS OF INTESTINE WITH ABSCESS WITHOUT BLEEDING, UNSPECIFIED PART OF INTESTINAL TRACT: Primary | ICD-10-CM

## 2025-01-27 PROCEDURE — 1159F MED LIST DOCD IN RCRD: CPT | Mod: CPTII,S$GLB,, | Performed by: STUDENT IN AN ORGANIZED HEALTH CARE EDUCATION/TRAINING PROGRAM

## 2025-01-27 PROCEDURE — 99213 OFFICE O/P EST LOW 20 MIN: CPT | Mod: S$GLB,,, | Performed by: INTERNAL MEDICINE

## 2025-01-27 PROCEDURE — 99213 OFFICE O/P EST LOW 20 MIN: CPT | Mod: S$GLB,,, | Performed by: STUDENT IN AN ORGANIZED HEALTH CARE EDUCATION/TRAINING PROGRAM

## 2025-01-27 PROCEDURE — 1101F PT FALLS ASSESS-DOCD LE1/YR: CPT | Mod: CPTII,S$GLB,, | Performed by: INTERNAL MEDICINE

## 2025-01-27 PROCEDURE — 99999 PR PBB SHADOW E&M-EST. PATIENT-LVL II: CPT | Mod: PBBFAC,,, | Performed by: STUDENT IN AN ORGANIZED HEALTH CARE EDUCATION/TRAINING PROGRAM

## 2025-01-27 PROCEDURE — 1160F RVW MEDS BY RX/DR IN RCRD: CPT | Mod: CPTII,S$GLB,, | Performed by: STUDENT IN AN ORGANIZED HEALTH CARE EDUCATION/TRAINING PROGRAM

## 2025-01-27 PROCEDURE — 1126F AMNT PAIN NOTED NONE PRSNT: CPT | Mod: CPTII,S$GLB,, | Performed by: INTERNAL MEDICINE

## 2025-01-27 PROCEDURE — 99999 PR PBB SHADOW E&M-EST. PATIENT-LVL II: CPT | Mod: PBBFAC,,, | Performed by: INTERNAL MEDICINE

## 2025-01-27 PROCEDURE — 3288F FALL RISK ASSESSMENT DOCD: CPT | Mod: CPTII,S$GLB,, | Performed by: INTERNAL MEDICINE

## 2025-01-27 PROCEDURE — 1159F MED LIST DOCD IN RCRD: CPT | Mod: CPTII,S$GLB,, | Performed by: INTERNAL MEDICINE

## 2025-01-28 NOTE — PROGRESS NOTES
Patient ID: Marycruz Perez is a 89 y.o. female.    Chief Complaint: No chief complaint on file.      HPI:  HPI  89F recentl admitted for diverticular abscess s/p IR drain  Drain has been putting out only a small amount of clear fluid recently. Complains of back pain mostly around drain site. Eating well. No fevers. Denies ab pain  [facility]    11/27/2024  Feeling ok. No fevers. Reg BM. Eating fine  Still with occasional LLQ pain  Unclear if has been getting abx. Family doesn't know if or when it was stopped  Still has PICC line.    12/20/2024  Still with some pain LLQ, intermittent. Seems overall to be better  Eating ok but decreased appetite  No fevers  Still unclear if she is on abx  PICC was removed    1/3/2024  Feeling ok. Seems better, still with some LLQ discomfort but only with BM. No pain today  Still unclear if getting abx at all. Not on med list with her.     1/27/2025  Feeling better overall  Has had some decrease appetite, nausea that she relates to the antibiotics  Denies pain, fevers. No pain with BM now.     Review of Systems   Constitutional:  Negative for fever.   HENT:  Negative for trouble swallowing.    Respiratory:  Negative for shortness of breath.    Cardiovascular:  Negative for chest pain.   Gastrointestinal:  Negative for abdominal pain, blood in stool, nausea and vomiting.   Genitourinary:  Negative for dysuria.   Musculoskeletal:  Positive for gait problem.   Skin:  Negative for rash and wound.   Allergic/Immunologic: Negative for immunocompromised state.   Neurological:  Negative for weakness.   Hematological:  Does not bruise/bleed easily.   Psychiatric/Behavioral:  Negative for agitation.        Current Outpatient Medications   Medication Sig Dispense Refill    0.9% NaCl PgBk 100 mL with ertapenem 1 gram SolR 1 g Inject 1 g into the vein Daily. Last dose 10/29      acetaminophen (TYLENOL) 325 MG tablet Take 2 tablets (650 mg total) by mouth every 6 (six) hours as needed  for Pain.      albuterol (PROVENTIL/VENTOLIN HFA) 90 mcg/actuation inhaler Inhale 2 puffs into the lungs every 4 (four) hours as needed for Wheezing (cough or wheezing). Rescue 18 g 11    albuterol-ipratropium (DUO-NEB) 2.5 mg-0.5 mg/3 mL nebulizer solution every 6 (six) hours as needed for Wheezing.      aspirin 81 MG Chew Take 1 tablet (81 mg total) by mouth once daily. 30 tablet 2    ciprofloxacin HCl (CIPRO) 500 MG tablet Take 1 tablet (500 mg total) by mouth 2 (two) times daily. 60 tablet 0    diclofenac sodium (VOLTAREN) 1 % Gel Apply 4 g topically 3 (three) times daily as needed.      ELIQUIS 5 mg Tab Take 5 mg by mouth 2 (two) times daily.      ezetimibe (ZETIA) 10 mg tablet Take 1 tablet (10 mg total) by mouth once daily. 90 tablet 3    famotidine (PEPCID) 20 MG tablet Take 1 tablet (20 mg total) by mouth nightly as needed for Heartburn (heartburn). 90 tablet 3    furosemide (LASIX) 40 MG tablet Take 40 mg by mouth.      meclizine (ANTIVERT) 25 mg tablet Take 25 mg by mouth 3 (three) times daily as needed (vertigo).      metroNIDAZOLE (FLAGYL) 500 MG tablet Take 1 tablet (500 mg total) by mouth 3 (three) times daily. 90 tablet 0    nitroGLYCERIN (NITROSTAT) 0.4 MG SL tablet Place 1 tablet (0.4 mg total) under the tongue every 5 (five) minutes as needed for Chest pain (angina). 30 tablet 1    potassium chloride SA (K-DUR,KLOR-CON M) 10 MEQ tablet Take 10 mEq by mouth.      tamsulosin (FLOMAX) 0.4 mg Cap Take 1 capsule (0.4 mg total) by mouth once daily.       No current facility-administered medications for this visit.     Facility-Administered Medications Ordered in Other Visits   Medication Dose Route Frequency Provider Last Rate Last Admin    vancomycin in dextrose 5 % 1 gram/250 mL IVPB 1,000 mg  1,000 mg Intravenous On Call Procedure Rocío Blevins, OSEAS   1,000 mg at 02/22/21 0947       Review of patient's allergies indicates:   Allergen Reactions    Codeine     Latex, natural rubber     Penicillins      Penicillin Rash       Past Medical History:   Diagnosis Date    Acute exacerbation of CHF (congestive heart failure) 10/27/2020    NATHEN (acute kidney injury) 11/30/2023    Hypertension     Syncope and collapse        Past Surgical History:   Procedure Laterality Date    A-V CARDIAC PACEMAKER INSERTION N/A 2/22/2021    Procedure: INSERTION, CARDIAC PACEMAKER, DUAL CHAMBER;  Surgeon: Norman Foote MD;  Location: [facility] EP LAB;  Service: Cardiology;  Laterality: N/A;  AVB, PPM upgrade to Dual PPM (right sided), Bio, Venogram prior to draping, MAC, [place], 3 Prep    CARDIAC CATHETERIZATION      CARDIAC PACEMAKER PLACEMENT      CHOLECYSTECTOMY      CORONARY ANGIOPLASTY      HYSTERECTOMY      REVISION OF SKIN POCKET FOR PACEMAKER  2/22/2021    Procedure: Revision, Skin Pocket, For Cardiac Pacemaker;  Surgeon: Norman Foote MD;  Location: [facility] EP LAB;  Service: Cardiology;;       Social History     Socioeconomic History    Marital status: Single   Tobacco Use    Smoking status: Never    Smokeless tobacco: Never   Substance and Sexual Activity    Alcohol use: No    Drug use: No     Social Drivers of Health     Financial Resource Strain: Medium Risk (10/10/2024)    Overall Financial Resource Strain (CARDIA)     Difficulty of Paying Living Expenses: Somewhat hard   Food Insecurity: No Food Insecurity (10/10/2024)    Hunger Vital Sign     Worried About Running Out of Food in the Last Year: Never true     Ran Out of Food in the Last Year: Never true   Recent Concern: Food Insecurity - Food Insecurity Present (9/17/2024)    Hunger Vital Sign     Worried About Running Out of Food in the Last Year: Sometimes true     Ran Out of Food in the Last Year: Sometimes true   Transportation Needs: No Transportation Needs (10/10/2024)    TRANSPORTATION NEEDS     Transportation : No   Physical Activity: Inactive (9/17/2024)    Exercise Vital Sign     Days of Exercise per Week: 0 days     Minutes of Exercise per Session: 0 min   Stress: No Stress  Concern Present (10/10/2024)    [employer] - Occupational Stress Questionnaire     Feeling of Stress : Not at all   Housing Stability: Low Risk  (10/10/2024)    Housing Stability Vital Sign     Unable to Pay for Housing in the Last Year: No     Homeless in the Last Year: No       There were no vitals filed for this visit.      Physical Exam  Constitutional:       General: She is not in acute distress.     Appearance: She is well-developed.   HENT:      Head: Normocephalic and atraumatic.   Eyes:      General: No scleral icterus.  Cardiovascular:      Rate and Rhythm: Normal rate.   Pulmonary:      Effort: Pulmonary effort is normal.      Breath sounds: No stridor.   Abdominal:      General: There is no distension.      Palpations: Abdomen is soft.      Tenderness: There is no abdominal tenderness.   Lymphadenopathy:      Cervical: No cervical adenopathy.   Skin:     General: Skin is warm.      Findings: No erythema.   Neurological:      Mental Status: She is alert and oriented to person, place, and time.   Psychiatric:         Behavior: Behavior normal.     CT reviewed from nov1. Small residual 2cm collection that is away from catheter tip.   Repeat CT reviewed. Small abscess  Repeat CT shows smaller abscess, improving but present    Assessment & Plan:  89F with diverticular abscess s/p IR drain  Clinically continues to improve  Stop abx  RTC with any issues or concerns

## (undated) DEVICE — GAUZE SPONGE 4X4 12PLY

## (undated) DEVICE — ELECTRODE REM PLYHSV RETURN 9

## (undated) DEVICE — PAD DEFIB CADENCE ADULT R2

## (undated) DEVICE — BLADE PLASMA WIDE SPATULA TIP

## (undated) DEVICE — ADHESIVE DERMABOND ADVANCED

## (undated) DEVICE — DRAPE INCISE IOBAN 2 23X17IN

## (undated) DEVICE — SHEATH SAFESHEATH II ULTRA 6FR

## (undated) DEVICE — DRESSING AQUACEL AG ADV 3.5X6

## (undated) DEVICE — SLING SWATHE UNIVERSAL FOAM

## (undated) DEVICE — PACK PACER PERMANENT

## (undated) DEVICE — KIT INTRO MICRO NIT VSI 4FR